# Patient Record
Sex: FEMALE | Race: BLACK OR AFRICAN AMERICAN | NOT HISPANIC OR LATINO | Employment: UNEMPLOYED | URBAN - METROPOLITAN AREA
[De-identification: names, ages, dates, MRNs, and addresses within clinical notes are randomized per-mention and may not be internally consistent; named-entity substitution may affect disease eponyms.]

---

## 2019-07-19 ENCOUNTER — HOSPITAL ENCOUNTER (INPATIENT)
Facility: HOSPITAL | Age: 54
LOS: 19 days | Discharge: HOME | End: 2019-08-07
Attending: INTERNAL MEDICINE | Admitting: INTERNAL MEDICINE
Payer: MEDICAID

## 2019-07-19 DIAGNOSIS — R07.9 CHEST PAIN: ICD-10-CM

## 2019-07-19 DIAGNOSIS — D69.6 THROMBOCYTOPENIA: ICD-10-CM

## 2019-07-19 DIAGNOSIS — R34 OLIGURIA: ICD-10-CM

## 2019-07-19 DIAGNOSIS — R76.8 ELEVATED SERUM IMMUNOGLOBULIN FREE LIGHT CHAINS: ICD-10-CM

## 2019-07-19 DIAGNOSIS — M79.89 LEG SWELLING: ICD-10-CM

## 2019-07-19 DIAGNOSIS — R31.9 HEMATURIA SYNDROME: ICD-10-CM

## 2019-07-19 DIAGNOSIS — R80.8 OTHER PROTEINURIA: ICD-10-CM

## 2019-07-19 DIAGNOSIS — R59.0 MEDIASTINAL ADENOPATHY: ICD-10-CM

## 2019-07-19 DIAGNOSIS — I10 ESSENTIAL HYPERTENSION: ICD-10-CM

## 2019-07-19 DIAGNOSIS — N00.8 ACUTE (DIFFUSE) PROLIFERATIVE GLOMERULONEPHRITIS: ICD-10-CM

## 2019-07-19 DIAGNOSIS — R77.8 LOW SERUM COMPLEMENT C4: ICD-10-CM

## 2019-07-19 DIAGNOSIS — D80.1 HYPOGAMMAGLOBULINEMIA: ICD-10-CM

## 2019-07-19 DIAGNOSIS — N17.9 ACUTE RENAL FAILURE, UNSPECIFIED ACUTE RENAL FAILURE TYPE: ICD-10-CM

## 2019-07-19 DIAGNOSIS — E87.79 OTHER HYPERVOLEMIA: ICD-10-CM

## 2019-07-19 DIAGNOSIS — B18.1 CHRONIC HEPATITIS B: ICD-10-CM

## 2019-07-19 DIAGNOSIS — D89.1 CRYOGLOBULINEMIC VASCULITIS: Primary | ICD-10-CM

## 2019-07-19 DIAGNOSIS — N17.9 ACUTE RENAL FAILURE: ICD-10-CM

## 2019-07-19 DIAGNOSIS — R06.01 ORTHOPNEA: ICD-10-CM

## 2019-07-19 PROBLEM — J18.9 PNEUMONIA: Status: ACTIVE | Noted: 2019-07-19

## 2019-07-19 PROBLEM — R79.89 ELEVATED BRAIN NATRIURETIC PEPTIDE (BNP) LEVEL: Status: ACTIVE | Noted: 2019-07-19

## 2019-07-19 LAB
ALBUMIN SERPL BCP-MCNC: 2.6 G/DL (ref 3.5–5.2)
ALLENS TEST: ABNORMAL
ALP SERPL-CCNC: 121 U/L (ref 55–135)
ALT SERPL W/O P-5'-P-CCNC: 26 U/L (ref 10–44)
ANION GAP SERPL CALC-SCNC: 14 MMOL/L (ref 8–16)
ASCENDING AORTA: 3.01 CM
AST SERPL-CCNC: 31 U/L (ref 10–40)
AV INDEX (PROSTH): 0.91
AV MEAN GRADIENT: 6 MMHG
AV PEAK GRADIENT: 15 MMHG
AV VALVE AREA: 2.64 CM2
AV VELOCITY RATIO: 0.78
BACTERIA #/AREA URNS AUTO: ABNORMAL /HPF
BASOPHILS # BLD AUTO: 0.01 K/UL (ref 0–0.2)
BASOPHILS NFR BLD: 0.2 % (ref 0–1.9)
BILIRUB SERPL-MCNC: 0.8 MG/DL (ref 0.1–1)
BILIRUB UR QL STRIP: NEGATIVE
BNP SERPL-MCNC: 999 PG/ML (ref 0–99)
BSA FOR ECHO PROCEDURE: 2.19 M2
BUN SERPL-MCNC: 100 MG/DL (ref 6–20)
CALCIUM SERPL-MCNC: 9 MG/DL (ref 8.7–10.5)
CHLORIDE SERPL-SCNC: 108 MMOL/L (ref 95–110)
CLARITY UR REFRACT.AUTO: ABNORMAL
CO2 SERPL-SCNC: 16 MMOL/L (ref 23–29)
COLOR UR AUTO: ABNORMAL
CREAT SERPL-MCNC: 4.3 MG/DL (ref 0.5–1.4)
CREAT UR-MCNC: 222 MG/DL (ref 15–325)
CV ECHO LV RWT: 0.51 CM
DAT IGG-SP REAG RBC-IMP: NORMAL
DIFFERENTIAL METHOD: ABNORMAL
DOP CALC AO PEAK VEL: 1.94 M/S
DOP CALC AO VTI: 34.14 CM
DOP CALC LVOT AREA: 2.9 CM2
DOP CALC LVOT DIAMETER: 1.92 CM
DOP CALC LVOT PEAK VEL: 1.52 M/S
DOP CALC LVOT STROKE VOLUME: 90.08 CM3
DOP CALCLVOT PEAK VEL VTI: 31.13 CM
E WAVE DECELERATION TIME: 213.6 MSEC
E/A RATIO: 2.49
E/E' RATIO: 11.79 M/S
ECHO LV POSTERIOR WALL: 1.18 CM (ref 0.6–1.1)
EOSINOPHIL # BLD AUTO: 0 K/UL (ref 0–0.5)
EOSINOPHIL NFR BLD: 0 % (ref 0–8)
ERYTHROCYTE [DISTWIDTH] IN BLOOD BY AUTOMATED COUNT: 22.3 % (ref 11.5–14.5)
EST. GFR  (AFRICAN AMERICAN): 12.8 ML/MIN/1.73 M^2
EST. GFR  (NON AFRICAN AMERICAN): 11.1 ML/MIN/1.73 M^2
FERRITIN SERPL-MCNC: 161 NG/ML (ref 20–300)
FIBRINOGEN PPP-MCNC: 393 MG/DL (ref 182–366)
FOLATE SERPL-MCNC: 11.1 NG/ML (ref 4–24)
FRACTIONAL SHORTENING: 39 % (ref 28–44)
GLUCOSE SERPL-MCNC: 123 MG/DL (ref 70–110)
GLUCOSE UR QL STRIP: ABNORMAL
HAPTOGLOB SERPL-MCNC: 209 MG/DL (ref 30–250)
HBSAG CONFIRMATION: POSITIVE
HBV CORE IGM SERPL QL IA: NEGATIVE
HBV SURFACE AB SER-ACNC: NEGATIVE M[IU]/ML
HBV SURFACE AG SERPL QL IA: POSITIVE
HCO3 UR-SCNC: 18.4 MMOL/L (ref 24–28)
HCT VFR BLD AUTO: 30.6 % (ref 37–48.5)
HGB BLD-MCNC: 10 G/DL (ref 12–16)
HGB UR QL STRIP: ABNORMAL
HIV 1+2 AB+HIV1 P24 AG SERPL QL IA: NEGATIVE
HYALINE CASTS UR QL AUTO: 15 /LPF
IMM GRANULOCYTES # BLD AUTO: 0.11 K/UL (ref 0–0.04)
IMM GRANULOCYTES NFR BLD AUTO: 1.8 % (ref 0–0.5)
INR PPP: 1 (ref 0.8–1.2)
INTERVENTRICULAR SEPTUM: 1 CM (ref 0.6–1.1)
IVRT: 0.06 MSEC
KETONES UR QL STRIP: NEGATIVE
LA MAJOR: 5.58 CM
LA MINOR: 5.66 CM
LA WIDTH: 4.08 CM
LACTATE SERPL-SCNC: 0.6 MMOL/L (ref 0.5–2.2)
LDH SERPL L TO P-CCNC: 604 U/L (ref 110–260)
LEFT ATRIUM SIZE: 4.01 CM
LEFT ATRIUM VOLUME INDEX: 37 ML/M2
LEFT ATRIUM VOLUME: 78.15 CM3
LEFT INTERNAL DIMENSION IN SYSTOLE: 2.84 CM (ref 2.1–4)
LEFT VENTRICLE DIASTOLIC VOLUME INDEX: 47.2 ML/M2
LEFT VENTRICLE DIASTOLIC VOLUME: 99.62 ML
LEFT VENTRICLE MASS INDEX: 86 G/M2
LEFT VENTRICLE SYSTOLIC VOLUME INDEX: 14.5 ML/M2
LEFT VENTRICLE SYSTOLIC VOLUME: 30.66 ML
LEFT VENTRICULAR INTERNAL DIMENSION IN DIASTOLE: 4.65 CM (ref 3.5–6)
LEFT VENTRICULAR MASS: 182.03 G
LEUKOCYTE ESTERASE UR QL STRIP: NEGATIVE
LV LATERAL E/E' RATIO: 11.2 M/S
LV SEPTAL E/E' RATIO: 12.44 M/S
LYMPHOCYTES # BLD AUTO: 1.3 K/UL (ref 1–4.8)
LYMPHOCYTES NFR BLD: 22.2 % (ref 18–48)
MAGNESIUM SERPL-MCNC: 2.2 MG/DL (ref 1.6–2.6)
MCH RBC QN AUTO: 22.4 PG (ref 27–31)
MCHC RBC AUTO-ENTMCNC: 32.7 G/DL (ref 32–36)
MCV RBC AUTO: 69 FL (ref 82–98)
MICROSCOPIC COMMENT: ABNORMAL
MONOCYTES # BLD AUTO: 0.4 K/UL (ref 0.3–1)
MONOCYTES NFR BLD: 7.4 % (ref 4–15)
MV PEAK A VEL: 0.45 M/S
MV PEAK E VEL: 1.12 M/S
NEUTROPHILS # BLD AUTO: 4.1 K/UL (ref 1.8–7.7)
NEUTROPHILS NFR BLD: 68.4 % (ref 38–73)
NITRITE UR QL STRIP: NEGATIVE
NRBC BLD-RTO: 0 /100 WBC
PATH REV BLD -IMP: NORMAL
PATH REV BLD -IMP: NORMAL
PCO2 BLDA: 28.1 MMHG (ref 35–45)
PH SMN: 7.42 [PH] (ref 7.35–7.45)
PH UR STRIP: 5 [PH] (ref 5–8)
PHOSPHATE SERPL-MCNC: 3.8 MG/DL (ref 2.7–4.5)
PISA TR MAX VEL: 2.81 M/S
PLATELET # BLD AUTO: 41 K/UL (ref 150–350)
PMV BLD AUTO: ABNORMAL FL (ref 9.2–12.9)
PO2 BLDA: 69 MMHG (ref 80–100)
POC BE: -6 MMOL/L
POC SATURATED O2: 94 % (ref 95–100)
POC TCO2: 19 MMOL/L (ref 23–27)
POTASSIUM SERPL-SCNC: 3.8 MMOL/L (ref 3.5–5.1)
PROT SERPL-MCNC: 5.4 G/DL (ref 6–8.4)
PROT UR QL STRIP: ABNORMAL
PROT UR-MCNC: 1326 MG/DL (ref 0–15)
PROT/CREAT UR: 5.97 MG/G{CREAT} (ref 0–0.2)
PROTHROMBIN TIME: 10.4 SEC (ref 9–12.5)
PULM VEIN S/D RATIO: 0.57
PV PEAK D VEL: 0.88 M/S
PV PEAK S VEL: 0.5 M/S
RA MAJOR: 4.99 CM
RA PRESSURE: 15 MMHG
RA WIDTH: 3.99 CM
RBC # BLD AUTO: 4.47 M/UL (ref 4–5.4)
RBC #/AREA URNS AUTO: 86 /HPF (ref 0–4)
RETICS/RBC NFR AUTO: 0.8 % (ref 0.5–2.5)
RIGHT VENTRICULAR END-DIASTOLIC DIMENSION: 3.57 CM
RV TISSUE DOPPLER FREE WALL SYSTOLIC VELOCITY 1 (APICAL 4 CHAMBER VIEW): 12 CM/S
SAMPLE: ABNORMAL
SINUS: 3.09 CM
SITE: ABNORMAL
SODIUM SERPL-SCNC: 138 MMOL/L (ref 136–145)
SP GR UR STRIP: 1.02 (ref 1–1.03)
SQUAMOUS #/AREA URNS AUTO: 1 /HPF
STJ: 2.57 CM
T4 FREE SERPL-MCNC: 0.89 NG/DL (ref 0.71–1.51)
TDI LATERAL: 0.1 M/S
TDI SEPTAL: 0.09 M/S
TDI: 0.1 M/S
TR MAX PG: 32 MMHG
TRICUSPID ANNULAR PLANE SYSTOLIC EXCURSION: 2.29 CM
TSH SERPL DL<=0.005 MIU/L-ACNC: 0.38 UIU/ML (ref 0.4–4)
TV REST PULMONARY ARTERY PRESSURE: 47 MMHG
URN SPEC COLLECT METH UR: ABNORMAL
VIT B12 SERPL-MCNC: 843 PG/ML (ref 210–950)
WBC # BLD AUTO: 5.98 K/UL (ref 3.9–12.7)
WBC #/AREA URNS AUTO: 4 /HPF (ref 0–5)
YEAST UR QL AUTO: ABNORMAL

## 2019-07-19 PROCEDURE — 86706 HEP B SURFACE ANTIBODY: CPT

## 2019-07-19 PROCEDURE — 83605 ASSAY OF LACTIC ACID: CPT

## 2019-07-19 PROCEDURE — 99232 PR SUBSEQUENT HOSPITAL CARE,LEVL II: ICD-10-PCS | Mod: ,,, | Performed by: INTERNAL MEDICINE

## 2019-07-19 PROCEDURE — 63600175 PHARM REV CODE 636 W HCPCS: Performed by: NURSE PRACTITIONER

## 2019-07-19 PROCEDURE — 85060 PATHOLOGIST REVIEW: ICD-10-PCS | Mod: ,,, | Performed by: PATHOLOGY

## 2019-07-19 PROCEDURE — 84100 ASSAY OF PHOSPHORUS: CPT

## 2019-07-19 PROCEDURE — 82803 BLOOD GASES ANY COMBINATION: CPT

## 2019-07-19 PROCEDURE — 11000001 HC ACUTE MED/SURG PRIVATE ROOM

## 2019-07-19 PROCEDURE — 85397 CLOTTING FUNCT ACTIVITY: CPT

## 2019-07-19 PROCEDURE — 84439 ASSAY OF FREE THYROXINE: CPT

## 2019-07-19 PROCEDURE — 86703 HIV-1/HIV-2 1 RESULT ANTBDY: CPT

## 2019-07-19 PROCEDURE — 83520 IMMUNOASSAY QUANT NOS NONAB: CPT | Mod: 59

## 2019-07-19 PROCEDURE — 25000003 PHARM REV CODE 250: Performed by: NURSE PRACTITIONER

## 2019-07-19 PROCEDURE — 83880 ASSAY OF NATRIURETIC PEPTIDE: CPT

## 2019-07-19 PROCEDURE — 63600175 PHARM REV CODE 636 W HCPCS: Performed by: STUDENT IN AN ORGANIZED HEALTH CARE EDUCATION/TRAINING PROGRAM

## 2019-07-19 PROCEDURE — 86334 IMMUNOFIX E-PHORESIS SERUM: CPT | Mod: 26,,, | Performed by: PATHOLOGY

## 2019-07-19 PROCEDURE — 84443 ASSAY THYROID STIM HORMONE: CPT

## 2019-07-19 PROCEDURE — 85060 BLOOD SMEAR INTERPRETATION: CPT | Mod: ,,, | Performed by: PATHOLOGY

## 2019-07-19 PROCEDURE — 81001 URINALYSIS AUTO W/SCOPE: CPT

## 2019-07-19 PROCEDURE — 85384 FIBRINOGEN ACTIVITY: CPT

## 2019-07-19 PROCEDURE — 86382 NEUTRALIZATION TEST VIRAL: CPT

## 2019-07-19 PROCEDURE — 99900035 HC TECH TIME PER 15 MIN (STAT)

## 2019-07-19 PROCEDURE — 85025 COMPLETE CBC W/AUTO DIFF WBC: CPT

## 2019-07-19 PROCEDURE — 86334 PATHOLOGIST INTERPRETATION IFE: ICD-10-PCS | Mod: 26,,, | Performed by: PATHOLOGY

## 2019-07-19 PROCEDURE — 84165 PROTEIN E-PHORESIS SERUM: CPT | Mod: 26,,, | Performed by: PATHOLOGY

## 2019-07-19 PROCEDURE — 82570 ASSAY OF URINE CREATININE: CPT

## 2019-07-19 PROCEDURE — 82728 ASSAY OF FERRITIN: CPT

## 2019-07-19 PROCEDURE — 99232 SBSQ HOSP IP/OBS MODERATE 35: CPT | Mod: ,,, | Performed by: INTERNAL MEDICINE

## 2019-07-19 PROCEDURE — 36415 COLL VENOUS BLD VENIPUNCTURE: CPT

## 2019-07-19 PROCEDURE — 99233 SBSQ HOSP IP/OBS HIGH 50: CPT | Mod: ,,, | Performed by: INTERNAL MEDICINE

## 2019-07-19 PROCEDURE — 87040 BLOOD CULTURE FOR BACTERIA: CPT

## 2019-07-19 PROCEDURE — 86705 HEP B CORE ANTIBODY IGM: CPT

## 2019-07-19 PROCEDURE — 36600 WITHDRAWAL OF ARTERIAL BLOOD: CPT

## 2019-07-19 PROCEDURE — 85045 AUTOMATED RETICULOCYTE COUNT: CPT

## 2019-07-19 PROCEDURE — 25000003 PHARM REV CODE 250: Performed by: STUDENT IN AN ORGANIZED HEALTH CARE EDUCATION/TRAINING PROGRAM

## 2019-07-19 PROCEDURE — 85610 PROTHROMBIN TIME: CPT

## 2019-07-19 PROCEDURE — 82746 ASSAY OF FOLIC ACID SERUM: CPT

## 2019-07-19 PROCEDURE — 80053 COMPREHEN METABOLIC PANEL: CPT

## 2019-07-19 PROCEDURE — 87340 HEPATITIS B SURFACE AG IA: CPT

## 2019-07-19 PROCEDURE — 83010 ASSAY OF HAPTOGLOBIN QUANT: CPT

## 2019-07-19 PROCEDURE — 83735 ASSAY OF MAGNESIUM: CPT

## 2019-07-19 PROCEDURE — 99233 PR SUBSEQUENT HOSPITAL CARE,LEVL III: ICD-10-PCS | Mod: ,,, | Performed by: INTERNAL MEDICINE

## 2019-07-19 PROCEDURE — 84165 PROTEIN E-PHORESIS SERUM: CPT

## 2019-07-19 PROCEDURE — 82607 VITAMIN B-12: CPT

## 2019-07-19 PROCEDURE — 94761 N-INVAS EAR/PLS OXIMETRY MLT: CPT

## 2019-07-19 PROCEDURE — 86334 IMMUNOFIX E-PHORESIS SERUM: CPT

## 2019-07-19 PROCEDURE — 83615 LACTATE (LD) (LDH) ENZYME: CPT

## 2019-07-19 PROCEDURE — 84165 PATHOLOGIST INTERPRETATION SPE: ICD-10-PCS | Mod: 26,,, | Performed by: PATHOLOGY

## 2019-07-19 PROCEDURE — 63600175 PHARM REV CODE 636 W HCPCS: Performed by: GENERAL PRACTICE

## 2019-07-19 PROCEDURE — 86880 COOMBS TEST DIRECT: CPT

## 2019-07-19 PROCEDURE — 86022 PLATELET ANTIBODIES: CPT

## 2019-07-19 RX ORDER — SODIUM CHLORIDE 0.9 % (FLUSH) 0.9 %
10 SYRINGE (ML) INJECTION
Status: DISCONTINUED | OUTPATIENT
Start: 2019-07-19 | End: 2019-07-19

## 2019-07-19 RX ORDER — FUROSEMIDE 40 MG/1
160 TABLET ORAL DAILY
Status: DISCONTINUED | OUTPATIENT
Start: 2019-07-19 | End: 2019-07-19

## 2019-07-19 RX ORDER — NIFEDIPINE 30 MG/1
30 TABLET, FILM COATED, EXTENDED RELEASE ORAL DAILY
Status: ON HOLD | COMMUNITY
End: 2019-07-19 | Stop reason: CLARIF

## 2019-07-19 RX ORDER — AMLODIPINE BESYLATE 10 MG/1
10 TABLET ORAL DAILY
Status: DISCONTINUED | OUTPATIENT
Start: 2019-07-19 | End: 2019-07-25

## 2019-07-19 RX ORDER — AZITHROMYCIN 250 MG/1
500 TABLET, FILM COATED ORAL DAILY
Status: COMPLETED | OUTPATIENT
Start: 2019-07-20 | End: 2019-07-21

## 2019-07-19 RX ORDER — AMLODIPINE BESYLATE 10 MG/1
10 TABLET ORAL DAILY
Status: ON HOLD | COMMUNITY
End: 2019-08-07 | Stop reason: HOSPADM

## 2019-07-19 RX ORDER — CARVEDILOL 6.25 MG/1
6.25 TABLET ORAL 2 TIMES DAILY
Status: DISCONTINUED | OUTPATIENT
Start: 2019-07-19 | End: 2019-07-29

## 2019-07-19 RX ORDER — SODIUM CHLORIDE 0.9 % (FLUSH) 0.9 %
10 SYRINGE (ML) INJECTION
Status: DISCONTINUED | OUTPATIENT
Start: 2019-07-19 | End: 2019-08-07 | Stop reason: HOSPADM

## 2019-07-19 RX ORDER — PREDNISONE 20 MG/1
60 TABLET ORAL DAILY
Status: DISCONTINUED | OUTPATIENT
Start: 2019-07-19 | End: 2019-07-26

## 2019-07-19 RX ORDER — CHLORTHALIDONE 50 MG/1
50 TABLET ORAL DAILY
Status: ON HOLD | COMMUNITY
End: 2019-08-07 | Stop reason: HOSPADM

## 2019-07-19 RX ORDER — AZITHROMYCIN 250 MG/1
500 TABLET, FILM COATED ORAL DAILY
Status: DISCONTINUED | OUTPATIENT
Start: 2019-07-19 | End: 2019-07-19

## 2019-07-19 RX ORDER — IPRATROPIUM BROMIDE AND ALBUTEROL SULFATE 2.5; .5 MG/3ML; MG/3ML
3 SOLUTION RESPIRATORY (INHALATION) EVERY 6 HOURS PRN
Status: DISCONTINUED | OUTPATIENT
Start: 2019-07-19 | End: 2019-08-07 | Stop reason: HOSPADM

## 2019-07-19 RX ORDER — PREDNISONE 20 MG/1
60 TABLET ORAL DAILY
Status: DISCONTINUED | OUTPATIENT
Start: 2019-07-19 | End: 2019-07-19

## 2019-07-19 RX ORDER — ASPIRIN 81 MG/1
81 TABLET ORAL DAILY
Status: ON HOLD | COMMUNITY
End: 2019-08-07 | Stop reason: HOSPADM

## 2019-07-19 RX ORDER — FUROSEMIDE 10 MG/ML
160 INJECTION INTRAMUSCULAR; INTRAVENOUS ONCE
Status: COMPLETED | OUTPATIENT
Start: 2019-07-19 | End: 2019-07-19

## 2019-07-19 RX ADMIN — FUROSEMIDE 160 MG: 10 INJECTION, SOLUTION INTRAMUSCULAR; INTRAVENOUS at 06:07

## 2019-07-19 RX ADMIN — CARVEDILOL 6.25 MG: 6.25 TABLET, FILM COATED ORAL at 08:07

## 2019-07-19 RX ADMIN — AMLODIPINE BESYLATE 10 MG: 10 TABLET ORAL at 08:07

## 2019-07-19 RX ADMIN — AZITHROMYCIN 500 MG: 250 TABLET, FILM COATED ORAL at 08:07

## 2019-07-19 RX ADMIN — CARVEDILOL 6.25 MG: 6.25 TABLET, FILM COATED ORAL at 10:07

## 2019-07-19 RX ADMIN — VANCOMYCIN HYDROCHLORIDE 1500 MG: 1.5 INJECTION, POWDER, LYOPHILIZED, FOR SOLUTION INTRAVENOUS at 08:07

## 2019-07-19 RX ADMIN — PIPERACILLIN AND TAZOBACTAM 4.5 G: 4; .5 INJECTION, POWDER, LYOPHILIZED, FOR SOLUTION INTRAVENOUS; PARENTERAL at 08:07

## 2019-07-19 RX ADMIN — PREDNISONE 60 MG: 20 TABLET ORAL at 06:07

## 2019-07-19 NOTE — PLAN OF CARE
Discharge Planning:    Sw met with patient to complete the discharge assessment.  Patient and fiance were present.  Patient stated that she lived at home with her grandchildren.  Patient received help with ADL's/IADL's from her fiance.  Pt did not have any assistive devices but believed that she will need oxygen at discharge.  Patient uses [facility]  and her PCP is Dr. John Mantilla.  Patients plan is to return home at discharge with continued support from her fiance'.    CM will continue to follow.  CARROL Moody,LCSW

## 2019-07-19 NOTE — RESIDENT HANDOFF
Handoff     Primary Team: [facility] CRITICAL CARE MEDICINE Room Number: 6090/6090 A     Patient Name: Kendy Vital MRN: [id number]     Date of Birth: [id number] Allergies: Patient has no known allergies.     Age: 53 y.o. Admit Date: 7/19/2019     Sex: female  BMI: Body mass index is 36.64 kg/m².     Code Status: Full Code        Illness Level (current clinical status): Watcher - No    Reason for Admission: Thrombocytopenia    Brief HPI (pertinent PMH and diagnosis or differential diagnosis):   Patient is a 53 year old female with hypertension, anemia, and history of leiomyoma of the uterus who presents to ICU as a transfer from [facility] for evaluation of thrombocytopenia. Patient reports that prior to going to the hospital 3 days ago that she was experiencing symptoms of chills, feeling febrile, dry cough, shortness of breath and occasional nose bleeds for roughly 2 weeks. She also reports easy fatigueability and difficulty breathing when laying flat; currently sleeps with 2 pillows. Patient presented to hospital in [place] when her symptoms did not resolve. Initial work up showed a , worsening kidney function with creatinine of 2.9, and thrombocytopenia with platelets of 33k. In addition, chest x-ray showed interstitial opacities and follow up CT showed perihilar ground glass opacity. Patient was treated with rocephin and doxycycline as well as Bumex q12 due to her heart failure type symptoms. Lab work at the time showed WBC of 7.9 and a lactate of .8. In addition, she tested positive for strep A. Patient was transferred for further evaluation of her thrombocytopenia with concern for TTP and possible need for plasmapheresis.     At time of exam, patient is properly oriented to person time and places. She endorses headache, generalized myalgias, nausea and orthopnea. She denies SOB while sitting up, chest pain, abdominal pain, vomiting, and diarrhea. She has not felt febrile since 1  week prior to hospital stay.       Procedure Date: n/a    Hospital Course (updated, brief assessment by system or problem, significant events): Upon transfer her platelets improved to 41. She was evaluated by nephrology and hematology/oncology services to determine the etiology of the thrombocytopenia and each service has ordered an extensive workup. Continue monitoring resutls and following up with services.     Tasks (specific, using if-then statements):   If patient deteriorates, consult ICU    Contingency Plan (special circumstances anticipated and plan):     Discharge Disposition: Home or Self Care

## 2019-07-19 NOTE — H&P
[facility]  Critical Care Medicine  History & Physical    Patient Name: Kendy Vital  MRN: [id number]  Admission Date: 7/19/2019  Hospital Length of Stay: 0 days  Code Status: Full Code  Attending Physician: Arielle Sena MD   Primary Care Provider: To Obtain Unable   Principal Problem: Thrombocytopenia    Subjective:     HPI:  Patient is a 53 year old female with hypertension, anemia, and history of leiomyoma of the uterus who presents to ICU as a transfer from [facility] for evaluation of thrombocytopenia. Patient reports that prior to going to the hospital 3 days ago that she was experiencing symptoms of chills, feeling febrile, dry cough, shortness of breath and occasional nose bleeds for roughly 2 weeks. She also reports easy fatigueability and difficulty breathing when laying flat; currently sleeps with 2 pillows. Patient presented to hospital in [place] when her symptoms did not resolve. Initial work up showed a , worsening kidney function with creatinine of 2.9, and thrombocytopenia with platelets of 33k. In addition, chest x-ray showed interstitial opacities and follow up CT showed perihilar ground glass opacity. Patient was treated with rocephin and doxycycline as well as Bumex q12 due to her heart failure type symptoms. Lab work at the time showed WBC of 7.9 and a lactate of .8. In addition, she tested positive for strep A. Patient was transferred for further evaluation of her thrombocytopenia with concern for TTP and possible need for plasmapheresis.    At time of exam, patient is properly oriented to person time and places. She endorses headache, generalized myalgias, nausea and orthopnea. She denies SOB while sitting up, chest pain, abdominal pain, vomiting, and diarrhea. She has not felt febrile since 1 week prior to hospital stay.       Hospital/ICU Course:  No notes on file     No past medical history on file.    No past surgical history  on file.    Review of patient's allergies indicates:  No Known Allergies    Family History     None        Tobacco Use    Smoking status: Not on file   Substance and Sexual Activity    Alcohol use: Not on file    Drug use: Not on file    Sexual activity: Not on file      Review of Systems   Constitutional: Positive for activity change, appetite change, chills, fatigue and fever.   HENT: Negative for congestion and sore throat.    Respiratory: Positive for cough and shortness of breath (orthopnea ). Negative for chest tightness.    Cardiovascular: Positive for leg swelling. Negative for chest pain and palpitations.   Gastrointestinal: Positive for nausea. Negative for abdominal pain, constipation, diarrhea and vomiting.   Genitourinary: Positive for flank pain. Negative for dysuria.   Musculoskeletal: Positive for myalgias. Negative for arthralgias and back pain.   Skin: Negative for color change, pallor and rash.   Neurological: Positive for headaches. Negative for dizziness and weakness.     Objective:     Vital Signs (Most Recent):  Pulse: 69 (07/19/19 0428)  Resp: (!) 30 (07/19/19 0428)  BP: (!) 166/83 (07/19/19 0428)  SpO2: 100 % (07/19/19 0428) Vital Signs (24h Range):  Temp:  [97.8 °F (36.6 °C)-98.2 °F (36.8 °C)] 98.2 °F (36.8 °C)  Pulse:  [69-84] 69  Resp:  [14-30] 30  SpO2:  [96 %-100 %] 100 %  BP: (149-192)/(79-96) 166/83   Weight: 103.4 kg (227 lb 15.3 oz)  Body mass index is 36.79 kg/m².    No intake or output data in the 24 hours ending 07/19/19 0631    Physical Exam   Constitutional: She is oriented to person, place, and time. She appears well-developed and well-nourished. No distress.   HENT:   Head: Normocephalic and atraumatic.   Mouth/Throat: No oropharyngeal exudate.   Eyes: Conjunctivae are normal.   Neck: Normal range of motion. Neck supple. No JVD present.   Cardiovascular: Normal rate, regular rhythm, normal heart sounds and intact distal pulses.   Pulmonary/Chest: Effort normal. She has  wheezes (minor). She has no rales.   Abdominal: Soft. Bowel sounds are normal. She exhibits no distension. There is no tenderness.   Musculoskeletal: Normal range of motion. She exhibits edema (1+ bilateral lower extremity ) and tenderness (bilateral lower extremity ). She exhibits no deformity.   Neurological: She is alert and oriented to person, place, and time. No cranial nerve deficit or sensory deficit.   Skin: Skin is warm and dry.   Psychiatric: She has a normal mood and affect. Her behavior is normal.   Vitals reviewed.      Vents:     Lines/Drains/Airways          None        Significant Labs:    CBC/Anemia Profile:  Recent Labs   Lab 07/19/19  0536   WBC 5.98   HGB 10.0*   HCT 30.6*   PLT 41*   MCV 69*   RDW 22.3*        Chemistries:   Recent Labs   Lab 07/19/19  0535   MG 2.2   PHOS 3.8       Lactic Acid:   Recent Labs   Lab 07/19/19  0537   LACTATE 0.6       Significant Imaging: I have reviewed all pertinent imaging results/findings within the past 24 hours.    Assessment/Plan:     Pulmonary  Pneumonia  - Patient had x-ray in [place] that showed interstitial infiltrates. Follow up chest CT showed perihilar ground glass opacity. Treated initially with rocephin and doxycycline  - Will repeat chest x-ray   - Follow up blood cultures  - Continue treatment with broad spectrum antibiotics vanc, zosyn, azithro    Cardiac/Vascular  Elevated brain natriuretic peptide (BNP) level  - Patient had BNP of 526 from outside records. Patient had minor bilateral lower extremity swelling and reports easy fatigueability and orthopnea   - Repeat BNP, ordered 2d echo  - Can consider lasix based on lab results and chest x-ray result.     Essential hypertension  - Patient normally takes nifedipine 30 mg QD, amlodipine 10 mg QD, and chlorthalidone 50 mg QD as home regimen.  - Patient hypertensive in hospital, however out of concern for sepsis, will resume only amlodipine for now    Renal/  Acute renal failure  - Patient  has baseline creatinine of 1.0 roughly 1 month ago. Outside records show creatinine of 2.9 and BUN of 74.  - Ordered urinalysis, urine sodium, urine creatinine and kidney ultrasound  - Patient also tested positive for strep A as seen on outside hospital records. Possible etiology for GN    Hematology  * Thrombocytopenia  - Patient transferred for evaluation of thrombocytopenia with platelet count of 33k out of concern for TTP.   - Shitocytes seen on blood morphology from outside hospital records  - Multiple lab tests ordered including CBC, CMP, LDH, haptoglobin, reticulocytes, Hep B labs, HIV, INR, PTT, fibrinogen, b12, folate, ferritin, TSH, peripheral blood smear, and [id number]  - Heme/onc consult placed      Critical Care Daily Checklist:    A: Awake: RASS Goal/Actual Goal:    Actual: [place] Agitation Sedation Scale (RASS): Alert and calm   B: Spontaneous Breathing Trial Performed?     C: SAT & SBT Coordinated?  N/A                      D: Delirium: CAM-ICU Overall CAM-ICU: Negative   E: Early Mobility Performed? No   F: Feeding Goal:    Status:     Current Diet Order   Procedures    Diet Cardiac      AS: Analgesia/Sedation none   T: Thromboembolic Prophylaxis SCD   H: HOB > 300 No   U: Stress Ulcer Prophylaxis (if needed) none   G: Glucose Control none   B: Bowel Function     I: Indwelling Catheter (Lines & Marcial) Necessity Urinary catheter   D: De-escalation of Antimicrobials/Pharmacotherapies Broad spectrum, vanc, zosyn, azithro    Plan for the day/ETD Heme/onc evaluation    Code Status:  Family/Goals of Care: Full Code         Critical secondary to Patient has a condition that poses threat to life and bodily function: thrombocytosis and sepsis     Critical care was time spent personally by me on the following activities: development of treatment plan with patient or surrogate and bedside caregivers, discussions with consultants, evaluation of patient's response to treatment, examination of patient, ordering  and performing treatments and interventions, ordering and review of laboratory studies, ordering and review of radiographic studies, pulse oximetry, re-evaluation of patient's condition. This critical care time did not overlap with that of any other provider or involve time for any procedures.     Gustavo Darby MD  Critical Care Medicine  [facility]

## 2019-07-19 NOTE — NURSING
CCS was made aware that the patient already arrived. Dr Darby was made aware of patient's elevated blood pressure. As per patient, last blood pressure medication intake was around 9pm. MD acknowledged. Awaiting for orders. Will continue to monitor.

## 2019-07-19 NOTE — PROGRESS NOTES
Pharmacokinetic Initial Assessment: IV Vancomycin    Assessment/Plan:    Initiate intravenous vancomycin with loading dose of 1500 mg once with subsequent doses when random concentrations are less than 25 mcg/ml  Desired empiric serum trough concentration is 10 to 20 mcg/mL.  Draw vancomycin random level on 7/20/19 with am labs.  Pharmacy will continue to follow and monitor vancomycin.      Please contact pharmacy at extension [phone] or [phone] with any questions regarding this assessment.     Thank you for the consult,   Anna Luna PharmD     Patient brief summary:  Kendy Vital is a 53 y.o. female initiated on antimicrobial therapy with IV Vancomycin for treatment of suspected lower respiratory infection    Drug Allergies:   Review of patient's allergies indicates:  No Known Allergies    Actual Body Weight:   103.4 kg    Renal Function:   Estimated Creatinine Clearance: 18.6 mL/min (A) (based on SCr of 4.3 mg/dL (H)).,     Dialysis Method (if applicable):  N/A    CBC (last 72 hours):  Recent Labs   Lab Result Units 07/19/19  0536   WBC K/uL 5.98   Hemoglobin g/dL 10.0*   Hematocrit % 30.6*   Platelets K/uL 41*   Gran% % 68.4   Lymph% % 22.2   Mono% % 7.4   Eosinophil% % 0.0   Basophil% % 0.2   Differential Method  Automated       Metabolic Panel (last 72 hours):  Recent Labs   Lab Result Units 07/19/19  0514 07/19/19  0535 07/19/19  0634   Sodium mmol/L 138  --   --    Potassium mmol/L 3.8  --   --    Chloride mmol/L 108  --   --    CO2 mmol/L 16*  --   --    Glucose mg/dL 123*  --   --    Glucose, UA   --   --  3+*   BUN, Bld mg/dL 100*  --   --    Creatinine mg/dL 4.3*  --   --    Albumin g/dL 2.6*  --   --    Total Bilirubin mg/dL 0.8  --   --    Alkaline Phosphatase U/L 121  --   --    AST U/L 31  --   --    ALT U/L 26  --   --    Magnesium mg/dL  --  2.2  --    Phosphorus mg/dL  --  3.8  --        Drug levels (last 3 results):  No results for input(s): VANCOMYCINRA, VANCOMYCINPE, VANCOMYCINTR in the last  72 hours.    Microbiologic Results:  Microbiology Results (last 7 days)     Procedure Component Value Units Date/Time    Blood culture [[id number]]     Order Status:  No result Specimen:  Blood     Blood culture [[id number]]     Order Status:  No result Specimen:  Blood

## 2019-07-19 NOTE — SUBJECTIVE & OBJECTIVE
No past medical history on file.    No past surgical history on file.    Review of patient's allergies indicates:  No Known Allergies    Family History     None        Tobacco Use    Smoking status: Not on file   Substance and Sexual Activity    Alcohol use: Not on file    Drug use: Not on file    Sexual activity: Not on file      Review of Systems   Constitutional: Positive for activity change, appetite change, chills, fatigue and fever.   HENT: Negative for congestion and sore throat.    Respiratory: Positive for cough and shortness of breath (orthopnea ). Negative for chest tightness.    Cardiovascular: Positive for leg swelling. Negative for chest pain and palpitations.   Gastrointestinal: Positive for nausea. Negative for abdominal pain, constipation, diarrhea and vomiting.   Genitourinary: Positive for flank pain. Negative for dysuria.   Musculoskeletal: Positive for myalgias. Negative for arthralgias and back pain.   Skin: Negative for color change, pallor and rash.   Neurological: Positive for headaches. Negative for dizziness and weakness.     Objective:     Vital Signs (Most Recent):  Pulse: 69 (07/19/19 0428)  Resp: (!) 30 (07/19/19 0428)  BP: (!) 166/83 (07/19/19 0428)  SpO2: 100 % (07/19/19 0428) Vital Signs (24h Range):  Temp:  [97.8 °F (36.6 °C)-98.2 °F (36.8 °C)] 98.2 °F (36.8 °C)  Pulse:  [69-84] 69  Resp:  [14-30] 30  SpO2:  [96 %-100 %] 100 %  BP: (149-192)/(79-96) 166/83   Weight: 103.4 kg (227 lb 15.3 oz)  Body mass index is 36.79 kg/m².    No intake or output data in the 24 hours ending 07/19/19 0631    Physical Exam   Constitutional: She is oriented to person, place, and time. She appears well-developed and well-nourished. No distress.   HENT:   Head: Normocephalic and atraumatic.   Mouth/Throat: No oropharyngeal exudate.   Eyes: Conjunctivae are normal.   Neck: Normal range of motion. Neck supple. No JVD present.   Cardiovascular: Normal rate, regular rhythm, normal heart sounds and  intact distal pulses.   Pulmonary/Chest: Effort normal. She has wheezes (minor). She has no rales.   Abdominal: Soft. Bowel sounds are normal. She exhibits no distension. There is no tenderness.   Musculoskeletal: Normal range of motion. She exhibits edema (1+ bilateral lower extremity ) and tenderness (bilateral lower extremity ). She exhibits no deformity.   Neurological: She is alert and oriented to person, place, and time. No cranial nerve deficit or sensory deficit.   Skin: Skin is warm and dry.   Psychiatric: She has a normal mood and affect. Her behavior is normal.   Vitals reviewed.      Vents:     Lines/Drains/Airways          None        Significant Labs:    CBC/Anemia Profile:  Recent Labs   Lab 07/19/19  0536   WBC 5.98   HGB 10.0*   HCT 30.6*   PLT 41*   MCV 69*   RDW 22.3*        Chemistries:   Recent Labs   Lab 07/19/19  0535   MG 2.2   PHOS 3.8       Lactic Acid:   Recent Labs   Lab 07/19/19  0537   LACTATE 0.6       Significant Imaging: I have reviewed all pertinent imaging results/findings within the past 24 hours.

## 2019-07-19 NOTE — PLAN OF CARE
No acute events throughout day, VS and assessment per flow sheet, patient progressing towards goals as tolerated, plan of care reviewed with Kendy Vital and family, all concerns addressed, will continue to monitor.

## 2019-07-19 NOTE — ASSESSMENT & PLAN NOTE
- Patient normally takes nifedipine 30 mg QD, amlodipine 10 mg QD, and chlorthalidone 50 mg QD as home regimen.  - Patient hypertensive in hospital, however out of concern for sepsis, will resume only amlodipine for now

## 2019-07-19 NOTE — CONSULTS
[facility]  Hematology/Oncology  Consult Note    Patient Name: Kendy Vital  MRN: [id number]  Admission Date: 7/19/2019  Hospital Length of Stay: 0 days  Code Status: Full Code   Attending Provider: Arielle Sena MD  Consulting Provider: Brock Perkins DO  Primary Care Physician: To Obtain Unable  Principal Problem:Thrombocytopenia    Inpatient consult to Hematology/Oncology  Consult performed by: Brock Perkins DO  Consult ordered by: Gustavo Darby MD        Subjective:     HPI: Pt is a 52 yo female with HTN, iron deficiency anemia, history of multiple DVTs, hx uterine fibroids s/p myomectomy who was transferred here from [facility] in [place] after she was found to have pneumonia, LACI and thrombocytopenia. Hematology was consulted for thrombocytopenia. History was obtained from the patient, patient's cousin, patient's fiancee and chart review.    Pt endorses episodes of fevers, chills, fatigue, dry cough, SOB, and orthopnea for 1 week. She also states that 3 days ago she began to have neck and back muscle cramping and stiffness and decreased urinary output and decided to see her PCP, who diagnosed her with muscle spasms and the flu. A few days later she decided to go to the [facility] ED and was found to have Cr 2.9, increased from baseline Cr 1.0. She was also found to have , WBC 7.9, Plt 33, lactate 0.8. CXR showed bilateral interstitial infiltrates, and subsequent CT chest showed bilateral ground glass opacities. She was treated with rochephin, doxycycline, bumex and transferred here.    Here her labs were significant for Hgb 10, MCV 69, RDW 22.3, Plt 41. WBC 5.98. UA showed 3+ protein, 3+ glucose and 3+ blood but was not consistent with UTI. Cr 4.3 and  consistent with an LACI. Hemolytic labs were unremarkable.    Pt has a history of uncontrolled hypertension and states that she is currently taking lisinopril 20 mg-HCTZ 12.5 mg,  "hydralazine 25 mg TID, and clonidine 0.2 mg BID. She was treated at [facility] 3 weeks ago for chest pain and was admitted. We do not have those records but pt states they had her on multiple drips and did not have a cath procedure. Pt also has a history of unprovoked thrombosis treated at [facility] approximately 4 years ago, none of which is visible in the "care everywhere" section of the chart. She states she had one episode of thrombosis in her left upper extremity that contained "many little clots" and required surgical thrombectomy. She also had a left renal vein thrombosis for which she received a stent. She states that for both episodes she was placed on a heparin drip, did outpatient lovenox and was continued on ASA 81 until now. She states that no one informed her of any findings suggesting a hypercoaguable state or if the clotting was provoked.    Oncology Treatment Plan:   [No treatment plan]    Medications:  Continuous Infusions:  Scheduled Meds:   amLODIPine  10 mg Oral Daily    [START ON 7/20/2019] azithromycin  500 mg Oral Daily    carvedilol  6.25 mg Oral BID    piperacillin-tazobactam (ZOSYN) IVPB  4.5 g Intravenous Q12H     PRN Meds:albuterol-ipratropium, sodium chloride 0.9%     Review of patient's allergies indicates:  No Known Allergies     No past medical history on file.  No past surgical history on file.  Family History     None        Tobacco Use    Smoking status: Not on file   Substance and Sexual Activity    Alcohol use: Not on file    Drug use: Not on file    Sexual activity: Not on file       Review of Systems   Constitutional: Positive for chills (resolved), fatigue and fever (resolved).   HENT: Positive for nosebleeds. Negative for congestion and sore throat.    Eyes: Negative for visual disturbance.   Respiratory: Positive for cough (dry) and shortness of breath (worsening SMYTH).    Cardiovascular: Positive for palpitations. Negative for chest pain and leg swelling. "   Gastrointestinal: Negative for abdominal pain, constipation, diarrhea, nausea and vomiting.   Genitourinary: Positive for decreased urine volume. Negative for dysuria and hematuria.   Musculoskeletal: Negative for arthralgias and back pain. Neck stiffness: resolved.   Skin: Negative for pallor and rash.   Neurological: Positive for weakness (generalized) and headaches (bilateral frontal). Negative for dizziness and syncope.   Hematological: Negative for adenopathy. Bruises/bleeds easily.   Psychiatric/Behavioral: Negative for confusion and decreased concentration.     Objective:     Vital Signs (Most Recent):  Temp: 98.4 °F (36.9 °C) (07/19/19 1100)  Pulse: 70 (07/19/19 1300)  Resp: 18 (07/19/19 1300)  BP: (!) 142/69 (07/19/19 1200)  SpO2: 99 % (07/19/19 1300) Vital Signs (24h Range):  Temp:  [97.8 °F (36.6 °C)-98.4 °F (36.9 °C)] 98.4 °F (36.9 °C)  Pulse:  [57-84] 70  Resp:  [14-22] 18  SpO2:  [96 %-100 %] 99 %  BP: (142-192)/(69-97) 142/69     Weight: 103.4 kg (227 lb 15.3 oz)  Body mass index is 36.79 kg/m².  Body surface area is 2.19 meters squared.      Intake/Output Summary (Last 24 hours) at 7/19/2019 1341  Last data filed at 7/19/2019 1200  Gross per 24 hour   Intake 40 ml   Output 160 ml   Net -120 ml       Physical Exam   Constitutional: She is oriented to person, place, and time. She appears well-developed and well-nourished. No distress.   HENT:   Head: Normocephalic and atraumatic.   Eyes: Pupils are equal, round, and reactive to light. EOM are normal.   Neck: Normal range of motion. No JVD present. No tracheal deviation present.   Cardiovascular: Normal rate, regular rhythm and normal heart sounds.   No murmur heard.  Pulmonary/Chest: Effort normal. No respiratory distress. She has wheezes (mild expiratory wheezing bilaterally at bases). She has no rales.   Abdominal: Soft. Bowel sounds are normal. She exhibits no distension. There is no tenderness.   Musculoskeletal: Normal range of motion. She  exhibits no edema or tenderness.   Neurological: She is alert and oriented to person, place, and time. No cranial nerve deficit or sensory deficit. She exhibits normal muscle tone.   Skin: Skin is warm and dry. She is not diaphoretic. No erythema. No pallor.       Significant Labs:   Recent Lab Results       07/19/19  0634   07/19/19  0537   07/19/19  0536   07/19/19  0535   07/19/19  0514        Albumin         2.6     Alkaline Phosphatase         121     ALT         26     Anion Gap         14     Appearance, UA Hazy             AST         31     Bacteria, UA Moderate             Baso #     0.01         Basophil%     0.2         Bilirubin (UA) Negative             BILIRUBIN TOTAL         0.8  Comment:  For infants and newborns, interpretation of results should be based  on gestational age, weight and in agreement with clinical  observations.  Premature Infant recommended reference ranges:  Up to 24 hours.............<8.0 mg/dL  Up to 48 hours............<12.0 mg/dL  3-5 days..................<15.0 mg/dL  6-29 days.................<15.0 mg/dL       BNP   999  Comment:  Values of less than 100 pg/ml are consistent with non-CHF populations.           BUN, Bld         100     Calcium         9.0     Chloride         108     CO2         16     Color, UA Serenity             Creatinine         4.3     Creatinine, Random Ur 222.0  Comment:  The random urine reference ranges provided were established   for 24 hour urine collections.  No reference ranges exist for  random urine specimens.  Correlate clinically.               Differential Method     Automated         eGFR if          12.8     eGFR if non          11.1  Comment:  Calculation used to obtain the estimated glomerular filtration  rate (eGFR) is the CKD-EPI equation.        Eos #     0.0         Eosinophil%     0.0         Ferritin         161     Fibrinogen         393     Folate         11.1     Free T4         0.89     Glucose          123     Glucose, UA 3+             Gran # (ANC)     4.1         Gran%     68.4         Haptoglobin         209     Hematocrit     30.6         Hemoglobin     10.0         HIV 1/2 Ag/Ab         Negative     Hyaline Casts, UA 15             Immature Grans (Abs)     0.11  Comment:  Mild elevation in immature granulocytes is non specific and   can be seen in a variety of conditions including stress response,   acute inflammation, trauma and pregnancy. Correlation with other   laboratory and clinical findings is essential.           Immature Granulocytes     1.8         Coumadin Monitoring INR         1.0  Comment:  Coumadin Therapy:  2.0 - 3.0 for INR for all indicators except mechanical heart valves  and antiphospholipid syndromes which should use 2.5 - 3.5.       Ketones, UA Negative             Lactate, Luis Angel   0.6  Comment:  Falsely low lactic acid results can be found in samples   containing >=13.0 mg/dL total bilirubin and/or >=3.5 mg/dL   direct bilirubin.             LD         604  Comment:  Results are increased in hemolyzed samples.     Leukocytes, UA Negative             Lymph #     1.3         Lymph%     22.2         Magnesium       2.2       MCH     22.4         MCHC     32.7         MCV     69         Microscopic Comment SEE COMMENT  Comment:  Other formed elements not mentioned in the report are not   present in the microscopic examination.                Mono #     0.4         Mono%     7.4         MPV     SEE COMMENT  Comment:  Result not available.         NITRITE UA Negative             nRBC     0         Occult Blood UA 3+             Pathologist Review     Review completed         Pathologist Review Peripheral Smear     REVIEWED  Comment:  Electronically reviewed and signed by:  Carmelina Rievra MD  Signed on 07/19/19 at 11:24  Pathologist interpretation of peripheral blood smear:  Red cells appear predominantly normochromic and normocytic with mild   anisocytosis.  Rare red cell fragments are  "seen, less than 2 per   high-power field overall.  Occasional target cells are seen which can   be seen in liver and splenic diseases as well as hemoglobinopathies   and thalassemias.  Correlate clinically.    There are occasional "ghost" red cells seen.  Recommend ruling out   hemolysis and G6PD deficiency.  Platelets are moderately decreased in number with no significant   platelet clumping seen.           pH, UA 5.0             Phosphorus       3.8       Platelets     41         Potassium         3.8     Prot/Creat Ratio, Ur 5.97             PROTEIN TOTAL         5.4     Protein, UA 3+  Comment:  Recommend a 24 hour urine protein or a urine   protein/creatinine ratio if globulin induced proteinuria is  clinically suspected.               Protein, Urine Random 1326  Comment:  The random urine reference ranges provided were established   for 24 hour urine collections.  No reference ranges exist for  random urine specimens.  Correlate clinically.               Protime         10.4     RBC     4.47         RBC, UA 86             RDW     22.3         Retic         0.8     Sodium         138     Specific Gravity, UA 1.020             Specimen UA Urine, Catheterized             Squam Epithel, UA 1             TSH         0.375     Vitamin B-12         843     WBC, UA 4             WBC     5.98         Yeast, UA None                                  Diagnostic Results:  I have reviewed all pertinent imaging results/findings within the past 24 hours.   CXR 1 View 7/19  Findings: Heart size normal.  Ill-defined patchy airspace consolidation and/or edema at the lung bases more on the right side.  Underlying pleural effusion cannot be ruled out.  The lung apices are clear.    U/S Retroperitoneal 7/19  Impression: Bilateral medical renal disease. Trace left pleural effusion.      Assessment/Plan:     Active Diagnoses:    Diagnosis Date Noted POA    PRINCIPAL PROBLEM:  Thrombocytopenia [D69.6] 07/19/2019 Yes    Essential " hypertension [I10] 07/19/2019 Yes    Acute renal failure [N17.9] 07/19/2019 Yes    Elevated brain natriuretic peptide (BNP) level [R79.89] 07/19/2019 Yes    Pneumonia [J18.9] 07/19/2019 Yes      Problems Resolved During this Admission:     Hematology Assessment  1) Thrombocytopenia  - Plt now 41, peripheral smear shows moderately decreased number of platelets but no clumping  - , fibrinogen 393, total bilirubin 0.8, haptoglobin 209, retic 0.8%  - PT 10.4, INR 1.0  - Folate and B12 WNL  - HIV negative, hep B pending  - ADAMTS 13 was sent and is pending  - Differential diagnosis includes but is not limited to TTP, HIT, drug- or infection-induced ITP, iron deficiency. HIT score is 4 (intermediate) so we will get HIT Ab. TTP unlikely because labs and smear are not consistent with hemolysis. It is possible that pt had a hypertensive episode in the setting of heart failure which caused the LACI and also caused a transient hemolysis.    2) Chronic microcytic iron-deficiency anemia  - Hgb now 10. Prior labs we have from 5/3/19 show Hgb 11.6 and MCV 69 at that time  - Peripheral smear shows normochromic normocytic RBCs with mild anisocytosis, occasional target cells and occasional ghost cells  - CHAS pending  - Was on oral ferrous sulfate 325 mg for years but was told to discontinue it by her PCP 2-3 months ago  - TSH mildly decreased at 0.375 with normal free T4 so hypothyroidism is unlikely cause of anemia or pt's symptoms  - Considering renal disease and anemia we will work up pt for multiple myeloma    Recommendations  - Will get G6PD level considering ghost RBCs on smear, HIT Ab, SPEP/MARCO/FLC  - Transfuse platelets if < 10k or if < 50k and actively bleeding  - Transfuse pRBCs if Hgb < 7      Thank you for your consult. I will follow-up with patient. Please contact us if you have any additional questions.    Brock Perkins,   Hematology/Oncology  [facility]

## 2019-07-19 NOTE — NURSING
Notified MD that patient's SBP has been ranging between 170s-189. Current SBP is 189. MD will review chart

## 2019-07-19 NOTE — PROGRESS NOTES
Pharmacokinetic Follow Up Assessment: IV Vancomycin    Therapy with vancomycin complete and consult discontinued by provider.  Pharmacy will sign off, please re-consult as needed.    Sabina De Luna, PharmD, BCCCP  [id number]

## 2019-07-19 NOTE — HPI
Patient is a 53 year old female with hypertension, anemia, and history of leiomyoma of the uterus who presents to ICU as a transfer from [facility] for evaluation of thrombocytopenia. Patient reports that prior to going to the hospital 3 days ago that she was experiencing symptoms of chills, feeling febrile, dry cough, shortness of breath and occasional nose bleeds for roughly 2 weeks. She also reports easy fatigueability and difficulty breathing when laying flat; currently sleeps with 2 pillows. Patient presented to hospital in [place] when her symptoms did not resolve. Initial work up showed a , worsening kidney function with creatinine of 2.9, and thrombocytopenia with platelets of 33k. In addition, chest x-ray showed interstitial opacities and follow up CT showed perihilar ground glass opacity. Patient was treated with rocephin and doxycycline as well as Bumex q12 due to her heart failure type symptoms. Lab work at the time showed WBC of 7.9 and a lactate of .8. In addition, she tested positive for strep A. Patient was transferred for further evaluation of her thrombocytopenia with concern for TTP and possible need for plasmapheresis.    At time of exam, patient is properly oriented to person time and places. She endorses headache, generalized myalgias, nausea and orthopnea. She denies SOB while sitting up, chest pain, abdominal pain, vomiting, and diarrhea. She has not felt febrile since 1 week prior to hospital stay.

## 2019-07-19 NOTE — PLAN OF CARE
Problem: Adult Inpatient Plan of Care  Goal: Plan of Care Review  Outcome: Ongoing (interventions implemented as appropriate)  Patient is conscious and coherent. Afebrile without pain concerns. On sinus rhythm. SBP's were noted 170's. Tolerates room air. No desaturations observed. On cardiac diet. FC in place. Plan to do plasma exchange today. POC discussed with patient will continue to monitor.

## 2019-07-19 NOTE — MEDICAL/APP STUDENT
Subjective:       Patient ID: Kendy Vital is a 53 y.o. female.    Chief Complaint: No chief complaint on file.    Ms Melton is a 53 year old female with hypertension, anemia, and history of leiomyoma of the uterus who presents to ICU as a transfer from [facility] for evaluation of thrombocytopenia. Patient presented to hospital 3 days ago with worsening heart failure symptoms of dry cough, shortness of breath, difficulty lying flat (sleeps w/ 2 pillows) and fatigue. Pt also reports occasional nose bleeds for roughly 2 weeks. Initial workup found , Cr 2.9, platelets of 33k, WBC of 7.9, lactate of 0.8, CXR and f/u CT showed interstitial opacities and perihilar ground glass opacity, respectively, and tested positive for strep A.. Patient received rocephin, doxycycline, and Bumex q12 for her heart failure type symptoms. Patient was transferred and hematology consulted for further evaluation of her thrombocytopenia with concern for TTP and possible need for plasmapheresis.     At time of exam, patient is properly oriented to person time and places. She endorses headache, for which she takes aleve (last was 4 days ago), generalized myalgias, nausea, vomiting and orthopnea. She also states to have not had a bowel movement for past 2 days (usually daily), has decreased appetite, and mild lapses in memory. She has not felt febrile since 1 week prior to hospital stay.       Patient reports to have history of multiple blood clots in her arms and kidneys, for which she takes a daily ASA. Pt reports that since she has been on ASA, she has noticed easy bruising. Additionally, she reports to have significant menometrorrhagia, lasting up to 3 weeks, but improved with fibroid removal. She denies any family history of bleeding disorders, is a 0.5 ppd smoker for past 7 yrs and drinks alcohol 3-4x/year. Pt negative for HIV. Work up for Hep B in process.    Review of Systems   Constitutional: Positive for  appetite change (decreased), chills, fatigue and fever. Negative for diaphoresis.   HENT: Positive for nosebleeds.    Eyes: Negative for visual disturbance.   Respiratory: Positive for cough (dry) and shortness of breath (orthopnea).    Cardiovascular: Positive for leg swelling.   Gastrointestinal: Positive for nausea and vomiting. Negative for abdominal pain, anal bleeding, blood in stool and diarrhea.   Genitourinary: Positive for menstrual problem (menometrorrhagia).   Musculoskeletal: Positive for myalgias (diffuse).   Skin: Negative for color change and rash.   Neurological: Positive for headaches. Negative for dizziness, seizures, syncope, weakness, light-headedness and numbness.   Hematological: Bruises/bleeds easily.   Psychiatric/Behavioral: Negative for confusion.       Objective:        07/18 0700 07/19 0659 07/19 0700 07/19 1022 Most Recent    Temp (°F) 97.8-98.2 98.4 98.4 (36.9) 07/19 0700   Pulse 61-84 57-82 82 07/19 0900   Resp 14-20 21-27 27  07/19 0900   //96 159//88 161/77  07/19 0900   MAP (mmHg) 117-132 110-128 110 07/19 0900   SpO2 (%)   100 07/19 0900   Weight (kg) 103.4   103.4 kg (227 lb 15.3 oz) 07/19 0609       Physical Exam   Constitutional: She appears well-developed and well-nourished. No distress.   HENT:   Head: Normocephalic and atraumatic.   Eyes: Pupils are equal, round, and reactive to light. EOM are normal.   Neck: Normal range of motion. Neck supple.   Cardiovascular: Normal rate, regular rhythm, normal heart sounds and intact distal pulses.   No murmur heard.  Pulmonary/Chest: Effort normal and breath sounds normal. No respiratory distress.   Abdominal: Soft. Bowel sounds are normal. There is no tenderness.   Musculoskeletal: She exhibits tenderness (BLE anterior ).   Skin: Skin is warm and dry. No rash noted. She is not diaphoretic.   Psychiatric: She has a normal mood and affect.          Ref Range & Units 05:36   WBC 3.90 - 12.70 K/uL 5.98     RBC 4.00 - 5.40 M/uL 4.47    Hemoglobin 12.0 - 16.0 g/dL 10.0Low     Hematocrit 37.0 - 48.5 % 30.6Low     Mean Corpuscular Volume 82 - 98 fL 69Low     Mean Corpuscular Hemoglobin 27.0 - 31.0 pg 22.4Low     Mean Corpuscular Hemoglobin Conc 32.0 - 36.0 g/dL 32.7    RDW 11.5 - 14.5 % 22.3High     Platelets 150 - 350 K/uL 41Low       Metabolic Panel (last 72 hours):         Recent Labs   Lab Result Units 07/19/19  0514 07/19/19  0535 07/19/19  0634   Sodium mmol/L 138  --   --    Potassium mmol/L 3.8  --   --    Chloride mmol/L 108  --   --    CO2 mmol/L 16*  --   --    Glucose mg/dL 123*  --   --    Glucose, UA    --   --  3+*   BUN, Bld mg/dL 100*  --   --    Creatinine mg/dL 4.3*  --   --    Albumin g/dL 2.6*  --   --    Total Bilirubin mg/dL 0.8  --   --    Alkaline Phosphatase U/L 121  --   --    AST U/L 31  --   --    ALT U/L 26  --   --    Magnesium mg/dL  --  2.2  --    Phosphorus mg/dL  --  3.8  --        Protein, Urine Random 0 - 15 mg/dL 1326High       Creatinine, Random Ur 15.0 - 325.0 mg/dL 222.0      Prot/Creat Ratio, Ur 0.00 - 0.20 5.97High       Specimen UA  Urine, Catheterized    Color, UA Yellow, Straw, Serenity Serenity    Appearance, UA Clear HazyAbnormal     pH, UA 5.0 - 8.0 5.0    Specific Gravity, UA 1.005 - 1.030 1.020    Protein, UA Negative 3+Abnormal     Comment: Recommend a 24 hour urine protein or a urine   protein/creatinine ratio if globulin induced proteinuria is   clinically suspected.    Glucose, UA Negative 3+Abnormal     Ketones, UA Negative Negative    Bilirubin (UA) Negative Negative    Occult Blood UA Negative 3+Abnormal     Nitrite, UA Negative Negative    Leukocytes, UA Negative Negative      Lactate (Lactic Acid) 0.5 - 2.2 mmol/L 0.6      Phosphorus 2.7 - 4.5 mg/dL 3.8      Magnesium 1.6 - 2.6 mg/dL 2.2        Assessment:       1. Thrombocytopenia    2. Orthopnea        Plan:         1. Thrombocytopenia  -Ms Vital is a 52 yo female transfer from [facility] for  evaluation of thrombocytopenia (PLT <33k). In addition, pt last febrile 1 week prior to hospital admission (7/16), with history of anemia, worsening renal function (Cr 2.9), and brief memory lapses is concerning for TTP, among other differentials (HUS, thrombocytopenia secondary to infection, nutrition, medications, ICU or TSH, or primary bone marrow disorders)  -PT 10.4, INR 1.0, fibrinogen 393 (high), B12 843, folate 11.1, ferritin 161, TSH 0.375 (low), free T4 0.89  -retic 0.8, haptoglob 209,  (high)    Plan:  -obtain [id number] levels  -CBC q8 hours, monitor PLT  -CMP daily to track kidney function, BNP  -G6PD quantitative  -SPEP  -Bone Marrow biopsy if worsening thrombocytopenia    Anjana Luz, MS4   [facility]

## 2019-07-19 NOTE — ASSESSMENT & PLAN NOTE
- Patient had x-ray in [place] that showed interstitial infiltrates. Follow up chest CT showed perihilar ground glass opacity. Treated initially with rocephin and doxycycline  - Will repeat chest x-ray   - Follow up blood cultures  - Continue treatment with broad spectrum antibiotics vanc, zosyn, santiagoro

## 2019-07-19 NOTE — ASSESSMENT & PLAN NOTE
- Patient transferred for evaluation of thrombocytopenia with platelet count of 33k out of concern for TTP.   - Shitocytes seen on blood morphology from outside hospital records  - Multiple lab tests ordered including CBC, CMP, LDH, haptoglobin, reticulocytes, Hep B labs, HIV, INR, PTT, fibrinogen, b12, folate, ferritin, TSH, peripheral blood smear, and [id number]  - Heme/onc consult placed

## 2019-07-19 NOTE — NURSING
Verified with charge nurse if urine specimen for urinalysis can be obtained from existing trevino. As per patient, FC was said to be inserted yesterday in [facility]. Charge nurse replied to verify concern with [facility] if they prefer to change the trevino before obtaining the specimen. Dr Darby was consulted. MD verbalized no need to change the FC. Specimen was obtained and sent to lab for urinalysis.

## 2019-07-19 NOTE — MEDICAL/APP STUDENT
[facility]  Nephrology  Medical Student History and Physical    Patient Name: Kendy Vital  MRN: [id number]   Admission Date: 7/19/2019  Length of Stay: 0 days  Attending Physician:       Assessment/Plan:     Pt is 54yo AA female transferred from outside facility for plasma exchange, LACI, bronchopneumonia, and thrombocytopenia.    Nephrology was consulted for LACI with records showing Cr of 2.9 (unknown date) and 3.4 (7/18/19). And BUN of 74 (unknown date) and 83 (7/18/19).     BL Cr is 1.0 approximately 1 month ago, now 4.3.     UA showed: granular/tubular/hyaline casts, 2 acanthocytes    Renal US showed bilateral decreased perfusion    LDH was elevated at other facility, [id number] results from outside facility are pending    Of note, in [facility], pt received azithromycin and vancomycin.  Nephrotic range proteinuria, occult blood 3+, protein 3+, glucose 3+  Protein:Cr ratio: 5.97    Ddx  iATN is possible if there was a fluctuation in her BP during her acute illness resulting in an LACI (possibly also occurred during recent near-syncopal episode)    Minimal change disease a possibility due to HTN, proteinuria, and hematuria. Timeframe fits.     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    Overuse of NSAID's (naproxen 500mg BID, Diclofenic 75mg Daily, daily OTC Aleve) combined with the use of Lisinopril 20-12.5mg BID is likely a contributing factor but does not explain findings on UA.    Strep A was positive may be a factor causing GN     Granulomatosis with polyangiitis- less likely neprhits      Possible UTI resolved as evidenced by frequency and hematuria but no dysuria and urine nitrites were negative.    ROS is concerning for underlying pathology with approximately 1-month hx of significant night sweats, fevers at night, progressive fatigue/weakness, and an unintentional weight loss of 12lbs over the  past month. With thrombocytopenia, a malignancy is a possibility.     TTP is less likely due to labs and smear not consistent with hemolysis     Plan    No biopsy at this time due to low platelet count.   Dialysis not indicated at this time  Manage BP  Avoid nephrotoxic drugs  Qasim [id number] results  Consider cardiology consult for signs/symptoms of decompensated HF  Consider cindy counselor to deal with the very recent loss of her brother. Pt expressed interest in talking to someone (mental health professional or [employer] /)     As per hematology notes, G6PD level, transfusion of platelet if indicated.     To be discussed with team and staff,     Wally Hathaway  MS4    Subjective:     Principal Problem:Thrombocytopenia    Chief Complaint: SOB, C/P, cough, hematuria, fatigue, leg edema    HPI: Pt is a 54yo AA female with a PMHx of HTN, renal thrombosis (approx. 3 years ago), clot in her left arm, obesity, dyslipidemia, angina, blood clots in left arm (4 years ago) and left kidney (3 years ago), uterine fibroids with myomectomy, presented to [facility] from [facility] in [place] for a 1-week history of cough with yellow sputum, malaise, loss of appetite, and generalized body pain to her upper body. She was found to have thrombocytopenia, LACI, and pneumonia.     CXR at other facility revealed interstital opacities. CT thorax showed bilateral groundglass opacities in the perihilar region. Treated with doxycycline 100mg and ceftriaxone 2gm. Her BP was elevated at other facility ('s) and managed with hydrazline 25mg PO TID, amlodipine 10mg PO daily, and clonidine 0.1mg PO twice daily. Other facility found that her platelet count was 33, down from 210 on June 9th. Rapid strep test was positive. BNP was elevated at other facility.     Pt reported small amounts of reddish tinged blood in her urine for 5-6 days with frequency, having to use the toilet every 45 minutes  "including waking up in the middle of the night to urinate. She denied any dysuria during this time period. She still menstruates but stated that her LMP was 3 weeks ago. She now reports a decreased urine output. She reported back/neck stiffness that is bad upon waking up but improves with movement throughout the day.    For the past month she has also had orthopnea (2-pillows at night), exertion intolerance (only able to ambulate approximately 10 feet before experiencing SOB/weakness), and SOB. She has no hx of CHF. She reported a near-syncopal episode recently.     She reported hair loss for the past 3 months and recurrent epistaxis for the past month.     She was unsure of her medications, family member was contacted and medications reviewed with patient. At home pt takes the following. She confirmed that she is compliant with these medications and denied any recent changes to her regimen. She reported that she takes Aleve OTC at a "low dose" for increased back pain/stiffness over the past month.   Current medications, verified by family member and pt over the phone are as follows:     Naproxen 500mg BID  Diclofenic 75mg Daily  Aspirin 81mg Daily  Lisinopril 20-12.5mg BID  Citalopram 20mg Daily  Hydralazine 25mg TID  Clonidine 0.2mg BID  Vitamin B Daily  Aleve- unknown dose once a day    She has never had a kidney biopsy and has never been on dialysis.      Review of Systems   Constitutional: Positive for activity change, appetite change, chills, diaphoresis, fatigue, fever and unexpected weight change.   HENT: Positive for nosebleeds and sore throat. Negative for ear discharge, ear pain, facial swelling, hearing loss, mouth sores, sinus pressure, sinus pain, tinnitus, trouble swallowing and voice change.    Eyes: Negative for photophobia, pain and visual disturbance.   Respiratory: Positive for cough, chest tightness and shortness of breath. Negative for apnea, choking, wheezing and stridor.    Cardiovascular: " Positive for chest pain, palpitations and leg swelling.   Gastrointestinal: Positive for constipation and nausea. Negative for blood in stool, diarrhea and vomiting.   Endocrine: Positive for polyuria.   Genitourinary: Positive for decreased urine volume, frequency, hematuria and urgency. Negative for difficulty urinating, dysuria, flank pain, menstrual problem, pelvic pain and vaginal discharge.   Musculoskeletal: Positive for back pain and neck stiffness. Negative for arthralgias, gait problem and joint swelling.   Skin: Negative for color change, pallor, rash and wound.   Allergic/Immunologic: Negative for environmental allergies, food allergies and immunocompromised state.   Neurological: Positive for weakness and headaches. Negative for dizziness, seizures and numbness.   Psychiatric/Behavioral: Positive for dysphoric mood. Negative for behavioral problems, confusion, decreased concentration, hallucinations, self-injury and suicidal ideas. The patient is nervous/anxious. The patient is not hyperactive.        HX: HTN, renal thrombosis (approx. 3 years ago), clot in her left arm, obesity, dyslipidemia, angina, blood clots in left arm (4 years ago) and left kidney (3 years ago), uterine fibroids with myomectomy      Review of patient's allergies indicates:  No Known Allergies  No current facility-administered medications on file prior to encounter.      Current Outpatient Medications on File Prior to Encounter   Medication Sig    amLODIPine (NORVASC) 10 MG tablet Take 10 mg by mouth once daily.    aspirin (ECOTRIN) 81 MG EC tablet Take 81 mg by mouth once daily.    chlorthalidone (HYGROTEN) 50 MG Tab Take 50 mg by mouth once daily.    [DISCONTINUED] NIFEdipine (ADALAT CC) 30 MG TbSR Take 30 mg by mouth once daily.        Family hx  Mother: Ulcer, brain aneurysm, HTN, Unknown renal disease, DM  Father: HTN,   Brother: HTN, ulcer    Social hx  Works as a caregiver for elderly patient  Smokes 1/2 pack per day  for the last 7 years  Denies drug/alcohol use    Objective:     Vital Signs (Most Recent):  Temp: 98.6 °F (37 °C) (07/19/19 1500)  Pulse: 66 (07/19/19 1500)  Resp: (!) 21 (07/19/19 1500)  BP: (!) 162/77 (07/19/19 1500)  SpO2: 99 % (07/19/19 1500) Vital Signs (24h Range):  Temp:  [97.8 °F (36.6 °C)-98.6 °F (37 °C)] 98.6 °F (37 °C)  Pulse:  [57-84] 66  Resp:  [14-22] 21  SpO2:  [96 %-100 %] 99 %  BP: (142-192)/(69-97) 162/77     Weight: 103 kg (227 lb)  Body mass index is 36.64 kg/m².    Intake/Output Summary (Last 24 hours) at 7/19/2019 1610  Last data filed at 7/19/2019 1500  Gross per 24 hour   Intake 55 ml   Output 180 ml   Net -125 ml      Physical Exam   Constitutional: She is oriented to person, place, and time.   HENT:   Mouth/Throat:       Eyes: Pupils are equal, round, and reactive to light. Right eye exhibits no discharge. Left eye exhibits no discharge. No scleral icterus.   Neck: No hepatojugular reflux and no JVD present. Carotid bruit is not present.   Cardiovascular: Normal rate and normal pulses. Exam reveals no gallop and no friction rub.   No murmur heard.  Pulmonary/Chest: No accessory muscle usage. She has decreased breath sounds in the right lower field and the left lower field. She has wheezes. She has no rhonchi. She has no rales.           Abdominal: Soft. Bowel sounds are normal. There is no tenderness.   Musculoskeletal: She exhibits edema.   Pt had bilateral edema to at least the level of her knees     Neurological: She is alert and oriented to person, place, and time. No sensory deficit. Coordination normal. GCS eye subscore is 4. GCS verbal subscore is 5. GCS motor subscore is 6.   When moving eyes right to left pt's eyes would involuntarily snap back to the middle. Test repeated several times to confirm. Pt denied any vision changes.   Skin: Skin is warm and dry. Capillary refill takes less than 2 seconds. Bruising noted. No rash noted. She is not diaphoretic. Nails show no clubbing.         Psychiatric: Her speech is normal and behavior is normal. Judgment and thought content normal. Her mood appears anxious. She is not actively hallucinating. Cognition and memory are normal. She exhibits a depressed mood.   Pt appeared to have a depressed mood during interview. Pt's brother  unexpectedly while in hospital 3 days ago. Expressed desire to speak with someone to help her cope. Expressed interest in speaking with a mental health professional or to a [employer]ec[employer] /.  She is attentive.   Nursing note and vitals reviewed.    Significant Labs:   Recent Results (from the past 24 hour(s))   Comprehensive metabolic panel    Collection Time: 19  5:14 AM   Result Value Ref Range    Sodium 138 136 - 145 mmol/L    Potassium 3.8 3.5 - 5.1 mmol/L    Chloride 108 95 - 110 mmol/L    CO2 16 (L) 23 - 29 mmol/L    Glucose 123 (H) 70 - 110 mg/dL    BUN, Bld 100 (H) 6 - 20 mg/dL    Creatinine 4.3 (H) 0.5 - 1.4 mg/dL    Calcium 9.0 8.7 - 10.5 mg/dL    Total Protein 5.4 (L) 6.0 - 8.4 g/dL    Albumin 2.6 (L) 3.5 - 5.2 g/dL    Total Bilirubin 0.8 0.1 - 1.0 mg/dL    Alkaline Phosphatase 121 55 - 135 U/L    AST 31 10 - 40 U/L    ALT 26 10 - 44 U/L    Anion Gap 14 8 - 16 mmol/L    eGFR if African American 12.8 (A) >60 mL/min/1.73 m^2    eGFR if non  11.1 (A) >60 mL/min/1.73 m^2   Lactate dehydrogenase    Collection Time: 19  5:14 AM   Result Value Ref Range     (H) 110 - 260 U/L   Haptoglobin    Collection Time: 19  5:14 AM   Result Value Ref Range    Haptoglobin 209 30 - 250 mg/dL   Reticulocytes    Collection Time: 19  5:14 AM   Result Value Ref Range    Retic 0.8 0.5 - 2.5 %   Hepatitis B surface antigen    Collection Time: 19  5:14 AM   Result Value Ref Range    Hepatitis B Surface Ag Positive (A)    Hepatitis B core antibody, IgM    Collection Time: 19  5:14 AM   Result Value Ref Range    Hep B C IgM Negative    HIV 1/2 Ag/Ab (4th Gen)    Collection  Time: 07/19/19  5:14 AM   Result Value Ref Range    HIV 1/2 Ag/Ab Negative Negative   Hepatitis B surface antibody    Collection Time: 07/19/19  5:14 AM   Result Value Ref Range    Hep B S Ab Negative    Protime-INR    Collection Time: 07/19/19  5:14 AM   Result Value Ref Range    Prothrombin Time 10.4 9.0 - 12.5 sec    INR 1.0 0.8 - 1.2   Fibrinogen    Collection Time: 07/19/19  5:14 AM   Result Value Ref Range    Fibrinogen 393 (H) 182 - 366 mg/dL   Vitamin B12    Collection Time: 07/19/19  5:14 AM   Result Value Ref Range    Vitamin B-12 843 210 - 950 pg/mL   Folate    Collection Time: 07/19/19  5:14 AM   Result Value Ref Range    Folate 11.1 4.0 - 24.0 ng/mL   Ferritin    Collection Time: 07/19/19  5:14 AM   Result Value Ref Range    Ferritin 161 20.0 - 300.0 ng/mL   TSH    Collection Time: 07/19/19  5:14 AM   Result Value Ref Range    TSH 0.375 (L) 0.400 - 4.000 uIU/mL   T4, free    Collection Time: 07/19/19  5:14 AM   Result Value Ref Range    Free T4 0.89 0.71 - 1.51 ng/dL   Hepatitis B surface antigen, EIA    Collection Time: 07/19/19  5:14 AM   Result Value Ref Range    HBsAg Confirmation Positive (A) Negative   Magnesium    Collection Time: 07/19/19  5:35 AM   Result Value Ref Range    Magnesium 2.2 1.6 - 2.6 mg/dL   Phosphorus    Collection Time: 07/19/19  5:35 AM   Result Value Ref Range    Phosphorus 3.8 2.7 - 4.5 mg/dL   Pathologist Interpretation Differential    Collection Time: 07/19/19  5:36 AM   Result Value Ref Range    Pathologist Review Review completed    CBC auto differential    Collection Time: 07/19/19  5:36 AM   Result Value Ref Range    WBC 5.98 3.90 - 12.70 K/uL    RBC 4.47 4.00 - 5.40 M/uL    Hemoglobin 10.0 (L) 12.0 - 16.0 g/dL    Hematocrit 30.6 (L) 37.0 - 48.5 %    Mean Corpuscular Volume 69 (L) 82 - 98 fL    Mean Corpuscular Hemoglobin 22.4 (L) 27.0 - 31.0 pg    Mean Corpuscular Hemoglobin Conc 32.7 32.0 - 36.0 g/dL    RDW 22.3 (H) 11.5 - 14.5 %    Platelets 41 (L) 150 - 350 K/uL     MPV SEE COMMENT 9.2 - 12.9 fL    Immature Granulocytes 1.8 (H) 0.0 - 0.5 %    Gran # (ANC) 4.1 1.8 - 7.7 K/uL    Immature Grans (Abs) 0.11 (H) 0.00 - 0.04 K/uL    Lymph # 1.3 1.0 - 4.8 K/uL    Mono # 0.4 0.3 - 1.0 K/uL    Eos # 0.0 0.0 - 0.5 K/uL    Baso # 0.01 0.00 - 0.20 K/uL    nRBC 0 0 /100 WBC    Gran% 68.4 38.0 - 73.0 %    Lymph% 22.2 18.0 - 48.0 %    Mono% 7.4 4.0 - 15.0 %    Eosinophil% 0.0 0.0 - 8.0 %    Basophil% 0.2 0.0 - 1.9 %    Differential Method Automated    Pathologist Review    Collection Time: 07/19/19  5:36 AM   Result Value Ref Range    Pathologist Review Peripheral Smear REVIEWED    Lactic acid, plasma    Collection Time: 07/19/19  5:37 AM   Result Value Ref Range    Lactate (Lactic Acid) 0.6 0.5 - 2.2 mmol/L   Brain natriuretic peptide    Collection Time: 07/19/19  5:37 AM   Result Value Ref Range     (H) 0 - 99 pg/mL   Urinalysis, Reflex to Urine Culture Urine, Clean Catch    Collection Time: 07/19/19  6:34 AM   Result Value Ref Range    Specimen UA Urine, Catheterized     Color, UA Serenity Yellow, Straw, Serenity    Appearance, UA Hazy (A) Clear    pH, UA 5.0 5.0 - 8.0    Specific Gravity, UA 1.020 1.005 - 1.030    Protein, UA 3+ (A) Negative    Glucose, UA 3+ (A) Negative    Ketones, UA Negative Negative    Bilirubin (UA) Negative Negative    Occult Blood UA 3+ (A) Negative    Nitrite, UA Negative Negative    Leukocytes, UA Negative Negative   Urinalysis Microscopic    Collection Time: 07/19/19  6:34 AM   Result Value Ref Range    RBC, UA 86 (H) 0 - 4 /hpf    WBC, UA 4 0 - 5 /hpf    Bacteria Moderate (A) None-Occ /hpf    Yeast, UA None None    Squam Epithel, UA 1 /hpf    Hyaline Casts, UA 15 (A) 0-1/lpf /lpf    Microscopic Comment SEE COMMENT    Protein / creatinine ratio, urine    Collection Time: 07/19/19  6:34 AM   Result Value Ref Range    Protein, Urine Random 1326 (H) 0 - 15 mg/dL    Creatinine, Random Ur 222.0 15.0 - 325.0 mg/dL    Prot/Creat Ratio, Ur 5.97 (H) 0.00 - 0.20   Direct  antiglobulin test    Collection Time: 07/19/19 11:27 AM   Result Value Ref Range    Direct Alfredo (CHAS) NEG    Transthoracic echo (TTE) 2D with Color Flow    Collection Time: 07/19/19  2:26 PM   Result Value Ref Range    Ascending aorta 3.01 cm    STJ 2.57 cm    AV mean gradient 6 mmHg    Ao peak reynaldo 1.94 m/s    Ao VTI 34.14 cm    IVRT 0.06 msec    IVS 1.00 0.6 - 1.1 cm    LA size 4.01 cm    Left Atrium Major Axis 5.58 cm    Left Atrium Minor Axis 5.66 cm    LVIDD 4.65 3.5 - 6.0 cm    LVIDS 2.84 2.1 - 4.0 cm    LVOT diameter 1.92 cm    LVOT peak VTI 31.13 cm    PW 1.18 (A) 0.6 - 1.1 cm    MV Peak A Reynaldo 0.45 m/s    E wave decelartion time 213.60 msec    MV Peak E Reynaldo 1.12 m/s    PV Peak D Reynaldo 0.88 m/s    PV Peak S Reynaldo 0.50 m/s    RA Major Axis 4.99 cm    RA Width 3.99 cm    RVDD 3.57 cm    Sinus 3.09 cm    TAPSE 2.29 cm    TR Max Reynaldo 2.81 m/s    TDI LATERAL 0.10 m/s    TDI SEPTAL 0.09 m/s    LA WIDTH 4.08 cm    LV Diastolic Volume 99.62 mL    LV Systolic Volume 30.66 mL    LVOT peak reynaldo 1.52 m/s    LV LATERAL E/E' RATIO 11.20 m/s    LV SEPTAL E/E' RATIO 12.44 m/s    FS 39 %    LA volume 78.15 cm3    LV mass 182.03 g    Left Ventricle Relative Wall Thickness 0.51 cm    AV valve area 2.64 cm2    AV Velocity Ratio 0.78     AV index (prosthetic) 0.91     E/A ratio 2.49     Mean e' 0.10 m/s    Pulm vein S/D ratio 0.57     LVOT area 2.9 cm2    LVOT stroke volume 90.08 cm3    AV peak gradient 15 mmHg    E/E' ratio 11.79 m/s    LV Systolic Volume Index 14.5 mL/m2    LV Diastolic Volume Index 47.20 mL/m2    LA Volume Index 37.0 mL/m2    LV Mass Index 86 g/m2    Triscuspid Valve Regurgitation Peak Gradient 32 mmHg    BSA 2.19 m2    Right Atrial Pressure (from IVC) 15 mmHg    RV S' 12 cm/s    TV rest pulmonary artery pressure 47 mmHg       Significant Imaging:  Done at outside facility: MRI MRA Neck without contrast: 7/18/19- 0737 hours. for transient cerebral ischemia, right side numbness. No comparison.  Findings: Normal  antegrade flow withuin both carotid and vertrbral arteries. No significant extracranial stenosis or occlusion is identified. No other significant vascular abnormality identified.     Renal US: Bilateral medical renal disease.  Right kidney: The right kidney measures 12.1 cm. No cortical thinning. No loss of corticomedullary distinction.  Decreased perfusion of the right kidney with resistive index measuring 0.76.  No mass. No renal stone. No hydronephrosis.    Left kidney: The left kidney measures 13.0 cm. No cortical thinning. No loss of corticomedullary distinction.  Decreased perfusion of the left kidney with resistive index measuring 0.84.  No mass. No renal stone. No hydronephrosis.    ATTENDING PHYSICIAN ATTESTATION  I have personally interviewed and examined the patient. I thoroughly reviewed the demographic, clinical, laboratorial and imaging information available in medical records. I agree with the assessment and recommendations provided by the MS4 Hathaway who was under my supervision. LACI with hematuria, overt proteinuria, evidence of ATI by urine microscopy. Recent exposure to 3 NSAIDs along with presentation and urinary findings raises suspicion for AIN plus COSME from NSAIDs. Will order serology for other potential causes of acute GN as well. Overall picture does not quite fit with aHUS/TMA. Recommended empiric initiation of corticosteroids (60 mg qd prednisone). Will consider kidney biopsy if PLTs improve. No indication for RRT. Recommend 1 dose of Lasix 160 mg IV to manage hypervolemia and oliguria.

## 2019-07-19 NOTE — CONSULTS
Consult acknowledged.    Full note to follow.    Raymundo Navarro MD  Nephrology  [facility]    Please, see separate full consult entered as a progress note.     ATTENDING PHYSICIAN ATTESTATION  I have personally interviewed and examined the patient. I thoroughly reviewed the demographic, clinical, laboratorial and imaging information available in medical records. I agree with the assessment and recommendations provided by the MS4 Rivas who was under my supervision. LACI with hematuria, overt proteinuria, evidence of ATI by urine microscopy. Recent exposure to 3 NSAIDs along with presentation and urinary findings raises suspicion for AIN plus COSME from NSAIDs. Will order serology for other potential causes of acute GN as well. Overall picture does not quite fit with aHUS/TMA. Recommended empiric initiation of corticosteroids (60 mg qd prednisone). Will consider kidney biopsy if PLTs improve. No indication for RRT. Recommend 1 dose of Lasix 160 mg IV to manage hypervolemia and oliguria.

## 2019-07-19 NOTE — ASSESSMENT & PLAN NOTE
- Patient has baseline creatinine of 1.0 roughly 1 month ago. Outside records show creatinine of 2.9 and BUN of 74.  - Ordered urinalysis, urine sodium, urine creatinine and kidney ultrasound  - Patient also tested positive for strep A as seen on outside hospital records. Possible etiology for GN

## 2019-07-20 PROBLEM — N17.0 ACUTE RENAL FAILURE WITH TUBULAR NECROSIS: Status: ACTIVE | Noted: 2019-07-19

## 2019-07-20 LAB
ALBUMIN SERPL BCP-MCNC: 2.6 G/DL (ref 3.5–5.2)
ALP SERPL-CCNC: 123 U/L (ref 55–135)
ALT SERPL W/O P-5'-P-CCNC: 26 U/L (ref 10–44)
ANION GAP SERPL CALC-SCNC: 14 MMOL/L (ref 8–16)
AST SERPL-CCNC: 25 U/L (ref 10–40)
BASOPHILS # BLD AUTO: 0.02 K/UL (ref 0–0.2)
BASOPHILS NFR BLD: 0.3 % (ref 0–1.9)
BILIRUB SERPL-MCNC: 1 MG/DL (ref 0.1–1)
BUN SERPL-MCNC: 113 MG/DL (ref 6–20)
C3 SERPL-MCNC: 41 MG/DL (ref 50–180)
C4 SERPL-MCNC: <3 MG/DL (ref 11–44)
CALCIUM SERPL-MCNC: 8.7 MG/DL (ref 8.7–10.5)
CHLORIDE SERPL-SCNC: 107 MMOL/L (ref 95–110)
CO2 SERPL-SCNC: 16 MMOL/L (ref 23–29)
CREAT SERPL-MCNC: 4.4 MG/DL (ref 0.5–1.4)
CREAT UR-MCNC: 334 MG/DL (ref 15–325)
DIFFERENTIAL METHOD: ABNORMAL
EOSINOPHIL # BLD AUTO: 0 K/UL (ref 0–0.5)
EOSINOPHIL NFR BLD: 0 % (ref 0–8)
ERYTHROCYTE [DISTWIDTH] IN BLOOD BY AUTOMATED COUNT: 23.1 % (ref 11.5–14.5)
ERYTHROCYTE [SEDIMENTATION RATE] IN BLOOD BY WESTERGREN METHOD: 50 MM/HR (ref 0–36)
EST. GFR  (AFRICAN AMERICAN): 12.5 ML/MIN/1.73 M^2
EST. GFR  (NON AFRICAN AMERICAN): 10.8 ML/MIN/1.73 M^2
GLUCOSE SERPL-MCNC: 163 MG/DL (ref 70–110)
HCT VFR BLD AUTO: 31.3 % (ref 37–48.5)
HGB BLD-MCNC: 10.2 G/DL (ref 12–16)
IMM GRANULOCYTES # BLD AUTO: 0.11 K/UL (ref 0–0.04)
IMM GRANULOCYTES NFR BLD AUTO: 1.5 % (ref 0–0.5)
LYMPHOCYTES # BLD AUTO: 1.2 K/UL (ref 1–4.8)
LYMPHOCYTES NFR BLD: 16.5 % (ref 18–48)
MAGNESIUM SERPL-MCNC: 2.2 MG/DL (ref 1.6–2.6)
MCH RBC QN AUTO: 22 PG (ref 27–31)
MCHC RBC AUTO-ENTMCNC: 32.6 G/DL (ref 32–36)
MCV RBC AUTO: 68 FL (ref 82–98)
MONOCYTES # BLD AUTO: 0.2 K/UL (ref 0.3–1)
MONOCYTES NFR BLD: 3.1 % (ref 4–15)
NEUTROPHILS # BLD AUTO: 5.8 K/UL (ref 1.8–7.7)
NEUTROPHILS NFR BLD: 78.6 % (ref 38–73)
NRBC BLD-RTO: 0 /100 WBC
PHOSPHATE SERPL-MCNC: 4.5 MG/DL (ref 2.7–4.5)
PLATELET # BLD AUTO: 304 K/UL (ref 150–350)
PMV BLD AUTO: 8.9 FL (ref 9.2–12.9)
POTASSIUM SERPL-SCNC: 4.3 MMOL/L (ref 3.5–5.1)
PROT SERPL-MCNC: 5.3 G/DL (ref 6–8.4)
RBC # BLD AUTO: 4.63 M/UL (ref 4–5.4)
SODIUM SERPL-SCNC: 137 MMOL/L (ref 136–145)
SODIUM UR-SCNC: 21 MMOL/L (ref 20–250)
WBC # BLD AUTO: 7.38 K/UL (ref 3.9–12.7)

## 2019-07-20 PROCEDURE — 86225 DNA ANTIBODY NATIVE: CPT

## 2019-07-20 PROCEDURE — 25000003 PHARM REV CODE 250: Performed by: HOSPITALIST

## 2019-07-20 PROCEDURE — 86704 HEP B CORE ANTIBODY TOTAL: CPT

## 2019-07-20 PROCEDURE — 84300 ASSAY OF URINE SODIUM: CPT

## 2019-07-20 PROCEDURE — 84100 ASSAY OF PHOSPHORUS: CPT

## 2019-07-20 PROCEDURE — 36415 COLL VENOUS BLD VENIPUNCTURE: CPT

## 2019-07-20 PROCEDURE — 83735 ASSAY OF MAGNESIUM: CPT

## 2019-07-20 PROCEDURE — 86160 COMPLEMENT ANTIGEN: CPT

## 2019-07-20 PROCEDURE — 82570 ASSAY OF URINE CREATININE: CPT

## 2019-07-20 PROCEDURE — 86038 ANTINUCLEAR ANTIBODIES: CPT

## 2019-07-20 PROCEDURE — 99233 PR SUBSEQUENT HOSPITAL CARE,LEVL III: ICD-10-PCS | Mod: ,,, | Performed by: HOSPITALIST

## 2019-07-20 PROCEDURE — 86255 FLUORESCENT ANTIBODY SCREEN: CPT

## 2019-07-20 PROCEDURE — 99233 PR SUBSEQUENT HOSPITAL CARE,LEVL III: ICD-10-PCS | Mod: ,,, | Performed by: INTERNAL MEDICINE

## 2019-07-20 PROCEDURE — 86215 DEOXYRIBONUCLEASE ANTIBODY: CPT

## 2019-07-20 PROCEDURE — 87517 HEPATITIS B DNA QUANT: CPT

## 2019-07-20 PROCEDURE — 63600175 PHARM REV CODE 636 W HCPCS: Performed by: NURSE PRACTITIONER

## 2019-07-20 PROCEDURE — 86060 ANTISTREPTOLYSIN O TITER: CPT

## 2019-07-20 PROCEDURE — 25000003 PHARM REV CODE 250: Performed by: NURSE PRACTITIONER

## 2019-07-20 PROCEDURE — 83520 IMMUNOASSAY QUANT NOS NONAB: CPT

## 2019-07-20 PROCEDURE — 63600175 PHARM REV CODE 636 W HCPCS: Performed by: GENERAL PRACTICE

## 2019-07-20 PROCEDURE — 25000003 PHARM REV CODE 250: Performed by: STUDENT IN AN ORGANIZED HEALTH CARE EDUCATION/TRAINING PROGRAM

## 2019-07-20 PROCEDURE — 85652 RBC SED RATE AUTOMATED: CPT

## 2019-07-20 PROCEDURE — 82955 ASSAY OF G6PD ENZYME: CPT

## 2019-07-20 PROCEDURE — 99233 SBSQ HOSP IP/OBS HIGH 50: CPT | Mod: ,,, | Performed by: HOSPITALIST

## 2019-07-20 PROCEDURE — 99233 SBSQ HOSP IP/OBS HIGH 50: CPT | Mod: ,,, | Performed by: INTERNAL MEDICINE

## 2019-07-20 PROCEDURE — 80053 COMPREHEN METABOLIC PANEL: CPT

## 2019-07-20 PROCEDURE — 63600175 PHARM REV CODE 636 W HCPCS: Performed by: HOSPITALIST

## 2019-07-20 PROCEDURE — 86160 COMPLEMENT ANTIGEN: CPT | Mod: 59

## 2019-07-20 PROCEDURE — 85025 COMPLETE CBC W/AUTO DIFF WBC: CPT

## 2019-07-20 PROCEDURE — 11000001 HC ACUTE MED/SURG PRIVATE ROOM

## 2019-07-20 RX ORDER — ACETAMINOPHEN 325 MG/1
650 TABLET ORAL EVERY 6 HOURS PRN
Status: DISCONTINUED | OUTPATIENT
Start: 2019-07-20 | End: 2019-08-07 | Stop reason: HOSPADM

## 2019-07-20 RX ORDER — HYDRALAZINE HYDROCHLORIDE 25 MG/1
25 TABLET, FILM COATED ORAL EVERY 8 HOURS
Status: DISCONTINUED | OUTPATIENT
Start: 2019-07-20 | End: 2019-07-20

## 2019-07-20 RX ORDER — FUROSEMIDE 10 MG/ML
160 INJECTION INTRAMUSCULAR; INTRAVENOUS ONCE
Status: COMPLETED | OUTPATIENT
Start: 2019-07-20 | End: 2019-07-20

## 2019-07-20 RX ORDER — HYDRALAZINE HYDROCHLORIDE 50 MG/1
50 TABLET, FILM COATED ORAL EVERY 8 HOURS
Status: DISCONTINUED | OUTPATIENT
Start: 2019-07-20 | End: 2019-07-31

## 2019-07-20 RX ORDER — CLONIDINE HYDROCHLORIDE 0.2 MG/1
0.2 TABLET ORAL 2 TIMES DAILY
Status: DISCONTINUED | OUTPATIENT
Start: 2019-07-20 | End: 2019-07-25

## 2019-07-20 RX ADMIN — HYDRALAZINE HYDROCHLORIDE 50 MG: 50 TABLET ORAL at 09:07

## 2019-07-20 RX ADMIN — FUROSEMIDE 160 MG: 10 INJECTION, SOLUTION INTRAMUSCULAR; INTRAVENOUS at 06:07

## 2019-07-20 RX ADMIN — AMLODIPINE BESYLATE 10 MG: 10 TABLET ORAL at 09:07

## 2019-07-20 RX ADMIN — CEFTRIAXONE 2 G: 2 INJECTION, SOLUTION INTRAVENOUS at 02:07

## 2019-07-20 RX ADMIN — PIPERACILLIN AND TAZOBACTAM 4.5 G: 4; .5 INJECTION, POWDER, LYOPHILIZED, FOR SOLUTION INTRAVENOUS; PARENTERAL at 09:07

## 2019-07-20 RX ADMIN — HYDRALAZINE HYDROCHLORIDE 25 MG: 25 TABLET, FILM COATED ORAL at 01:07

## 2019-07-20 RX ADMIN — CLONIDINE HYDROCHLORIDE 0.2 MG: 0.2 TABLET ORAL at 01:07

## 2019-07-20 RX ADMIN — AZITHROMYCIN 500 MG: 250 TABLET, FILM COATED ORAL at 09:07

## 2019-07-20 RX ADMIN — PREDNISONE 60 MG: 20 TABLET ORAL at 09:07

## 2019-07-20 RX ADMIN — ACETAMINOPHEN 650 MG: 325 TABLET ORAL at 06:07

## 2019-07-20 RX ADMIN — CARVEDILOL 6.25 MG: 6.25 TABLET, FILM COATED ORAL at 09:07

## 2019-07-20 RX ADMIN — HYDRALAZINE HYDROCHLORIDE 25 MG: 25 TABLET, FILM COATED ORAL at 06:07

## 2019-07-20 NOTE — SUBJECTIVE & OBJECTIVE
Interval History: Stepped down from ICU overnight. States that she feels well now, although still oliguric and endorsing some leg tightness. States that she did not urinate much following 160mg lasix dose last night. Denies any fever or chills, states breathing is improved. No bleeding. Discussed her case with family at bedside.    Review of Systems   Constitutional: Positive for activity change and fatigue. Negative for appetite change, chills and fever.   HENT: Negative for congestion and sore throat.    Respiratory: Positive for cough and shortness of breath (improving). Negative for chest tightness.    Cardiovascular: Positive for leg swelling. Negative for chest pain and palpitations.   Gastrointestinal: Negative for abdominal pain, constipation, diarrhea, nausea and vomiting.   Genitourinary: Negative for dysuria.   Musculoskeletal: Positive for myalgias. Negative for arthralgias and back pain.   Skin: Negative for color change, pallor and rash.   Neurological: Negative for dizziness, weakness and headaches.     Objective:     Vital Signs (Most Recent):  Temp: 96.7 °F (35.9 °C) (07/20/19 1118)  Pulse: 62 (07/20/19 1118)  Resp: 18 (07/20/19 1118)  BP: (!) 175/86 (07/20/19 1119)  SpO2: 97 % (07/20/19 1118) Vital Signs (24h Range):  Temp:  [96.7 °F (35.9 °C)-98.6 °F (37 °C)] 96.7 °F (35.9 °C)  Pulse:  [62-70] 62  Resp:  [17-40] 18  SpO2:  [95 %-99 %] 97 %  BP: (146-189)/(69-88) 175/86     Weight: 103 kg (227 lb)  Body mass index is 36.64 kg/m².    Intake/Output Summary (Last 24 hours) at 7/20/2019 1207  Last data filed at 7/20/2019 0628  Gross per 24 hour   Intake 300 ml   Output 260 ml   Net 40 ml      Physical Exam   Constitutional: She is oriented to person, place, and time. She appears well-developed and well-nourished. No distress.   HENT:   Head: Normocephalic and atraumatic.   Mouth/Throat: No oropharyngeal exudate.   Eyes: Conjunctivae are normal.   Neck: Normal range of motion. Neck supple. No JVD  present.   Cardiovascular: Normal rate, regular rhythm, normal heart sounds and intact distal pulses.   Pulmonary/Chest: Effort normal. She has wheezes (minor). She has no rales.   Abdominal: Soft. Bowel sounds are normal. She exhibits no distension. There is no tenderness.   Musculoskeletal: Normal range of motion. She exhibits edema (1+ bilateral lower extremity ) and tenderness (bilateral lower extremity ). She exhibits no deformity.   Neurological: She is alert and oriented to person, place, and time. No cranial nerve deficit or sensory deficit.   Skin: Skin is warm and dry.   Psychiatric: She has a normal mood and affect. Her behavior is normal.   Vitals reviewed.      Significant Labs:   Recent Lab Results       07/20/19  1036   07/20/19  0501   07/20/19  0242   07/19/19  1747   07/19/19  1640        Albumin   2.6           Alkaline Phosphatase   123           Allens Test       Pass       ALT   26           Anion Gap   14           Ascending aorta               Ao peak ed               Ao VTI               AST   25           AV valve area               AV mean gradient               AV index (prosthetic)               AV peak gradient               AV Velocity Ratio               Baso #   0.02           Basophil%   0.3           BILIRUBIN TOTAL   1.0  Comment:  For infants and newborns, interpretation of results should be based  on gestational age, weight and in agreement with clinical  observations.  Premature Infant recommended reference ranges:  Up to 24 hours.............<8.0 mg/dL  Up to 48 hours............<12.0 mg/dL  3-5 days..................<15.0 mg/dL  6-29 days.................<15.0 mg/dL             Blood Culture, Routine               Site       LB       BSA               BUN, Bld   113           Calcium   8.7           Chloride   107           CO2   16           Complement (C-3) 41             Complement (C-4) <3             Creatinine   4.4           Creatinine, Random Ur      334.0  Comment:  The random urine reference ranges provided were established   for 24 hour urine collections.  No reference ranges exist for  random urine specimens.  Correlate clinically.           Left Ventricle Relative Wall Thickness               Differential Method   Automated           E/A ratio               E/E' ratio               eGFR if    12.5           eGFR if non    10.8  Comment:  Calculation used to obtain the estimated glomerular filtration  rate (eGFR) is the CKD-EPI equation.              Eos #   0.0           Eosinophil%   0.0           E wave decelartion time               FS               Glucose   163           Gran # (ANC)   5.8           Gran%   78.6           Hematocrit   31.3           Hemoglobin   10.2           Immature Grans (Abs)   0.11  Comment:  Mild elevation in immature granulocytes is non specific and   can be seen in a variety of conditions including stress response,   acute inflammation, trauma and pregnancy. Correlation with other   laboratory and clinical findings is essential.             Immature Granulocytes   1.5           IVRT               IVS               LA WIDTH               Left Atrium Major Axis               Left Atrium Minor Axis               LA size               LA volume               LA Volume Index               LVOT area               LV LATERAL E/E' RATIO               LV SEPTAL E/E' RATIO               LV Diastolic Volume               LV Diastolic Volume Index               LVIDD               LVIDS               LV mass               LV Mass Index               LVOT diameter               LVOT peak ed               LVOT stroke volume               LVOT peak VTI               LV Systolic Volume               LV Systolic Volume Index               Lymph #   1.2           Lymph%   16.5           Magnesium   2.2           MCH   22.0           MCHC   32.6           MCV   68           Mean e'               Mono #   0.2            Mono%   3.1           MPV   8.9           MV Peak A Reynaldo               MV Peak E Reynaldo               nRBC   0           Phosphorus   4.5           Platelets   304           POC BE       -6       POC HCO3       18.4       POC PCO2       28.1       POC PH       7.424       POC PO2       69       POC SATURATED O2       94       POC TCO2       19       Potassium   4.3           PROTEIN TOTAL   5.3           Protein, Serum         4.9  Comment:  Serum protein electrophoresis and immunofixation results should be   interpreted in clinical context in that some therapeutic agents can   result   in false positive results (example, daratumumab). Correlation with   the   patient s therapeutic regimen is required.       PV Peak D Reynaldo               PV Peak S Reynaldo               Pulm vein S/D ratio               PW               Right Atrial Pressure (from IVC)               RA Major Axis               RA Width               RBC   4.63           RDW   23.1           RV S'               RVDD               Sample       ARTERIAL       Sinus               Sodium   137           Sodium Urine Random     21  Comment:  The random urine reference ranges provided were established   for 24 hour urine collections.  No reference ranges exist for  random urine specimens.  Correlate clinically.           STJ               TAPSE               TDI SEPTAL               TDI LATERAL               Triscuspid Valve Regurgitation Peak Gradient               TR Max Reynaldo               TV rest pulmonary artery pressure               WBC   7.38                            07/19/19  1426   07/19/19  1216        Albumin         Alkaline Phosphatase         Allens Test         ALT         Anion Gap         Ascending aorta 3.01       Ao peak reynaldo 1.94       Ao VTI 34.14       AST         AV valve area 2.64       AV mean gradient 6       AV index (prosthetic) 0.91       AV peak gradient 15       AV Velocity Ratio 0.78       Baso #         Basophil%          BILIRUBIN TOTAL         Blood Culture, Routine   No Growth to date[P]     Site         BSA 2.19       BUN, Bld         Calcium         Chloride         CO2         Complement (C-3)         Complement (C-4)         Creatinine         Creatinine, Random Ur         Left Ventricle Relative Wall Thickness 0.51       Differential Method         E/A ratio 2.49       E/E' ratio 11.79       eGFR if          eGFR if non          Eos #         Eosinophil%         E wave decelartion time 213.60       FS 39       Glucose         Gran # (ANC)         Gran%         Hematocrit         Hemoglobin         Immature Grans (Abs)         Immature Granulocytes         IVRT 0.06       IVS 1.00       LA WIDTH 4.08       Left Atrium Major Axis 5.58       Left Atrium Minor Axis 5.66       LA size 4.01       LA volume 78.15       LA Volume Index 37.0       LVOT area 2.9       LV LATERAL E/E' RATIO 11.20       LV SEPTAL E/E' RATIO 12.44       LV Diastolic Volume 99.62       LV Diastolic Volume Index 47.20       LVIDD 4.65       LVIDS 2.84       LV mass 182.03       LV Mass Index 86       LVOT diameter 1.92       LVOT peak reynaldo 1.52       LVOT stroke volume 90.08       LVOT peak VTI 31.13       LV Systolic Volume 30.66       LV Systolic Volume Index 14.5       Lymph #         Lymph%         Magnesium         MCH         MCHC         MCV         Mean e' 0.10       Mono #         Mono%         MPV         MV Peak A Reynaldo 0.45       MV Peak E Reynaldo 1.12       nRBC         Phosphorus         Platelets         POC BE         POC HCO3         POC PCO2         POC PH         POC PO2         POC SATURATED O2         POC TCO2         Potassium         PROTEIN TOTAL         Protein, Serum         PV Peak D Reynaldo 0.88       PV Peak S Reynaldo 0.50       Pulm vein S/D ratio 0.57       PW 1.18       Right Atrial Pressure (from IVC) 15       RA Major Axis 4.99       RA Width 3.99       RBC         RDW         RV S' 12       RVDD 3.57        Sample         Sinus 3.09       Sodium         Sodium Urine Random         STJ 2.57       TAPSE 2.29       TDI SEPTAL 0.09       TDI LATERAL 0.10       Triscuspid Valve Regurgitation Peak Gradient 32       TR Max Reynaldo 2.81       TV rest pulmonary artery pressure 47       WBC             All pertinent labs within the past 24 hours have been reviewed.    Significant Imaging: I have reviewed all pertinent imaging results/findings within the past 24 hours.

## 2019-07-20 NOTE — ASSESSMENT & PLAN NOTE
- x-ray in [place] revealed interstitial infiltrates. Follow up chest CT showed perihilar ground glass opacity. Treated initially with rocephin and doxycycline  - repeat chest x-ray with patchy airspace consolidations bilaterally R>L, edema vs PNA  - blood cultures NGTD  - currently on broad spectrum antibiotics vanc, zosyn, azithro; will hold vancomycin and de-escalate zosyn to ceftriaxone

## 2019-07-20 NOTE — NURSING TRANSFER
Nursing Transfer Note      7/19/2019     Transfer To: 645    Transfer via bed    Transported by RN and PCT    Medicines sent: none    Chart send with patient: Yes    Notified: Family    Patient reassessed at: 7/19 2130    Upon arrival to floor: patient oriented to room, call bell in reach and bed in lowest position    CCS was made aware of 's. Verified if transfer will still be pushed through. MD verbalized that patient was already started on blood pressure medication, hence evaluated okay for transfer. Patient has poor venous access as per report. Blood culture (2nd site) is still pending to be facilitated. Multiple attempts were already done during the day shift. Urine diagnostic tests are also still pending. No urine output was noted during the day shift. Awaiting for patients response to diuretic. Report given to Med-Surg floor RN.

## 2019-07-20 NOTE — PLAN OF CARE
Problem: Adult Inpatient Plan of Care  Goal: Plan of Care Review  Outcome: Ongoing (interventions implemented as appropriate)  Pt able to make needs known, meds administered as ordered, no distress noted, will continue to monitor.    Problem: Fall Injury Risk  Goal: Absence of Fall and Fall-Related Injury    Intervention: Identify and Manage Contributors to Fall Injury Risk     07/20/19 3788   Manage Acute Allergic Reaction   Medication Review/Management medications reviewed   Identify and Manage Contributors to Fall Injury Risk   Self-Care Promotion independence encouraged;BADL personal objects within reach

## 2019-07-20 NOTE — HPI
Ms. Vital is a 53 year old female with hypertension, anemia, and history of leiomyoma of the uterus who presents to ICU as a transfer from [facility] [place] [facility] for evaluation of thrombocytopenia. Patient reports that prior to going to the hospital 3 days ago that she was experiencing symptoms of chills, feeling febrile, dry cough, shortness of breath and occasional nose bleeds for roughly 2 weeks. She also reports easy fatigueability and difficulty breathing when laying flat; currently sleeps with 2 pillows. Patient presented to hospital in [place] when her symptoms did not resolve. Initial work up showed a , worsening kidney function with creatinine of 2.9, and thrombocytopenia with platelets of 33k. In addition, chest x-ray showed interstitial opacities and follow up CT showed perihilar ground glass opacity. Patient was treated with rocephin and doxycycline as well as Bumex q12 due to her heart failure type symptoms. Lab work at the time showed WBC of 7.9 and a lactate of .8. In addition, she tested positive for strep A. Patient was transferred for further evaluation of her thrombocytopenia with concern for TTP and possible need for plasmapheresis.     At time of exam, patient is properly oriented to person time and places. She endorses headache, generalized myalgias, nausea and orthopnea. She denies SOB while sitting up, chest pain, abdominal pain, vomiting, and diarrhea. She has not felt febrile since 1 week prior to hospital stay.

## 2019-07-20 NOTE — ASSESSMENT & PLAN NOTE
- transferred for evaluation of thrombocytopenia with platelet count of 33k out of concern for TTP  - Shitocytes seen on blood morphology from outside hospital records, although not present here  - multiple lab tests ordered including CBC, CMP, LDH, haptoglobin, reticulocytes, Hep B labs, HIV, INR, PTT, fibrinogen, b12, folate, ferritin, TSH, peripheral blood smear, and [id number]  - Heme/onc following, appreciate recs  - currently, platelets are 304; very unlikely to be related to TTP

## 2019-07-20 NOTE — NURSING
Pt arrived to floor via bed accompanied by nurse, pt denies any pain/discomfort, all precautions maintained, no distress/discomfort noted in pt. Will continue to monitor pt.

## 2019-07-20 NOTE — ASSESSMENT & PLAN NOTE
- baseline creatinine of 1.0 roughly 1 month ago. Outside records show creatinine of 2.9 and BUN of 74; Cr 4.4 today and oliguric  - concern for AIN in setting of recent NSAID exposure vs PSGN vs COSME vs anti-GBM, less concern for TTP despite low platelets given lack of hemolysis on labs  - RJ with bilateral medicorenal disease  - UA with proteinuria, blood, no evidence of infection; pro:cr 5.97; C3 41, C4<3  - positive for strep A as seen on outside hospital records  - given 1x dose lasix 160mg IV last night with poor response  - initiated on empiric prednisone 60mg daily  - avoid NSAIDs and other nephrotoxic agents  - Nephrology following, appreciate recs

## 2019-07-20 NOTE — ASSESSMENT & PLAN NOTE
- BNP of 526 from outside records; 999 on admission  - bilateral lower extremity swelling and reports easy fatigueability and orthopnea  - TTE with CVP 15, normal EF, indeterminate diastolic function  - suspect that this reflects volume overload in setting of oliguric renal failure rather than primary cardiac process

## 2019-07-20 NOTE — PROGRESS NOTES
[facility] Hospital Medicine  Progress Note    Patient Name: Kendy Vital  MRN: [id number]  Patient Class: IP- Inpatient   Admission Date: 7/19/2019  Length of Stay: 1 days  Attending Physician: Lloyd Arambula, *  Primary Care Provider: To Obtain Unable    Hospital Medicine Team: [facility] HOSP MED R Lloyd Arambula MD    Subjective:     Principal Problem:Acute renal failure with tubular necrosis      HPI:  Ms. Vital is a 53 year old female with hypertension, anemia, and history of leiomyoma of the uterus who presents to ICU as a transfer from [facility] for evaluation of thrombocytopenia. Patient reports that prior to going to the hospital 3 days ago that she was experiencing symptoms of chills, feeling febrile, dry cough, shortness of breath and occasional nose bleeds for roughly 2 weeks. She also reports easy fatigueability and difficulty breathing when laying flat; currently sleeps with 2 pillows. Patient presented to hospital in [place] when her symptoms did not resolve. Initial work up showed a , worsening kidney function with creatinine of 2.9, and thrombocytopenia with platelets of 33k. In addition, chest x-ray showed interstitial opacities and follow up CT showed perihilar ground glass opacity. Patient was treated with rocephin and doxycycline as well as Bumex q12 due to her heart failure type symptoms. Lab work at the time showed WBC of 7.9 and a lactate of .8. In addition, she tested positive for strep A. Patient was transferred for further evaluation of her thrombocytopenia with concern for TTP and possible need for plasmapheresis.     At time of exam, patient is properly oriented to person time and places. She endorses headache, generalized myalgias, nausea and orthopnea. She denies SOB while sitting up, chest pain, abdominal pain, vomiting, and diarrhea. She has not felt febrile since 1 week prior to hospital stay.         Overview/Hospital Course:  No notes on file    Interval History: Stepped down from ICU overnight. States that she feels well now, although still oliguric and endorsing some leg tightness. States that she did not urinate much following 160mg lasix dose last night. Denies any fever or chills, states breathing is improved. No bleeding. Discussed her case with family at bedside.    Review of Systems   Constitutional: Positive for activity change and fatigue. Negative for appetite change, chills and fever.   HENT: Negative for congestion and sore throat.    Respiratory: Positive for cough and shortness of breath (improving). Negative for chest tightness.    Cardiovascular: Positive for leg swelling. Negative for chest pain and palpitations.   Gastrointestinal: Negative for abdominal pain, constipation, diarrhea, nausea and vomiting.   Genitourinary: Negative for dysuria.   Musculoskeletal: Positive for myalgias. Negative for arthralgias and back pain.   Skin: Negative for color change, pallor and rash.   Neurological: Negative for dizziness, weakness and headaches.     Objective:     Vital Signs (Most Recent):  Temp: 96.7 °F (35.9 °C) (07/20/19 1118)  Pulse: 62 (07/20/19 1118)  Resp: 18 (07/20/19 1118)  BP: (!) 175/86 (07/20/19 1119)  SpO2: 97 % (07/20/19 1118) Vital Signs (24h Range):  Temp:  [96.7 °F (35.9 °C)-98.6 °F (37 °C)] 96.7 °F (35.9 °C)  Pulse:  [62-70] 62  Resp:  [17-40] 18  SpO2:  [95 %-99 %] 97 %  BP: (146-189)/(69-88) 175/86     Weight: 103 kg (227 lb)  Body mass index is 36.64 kg/m².    Intake/Output Summary (Last 24 hours) at 7/20/2019 1207  Last data filed at 7/20/2019 0628  Gross per 24 hour   Intake 300 ml   Output 260 ml   Net 40 ml      Physical Exam   Constitutional: She is oriented to person, place, and time. She appears well-developed and well-nourished. No distress.   HENT:   Head: Normocephalic and atraumatic.   Mouth/Throat: No oropharyngeal exudate.   Eyes: Conjunctivae are normal.   Neck:  Normal range of motion. Neck supple. No JVD present.   Cardiovascular: Normal rate, regular rhythm, normal heart sounds and intact distal pulses.   Pulmonary/Chest: Effort normal. She has wheezes (minor). She has no rales.   Abdominal: Soft. Bowel sounds are normal. She exhibits no distension. There is no tenderness.   Musculoskeletal: Normal range of motion. She exhibits edema (1+ bilateral lower extremity ) and tenderness (bilateral lower extremity ). She exhibits no deformity.   Neurological: She is alert and oriented to person, place, and time. No cranial nerve deficit or sensory deficit.   Skin: Skin is warm and dry.   Psychiatric: She has a normal mood and affect. Her behavior is normal.   Vitals reviewed.      Significant Labs:   Recent Lab Results       07/20/19  1036   07/20/19  0501   07/20/19  0242   07/19/19  1747   07/19/19  1640        Albumin   2.6           Alkaline Phosphatase   123           Allens Test       Pass       ALT   26           Anion Gap   14           Ascending aorta               Ao peak ed               Ao VTI               AST   25           AV valve area               AV mean gradient               AV index (prosthetic)               AV peak gradient               AV Velocity Ratio               Baso #   0.02           Basophil%   0.3           BILIRUBIN TOTAL   1.0  Comment:  For infants and newborns, interpretation of results should be based  on gestational age, weight and in agreement with clinical  observations.  Premature Infant recommended reference ranges:  Up to 24 hours.............<8.0 mg/dL  Up to 48 hours............<12.0 mg/dL  3-5 days..................<15.0 mg/dL  6-29 days.................<15.0 mg/dL             Blood Culture, Routine               Site       LB       BSA               BUN, Bld   113           Calcium   8.7           Chloride   107           CO2   16           Complement (C-3) 41             Complement (C-4) <3             Creatinine   4.4            Creatinine, Random Ur     334.0  Comment:  The random urine reference ranges provided were established   for 24 hour urine collections.  No reference ranges exist for  random urine specimens.  Correlate clinically.           Left Ventricle Relative Wall Thickness               Differential Method   Automated           E/A ratio               E/E' ratio               eGFR if    12.5           eGFR if non    10.8  Comment:  Calculation used to obtain the estimated glomerular filtration  rate (eGFR) is the CKD-EPI equation.              Eos #   0.0           Eosinophil%   0.0           E wave decelartion time               FS               Glucose   163           Gran # (ANC)   5.8           Gran%   78.6           Hematocrit   31.3           Hemoglobin   10.2           Immature Grans (Abs)   0.11  Comment:  Mild elevation in immature granulocytes is non specific and   can be seen in a variety of conditions including stress response,   acute inflammation, trauma and pregnancy. Correlation with other   laboratory and clinical findings is essential.             Immature Granulocytes   1.5           IVRT               IVS               LA WIDTH               Left Atrium Major Axis               Left Atrium Minor Axis               LA size               LA volume               LA Volume Index               LVOT area               LV LATERAL E/E' RATIO               LV SEPTAL E/E' RATIO               LV Diastolic Volume               LV Diastolic Volume Index               LVIDD               LVIDS               LV mass               LV Mass Index               LVOT diameter               LVOT peak ed               LVOT stroke volume               LVOT peak VTI               LV Systolic Volume               LV Systolic Volume Index               Lymph #   1.2           Lymph%   16.5           Magnesium   2.2           MCH   22.0           MCHC   32.6           MCV   68           Mean e'                Mono #   0.2           Mono%   3.1           MPV   8.9           MV Peak A Reynaldo               MV Peak E Reynaldo               nRBC   0           Phosphorus   4.5           Platelets   304           POC BE       -6       POC HCO3       18.4       POC PCO2       28.1       POC PH       7.424       POC PO2       69       POC SATURATED O2       94       POC TCO2       19       Potassium   4.3           PROTEIN TOTAL   5.3           Protein, Serum         4.9  Comment:  Serum protein electrophoresis and immunofixation results should be   interpreted in clinical context in that some therapeutic agents can   result   in false positive results (example, daratumumab). Correlation with   the   patient s therapeutic regimen is required.       PV Peak D Reynaldo               PV Peak S Reynaldo               Pulm vein S/D ratio               PW               Right Atrial Pressure (from IVC)               RA Major Axis               RA Width               RBC   4.63           RDW   23.1           RV S'               RVDD               Sample       ARTERIAL       Sinus               Sodium   137           Sodium Urine Random     21  Comment:  The random urine reference ranges provided were established   for 24 hour urine collections.  No reference ranges exist for  random urine specimens.  Correlate clinically.           STJ               TAPSE               TDI SEPTAL               TDI LATERAL               Triscuspid Valve Regurgitation Peak Gradient               TR Max Reynaldo               TV rest pulmonary artery pressure               WBC   7.38                            07/19/19  1426   07/19/19  1216        Albumin         Alkaline Phosphatase         Allens Test         ALT         Anion Gap         Ascending aorta 3.01       Ao peak reynaldo 1.94       Ao VTI 34.14       AST         AV valve area 2.64       AV mean gradient 6       AV index (prosthetic) 0.91       AV peak gradient 15       AV Velocity Ratio 0.78       Baso #          Basophil%         BILIRUBIN TOTAL         Blood Culture, Routine   No Growth to date[P]     Site         BSA 2.19       BUN, Bld         Calcium         Chloride         CO2         Complement (C-3)         Complement (C-4)         Creatinine         Creatinine, Random Ur         Left Ventricle Relative Wall Thickness 0.51       Differential Method         E/A ratio 2.49       E/E' ratio 11.79       eGFR if          eGFR if non          Eos #         Eosinophil%         E wave decelartion time 213.60       FS 39       Glucose         Gran # (ANC)         Gran%         Hematocrit         Hemoglobin         Immature Grans (Abs)         Immature Granulocytes         IVRT 0.06       IVS 1.00       LA WIDTH 4.08       Left Atrium Major Axis 5.58       Left Atrium Minor Axis 5.66       LA size 4.01       LA volume 78.15       LA Volume Index 37.0       LVOT area 2.9       LV LATERAL E/E' RATIO 11.20       LV SEPTAL E/E' RATIO 12.44       LV Diastolic Volume 99.62       LV Diastolic Volume Index 47.20       LVIDD 4.65       LVIDS 2.84       LV mass 182.03       LV Mass Index 86       LVOT diameter 1.92       LVOT peak reynaldo 1.52       LVOT stroke volume 90.08       LVOT peak VTI 31.13       LV Systolic Volume 30.66       LV Systolic Volume Index 14.5       Lymph #         Lymph%         Magnesium         MCH         MCHC         MCV         Mean e' 0.10       Mono #         Mono%         MPV         MV Peak A Reynaldo 0.45       MV Peak E Reynaldo 1.12       nRBC         Phosphorus         Platelets         POC BE         POC HCO3         POC PCO2         POC PH         POC PO2         POC SATURATED O2         POC TCO2         Potassium         PROTEIN TOTAL         Protein, Serum         PV Peak D Reynaldo 0.88       PV Peak S Reynaldo 0.50       Pulm vein S/D ratio 0.57       PW 1.18       Right Atrial Pressure (from IVC) 15       RA Major Axis 4.99       RA Width 3.99       RBC         RDW         RV S' 12        RVDD 3.57       Sample         Sinus 3.09       Sodium         Sodium Urine Random         STJ 2.57       TAPSE 2.29       TDI SEPTAL 0.09       TDI LATERAL 0.10       Triscuspid Valve Regurgitation Peak Gradient 32       TR Max Reynaldo 2.81       TV rest pulmonary artery pressure 47       WBC             All pertinent labs within the past 24 hours have been reviewed.    Significant Imaging: I have reviewed all pertinent imaging results/findings within the past 24 hours.      Assessment/Plan:      * Acute renal failure with tubular necrosis  - baseline creatinine of 1.0 roughly 1 month ago. Outside records show creatinine of 2.9 and BUN of 74; Cr 4.4 today and oliguric  - concern for AIN in setting of recent NSAID exposure vs PSGN vs COSME vs anti-GBM, less concern for TTP despite low platelets given lack of hemolysis on labs  - RJ with bilateral medicorenal disease  - UA with proteinuria, blood, no evidence of infection; pro:cr 5.97; C3 41, C4<3  - positive for strep A as seen on outside hospital records  - given 1x dose lasix 160mg IV last night with poor response  - initiated on empiric prednisone 60mg daily  - avoid NSAIDs and other nephrotoxic agents  - Nephrology following, appreciate recs      Pneumonia  - x-ray in [place] revealed interstitial infiltrates. Follow up chest CT showed perihilar ground glass opacity. Treated initially with rocephin and doxycycline  - repeat chest x-ray with patchy airspace consolidations bilaterally R>L, edema vs PNA  - blood cultures NGTD  - currently on broad spectrum antibiotics vanc, zosyn, azithro; will hold vancomycin and de-escalate zosyn to ceftriaxone    Elevated brain natriuretic peptide (BNP) level  - BNP of 526 from outside records; 999 on admission  - bilateral lower extremity swelling and reports easy fatigueability and orthopnea  - TTE with CVP 15, normal EF, indeterminate diastolic function  - suspect that this reflects volume overload in setting of oliguric  renal failure rather than primary cardiac process    Essential hypertension  - currently hypertensive and per patient, has been undergoing multiple recent medication changes due to HTN as outpatient  - suspect initially may have been exacerbated by NSAID use, now concern for multifactorial cause including renal failure with volume overload, steroid use, and inadequate dosing of home regimen  - home regimen includes: lisinopril-HCTZ 20-12.5mg, clonidine 0.2mg bid, and hydralazine 25mg tid  - currently on amlodipine 10mg, hydralazine 25mg tid, and coreg 6.25mg bid  - will add on home clonidine 0.2mg bid today and monitor    Thrombocytopenia  - transferred for evaluation of thrombocytopenia with platelet count of 33k out of concern for TTP  - Shitocytes seen on blood morphology from outside hospital records, although not present here  - multiple lab tests ordered including CBC, CMP, LDH, haptoglobin, reticulocytes, Hep B labs, HIV, INR, PTT, fibrinogen, b12, folate, ferritin, TSH, peripheral blood smear, and [id number]  - Heme/onc following, appreciate recs  - currently, platelets are 304; very unlikely to be related to TTP      VTE Risk Mitigation (From admission, onward)        Ordered     Place sequential compression device  Until discontinued      07/19/19 0533     IP VTE LOW RISK PATIENT  Once      07/19/19 0533                Lloyd Arabmula MD  Department of Hospital Medicine   [facility]

## 2019-07-20 NOTE — PLAN OF CARE
Problem: Adult Inpatient Plan of Care  Goal: Plan of Care Review  Outcome: Ongoing (interventions implemented as appropriate)     07/20/19 5665   Plan of Care Review   Plan of Care Reviewed With patient     no distress/discomfort noted in pt, trevino care performed, all precautions maintained, will continue to monitor pt.        3.22 3.11

## 2019-07-20 NOTE — ASSESSMENT & PLAN NOTE
- currently hypertensive and per patient, has been undergoing multiple recent medication changes due to HTN as outpatient  - suspect initially may have been exacerbated by NSAID use, now concern for multifactorial cause including renal failure with volume overload, steroid use, and inadequate dosing of home regimen  - home regimen includes: lisinopril-HCTZ 20-12.5mg, clonidine 0.2mg bid, and hydralazine 25mg tid  - currently on amlodipine 10mg, hydralazine 25mg tid, and coreg 6.25mg bid  - will add on home clonidine 0.2mg bid today and monitor

## 2019-07-20 NOTE — PLAN OF CARE
Pt's /88, recheck 203/87, and pt complaining of back pain, pt denies any chest pain/blurry vision/lightheadedness, critical care medicine MD called and MD notified, and MD notified pt with indwelling Marcial and no Marcial order, no new orders received at this time. Will continue to monitor pt

## 2019-07-21 PROBLEM — D69.6 THROMBOCYTOPENIA: Status: RESOLVED | Noted: 2019-07-19 | Resolved: 2019-07-21

## 2019-07-21 LAB
ALBUMIN SERPL BCP-MCNC: 2.5 G/DL (ref 3.5–5.2)
ANION GAP SERPL CALC-SCNC: 16 MMOL/L (ref 8–16)
BASOPHILS # BLD AUTO: 0.01 K/UL (ref 0–0.2)
BASOPHILS NFR BLD: 0.1 % (ref 0–1.9)
BUN SERPL-MCNC: 124 MG/DL (ref 6–20)
CALCIUM SERPL-MCNC: 8.5 MG/DL (ref 8.7–10.5)
CHLORIDE SERPL-SCNC: 106 MMOL/L (ref 95–110)
CO2 SERPL-SCNC: 16 MMOL/L (ref 23–29)
CREAT SERPL-MCNC: 5.7 MG/DL (ref 0.5–1.4)
DIFFERENTIAL METHOD: ABNORMAL
EOSINOPHIL # BLD AUTO: 0 K/UL (ref 0–0.5)
EOSINOPHIL NFR BLD: 0 % (ref 0–8)
ERYTHROCYTE [DISTWIDTH] IN BLOOD BY AUTOMATED COUNT: 23.7 % (ref 11.5–14.5)
EST. GFR  (AFRICAN AMERICAN): 9.1 ML/MIN/1.73 M^2
EST. GFR  (NON AFRICAN AMERICAN): 7.9 ML/MIN/1.73 M^2
GLUCOSE SERPL-MCNC: 127 MG/DL (ref 70–110)
HCT VFR BLD AUTO: 31.1 % (ref 37–48.5)
HGB BLD-MCNC: 9.9 G/DL (ref 12–16)
IMM GRANULOCYTES # BLD AUTO: 0.21 K/UL (ref 0–0.04)
IMM GRANULOCYTES NFR BLD AUTO: 1.7 % (ref 0–0.5)
LYMPHOCYTES # BLD AUTO: 2.3 K/UL (ref 1–4.8)
LYMPHOCYTES NFR BLD: 19.3 % (ref 18–48)
MCH RBC QN AUTO: 22.3 PG (ref 27–31)
MCHC RBC AUTO-ENTMCNC: 31.8 G/DL (ref 32–36)
MCV RBC AUTO: 70 FL (ref 82–98)
MONOCYTES # BLD AUTO: 1.8 K/UL (ref 0.3–1)
MONOCYTES NFR BLD: 14.6 % (ref 4–15)
NEUTROPHILS # BLD AUTO: 7.8 K/UL (ref 1.8–7.7)
NEUTROPHILS NFR BLD: 64.3 % (ref 38–73)
NRBC BLD-RTO: 0 /100 WBC
PHOSPHATE SERPL-MCNC: 5.5 MG/DL (ref 2.7–4.5)
PLATELET # BLD AUTO: 237 K/UL (ref 150–350)
PMV BLD AUTO: 7.6 FL (ref 9.2–12.9)
POTASSIUM SERPL-SCNC: 3.9 MMOL/L (ref 3.5–5.1)
RBC # BLD AUTO: 4.43 M/UL (ref 4–5.4)
RHEUMATOID FACT SERPL-ACNC: 209 IU/ML (ref 0–15)
SODIUM SERPL-SCNC: 138 MMOL/L (ref 136–145)
WBC # BLD AUTO: 12.1 K/UL (ref 3.9–12.7)

## 2019-07-21 PROCEDURE — 99232 PR SUBSEQUENT HOSPITAL CARE,LEVL II: ICD-10-PCS | Mod: ,,, | Performed by: INTERNAL MEDICINE

## 2019-07-21 PROCEDURE — 25000003 PHARM REV CODE 250: Performed by: STUDENT IN AN ORGANIZED HEALTH CARE EDUCATION/TRAINING PROGRAM

## 2019-07-21 PROCEDURE — 63600175 PHARM REV CODE 636 W HCPCS: Performed by: HOSPITALIST

## 2019-07-21 PROCEDURE — 36415 COLL VENOUS BLD VENIPUNCTURE: CPT

## 2019-07-21 PROCEDURE — 80069 RENAL FUNCTION PANEL: CPT

## 2019-07-21 PROCEDURE — 86431 RHEUMATOID FACTOR QUANT: CPT

## 2019-07-21 PROCEDURE — 85025 COMPLETE CBC W/AUTO DIFF WBC: CPT

## 2019-07-21 PROCEDURE — 99233 SBSQ HOSP IP/OBS HIGH 50: CPT | Mod: ,,, | Performed by: HOSPITALIST

## 2019-07-21 PROCEDURE — 63600175 PHARM REV CODE 636 W HCPCS: Performed by: GENERAL PRACTICE

## 2019-07-21 PROCEDURE — 99232 SBSQ HOSP IP/OBS MODERATE 35: CPT | Mod: ,,, | Performed by: INTERNAL MEDICINE

## 2019-07-21 PROCEDURE — 25000003 PHARM REV CODE 250: Performed by: HOSPITALIST

## 2019-07-21 PROCEDURE — 11000001 HC ACUTE MED/SURG PRIVATE ROOM

## 2019-07-21 PROCEDURE — 99233 PR SUBSEQUENT HOSPITAL CARE,LEVL III: ICD-10-PCS | Mod: ,,, | Performed by: HOSPITALIST

## 2019-07-21 PROCEDURE — 86803 HEPATITIS C AB TEST: CPT

## 2019-07-21 PROCEDURE — 25000003 PHARM REV CODE 250: Performed by: NURSE PRACTITIONER

## 2019-07-21 RX ORDER — RAMELTEON 8 MG/1
8 TABLET ORAL NIGHTLY PRN
Status: DISCONTINUED | OUTPATIENT
Start: 2019-07-21 | End: 2019-08-03

## 2019-07-21 RX ADMIN — CARVEDILOL 6.25 MG: 6.25 TABLET, FILM COATED ORAL at 09:07

## 2019-07-21 RX ADMIN — PREDNISONE 60 MG: 20 TABLET ORAL at 09:07

## 2019-07-21 RX ADMIN — CLONIDINE HYDROCHLORIDE 0.2 MG: 0.2 TABLET ORAL at 09:07

## 2019-07-21 RX ADMIN — CEFTRIAXONE 2 G: 2 INJECTION, SOLUTION INTRAVENOUS at 02:07

## 2019-07-21 RX ADMIN — AMLODIPINE BESYLATE 10 MG: 10 TABLET ORAL at 09:07

## 2019-07-21 RX ADMIN — HYDRALAZINE HYDROCHLORIDE 50 MG: 50 TABLET ORAL at 05:07

## 2019-07-21 RX ADMIN — ACETAMINOPHEN 650 MG: 325 TABLET ORAL at 06:07

## 2019-07-21 RX ADMIN — HYDRALAZINE HYDROCHLORIDE 50 MG: 50 TABLET ORAL at 02:07

## 2019-07-21 RX ADMIN — HYDRALAZINE HYDROCHLORIDE 50 MG: 50 TABLET ORAL at 10:07

## 2019-07-21 RX ADMIN — CLONIDINE HYDROCHLORIDE 0.2 MG: 0.2 TABLET ORAL at 12:07

## 2019-07-21 RX ADMIN — AZITHROMYCIN 500 MG: 250 TABLET, FILM COATED ORAL at 09:07

## 2019-07-21 NOTE — PROGRESS NOTES
[facility] Hospital Medicine  Progress Note    Patient Name: Kendy Vital  MRN: [id number]  Patient Class: IP- Inpatient   Admission Date: 7/19/2019  Length of Stay: 2 days  Attending Physician: Lloyd Arambula, *  Primary Care Provider: To Obtain Unable    Hospital Medicine Team: [facility] HOSP MED R Lloyd Arambula MD    Subjective:     Principal Problem:Acute renal failure with tubular necrosis      HPI:  Ms. Vital is a 53 year old female with hypertension, anemia, and history of leiomyoma of the uterus who presents to ICU as a transfer from [facility] for evaluation of thrombocytopenia. Patient reports that prior to going to the hospital 3 days ago that she was experiencing symptoms of chills, feeling febrile, dry cough, shortness of breath and occasional nose bleeds for roughly 2 weeks. She also reports easy fatigueability and difficulty breathing when laying flat; currently sleeps with 2 pillows. Patient presented to hospital in [place] when her symptoms did not resolve. Initial work up showed a , worsening kidney function with creatinine of 2.9, and thrombocytopenia with platelets of 33k. In addition, chest x-ray showed interstitial opacities and follow up CT showed perihilar ground glass opacity. Patient was treated with rocephin and doxycycline as well as Bumex q12 due to her heart failure type symptoms. Lab work at the time showed WBC of 7.9 and a lactate of .8. In addition, she tested positive for strep A. Patient was transferred for further evaluation of her thrombocytopenia with concern for TTP and possible need for plasmapheresis.     At time of exam, patient is properly oriented to person time and places. She endorses headache, generalized myalgias, nausea and orthopnea. She denies SOB while sitting up, chest pain, abdominal pain, vomiting, and diarrhea. She has not felt febrile since 1 week prior to hospital stay.         Overview/Hospital Course:  No notes on file    Interval History: NAEO. BP a little better during the evening after addition of clonidine. Still oliguric, but denies shortness of breath, chest pain, nausea. Nephrology following with possible biopsy on Monday given platelet recovery.    Review of Systems   Constitutional: Positive for activity change and fatigue. Negative for appetite change, chills and fever.   HENT: Negative for congestion and sore throat.    Respiratory: Positive for cough and shortness of breath (improving). Negative for chest tightness.    Cardiovascular: Positive for leg swelling. Negative for chest pain and palpitations.   Gastrointestinal: Negative for abdominal pain, constipation, diarrhea, nausea and vomiting.   Genitourinary: Negative for dysuria.   Musculoskeletal: Positive for myalgias. Negative for arthralgias and back pain.   Skin: Negative for color change, pallor and rash.   Neurological: Negative for dizziness, weakness and headaches.     Objective:     Vital Signs (Most Recent):  Temp: 96.5 °F (35.8 °C) (07/21/19 1149)  Pulse: 65 (07/21/19 1149)  Resp: 18 (07/21/19 1149)  BP: 131/64 (07/21/19 1149)  SpO2: 97 % (07/21/19 1149) Vital Signs (24h Range):  Temp:  [96.5 °F (35.8 °C)-98.3 °F (36.8 °C)] 96.5 °F (35.8 °C)  Pulse:  [59-69] 65  Resp:  [18-20] 18  SpO2:  [94 %-100 %] 97 %  BP: (127-196)/(64-92) 131/64     Weight: 103 kg (227 lb)  Body mass index is 36.64 kg/m².    Intake/Output Summary (Last 24 hours) at 7/21/2019 1408  Last data filed at 7/21/2019 0619  Gross per 24 hour   Intake 1070 ml   Output 300 ml   Net 770 ml      Physical Exam   Constitutional: She is oriented to person, place, and time. She appears well-developed and well-nourished. No distress.   HENT:   Head: Normocephalic and atraumatic.   Mouth/Throat: No oropharyngeal exudate.   Eyes: Conjunctivae are normal.   Neck: Normal range of motion. Neck supple. No JVD present.   Cardiovascular: Normal rate, regular  rhythm, normal heart sounds and intact distal pulses.   Pulmonary/Chest: Effort normal. She has wheezes (minor). She has no rales.   Abdominal: Soft. Bowel sounds are normal. She exhibits no distension. There is no tenderness.   Musculoskeletal: Normal range of motion. She exhibits edema (1+ bilateral lower extremity ) and tenderness (bilateral lower extremity ). She exhibits no deformity.   Neurological: She is alert and oriented to person, place, and time. No cranial nerve deficit or sensory deficit.   Skin: Skin is warm and dry.   Psychiatric: She has a normal mood and affect. Her behavior is normal.   Vitals reviewed.      Significant Labs:   Recent Lab Results       07/21/19  1036   07/21/19  0829   07/20/19  1727        Albumin   2.5       Anion Gap   16       Baso #   0.01       Basophil%   0.1       BUN, Bld   124       Calcium   8.5       Chloride   106       CO2   16       Creatinine   5.7       Differential Method   Automated       eGFR if    9.1       eGFR if non    7.9  Comment:  Calculation used to obtain the estimated glomerular filtration  rate (eGFR) is the CKD-EPI equation.          Eos #   0.0       Eosinophil%   0.0       Glucose   127       Gran # (ANC)   7.8       Gran%   64.3       Hematocrit   31.1       Hemoglobin   9.9       Immature Grans (Abs)   0.21  Comment:  Mild elevation in immature granulocytes is non specific and   can be seen in a variety of conditions including stress response,   acute inflammation, trauma and pregnancy. Correlation with other   laboratory and clinical findings is essential.         Immature Granulocytes   1.7       Lymph #   2.3       Lymph%   19.3       MCH   22.3       MCHC   31.8       MCV   70       Mono #   1.8       Mono%   14.6       MPV   7.6       nRBC   0       Phosphorus   5.5       Platelets   237       Potassium   3.9       RBC   4.43       RDW   23.7       Rheumatoid Factor 209.0         Sed Rate     50     Sodium    138       WBC   12.10           All pertinent labs within the past 24 hours have been reviewed.    Significant Imaging: I have reviewed all pertinent imaging results/findings within the past 24 hours.      Assessment/Plan:      * Acute renal failure with tubular necrosis  - baseline creatinine of 1.0 roughly 1 month ago. Outside records show creatinine of 2.9 and BUN of 74; BUN/Cr 124/5.7 today and oliguric  - concern for PSGN vs COSME vs anti-GBM vs AIN in setting of recent NSAID exposure, less concern for TTP despite low platelets given lack of hemolysis on labs  - RJ with bilateral medicorenal disease  - UA with proteinuria, blood, no evidence of infection; pro:cr 5.97; C3 41, C4<3  - positive for strep A as seen on outside hospital records  - given 1x dose lasix 160mg IV with poor response  - initiated on empiric prednisone 60mg daily  - avoid NSAIDs and other nephrotoxic agents  - Nephrology following, appreciate recs: will plan on renal biopsy tomorrow am      Hypervolemia  - 2/2 ARF  - currently appears comfortable, no need for HD for volume control at present time      Pneumonia  - x-ray in [place] revealed interstitial infiltrates. Follow up chest CT showed perihilar ground glass opacity. Treated initially with rocephin and doxycycline  - repeat chest x-ray with patchy airspace consolidations bilaterally R>L, edema vs PNA  - blood cultures NGTD  - currently on broad spectrum antibiotics vanc, zosyn, azithro; will hold vancomycin and de-escalate zosyn to ceftriaxone    Elevated brain natriuretic peptide (BNP) level  - BNP of 526 from outside records; 999 on admission  - bilateral lower extremity swelling and reports easy fatigueability and orthopnea  - TTE with CVP 15, normal EF, indeterminate diastolic function  - suspect that this reflects volume overload in setting of oliguric renal failure rather than primary cardiac process    Essential hypertension  - hypertensive at admission and per patient, has  been undergoing multiple recent medication changes due to HTN as outpatient  - suspect initially may have been exacerbated by NSAID use, now concern for multifactorial cause including renal failure with volume overload, steroid use, and inadequate dosing of home regimen  - home regimen includes: lisinopril-HCTZ 20-12.5mg, clonidine 0.2mg bid, and hydralazine 25mg tid  - currently on amlodipine 10mg, hydralazine 50mg tid, clonidine 0.2mg bid, and coreg 6.25mg bid      VTE Risk Mitigation (From admission, onward)        Ordered     Place sequential compression device  Until discontinued      07/19/19 0533     IP VTE LOW RISK PATIENT  Once      07/19/19 0533                Lloyd Arambula MD  Department of Hospital Medicine   [facility]

## 2019-07-21 NOTE — ASSESSMENT & PLAN NOTE
- baseline creatinine of 1.0 roughly 1 month ago. Outside records show creatinine of 2.9 and BUN of 74; BUN/Cr 124/5.7 today and oliguric  - concern for PSGN vs COSME vs anti-GBM vs AIN in setting of recent NSAID exposure, less concern for TTP despite low platelets given lack of hemolysis on labs  - RJ with bilateral medicorenal disease  - UA with proteinuria, blood, no evidence of infection; pro:cr 5.97; C3 41, C4<3  - positive for strep A as seen on outside hospital records  - given 1x dose lasix 160mg IV with poor response  - initiated on empiric prednisone 60mg daily  - avoid NSAIDs and other nephrotoxic agents  - Nephrology following, appreciate recs: will plan on renal biopsy tomorrow am

## 2019-07-21 NOTE — SUBJECTIVE & OBJECTIVE
Interval History: NAEO. BP a little better during the evening after addition of clonidine. Still oliguric, but denies shortness of breath, chest pain, nausea. Nephrology following with possible biopsy on Monday given platelet recovery.    Review of Systems   Constitutional: Positive for activity change and fatigue. Negative for appetite change, chills and fever.   HENT: Negative for congestion and sore throat.    Respiratory: Positive for cough and shortness of breath (improving). Negative for chest tightness.    Cardiovascular: Positive for leg swelling. Negative for chest pain and palpitations.   Gastrointestinal: Negative for abdominal pain, constipation, diarrhea, nausea and vomiting.   Genitourinary: Negative for dysuria.   Musculoskeletal: Positive for myalgias. Negative for arthralgias and back pain.   Skin: Negative for color change, pallor and rash.   Neurological: Negative for dizziness, weakness and headaches.     Objective:     Vital Signs (Most Recent):  Temp: 96.5 °F (35.8 °C) (07/21/19 1149)  Pulse: 65 (07/21/19 1149)  Resp: 18 (07/21/19 1149)  BP: 131/64 (07/21/19 1149)  SpO2: 97 % (07/21/19 1149) Vital Signs (24h Range):  Temp:  [96.5 °F (35.8 °C)-98.3 °F (36.8 °C)] 96.5 °F (35.8 °C)  Pulse:  [59-69] 65  Resp:  [18-20] 18  SpO2:  [94 %-100 %] 97 %  BP: (127-196)/(64-92) 131/64     Weight: 103 kg (227 lb)  Body mass index is 36.64 kg/m².    Intake/Output Summary (Last 24 hours) at 7/21/2019 1408  Last data filed at 7/21/2019 0619  Gross per 24 hour   Intake 1070 ml   Output 300 ml   Net 770 ml      Physical Exam   Constitutional: She is oriented to person, place, and time. She appears well-developed and well-nourished. No distress.   HENT:   Head: Normocephalic and atraumatic.   Mouth/Throat: No oropharyngeal exudate.   Eyes: Conjunctivae are normal.   Neck: Normal range of motion. Neck supple. No JVD present.   Cardiovascular: Normal rate, regular rhythm, normal heart sounds and intact distal  pulses.   Pulmonary/Chest: Effort normal. She has wheezes (minor). She has no rales.   Abdominal: Soft. Bowel sounds are normal. She exhibits no distension. There is no tenderness.   Musculoskeletal: Normal range of motion. She exhibits edema (1+ bilateral lower extremity ) and tenderness (bilateral lower extremity ). She exhibits no deformity.   Neurological: She is alert and oriented to person, place, and time. No cranial nerve deficit or sensory deficit.   Skin: Skin is warm and dry.   Psychiatric: She has a normal mood and affect. Her behavior is normal.   Vitals reviewed.      Significant Labs:   Recent Lab Results       07/21/19  1036   07/21/19  0829   07/20/19  1727        Albumin   2.5       Anion Gap   16       Baso #   0.01       Basophil%   0.1       BUN, Bld   124       Calcium   8.5       Chloride   106       CO2   16       Creatinine   5.7       Differential Method   Automated       eGFR if    9.1       eGFR if non    7.9  Comment:  Calculation used to obtain the estimated glomerular filtration  rate (eGFR) is the CKD-EPI equation.          Eos #   0.0       Eosinophil%   0.0       Glucose   127       Gran # (ANC)   7.8       Gran%   64.3       Hematocrit   31.1       Hemoglobin   9.9       Immature Grans (Abs)   0.21  Comment:  Mild elevation in immature granulocytes is non specific and   can be seen in a variety of conditions including stress response,   acute inflammation, trauma and pregnancy. Correlation with other   laboratory and clinical findings is essential.         Immature Granulocytes   1.7       Lymph #   2.3       Lymph%   19.3       MCH   22.3       MCHC   31.8       MCV   70       Mono #   1.8       Mono%   14.6       MPV   7.6       nRBC   0       Phosphorus   5.5       Platelets   237       Potassium   3.9       RBC   4.43       RDW   23.7       Rheumatoid Factor 209.0         Sed Rate     50     Sodium   138       WBC   12.10           All pertinent  labs within the past 24 hours have been reviewed.    Significant Imaging: I have reviewed all pertinent imaging results/findings within the past 24 hours.

## 2019-07-21 NOTE — PLAN OF CARE
Problem: Adult Inpatient Plan of Care  Goal: Plan of Care Review  Outcome: Ongoing (interventions implemented as appropriate)     07/21/19 0106   Plan of Care Review   Plan of Care Reviewed With patient     no distress/discomfort noted in pt, BP meds administered as ordered, pt denies any distress/discomfort, trevino care performed as ordered, all precautions maintained, will continue to monitor pt

## 2019-07-21 NOTE — PLAN OF CARE
Problem: Adult Inpatient Plan of Care  Goal: Plan of Care Review  Outcome: Ongoing (interventions implemented as appropriate)  Pt able to make needs known, no distress noted, will continue to monitor.

## 2019-07-21 NOTE — ASSESSMENT & PLAN NOTE
- hypertensive at admission and per patient, has been undergoing multiple recent medication changes due to HTN as outpatient  - suspect initially may have been exacerbated by NSAID use, now concern for multifactorial cause including renal failure with volume overload, steroid use, and inadequate dosing of home regimen  - home regimen includes: lisinopril-HCTZ 20-12.5mg, clonidine 0.2mg bid, and hydralazine 25mg tid  - currently on amlodipine 10mg, hydralazine 50mg tid, clonidine 0.2mg bid, and coreg 6.25mg bid

## 2019-07-21 NOTE — ASSESSMENT & PLAN NOTE
- transferred for evaluation of thrombocytopenia with platelet count of 33k out of concern for TTP  - shitocytes seen on blood morphology from outside hospital records, although not present here  - multiple lab tests ordered including CBC, CMP, LDH, haptoglobin, reticulocytes, Hep B labs, HIV, INR, PTT, fibrinogen, b12, folate, ferritin, TSH, peripheral blood smear, and [id number]  - Heme/onc following, appreciate recs  - currently, platelets are 304; very unlikely to be related to TTP

## 2019-07-22 LAB
ABO + RH BLD: NORMAL
ALBUMIN SERPL BCP-MCNC: 2.4 G/DL (ref 3.5–5.2)
ALBUMIN SERPL ELPH-MCNC: 2.71 G/DL (ref 3.35–5.55)
ALPHA1 GLOB SERPL ELPH-MCNC: 0.37 G/DL (ref 0.17–0.41)
ALPHA2 GLOB SERPL ELPH-MCNC: 0.83 G/DL (ref 0.43–0.99)
ANA SER QL IF: NORMAL
ANION GAP SERPL CALC-SCNC: 15 MMOL/L (ref 8–16)
ANISOCYTOSIS BLD QL SMEAR: SLIGHT
B-GLOBULIN SERPL ELPH-MCNC: 0.5 G/DL (ref 0.5–1.1)
BASOPHILS # BLD AUTO: 0.01 K/UL (ref 0–0.2)
BASOPHILS # BLD AUTO: 0.01 K/UL (ref 0–0.2)
BASOPHILS NFR BLD: 0.1 % (ref 0–1.9)
BASOPHILS NFR BLD: 0.1 % (ref 0–1.9)
BILIRUB SERPL-MCNC: 0.4 MG/DL (ref 0.1–1)
BLD GP AB SCN CELLS X3 SERPL QL: NORMAL
BLD PROD TYP BPU: NORMAL
BLOOD UNIT EXPIRATION DATE: NORMAL
BLOOD UNIT TYPE CODE: 8400
BLOOD UNIT TYPE: NORMAL
BUN SERPL-MCNC: 132 MG/DL (ref 6–20)
CALCIUM SERPL-MCNC: 8.7 MG/DL (ref 8.7–10.5)
CHLORIDE SERPL-SCNC: 105 MMOL/L (ref 95–110)
CO2 SERPL-SCNC: 16 MMOL/L (ref 23–29)
CODING SYSTEM: NORMAL
CREAT SERPL-MCNC: 5.8 MG/DL (ref 0.5–1.4)
DIFFERENTIAL METHOD: ABNORMAL
DIFFERENTIAL METHOD: ABNORMAL
DISPENSE STATUS: NORMAL
DSDNA AB SER-ACNC: NORMAL [IU]/ML
EOSINOPHIL # BLD AUTO: 0 K/UL (ref 0–0.5)
EOSINOPHIL # BLD AUTO: 0 K/UL (ref 0–0.5)
EOSINOPHIL NFR BLD: 0 % (ref 0–8)
EOSINOPHIL NFR BLD: 0 % (ref 0–8)
ERYTHROCYTE [DISTWIDTH] IN BLOOD BY AUTOMATED COUNT: 24.2 % (ref 11.5–14.5)
ERYTHROCYTE [DISTWIDTH] IN BLOOD BY AUTOMATED COUNT: 24.9 % (ref 11.5–14.5)
EST. GFR  (AFRICAN AMERICAN): 8.9 ML/MIN/1.73 M^2
EST. GFR  (NON AFRICAN AMERICAN): 7.8 ML/MIN/1.73 M^2
G6PD RBC-CCNT: 15.2 U/G HGB (ref 7–20.5)
GAMMA GLOB SERPL ELPH-MCNC: 0.49 G/DL (ref 0.67–1.58)
GLUCOSE SERPL-MCNC: 154 MG/DL (ref 70–110)
HAPTOGLOB SERPL-MCNC: 117 MG/DL (ref 30–250)
HBV CORE AB SERPL QL IA: POSITIVE
HCT VFR BLD AUTO: 29.3 % (ref 37–48.5)
HCT VFR BLD AUTO: 30.9 % (ref 37–48.5)
HCV AB SERPL QL IA: NEGATIVE
HEPARIN INDUCED THROMBOCYTOPENIA: NORMAL
HGB BLD-MCNC: 9.6 G/DL (ref 12–16)
HGB BLD-MCNC: 9.7 G/DL (ref 12–16)
IMM GRANULOCYTES # BLD AUTO: 0.2 K/UL (ref 0–0.04)
IMM GRANULOCYTES # BLD AUTO: 0.24 K/UL (ref 0–0.04)
IMM GRANULOCYTES NFR BLD AUTO: 1.8 % (ref 0–0.5)
IMM GRANULOCYTES NFR BLD AUTO: 1.8 % (ref 0–0.5)
INR PPP: 1 (ref 0.8–1.2)
INTERPRETATION SERPL IFE-IMP: NORMAL
KAPPA LC SER QL IA: 107.07 MG/DL (ref 0.33–1.94)
KAPPA LC/LAMBDA SER IA: 47.59 (ref 0.26–1.65)
LAMBDA LC SER QL IA: 2.25 MG/DL (ref 0.57–2.63)
LDH SERPL L TO P-CCNC: 461 U/L (ref 110–260)
LYMPHOCYTES # BLD AUTO: 2 K/UL (ref 1–4.8)
LYMPHOCYTES # BLD AUTO: 2.5 K/UL (ref 1–4.8)
LYMPHOCYTES NFR BLD: 18 % (ref 18–48)
LYMPHOCYTES NFR BLD: 19.1 % (ref 18–48)
MCH RBC QN AUTO: 22.4 PG (ref 27–31)
MCH RBC QN AUTO: 22.9 PG (ref 27–31)
MCHC RBC AUTO-ENTMCNC: 31.4 G/DL (ref 32–36)
MCHC RBC AUTO-ENTMCNC: 32.8 G/DL (ref 32–36)
MCV RBC AUTO: 70 FL (ref 82–98)
MCV RBC AUTO: 71 FL (ref 82–98)
MONOCYTES # BLD AUTO: 1.6 K/UL (ref 0.3–1)
MONOCYTES # BLD AUTO: 2.1 K/UL (ref 0.3–1)
MONOCYTES NFR BLD: 14.5 % (ref 4–15)
MONOCYTES NFR BLD: 16.3 % (ref 4–15)
NEUTROPHILS # BLD AUTO: 7.3 K/UL (ref 1.8–7.7)
NEUTROPHILS # BLD AUTO: 8.2 K/UL (ref 1.8–7.7)
NEUTROPHILS NFR BLD: 62.7 % (ref 38–73)
NEUTROPHILS NFR BLD: 65.6 % (ref 38–73)
NRBC BLD-RTO: 1 /100 WBC
NRBC BLD-RTO: 1 /100 WBC
PATH REV BLD -IMP: NORMAL
PATHOLOGIST INTERPRETATION IFE: NORMAL
PATHOLOGIST INTERPRETATION SPE: NORMAL
PHOSPHATE SERPL-MCNC: 6.2 MG/DL (ref 2.7–4.5)
PLATELET # BLD AUTO: 76 K/UL (ref 150–350)
PLATELET # BLD AUTO: 79 K/UL (ref 150–350)
PLATELET BLD QL SMEAR: ABNORMAL
PMV BLD AUTO: 9.9 FL (ref 9.2–12.9)
PMV BLD AUTO: ABNORMAL FL (ref 9.2–12.9)
POLYCHROMASIA BLD QL SMEAR: ABNORMAL
POTASSIUM SERPL-SCNC: 4.4 MMOL/L (ref 3.5–5.1)
PROT SERPL-MCNC: 4.9 G/DL (ref 6–8.4)
PROTHROMBIN TIME: 10.3 SEC (ref 9–12.5)
RBC # BLD AUTO: 4.2 M/UL (ref 4–5.4)
RBC # BLD AUTO: 4.34 M/UL (ref 4–5.4)
RETICS/RBC NFR AUTO: 4.7 % (ref 0.5–2.5)
SODIUM SERPL-SCNC: 136 MMOL/L (ref 136–145)
TRANS PLATPHERESIS VOL PATIENT: NORMAL ML
URATE SERPL-MCNC: 14.5 MG/DL (ref 2.4–5.7)
WBC # BLD AUTO: 11.14 K/UL (ref 3.9–12.7)
WBC # BLD AUTO: 13.12 K/UL (ref 3.9–12.7)

## 2019-07-22 PROCEDURE — 99233 SBSQ HOSP IP/OBS HIGH 50: CPT | Mod: ,,, | Performed by: HOSPITALIST

## 2019-07-22 PROCEDURE — 99233 PR SUBSEQUENT HOSPITAL CARE,LEVL III: ICD-10-PCS | Mod: ,,, | Performed by: HOSPITALIST

## 2019-07-22 PROCEDURE — 88341 IMHCHEM/IMCYTCHM EA ADD ANTB: CPT | Mod: 26,59,, | Performed by: PATHOLOGY

## 2019-07-22 PROCEDURE — 83615 LACTATE (LD) (LDH) ENZYME: CPT

## 2019-07-22 PROCEDURE — 83010 ASSAY OF HAPTOGLOBIN QUANT: CPT

## 2019-07-22 PROCEDURE — 88313 SPECIAL STAINS GROUP 2: CPT | Mod: 26,,, | Performed by: PATHOLOGY

## 2019-07-22 PROCEDURE — 63600175 PHARM REV CODE 636 W HCPCS: Performed by: GENERAL PRACTICE

## 2019-07-22 PROCEDURE — 88341 TISSUE SPECIMEN TO PATHOLOGY: ICD-10-PCS | Mod: 26,59,, | Performed by: PATHOLOGY

## 2019-07-22 PROCEDURE — 85045 AUTOMATED RETICULOCYTE COUNT: CPT

## 2019-07-22 PROCEDURE — 88311 TISSUE SPECIMEN TO PATHOLOGY: ICD-10-PCS | Mod: 26,,, | Performed by: PATHOLOGY

## 2019-07-22 PROCEDURE — 85097 BONE MARROW INTERPRETATION: CPT | Mod: ,,, | Performed by: PATHOLOGY

## 2019-07-22 PROCEDURE — 36415 COLL VENOUS BLD VENIPUNCTURE: CPT

## 2019-07-22 PROCEDURE — 85025 COMPLETE CBC W/AUTO DIFF WBC: CPT

## 2019-07-22 PROCEDURE — 88342 IMHCHEM/IMCYTCHM 1ST ANTB: CPT | Mod: 26,59,, | Performed by: PATHOLOGY

## 2019-07-22 PROCEDURE — 11000001 HC ACUTE MED/SURG PRIVATE ROOM

## 2019-07-22 PROCEDURE — 99233 PR SUBSEQUENT HOSPITAL CARE,LEVL III: ICD-10-PCS | Mod: ,,, | Performed by: INTERNAL MEDICINE

## 2019-07-22 PROCEDURE — 82247 BILIRUBIN TOTAL: CPT

## 2019-07-22 PROCEDURE — 80069 RENAL FUNCTION PANEL: CPT

## 2019-07-22 PROCEDURE — 63600175 PHARM REV CODE 636 W HCPCS: Performed by: RADIOLOGY

## 2019-07-22 PROCEDURE — 85097 TISSUE SPECIMEN TO PATHOLOGY: ICD-10-PCS | Mod: ,,, | Performed by: PATHOLOGY

## 2019-07-22 PROCEDURE — 88365 INSITU HYBRIDIZATION (FISH): CPT | Mod: 26,,, | Performed by: PATHOLOGY

## 2019-07-22 PROCEDURE — 88305 TISSUE SPECIMEN TO PATHOLOGY: ICD-10-PCS | Mod: 26,,, | Performed by: PATHOLOGY

## 2019-07-22 PROCEDURE — 88365 TISSUE SPECIMEN TO PATHOLOGY: ICD-10-PCS | Mod: 26,,, | Performed by: PATHOLOGY

## 2019-07-22 PROCEDURE — 25000003 PHARM REV CODE 250: Performed by: NURSE PRACTITIONER

## 2019-07-22 PROCEDURE — 88364 INSITU HYBRIDIZATION (FISH): CPT | Mod: 26,,, | Performed by: PATHOLOGY

## 2019-07-22 PROCEDURE — 25000003 PHARM REV CODE 250: Performed by: STUDENT IN AN ORGANIZED HEALTH CARE EDUCATION/TRAINING PROGRAM

## 2019-07-22 PROCEDURE — 88313 TISSUE SPECIMEN TO PATHOLOGY: ICD-10-PCS | Mod: 26,,, | Performed by: PATHOLOGY

## 2019-07-22 PROCEDURE — 88342 TISSUE SPECIMEN TO PATHOLOGY: ICD-10-PCS | Mod: 26,59,, | Performed by: PATHOLOGY

## 2019-07-22 PROCEDURE — 63600175 PHARM REV CODE 636 W HCPCS: Performed by: HOSPITALIST

## 2019-07-22 PROCEDURE — 88364 TISSUE SPECIMEN TO PATHOLOGY: ICD-10-PCS | Mod: 26,,, | Performed by: PATHOLOGY

## 2019-07-22 PROCEDURE — P9035 PLATELET PHERES LEUKOREDUCED: HCPCS

## 2019-07-22 PROCEDURE — 88311 DECALCIFY TISSUE: CPT | Mod: 26,,, | Performed by: PATHOLOGY

## 2019-07-22 PROCEDURE — 88305 TISSUE EXAM BY PATHOLOGIST: CPT | Performed by: PATHOLOGY

## 2019-07-22 PROCEDURE — 86901 BLOOD TYPING SEROLOGIC RH(D): CPT

## 2019-07-22 PROCEDURE — 84550 ASSAY OF BLOOD/URIC ACID: CPT

## 2019-07-22 PROCEDURE — 99233 SBSQ HOSP IP/OBS HIGH 50: CPT | Mod: ,,, | Performed by: INTERNAL MEDICINE

## 2019-07-22 PROCEDURE — 88305 TISSUE EXAM BY PATHOLOGIST: CPT | Mod: 26,,, | Performed by: PATHOLOGY

## 2019-07-22 PROCEDURE — 63600175 PHARM REV CODE 636 W HCPCS: Mod: JG | Performed by: STUDENT IN AN ORGANIZED HEALTH CARE EDUCATION/TRAINING PROGRAM

## 2019-07-22 PROCEDURE — 85610 PROTHROMBIN TIME: CPT

## 2019-07-22 PROCEDURE — 25000003 PHARM REV CODE 250: Performed by: HOSPITALIST

## 2019-07-22 RX ORDER — LEVOFLOXACIN 500 MG/1
500 TABLET, FILM COATED ORAL ONCE
Status: COMPLETED | OUTPATIENT
Start: 2019-07-24 | End: 2019-07-24

## 2019-07-22 RX ORDER — LEVOFLOXACIN 750 MG/1
750 TABLET ORAL ONCE
Status: COMPLETED | OUTPATIENT
Start: 2019-07-22 | End: 2019-07-22

## 2019-07-22 RX ORDER — MIDAZOLAM HYDROCHLORIDE 1 MG/ML
INJECTION INTRAMUSCULAR; INTRAVENOUS CODE/TRAUMA/SEDATION MEDICATION
Status: COMPLETED | OUTPATIENT
Start: 2019-07-22 | End: 2019-07-22

## 2019-07-22 RX ORDER — FENTANYL CITRATE 50 UG/ML
INJECTION, SOLUTION INTRAMUSCULAR; INTRAVENOUS CODE/TRAUMA/SEDATION MEDICATION
Status: COMPLETED | OUTPATIENT
Start: 2019-07-22 | End: 2019-07-22

## 2019-07-22 RX ORDER — LIDOCAINE HYDROCHLORIDE 20 MG/ML
10 INJECTION, SOLUTION INFILTRATION; PERINEURAL ONCE
Status: DISCONTINUED | OUTPATIENT
Start: 2019-07-22 | End: 2019-07-23

## 2019-07-22 RX ORDER — HYDROCODONE BITARTRATE AND ACETAMINOPHEN 500; 5 MG/1; MG/1
TABLET ORAL
Status: DISCONTINUED | OUTPATIENT
Start: 2019-07-22 | End: 2019-07-27

## 2019-07-22 RX ADMIN — CLONIDINE HYDROCHLORIDE 0.2 MG: 0.2 TABLET ORAL at 10:07

## 2019-07-22 RX ADMIN — CARVEDILOL 6.25 MG: 6.25 TABLET, FILM COATED ORAL at 09:07

## 2019-07-22 RX ADMIN — HYDRALAZINE HYDROCHLORIDE 50 MG: 50 TABLET ORAL at 01:07

## 2019-07-22 RX ADMIN — PREDNISONE 60 MG: 20 TABLET ORAL at 10:07

## 2019-07-22 RX ADMIN — CLONIDINE HYDROCHLORIDE 0.2 MG: 0.2 TABLET ORAL at 09:07

## 2019-07-22 RX ADMIN — CARVEDILOL 6.25 MG: 6.25 TABLET, FILM COATED ORAL at 10:07

## 2019-07-22 RX ADMIN — HYDRALAZINE HYDROCHLORIDE 50 MG: 50 TABLET ORAL at 06:07

## 2019-07-22 RX ADMIN — DESMOPRESSIN ACETATE 15.45 MCG: 4 SOLUTION INTRAVENOUS at 01:07

## 2019-07-22 RX ADMIN — AMLODIPINE BESYLATE 10 MG: 10 TABLET ORAL at 10:07

## 2019-07-22 RX ADMIN — MIDAZOLAM HYDROCHLORIDE 2 MG: 1 INJECTION, SOLUTION INTRAMUSCULAR; INTRAVENOUS at 03:07

## 2019-07-22 RX ADMIN — FENTANYL CITRATE 50 MCG: 50 INJECTION, SOLUTION INTRAMUSCULAR; INTRAVENOUS at 03:07

## 2019-07-22 RX ADMIN — HYDRALAZINE HYDROCHLORIDE 50 MG: 50 TABLET ORAL at 09:07

## 2019-07-22 RX ADMIN — LEVOFLOXACIN 750 MG: 750 TABLET, FILM COATED ORAL at 01:07

## 2019-07-22 NOTE — NURSING
Pt to [facility] post procedure bay # 2. Pt arrived A/O x4 and denies pain. Pt VSS, procedure site w/o bleeding or hematoma and dsg are CDI. Pt in NAD will continue to monitor.

## 2019-07-22 NOTE — MEDICAL/APP STUDENT
[facility]  Nephrology  Medical Student Progress Note    Patient Name: Kendy Vital  MRN: [id number]   Admission Date: 7/19/2019  Length of Stay: 3 days  Attending Physician: Dr. Trent Garcia        Assessment/Plan:     Pt is 53F transferred to [facility] for bronchopneumonia, LACI, thrombocytopenia, and plasma exchange.  Continues to make urine. Plates decreased from 237 yesterday to 79 today.    NSAID-induced AIN from use of multiple NSAID's likely a factor but does not explain all her findings.    On ddx is undiagnosed SLE as evidenced by decreased C3/4 with thrombocytopenia, diffuse body pain, and kidney injury, although she lacks other SLE signs. Anti-dsDNA was negative, ANUPAM is pending and will aid in dx clarification.     MPGN a possibility as evidenced by dysmorphic RBC's, hematuria, proteinuria, HTN, positive RF.     PAN on the ddx due to hepatitis B infection, systemic symptoms, HTN but less likely due to amount of proteinuria and the lack of motor/sensory symptoms.     Membraneous nephropathy a possibility as evidenced by hypoalbumenia, peripheral edema, nephrotic range proteinuria, and hypocomplementemia. Possibly secondary to SLE or hepatitis B infection but less likely due to dysmorphic RBCs.    FSGS a possibility due to HTN, renal impairment, and hematuria.     Minimal change disease a possibility since she is recently suffered from a respiratory infection    Mixed cryoglobulinemia syndrome less likely due to negative HCV testing although has C3>C4 and clinical manifestations.    Plan  Blood transfusion due to low platelet count of 74, 72 on repeat. Transfusion was discussed with primary team.  After blood transfusion, kidney biopsy.  Avoid nephrotoxic drugs  Continue prednisone    To be discussed with team and staff,    Wally Hathaway  MS4    Subjective:     Principal Problem:Acute renal failure  Please see H&P for detailed presenting history and ROS.    Interval History: Pt denied  any new symptoms.       Objective:     Vital Signs (Most Recent):  Temp: 97.8 °F (36.6 °C) (07/22/19 1235)  Pulse: (!) 57 (07/22/19 1235)  Resp: 16 (07/22/19 1000)  BP: (!) 141/69 (07/22/19 1235)  SpO2: 97 % (07/22/19 1235) Vital Signs (24h Range):  Temp:  [96.1 °F (35.6 °C)-98.2 °F (36.8 °C)] 97.8 °F (36.6 °C)  Pulse:  [57-65] 57  Resp:  [16-18] 16  SpO2:  [95 %-100 %] 97 %  BP: (141-167)/(68-84) 141/69     Weight: 103 kg (227 lb)  Body mass index is 36.64 kg/m².    Intake/Output Summary (Last 24 hours) at 7/22/2019 1303  Last data filed at 7/22/2019 0645  Gross per 24 hour   Intake 2720 ml   Output 450 ml   Net 2270 ml      Physical Exam   Constitutional: She is oriented to person, place, and time.   Neck: No JVD present.   Cardiovascular: Normal rate.   Murmur heard.   Systolic murmur is present.  Systolic ejection murmur noted, heard loudest over the aortic region   Pulmonary/Chest: Effort normal. She has decreased breath sounds in the right lower field and the left lower field.   Abdominal: Soft. Bowel sounds are normal.   Neurological: She is alert and oriented to person, place, and time.   Skin: Skin is warm and dry. Capillary refill takes less than 2 seconds.     Significant Labs:   Recent Results (from the past 24 hour(s))   Renal function panel    Collection Time: 07/22/19  5:41 AM   Result Value Ref Range    Glucose 154 (H) 70 - 110 mg/dL    Sodium 136 136 - 145 mmol/L    Potassium 4.4 3.5 - 5.1 mmol/L    Chloride 105 95 - 110 mmol/L    CO2 16 (L) 23 - 29 mmol/L    BUN, Bld 132 (H) 6 - 20 mg/dL    Calcium 8.7 8.7 - 10.5 mg/dL    Creatinine 5.8 (H) 0.5 - 1.4 mg/dL    Albumin 2.4 (L) 3.5 - 5.2 g/dL    Phosphorus 6.2 (H) 2.7 - 4.5 mg/dL    eGFR if African American 8.9 (A) >60 mL/min/1.73 m^2    eGFR if non  7.8 (A) >60 mL/min/1.73 m^2    Anion Gap 15 8 - 16 mmol/L   CBC auto differential    Collection Time: 07/22/19  5:41 AM   Result Value Ref Range    WBC 11.14 3.90 - 12.70 K/uL    RBC 4.34  4.00 - 5.40 M/uL    Hemoglobin 9.7 (L) 12.0 - 16.0 g/dL    Hematocrit 30.9 (L) 37.0 - 48.5 %    Mean Corpuscular Volume 71 (L) 82 - 98 fL    Mean Corpuscular Hemoglobin 22.4 (L) 27.0 - 31.0 pg    Mean Corpuscular Hemoglobin Conc 31.4 (L) 32.0 - 36.0 g/dL    RDW 24.9 (H) 11.5 - 14.5 %    Platelets 76 (L) 150 - 350 K/uL    MPV 9.9 9.2 - 12.9 fL    Immature Granulocytes 1.8 (H) 0.0 - 0.5 %    Gran # (ANC) 7.3 1.8 - 7.7 K/uL    Immature Grans (Abs) 0.20 (H) 0.00 - 0.04 K/uL    Lymph # 2.0 1.0 - 4.8 K/uL    Mono # 1.6 (H) 0.3 - 1.0 K/uL    Eos # 0.0 0.0 - 0.5 K/uL    Baso # 0.01 0.00 - 0.20 K/uL    nRBC 1 (A) 0 /100 WBC    Gran% 65.6 38.0 - 73.0 %    Lymph% 18.0 18.0 - 48.0 %    Mono% 14.5 4.0 - 15.0 %    Eosinophil% 0.0 0.0 - 8.0 %    Basophil% 0.1 0.0 - 1.9 %    Differential Method Automated    Haptoglobin    Collection Time: 07/22/19  9:54 AM   Result Value Ref Range    Haptoglobin 117 30 - 250 mg/dL   Reticulocytes    Collection Time: 07/22/19  9:54 AM   Result Value Ref Range    Retic 4.7 (H) 0.5 - 2.5 %   Bilirubin, total    Collection Time: 07/22/19  9:54 AM   Result Value Ref Range    Total Bilirubin 0.4 0.1 - 1.0 mg/dL   Lactate dehydrogenase    Collection Time: 07/22/19  9:54 AM   Result Value Ref Range     (H) 110 - 260 U/L   Pathologist Interpretation Differential    Collection Time: 07/22/19  9:54 AM   Result Value Ref Range    Pathologist Review Review required    Protime-INR    Collection Time: 07/22/19  9:54 AM   Result Value Ref Range    Prothrombin Time 10.3 9.0 - 12.5 sec    INR 1.0 0.8 - 1.2   CBC auto differential    Collection Time: 07/22/19  9:54 AM   Result Value Ref Range    WBC 13.12 (H) 3.90 - 12.70 K/uL    RBC 4.20 4.00 - 5.40 M/uL    Hemoglobin 9.6 (L) 12.0 - 16.0 g/dL    Hematocrit 29.3 (L) 37.0 - 48.5 %    Mean Corpuscular Volume 70 (L) 82 - 98 fL    Mean Corpuscular Hemoglobin 22.9 (L) 27.0 - 31.0 pg    Mean Corpuscular Hemoglobin Conc 32.8 32.0 - 36.0 g/dL    RDW 24.2 (H) 11.5 -  14.5 %    Platelets 79 (L) 150 - 350 K/uL    MPV SEE COMMENT 9.2 - 12.9 fL    Immature Granulocytes 1.8 (H) 0.0 - 0.5 %    Gran # (ANC) 8.2 (H) 1.8 - 7.7 K/uL    Immature Grans (Abs) 0.24 (H) 0.00 - 0.04 K/uL    Lymph # 2.5 1.0 - 4.8 K/uL    Mono # 2.1 (H) 0.3 - 1.0 K/uL    Eos # 0.0 0.0 - 0.5 K/uL    Baso # 0.01 0.00 - 0.20 K/uL    nRBC 1 (A) 0 /100 WBC    Gran% 62.7 38.0 - 73.0 %    Lymph% 19.1 18.0 - 48.0 %    Mono% 16.3 (H) 4.0 - 15.0 %    Eosinophil% 0.0 0.0 - 8.0 %    Basophil% 0.1 0.0 - 1.9 %    Platelet Estimate Decreased (A)     Aniso Slight     Poly Occasional     Differential Method Automated        ATTENDING PHYSICIAN ATTESTATION  I have personally interviewed and examined the patient. I thoroughly reviewed the demographic, clinical, laboratorial and imaging information available in medical records. I agree with the assessment and recommendations provided by the MS4 Hathaway who was under my supervision.

## 2019-07-22 NOTE — H&P
Inpatient Radiology Pre-procedure Note    History of Present Illness:  Kendy Vital is a 53 y.o. female who presents for kidney biopsy.    Admission H&P reviewed.  No past medical history on file.  No past surgical history on file.    Review of Systems:   As documented in primary team H&P    Home Meds:   Prior to Admission medications    Medication Sig Start Date End Date Taking? Authorizing Provider   amLODIPine (NORVASC) 10 MG tablet Take 10 mg by mouth once daily.   Yes Historical Provider, MD   aspirin (ECOTRIN) 81 MG EC tablet Take 81 mg by mouth once daily.   Yes Historical Provider, MD   chlorthalidone (HYGROTEN) 50 MG Tab Take 50 mg by mouth once daily.   Yes Historical Provider, MD     Scheduled Meds:    amLODIPine  10 mg Oral Daily    carvedilol  6.25 mg Oral BID    cloNIDine  0.2 mg Oral BID    hydrALAZINE  50 mg Oral Q8H    [START ON 7/24/2019] levoFLOXacin  500 mg Oral Once    lidocaine HCL 20 mg/ml (2%)  10 mL Other Once    predniSONE  60 mg Oral Daily     Continuous Infusions:   PRN Meds:sodium chloride, acetaminophen, albuterol-ipratropium, desmopressin (DDAVP) IVPB, ramelteon, sodium chloride 0.9%  Anticoagulants/Antiplatelets: aspirin held since PTA    Allergies: Review of patient's allergies indicates:  No Known Allergies  Sedation Hx: have not been any systemic reactions    Labs:  Recent Labs   Lab 07/22/19  0954   INR 1.0       Recent Labs   Lab 07/22/19  0954   WBC 13.12*   HGB 9.6*   HCT 29.3*   MCV 70*   PLT 79*      Recent Labs   Lab 07/20/19  0501  07/22/19  0541 07/22/19  0954   *   < > 154*  --       < > 136  --    K 4.3   < > 4.4  --       < > 105  --    CO2 16*   < > 16*  --    *   < > 132*  --    CREATININE 4.4*   < > 5.8*  --    CALCIUM 8.7   < > 8.7  --    MG 2.2  --   --   --    ALT 26  --   --   --    AST 25  --   --   --    ALBUMIN 2.6*   < > 2.4*  --    BILITOT 1.0  --   --  0.4    < > = values in this interval not displayed.          Vitals:  Temp: 97.8 °F (36.6 °C) (07/22/19 1235)  Pulse: 67 (07/22/19 1552)  Resp: 17 (07/22/19 1552)  BP: 134/68 (07/22/19 1552)  SpO2: 100 % (07/22/19 1552)     Physical Exam:  ASA: 3  Mallampati: 3    General: no acute distress  Mental Status: alert and oriented to person, place and time  HEENT: normocephalic, atraumatic  Chest: unlabored breathing  Heart: no heaves  Abdomen: nondistended  Extremity: moves all extremities    Plan: kidney biopsy  Sedation Plan: moderate    Marino Davila MD  Radiology

## 2019-07-22 NOTE — PROGRESS NOTES
[facility]  Hematology/Oncology  Progress Note    Patient Name: Kendy Vital  Admission Date: 7/19/2019  Hospital Length of Stay: 3 days  Code Status: Full Code     Subjective:     HPI 7/19: Pt is a 52 yo female with HTN, iron deficiency anemia, history of multiple DVTs, hx uterine fibroids s/p myomectomy who was transferred here from [facility] in [place] after she was found to have pneumonia, LACI and thrombocytopenia. Hematology was consulted for thrombocytopenia. History was obtained from the patient, patient's cousin, patient's fiancee and chart review.     Pt endorses episodes of fevers, chills, fatigue, dry cough, SOB, and orthopnea for 1 week. She also states that 3 days ago she began to have neck and back muscle cramping and stiffness and decreased urinary output and decided to see her PCP, who diagnosed her with muscle spasms and the flu. A few days later she decided to go to the [facility] ED and was found to have Cr 2.9, increased from baseline Cr 1.0. She was also found to have , WBC 7.9, Plt 33, lactate 0.8. CXR showed bilateral interstitial infiltrates, and subsequent CT chest showed bilateral ground glass opacities. She was treated with rochephin, doxycycline, bumex and transferred here.     Here her labs were significant for Hgb 10, MCV 69, RDW 22.3, Plt 41. WBC 5.98. UA showed 3+ protein, 3+ glucose and 3+ blood but was not consistent with UTI. Cr 4.3 and  consistent with an LACI. Hemolytic labs were unremarkable.     Pt has a history of uncontrolled hypertension and states that she is currently taking lisinopril 20 mg-HCTZ 12.5 mg, hydralazine 25 mg TID, and clonidine 0.2 mg BID. She was treated at [facility] 3 weeks ago for chest pain and was admitted. We do not have those records but pt states they had her on multiple drips and did not have a cath procedure. Pt also has a history of unprovoked thrombosis treated at [facility]  "approximately 4 years ago, none of which is visible in the "care everywhere" section of the chart. She states she had one episode of thrombosis in her left upper extremity that contained "many little clots" and required surgical thrombectomy. She also had a left renal vein thrombosis for which she received a stent. She states that for both episodes she was placed on a heparin drip, did outpatient lovenox and was continued on ASA 81 until now. She states that no one informed her of any findings suggesting a hypercoaguable state or if the clotting was provoked.    Interval History:   7/22: Pt seen at bedside with bella. Plt today was 76, down from 237 yesterday. Pt denies any active bleeding such has epistaxis, new bruising, BRBPR or melena. Urine in Marcial bag is yellow. Pt has been NPO for kidney biopsy scheduled today. FLC shows elevated kappa/lambda ratio 47.59. Cr 5.8 , stable compared to yesterday but overall increased since admission.    Oncology Treatment Plan:   [No treatment plan]    Medications:  Continuous Infusions:  Scheduled Meds:   amLODIPine  10 mg Oral Daily    carvedilol  6.25 mg Oral BID    cefTRIAXone (ROCEPHIN) IVPB  2 g Intravenous Q24H    cloNIDine  0.2 mg Oral BID    hydrALAZINE  50 mg Oral Q8H    predniSONE  60 mg Oral Daily     PRN Meds:acetaminophen, albuterol-ipratropium, ramelteon, sodium chloride 0.9%     Review of Systems   Constitutional: Positive for fatigue (improving). Negative for chills and fever.   HENT: Negative for congestion and sore throat.    Eyes: Negative for visual disturbance.   Respiratory: Positive for shortness of breath (on exertion, improving).    Cardiovascular: Positive for leg swelling. Negative for chest pain.   Gastrointestinal: Negative for abdominal pain, constipation, diarrhea, nausea and vomiting.   Genitourinary: Positive for decreased urine volume. Negative for dysuria.   Musculoskeletal: Negative for back pain and neck stiffness.   Skin: " Negative for rash.   Neurological: Negative for dizziness, syncope and headaches.   Psychiatric/Behavioral: Negative for confusion and decreased concentration.     Objective:     Vital Signs (Most Recent):  Temp: 98.2 °F (36.8 °C) (07/22/19 0504)  Pulse: 63 (07/22/19 0504)  Resp: 18 (07/22/19 0504)  BP: (!) 155/68 (07/22/19 0504)  SpO2: 95 % (07/22/19 0504) Vital Signs (24h Range):  Temp:  [96.5 °F (35.8 °C)-98.2 °F (36.8 °C)] 98.2 °F (36.8 °C)  Pulse:  [59-65] 63  Resp:  [18] 18  SpO2:  [95 %-100 %] 95 %  BP: (127-155)/(64-87) 155/68     Weight: 103 kg (227 lb)  Body mass index is 36.64 kg/m².  Body surface area is 2.19 meters squared.      Intake/Output Summary (Last 24 hours) at 7/22/2019 0815  Last data filed at 7/22/2019 0645  Gross per 24 hour   Intake 2720 ml   Output 450 ml   Net 2270 ml       Physical Exam   Constitutional: She is oriented to person, place, and time. She appears well-developed and well-nourished. No distress.   HENT:   Head: Normocephalic and atraumatic.   Eyes: EOM are normal.   Neck: Normal range of motion. No JVD present. No tracheal deviation present.   Cardiovascular: Normal rate, regular rhythm and normal heart sounds.   No murmur heard.  Pulmonary/Chest: Effort normal. No respiratory distress. She has wheezes (mild, expiratory, bilateral). She has no rales.   Abdominal: Soft. Bowel sounds are normal. She exhibits no distension. There is no tenderness.   Musculoskeletal: She exhibits edema (trace pitting edema lower extremities bilaterally).   Neurological: She is alert and oriented to person, place, and time. No cranial nerve deficit or sensory deficit. She exhibits normal muscle tone.   Skin: Skin is warm and dry. Capillary refill takes less than 2 seconds. No rash noted. She is not diaphoretic. No erythema.   Vitals reviewed.      Significant Labs:   Recent Lab Results       07/22/19  0541   07/21/19  1036   07/21/19  0829        Albumin 2.4   2.5     Anion Gap 15   16     Baso #  0.01   0.01     Basophil% 0.1   0.1     BUN, Bld 132   124     Calcium 8.7   8.5     Chloride 105   106     CO2 16   16     Creatinine 5.8   5.7     Differential Method Automated   Automated     eGFR if  8.9   9.1     eGFR if non  7.8  Comment:  Calculation used to obtain the estimated glomerular filtration  rate (eGFR) is the CKD-EPI equation.      7.9  Comment:  Calculation used to obtain the estimated glomerular filtration  rate (eGFR) is the CKD-EPI equation.        Eos # 0.0   0.0     Eosinophil% 0.0   0.0     Glucose 154   127     Gran # (ANC) 7.3   7.8     Gran% 65.6   64.3     Hematocrit 30.9   31.1     Hemoglobin 9.7   9.9     Immature Grans (Abs) 0.20  Comment:  Mild elevation in immature granulocytes is non specific and   can be seen in a variety of conditions including stress response,   acute inflammation, trauma and pregnancy. Correlation with other   laboratory and clinical findings is essential.     0.21  Comment:  Mild elevation in immature granulocytes is non specific and   can be seen in a variety of conditions including stress response,   acute inflammation, trauma and pregnancy. Correlation with other   laboratory and clinical findings is essential.       Immature Granulocytes 1.8   1.7     Lymph # 2.0   2.3     Lymph% 18.0   19.3     MCH 22.4   22.3     MCHC 31.4   31.8     MCV 71   70     Mono # 1.6   1.8     Mono% 14.5   14.6     MPV 9.9   7.6     nRBC 1   0     Phosphorus 6.2   5.5     Platelets 76   237     Potassium 4.4   3.9     RBC 4.34   4.43     RDW 24.9   23.7     Rheumatoid Factor   209.0       Sodium 136   138     WBC 11.14   12.10           Diagnostic Results:  I have reviewed all pertinent imaging results/findings within the past 24 hours.    Assessment/Plan:     Active Diagnoses:    Diagnosis Date Noted POA    PRINCIPAL PROBLEM:  Acute renal failure [N17.9] 07/19/2019 Yes    Hematuria syndrome [R31.9]  Yes    Other proteinuria [R80.8]  Yes     Oliguria [R34]  Yes    Hypervolemia [E87.70]  Yes    Essential hypertension [I10] 07/19/2019 Yes    Elevated brain natriuretic peptide (BNP) level [R79.89] 07/19/2019 Yes    Pneumonia [J18.9] 07/19/2019 Yes      Problems Resolved During this Admission:    Diagnosis Date Noted Date Resolved POA    Thrombocytopenia [D69.6] 07/19/2019 07/21/2019 Yes       Hematology Assessment  1) Thrombocytopenia  - Plt on admission 41, 304 on 7/20, 237 on 7/21. Plt today 76  - Pt has had decreasing kidney function since being transferred. Nephro is following and ordered strict I&O's, lasix last given 7/20 for pt's hypervolemia, and kidney biopsy is planned for today. Pt's current fluid balance is +2720 so decrease in platelets since yesterday could be partially due to hemodilution  - Active medications that could be contributing to thrombocytopenia include ceftriaxone. Pt was also given lasix, zosyn, vancomycin and tylenol during this hospitalization which could have contributed.  - Hep B surface Ag positive, Hep B quantitative DNA and core Ab pending to differentiate between chronic and acute infection. Hep B not associated with thrombocytopenia like Hep C. Pt's Hep C negative.  - HIT Ab negative  - TMA from TTP/HUS is less likely, but we are suspicious of the acute platelet decrease over the past day. [id number] sent on 7/19 is pending. We repeated hemolytic labs which showed an increased reticulocyte count 4.7,  (down from 604 on 7/19/19), haptoglobin, bilirubin, and PT-INR WNL. Initial smear showed 1-2 schistocytes/HPF but we sent another smear today.      2) Chronic microcytic anemia, multifactorial from iron deficiency, blood loss from dysfunctional uterine bleeding from fibroids, kidney disease, possible multiple myeloma  - Hgb 9.7, stable since admission  - SPEP does not show M-spike and MARCO is normal, however Kappa FLCs are elevated at 107.07 with kappa/lambda FLC ratio of 47.59. Patient's anemia, renal failure,  and these lab findings are concerning for multiple myeloma. If there is an underlying myeloproliferative process, kidney disease could also be caused by tumor lysis syndrome. Calcium is 8.7 and Ca corrected for albumin is 10.  - G6PD pending      Recommendations  - We will add urine MARCO and UPEP and plan for bone marrow biopsy tomorrow considering worrisome FLC ratio and worsening renal function. Renal biopsy done today will also help diagnose MM if light chain deposition is seen. We will also need to do a skeletal survey for evaluation of lytic lesions, osteopenia or fractures that may suggest MM. We will also get uric acid to assess for tumor lysis syndrome.  - Thrombocytopenia likely multifactorial from hemodilution from hypervolemia and various medications. Low suspicion for active hemolysis caused by TTP/HUS, thrombocytopenia likely related to other medical problems but we will await [id number] and peripheral smear, and continue to trend CBC.  - Transfuse platelets if < 10k or if < 50k and actively bleeding  - Transfuse pRBCs if Hgb < 7      Brock Perkins,   Hematology/Oncology  [facility]

## 2019-07-22 NOTE — PROGRESS NOTES
Pt kidney biopsy complete. Left flank site CDI. Pt to ROCU. Strict bedrest until 2010. Report called to RN [id number].

## 2019-07-22 NOTE — NURSING
Patient lying in bed, AAO, left hand IV intact, unlabored breathing, skin intact, NPO, trevino in place, reviewed and verbalized understanding of the plan of care. RUSSELTM

## 2019-07-22 NOTE — ASSESSMENT & PLAN NOTE
- x-ray in [place] revealed interstitial infiltrates. Follow up chest CT showed perihilar ground glass opacity. Treated initially with rocephin and doxycycline  - repeat chest x-ray with patchy airspace consolidations bilaterally R>L, edema vs PNA  - blood cultures NGTD  - currently on broad spectrum antibiotics vanc, zosyn, azithro; held vanc and de-escalated zosyn to ceftriaxone on 7/20

## 2019-07-22 NOTE — ASSESSMENT & PLAN NOTE
- BNP of 526 from outside records; 999 on admission  - bilateral lower extremity swelling and reports easy fatigueability and orthopnea  - TTE with CVP 15, normal EF, indeterminate diastolic function  - suspect that this reflects volume overload in setting of oliguric renal failure rather than primary cardiac process   PCP addended notes. They were faxed to [facility]

## 2019-07-22 NOTE — PHYSICIAN QUERY
"PT Name: Kendy Vital  MR #: [id number]    Physician Query Form - Hematology Clarification     CDS/: Almaz Sun RN            Contact information:[email or web address]  This form is a permanent document in the medical record.     Query Date: July 22, 2019    By submitting this query, we are merely seeking further clarification of documentation. Please utilize your independent clinical judgment when addressing the question(s) below.    The Medical record contains the following:     Indicators   Supporting Clinical Findings   Location in Medical Record   X "Thrombocytopenia" documented  · Thrombocytopenia: platelets improving.  Seems less likely TTP, but will request hematology input regarding further management.   H & P   X Platelets Plt, 41, 304, 76 Labs 7/19, 7/20, 7/22    Acute bleeding, Petechiae, Bruising      Patient on anticoagulant medication     X Transfusion(s) Transfuse platelets 1 dose MD orders 7/22   X Treatments:  possibly 2/2 rocephin, will switch medication today Hospital medicine 7/22   X Other:  Patient is a 53 year old female with hypertension, anemia, and history of leiomyoma of the uterus who presents to ICU as a transfer from [facility] for evaluation of thrombocytopenia H & P       Provider, please specify diagnosis or diagnoses associated with above clinical findings.    [   ] Secondary thrombocytopenia related to (please specify)______________________   [  x ] Unspecified thrombocytopenia   [   ] Drug induced thrombocytopenia (please specify)   [   ] Other hematological diagnosis (please specify)   [  ] Clinically Undetermined         Please document in your progress notes daily for the duration of treatment, until resolved, and include in your discharge summary.                                                                                                                                                                                       "

## 2019-07-22 NOTE — PROGRESS NOTES
INPATIENT NEPHROLOGY PROGRESS NOTE    S: event over last 24 hrs noted.     O: VITALS: /70 mmHg;   Intake/Output: 1.1 L in / 0.35 L (UOP 0.35 L) = +0.75 L/L24h  Gen: AAOx3 NAD  Skin: no rash  CV: S1 S2 reg no rub  Lungs: CTAB  Abd: soft NT, distended  MSK: trace pitting edema in LE  Neuro: no asterixis    LABS  Serum Cr 5.7;   Urine: MBGCs, waxy casts, dysmorphic RBCs    IMPRESSION  1. LACI/Proteinuria/Hematuria. Worsening, no uremia, no fluid overload. Etiology undetermined: diff diagnosis includes NSAIDs-AIN/COSME, acute DPGN from SLE, MPGN-HCV, among others. Plan for kidney biopsy tomorrow.   2. Low bicarbonate, primary respiratory alkalosis on 7/19. Tracking CO2.     RECS:   1. Kidney biopsy  2. Continue empiric management with prednisone 60 mg qd  3. Daily BMP, strict I/Os, keep Marcial if possible  4. Give 1 dose of Lasix 160 mg IV to prevent hypervolemia.     Trent Garcia MD  Nephrology Attending

## 2019-07-22 NOTE — ASSESSMENT & PLAN NOTE
- baseline creatinine of 1.0 roughly 1 month ago. Outside records show creatinine of 2.9 and BUN of 74; BUN/Cr 132/5.8 today and oliguric although trend appears to be peaking  - concern for DPGN from SLE vs PSGN vs COSME vs anti-GBM vs AIN in setting of recent NSAID exposure, less concern for TTP despite low platelets given lack of hemolysis on labs  - RJ with bilateral medicorenal disease  - UA with proteinuria, blood, no evidence of infection; pro:cr 5.97; C3 41, C4<3  - positive for strep A as seen on outside hospital records  - given 1x dose lasix 160mg IV with poor response  - initiated on empiric prednisone 60mg daily  - avoid NSAIDs and other nephrotoxic agents  - Nephrology following, appreciate recs: will plan on renal biopsy today to further guide treatment

## 2019-07-22 NOTE — HOSPITAL COURSE
Ms. Vital presented as transfer to ICU for suspected TTP. At that time, she had severe thrombocytopenia, renal failure, and bilateral infiltrates on chest CT concerning for PNA. She was stepped down when repeat CBC revealed normalizing platelets and hemolysis labs were unremarkable. Initially placed on vanc/zosyn/azithromycin for PNA, which was de-escalated to rocephin/azithromycin for CAP and she has completed all antibiotics.     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  Patient does not have HCV, but is positive for HBV and has been started on anti-viral therapy with entecavir.  Due to concern of possible hematologic malignancy, patient had bone marrow biopsy on 7/24 and results showed B-cell lymphoproliferative disorders.  Her serum cryoglobulins returned as Type 3 and this was not c/w with a hematologic malignancy etiology for crytoglobulinemia (would expect Type 1 per hematology).     Rheumatology and Nephrology continued to follow for management of her Cryolobinemic Vasculitis with Glomerulonephritis. She has been on Glucorticoid therapy with oral prednisone and also had 3 days of pulse steroids 1gm hydrocortisone, in addition to sessions of Plasma Exchange/Plasmapheresis (via Right IJ Trialysis since dc'd) .      Her platelet levels have normalized, and renal function improving since above treatments started. Rheumatology is following and she received IVIG infusion (due to hypogammaglobulinemia) and then rituxan while in house.  Will need additional dose of rituxan in 2 weeks as outpatient. She will continue prednisone 50mg for 2 weeks (end date 8/16), then taper to 40mg daily until f/u. While on prednisone, will be on dapsone (G6PD wnl) for PCP ppx and PPI. She was also started on insulin due to steroid induced hyperglycemia and  will need close PCP follow up to manage this as her steroids are tapered. Also have made adjustments to BP medications as listed below. Will need coordination with multiple consultants upon discharge, including Heme/Onc, Rheumatology, Allergy/Immunology, Nephrology, and Hepatology. CM assisting with this coordination. Problem based discussion below.    Vitals:    08/07/19 1156   BP: (!) 167/90   Pulse: 79   Resp: 17   Temp: 98.4 °F (36.9 °C)     Physical Exam   Constitutional: She is oriented to person, place, and time. She appears well-nourished. No distress.   obese   Cardiovascular: Normal rate, regular rhythm and normal heart sounds.   Pulmonary/Chest: Effort normal and breath sounds normal. No respiratory distress.   Abdominal: Soft. Bowel sounds are normal. She exhibits no distension.   Musculoskeletal: She exhibits no edema.   Neurological: She is alert and oriented to person, place, and time.   Skin:   Medial thigh rash scaly, hypopigmentation   Psychiatric: She has a normal mood and affect. Her behavior is normal.

## 2019-07-22 NOTE — PLAN OF CARE
Problem: Adult Inpatient Plan of Care  Goal: Plan of Care Review  Outcome: Ongoing (interventions implemented as appropriate)     07/22/19 9530   Plan of Care Review   Plan of Care Reviewed With patient     no distress/discomfort noted in pt, BP meds administered as ordered, pt continues NPO as ordered, pt denies any distress/discomfort, trevino care performed as ordered, strict I&O monitoring ongoing, all precautions maintained, will continue to monitor pt

## 2019-07-22 NOTE — PROGRESS NOTES
[facility] Hospital Medicine  Progress Note    Patient Name: Kendy Vital  MRN: [id number]  Patient Class: IP- Inpatient   Admission Date: 7/19/2019  Length of Stay: 3 days  Attending Physician: Lloyd Arambula, *  Primary Care Provider: To Obtain Unable    Hospital Medicine Team: [facility] R Lloyd Arambula MD    Subjective:     Principal Problem:Acute renal failure      HPI:  Ms. Vital is a 53 year old female with hypertension, anemia, and history of leiomyoma of the uterus who presents to ICU as a transfer from [facility] for evaluation of thrombocytopenia. Patient reports that prior to going to the hospital 3 days ago that she was experiencing symptoms of chills, feeling febrile, dry cough, shortness of breath and occasional nose bleeds for roughly 2 weeks. She also reports easy fatigueability and difficulty breathing when laying flat; currently sleeps with 2 pillows. Patient presented to hospital in [place] when her symptoms did not resolve. Initial work up showed a , worsening kidney function with creatinine of 2.9, and thrombocytopenia with platelets of 33k. In addition, chest x-ray showed interstitial opacities and follow up CT showed perihilar ground glass opacity. Patient was treated with rocephin and doxycycline as well as Bumex q12 due to her heart failure type symptoms. Lab work at the time showed WBC of 7.9 and a lactate of .8. In addition, she tested positive for strep A. Patient was transferred for further evaluation of her thrombocytopenia with concern for TTP and possible need for plasmapheresis.     At time of exam, patient is properly oriented to person time and places. She endorses headache, generalized myalgias, nausea and orthopnea. She denies SOB while sitting up, chest pain, abdominal pain, vomiting, and diarrhea. She has not felt febrile since 1 week prior to hospital stay.        Overview/Hospital Course:  Ms.  Zahraa presented as transfer to ICU for suspected TTP. At that time, she had severe thrombocytopenia, renal failure, and bilateral infiltrates on chest CT concerning for PNA. She was stepped down when repeat CBC revealed normalizing platelets and hemolysis labs were unremarkable. Initially placed on vanc/zosyn/azithromycin for PNA, which has been de-escalated to rocephin/azithromycin for CAP. Her course has been complicated by ARF of unclear cause. 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 Nephrology to biopsy today and have initiated on prednisone empirically.    Interval History: NAEO. BP better controlled today; she denies any chest pain, shortness of breath. Leg swelling has improved. Planning on renal biopsy today.    Review of Systems   Constitutional: Positive for activity change and fatigue. Negative for appetite change, chills and fever.   HENT: Negative for congestion and sore throat.    Respiratory: Positive for cough. Negative for chest tightness and shortness of breath (improving).    Cardiovascular: Negative for chest pain, palpitations and leg swelling.   Gastrointestinal: Negative for abdominal pain, constipation, diarrhea, nausea and vomiting.   Genitourinary: Negative for dysuria.   Musculoskeletal: Positive for myalgias. Negative for arthralgias and back pain.   Skin: Negative for color change, pallor and rash.   Neurological: Negative for dizziness, weakness and headaches.     Objective:     Vital Signs (Most Recent):  Temp: 96.1 °F (35.6 °C) (07/22/19 1000)  Pulse: 61 (07/22/19 1000)  Resp: 16 (07/22/19 1000)  BP: (!) 167/84 (07/22/19 1000)  SpO2: 98 % (07/22/19 1000) Vital Signs (24h Range):  Temp:  [96.1 °F (35.6 °C)-98.2 °F (36.8 °C)] 96.1 °F (35.6 °C)  Pulse:  [61-65] 61  Resp:  [16-18] 16  SpO2:  [95 %-100 %] 98 %  BP: (131-167)/(64-84) 167/84     Weight: 103 kg (227 lb)  Body mass index is 36.64 kg/m².    Intake/Output Summary (Last 24 hours) at 7/22/2019 1105  Last data filed at  7/22/2019 0645  Gross per 24 hour   Intake 2720 ml   Output 450 ml   Net 2270 ml      Physical Exam   Constitutional: She is oriented to person, place, and time. She appears well-developed and well-nourished. No distress.   HENT:   Head: Normocephalic and atraumatic.   Mouth/Throat: No oropharyngeal exudate.   Eyes: Conjunctivae are normal.   Neck: Normal range of motion. Neck supple. No JVD present.   Cardiovascular: Normal rate, regular rhythm, normal heart sounds and intact distal pulses.   Pulmonary/Chest: Effort normal. She has wheezes (minor). She has no rales.   Abdominal: Soft. Bowel sounds are normal. She exhibits no distension. There is no tenderness.   Musculoskeletal: Normal range of motion. She exhibits edema (1+ bilateral lower extremity ) and tenderness (bilateral lower extremity ). She exhibits no deformity.   Neurological: She is alert and oriented to person, place, and time. No cranial nerve deficit or sensory deficit.   Skin: Skin is warm and dry.   Psychiatric: She has a normal mood and affect. Her behavior is normal.   Vitals reviewed.      Significant Labs:   Recent Lab Results       07/22/19  0954   07/22/19  0541        Albumin   2.4     Anion Gap   15     Aniso Slight       Baso # 0.01 0.01     Basophil% 0.1 0.1     BILIRUBIN TOTAL 0.4  Comment:  For infants and newborns, interpretation of results should be based  on gestational age, weight and in agreement with clinical  observations.  Premature Infant recommended reference ranges:  Up to 24 hours.............<8.0 mg/dL  Up to 48 hours............<12.0 mg/dL  3-5 days..................<15.0 mg/dL  6-29 days.................<15.0 mg/dL         BUN, Bld   132     Calcium   8.7     Chloride   105     CO2   16     Creatinine   5.8     Differential Method Automated Automated     eGFR if African American   8.9     eGFR if non    7.8  Comment:  Calculation used to obtain the estimated glomerular filtration  rate (eGFR) is the  CKD-EPI equation.        Eos # 0.0 0.0     Eosinophil% 0.0 0.0     Glucose   154     Gran # (ANC) 8.2 7.3     Gran% 62.7 65.6     Haptoglobin 117       Hematocrit 29.3 30.9     Hemoglobin 9.6 9.7     Immature Grans (Abs) 0.24  Comment:  Mild elevation in immature granulocytes is non specific and   can be seen in a variety of conditions including stress response,   acute inflammation, trauma and pregnancy. Correlation with other   laboratory and clinical findings is essential.   0.20  Comment:  Mild elevation in immature granulocytes is non specific and   can be seen in a variety of conditions including stress response,   acute inflammation, trauma and pregnancy. Correlation with other   laboratory and clinical findings is essential.       Immature Granulocytes 1.8 1.8     Coumadin Monitoring INR 1.0  Comment:  Coumadin Therapy:  2.0 - 3.0 for INR for all indicators except mechanical heart valves  and antiphospholipid syndromes which should use 2.5 - 3.5.           Comment:  Results are increased in hemolyzed samples.       Lymph # 2.5 2.0     Lymph% 19.1 18.0     MCH 22.9 22.4     MCHC 32.8 31.4     MCV 70 71     Mono # 2.1 1.6     Mono% 16.3 14.5     MPV SEE COMMENT  Comment:  Result not available. 9.9     nRBC 1 1     Pathologist Review Review required       Phosphorus   6.2     Platelet Estimate Decreased       Platelets 79 76     Poly Occasional       Potassium   4.4     Protime 10.3       RBC 4.20 4.34     RDW 24.2 24.9     Retic 4.7       Sodium   136     WBC 13.12 11.14         All pertinent labs within the past 24 hours have been reviewed.    Significant Imaging: I have reviewed all pertinent imaging results/findings within the past 24 hours.      Assessment/Plan:      * Acute renal failure  - baseline creatinine of 1.0 roughly 1 month ago. Outside records show creatinine of 2.9 and BUN of 74; BUN/Cr 132/5.8 today and oliguric although trend appears to be peaking  - concern for DPGN from SLE vs PSGN vs  COSME vs anti-GBM vs AIN in setting of recent NSAID exposure, less concern for TTP despite low platelets given lack of hemolysis on labs  - RJ with bilateral medicorenal disease  - UA with proteinuria, blood, no evidence of infection; pro:cr 5.97; C3 41, C4<3  - positive for strep A as seen on outside hospital records  - given 1x dose lasix 160mg IV with poor response  - initiated on empiric prednisone 60mg daily  - avoid NSAIDs and other nephrotoxic agents  - Nephrology following, appreciate recs: will plan on renal biopsy today to further guide treatment      Hypervolemia  - 2/2 ARF  - currently appears comfortable, no need for HD for volume control at present time      Pneumonia  - x-ray in [place] revealed interstitial infiltrates. Follow up chest CT showed perihilar ground glass opacity. Treated initially with rocephin and doxycycline  - repeat chest x-ray with patchy airspace consolidations bilaterally R>L, edema vs PNA  - blood cultures NGTD  - currently on broad spectrum antibiotics vanc, zosyn, azithro; held vanc and de-escalated zosyn to ceftriaxone on 7/20; will switch to levaquin renally dosed today given concern that ceftriaxone may be leading to thrombocytopenia    Elevated brain natriuretic peptide (BNP) level  - BNP of 526 from outside records; 999 on admission  - bilateral lower extremity swelling and reports easy fatigueability and orthopnea  - TTE with CVP 15, normal EF, indeterminate diastolic function  - suspect that this reflects volume overload in setting of oliguric renal failure rather than primary cardiac process    Essential hypertension  - hypertensive at admission and per patient, has been undergoing multiple recent medication changes due to HTN as outpatient  - suspect initially may have been exacerbated by NSAID use, now concern for multifactorial cause including renal failure with volume overload, steroid use, and inadequate dosing of home regimen  - home regimen includes:  lisinopril-HCTZ 20-12.5mg, clonidine 0.2mg bid, and hydralazine 25mg tid  - currently on amlodipine 10mg, hydralazine 50mg tid, clonidine 0.2mg bid, and coreg 6.25mg bid    Thrombocytopenia  - possibly 2/2 rocephin, will switch medication today    VTE Risk Mitigation (From admission, onward)        Ordered     Place sequential compression device  Until discontinued      07/19/19 0533     IP VTE LOW RISK PATIENT  Once      07/19/19 0533                Lloyd Arambula MD  Department of Hospital Medicine   [facility]

## 2019-07-22 NOTE — SUBJECTIVE & OBJECTIVE
Interval History: NAEO. BP better controlled today; she denies any chest pain, shortness of breath. Leg swelling has improved. Planning on renal biopsy today.    Review of Systems   Constitutional: Positive for activity change and fatigue. Negative for appetite change, chills and fever.   HENT: Negative for congestion and sore throat.    Respiratory: Positive for cough. Negative for chest tightness and shortness of breath (improving).    Cardiovascular: Negative for chest pain, palpitations and leg swelling.   Gastrointestinal: Negative for abdominal pain, constipation, diarrhea, nausea and vomiting.   Genitourinary: Negative for dysuria.   Musculoskeletal: Positive for myalgias. Negative for arthralgias and back pain.   Skin: Negative for color change, pallor and rash.   Neurological: Negative for dizziness, weakness and headaches.     Objective:     Vital Signs (Most Recent):  Temp: 96.1 °F (35.6 °C) (07/22/19 1000)  Pulse: 61 (07/22/19 1000)  Resp: 16 (07/22/19 1000)  BP: (!) 167/84 (07/22/19 1000)  SpO2: 98 % (07/22/19 1000) Vital Signs (24h Range):  Temp:  [96.1 °F (35.6 °C)-98.2 °F (36.8 °C)] 96.1 °F (35.6 °C)  Pulse:  [61-65] 61  Resp:  [16-18] 16  SpO2:  [95 %-100 %] 98 %  BP: (131-167)/(64-84) 167/84     Weight: 103 kg (227 lb)  Body mass index is 36.64 kg/m².    Intake/Output Summary (Last 24 hours) at 7/22/2019 1105  Last data filed at 7/22/2019 0645  Gross per 24 hour   Intake 2720 ml   Output 450 ml   Net 2270 ml      Physical Exam   Constitutional: She is oriented to person, place, and time. She appears well-developed and well-nourished. No distress.   HENT:   Head: Normocephalic and atraumatic.   Mouth/Throat: No oropharyngeal exudate.   Eyes: Conjunctivae are normal.   Neck: Normal range of motion. Neck supple. No JVD present.   Cardiovascular: Normal rate, regular rhythm, normal heart sounds and intact distal pulses.   Pulmonary/Chest: Effort normal. She has wheezes (minor). She has no rales.    Abdominal: Soft. Bowel sounds are normal. She exhibits no distension. There is no tenderness.   Musculoskeletal: Normal range of motion. She exhibits edema (1+ bilateral lower extremity ) and tenderness (bilateral lower extremity ). She exhibits no deformity.   Neurological: She is alert and oriented to person, place, and time. No cranial nerve deficit or sensory deficit.   Skin: Skin is warm and dry.   Psychiatric: She has a normal mood and affect. Her behavior is normal.   Vitals reviewed.      Significant Labs:   Recent Lab Results       07/22/19  0954   07/22/19  0541        Albumin   2.4     Anion Gap   15     Aniso Slight       Baso # 0.01 0.01     Basophil% 0.1 0.1     BILIRUBIN TOTAL 0.4  Comment:  For infants and newborns, interpretation of results should be based  on gestational age, weight and in agreement with clinical  observations.  Premature Infant recommended reference ranges:  Up to 24 hours.............<8.0 mg/dL  Up to 48 hours............<12.0 mg/dL  3-5 days..................<15.0 mg/dL  6-29 days.................<15.0 mg/dL         BUN, Bld   132     Calcium   8.7     Chloride   105     CO2   16     Creatinine   5.8     Differential Method Automated Automated     eGFR if African American   8.9     eGFR if non    7.8  Comment:  Calculation used to obtain the estimated glomerular filtration  rate (eGFR) is the CKD-EPI equation.        Eos # 0.0 0.0     Eosinophil% 0.0 0.0     Glucose   154     Gran # (ANC) 8.2 7.3     Gran% 62.7 65.6     Haptoglobin 117       Hematocrit 29.3 30.9     Hemoglobin 9.6 9.7     Immature Grans (Abs) 0.24  Comment:  Mild elevation in immature granulocytes is non specific and   can be seen in a variety of conditions including stress response,   acute inflammation, trauma and pregnancy. Correlation with other   laboratory and clinical findings is essential.   0.20  Comment:  Mild elevation in immature granulocytes is non specific and   can be seen in a  variety of conditions including stress response,   acute inflammation, trauma and pregnancy. Correlation with other   laboratory and clinical findings is essential.       Immature Granulocytes 1.8 1.8     Coumadin Monitoring INR 1.0  Comment:  Coumadin Therapy:  2.0 - 3.0 for INR for all indicators except mechanical heart valves  and antiphospholipid syndromes which should use 2.5 - 3.5.           Comment:  Results are increased in hemolyzed samples.       Lymph # 2.5 2.0     Lymph% 19.1 18.0     MCH 22.9 22.4     MCHC 32.8 31.4     MCV 70 71     Mono # 2.1 1.6     Mono% 16.3 14.5     MPV SEE COMMENT  Comment:  Result not available. 9.9     nRBC 1 1     Pathologist Review Review required       Phosphorus   6.2     Platelet Estimate Decreased       Platelets 79 76     Poly Occasional       Potassium   4.4     Protime 10.3       RBC 4.20 4.34     RDW 24.2 24.9     Retic 4.7       Sodium   136     WBC 13.12 11.14         All pertinent labs within the past 24 hours have been reviewed.    Significant Imaging: I have reviewed all pertinent imaging results/findings within the past 24 hours.

## 2019-07-22 NOTE — PHYSICIAN QUERY
"PT Name: Kendy Vital  MR #: [id number]    Physician Query Form - Hematology Clarification      CDS/: Almaz Sun RN           Contact information:[email or web address]    This form is a permanent document in the medical record.      Query Date: July 22, 2019    By submitting this query, we are merely seeking further clarification of documentation. Please utilize your independent clinical judgment when addressing the question(s) below.    The Medical record contains the following:   Indicators  Supporting Clinical Findings Location in Medical Record   X "Anemia" documented Ms. Vital is a 53 year old female with hypertension, anemia, and history of leiomyoma of the uterus who presents to ICU as a transfer from [facility] for evaluation of thrombocytopenia [facility] medicine 7/22   X H & H = H/H 10.0, 30.6  H/H 9.6, 29.3 Labs 7/19 admission  Labs 7/22   X BP =                     HR= /83, HR 69  /84, HR 61 H & P vitals  [facility] medicine vitals 7/22    "GI bleeding" documented      Acute bleeding (Non GI site)      Transfusion(s)     X Treatment: · Anemia: stable.  transfuse to maintain hgb>7.    CBC with auto differential daily H & P    MD orders 7/22   X Other:  Patient is a 53 year old female with hypertension, anemia, and history of leiomyoma of the uterus who presents to ICU as a transfer from [facility] for evaluation of thrombocytopenia    Ferritin 161 , Folate 11.1,   Vit B-12 843    Renal/  Acute renal failure  - Patient has baseline creatinine of 1.0 roughly 1 month ago. Outside records show creatinine of 2.9 and BUN of 74.  - Ordered urinalysis, urine sodium, urine creatinine and kidney ultrasound  - Patient also tested positive for strep A as seen on outside hospital records. Possible etiology for GN    "shortness of breath and occasional nose bleeds for roughly 2 weeks." H & P            Iron/anemia profile labs " 7/19    H & P     Provider, please specify diagnosis or diagnoses associated with above clinical findings.      [  ] Chronic blood loss anemia    [  ] Anemia of chronic disease ( Specify chronic disease) ______________      [  ] Neoplastic disease    [  ] Other (Specify):_______________   [ x ] Clinically Undetermined       [  ] Other Hematological Diagnosis (please specify):     [ x ] Clinically Undetermined       Please document in your progress notes daily for the duration of treatment, until resolved, and include in your discharge summary.

## 2019-07-23 PROBLEM — D69.6 THROMBOCYTOPENIA, UNSPECIFIED: Status: ACTIVE | Noted: 2019-07-23

## 2019-07-23 PROBLEM — D64.89 OTHER SPECIFIED ANEMIAS: Status: ACTIVE | Noted: 2019-07-23

## 2019-07-23 LAB
ALBUMIN SERPL BCP-MCNC: 2.4 G/DL (ref 3.5–5.2)
ANCA AB TITR SER IF: NORMAL TITER
ANION GAP SERPL CALC-SCNC: 13 MMOL/L (ref 8–16)
ANISOCYTOSIS BLD QL SMEAR: SLIGHT
ASO AB SERPL-ACNC: <14 IU/ML
BASOPHILS # BLD AUTO: 0 K/UL (ref 0–0.2)
BASOPHILS NFR BLD: 0 % (ref 0–1.9)
BM IGG SER-ACNC: <0.2 U
BONE MARROW WRIGHT STAIN COMMENT: NORMAL
BUN SERPL-MCNC: 148 MG/DL (ref 6–20)
CALCIUM SERPL-MCNC: 8.3 MG/DL (ref 8.7–10.5)
CHLORIDE SERPL-SCNC: 103 MMOL/L (ref 95–110)
CO2 SERPL-SCNC: 17 MMOL/L (ref 23–29)
CREAT SERPL-MCNC: 5.9 MG/DL (ref 0.5–1.4)
DIFFERENTIAL METHOD: ABNORMAL
EOSINOPHIL # BLD AUTO: 0 K/UL (ref 0–0.5)
EOSINOPHIL NFR BLD: 0 % (ref 0–8)
ERYTHROCYTE [DISTWIDTH] IN BLOOD BY AUTOMATED COUNT: 25.1 % (ref 11.5–14.5)
EST. GFR  (AFRICAN AMERICAN): 8.8 ML/MIN/1.73 M^2
EST. GFR  (NON AFRICAN AMERICAN): 7.6 ML/MIN/1.73 M^2
GLUCOSE SERPL-MCNC: 194 MG/DL (ref 70–110)
HBV DNA SERPL NAA+PROBE-ACNC: 19 IU/ML
HBV DNA SERPL NAA+PROBE-LOG IU: 1.28 LOG (10) IU/ML
HBV DNA SERPL QL NAA+PROBE: DETECTED
HCT VFR BLD AUTO: 26.7 % (ref 37–48.5)
HGB BLD-MCNC: 8.5 G/DL (ref 12–16)
IMM GRANULOCYTES # BLD AUTO: 0.14 K/UL (ref 0–0.04)
IMM GRANULOCYTES NFR BLD AUTO: 1.5 % (ref 0–0.5)
LYMPHOCYTES # BLD AUTO: 1.3 K/UL (ref 1–4.8)
LYMPHOCYTES NFR BLD: 14.4 % (ref 18–48)
MCH RBC QN AUTO: 22.8 PG (ref 27–31)
MCHC RBC AUTO-ENTMCNC: 31.8 G/DL (ref 32–36)
MCV RBC AUTO: 72 FL (ref 82–98)
MONOCYTES # BLD AUTO: 1.3 K/UL (ref 0.3–1)
MONOCYTES NFR BLD: 13.9 % (ref 4–15)
NEUTROPHILS # BLD AUTO: 6.6 K/UL (ref 1.8–7.7)
NEUTROPHILS NFR BLD: 70.2 % (ref 38–73)
NRBC BLD-RTO: 0 /100 WBC
OB PNL STL: NEGATIVE
P-ANCA TITR SER IF: NORMAL TITER
PHOSPHATE SERPL-MCNC: 6.3 MG/DL (ref 2.7–4.5)
PLATELET # BLD AUTO: 118 K/UL (ref 150–350)
PLATELET BLD QL SMEAR: ABNORMAL
PMV BLD AUTO: ABNORMAL FL (ref 9.2–12.9)
POLYCHROMASIA BLD QL SMEAR: ABNORMAL
POTASSIUM SERPL-SCNC: 4.8 MMOL/L (ref 3.5–5.1)
RBC # BLD AUTO: 3.72 M/UL (ref 4–5.4)
SODIUM SERPL-SCNC: 133 MMOL/L (ref 136–145)
WBC # BLD AUTO: 9.33 K/UL (ref 3.9–12.7)

## 2019-07-23 PROCEDURE — 80069 RENAL FUNCTION PANEL: CPT

## 2019-07-23 PROCEDURE — 99233 SBSQ HOSP IP/OBS HIGH 50: CPT | Mod: ,,, | Performed by: INTERNAL MEDICINE

## 2019-07-23 PROCEDURE — 25000003 PHARM REV CODE 250: Performed by: HOSPITALIST

## 2019-07-23 PROCEDURE — 88313 SPECIAL STAINS GROUP 2: CPT

## 2019-07-23 PROCEDURE — 63600175 PHARM REV CODE 636 W HCPCS: Performed by: STUDENT IN AN ORGANIZED HEALTH CARE EDUCATION/TRAINING PROGRAM

## 2019-07-23 PROCEDURE — 88299 UNLISTED CYTOGENETIC STUDY: CPT

## 2019-07-23 PROCEDURE — 88189 FLOWCYTOMETRY/READ 16 & >: CPT | Mod: ,,, | Performed by: PATHOLOGY

## 2019-07-23 PROCEDURE — 11000001 HC ACUTE MED/SURG PRIVATE ROOM

## 2019-07-23 PROCEDURE — 85025 COMPLETE CBC W/AUTO DIFF WBC: CPT

## 2019-07-23 PROCEDURE — 82272 OCCULT BLD FECES 1-3 TESTS: CPT

## 2019-07-23 PROCEDURE — 88189 PR  FLOWCYTOMETRY/READ, 16 & > MARKERS: ICD-10-PCS | Mod: ,,, | Performed by: PATHOLOGY

## 2019-07-23 PROCEDURE — 99233 PR SUBSEQUENT HOSPITAL CARE,LEVL III: ICD-10-PCS | Mod: ,,, | Performed by: HOSPITALIST

## 2019-07-23 PROCEDURE — 88184 FLOWCYTOMETRY/ TC 1 MARKER: CPT | Performed by: PATHOLOGY

## 2019-07-23 PROCEDURE — 99233 PR SUBSEQUENT HOSPITAL CARE,LEVL III: ICD-10-PCS | Mod: ,,, | Performed by: INTERNAL MEDICINE

## 2019-07-23 PROCEDURE — 36430 TRANSFUSION BLD/BLD COMPNT: CPT

## 2019-07-23 PROCEDURE — 82595 ASSAY OF CRYOGLOBULIN: CPT

## 2019-07-23 PROCEDURE — 88185 FLOWCYTOMETRY/TC ADD-ON: CPT | Mod: 59 | Performed by: PATHOLOGY

## 2019-07-23 PROCEDURE — 99233 SBSQ HOSP IP/OBS HIGH 50: CPT | Mod: ,,, | Performed by: HOSPITALIST

## 2019-07-23 PROCEDURE — 63600175 PHARM REV CODE 636 W HCPCS: Performed by: GENERAL PRACTICE

## 2019-07-23 PROCEDURE — 25000003 PHARM REV CODE 250: Performed by: STUDENT IN AN ORGANIZED HEALTH CARE EDUCATION/TRAINING PROGRAM

## 2019-07-23 PROCEDURE — 36415 COLL VENOUS BLD VENIPUNCTURE: CPT

## 2019-07-23 PROCEDURE — 25000003 PHARM REV CODE 250: Performed by: NURSE PRACTITIONER

## 2019-07-23 PROCEDURE — 88237 TISSUE CULTURE BONE MARROW: CPT

## 2019-07-23 PROCEDURE — 88271 CYTOGENETICS DNA PROBE: CPT

## 2019-07-23 RX ORDER — HYDROMORPHONE HYDROCHLORIDE 1 MG/ML
0.5 INJECTION, SOLUTION INTRAMUSCULAR; INTRAVENOUS; SUBCUTANEOUS ONCE
Status: COMPLETED | OUTPATIENT
Start: 2019-07-23 | End: 2019-07-23

## 2019-07-23 RX ORDER — SODIUM BICARBONATE 650 MG/1
1300 TABLET ORAL 3 TIMES DAILY
Status: DISCONTINUED | OUTPATIENT
Start: 2019-07-23 | End: 2019-07-29

## 2019-07-23 RX ORDER — SODIUM BICARBONATE 650 MG/1
650 TABLET ORAL 3 TIMES DAILY
Status: DISCONTINUED | OUTPATIENT
Start: 2019-07-23 | End: 2019-07-23

## 2019-07-23 RX ADMIN — PREDNISONE 60 MG: 20 TABLET ORAL at 09:07

## 2019-07-23 RX ADMIN — Medication 1 SPRAY: at 05:07

## 2019-07-23 RX ADMIN — CARVEDILOL 6.25 MG: 6.25 TABLET, FILM COATED ORAL at 09:07

## 2019-07-23 RX ADMIN — SODIUM BICARBONATE 650 MG TABLET 1300 MG: at 09:07

## 2019-07-23 RX ADMIN — CLONIDINE HYDROCHLORIDE 0.2 MG: 0.2 TABLET ORAL at 09:07

## 2019-07-23 RX ADMIN — Medication 1 SPRAY: at 08:07

## 2019-07-23 RX ADMIN — HYDRALAZINE HYDROCHLORIDE 50 MG: 50 TABLET ORAL at 05:07

## 2019-07-23 RX ADMIN — SODIUM BICARBONATE 650 MG TABLET 650 MG: at 09:07

## 2019-07-23 RX ADMIN — AMLODIPINE BESYLATE 10 MG: 10 TABLET ORAL at 09:07

## 2019-07-23 RX ADMIN — HYDRALAZINE HYDROCHLORIDE 50 MG: 50 TABLET ORAL at 03:07

## 2019-07-23 RX ADMIN — SODIUM BICARBONATE 650 MG TABLET 650 MG: at 03:07

## 2019-07-23 RX ADMIN — HYDROMORPHONE HYDROCHLORIDE 0.5 MG: 1 INJECTION, SOLUTION INTRAMUSCULAR; INTRAVENOUS; SUBCUTANEOUS at 10:07

## 2019-07-23 RX ADMIN — ACETAMINOPHEN 650 MG: 325 TABLET ORAL at 09:07

## 2019-07-23 RX ADMIN — HYDRALAZINE HYDROCHLORIDE 50 MG: 50 TABLET ORAL at 09:07

## 2019-07-23 NOTE — ASSESSMENT & PLAN NOTE
baseline creatinine of 1.0 roughly 1 month ago. Outside records show creatinine of 2.9 and BUN of 74;  Concern for DPGN from SLE vs PSGN vs COSME vs anti-GBM vs AIN in setting of recent NSAID exposure, less concern for TTP despite low platelets given lack of hemolysis on labs  RJ with bilateral medicorenal disease  UA with proteinuria, blood, no evidence of infection; pro:cr 5.97; C3 41, C4<3  positive for strep A as seen on outside hospital records  given 1x dose lasix 160mg IV with poor response  - 7/19 initiated on empiric prednisone 60mg daily  - Nephrology following, appreciate recs:  7/22 renal biopsy to further guide treatment

## 2019-07-23 NOTE — SUBJECTIVE & OBJECTIVE
Interval History:   Symptoms:  Improved.    Diet:  Adequate intake.    Activity level:  Impaired due to weakness.   Pain:  No pain.       Review of Systems   Constitutional: Positive for fatigue. Negative for fever.   HENT: Positive for nosebleeds.    Respiratory: Negative for shortness of breath.    Cardiovascular: Negative for chest pain.   Gastrointestinal: Negative for abdominal pain.     Objective:     Vital Signs (Most Recent):  Temp: 96.9 °F (36.1 °C) (07/23/19 1225)  Pulse: (!) 57 (07/23/19 1225)  Resp: 17 (07/23/19 1225)  BP: 131/67 (07/23/19 1225)  SpO2: 97 % (07/23/19 1225) Vital Signs (24h Range):  Temp:  [96.4 °F (35.8 °C)-98.3 °F (36.8 °C)] 96.9 °F (36.1 °C)  Pulse:  [57-70] 57  Resp:  [16-20] 17  SpO2:  [96 %-100 %] 97 %  BP: (117-156)/(44-71) 131/67     Weight: 103 kg (227 lb)  Body mass index is 36.64 kg/m².    Intake/Output Summary (Last 24 hours) at 7/23/2019 1535  Last data filed at 7/23/2019 1220  Gross per 24 hour   Intake 502 ml   Output 800 ml   Net -298 ml      Physical Exam   Constitutional: She is oriented to person, place, and time. She appears well-nourished. No distress.   Neck: No JVD present.   Cardiovascular: Normal rate, regular rhythm and normal heart sounds.   Pulmonary/Chest: Effort normal and breath sounds normal. No respiratory distress.   Abdominal: Soft. Bowel sounds are normal. She exhibits no distension.   Musculoskeletal: She exhibits no edema.   Neurological: She is alert and oriented to person, place, and time.   Psychiatric: She has a normal mood and affect. Her behavior is normal.       Significant Labs: All pertinent labs within the past 24 hours have been reviewed.    Significant Imaging: I have reviewed and interpreted all pertinent imaging results/findings within the past 24 hours.

## 2019-07-23 NOTE — ASSESSMENT & PLAN NOTE
hypertensive at admission and per patient, has been undergoing multiple recent medication changes due to HTN as outpatient.  Suspect initially may have been exacerbated by NSAID use, now concern for multifactorial cause including renal failure with volume overload, steroid use, and inadequate dosing of home regimen  Home regimen includes: lisinopril-HCTZ 20-12.5mg, clonidine 0.2mg bid, and hydralazine 25mg tid  - currently on amlodipine 10mg, hydralazine 50mg tid, clonidine 0.2mg bid, and coreg 6.25mg bid

## 2019-07-23 NOTE — PLAN OF CARE
Problem: Fall Injury Risk  Goal: Absence of Fall and Fall-Related Injury  Outcome: Ongoing (interventions implemented as appropriate)  Meds given as ordered. No complaints of pain. No signs or symptoms of distress/discomfort noted. Alert and oriented. Resting quietly. Safety maintained. Will continue to monitor.

## 2019-07-23 NOTE — ASSESSMENT & PLAN NOTE
BNP of 526 from outside records; 999 on admission  bilateral lower extremity swelling and reports easy fatigueability and orthopnea  TTE with CVP 15, normal EF, indeterminate diastolic function  suspect that this reflects volume overload in setting of oliguric renal failure rather than primary cardiac process

## 2019-07-23 NOTE — MEDICAL/APP STUDENT
[facility]  Nephrology  Medical Student Progress Note    Patient Name: Kendy Vital  MRN: [id number]   Admission Date: 7/19/2019  Length of Stay: 4 days  Attending Physician: Dr. Garcia        Assessment/Plan:   Pt is 53 F transferred to [facility] for bronchopneumonia, LACI, thrombocytopenia, and plasma exchange.  Cr 5.9 today, 5.8 yesterday.    As per heme-onc notes, concern for multiple myeloma and tumor lysis syndrome due to renal failure, anemia, and her presentation.    Kappa FLC's are 107.07 and Kappa/Lambda FLC ratio 47.59.  She had a bone marrow biopsy today.      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  Awaiting results for kidney biopsy which will aid in dx clarification    Ddx  Meyloma cast nephropathy is likely given kappa and lambda FLC's although this does not explain low complement levels    NSAID-induced AIN from use of multiple NSAID's likely a factor.    MPGN a possibility as evidenced by dysmorphic RBC's, hematuria, proteinuria, HTN, positive RF.      PAN on the ddx due to hepatitis B infection, systemic symptoms, HTN but less likely due to amount of proteinuria and the lack of motor/sensory symptoms.      Membraneous nephropathy a possibility as evidenced by hypoalbumenia, peripheral edema, nephrotic range proteinuria, and hypocomplementemia. Possibly secondary to hepatitis B infection but less likely due to dysmorphic RBCs.     FSGS a possibility due to HTN, renal impairment, and hematuria.      Minimal change disease a possibility since she is recently suffered from a respiratory infection     Mixed cryoglobulinemia syndrome less likely due to negative HCV testing although has C3>C4 and clinical manifestations.    Lupus is unlikely due to negative ANUPAM and dsDNA      Plan  Continue prednisone  Await biopsy results  Avoid nephrotoxic drugs    To be discussed with team and staff,    Wally Hathaway  MS4    Subjective:      Principal Problem:Acute renal failure  Please see H&P for detailed presenting history and ROS.    Interval History:       Objective:     Vital Signs (Most Recent):  Temp: 96.9 °F (36.1 °C) (07/23/19 1225)  Pulse: (!) 57 (07/23/19 1225)  Resp: 17 (07/23/19 1225)  BP: 131/67 (07/23/19 1225)  SpO2: 97 % (07/23/19 1225) Vital Signs (24h Range):  Temp:  [96.4 °F (35.8 °C)-98.3 °F (36.8 °C)] 96.9 °F (36.1 °C)  Pulse:  [57-70] 57  Resp:  [16-20] 17  SpO2:  [96 %-100 %] 97 %  BP: (117-156)/(44-71) 131/67     Weight: 103 kg (227 lb)  Body mass index is 36.64 kg/m².    Intake/Output Summary (Last 24 hours) at 7/23/2019 1423  Last data filed at 7/23/2019 1220  Gross per 24 hour   Intake 502 ml   Output 800 ml   Net -298 ml      Physical Exam   Constitutional: She is oriented to person, place, and time.   Neck: No JVD present.   Cardiovascular: Bradycardia present.   Pulmonary/Chest: Effort normal and breath sounds normal.   Abdominal: Soft. Bowel sounds are normal. There is no tenderness. No hernia.   Musculoskeletal: She exhibits edema.   Neurological: She is alert and oriented to person, place, and time. GCS eye subscore is 4. GCS verbal subscore is 5. GCS motor subscore is 6.   Skin: Skin is warm and dry. Capillary refill takes less than 2 seconds.   Nursing note and vitals reviewed.    Significant Labs:   Recent Results (from the past 24 hour(s))   Type & Screen    Collection Time: 07/22/19  6:00 PM   Result Value Ref Range    Group & Rh A POS     Indirect Alfredo NEG    Renal function panel    Collection Time: 07/23/19  4:16 AM   Result Value Ref Range    Glucose 194 (H) 70 - 110 mg/dL    Sodium 133 (L) 136 - 145 mmol/L    Potassium 4.8 3.5 - 5.1 mmol/L    Chloride 103 95 - 110 mmol/L    CO2 17 (L) 23 - 29 mmol/L    BUN, Bld 148 (H) 6 - 20 mg/dL    Calcium 8.3 (L) 8.7 - 10.5 mg/dL    Creatinine 5.9 (H) 0.5 - 1.4 mg/dL    Albumin 2.4 (L) 3.5 - 5.2 g/dL    Phosphorus 6.3 (H) 2.7 - 4.5 mg/dL    eGFR if African American  8.8 (A) >60 mL/min/1.73 m^2    eGFR if non  7.6 (A) >60 mL/min/1.73 m^2    Anion Gap 13 8 - 16 mmol/L   CBC auto differential    Collection Time: 07/23/19  4:16 AM   Result Value Ref Range    WBC 9.33 3.90 - 12.70 K/uL    RBC 3.72 (L) 4.00 - 5.40 M/uL    Hemoglobin 8.5 (L) 12.0 - 16.0 g/dL    Hematocrit 26.7 (L) 37.0 - 48.5 %    Mean Corpuscular Volume 72 (L) 82 - 98 fL    Mean Corpuscular Hemoglobin 22.8 (L) 27.0 - 31.0 pg    Mean Corpuscular Hemoglobin Conc 31.8 (L) 32.0 - 36.0 g/dL    RDW 25.1 (H) 11.5 - 14.5 %    Platelets 118 (L) 150 - 350 K/uL    MPV SEE COMMENT 9.2 - 12.9 fL    Immature Granulocytes 1.5 (H) 0.0 - 0.5 %    Gran # (ANC) 6.6 1.8 - 7.7 K/uL    Immature Grans (Abs) 0.14 (H) 0.00 - 0.04 K/uL    Lymph # 1.3 1.0 - 4.8 K/uL    Mono # 1.3 (H) 0.3 - 1.0 K/uL    Eos # 0.0 0.0 - 0.5 K/uL    Baso # 0.00 0.00 - 0.20 K/uL    nRBC 0 0 /100 WBC    Gran% 70.2 38.0 - 73.0 %    Lymph% 14.4 (L) 18.0 - 48.0 %    Mono% 13.9 4.0 - 15.0 %    Eosinophil% 0.0 0.0 - 8.0 %    Basophil% 0.0 0.0 - 1.9 %    Platelet Estimate Decreased (A)     Aniso Slight     Poly Occasional     Differential Method Automated    Bone Marrow Prep and Stain    Collection Time: 07/23/19 10:38 AM   Result Value Ref Range    Bone Marrow Pak Stain Comment See Anatomic Pathology bone marrow report.    Plasma Cell Proliferative Disorder (PCPD), FISH    Collection Time: 07/23/19 10:38 AM   Result Value Ref Range    Reason for Referral, Plasma Cell Prolif (PCPD), FISH anemia, renal failure    Chromosome Analysis, Bone Marrow    Collection Time: 07/23/19 10:54 AM   Result Value Ref Range    Reason for Referral anemia    Leukemia/Lymphoma Screen - Bone Marrow Left Posterior Iliac Crest    Collection Time: 07/23/19 10:55 AM   Result Value Ref Range    Clinical Diagnosis - Bone Marrow ANM     Body Site - Bone Marrow LPI        ATTENDING PHYSICIAN ATTESTATION  I have personally interviewed and examined the patient. I thoroughly reviewed  "the demographic, clinical, laboratorial and imaging information available in medical records. I agree with the assessment and recommendations provided by the MS4 Arroyo was under my supervision. 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 She also has extensive ATN likely secondary to ischemia downstream the affected glomeruli. These features fit with the low C4 and the +RF. 90% of these cases are associated with HCV (but she is negative!) and only ~3% are associated with HBV. Despite her very high kappa/lambda ratio (K:L), she has no features of myeloma cast nephropathy or light chain deposition disease in her biopsy specimen. The high K:L could come from the cryoglobulinemic process (mono and polyclonal LC production). Of note, lymphoma can rarely cause cryoglobulinemic DPGN, so BM biopsy results will be important. No evidence of uric acid tubulopathy (tumor lysis syndrome) either. In retrospect, her NSAIDs use was a major confounding factor in this case.   Recommendations:  1. Continue prednisone 60 mg qd for additional 2 weeks, then begin taper to 40 mg qd  2. Begin PLEX to remove cryoglobulins, consult Blood Bank (please, DO NOT START until a blood sample for the the cryoglobulin test is obtained (I ordered it today).  3. Begin antiviral therapy for HBV with either tenofovir alafenamide or entecavir (avoid tenofovir disoproxil fumarate), consult ID or Hepatology  4. Continue current antihypertensives (HTN at least partially secondary to glomerular dz)  5. No indication for RRT today. However, her kidney function is still slowly declining and her azotemia is worsening. An HD catheter needs to be placed for PLEX ASAP, so, we could use it for RRT if needed, we may end up needing to initiate it in the next 24-48 hrs.   6. OK to use a single daily dose of Lasix " 160 mg IV to avoid hypervolemia  7. Limit protein diet given azotemia, no more than 60-80 g per day  8. Increase NaHCO3 to 1300 mg tid    ALEX Morgan Attending

## 2019-07-23 NOTE — PROGRESS NOTES
[facility] Hospital Medicine  Progress Note    Patient Name: Kendy Vital  MRN: [id number]  Patient Class: IP- Inpatient   Admission Date: 7/19/2019  Length of Stay: 4 days  Attending Physician: Luiz Rea MD  Primary Care Provider: To Obtain [facility] Medicine Team: [facility] MED R Luiz Rea MD    Subjective:     Principal Problem:Acute renal failure      HPI:  Ms. Vital is a 53 year old female with hypertension, anemia, and history of leiomyoma of the uterus who presents to ICU as a transfer from [facility] [place] [facility] for evaluation of thrombocytopenia. Patient reports that prior to going to the hospital 3 days ago that she was experiencing symptoms of chills, feeling febrile, dry cough, shortness of breath and occasional nose bleeds for roughly 2 weeks. She also reports easy fatigueability and difficulty breathing when laying flat; currently sleeps with 2 pillows. Patient presented to hospital in [place] when her symptoms did not resolve. Initial work up showed a , worsening kidney function with creatinine of 2.9, and thrombocytopenia with platelets of 33k. In addition, chest x-ray showed interstitial opacities and follow up CT showed perihilar ground glass opacity. Patient was treated with rocephin and doxycycline as well as Bumex q12 due to her heart failure type symptoms. Lab work at the time showed WBC of 7.9 and a lactate of .8. In addition, she tested positive for strep A. Patient was transferred for further evaluation of her thrombocytopenia with concern for TTP and possible need for plasmapheresis.     At time of exam, patient is properly oriented to person time and places. She endorses headache, generalized myalgias, nausea and orthopnea. She denies SOB while sitting up, chest pain, abdominal pain, vomiting, and diarrhea. She has not felt febrile since 1 week prior to hospital stay.        Overview/Hospital Course:  Ms. Vital  presented as transfer to ICU for suspected TTP. At that time, she had severe thrombocytopenia, renal failure, and bilateral infiltrates on chest CT concerning for PNA. She was stepped down when repeat CBC revealed normalizing platelets and hemolysis labs were unremarkable. Initially placed on vanc/zosyn/azithromycin for PNA, which has been de-escalated to rocephin/azithromycin for CAP. Her course has been complicated by ARF of unclear cause. 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   7/23 Nephrology biopsed  and have initiated on prednisone empirically.  7/24 Heme did BMBx      Interval History:   Symptoms:  Improved.    Diet:  Adequate intake.    Activity level:  Impaired due to weakness.   Pain:  No pain.       Review of Systems   Constitutional: Positive for fatigue. Negative for fever.   HENT: Positive for nosebleeds.    Respiratory: Negative for shortness of breath.    Cardiovascular: Negative for chest pain.   Gastrointestinal: Negative for abdominal pain.     Objective:     Vital Signs (Most Recent):  Temp: 96.9 °F (36.1 °C) (07/23/19 1225)  Pulse: (!) 57 (07/23/19 1225)  Resp: 17 (07/23/19 1225)  BP: 131/67 (07/23/19 1225)  SpO2: 97 % (07/23/19 1225) Vital Signs (24h Range):  Temp:  [96.4 °F (35.8 °C)-98.3 °F (36.8 °C)] 96.9 °F (36.1 °C)  Pulse:  [57-70] 57  Resp:  [16-20] 17  SpO2:  [96 %-100 %] 97 %  BP: (117-156)/(44-71) 131/67     Weight: 103 kg (227 lb)  Body mass index is 36.64 kg/m².    Intake/Output Summary (Last 24 hours) at 7/23/2019 1535  Last data filed at 7/23/2019 1220  Gross per 24 hour   Intake 502 ml   Output 800 ml   Net -298 ml      Physical Exam   Constitutional: She is oriented to person, place, and time. She appears well-nourished. No distress.   Neck: No JVD present.   Cardiovascular: Normal rate, regular rhythm and normal heart sounds.   Pulmonary/Chest: Effort normal and breath sounds normal. No respiratory distress.   Abdominal: Soft. Bowel sounds are normal. She exhibits no  distension.   Musculoskeletal: She exhibits no edema.   Neurological: She is alert and oriented to person, place, and time.   Psychiatric: She has a normal mood and affect. Her behavior is normal.       Significant Labs: All pertinent labs within the past 24 hours have been reviewed.    Significant Imaging: I have reviewed and interpreted all pertinent imaging results/findings within the past 24 hours.      Assessment/Plan:      * Acute renal failure  baseline creatinine of 1.0 roughly 1 month ago. Outside records show creatinine of 2.9 and BUN of 74;  Concern for DPGN from SLE vs PSGN vs COSME vs anti-GBM vs AIN in setting of recent NSAID exposure, less concern for TTP despite low platelets given lack of hemolysis on labs  RJ with bilateral medicorenal disease  UA with proteinuria, blood, no evidence of infection; pro:cr 5.97; C3 41, C4<3  positive for strep A as seen on outside hospital records  given 1x dose lasix 160mg IV with poor response  - 7/19 initiated on empiric prednisone 60mg daily  - Nephrology following, appreciate recs:  7/22 renal biopsy to further guide treatment      Essential hypertension  hypertensive at admission and per patient, has been undergoing multiple recent medication changes due to HTN as outpatient.  Suspect initially may have been exacerbated by NSAID use, now concern for multifactorial cause including renal failure with volume overload, steroid use, and inadequate dosing of home regimen  Home regimen includes: lisinopril-HCTZ 20-12.5mg, clonidine 0.2mg bid, and hydralazine 25mg tid  - currently on amlodipine 10mg, hydralazine 50mg tid, clonidine 0.2mg bid, and coreg 6.25mg bid    Thrombocytopenia, unspecified  Plt on admission 41.  Seen by Heme/onc.  Multifactorial such as  Hemodilution and medications.  HIT Ab negative  - monitor      Other specified anemias  Hgb slowly going down.  FERRITIN 161, RETIC 4.7 (H), Bili 1, FOLATE 11.1, VITAMIN B12 843  - monitor  -  fobt            Pneumonia  x-ray in [place] revealed interstitial infiltrates. Follow up chest CT showed perihilar ground glass opacity. Treated initially with rocephin and doxycycline  repeat chest x-ray with patchy airspace consolidations bilaterally R>L, edema vs PNA  blood cultures NGTD  - currently on broad spectrum antibiotics vanc, zosyn, azithro; held vanc and de-escalated zosyn to ceftriaxone on 7/20    Elevated brain natriuretic peptide (BNP) level  BNP of 526 from outside records; 999 on admission  bilateral lower extremity swelling and reports easy fatigueability and orthopnea  TTE with CVP 15, normal EF, indeterminate diastolic function  suspect that this reflects volume overload in setting of oliguric renal failure rather than primary cardiac process      VTE Risk Mitigation (From admission, onward)        Ordered     Place sequential compression device  Until discontinued      07/19/19 0533     IP VTE LOW RISK PATIENT  Once      07/19/19 0533                Luiz Rea MD  Department of Hospital Medicine   [facility]

## 2019-07-23 NOTE — ASSESSMENT & PLAN NOTE
Hgb slowly going down.  FERRITIN 161, RETIC 4.7 (H), Bili 1, FOLATE 11.1, VITAMIN B12 843  - monitor  - fobt

## 2019-07-23 NOTE — PROCEDURES
PROCEDURE NOTE:  Procedure Date: 7/23/19  Bone Marrow Biopsy  Indication: anemia, renal failure, elevated FLC ratio, r/o plasma cell dyscrasia  Consent: Informed consent was obtained from patient.  Timeout: Done and documented.  Position: prone  Site: Left posterior illiac crest.  Prep: Betadine.  Needle used: 11 gauge Jamshidi needle.  Anesthetic: 2% lidocaine 10 cc, 0.5mg dilaudid IV  Biopsy: The biopsy needle was introduced into the marrow cavity and an aspirate was obtained without complications. Obtained 15 cc aspirate. Core biopsy obtained and sent for routine histologic examination and cytogenetics.  Complications: None  EBL: minimal  Disposition: The patient was instructed to lay flat for 20 min, and to keep area dry/clean for 24 hours    -Expect results in 2-3 days  -Please obtain UPEP and urine MARCO

## 2019-07-23 NOTE — PLAN OF CARE
Problem: Adult Inpatient Plan of Care  Goal: Plan of Care Review  Outcome: Ongoing (interventions implemented as appropriate)  Patient is alert and oriented x4. Marcial catheter removed without difficulty. Patient has inadequate urinary output. Bladder scan showed 111ml of urine in bladder at approximately 5:15pm.Denies urinary urgency. Safety measures met. Free from falls and injury. Denies any pain. Able to verbalize all needs. Ambulates with standby assistance . Has adequate food and fluid intake. Bed locked and placed in lowest position. Call light within reach.

## 2019-07-23 NOTE — ASSESSMENT & PLAN NOTE
Plt on admission 41.  Seen by Heme/onc.  Multifactorial such as  Hemodilution and medications.  HIT Ab negative  - monitor

## 2019-07-23 NOTE — ASSESSMENT & PLAN NOTE
x-ray in [place] revealed interstitial infiltrates. Follow up chest CT showed perihilar ground glass opacity. Treated initially with rocephin and doxycycline  repeat chest x-ray with patchy airspace consolidations bilaterally R>L, edema vs PNA  blood cultures NGTD  - currently on broad spectrum antibiotics vanc, zosyn, azithro; held vanc and de-escalated zosyn to ceftriaxone on 7/20

## 2019-07-24 ENCOUNTER — ANESTHESIA EVENT (OUTPATIENT)
Dept: MEDSURG UNIT | Facility: HOSPITAL | Age: 54
End: 2019-07-24
Payer: MEDICAID

## 2019-07-24 ENCOUNTER — ANESTHESIA (OUTPATIENT)
Dept: MEDSURG UNIT | Facility: HOSPITAL | Age: 54
End: 2019-07-24
Payer: MEDICAID

## 2019-07-24 PROBLEM — B18.1 CHRONIC HEPATITIS B: Status: ACTIVE | Noted: 2019-07-24

## 2019-07-24 LAB
ADAMTS13 ACTIVITY: NORMAL %
ALBUMIN SERPL BCP-MCNC: 2.5 G/DL (ref 3.5–5.2)
ANION GAP SERPL CALC-SCNC: 15 MMOL/L (ref 8–16)
BACTERIA BLD CULT: NORMAL
BASOPHILS # BLD AUTO: 0 K/UL (ref 0–0.2)
BASOPHILS NFR BLD: 0 % (ref 0–1.9)
BONE MARROW IRON STAIN COMMENT: NORMAL
BUN SERPL-MCNC: 154 MG/DL (ref 6–20)
CALCIUM SERPL-MCNC: 8.6 MG/DL (ref 8.7–10.5)
CHLORIDE SERPL-SCNC: 105 MMOL/L (ref 95–110)
CO2 SERPL-SCNC: 17 MMOL/L (ref 23–29)
CREAT SERPL-MCNC: 6 MG/DL (ref 0.5–1.4)
DIFFERENTIAL METHOD: ABNORMAL
EOSINOPHIL # BLD AUTO: 0 K/UL (ref 0–0.5)
EOSINOPHIL NFR BLD: 0 % (ref 0–8)
ERYTHROCYTE [DISTWIDTH] IN BLOOD BY AUTOMATED COUNT: 25.5 % (ref 11.5–14.5)
EST. GFR  (AFRICAN AMERICAN): 8.6 ML/MIN/1.73 M^2
EST. GFR  (NON AFRICAN AMERICAN): 7.4 ML/MIN/1.73 M^2
GLUCOSE SERPL-MCNC: 181 MG/DL (ref 70–110)
HCT VFR BLD AUTO: 26.8 % (ref 37–48.5)
HGB BLD-MCNC: 8.6 G/DL (ref 12–16)
IMM GRANULOCYTES # BLD AUTO: 0.14 K/UL (ref 0–0.04)
IMM GRANULOCYTES NFR BLD AUTO: 1.6 % (ref 0–0.5)
LYMPHOCYTES # BLD AUTO: 1.2 K/UL (ref 1–4.8)
LYMPHOCYTES NFR BLD: 13.8 % (ref 18–48)
MCH RBC QN AUTO: 23.1 PG (ref 27–31)
MCHC RBC AUTO-ENTMCNC: 32.1 G/DL (ref 32–36)
MCV RBC AUTO: 72 FL (ref 82–98)
MONOCYTES # BLD AUTO: 0.8 K/UL (ref 0.3–1)
MONOCYTES NFR BLD: 8.6 % (ref 4–15)
NEUTROPHILS # BLD AUTO: 6.9 K/UL (ref 1.8–7.7)
NEUTROPHILS NFR BLD: 76 % (ref 38–73)
NRBC BLD-RTO: 0 /100 WBC
PHOSPHATE SERPL-MCNC: 7 MG/DL (ref 2.7–4.5)
PLATELET # BLD AUTO: 456 K/UL (ref 150–350)
PMV BLD AUTO: 9.2 FL (ref 9.2–12.9)
POTASSIUM SERPL-SCNC: 4.6 MMOL/L (ref 3.5–5.1)
PROT UR-MCNC: 183 MG/DL (ref 0–15)
RBC # BLD AUTO: 3.72 M/UL (ref 4–5.4)
SODIUM SERPL-SCNC: 137 MMOL/L (ref 136–145)
STREP DNASE B SER-ACNC: <86 U/ML (ref 0–260)
WBC # BLD AUTO: 9 K/UL (ref 3.9–12.7)

## 2019-07-24 PROCEDURE — 11000001 HC ACUTE MED/SURG PRIVATE ROOM

## 2019-07-24 PROCEDURE — 63600175 PHARM REV CODE 636 W HCPCS: Mod: JG | Performed by: PATHOLOGY

## 2019-07-24 PROCEDURE — 86335 PATHOLOGIST INTERPRETATION UIFE: ICD-10-PCS | Mod: 26,,, | Performed by: PATHOLOGY

## 2019-07-24 PROCEDURE — 25000003 PHARM REV CODE 250: Performed by: HOSPITALIST

## 2019-07-24 PROCEDURE — 36514 APHERESIS PLASMA: CPT

## 2019-07-24 PROCEDURE — 63600175 PHARM REV CODE 636 W HCPCS: Performed by: GENERAL PRACTICE

## 2019-07-24 PROCEDURE — 36514 PR THER APHERESIS,PLASMA PHERESIS: ICD-10-PCS | Mod: ,,, | Performed by: PATHOLOGY

## 2019-07-24 PROCEDURE — 25000003 PHARM REV CODE 250: Performed by: STUDENT IN AN ORGANIZED HEALTH CARE EDUCATION/TRAINING PROGRAM

## 2019-07-24 PROCEDURE — 36514 APHERESIS PLASMA: CPT | Mod: ,,, | Performed by: PATHOLOGY

## 2019-07-24 PROCEDURE — 99233 SBSQ HOSP IP/OBS HIGH 50: CPT | Mod: ,,, | Performed by: HOSPITALIST

## 2019-07-24 PROCEDURE — 80069 RENAL FUNCTION PANEL: CPT

## 2019-07-24 PROCEDURE — 80500 PR  LAB PATHOLOGY CONSULT-LTD: ICD-10-PCS | Mod: ,,, | Performed by: PATHOLOGY

## 2019-07-24 PROCEDURE — P9045 ALBUMIN (HUMAN), 5%, 250 ML: HCPCS | Mod: JG | Performed by: PATHOLOGY

## 2019-07-24 PROCEDURE — 85025 COMPLETE CBC W/AUTO DIFF WBC: CPT

## 2019-07-24 PROCEDURE — 86335 IMMUNFIX E-PHORSIS/URINE/CSF: CPT | Mod: 26,,, | Performed by: PATHOLOGY

## 2019-07-24 PROCEDURE — 99232 PR SUBSEQUENT HOSPITAL CARE,LEVL II: ICD-10-PCS | Mod: ,,, | Performed by: INTERNAL MEDICINE

## 2019-07-24 PROCEDURE — 36415 COLL VENOUS BLD VENIPUNCTURE: CPT

## 2019-07-24 PROCEDURE — 80500 PR  LAB PATHOLOGY CONSULT-LTD: CPT | Mod: ,,, | Performed by: PATHOLOGY

## 2019-07-24 PROCEDURE — 99232 SBSQ HOSP IP/OBS MODERATE 35: CPT | Mod: ,,, | Performed by: INTERNAL MEDICINE

## 2019-07-24 PROCEDURE — 25000003 PHARM REV CODE 250: Performed by: NURSE PRACTITIONER

## 2019-07-24 PROCEDURE — 25000003 PHARM REV CODE 250: Performed by: PHYSICIAN ASSISTANT

## 2019-07-24 PROCEDURE — 99233 PR SUBSEQUENT HOSPITAL CARE,LEVL III: ICD-10-PCS | Mod: ,,, | Performed by: HOSPITALIST

## 2019-07-24 PROCEDURE — 86335 IMMUNFIX E-PHORSIS/URINE/CSF: CPT

## 2019-07-24 PROCEDURE — 84156 ASSAY OF PROTEIN URINE: CPT

## 2019-07-24 RX ORDER — BISACODYL 10 MG
10 SUPPOSITORY, RECTAL RECTAL DAILY PRN
Status: DISCONTINUED | OUTPATIENT
Start: 2019-07-24 | End: 2019-08-07 | Stop reason: HOSPADM

## 2019-07-24 RX ORDER — ALBUMIN HUMAN 50 G/1000ML
275 SOLUTION INTRAVENOUS ONCE
Status: DISCONTINUED | OUTPATIENT
Start: 2019-07-24 | End: 2019-07-24

## 2019-07-24 RX ORDER — HEPARIN SODIUM 1000 [USP'U]/ML
4000 INJECTION, SOLUTION INTRAVENOUS; SUBCUTANEOUS ONCE
Status: COMPLETED | OUTPATIENT
Start: 2019-07-24 | End: 2019-07-24

## 2019-07-24 RX ORDER — ENTECAVIR 0.5 MG/1
0.5 TABLET, FILM COATED ORAL
Status: DISCONTINUED | OUTPATIENT
Start: 2019-07-25 | End: 2019-08-01

## 2019-07-24 RX ORDER — IBUPROFEN 200 MG
24 TABLET ORAL
Status: DISCONTINUED | OUTPATIENT
Start: 2019-07-24 | End: 2019-07-27

## 2019-07-24 RX ORDER — ALBUMIN HUMAN 50 G/1000ML
275 SOLUTION INTRAVENOUS ONCE
Status: COMPLETED | OUTPATIENT
Start: 2019-07-24 | End: 2019-07-24

## 2019-07-24 RX ORDER — IBUPROFEN 200 MG
16 TABLET ORAL
Status: DISCONTINUED | OUTPATIENT
Start: 2019-07-24 | End: 2019-07-27

## 2019-07-24 RX ORDER — OXYCODONE AND ACETAMINOPHEN 5; 325 MG/1; MG/1
1 TABLET ORAL EVERY 8 HOURS PRN
Status: DISCONTINUED | OUTPATIENT
Start: 2019-07-24 | End: 2019-08-07 | Stop reason: HOSPADM

## 2019-07-24 RX ORDER — ONDANSETRON 8 MG/1
8 TABLET, ORALLY DISINTEGRATING ORAL EVERY 8 HOURS PRN
Status: DISCONTINUED | OUTPATIENT
Start: 2019-07-24 | End: 2019-08-07 | Stop reason: HOSPADM

## 2019-07-24 RX ORDER — POLYETHYLENE GLYCOL 3350 17 G/17G
17 POWDER, FOR SOLUTION ORAL DAILY
Status: DISCONTINUED | OUTPATIENT
Start: 2019-07-25 | End: 2019-07-30

## 2019-07-24 RX ORDER — GLUCAGON 1 MG
1 KIT INJECTION
Status: DISCONTINUED | OUTPATIENT
Start: 2019-07-24 | End: 2019-07-27

## 2019-07-24 RX ADMIN — LEVOFLOXACIN 500 MG: 500 TABLET, FILM COATED ORAL at 08:07

## 2019-07-24 RX ADMIN — Medication 1 SPRAY: at 08:07

## 2019-07-24 RX ADMIN — CALCIUM GLUCONATE 2000 MG: 98 INJECTION, SOLUTION INTRAVENOUS at 04:07

## 2019-07-24 RX ADMIN — AMLODIPINE BESYLATE 10 MG: 10 TABLET ORAL at 08:07

## 2019-07-24 RX ADMIN — CARVEDILOL 6.25 MG: 6.25 TABLET, FILM COATED ORAL at 09:07

## 2019-07-24 RX ADMIN — SODIUM BICARBONATE 650 MG TABLET 1300 MG: at 09:07

## 2019-07-24 RX ADMIN — PREDNISONE 60 MG: 20 TABLET ORAL at 08:07

## 2019-07-24 RX ADMIN — HYDRALAZINE HYDROCHLORIDE 50 MG: 50 TABLET ORAL at 05:07

## 2019-07-24 RX ADMIN — CARVEDILOL 6.25 MG: 6.25 TABLET, FILM COATED ORAL at 08:07

## 2019-07-24 RX ADMIN — Medication 1 SPRAY: at 03:07

## 2019-07-24 RX ADMIN — HEPARIN SODIUM 2300 UNITS: 1000 INJECTION, SOLUTION INTRAVENOUS; SUBCUTANEOUS at 04:07

## 2019-07-24 RX ADMIN — SODIUM BICARBONATE 650 MG TABLET 1300 MG: at 08:07

## 2019-07-24 RX ADMIN — HYDRALAZINE HYDROCHLORIDE 50 MG: 50 TABLET ORAL at 03:07

## 2019-07-24 RX ADMIN — SODIUM BICARBONATE 650 MG TABLET 1300 MG: at 03:07

## 2019-07-24 RX ADMIN — ACETAMINOPHEN 650 MG: 325 TABLET ORAL at 11:07

## 2019-07-24 RX ADMIN — HYDRALAZINE HYDROCHLORIDE 50 MG: 50 TABLET ORAL at 09:07

## 2019-07-24 RX ADMIN — OXYCODONE HYDROCHLORIDE AND ACETAMINOPHEN 1 TABLET: 5; 325 TABLET ORAL at 05:07

## 2019-07-24 RX ADMIN — Medication 1 SPRAY: at 11:07

## 2019-07-24 RX ADMIN — CLONIDINE HYDROCHLORIDE 0.2 MG: 0.2 TABLET ORAL at 09:07

## 2019-07-24 RX ADMIN — ALBUMIN (HUMAN) 275 G: 12.5 SOLUTION INTRAVENOUS at 04:07

## 2019-07-24 RX ADMIN — ONDANSETRON 8 MG: 8 TABLET, ORALLY DISINTEGRATING ORAL at 10:07

## 2019-07-24 RX ADMIN — CLONIDINE HYDROCHLORIDE 0.2 MG: 0.2 TABLET ORAL at 08:07

## 2019-07-24 NOTE — PLAN OF CARE
Problem: Fall Injury Risk  Goal: Absence of Fall and Fall-Related Injury  Outcome: Ongoing (interventions implemented as appropriate)  Meds given as ordered tolerated well. C/o of pain due to trialysis being placed in right neck. No signs or symptoms of distress/discomfort noted. Apheresis in progress no signs or distress. Nursing communication in to use trialysis catheter. Ambulated to bathroom. Bath given.Vitals stable. Safety maintained. Will continue to monitor.

## 2019-07-24 NOTE — ASSESSMENT & PLAN NOTE
Hepatology consulted to see if her HBV can be causing the cryoglubins and if she needs treatment.  - entecavir 0.5 mg every 72 hour (renal dose)

## 2019-07-24 NOTE — ASSESSMENT & PLAN NOTE
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  As per Dr. 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 She also has extensive ATN likely secondary to ischemia downstream the affected glomeruli. These features fit with the low C4 and the +RF. 90% of these cases are associated with HCV (but she is negative!) and only ~3% are associated with HBV. Despite her very high kappa/lambda ratio (K:L), she has no features of myeloma cast nephropathy or light chain deposition disease in her biopsy specimen. The high K:L could come from the cryoglobulinemic process (mono and polyclonal LC production). Of note, lymphoma can rarely cause cryoglobulinemic DPGN, so BM biopsy results will be important. No evidence of uric acid tubulopathy (tumor lysis syndrome) either. In retrospect, her NSAIDs use was a major confounding factor in this case."     Recommendations:  - Continue prednisone 60 mg qd for additional 2 weeks, then begin taper to 40 mg qd  - Cryoglobulin lab sent f/u results  - Recommend PLEX to remove cryoglobulins; spoke with Dr. Lyons who would reevaluate case now that prelim results show cryoglobulinemic DPGN  - Begin antiviral therapy for HBV with either tenofovir alafenamide or entecavir (avoid tenofovir disoproxil fumarate), consult ID or Hepatology  - BP control to goal <140/90  - No indication for RRT today.  Spoke to patient about RRt in the close future, oriented her about risks vs benefits.  Answered all her questions.  Patient signed informed consent.  Nurse served as witness.  - Limit protein diet given azotemia, no more than 60-80 g per day  - Continue sodium bicarb 1300 mg tid  - Will follow closely  "

## 2019-07-24 NOTE — SUBJECTIVE & OBJECTIVE
PMH and PSH reviewed 07/24/2019 and relevant items addressed in HPI.    Review of patient's allergies indicates:  No Known Allergies    All medications reviewed 07/24/2019 and ace inhibitors {Identified?:55908}    Family History     None        Tobacco Use    Smoking status: Not on file   Substance and Sexual Activity    Alcohol use: Not on file    Drug use: Not on file    Sexual activity: Not on file     Review of Systems  Objective:     Vital Signs (Most Recent):  Temp: 97.2 °F (36.2 °C) (07/24/19 1043)  Pulse: 72 (07/24/19 1111)  Resp: 20 (07/24/19 1111)  BP: (!) 142/73 (07/24/19 1111)  SpO2: 100 % (07/24/19 1111) Vital Signs (24h Range):  Temp:  [96.2 °F (35.7 °C)-97.7 °F (36.5 °C)] 97.2 °F (36.2 °C)  Pulse:  [65-73] 72  Resp:  [16-20] 20  SpO2:  [94 %-100 %] 100 %  BP: (128-157)/(64-85) 142/73     Weight: 103 kg (227 lb)    Physical Exam    Significant Labs: {Results:32609}

## 2019-07-24 NOTE — ASSESSMENT & PLAN NOTE
baseline creatinine of 1.0 roughly 1 month ago. Outside records show creatinine of 2.9 and BUN of 74;  Concern for DPGN from SLE vs PSGN vs COSME vs anti-GBM vs AIN in setting of recent NSAID exposure, less concern for TTP despite low platelets given lack of hemolysis on labs  RJ with bilateral medicorenal disease  UA with proteinuria, blood, no evidence of infection; pro:cr 5.97; C3 41, C4<3  positive for strep A as seen on outside hospital records  given 1x dose lasix 160mg IV with poor response  - 7/19 initiated on empiric prednisone 60mg daily  - Nephrology following, appreciate recs:  7/22 renal biopsy to further guide treatment  - 7/23 HD line placed for HD and/or PLEX.  Blood bank consulted.

## 2019-07-24 NOTE — CONSULTS
"[facility]  Transfusion Medicine  Consult Note    Patient Name: Kendy Vital  MRN: [id number]  Admission Date: 7/19/2019  Hospital Length of Stay: 5 days  Attending Physician: Luiz Rea MD  Primary Care Provider: To Obtain Unable     Inpatient consult to [facility]  Consult performed by: Emily Lyons MD  Consult ordered by: Luiz Rea MD  Reason for consult: Symptomatic cryoglobuliemia   Assessment/Recommendations: TPE x 3, every 1-3 days (plan: W, F, M with clinical reassessment)        Subjective:     Principal Problem:Acute renal failure,     History of Present Illness:  53 year old female with history of anemia initially presented with symptomatic thrombocytopenia concerning for TTP. Subsequent [id number] activity levels returned as normal (83%). Since admit her renal failure has progressed, her creatinine is substantially elevated from historical levels: 1mg/dL cited in note -> 6 mg/dL on 7/24. 4.3 mg/dL on 7/19 is the candace in our system.      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 Steroids have been initiated (7/23). Her low complement levels support this diagnosis. Routine TAT for characterization of cryoglobulins may be over a week, so we will treat presumptively.  We have been consulted to initiate therapeutic plasma exchange for severe/symptomatic cryoglobulinemia with glomerulonephritis an adjunct therapy.    PMH and PSH reviewed 07/24/2019 and relevant items addressed in HPI.    Review of patient's allergies indicates:  No Known Allergies    All medications reviewed 07/24/2019 and ace inhibitors not identified.    Family History     None        Tobacco Use    Smoking status: Not on file   Substance and Sexual Activity    Alcohol use: Not on file    Drug use: Not on file    " Sexual activity: Not on file     Review of Systems  Objective:     Vital Signs (Most Recent):  Temp: 97 °F (36.1 °C) (07/24/19 0729)  Pulse: 69 (07/24/19 0729)  Resp: 18 (07/24/19 0729)  BP: (!) 149/71 (07/24/19 0729)  SpO2: 99 % (07/24/19 0729) Vital Signs (24h Range):  Temp:  [96.2 °F (35.7 °C)-97.7 °F (36.5 °C)] 97 °F (36.1 °C)  Pulse:  [57-73] 69  Resp:  [16-18] 18  SpO2:  [94 %-99 %] 99 %  BP: (128-157)/(64-85) 149/71     Weight: 103 kg (227 lb)    Physical Exam   Nursing note and vitals reviewed.      Significant Labs:   BMP:   Recent Labs   Lab 07/24/19  0327   *      K 4.6      CO2 17*   *   CREATININE 6.0*   CALCIUM 8.6*     CBC:   Recent Labs   Lab 07/23/19  0416 07/24/19  0327   WBC 9.33 9.00   HGB 8.5* 8.6*   HCT 26.7* 26.8*   * 456*     All pertinent labs within the past 24 hours have been reviewed.    Assessment/Plan:     Severe/symptomatic cryoglobulinemia with glomerulonephritis carries a Category II Grade 2A indication for therapeutic apheresis via the 2019 Journal of Clinical Apheresis Guidelines (Teofilo A et al. Journal of Clinical Apheresis 2019; 34:171-354.) The TPE plan is as follows:     Access: RIJ  Number of Procedures: 3  Schedule: W, F, M  Volume: 5.5L  Replacement Fluid: Albumin  Recommended Laboratory Studies: Complete Blood Count, Basic Metabolic Panel and Protein/creatinine ratio    Emily Lyons MD, PhD  Section of Transfusion Medicine & Histocompatibility  Department of Pathology and Laboratory Medicine  [facility]  [phone] (HLA & Blood Bank Offices)  07/24/2019

## 2019-07-24 NOTE — HPI
Ms. Vital is a 54yo F w/PMH of HTN, uterine fibroids and anemia who is admitted with thrombocytopenia and acute renal failure. Hepatology consulted for management of new diagnosis of chronic Hep B.  Patient initially presented to [facility] with few weeks of subjective fevers, chills, arthralgias, fatigue and nose bleed. She was found to be thrombocytopenic with LACI. She was transferred here to ICU for possible TTP, but repeat labs here with normal platelets so downgraded to floor. She underwent renal biopsy 7/22 (prelim report with cryoglobulinemic DPGN) and bone marrow biopsy 7/23. Labs revealed Hep B core total Ab +, viral load 19, Hep B sAg + and normal AST / ALT. Patient did not know about Hep B infection prior to this. 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 Has received blood transfusions about 5 years ago for anemia, but no transfusions >15 years ago. An aunt and uncle had HCC and sister may have had hepatitis. Does not know if mother had hepatitis.

## 2019-07-24 NOTE — ASSESSMENT & PLAN NOTE
TPE not indicated at this time, pending biopsy result.    HD catheter to be placed for dialysis can be used for TPE if we pursue this modality later.

## 2019-07-24 NOTE — SUBJECTIVE & OBJECTIVE
Interval History:   Symptoms:  Same.    Diet:  Adequate intake.    Activity level:  Impaired due to weakness.   Pain:  No pain.       Review of Systems   Constitutional: Positive for fatigue. Negative for fever.   Respiratory: Negative for shortness of breath.    Cardiovascular: Negative for chest pain.   Gastrointestinal: Negative for abdominal pain.     Objective:     Vital Signs (Most Recent):  Temp: 97.2 °F (36.2 °C) (07/24/19 1043)  Pulse: 72 (07/24/19 1111)  Resp: 20 (07/24/19 1111)  BP: (!) 142/73 (07/24/19 1111)  SpO2: 100 % (07/24/19 1111) Vital Signs (24h Range):  Temp:  [96.2 °F (35.7 °C)-97.7 °F (36.5 °C)] 97.2 °F (36.2 °C)  Pulse:  [57-73] 72  Resp:  [16-20] 20  SpO2:  [94 %-100 %] 100 %  BP: (128-157)/(64-85) 142/73     Weight: 103 kg (227 lb)  Body mass index is 36.64 kg/m².    Intake/Output Summary (Last 24 hours) at 7/24/2019 1122  Last data filed at 7/24/2019 0500  Gross per 24 hour   Intake 840 ml   Output 1400 ml   Net -560 ml      Physical Exam   Constitutional: She is oriented to person, place, and time. She appears well-nourished. No distress.   Neck: No JVD present.   Cardiovascular: Normal rate, regular rhythm and normal heart sounds.   Pulmonary/Chest: Effort normal and breath sounds normal. No respiratory distress.   Abdominal: Soft. Bowel sounds are normal. She exhibits no distension.   Musculoskeletal: She exhibits no edema.   Neurological: She is alert and oriented to person, place, and time.   Psychiatric: She has a normal mood and affect. Her behavior is normal.       Significant Labs: All pertinent labs within the past 24 hours have been reviewed.    Significant Imaging: I have reviewed and interpreted all pertinent imaging results/findings within the past 24 hours.

## 2019-07-24 NOTE — MEDICAL/APP STUDENT
[facility]  Hepatology  Consult Note    Patient Name: Kendy Vital  MRN: [id number]  Admission Date: 2019  Hospital Length of Stay: 5 days  Attending Provider: Luiz Rea MD   Primary Care Physician: To Obtain Unable  Principal Problem:Acute renal failure    Consults  Subjective:     Transplant status: No    HPI: 52 yo woman w/ PMHx HTN, anemia, DVTs, uterine leiomyoma transferred from [facility] for evaluation of thrombocytopenia and renal failure. Hepatology consulted for hepB treatment. She had some fevers and chills a few days prior, initially treated for pneumonia and fluid overload at which point decision was made [place] [facility] [place] after worsening renal function due to concern for TTP.     Hematology evaluated her for her thrombocytopenia, elevated uric acid, and elevated light chains, concerned for TLS, multiple myeloma and obtained BM biopsy. Nephrology consulted for acute GN, started her on empiric steroids 60mg prednisone and obtained kidney bx, which consistent with cryoglobulinemic DPGN.     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 She has received multiple blood transfusions in the past for her anemia. She denies IVDU. She states that no one ever tested her for hepatitis. Her FHx is significant for mother's brother and sister both having  of HCC. Her sister has hepatitis but she's not sure which one.         Review of Systems   Constitutional: Positive for fatigue. Negative for chills, fever and unexpected weight change.   HENT: Positive for nosebleeds.    Eyes: Negative for pain and redness.   Respiratory: Negative for cough, chest tightness and shortness of breath.    Cardiovascular: Negative for chest pain and leg swelling.   Gastrointestinal: Negative for abdominal pain,  blood in stool, diarrhea, nausea and vomiting.   Musculoskeletal: Negative for arthralgias, joint swelling and myalgias.   Skin: Positive for rash. Negative for color change.   Neurological: Positive for weakness. Negative for syncope, light-headedness and headaches.   Hematological: Bruises/bleeds easily.       No past medical history on file.    No past surgical history on file.    Family history of liver disease: Yes    Review of patient's allergies indicates:  No Known Allergies    Tobacco Use    Smoking status: Not on file   Substance and Sexual Activity    Alcohol use: Not on file    Drug use: Not on file    Sexual activity: Not on file       Medications Prior to Admission   Medication Sig Dispense Refill Last Dose    amLODIPine (NORVASC) 10 MG tablet Take 10 mg by mouth once daily.       aspirin (ECOTRIN) 81 MG EC tablet Take 81 mg by mouth once daily.       chlorthalidone (HYGROTEN) 50 MG Tab Take 50 mg by mouth once daily.          Objective:     Vital Signs (Most Recent):  Temp: 97 °F (36.1 °C) (07/24/19 0729)  Pulse: 69 (07/24/19 0729)  Resp: 18 (07/24/19 0729)  BP: (!) 149/71 (07/24/19 0729)  SpO2: 99 % (07/24/19 0729)  O2 Device (Oxygen Therapy): room air (07/23/19 1900) Vital Signs (24h Range):  Temp:  [96.2 °F (35.7 °C)-97.7 °F (36.5 °C)] 97 °F (36.1 °C)  Pulse:  [57-73] 69  Resp:  [16-18] 18  SpO2:  [94 %-99 %] 99 %  BP: (128-157)/(64-85) 149/71     Weight: 103 kg (227 lb) (07/19/19 1400)  Body mass index is 36.64 kg/m².    Physical Exam   Constitutional: She is oriented to person, place, and time. She appears well-developed and well-nourished. No distress.   HENT:   Head: Normocephalic and atraumatic.   Nose: Nose normal.   Mouth/Throat: Oropharynx is clear and moist.   Eyes: Pupils are equal, round, and reactive to light. Conjunctivae and EOM are normal. No scleral icterus.   Neck: Normal range of motion. Neck supple.   Cardiovascular: Normal rate and regular rhythm.   No murmur  heard.  Pulmonary/Chest: Effort normal and breath sounds normal.   Abdominal: Soft. Bowel sounds are normal. She exhibits no distension. There is no tenderness. There is no guarding.   Musculoskeletal: She exhibits no edema or tenderness.   Neurological: She is alert and oriented to person, place, and time.   Skin: Capillary refill takes less than 2 seconds.       MELD-Na score: 20 at 7/24/2019  3:27 AM  MELD score: 20 at 7/24/2019  3:27 AM  Calculated from:  Serum Creatinine: 6.0 mg/dL (Rounded to 4 mg/dL) at 7/24/2019  3:27 AM  Serum Sodium: 137 mmol/L at 7/24/2019  3:27 AM  Total Bilirubin: 0.4 mg/dL (Rounded to 1 mg/dL) at 7/22/2019  9:54 AM  INR(ratio): 1.0 at 7/22/2019  9:54 AM  Age: 53 years    Significant Labs:  CBC:   Recent Labs   Lab 07/24/19  0327   WBC 9.00   RBC 3.72*   HGB 8.6*   HCT 26.8*   *     CMP:   Recent Labs   Lab 07/20/19  0501  07/22/19  0954  07/24/19  0327   *   < >  --    < > 181*   CALCIUM 8.7   < >  --    < > 8.6*   ALBUMIN 2.6*   < >  --    < > 2.5*   PROT 5.3*  --   --   --   --       < >  --    < > 137   K 4.3   < >  --    < > 4.6   CO2 16*   < >  --    < > 17*      < >  --    < > 105   *   < >  --    < > 154*   CREATININE 4.4*   < >  --    < > 6.0*   ALKPHOS 123  --   --   --   --    ALT 26  --   --   --   --    AST 25  --   --   --   --    BILITOT 1.0  --  0.4  --   --     < > = values in this interval not displayed.     Coagulation:   Recent Labs   Lab 07/22/19  0954   INR 1.0       Significant Imaging:    Assessment/Plan:     54 yo F here for acute renal failure due to cryoglobulinemia 2/2 HepB vs lymphoma/leukemia    Acute Renal Failure   Chronic Hepatitis B     According to literature, about 3% of cryoglobulinemia cases have been associated with HepB. A study out of [place] identified 17 patients: the median cryocrit was 3% (range 1-14), rheumatoid factor was 200 U/L (range ), C4 was 12 mg/dl (range 2-31), ALT 71 U/L (range ). All  patients were HBsAg-positive and 80% anti-HbeAg-positive, HBV-DNA (IU/ml) 2265 ([id number]). 5 patients were treated with antivirals which achieved clinical and biochemical response, 2/5 achieved immunological response.     - Pt's viral load 19, normal ALT, less likely to have caused mixed cryoglobulinemia however she will need treatment for HepB since she is undergoing immunosuppression and has >20% reactivation risk with high dose steroids    - Hepatitis B labs consistent with chronic HepB  - Please obtain HepB e Ag and Ab  - Please obtain liver U/S w/ doppler and AFP tumor marker for HCC screening  - Cr 6, CrCl 8.6 , will need renally dosed entecavir 0.5 mg q72h -- dose will need to be adjusted if decision for hemodialysis is made   - Please obtain daily hepatic function panel      Active Diagnoses:    Diagnosis Date Noted POA    PRINCIPAL PROBLEM:  Acute renal failure [N17.9] 07/19/2019 Yes    Chronic hepatitis B [B18.1] 07/24/2019 Yes    Other specified anemias [D64.89] 07/23/2019 Yes    Thrombocytopenia, unspecified [D69.6] 07/23/2019 Yes    Hypervolemia [E87.70]  Yes    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 I will follow-up with patient. Please contact us if you have any additional questions.    Adama Elder   Medical Student 4  Hepatology  [facility]

## 2019-07-24 NOTE — CONSULTS
Patient appeared calm and was grateful for the visit. Patient has one dtr and I grandchild. Pt has 2 sisters; the youngest sibling, a brother,  recently while pt was in OSH. Pt's mother is grieving loss of her only son. Pt lives nearly next door to mother and also close to dtr. Patient has had a long career of caring for the elderly . She admits to not having good self-care practices. This would be a useful topic to examine together, but we were interrupted by MDs to do a procedure.

## 2019-07-24 NOTE — PROGRESS NOTES
[facility]  Nephrology  Progress Note    Patient Name: Kendy Vital  MRN: [id number]  Admission Date: 7/19/2019  Hospital Length of Stay: 5 days  Attending Provider: Luiz Rea MD   Primary Care Physician: To Obtain Unable  Principal Problem:Acute renal failure    Subjective:     Interval History: Patient evaluated bedside, stable vital signs, good urine output.  sCr continues to rise.  Night uneventful.    Review of patient's allergies indicates:  No Known Allergies  Current Facility-Administered Medications   Medication Frequency    0.9%  NaCl infusion (for blood administration) Q24H PRN    acetaminophen tablet 650 mg Q6H PRN    albuterol-ipratropium 2.5 mg-0.5 mg/3 mL nebulizer solution 3 mL Q6H PRN    amLODIPine tablet 10 mg Daily    carvedilol tablet 6.25 mg BID    cloNIDine tablet 0.2 mg BID    desmopressin (DDAVP) 15.452 mcg in sodium chloride 0.9% 50 mL IVPB On Call Procedure    hydrALAZINE tablet 50 mg Q8H    predniSONE tablet 60 mg Daily    ramelteon tablet 8 mg Nightly PRN    sodium bicarbonate tablet 1,300 mg TID    sodium chloride 0.65 % nasal spray 1 spray QID WAKE    sodium chloride 0.9% flush 10 mL PRN       Objective:     Vital Signs (Most Recent):  Temp: 97.2 °F (36.2 °C) (07/24/19 1043)  Pulse: 72 (07/24/19 1111)  Resp: 20 (07/24/19 1111)  BP: (!) 142/73 (07/24/19 1111)  SpO2: 100 % (07/24/19 1111)  O2 Device (Oxygen Therapy): room air (07/23/19 1900) Vital Signs (24h Range):  Temp:  [96.2 °F (35.7 °C)-97.7 °F (36.5 °C)] 97.2 °F (36.2 °C)  Pulse:  [65-73] 72  Resp:  [16-20] 20  SpO2:  [94 %-100 %] 100 %  BP: (128-157)/(64-85) 142/73     Weight: 103 kg (227 lb) (07/19/19 1400)  Body mass index is 36.64 kg/m².  Body surface area is 2.19 meters squared.    I/O last 3 completed shifts:  In: 1462 [P.O.:960; Blood:502]  Out: 1400 [Urine:1400]    Physical Exam   Constitutional: She is oriented to person, place, and time. She appears well-nourished. No distress.  "  HENT:   Head: Normocephalic and atraumatic.   Neck: Neck supple. No JVD present.   Cardiovascular: Normal rate, regular rhythm and normal heart sounds.   Pulmonary/Chest: Effort normal and breath sounds normal. No respiratory distress.   Abdominal: Soft. Bowel sounds are normal. She exhibits no distension.   Musculoskeletal: She exhibits no edema.   Neurological: She is alert and oriented to person, place, and time.   Skin: Skin is warm.   Psychiatric: She has a normal mood and affect. Her behavior is normal.   Nursing note and vitals reviewed.      Significant Labs:  CBC:   Recent Labs   Lab 07/24/19  0327   WBC 9.00   RBC 3.72*   HGB 8.6*   HCT 26.8*   *   MCV 72*   MCH 23.1*   MCHC 32.1     CMP:   Recent Labs   Lab 07/20/19  0501  07/22/19  0954  07/24/19  0327   *   < >  --    < > 181*   CALCIUM 8.7   < >  --    < > 8.6*   ALBUMIN 2.6*   < >  --    < > 2.5*   PROT 5.3*  --   --   --   --       < >  --    < > 137   K 4.3   < >  --    < > 4.6   CO2 16*   < >  --    < > 17*      < >  --    < > 105   *   < >  --    < > 154*   CREATININE 4.4*   < >  --    < > 6.0*   ALKPHOS 123  --   --   --   --    ALT 26  --   --   --   --    AST 25  --   --   --   --    BILITOT 1.0  --  0.4  --   --     < > = values in this interval not displayed.     All labs within the past 24 hours have been reviewed.     Significant Imaging:  CXR personally reviewed.    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  As per Dr. 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 She also has extensive ATN likely secondary to ischemia downstream the affected glomeruli. These features fit with the " "low C4 and the +RF. 90% of these cases are associated with HCV (but she is negative!) and only ~3% are associated with HBV. Despite her very high kappa/lambda ratio (K:L), she has no features of myeloma cast nephropathy or light chain deposition disease in her biopsy specimen. The high K:L could come from the cryoglobulinemic process (mono and polyclonal LC production). Of note, lymphoma can rarely cause cryoglobulinemic DPGN, so BM biopsy results will be important. No evidence of uric acid tubulopathy (tumor lysis syndrome) either. In retrospect, her NSAIDs use was a major confounding factor in this case."     Recommendations:  - Continue prednisone 60 mg qd for additional 2 weeks, then begin taper to 40 mg qd  - Cryoglobulin lab sent f/u results  - Recommend PLEX to remove cryoglobulins; spoke with Dr. Lyons who would reevaluate case now that prelim results show cryoglobulinemic DPGN  - Begin antiviral therapy for HBV with either tenofovir alafenamide or entecavir (avoid tenofovir disoproxil fumarate), consult ID or Hepatology  - BP control to goal <140/90  - No indication for RRT today.  Spoke to patient about RRt in the close future, oriented her about risks vs benefits.  Answered all her questions.  Patient signed informed consent.  Nurse served as witness.  - Limit protein diet given azotemia, no more than 60-80 g per day  - Continue sodium bicarb 1300 mg tid  - Will follow closely    Cryoglobulinemic vasculitis  Refer to ARF    Acute (diffuse) proliferative glomerulonephritis  Refer to ARF      Thank you for your consult. I will follow-up with patient. Please contact us if you have any additional questions.    Hira Paris MD  Nephrology  [facility]  "

## 2019-07-24 NOTE — SUBJECTIVE & OBJECTIVE
Review of Systems   Constitutional: Positive for chills, fatigue and fever.   Respiratory: Positive for shortness of breath.    Cardiovascular: Negative for chest pain.   Gastrointestinal: Negative for abdominal pain, blood in stool, constipation, diarrhea, nausea and vomiting.   Genitourinary: Negative for dysuria.   Musculoskeletal: Positive for arthralgias.   Skin: Negative for rash.   Neurological: Negative for light-headedness and headaches.   Psychiatric/Behavioral: Negative for behavioral problems and confusion.       No past medical history on file.    No past surgical history on file.    Family history of liver disease: Yes. See HPI    Review of patient's allergies indicates:  No Known Allergies    Tobacco Use    Smoking status: Not on file   Substance and Sexual Activity    Alcohol use: Not on file    Drug use: Not on file    Sexual activity: Not on file       Medications Prior to Admission   Medication Sig Dispense Refill Last Dose    amLODIPine (NORVASC) 10 MG tablet Take 10 mg by mouth once daily.       aspirin (ECOTRIN) 81 MG EC tablet Take 81 mg by mouth once daily.       chlorthalidone (HYGROTEN) 50 MG Tab Take 50 mg by mouth once daily.          Objective:     Vital Signs (Most Recent):  Temp: 96.6 °F (35.9 °C) (07/24/19 1600)  Pulse: 67 (07/24/19 1600)  Resp: 20 (07/24/19 1111)  BP: (!) 158/76 (07/24/19 1600)  SpO2: (!) 93 % (07/24/19 1600) Vital Signs (24h Range):  Temp:  [96.2 °F (35.7 °C)-97.7 °F (36.5 °C)] 96.6 °F (35.9 °C)  Pulse:  [65-73] 67  Resp:  [16-20] 20  SpO2:  [93 %-100 %] 93 %  BP: (128-158)/(64-85) 158/76     Weight: 103 kg (227 lb) (07/19/19 1400)  Body mass index is 36.64 kg/m².    Physical Exam   Constitutional: She appears well-developed and well-nourished. No distress.   Obese   HENT:   Mouth/Throat: Oropharynx is clear and moist.   Neck: Neck supple.   Cardiovascular: Normal rate and regular rhythm.   Pulmonary/Chest: Effort normal and breath sounds normal. No  respiratory distress.   Abdominal: Soft. Bowel sounds are normal. She exhibits no distension. There is no tenderness.   Neurological: She is alert.   Skin: Skin is warm and dry.   Psychiatric: She has a normal mood and affect. Her behavior is normal.   Nursing note and vitals reviewed.      MELD-Na score: 20 at 7/24/2019  3:27 AM  MELD score: 20 at 7/24/2019  3:27 AM  Calculated from:  Serum Creatinine: 6.0 mg/dL (Rounded to 4 mg/dL) at 7/24/2019  3:27 AM  Serum Sodium: 137 mmol/L at 7/24/2019  3:27 AM  Total Bilirubin: 0.4 mg/dL (Rounded to 1 mg/dL) at 7/22/2019  9:54 AM  INR(ratio): 1.0 at 7/22/2019  9:54 AM  Age: 53 years    Significant Labs:  Labs within the past month have been reviewed.    Significant Imaging:  Reviewed

## 2019-07-24 NOTE — PROGRESS NOTES
.Apheresis tx started at 1603 via RIJ catheter  Pt voiced no complaints of pain at present.  Pt oriented x 4.  5.5 Liter exchange.  Replacement fluids albumin 5%.   RIJ cath patent dressing clean dry intact.  Pt stated 0 pain on numeric scale at start of procedure.  Meds Calcium Gluconate 2 Gm IVPB.  Tx ended at 1736.  Cath flushed and locked.  Pt tolerated tx with out difficulty.  Next tx 07/26/2019.

## 2019-07-24 NOTE — ASSESSMENT & PLAN NOTE
Hepatology consulted to see if her HBV can be causing the cryoglubins and if she needs treatment.

## 2019-07-24 NOTE — PLAN OF CARE
Problem: Adult Inpatient Plan of Care  Goal: Plan of Care Review  Outcome: Ongoing (interventions implemented as appropriate)  Pt calm and cooperative to care. Showed the understanding of POC. S/P D/C F/C. Pt was able to void spontaneously. 300cc clear urine output noted. All due medication taken with tolerated. Kept comfortable. Family remains at bedside. VSS. Continue to monitor.

## 2019-07-24 NOTE — PROGRESS NOTES
[facility] Hospital Medicine  Progress Note    Patient Name: Kendy Vital  MRN: [id number]  Patient Class: IP- Inpatient   Admission Date: 7/19/2019  Length of Stay: 5 days  Attending Physician: Luiz Rea MD  Primary Care Provider: To Obtain [facility] Medicine Team: [facility] [place], [place] HOSP MED R Luiz Rea MD    Subjective:     Principal Problem:Acute renal failure      HPI:  Ms. Vital is a 53 year old female with hypertension, anemia, and history of leiomyoma of the uterus who presents to ICU as a transfer from [facility] for evaluation of thrombocytopenia. Patient reports that prior to going to the hospital 3 days ago that she was experiencing symptoms of chills, feeling febrile, dry cough, shortness of breath and occasional nose bleeds for roughly 2 weeks. She also reports easy fatigueability and difficulty breathing when laying flat; currently sleeps with 2 pillows. Patient presented to hospital in [place] when her symptoms did not resolve. Initial work up showed a , worsening kidney function with creatinine of 2.9, and thrombocytopenia with platelets of 33k. In addition, chest x-ray showed interstitial opacities and follow up CT showed perihilar ground glass opacity. Patient was treated with rocephin and doxycycline as well as Bumex q12 due to her heart failure type symptoms. Lab work at the time showed WBC of 7.9 and a lactate of .8. In addition, she tested positive for strep A. Patient was transferred for further evaluation of her thrombocytopenia with concern for TTP and possible need for plasmapheresis.     At time of exam, patient is properly oriented to person time and places. She endorses headache, generalized myalgias, nausea and orthopnea. She denies SOB while sitting up, chest pain, abdominal pain, vomiting, and diarrhea. She has not felt febrile since 1 week prior to hospital stay.        Overview/Hospital Course:  Ms. Viatl  presented as transfer to ICU for suspected TTP. At that time, she had severe thrombocytopenia, renal failure, and bilateral infiltrates on chest CT concerning for PNA. She was stepped down when repeat CBC revealed normalizing platelets and hemolysis labs were unremarkable. Initially placed on vanc/zosyn/azithromycin for PNA, which has been de-escalated to rocephin/azithromycin for CAP. Her course has been complicated by ARF of unclear cause. 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   7/23 Nephrology biopsed  and have initiated on prednisone empirically.  7/24 Heme did BMBx      Interval History:   Symptoms:  Same.    Diet:  Adequate intake.    Activity level:  Impaired due to weakness.   Pain:  No pain.       Review of Systems   Constitutional: Positive for fatigue. Negative for fever.   Respiratory: Negative for shortness of breath.    Cardiovascular: Negative for chest pain.   Gastrointestinal: Negative for abdominal pain.     Objective:     Vital Signs (Most Recent):  Temp: 97.2 °F (36.2 °C) (07/24/19 1043)  Pulse: 72 (07/24/19 1111)  Resp: 20 (07/24/19 1111)  BP: (!) 142/73 (07/24/19 1111)  SpO2: 100 % (07/24/19 1111) Vital Signs (24h Range):  Temp:  [96.2 °F (35.7 °C)-97.7 °F (36.5 °C)] 97.2 °F (36.2 °C)  Pulse:  [57-73] 72  Resp:  [16-20] 20  SpO2:  [94 %-100 %] 100 %  BP: (128-157)/(64-85) 142/73     Weight: 103 kg (227 lb)  Body mass index is 36.64 kg/m².    Intake/Output Summary (Last 24 hours) at 7/24/2019 1122  Last data filed at 7/24/2019 0500  Gross per 24 hour   Intake 840 ml   Output 1400 ml   Net -560 ml      Physical Exam   Constitutional: She is oriented to person, place, and time. She appears well-nourished. No distress.   Neck: No JVD present.   Cardiovascular: Normal rate, regular rhythm and normal heart sounds.   Pulmonary/Chest: Effort normal and breath sounds normal. No respiratory distress.   Abdominal: Soft. Bowel sounds are normal. She exhibits no distension.   Musculoskeletal: She  exhibits no edema.   Neurological: She is alert and oriented to person, place, and time.   Psychiatric: She has a normal mood and affect. Her behavior is normal.       Significant Labs: All pertinent labs within the past 24 hours have been reviewed.    Significant Imaging: I have reviewed and interpreted all pertinent imaging results/findings within the past 24 hours.      Assessment/Plan:      * Acute renal failure  baseline creatinine of 1.0 roughly 1 month ago. Outside records show creatinine of 2.9 and BUN of 74;  Concern for DPGN from SLE vs PSGN vs COSME vs anti-GBM vs AIN in setting of recent NSAID exposure, less concern for TTP despite low platelets given lack of hemolysis on labs  RJ with bilateral medicorenal disease  UA with proteinuria, blood, no evidence of infection; pro:cr 5.97; C3 41, C4<3  positive for strep A as seen on outside hospital records  given 1x dose lasix 160mg IV with poor response  - 7/19 initiated on empiric prednisone 60mg daily  - Nephrology following, appreciate recs:  7/22 renal biopsy to further guide treatment  - 7/23 HD line placed for HD and/or PLEX.  Blood bank consulted.      Essential hypertension  hypertensive at admission and per patient, has been undergoing multiple recent medication changes due to HTN as outpatient.  Suspect initially may have been exacerbated by NSAID use, now concern for multifactorial cause including renal failure with volume overload, steroid use, and inadequate dosing of home regimen  Home regimen includes: lisinopril-HCTZ 20-12.5mg, clonidine 0.2mg bid, and hydralazine 25mg tid  - currently on amlodipine 10mg, hydralazine 50mg tid, clonidine 0.2mg bid, and coreg 6.25mg bid    Thrombocytopenia, unspecified  Plt on admission 41.  Seen by Heme/onc.  Multifactorial such as  Hemodilution and medications.  HIT Ab negative  - monitor      Other specified anemias  Hgb slowly going down.  FERRITIN 161, RETIC 4.7 (H), Bili 1, FOLATE 11.1, VITAMIN B12 843  -  monitor  - fobt            Chronic hepatitis B  Hepatology consulted to see if her HBV can be causing the cryoglubins and if she needs treatment.      Pneumonia  x-ray in [place] revealed interstitial infiltrates. Follow up chest CT showed perihilar ground glass opacity. Treated initially with rocephin and doxycycline  repeat chest x-ray with patchy airspace consolidations bilaterally R>L, edema vs PNA  blood cultures NGTD  - currently on broad spectrum antibiotics vanc, zosyn, azithro; held vanc and de-escalated zosyn to ceftriaxone on 7/20    Elevated brain natriuretic peptide (BNP) level  BNP of 526 from outside records; 999 on admission  bilateral lower extremity swelling and reports easy fatigueability and orthopnea  TTE with CVP 15, normal EF, indeterminate diastolic function  suspect that this reflects volume overload in setting of oliguric renal failure rather than primary cardiac process      VTE Risk Mitigation (From admission, onward)        Ordered     Place sequential compression device  Until discontinued      07/19/19 0533     IP VTE LOW RISK PATIENT  Once      07/19/19 0533                Luiz Rea MD  Department of Hospital Medicine   [facility]

## 2019-07-24 NOTE — ASSESSMENT & PLAN NOTE
Cryoglobulinemia usually associated with HCV, but rare association with Hep B. Presence of glomerulonephritis on preliminary renal biopsy. Overall, less likely that Hep B is related to this as ALT normal and viral load very low.  - Will need to be on prophylaxis due to immunosuppression with prednisone. Will avoid tenofovir given acute renal failure. Please start entecavir 0.5mg q72hrs (renally dosed)  - Check liver U/S with Doppler  - Patient will need HCC screening going forward given her ethnicity (Liver U/S and AFP every 6 months)  - will f/u final renal and bone marrow biopsies

## 2019-07-24 NOTE — ANESTHESIA PROCEDURE NOTES
Central Line    Diagnosis: renal insufficiency  Patient location during procedure: floor  Procedure start time: 7/24/2019 10:55 AM  Timeout: 7/24/2019 10:50 AM  Procedure end time: 7/24/2019 11:15 AM    Staffing  Authorizing Provider: Abigail Monae MD  Performing Provider: Abigail Monae MD    Staffing  Resident/CRNA: Mik Ocampo MD  Other anesthesia staff: Abigail Monae MD  Performed: resident/CRNA   Preanesthetic Checklist  Completed: patient identified, site marked, timeout performed, IV checked, risks and benefits discussed, monitors and equipment checked and anesthesia consent given  Indication   Indication: hemodialysis     Anesthesia   local infiltration    Central Line   Skin Prep: skin prepped with ChloraPrep, skin prep agent completely dried prior to procedure  maximum sterile barriers used during central venous catheter insertion  hand hygiene performed prior to central venous catheter insertion  Location: right, internal jugular.   Catheter type: triple lumen  Catheter Size: 12 Fr  Inserted Catheter Length: 16 cm  Ultrasound: vascular probe with ultrasound  Vessel Caliber: medium, patent  Needle advanced into vessel with real time Ultrasound guidance.   Manometry: Venous cannualation confirmed by visual estimation of blood vessel pressure using manometry.  Insertion Attempts: 1   Securement:line sutured, chlorhexidine patch, sterile dressing applied and blood return through all ports    Post-Procedure   Adverse Events:none    Guidewire Guidewire removed intact.

## 2019-07-24 NOTE — PROCEDURES
"[facility]  Transfusion Medicine  Procedure Note    SUMMARY   Therapeutic Plasma Exchange (Apheresis)  Date/Time: 7/24/2019 3:35 PM  Performed by: Emily Lyons MD  Authorized by: Emily Lyons MD         Date of Procedure: 7/24/2019     Procedure: Plasma Exchange    Provider: Emily Lyons MD     Pre-Procedure Diagnosis: Severe/symptomatic cryoglobulinemia with glomerulonephritis   Post-Procedure Diagnosis: Severe/symptomatic cryoglobulinemia with glomerulonephritis     Follow-up Assessment: 53 year old female with history of anemia initially presented with symptomatic thrombocytopenia concerning for TTP. Subsequent [id number] activity levels returned as normal (83%). Since admit her renal failure has progressed, her creatinine is substantially elevated from historical levels: 1mg/dL cited in note -> 6 mg/dL on 7/24. 4.3 mg/dL on 7/19 is the candace in our system.  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 Steroids have been initiated (7/23). Her low complement levels support this diagnosis. Routine TAT for characterization of cryoglobulins may be over a week, so we will treat presumptively.  We have been consulted to initiate therapeutic plasma exchange for severe/symptomatic cryoglobulinemia with glomerulonephritis an adjunct therapy.    Severe/symptomatic cryoglobulinemia with glomerulonephritis carries a Category II Grade 2A indication for therapeutic apheresis via the 2019 Journal of Clinical Apheresis Guidelines (Teofilo ROBLES et al. Journal of Clinical Apheresis 2019; 34:171-354.) The TPE plan is as follows: , W, F    Today's procedure was well tolerated and without complication.      Pertinent Laboratory Data:   Complete Blood Count:   Lab Results   Component Value Date    HGB 8.6 (L) 07/24/2019    HCT 26.8 " (L) 07/24/2019     (H) 07/24/2019    WBC 9.00 07/24/2019     Basic Metabolic Panel:   Lab Results   Component Value Date     07/24/2019    K 4.6 07/24/2019     07/24/2019    CO2 17 (L) 07/24/2019     (H) 07/24/2019     (H) 07/24/2019    CREATININE 6.0 (H) 07/24/2019    CALCIUM 8.6 (L) 07/24/2019    ANIONGAP 15 07/24/2019    ESTGFRAFRICA 8.6 (A) 07/24/2019    EGFRNONAA 7.4 (A) 07/24/2019       Pertinent Medications: None contraindicated for TPE    Review of patient's allergies indicates:  No Known Allergies    Anesthesia: None     Technical Procedures Used: Plasma Exchange: Volume exchanged - 5.5; Replacement fluid - Albumin; Number of procedures 1 of 3; Date of next procedure 7/26.    Description of the Findings of the Procedure:     Please see Apheresis Nurse flowsheet for details.    The patient was evaluated and all clinical and laboratory data relevant to the treatment was reviewed, and a decision was made to proceed with the Apheresis procedure.    I was available to the clinical staff throughout the procedure.    Significant Surgical Tasks Conducted by the Assistant(s): Not applicable  Complications: None  Estimated Blood Loss (EBL): None  Implants: None   Specimens: None    Emily Lyons MD, PhD  Section of Transfusion Medicine & Histocompatibility  Department of Pathology and Laboratory Medicine  [facility]  [phone] (HLA & Blood Bank Offices)  07/24/2019

## 2019-07-24 NOTE — HPI
"53 year old female with history of anemia initially presented with symptomatic thrombocytopenia concerning for TTP. Subsequent [id number] activity levels returned as normal (83%). Since admit her renal failure has progressed, her creatinine is substantially elevated from historical levels: 1mg/dL cited in note -> 6 mg/dL on 7/24. 4.3 mg/dL on 7/19 is the candace in our system.     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 Steroids have been initiated (7/23). Her low complement levels support this diagnosis. Routine TAT for characterization of cryoglobulins may be over a week, so we will treat presumptively.  We have been consulted to initiate therapeutic plasma exchange for severe/symptomatic cryoglobulinemia with glomerulonephritis an adjunct therapy.  "

## 2019-07-24 NOTE — SUBJECTIVE & OBJECTIVE
PMH and PSH reviewed 07/24/2019 and relevant items addressed in HPI.    Review of patient's allergies indicates:  No Known Allergies    All medications reviewed 07/24/2019 and ace inhibitors not identified.    Family History     None        Tobacco Use    Smoking status: Not on file   Substance and Sexual Activity    Alcohol use: Not on file    Drug use: Not on file    Sexual activity: Not on file     Review of Systems  Objective:     Vital Signs (Most Recent):  Temp: 97 °F (36.1 °C) (07/24/19 0729)  Pulse: 69 (07/24/19 0729)  Resp: 18 (07/24/19 0729)  BP: (!) 149/71 (07/24/19 0729)  SpO2: 99 % (07/24/19 0729) Vital Signs (24h Range):  Temp:  [96.2 °F (35.7 °C)-97.7 °F (36.5 °C)] 97 °F (36.1 °C)  Pulse:  [57-73] 69  Resp:  [16-18] 18  SpO2:  [94 %-99 %] 99 %  BP: (128-157)/(64-85) 149/71     Weight: 103 kg (227 lb)    Physical Exam   Nursing note and vitals reviewed.      Significant Labs:   BMP:   Recent Labs   Lab 07/24/19  0327   *      K 4.6      CO2 17*   *   CREATININE 6.0*   CALCIUM 8.6*     CBC:   Recent Labs   Lab 07/23/19  0416 07/24/19  0327   WBC 9.33 9.00   HGB 8.5* 8.6*   HCT 26.7* 26.8*   * 456*     All pertinent labs within the past 24 hours have been reviewed.

## 2019-07-24 NOTE — ASSESSMENT & PLAN NOTE
Access: HD catheter in place  Number of Procedures: 3  Schedule: W, F, M  Volume: 5.5L  Replacement Fluid: Albumin  Recommended Laboratory Studies: Complete Blood Count, Basic Metabolic Panel and Protein/creatinine ratio

## 2019-07-24 NOTE — SUBJECTIVE & OBJECTIVE
Interval History: Patient evaluated bedside, stable vital signs, good urine output.  sCr continues to rise.  Night uneventful.    Review of patient's allergies indicates:  No Known Allergies  Current Facility-Administered Medications   Medication Frequency    0.9%  NaCl infusion (for blood administration) Q24H PRN    acetaminophen tablet 650 mg Q6H PRN    albuterol-ipratropium 2.5 mg-0.5 mg/3 mL nebulizer solution 3 mL Q6H PRN    amLODIPine tablet 10 mg Daily    carvedilol tablet 6.25 mg BID    cloNIDine tablet 0.2 mg BID    desmopressin (DDAVP) 15.452 mcg in sodium chloride 0.9% 50 mL IVPB On Call Procedure    hydrALAZINE tablet 50 mg Q8H    predniSONE tablet 60 mg Daily    ramelteon tablet 8 mg Nightly PRN    sodium bicarbonate tablet 1,300 mg TID    sodium chloride 0.65 % nasal spray 1 spray QID WAKE    sodium chloride 0.9% flush 10 mL PRN       Objective:     Vital Signs (Most Recent):  Temp: 97.2 °F (36.2 °C) (07/24/19 1043)  Pulse: 72 (07/24/19 1111)  Resp: 20 (07/24/19 1111)  BP: (!) 142/73 (07/24/19 1111)  SpO2: 100 % (07/24/19 1111)  O2 Device (Oxygen Therapy): room air (07/23/19 1900) Vital Signs (24h Range):  Temp:  [96.2 °F (35.7 °C)-97.7 °F (36.5 °C)] 97.2 °F (36.2 °C)  Pulse:  [65-73] 72  Resp:  [16-20] 20  SpO2:  [94 %-100 %] 100 %  BP: (128-157)/(64-85) 142/73     Weight: 103 kg (227 lb) (07/19/19 1400)  Body mass index is 36.64 kg/m².  Body surface area is 2.19 meters squared.    I/O last 3 completed shifts:  In: 1462 [P.O.:960; Blood:502]  Out: 1400 [Urine:1400]    Physical Exam   Constitutional: She is oriented to person, place, and time. She appears well-nourished. No distress.   HENT:   Head: Normocephalic and atraumatic.   Neck: Neck supple. No JVD present.   Cardiovascular: Normal rate, regular rhythm and normal heart sounds.   Pulmonary/Chest: Effort normal and breath sounds normal. No respiratory distress.   Abdominal: Soft. Bowel sounds are normal. She exhibits no distension.    Musculoskeletal: She exhibits no edema.   Neurological: She is alert and oriented to person, place, and time.   Skin: Skin is warm.   Psychiatric: She has a normal mood and affect. Her behavior is normal.   Nursing note and vitals reviewed.      Significant Labs:  CBC:   Recent Labs   Lab 07/24/19  0327   WBC 9.00   RBC 3.72*   HGB 8.6*   HCT 26.8*   *   MCV 72*   MCH 23.1*   MCHC 32.1     CMP:   Recent Labs   Lab 07/20/19  0501  07/22/19  0954  07/24/19  0327   *   < >  --    < > 181*   CALCIUM 8.7   < >  --    < > 8.6*   ALBUMIN 2.6*   < >  --    < > 2.5*   PROT 5.3*  --   --   --   --       < >  --    < > 137   K 4.3   < >  --    < > 4.6   CO2 16*   < >  --    < > 17*      < >  --    < > 105   *   < >  --    < > 154*   CREATININE 4.4*   < >  --    < > 6.0*   ALKPHOS 123  --   --   --   --    ALT 26  --   --   --   --    AST 25  --   --   --   --    BILITOT 1.0  --  0.4  --   --     < > = values in this interval not displayed.     All labs within the past 24 hours have been reviewed.     Significant Imaging:  CXR personally reviewed.

## 2019-07-25 LAB
AFP SERPL-MCNC: 1.3 NG/ML (ref 0–8.4)
ALBUMIN SERPL BCP-MCNC: 4.1 G/DL (ref 3.5–5.2)
ALP SERPL-CCNC: 26 U/L (ref 55–135)
ALT SERPL W/O P-5'-P-CCNC: 11 U/L (ref 10–44)
ANION GAP SERPL CALC-SCNC: 14 MMOL/L (ref 8–16)
ANISOCYTOSIS BLD QL SMEAR: SLIGHT
AST SERPL-CCNC: 12 U/L (ref 10–40)
BASOPHILS # BLD AUTO: 0.01 K/UL (ref 0–0.2)
BASOPHILS NFR BLD: 0.1 % (ref 0–1.9)
BILIRUB SERPL-MCNC: 0.6 MG/DL (ref 0.1–1)
BUN SERPL-MCNC: 138 MG/DL (ref 6–20)
CALCIUM SERPL-MCNC: 8.4 MG/DL (ref 8.7–10.5)
CHLORIDE SERPL-SCNC: 111 MMOL/L (ref 95–110)
CO2 SERPL-SCNC: 17 MMOL/L (ref 23–29)
CREAT SERPL-MCNC: 5.2 MG/DL (ref 0.5–1.4)
DIFFERENTIAL METHOD: ABNORMAL
DNA/RNA EXTRACT AND HOLD RESULT: NORMAL
DNA/RNA EXTRACTION: NORMAL
EOSINOPHIL # BLD AUTO: 0 K/UL (ref 0–0.5)
EOSINOPHIL NFR BLD: 0 % (ref 0–8)
ERYTHROCYTE [DISTWIDTH] IN BLOOD BY AUTOMATED COUNT: 26.3 % (ref 11.5–14.5)
EST. GFR  (AFRICAN AMERICAN): 10.2 ML/MIN/1.73 M^2
EST. GFR  (NON AFRICAN AMERICAN): 8.8 ML/MIN/1.73 M^2
EXHR SPECIMEN TYPE: NORMAL
GLUCOSE SERPL-MCNC: 156 MG/DL (ref 70–110)
HCT VFR BLD AUTO: 26.9 % (ref 37–48.5)
HGB BLD-MCNC: 8.5 G/DL (ref 12–16)
HYPOCHROMIA BLD QL SMEAR: ABNORMAL
IMM GRANULOCYTES # BLD AUTO: 0.18 K/UL (ref 0–0.04)
IMM GRANULOCYTES NFR BLD AUTO: 2 % (ref 0–0.5)
INTERPRETATION UR IFE-IMP: NORMAL
IRON SERPL-MCNC: 40 UG/DL (ref 30–160)
LYMPHOCYTES # BLD AUTO: 1.4 K/UL (ref 1–4.8)
LYMPHOCYTES NFR BLD: 15.5 % (ref 18–48)
MCH RBC QN AUTO: 22.6 PG (ref 27–31)
MCHC RBC AUTO-ENTMCNC: 31.6 G/DL (ref 32–36)
MCV RBC AUTO: 72 FL (ref 82–98)
MONOCYTES # BLD AUTO: 0.7 K/UL (ref 0.3–1)
MONOCYTES NFR BLD: 7.9 % (ref 4–15)
NEUTROPHILS # BLD AUTO: 6.9 K/UL (ref 1.8–7.7)
NEUTROPHILS NFR BLD: 74.5 % (ref 38–73)
NRBC BLD-RTO: 0 /100 WBC
OVALOCYTES BLD QL SMEAR: ABNORMAL
PLATELET # BLD AUTO: 209 K/UL (ref 150–350)
PLATELET BLD QL SMEAR: ABNORMAL
PMV BLD AUTO: ABNORMAL FL (ref 9.2–12.9)
POIKILOCYTOSIS BLD QL SMEAR: SLIGHT
POLYCHROMASIA BLD QL SMEAR: ABNORMAL
POTASSIUM SERPL-SCNC: 4.5 MMOL/L (ref 3.5–5.1)
PROT SERPL-MCNC: 4.7 G/DL (ref 6–8.4)
RBC # BLD AUTO: 3.76 M/UL (ref 4–5.4)
RETICS/RBC NFR AUTO: 6.4 % (ref 0.5–2.5)
SATURATED IRON: 33 % (ref 20–50)
SODIUM SERPL-SCNC: 142 MMOL/L (ref 136–145)
TOTAL IRON BINDING CAPACITY: 123 UG/DL (ref 250–450)
TRANSFERRIN SERPL-MCNC: 83 MG/DL (ref 200–375)
WBC # BLD AUTO: 9.2 K/UL (ref 3.9–12.7)

## 2019-07-25 PROCEDURE — 85025 COMPLETE CBC W/AUTO DIFF WBC: CPT

## 2019-07-25 PROCEDURE — 87350 HEPATITIS BE AG IA: CPT

## 2019-07-25 PROCEDURE — 63600175 PHARM REV CODE 636 W HCPCS: Performed by: GENERAL PRACTICE

## 2019-07-25 PROCEDURE — 82105 ALPHA-FETOPROTEIN SERUM: CPT

## 2019-07-25 PROCEDURE — 25000003 PHARM REV CODE 250: Performed by: HOSPITALIST

## 2019-07-25 PROCEDURE — 99233 SBSQ HOSP IP/OBS HIGH 50: CPT | Mod: ,,, | Performed by: INTERNAL MEDICINE

## 2019-07-25 PROCEDURE — 80053 COMPREHEN METABOLIC PANEL: CPT

## 2019-07-25 PROCEDURE — 85045 AUTOMATED RETICULOCYTE COUNT: CPT

## 2019-07-25 PROCEDURE — 25000003 PHARM REV CODE 250: Performed by: PHYSICIAN ASSISTANT

## 2019-07-25 PROCEDURE — 11000001 HC ACUTE MED/SURG PRIVATE ROOM

## 2019-07-25 PROCEDURE — 99233 PR SUBSEQUENT HOSPITAL CARE,LEVL III: ICD-10-PCS | Mod: ,,, | Performed by: HOSPITALIST

## 2019-07-25 PROCEDURE — 25000003 PHARM REV CODE 250: Performed by: STUDENT IN AN ORGANIZED HEALTH CARE EDUCATION/TRAINING PROGRAM

## 2019-07-25 PROCEDURE — 86707 HEPATITIS BE ANTIBODY: CPT

## 2019-07-25 PROCEDURE — 99233 PR SUBSEQUENT HOSPITAL CARE,LEVL III: ICD-10-PCS | Mod: ,,, | Performed by: INTERNAL MEDICINE

## 2019-07-25 PROCEDURE — 99233 SBSQ HOSP IP/OBS HIGH 50: CPT | Mod: ,,, | Performed by: HOSPITALIST

## 2019-07-25 PROCEDURE — 83540 ASSAY OF IRON: CPT

## 2019-07-25 PROCEDURE — 25000003 PHARM REV CODE 250: Performed by: NURSE PRACTITIONER

## 2019-07-25 RX ORDER — FAMOTIDINE 20 MG/1
20 TABLET, FILM COATED ORAL DAILY
Status: DISCONTINUED | OUTPATIENT
Start: 2019-07-25 | End: 2019-07-30

## 2019-07-25 RX ORDER — NIFEDIPINE 60 MG/1
60 TABLET, EXTENDED RELEASE ORAL DAILY
Status: DISCONTINUED | OUTPATIENT
Start: 2019-07-26 | End: 2019-07-31

## 2019-07-25 RX ORDER — CLONIDINE HYDROCHLORIDE 0.1 MG/1
0.1 TABLET ORAL 2 TIMES DAILY
Status: DISCONTINUED | OUTPATIENT
Start: 2019-07-25 | End: 2019-07-30

## 2019-07-25 RX ORDER — ATOVAQUONE 750 MG/5ML
1500 SUSPENSION ORAL DAILY
Status: DISCONTINUED | OUTPATIENT
Start: 2019-07-25 | End: 2019-08-07 | Stop reason: HOSPADM

## 2019-07-25 RX ADMIN — Medication 1 SPRAY: at 08:07

## 2019-07-25 RX ADMIN — Medication 1 SPRAY: at 10:07

## 2019-07-25 RX ADMIN — SODIUM BICARBONATE 650 MG TABLET 1300 MG: at 04:07

## 2019-07-25 RX ADMIN — SODIUM BICARBONATE 650 MG TABLET 1300 MG: at 10:07

## 2019-07-25 RX ADMIN — CLONIDINE HYDROCHLORIDE 0.1 MG: 0.1 TABLET ORAL at 10:07

## 2019-07-25 RX ADMIN — ATOVAQUONE 1500 MG: 750 SUSPENSION ORAL at 10:07

## 2019-07-25 RX ADMIN — ENTECAVIR 0.5 MG: 0.5 TABLET ORAL at 10:07

## 2019-07-25 RX ADMIN — CARVEDILOL 6.25 MG: 6.25 TABLET, FILM COATED ORAL at 10:07

## 2019-07-25 RX ADMIN — PREDNISONE 60 MG: 20 TABLET ORAL at 10:07

## 2019-07-25 RX ADMIN — POLYETHYLENE GLYCOL 3350 17 G: 17 POWDER, FOR SOLUTION ORAL at 10:07

## 2019-07-25 RX ADMIN — HYDRALAZINE HYDROCHLORIDE 50 MG: 50 TABLET ORAL at 05:07

## 2019-07-25 RX ADMIN — HYDRALAZINE HYDROCHLORIDE 50 MG: 50 TABLET ORAL at 04:07

## 2019-07-25 RX ADMIN — FAMOTIDINE 20 MG: 20 TABLET, FILM COATED ORAL at 10:07

## 2019-07-25 RX ADMIN — HYDRALAZINE HYDROCHLORIDE 50 MG: 50 TABLET ORAL at 10:07

## 2019-07-25 RX ADMIN — Medication 1 SPRAY: at 04:07

## 2019-07-25 RX ADMIN — Medication 1 SPRAY: at 01:07

## 2019-07-25 NOTE — PLAN OF CARE
Problem: Fall Injury Risk  Goal: Absence of Fall and Fall-Related Injury  Outcome: Ongoing (interventions implemented as appropriate)  Meds given as ordered tolerated well. No complaints of pain. No signs or symptoms of distress/discomfort noted. Ambulated independently. Up in chair. Vitals stable. Safety maintained. Will continue to monitor.

## 2019-07-25 NOTE — PLAN OF CARE
07/25/19 1108   Discharge Reassessment   Assessment Type Discharge Planning Reassessment   Discharge Plan A Home with family   Discharge Plan B Home with family

## 2019-07-25 NOTE — SUBJECTIVE & OBJECTIVE
Interval History:   Symptoms:  Improved strength.    Diet:  Adequate intake.    Activity level:  Impaired due to weakness.   Pain:  No pain.       Review of Systems   Constitutional: Positive for fatigue. Negative for fever.   Respiratory: Negative for shortness of breath.    Cardiovascular: Negative for chest pain.   Gastrointestinal: Negative for abdominal pain.     Objective:     Vital Signs (Most Recent):  Temp: 96.3 °F (35.7 °C) (07/25/19 0805)  Pulse: 66 (07/25/19 0805)  Resp: 18 (07/25/19 0805)  BP: (!) 147/71 (07/25/19 0805)  SpO2: 96 % (07/25/19 0805) Vital Signs (24h Range):  Temp:  [96.3 °F (35.7 °C)-98.8 °F (37.1 °C)] 96.3 °F (35.7 °C)  Pulse:  [63-77] 66  Resp:  [18-20] 18  SpO2:  [93 %-97 %] 96 %  BP: (128-163)/(61-81) 147/71     Weight: 103 kg (227 lb)  Body mass index is 36.64 kg/m².    Intake/Output Summary (Last 24 hours) at 7/25/2019 1152  Last data filed at 7/25/2019 0400  Gross per 24 hour   Intake 750 ml   Output 650 ml   Net 100 ml      Physical Exam   Constitutional: She is oriented to person, place, and time. She appears well-nourished. No distress.   Neck: No JVD present.   Cardiovascular: Normal rate, regular rhythm and normal heart sounds.   Pulmonary/Chest: Effort normal and breath sounds normal. No respiratory distress.   Abdominal: Soft. Bowel sounds are normal. She exhibits no distension.   Musculoskeletal: She exhibits no edema.   Neurological: She is alert and oriented to person, place, and time.   Psychiatric: She has a normal mood and affect. Her behavior is normal.       Significant Labs: All pertinent labs within the past 24 hours have been reviewed.    Significant Imaging: I have reviewed and interpreted all pertinent imaging results/findings within the past 24 hours.

## 2019-07-25 NOTE — ASSESSMENT & PLAN NOTE
Hgb slowly going down.  FERRITIN 161, RETIC 4.7 (H), Bili 1, FOLATE 11.1, VITAMIN B12 843  Iron 40, sat 33%  - monitor  - fobt

## 2019-07-25 NOTE — SUBJECTIVE & OBJECTIVE
No past medical history on file.    No past surgical history on file.      There is no immunization history on file for this patient.    Review of patient's allergies indicates:  No Known Allergies  Current Facility-Administered Medications   Medication Frequency    0.9%  NaCl infusion (for blood administration) Q24H PRN    acetaminophen tablet 650 mg Q6H PRN    albuterol-ipratropium 2.5 mg-0.5 mg/3 mL nebulizer solution 3 mL Q6H PRN    bisacodyl suppository 10 mg Daily PRN    carvedilol tablet 6.25 mg BID    cloNIDine tablet 0.1 mg BID    desmopressin (DDAVP) 15.452 mcg in sodium chloride 0.9% 50 mL IVPB On Call Procedure    dextrose 10% (D10W) Bolus PRN    dextrose 10% (D10W) Bolus PRN    entecavir tablet 0.5 mg Q72H    famotidine tablet 20 mg Daily    glucagon (human recombinant) injection 1 mg PRN    glucose chewable tablet 16 g PRN    glucose chewable tablet 24 g PRN    hydrALAZINE tablet 50 mg Q8H    [START ON 7/26/2019] NIFEdipine 24 hr tablet 60 mg Daily    ondansetron disintegrating tablet 8 mg Q8H PRN    oxyCODONE-acetaminophen 5-325 mg per tablet 1 tablet Q8H PRN    polyethylene glycol packet 17 g Daily    predniSONE tablet 60 mg Daily    promethazine (PHENERGAN) 6.25 mg in dextrose 5 % 50 mL IVPB Q6H PRN    ramelteon tablet 8 mg Nightly PRN    sodium bicarbonate tablet 1,300 mg TID    sodium chloride 0.65 % nasal spray 1 spray QID WAKE    sodium chloride 0.9% flush 10 mL PRN     Family History     None        Tobacco Use    Smoking status: Not on file   Substance and Sexual Activity    Alcohol use: Not on file    Drug use: Not on file    Sexual activity: Not on file     Review of Systems   Constitutional: Negative for activity change, appetite change, chills and fever.   HENT: Positive for nosebleeds.    Eyes: Positive for itching.   Respiratory: Negative for cough, shortness of breath and wheezing.    Cardiovascular: Negative for chest pain and palpitations.    Gastrointestinal: Negative for abdominal pain, blood in stool, constipation, diarrhea, nausea and vomiting.   Genitourinary: Negative for difficulty urinating and hematuria.   Musculoskeletal: Positive for arthralgias and myalgias. Negative for joint swelling.   Skin: Negative for rash.   Allergic/Immunologic: Negative.    Neurological: Negative for weakness, light-headedness, numbness and headaches.   Hematological: Bruises/bleeds easily.        Clotting history as in HPI   Psychiatric/Behavioral: Negative.      Objective:     Vital Signs (Most Recent):  Temp: 98.4 °F (36.9 °C) (07/25/19 1200)  Pulse: 62 (07/25/19 1200)  Resp: 18 (07/25/19 0805)  BP: (!) 147/70 (07/25/19 1200)  SpO2: 96 % (07/25/19 1200)  O2 Device (Oxygen Therapy): room air (07/24/19 1900) Vital Signs (24h Range):  Temp:  [96.3 °F (35.7 °C)-98.8 °F (37.1 °C)] 98.4 °F (36.9 °C)  Pulse:  [62-77] 62  Resp:  [18-20] 18  SpO2:  [96 %-97 %] 96 %  BP: (128-149)/(61-79) 147/70     Weight: 103 kg (227 lb) (07/19/19 1400)  Body mass index is 36.64 kg/m².  Body surface area is 2.19 meters squared.      Intake/Output Summary (Last 24 hours) at 7/25/2019 1714  Last data filed at 7/25/2019 0400  Gross per 24 hour   Intake 650 ml   Output 650 ml   Net 0 ml       Physical Exam   Constitutional: She is oriented to person, place, and time and well-developed, well-nourished, and in no distress. No distress.   HENT:   Head: Normocephalic and atraumatic.   Nasal passage mildly red and inflamed bilaterally.   Neck: Neck supple.   Pulmonary/Chest: Effort normal and breath sounds normal. No respiratory distress. She has no wheezes.   Abdominal: Soft. Bowel sounds are normal. She exhibits no distension. There is no tenderness.   Neurological: She is alert and oriented to person, place, and time.   Skin: She is not diaphoretic.     Patient has significant bruising on both arms.   Musculoskeletal: She exhibits edema.   Patient has no joint swelling or stiffness in  fingers, elbows, knees or toes.    However, right leg appears to be more swollen than left.         Significant Labs:  CBC:   Recent Labs   Lab 07/25/19 0425   WBC 9.20   HGB 8.5*   HCT 26.9*        CMP:   Recent Labs   Lab 07/25/19 0425   *   CALCIUM 8.4*   ALBUMIN 4.1   PROT 4.7*      K 4.5   CO2 17*   *   *   CREATININE 5.2*   ALKPHOS 26*   ALT 11   AST 12   BILITOT 0.6     Coagulation: No results for input(s): LABPROT, INR, APTT in the last 24 hours.  CRP: No results for input(s): CRP in the last 24 hours.  ESR: No results for input(s): SEDRATE in the last 24 hours.  Liver Function Test:   Recent Labs   Lab 07/25/19 0425   ALT 11   AST 12   ALKPHOS 26*   BILITOT 0.6   PROT 4.7*   ALBUMIN 4.1       Significant Imaging:  Imaging results within the past 24 hours have been reviewed.

## 2019-07-25 NOTE — ASSESSMENT & PLAN NOTE
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 Agree that hepatitis B is less likely to be associated with cryoglobulinemia as compared with hepatitis C. Agree with BM biopsy and workup for plasma cell dyscrasia, especially given elevated kappa:lambda light chain ratio.     Given her significant thromboembolic history, we would like to work her up for antiphopholipid antibody syndrome. Will order levels. Would also like to obtain a RLE duplex given her thrombotic history and asymmetric edema. She is not on any anticoagulation currently.    She also had sicca syndromes. Will check for sjogrens syndrome (SSA/SSB)    Regarding therapy for cryoglobulinemia syndrome, we recommend additional therapy with pulse dose steroids and Rituximab.     RECS:  - Antiphospholipid labs  - PCP prophylaxis (for steroids) with bactrim  - AntiSSA and AntiSSB  - RLE duplex to r/o DVT  - IgM, IgG, IgA  - IV solumedrol 1g x3 doses, with rituximab 1g on Day 1 and Day 14.    Plan to be discussed with staff Dr Solis.

## 2019-07-25 NOTE — CONSULTS
[facility]  Rheumatology  Consult Note    Patient Name: Kendy Vital  MRN: [id number]  Admission Date: 7/19/2019  Hospital Length of Stay: 6 days  Code Status: Full Code   Attending Provider: Luiz Rea MD  Primary Care Physician: To Obtain Unable  Principal Problem:Acute renal failure    Consults  Subjective:     HPI: Ms. Vital is a 53 year old female with hypertension, anemia, and history of leiomyoma of the uterus who presents to ICU as a transfer from [facility] for evaluation of thrombocytopenia. Patient initially presented to [facility] due to symptoms of fevers, chills, dry cough, shortness of breath and occasional nose bleeds for roughly 2 weeks. She reported some orthopnea with the need for 2 pillows to sleep. Initial work up at [facility] showed a , worsening kidney function with creatinine of 2.9, and thrombocytopenia with platelets of 33k. In addition, chest x-ray showed interstitial opacities and follow up CT showed perihilar ground glass opacity. Patient was treated with rocephin and doxycycline for suspected pneumonia as well as Bumex q12 due to her heart failure type symptoms. Lab work at the time showed WBC of 7.9 and a lactate of 0.8. In addition, she reportedly tested positive for strep A (AntiDNAseB and ASO titers negative here at [facility]).     Patient was transferred to [facility] on 7/19/19 for further evaluation of her thrombocytopenia with concern for TTP and possible need for plasmapheresis. She was started on PREDNISONE 60mg daily on 7/19/19.      Workup here included an [id number] which was normal at 84. Labs were significant for decreased complement (C3 at 41 and C4 at <3). HepBsAg was positive. LFTs have been normal. Rheumatoid factor elevated at 209. [place]:lambda free light chain ratio was elevated. ESR was elevated at 50. ANCA, ANUPAM and AntidsDNA were negative.      Patient was evaluated by nephrology for increasing creatinine. 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    Given possible malignant etiologies of cryoglobulinemia with elevated kappa:lambda ratio, Hem onc was consulted and a BM biopsy was performed on 7/23/19. Results pending.    Cryoglobulinemia labs were sent on 7/23/19 and treatment was begun empirically with PLEX. She received one session with 5.5L exchanged on 7/24 and another session has been planned for 7/26.  With HBsAg positivity, she was also started on entecavir on 7/25. Of note, she tested negative for Hepatitis C which is more commonly associated with cryoglobulinemia.    Rheumatology is consulted for further management of cryoglobulinemia syndrome and consideration for rituximab therapy.    Regarding her rheumatologic history, she states that she does not have a history of SLE, RA or any other autoimmune condition to her knowledge. She does have a history of significant thrombosis however. Approximately 3-4 yrs ago, she reports that she had a renal vein thrombosis and a left arm thrombosis. The renal vein thrombosis was treated with embolectomy, and she was started on lovenox for 9 mos. She was then taken off anticoagulation. She has only been on low dose ASA antiplatelets since then. She has never been tested for antiphospholipid to her knowledge. She does have a history of 1 miscarriage at 3-4 weeks gestation.    Symptomatically today at bedside, she reports no complaints. She says she feels much better since arriving at [facility]. She denies, headaches, chest pain, SOB, nausea, vomiting, diarrhea, constipation, numbness or tingling.     However, she does some report some concerning rheumatologic symptoms: hair loss for past 6 mos, occasional red, dry eyes, dry mouth, " nosebleeds for past 3 mos, hemoptysis on occasion at night. She also states that her legs hurt and that she has morning stiffness that resolves after 35 minutes of activity around the house. She denies redness or swelling of her joints. She denies nailbed changes or raynauds. She also has a significant autoimmune family history as detailed below.    PMH  Blood clot history as above  Leiomyomas    PSH  Embolectomy as above  Uterine leiomyoma removal    FMH  Mother with rheumatoid arthritis  Cousin with SLE  Niece and niece's daughter with psoriasis  No history of inflammatory bowel disease or sarcoidosis      Patient works as a caretaker  Patient is  with 1 child  Patient is former smoker, quit 5 mos ago. Smoked 0.5 pack per day for 5-10 yrs.  Patient drinks alcohol on occasion.  Patient denies any illicit drug use.    ROS as in HPI.       No past medical history on file.    No past surgical history on file.      There is no immunization history on file for this patient.    Review of patient's allergies indicates:  No Known Allergies  Current Facility-Administered Medications   Medication Frequency    0.9%  NaCl infusion (for blood administration) Q24H PRN    acetaminophen tablet 650 mg Q6H PRN    albuterol-ipratropium 2.5 mg-0.5 mg/3 mL nebulizer solution 3 mL Q6H PRN    bisacodyl suppository 10 mg Daily PRN    carvedilol tablet 6.25 mg BID    cloNIDine tablet 0.1 mg BID    desmopressin (DDAVP) 15.452 mcg in sodium chloride 0.9% 50 mL IVPB On Call Procedure    dextrose 10% (D10W) Bolus PRN    dextrose 10% (D10W) Bolus PRN    entecavir tablet 0.5 mg Q72H    famotidine tablet 20 mg Daily    glucagon (human recombinant) injection 1 mg PRN    glucose chewable tablet 16 g PRN    glucose chewable tablet 24 g PRN    hydrALAZINE tablet 50 mg Q8H    [START ON 7/26/2019] NIFEdipine 24 hr tablet 60 mg Daily    ondansetron disintegrating tablet 8 mg Q8H PRN    oxyCODONE-acetaminophen 5-325 mg per  tablet 1 tablet Q8H PRN    polyethylene glycol packet 17 g Daily    predniSONE tablet 60 mg Daily    promethazine (PHENERGAN) 6.25 mg in dextrose 5 % 50 mL IVPB Q6H PRN    ramelteon tablet 8 mg Nightly PRN    sodium bicarbonate tablet 1,300 mg TID    sodium chloride 0.65 % nasal spray 1 spray QID WAKE    sodium chloride 0.9% flush 10 mL PRN     Family History     None        Tobacco Use    Smoking status: Not on file   Substance and Sexual Activity    Alcohol use: Not on file    Drug use: Not on file    Sexual activity: Not on file     Review of Systems   Constitutional: Negative for activity change, appetite change, chills and fever.   HENT: Positive for nosebleeds.    Eyes: Positive for itching.   Respiratory: Negative for cough, shortness of breath and wheezing.    Cardiovascular: Negative for chest pain and palpitations.   Gastrointestinal: Negative for abdominal pain, blood in stool, constipation, diarrhea, nausea and vomiting.   Genitourinary: Negative for difficulty urinating and hematuria.   Musculoskeletal: Positive for arthralgias and myalgias. Negative for joint swelling.   Skin: Negative for rash.   Allergic/Immunologic: Negative.    Neurological: Negative for weakness, light-headedness, numbness and headaches.   Hematological: Bruises/bleeds easily.        Clotting history as in HPI   Psychiatric/Behavioral: Negative.      Objective:     Vital Signs (Most Recent):  Temp: 98.4 °F (36.9 °C) (07/25/19 1200)  Pulse: 62 (07/25/19 1200)  Resp: 18 (07/25/19 0805)  BP: (!) 147/70 (07/25/19 1200)  SpO2: 96 % (07/25/19 1200)  O2 Device (Oxygen Therapy): room air (07/24/19 1900) Vital Signs (24h Range):  Temp:  [96.3 °F (35.7 °C)-98.8 °F (37.1 °C)] 98.4 °F (36.9 °C)  Pulse:  [62-77] 62  Resp:  [18-20] 18  SpO2:  [96 %-97 %] 96 %  BP: (128-149)/(61-79) 147/70     Weight: 103 kg (227 lb) (07/19/19 1400)  Body mass index is 36.64 kg/m².  Body surface area is 2.19 meters squared.      Intake/Output Summary  (Last 24 hours) at 7/25/2019 1714  Last data filed at 7/25/2019 0400  Gross per 24 hour   Intake 650 ml   Output 650 ml   Net 0 ml       Physical Exam   Constitutional: She is oriented to person, place, and time and well-developed, well-nourished, and in no distress. No distress.   HENT:   Head: Normocephalic and atraumatic.   Nasal passage mildly red and inflamed bilaterally.   Neck: Neck supple.   Pulmonary/Chest: Effort normal and breath sounds normal. No respiratory distress. She has no wheezes.   Abdominal: Soft. Bowel sounds are normal. She exhibits no distension. There is no tenderness.   Neurological: She is alert and oriented to person, place, and time.   Skin: She is not diaphoretic.     Patient has significant bruising on both arms.   Musculoskeletal: She exhibits edema.   Patient has no joint swelling or stiffness in fingers, elbows, knees or toes.    However, right leg appears to be more swollen than left.         Significant Labs:  CBC:   Recent Labs   Lab 07/25/19  0425   WBC 9.20   HGB 8.5*   HCT 26.9*        CMP:   Recent Labs   Lab 07/25/19  0425   *   CALCIUM 8.4*   ALBUMIN 4.1   PROT 4.7*      K 4.5   CO2 17*   *   *   CREATININE 5.2*   ALKPHOS 26*   ALT 11   AST 12   BILITOT 0.6     Coagulation: No results for input(s): LABPROT, INR, APTT in the last 24 hours.  CRP: No results for input(s): CRP in the last 24 hours.  ESR: No results for input(s): SEDRATE in the last 24 hours.  Liver Function Test:   Recent Labs   Lab 07/25/19  0425   ALT 11   AST 12   ALKPHOS 26*   BILITOT 0.6   PROT 4.7*   ALBUMIN 4.1       Significant Imaging:  Imaging results within the past 24 hours have been reviewed.    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 Agree that hepatitis B is less likely to be associated with cryoglobulinemia as compared with hepatitis C. Agree with BM biopsy and workup  for plasma cell dyscrasia, especially given elevated kappa:lambda light chain ratio.     Given her significant thromboembolic history, we would like to work her up for antiphopholipid antibody syndrome. Will order levels. Would also like to obtain a RLE duplex given her thrombotic history and asymmetric edema. She is not on any anticoagulation currently.    She also had sicca syndromes. Will check for sjogrens syndrome (SSA/SSB)    Regarding therapy for cryoglobulinemia syndrome, we recommend additional therapy with pulse dose steroids and Rituximab.     RECS:  - Antiphospholipid labs  - PreDMARD labs  - AntiSSA and AntiSSB  - MPO and PR3 ANCA  - IgM, IgG, IgA (for baseline prior to initiating rituximab)  - RLE duplex to r/o DVT  - IV solumedrol 1g x3 doses, dosed after PLEX  - PCP prophylaxis (for steroids) with atovaquone 1500mg suspension  - Plan for rituximab 1g after DMARD labs for 1 dose on day 1 and then again on Day 14.    Plan discussed with staff Dr Solis.    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 Agree that hepatitis B is less likely to be associated with cryoglobulinemia as compared with hepatitis C. Agree with BM biopsy and workup for plasma cell dyscrasia, especially given elevated kappa:lambda light chain ratio.     Given her significant thromboembolic history, we would like to work her up for antiphopholipid antibody syndrome. Will order levels. Would also like to obtain a RLE duplex given her thrombotic history and asymmetric edema. She is not on any anticoagulation currently.    She also had sicca syndromes. Will check for sjogrens syndrome (SSA/SSB)    Regarding therapy for cryoglobulinemia syndrome, we recommend additional therapy with pulse dose steroids and Rituximab.     RECS:  - Antiphospholipid labs  - PCP prophylaxis (for steroids) with bactrim  - AntiSSA and AntiSSB  - RLE duplex to r/o DVT  - IgM, IgG, IgA  - IV solumedrol 1g x3 doses, with  rituximab 1g on Day 1 and Day 14.    Plan to be discussed with staff Dr Solis.        Thank you for your consult. We will continue to follow.    Augie Null MD  Rheumatology  [facility]      I have personally taken the history and examined the patient and agree with the resident,s note as stated above Pt symptomatically much improved with PLEX and high dose prednisone. LACI and nephrotic syndrome unchanged.    Verbal preliminary renal biopsy report per Dr. Garcia with DPGN with pseudothrombi/cryoplugs typical of cryoglobulinemia. Serum cryoglobulins pending. RF+, C4 <3,  C3  41 ANUPAM- ANCA-(no MPO, PR3) HBsAg+ HBcAB+ HBV DNA 19, HBsAB -. HCV - ASO- antiDNAse B -, anti-GGM -  Has symptoms per Dr. Null's note above, indicating need to further r/o AAV, Sjogren's, APS/microangiopathy, but the cryoglobulinemic DPGN most likely related to hepatitis B.  PH on TTE, will need to follow, may be due to volume overload including lungs with left pleural effusion.[id number]    Thrombophilia panel including APS studies, SS-A  SS-B dsDNA, PR3  MPO,  quant Igs as baseline, rest of pre-DMARD labs not already done.   Obtain records of the prior LUE and renal vein thromboses  Await serum cryoglobulins  CFVDUS LEs to r/o DVT given prior history, nephrotic syndrome, vasculitis  Pulse Solu-Medrol 1 g IV daily x 3 days, then resume prednisone 60mg daily  PLEX every other day x 3  ID consult for vaccinations ASAP prior to rituximab initiation  Reviewed ACR rituximab handout which I personally reviewed  with patient and provided copy  Will obtain witnessed and signed informed consent from patient tomorrow for rituximab  Cont entecavir for hepatitis B, checked with Hepatology who agree with proceeding with rituximab 1000mg IV day 0 and day 15, likely starting early next week, after completion of PLEX and to allow at least several days for vaccinations  Will need Pneumocystis prophylaxis with Bactrim or  atovaquone, per renal opinion.

## 2019-07-25 NOTE — PROGRESS NOTES
[facility] Hospital Medicine  Progress Note    Patient Name: Kendy Vital  MRN: [id number]  Patient Class: IP- Inpatient   Admission Date: 7/19/2019  Length of Stay: 6 days  Attending Physician: Luiz Rea MD  Primary Care Provider: To Obtain [facility] Medicine Team: [facility] Luiz Rea MD    Subjective:     Principal Problem:Acute renal failure      HPI:  Ms. Vital is a 53 year old female with hypertension, anemia, and history of leiomyoma of the uterus who presents to ICU as a transfer from [facility] for evaluation of thrombocytopenia. Patient reports that prior to going to the hospital 3 days ago that she was experiencing symptoms of chills, feeling febrile, dry cough, shortness of breath and occasional nose bleeds for roughly 2 weeks. She also reports easy fatigueability and difficulty breathing when laying flat; currently sleeps with 2 pillows. Patient presented to hospital in [place] when her symptoms did not resolve. Initial work up showed a , worsening kidney function with creatinine of 2.9, and thrombocytopenia with platelets of 33k. In addition, chest x-ray showed interstitial opacities and follow up CT showed perihilar ground glass opacity. Patient was treated with rocephin and doxycycline as well as Bumex q12 due to her heart failure type symptoms. Lab work at the time showed WBC of 7.9 and a lactate of .8. In addition, she tested positive for strep A. Patient was transferred for further evaluation of her thrombocytopenia with concern for TTP and possible need for plasmapheresis.     At time of exam, patient is properly oriented to person time and places. She endorses headache, generalized myalgias, nausea and orthopnea. She denies SOB while sitting up, chest pain, abdominal pain, vomiting, and diarrhea. She has not felt febrile since 1 week prior to hospital stay.        Overview/Hospital Course:  Ms. Vital  presented as transfer to ICU for suspected TTP. At that time, she had severe thrombocytopenia, renal failure, and bilateral infiltrates on chest CT concerning for PNA. She was stepped down when repeat CBC revealed normalizing platelets and hemolysis labs were unremarkable. Initially placed on vanc/zosyn/azithromycin for PNA, which has been de-escalated to rocephin/azithromycin for CAP. Her course has been complicated by ARF of unclear cause. 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   7/23 Nephrology biopsed  and have initiated on prednisone empirically.  7/24 Heme did BMBx      Interval History:   Symptoms:  Improved strength.    Diet:  Adequate intake.    Activity level:  Impaired due to weakness.   Pain:  No pain.       Review of Systems   Constitutional: Positive for fatigue. Negative for fever.   Respiratory: Negative for shortness of breath.    Cardiovascular: Negative for chest pain.   Gastrointestinal: Negative for abdominal pain.     Objective:     Vital Signs (Most Recent):  Temp: 96.3 °F (35.7 °C) (07/25/19 0805)  Pulse: 66 (07/25/19 0805)  Resp: 18 (07/25/19 0805)  BP: (!) 147/71 (07/25/19 0805)  SpO2: 96 % (07/25/19 0805) Vital Signs (24h Range):  Temp:  [96.3 °F (35.7 °C)-98.8 °F (37.1 °C)] 96.3 °F (35.7 °C)  Pulse:  [63-77] 66  Resp:  [18-20] 18  SpO2:  [93 %-97 %] 96 %  BP: (128-163)/(61-81) 147/71     Weight: 103 kg (227 lb)  Body mass index is 36.64 kg/m².    Intake/Output Summary (Last 24 hours) at 7/25/2019 1152  Last data filed at 7/25/2019 0400  Gross per 24 hour   Intake 750 ml   Output 650 ml   Net 100 ml      Physical Exam   Constitutional: She is oriented to person, place, and time. She appears well-nourished. No distress.   Neck: No JVD present.   Cardiovascular: Normal rate, regular rhythm and normal heart sounds.   Pulmonary/Chest: Effort normal and breath sounds normal. No respiratory distress.   Abdominal: Soft. Bowel sounds are normal. She exhibits no distension.    Musculoskeletal: She exhibits no edema.   Neurological: She is alert and oriented to person, place, and time.   Psychiatric: She has a normal mood and affect. Her behavior is normal.       Significant Labs: All pertinent labs within the past 24 hours have been reviewed.    Significant Imaging: I have reviewed and interpreted all pertinent imaging results/findings within the past 24 hours.      Assessment/Plan:      * Acute renal failure  baseline creatinine of 1.0 roughly 1 month ago. Outside records show creatinine of 2.9 and BUN of 74;  Concern for DPGN from SLE vs PSGN vs COSME vs anti-GBM vs AIN in setting of recent NSAID exposure, less concern for TTP despite low platelets given lack of hemolysis on labs  RJ with bilateral medicorenal disease  UA with proteinuria, blood, no evidence of infection; pro:cr 5.97; C3 41, C4<3  positive for strep A as seen on outside hospital records  given 1x dose lasix 160mg IV with poor response  - 7/24 HD line placed for PLEX which was started  - 7/19 initiated on empiric prednisone 60mg daily  for additional 2 weeks, then begin taper to 40 mg qd  - Nephrology following, appreciate recs:  7/22 renal biopsy to further guide treatment        Essential hypertension  hypertensive at admission and per patient, has been undergoing multiple recent medication changes due to HTN as outpatient.  Suspect initially may have been exacerbated by NSAID use, now concern for multifactorial cause including renal failure with volume overload, steroid use, and inadequate dosing of home regimen  Home regimen includes: lisinopril-HCTZ 20-12.5mg, clonidine 0.2mg bid, and hydralazine 25mg tid  - amlodipine changed for nifedpine  - started hydralazine 50mg tid, clonidine, and coreg 6.25mg bid    Thrombocytopenia, unspecified  Plt on admission 41.  Seen by Heme/onc.  Multifactorial such as  Hemodilution and medications.  HIT Ab negative  - monitor      Other specified anemias  Hgb slowly going  down.  FERRITIN 161, RETIC 4.7 (H), Bili 1, FOLATE 11.1, VITAMIN B12 843  Iron 40, sat 33%  - monitor  - fobt            Chronic hepatitis B  Hepatology consulted to see if her HBV can be causing the cryoglubins and if she needs treatment.  - entecavir 0.5 mg every 72 hour (renal dose)    Pneumonia  x-ray in [place] revealed interstitial infiltrates. Follow up chest CT showed perihilar ground glass opacity. Treated initially with rocephin and doxycycline  repeat chest x-ray with patchy airspace consolidations bilaterally R>L, edema vs PNA  blood cultures NGTD  - currently on broad spectrum antibiotics vanc, zosyn, azithro; held vanc and de-escalated zosyn to ceftriaxone on 7/20    Elevated brain natriuretic peptide (BNP) level  BNP of 526 from outside records; 999 on admission  bilateral lower extremity swelling and reports easy fatigueability and orthopnea  TTE with CVP 15, normal EF, indeterminate diastolic function  suspect that this reflects volume overload in setting of oliguric renal failure rather than primary cardiac process      VTE Risk Mitigation (From admission, onward)        Ordered     Place sequential compression device  Until discontinued      07/19/19 0533     IP VTE LOW RISK PATIENT  Once      07/19/19 0533                Luiz Rea MD  Department of Hospital Medicine   [facility]

## 2019-07-25 NOTE — HPI
Ms. 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 She initially presented with fevers, chills, dry cough, shortness of breath, 2 pillow orthopnea, and occasional epistaxis for roughly 2 weeks. Initial work up showed a BNP of 526, worsening kidney function, and thrombocytopenia with platelets of 33k. She was initially treated with Rocephin and doxycycline for suspected pneumonia and was diuresed for HF. She was then  plasmapheresed and started on prednisone 60mg daily for suspected TTP. 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 Thus far, the patient has received pulse steroids (today is day 2). 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

## 2019-07-25 NOTE — ASSESSMENT & PLAN NOTE
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 Agree that hepatitis B is less likely to be associated with cryoglobulinemia as compared with hepatitis C. Agree with BM biopsy and workup for plasma cell dyscrasia, especially given elevated kappa:lambda light chain ratio.     Given her significant thromboembolic history, we would like to work her up for antiphopholipid antibody syndrome. Will order levels. Would also like to obtain a RLE duplex given her thrombotic history and asymmetric edema. She is not on any anticoagulation currently.    She also had sicca syndromes. Will check for sjogrens syndrome (SSA/SSB)    Regarding therapy for cryoglobulinemia syndrome, we recommend additional therapy with pulse dose steroids and Rituximab.     RECS:  - Antiphospholipid labs  - PreDMARD labs  - AntiSSA and AntiSSB  - MPO and PR3 ANCA  - IgM, IgG, IgA (for baseline prior to initiating rituximab)  - RLE duplex to r/o DVT  - IV solumedrol 1g x3 doses, dosed after PLEX  - PCP prophylaxis (for steroids) with atovaquone 1500mg suspension  - Plan for rituximab 1g after DMARD labs for 1 dose on day 1 and then again on Day 14.    Plan discussed with staff Dr Solis.

## 2019-07-25 NOTE — CONSULTS
[facility]  Rheumatology  Consult Note    Patient Name: Kendy Vital  MRN: [id number]  Admission Date: 7/19/2019  Hospital Length of Stay: 6 days  Code Status: Full Code   Attending Provider: Luiz Rea MD  Primary Care Physician: To Obtain Unable  Principal Problem:Acute renal failure    Consults  Subjective:     HPI: Ms. Vital is a 53 year old female with hypertension, anemia, and history of leiomyoma of the uterus who presents to ICU as a transfer from [facility] for evaluation of thrombocytopenia. Patient initially presented to [place] hospital due to symptoms of fevers, chills, dry cough, shortness of breath and occasional nose bleeds for roughly 2 weeks. She reported some orthopnea with the need for 2 pillows to sleep. Initial work up at [place] showed a , worsening kidney function with creatinine of 2.9, and thrombocytopenia with platelets of 33k. In addition, chest x-ray showed interstitial opacities and follow up CT showed perihilar ground glass opacity. Patient was treated with rocephin and doxycycline for suspected pneumonia as well as Bumex q12 due to her heart failure type symptoms. Lab work at the time showed WBC of 7.9 and a lactate of 0.8. In addition, she reportedly tested positive for strep A (AntiDNAseB and ASO titers negative here at [facility]).     Patient was transferred to [facility] on 7/19/19 for further evaluation of her thrombocytopenia with concern for TTP and possible need for plasmapheresis. She was started on PREDNISONE 60mg daily on 7/19/19.      Workup here included an [id number] which was normal at 84. Labs were significant for decreased complement (C3 at 41 and C4 at <3). HepBsAg was positive. LFTs have been normal. Rheumatoid factor elevated at 209. [place]:lambda free light chain ratio was elevated. ESR was elevated at 50. ANCA, ANUPAM and AntidsDNA were negative.      Patient was evaluated by nephrology for increasing creatinine. 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    Given possible malignant etiologies of cryoglobulinemia with elevated kappa:lambda ratio, Hem onc was consulted and a BM biopsy was performed on 7/23/19. Results pending.    Cryoglobulinemia labs were sent on 7/23/19 and treatment was begun empirically with PLEX. She received one session with 5.5L exchanged on 7/24 and another session has been planned for 7/26.  With HBsAg positivity, she was also started on entecavir on 7/25. Of note, she tested negative for Hepatitis C which is more commonly associated with cryoglobulinemia.    Rheumatology is consulted for further management of cryoglobulinemia syndrome and consideration for rituximab therapy.    Regarding her rheumatologic history, she states that she does not have a history of SLE, RA or any other autoimmune condition to her knowledge. She does have a history of significant thrombosis however. Approximately 3-4 yrs ago, she reports that she had a renal vein thrombosis and a left arm thrombosis. The renal vein thrombosis was treated with embolectomy, and she was started on lovenox for 9 mos. She was then taken off anticoagulation. She has only been on low dose ASA antiplatelets since then. She has never been tested for antiphospholipid to her knowledge. She does have a history of 1 miscarriage at 3-4 weeks gestation.    Symptomatically today at bedside, she reports no complaints. She says she feels much better since [place] [facility] She denies, headaches, chest pain, SOB, nausea, vomiting, diarrhea, constipation, numbness or tingling.     However, she does some report some concerning rheumatologic symptoms: hair loss for past 6 mos, occasional red, dry eyes, dry mouth, " nosebleeds for past 3 mos, hemoptysis on occasion at night. She also states that her legs hurt and that she has morning stiffness that resolves after 35 minutes of activity around the house. She denies redness or swelling of her joints. She denies nailbed changes or raynauds. She also has a significant autoimmune family history as detailed below.    PMH  Blood clot history as above  Leiomyomas    PSH  Embolectomy as above  Uterine leiomyoma removal    FMH  Mother with rheumatoid arthritis  Cousin with SLE  Niece and niece's daughter with psoriasis  No history of inflammatory bowel disease or sarcoidosis      Patient works as a caretaker  Patient is  with 1 child  Patient is former smoker, quit 5 mos ago. Smoked 0.5 pack per day for 5-10 yrs.  Patient drinks alcohol on occasion.  Patient denies any illicit drug use.    ROS as in HPI.       No past medical history on file.    No past surgical history on file.      There is no immunization history on file for this patient.    Review of patient's allergies indicates:  No Known Allergies  Current Facility-Administered Medications   Medication Frequency    0.9%  NaCl infusion (for blood administration) Q24H PRN    acetaminophen tablet 650 mg Q6H PRN    albuterol-ipratropium 2.5 mg-0.5 mg/3 mL nebulizer solution 3 mL Q6H PRN    bisacodyl suppository 10 mg Daily PRN    carvedilol tablet 6.25 mg BID    cloNIDine tablet 0.1 mg BID    desmopressin (DDAVP) 15.452 mcg in sodium chloride 0.9% 50 mL IVPB On Call Procedure    dextrose 10% (D10W) Bolus PRN    dextrose 10% (D10W) Bolus PRN    entecavir tablet 0.5 mg Q72H    famotidine tablet 20 mg Daily    glucagon (human recombinant) injection 1 mg PRN    glucose chewable tablet 16 g PRN    glucose chewable tablet 24 g PRN    hydrALAZINE tablet 50 mg Q8H    [START ON 7/26/2019] NIFEdipine 24 hr tablet 60 mg Daily    ondansetron disintegrating tablet 8 mg Q8H PRN    oxyCODONE-acetaminophen 5-325 mg per  tablet 1 tablet Q8H PRN    polyethylene glycol packet 17 g Daily    predniSONE tablet 60 mg Daily    promethazine (PHENERGAN) 6.25 mg in dextrose 5 % 50 mL IVPB Q6H PRN    ramelteon tablet 8 mg Nightly PRN    sodium bicarbonate tablet 1,300 mg TID    sodium chloride 0.65 % nasal spray 1 spray QID WAKE    sodium chloride 0.9% flush 10 mL PRN     Family History     None        Tobacco Use    Smoking status: Not on file   Substance and Sexual Activity    Alcohol use: Not on file    Drug use: Not on file    Sexual activity: Not on file     Review of Systems   Constitutional: Negative for activity change, appetite change, chills and fever.   HENT: Positive for nosebleeds.    Eyes: Positive for itching.   Respiratory: Negative for cough, shortness of breath and wheezing.    Cardiovascular: Negative for chest pain and palpitations.   Gastrointestinal: Negative for abdominal pain, blood in stool, constipation, diarrhea, nausea and vomiting.   Genitourinary: Negative for difficulty urinating and hematuria.   Musculoskeletal: Positive for arthralgias and myalgias. Negative for joint swelling.   Skin: Negative for rash.   Allergic/Immunologic: Negative.    Neurological: Negative for weakness, light-headedness, numbness and headaches.   Hematological: Bruises/bleeds easily.        Clotting history as in HPI   Psychiatric/Behavioral: Negative.      Objective:     Vital Signs (Most Recent):  Temp: 98.4 °F (36.9 °C) (07/25/19 1200)  Pulse: 62 (07/25/19 1200)  Resp: 18 (07/25/19 0805)  BP: (!) 147/70 (07/25/19 1200)  SpO2: 96 % (07/25/19 1200)  O2 Device (Oxygen Therapy): room air (07/24/19 1900) Vital Signs (24h Range):  Temp:  [96.3 °F (35.7 °C)-98.8 °F (37.1 °C)] 98.4 °F (36.9 °C)  Pulse:  [62-77] 62  Resp:  [18-20] 18  SpO2:  [96 %-97 %] 96 %  BP: (128-149)/(61-79) 147/70     Weight: 103 kg (227 lb) (07/19/19 1400)  Body mass index is 36.64 kg/m².  Body surface area is 2.19 meters squared.      Intake/Output Summary  (Last 24 hours) at 7/25/2019 1714  Last data filed at 7/25/2019 0400  Gross per 24 hour   Intake 650 ml   Output 650 ml   Net 0 ml       Physical Exam   Constitutional: She is oriented to person, place, and time and well-developed, well-nourished, and in no distress. No distress.   HENT:   Head: Normocephalic and atraumatic.   Nasal passage mildly red and inflamed bilaterally.   Neck: Neck supple.   Pulmonary/Chest: Effort normal and breath sounds normal. No respiratory distress. She has no wheezes.   Abdominal: Soft. Bowel sounds are normal. She exhibits no distension. There is no tenderness.   Neurological: She is alert and oriented to person, place, and time.   Skin: She is not diaphoretic.     Patient has significant bruising on both arms.   Musculoskeletal: She exhibits edema.   Patient has no joint swelling or stiffness in fingers, elbows, knees or toes.    However, right leg appears to be more swollen than left.         Significant Labs:  CBC:   Recent Labs   Lab 07/25/19  0425   WBC 9.20   HGB 8.5*   HCT 26.9*        CMP:   Recent Labs   Lab 07/25/19  0425   *   CALCIUM 8.4*   ALBUMIN 4.1   PROT 4.7*      K 4.5   CO2 17*   *   *   CREATININE 5.2*   ALKPHOS 26*   ALT 11   AST 12   BILITOT 0.6     Coagulation: No results for input(s): LABPROT, INR, APTT in the last 24 hours.  CRP: No results for input(s): CRP in the last 24 hours.  ESR: No results for input(s): SEDRATE in the last 24 hours.  Liver Function Test:   Recent Labs   Lab 07/25/19  0425   ALT 11   AST 12   ALKPHOS 26*   BILITOT 0.6   PROT 4.7*   ALBUMIN 4.1       Significant Imaging:  Imaging results within the past 24 hours have been reviewed.    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 Agree that hepatitis B is less likely to be associated with cryoglobulinemia as compared with hepatitis C. Agree with BM biopsy and workup  for plasma cell dyscrasia, especially given elevated kappa:lambda light chain ratio.     Given her significant thromboembolic history, we would like to work her up for antiphopholipid antibody syndrome. Will order levels. Would also like to obtain a RLE duplex given her thrombotic history and asymmetric edema. She is not on any anticoagulation currently.    She also had sicca syndromes. Will check for sjogrens syndrome (SSA/SSB)    Regarding therapy for cryoglobulinemia syndrome, we recommend additional therapy with pulse dose steroids and Rituximab.     RECS:  - Antiphospholipid labs  - PCP prophylaxis (for steroids) with bactrim  - AntiSSA and AntiSSB  - RLE duplex to r/o DVT  - IgM, IgG, IgA  - IV solumedrol 1g x3 doses, with rituximab 1g on Day 1 and Day 14.    Plan to be discussed with staff Dr Solis.        Thank you for your consult.     Augie Null MD  Rheumatology  [facility]

## 2019-07-25 NOTE — ASSESSMENT & PLAN NOTE
hypertensive at admission and per patient, has been undergoing multiple recent medication changes due to HTN as outpatient.  Suspect initially may have been exacerbated by NSAID use, now concern for multifactorial cause including renal failure with volume overload, steroid use, and inadequate dosing of home regimen  Home regimen includes: lisinopril-HCTZ 20-12.5mg, clonidine 0.2mg bid, and hydralazine 25mg tid  - amlodipine changed for nifedpine  - started hydralazine 50mg tid, clonidine, and coreg 6.25mg bid

## 2019-07-25 NOTE — ASSESSMENT & PLAN NOTE
baseline creatinine of 1.0 roughly 1 month ago. Outside records show creatinine of 2.9 and BUN of 74;  Concern for DPGN from SLE vs PSGN vs COSME vs anti-GBM vs AIN in setting of recent NSAID exposure, less concern for TTP despite low platelets given lack of hemolysis on labs  RJ with bilateral medicorenal disease  UA with proteinuria, blood, no evidence of infection; pro:cr 5.97; C3 41, C4<3  positive for strep A as seen on outside hospital records  given 1x dose lasix 160mg IV with poor response  - 7/24 HD line placed for PLEX which was started  - 7/19 initiated on empiric prednisone 60mg daily  for additional 2 weeks, then begin taper to 40 mg qd  - Nephrology following, appreciate recs:  7/22 renal biopsy to further guide treatment

## 2019-07-25 NOTE — MEDICAL/APP STUDENT
[facility]  Nephrology  Medical Student Progress Note    Patient Name: Kendy Vital  MRN: [id number]   Admission Date: 7/19/2019  Length of Stay: 6 days  Attending Physician: Dr. 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  Cr today is 5.2, yesterday 6.0 (Admission 4.3).  Steroids were commenced 7/23.   Plasmapheresis (5.5L) was done yesterday, tolerated well.     Not uremic at this time    Plan  Plasmapheresis tomorrow  Continue Prednisone 60mg PO daily  Await bone marrow biopsy results   Maintain BP <140/90  Trend Cr  Avoid nephrotoxic drugs    To be discussed with team and staff,    Wally Hathaway  MS4    Subjective:     Principal Problem:Acute renal failure  Please see H&P for detailed presenting history and ROS.    Interval History:   Pt had nausea without vomiting  Pt reported some depression but was happy to speak with  who will continue to meet with her    Objective:     Vital Signs (Most Recent):  Temp: 98.8 °F (37.1 °C) (07/25/19 0300)  Pulse: 63 (07/25/19 0300)  Resp: 18 (07/25/19 0300)  BP: 139/61 (07/25/19 0300)  SpO2: 96 % (07/25/19 0300) Vital Signs (24h Range):  Temp:  [96.6 °F (35.9 °C)-98.8 °F (37.1 °C)] 98.8 °F (37.1 °C)  Pulse:  [63-77] 63  Resp:  [18-20] 18  SpO2:  [93 %-100 %] 96 %  BP: (128-163)/(61-81) 139/61     Weight: 103 kg (227 lb)  Body mass index is 36.64 kg/m².    Intake/Output Summary (Last 24 hours) at 7/25/2019 0723  Last data filed at 7/25/2019 0400  Gross per 24 hour   Intake 750 ml   Output 650 ml   Net 100 ml      Physical Exam   Constitutional: She is oriented to person, place, and time.   Neck: No JVD present.   Cardiovascular: Normal rate and normal pulses.   Murmur heard.   Systolic murmur is present.  Pt has systolic ejection murmur heard loudest at aortic region   Pulmonary/Chest: Effort normal and breath sounds normal.   Abdominal: Soft. Bowel sounds are  normal.   Neurological: She is alert and oriented to person, place, and time. She has normal strength. GCS eye subscore is 4. GCS verbal subscore is 5. GCS motor subscore is 6.   Skin: Skin is warm and dry. Capillary refill takes less than 2 seconds.        Psychiatric: Her speech is normal and behavior is normal. Judgment and thought content normal. Cognition and memory are normal. She does not exhibit a depressed mood.   Nursing note and vitals reviewed.    Significant Labs:   Recent Results (from the past 24 hour(s))   CBC auto differential    Collection Time: 07/25/19  4:25 AM   Result Value Ref Range    WBC 9.20 3.90 - 12.70 K/uL    RBC 3.76 (L) 4.00 - 5.40 M/uL    Hemoglobin 8.5 (L) 12.0 - 16.0 g/dL    Hematocrit 26.9 (L) 37.0 - 48.5 %    Mean Corpuscular Volume 72 (L) 82 - 98 fL    Mean Corpuscular Hemoglobin 22.6 (L) 27.0 - 31.0 pg    Mean Corpuscular Hemoglobin Conc 31.6 (L) 32.0 - 36.0 g/dL    RDW 26.3 (H) 11.5 - 14.5 %    Platelets 209 150 - 350 K/uL    MPV SEE COMMENT 9.2 - 12.9 fL    Immature Granulocytes 2.0 (H) 0.0 - 0.5 %    Gran # (ANC) 6.9 1.8 - 7.7 K/uL    Immature Grans (Abs) 0.18 (H) 0.00 - 0.04 K/uL    Lymph # 1.4 1.0 - 4.8 K/uL    Mono # 0.7 0.3 - 1.0 K/uL    Eos # 0.0 0.0 - 0.5 K/uL    Baso # 0.01 0.00 - 0.20 K/uL    nRBC 0 0 /100 WBC    Gran% 74.5 (H) 38.0 - 73.0 %    Lymph% 15.5 (L) 18.0 - 48.0 %    Mono% 7.9 4.0 - 15.0 %    Eosinophil% 0.0 0.0 - 8.0 %    Basophil% 0.1 0.0 - 1.9 %    Platelet Estimate Appears normal     Aniso Slight     Poik Slight     Poly Occasional     Hypo Occasional     Ovalocytes Occasional     Differential Method Automated    AFP tumor marker    Collection Time: 07/25/19  4:25 AM   Result Value Ref Range    AFP 1.3 0.0 - 8.4 ng/mL   Comprehensive metabolic panel    Collection Time: 07/25/19  4:25 AM   Result Value Ref Range    Sodium 142 136 - 145 mmol/L    Potassium 4.5 3.5 - 5.1 mmol/L    Chloride 111 (H) 95 - 110 mmol/L    CO2 17 (L) 23 - 29 mmol/L    Glucose 156  (H) 70 - 110 mg/dL    BUN, Bld 138 (H) 6 - 20 mg/dL    Creatinine 5.2 (H) 0.5 - 1.4 mg/dL    Calcium 8.4 (L) 8.7 - 10.5 mg/dL    Total Protein 4.7 (L) 6.0 - 8.4 g/dL    Albumin 4.1 3.5 - 5.2 g/dL    Total Bilirubin 0.6 0.1 - 1.0 mg/dL    Alkaline Phosphatase 26 (L) 55 - 135 U/L    AST 12 10 - 40 U/L    ALT 11 10 - 44 U/L    Anion Gap 14 8 - 16 mmol/L    eGFR if African American 10.2 (A) >60 mL/min/1.73 m^2    eGFR if non  8.8 (A) >60 mL/min/1.73 m^2       Significant Imaging:    CXR AP Portable    FINDINGS:  Right-sided central venous catheter in the SVC.  Mild cardiomegaly.  Opacification at the lung bases probably edema versus volume loss.  The left costophrenic angle is blunted as before.  The lung apices are clear.

## 2019-07-25 NOTE — PLAN OF CARE
Problem: Adult Inpatient Plan of Care  Goal: Plan of Care Review  Outcome: Ongoing (interventions implemented as appropriate)  Pt AAOx4, pleasant and cooperative to care. S/P TLC cath to RIJ and plasma exchange. Pt c/o mild pain and nausea. PRN PO zofran given with effect. Pt states pain and nausea resolved. Slept on and off. No s/s of acute distress noted. Fall precaution maintained. Continue to monitor.

## 2019-07-26 ENCOUNTER — CLINICAL SUPPORT (OUTPATIENT)
Dept: CARDIOLOGY | Facility: CLINIC | Age: 54
End: 2019-07-26
Attending: INTERNAL MEDICINE
Payer: MEDICAID

## 2019-07-26 PROBLEM — D84.9 IMMUNOSUPPRESSION: Status: ACTIVE | Noted: 2019-07-26

## 2019-07-26 LAB
ALBUMIN SERPL BCP-MCNC: 3.6 G/DL (ref 3.5–5.2)
ALP SERPL-CCNC: 36 U/L (ref 55–135)
ALT SERPL W/O P-5'-P-CCNC: 13 U/L (ref 10–44)
ANION GAP SERPL CALC-SCNC: 11 MMOL/L (ref 8–16)
AST SERPL-CCNC: 11 U/L (ref 10–40)
BASOPHILS # BLD AUTO: 0 K/UL (ref 0–0.2)
BASOPHILS NFR BLD: 0 % (ref 0–1.9)
BILIRUB SERPL-MCNC: 0.6 MG/DL (ref 0.1–1)
BODY SITE - BONE MARROW: NORMAL
BUN SERPL-MCNC: 112 MG/DL (ref 6–20)
CALCIUM SERPL-MCNC: 9.3 MG/DL (ref 8.7–10.5)
CHLORIDE SERPL-SCNC: 113 MMOL/L (ref 95–110)
CLINICAL DIAGNOSIS - BONE MARROW: NORMAL
CO2 SERPL-SCNC: 21 MMOL/L (ref 23–29)
CREAT SERPL-MCNC: 4.4 MG/DL (ref 0.5–1.4)
DIFFERENTIAL METHOD: ABNORMAL
EOSINOPHIL # BLD AUTO: 0 K/UL (ref 0–0.5)
EOSINOPHIL NFR BLD: 0 % (ref 0–8)
ERYTHROCYTE [DISTWIDTH] IN BLOOD BY AUTOMATED COUNT: 27.2 % (ref 11.5–14.5)
EST. GFR  (AFRICAN AMERICAN): 12.5 ML/MIN/1.73 M^2
EST. GFR  (NON AFRICAN AMERICAN): 10.8 ML/MIN/1.73 M^2
FACT VIII ACT/NOR PPP: 225 % (ref 60–170)
FLOW CYTOMETRY ANTIBODIES ANALYZED - BONE MARROW: NORMAL
FLOW CYTOMETRY COMMENT - BONE MARROW: NORMAL
FLOW CYTOMETRY INTERPRETATION - BONE MARROW: NORMAL
GLUCOSE SERPL-MCNC: 135 MG/DL (ref 70–110)
HBV E AB SER QL: ABNORMAL
HBV E AG SERPL QL IA: NORMAL
HCT VFR BLD AUTO: 25.8 % (ref 37–48.5)
HEPATITIS A ANTIBODY, IGG: NEGATIVE
HGB A2 MFR BLD HPLC: 2.2 % (ref 2.2–3.2)
HGB BLD-MCNC: 8.3 G/DL (ref 12–16)
HGB FRACT BLD ELPH-IMP: NORMAL
HGB FRACT BLD ELPH-IMP: NORMAL
HIV 1+2 AB+HIV1 P24 AG SERPL QL IA: NEGATIVE
IGA SERPL-MCNC: 44 MG/DL (ref 40–350)
IGG SERPL-MCNC: 185 MG/DL (ref 650–1600)
IGM SERPL-MCNC: 139 MG/DL (ref 50–300)
IMM GRANULOCYTES # BLD AUTO: 0.16 K/UL (ref 0–0.04)
IMM GRANULOCYTES NFR BLD AUTO: 1.7 % (ref 0–0.5)
LYMPHOCYTES # BLD AUTO: 1.4 K/UL (ref 1–4.8)
LYMPHOCYTES NFR BLD: 15.6 % (ref 18–48)
MCH RBC QN AUTO: 23.3 PG (ref 27–31)
MCHC RBC AUTO-ENTMCNC: 32.2 G/DL (ref 32–36)
MCV RBC AUTO: 73 FL (ref 82–98)
MONOCYTES # BLD AUTO: 1.1 K/UL (ref 0.3–1)
MONOCYTES NFR BLD: 11.3 % (ref 4–15)
NEUTROPHILS # BLD AUTO: 6.6 K/UL (ref 1.8–7.7)
NEUTROPHILS NFR BLD: 71.4 % (ref 38–73)
NRBC BLD-RTO: 0 /100 WBC
PATHOLOGIST INTERPRETATION UIFE: NORMAL
PHOSPHATE SERPL-MCNC: 6.5 MG/DL (ref 2.7–4.5)
PLATELET # BLD AUTO: 254 K/UL (ref 150–350)
PMV BLD AUTO: ABNORMAL FL (ref 9.2–12.9)
POTASSIUM SERPL-SCNC: 4.5 MMOL/L (ref 3.5–5.1)
PROT SERPL-MCNC: 4.8 G/DL (ref 6–8.4)
RBC # BLD AUTO: 3.56 M/UL (ref 4–5.4)
RPR SER QL: NORMAL
SODIUM SERPL-SCNC: 145 MMOL/L (ref 136–145)
WBC # BLD AUTO: 9.26 K/UL (ref 3.9–12.7)

## 2019-07-26 PROCEDURE — 99233 SBSQ HOSP IP/OBS HIGH 50: CPT | Mod: ,,, | Performed by: HOSPITALIST

## 2019-07-26 PROCEDURE — 85025 COMPLETE CBC W/AUTO DIFF WBC: CPT

## 2019-07-26 PROCEDURE — 86592 SYPHILIS TEST NON-TREP QUAL: CPT

## 2019-07-26 PROCEDURE — 63600175 PHARM REV CODE 636 W HCPCS: Performed by: STUDENT IN AN ORGANIZED HEALTH CARE EDUCATION/TRAINING PROGRAM

## 2019-07-26 PROCEDURE — 63600175 PHARM REV CODE 636 W HCPCS: Performed by: HOSPITALIST

## 2019-07-26 PROCEDURE — 90670 PCV13 VACCINE IM: CPT | Performed by: PHYSICIAN ASSISTANT

## 2019-07-26 PROCEDURE — 82784 ASSAY IGA/IGD/IGG/IGM EACH: CPT

## 2019-07-26 PROCEDURE — 63600175 PHARM REV CODE 636 W HCPCS: Performed by: PHYSICIAN ASSISTANT

## 2019-07-26 PROCEDURE — 86480 TB TEST CELL IMMUN MEASURE: CPT

## 2019-07-26 PROCEDURE — 86157 COLD AGGLUTININ TITER: CPT

## 2019-07-26 PROCEDURE — 86787 VARICELLA-ZOSTER ANTIBODY: CPT

## 2019-07-26 PROCEDURE — 86235 NUCLEAR ANTIGEN ANTIBODY: CPT

## 2019-07-26 PROCEDURE — 99233 PR SUBSEQUENT HOSPITAL CARE,LEVL III: ICD-10-PCS | Mod: ,,, | Performed by: HOSPITALIST

## 2019-07-26 PROCEDURE — 99231 SBSQ HOSP IP/OBS SF/LOW 25: CPT | Mod: ,,, | Performed by: INTERNAL MEDICINE

## 2019-07-26 PROCEDURE — 90472 IMMUNIZATION ADMIN EACH ADD: CPT | Performed by: HOSPITALIST

## 2019-07-26 PROCEDURE — 25000003 PHARM REV CODE 250: Performed by: HOSPITALIST

## 2019-07-26 PROCEDURE — 93971 CV US DOPPLER VENOUS LEG RIGHT (CUPID ONLY): ICD-10-PCS | Mod: 26,59,RT, | Performed by: INTERNAL MEDICINE

## 2019-07-26 PROCEDURE — 82784 ASSAY IGA/IGD/IGG/IGM EACH: CPT | Mod: 59

## 2019-07-26 PROCEDURE — 93971 EXTREMITY STUDY: CPT | Mod: RT

## 2019-07-26 PROCEDURE — 86703 HIV-1/HIV-2 1 RESULT ANTBDY: CPT

## 2019-07-26 PROCEDURE — 86147 CARDIOLIPIN ANTIBODY EA IG: CPT

## 2019-07-26 PROCEDURE — 90714 TD VACC NO PRESV 7 YRS+ IM: CPT | Performed by: HOSPITALIST

## 2019-07-26 PROCEDURE — 36415 COLL VENOUS BLD VENIPUNCTURE: CPT

## 2019-07-26 PROCEDURE — 90471 IMMUNIZATION ADMIN: CPT | Performed by: PHYSICIAN ASSISTANT

## 2019-07-26 PROCEDURE — 93971 EXTREMITY STUDY: CPT | Mod: 26,59,RT, | Performed by: INTERNAL MEDICINE

## 2019-07-26 PROCEDURE — 83516 IMMUNOASSAY NONANTIBODY: CPT

## 2019-07-26 PROCEDURE — 11000001 HC ACUTE MED/SURG PRIVATE ROOM

## 2019-07-26 PROCEDURE — 93971 EXTREMITY STUDY: CPT | Mod: LT

## 2019-07-26 PROCEDURE — 85240 CLOT FACTOR VIII AHG 1 STAGE: CPT

## 2019-07-26 PROCEDURE — P9045 ALBUMIN (HUMAN), 5%, 250 ML: HCPCS | Mod: JG | Performed by: PATHOLOGY

## 2019-07-26 PROCEDURE — 99231 PR SUBSEQUENT HOSPITAL CARE,LEVL I: ICD-10-PCS | Mod: ,,, | Performed by: INTERNAL MEDICINE

## 2019-07-26 PROCEDURE — 86147 CARDIOLIPIN ANTIBODY EA IG: CPT | Mod: 59

## 2019-07-26 PROCEDURE — 99232 SBSQ HOSP IP/OBS MODERATE 35: CPT | Mod: ,,, | Performed by: PHYSICIAN ASSISTANT

## 2019-07-26 PROCEDURE — 86790 VIRUS ANTIBODY NOS: CPT

## 2019-07-26 PROCEDURE — 25000003 PHARM REV CODE 250: Performed by: STUDENT IN AN ORGANIZED HEALTH CARE EDUCATION/TRAINING PROGRAM

## 2019-07-26 PROCEDURE — 25500020 PHARM REV CODE 255: Performed by: STUDENT IN AN ORGANIZED HEALTH CARE EDUCATION/TRAINING PROGRAM

## 2019-07-26 PROCEDURE — 90633 HEPA VACC PED/ADOL 2 DOSE IM: CPT | Performed by: HOSPITALIST

## 2019-07-26 PROCEDURE — 83020 HEMOGLOBIN ELECTROPHORESIS: CPT

## 2019-07-26 PROCEDURE — 99233 PR SUBSEQUENT HOSPITAL CARE,LEVL III: ICD-10-PCS | Mod: ,,, | Performed by: INTERNAL MEDICINE

## 2019-07-26 PROCEDURE — 99232 PR SUBSEQUENT HOSPITAL CARE,LEVL II: ICD-10-PCS | Mod: ,,, | Performed by: PHYSICIAN ASSISTANT

## 2019-07-26 PROCEDURE — 36514 APHERESIS PLASMA: CPT

## 2019-07-26 PROCEDURE — 99233 SBSQ HOSP IP/OBS HIGH 50: CPT | Mod: ,,, | Performed by: INTERNAL MEDICINE

## 2019-07-26 PROCEDURE — 36514 APHERESIS PLASMA: CPT | Mod: ,,, | Performed by: PATHOLOGY

## 2019-07-26 PROCEDURE — 80053 COMPREHEN METABOLIC PANEL: CPT

## 2019-07-26 PROCEDURE — 25000003 PHARM REV CODE 250: Performed by: NURSE PRACTITIONER

## 2019-07-26 PROCEDURE — 85810 BLOOD VISCOSITY EXAMINATION: CPT

## 2019-07-26 PROCEDURE — 63600175 PHARM REV CODE 636 W HCPCS: Performed by: PATHOLOGY

## 2019-07-26 PROCEDURE — 85613 RUSSELL VIPER VENOM DILUTED: CPT

## 2019-07-26 PROCEDURE — 36514 PR THER APHERESIS,PLASMA PHERESIS: ICD-10-PCS | Mod: ,,, | Performed by: PATHOLOGY

## 2019-07-26 PROCEDURE — 86235 NUCLEAR ANTIGEN ANTIBODY: CPT | Mod: 59

## 2019-07-26 PROCEDURE — 84100 ASSAY OF PHOSPHORUS: CPT

## 2019-07-26 PROCEDURE — 86146 BETA-2 GLYCOPROTEIN ANTIBODY: CPT

## 2019-07-26 RX ORDER — ALLOPURINOL 100 MG/1
100 TABLET ORAL DAILY
Status: DISCONTINUED | OUTPATIENT
Start: 2019-07-27 | End: 2019-08-07 | Stop reason: HOSPADM

## 2019-07-26 RX ORDER — HEPARIN SODIUM 1000 [USP'U]/ML
2300 INJECTION, SOLUTION INTRAVENOUS; SUBCUTANEOUS ONCE
Status: COMPLETED | OUTPATIENT
Start: 2019-07-26 | End: 2019-07-26

## 2019-07-26 RX ORDER — ALBUMIN HUMAN 50 G/1000ML
275 SOLUTION INTRAVENOUS ONCE
Status: COMPLETED | OUTPATIENT
Start: 2019-07-26 | End: 2019-07-26

## 2019-07-26 RX ADMIN — CALCIUM GLUCONATE 2000 MG: 98 INJECTION, SOLUTION INTRAVENOUS at 12:07

## 2019-07-26 RX ADMIN — METHYLPREDNISOLONE SODIUM SUCCINATE: 1 INJECTION, POWDER, LYOPHILIZED, FOR SOLUTION INTRAMUSCULAR; INTRAVENOUS at 11:07

## 2019-07-26 RX ADMIN — CARVEDILOL 6.25 MG: 6.25 TABLET, FILM COATED ORAL at 09:07

## 2019-07-26 RX ADMIN — IOHEXOL 15 ML: 350 INJECTION, SOLUTION INTRAVENOUS at 10:07

## 2019-07-26 RX ADMIN — HEPATITIS A VACCINE 720 UNITS: 720 INJECTION, SUSPENSION INTRAMUSCULAR at 06:07

## 2019-07-26 RX ADMIN — HEPARIN SODIUM 2300 UNITS: 1000 INJECTION, SOLUTION INTRAVENOUS; SUBCUTANEOUS at 12:07

## 2019-07-26 RX ADMIN — ATOVAQUONE 1500 MG: 750 SUSPENSION ORAL at 09:07

## 2019-07-26 RX ADMIN — NIFEDIPINE 60 MG: 60 TABLET, FILM COATED, EXTENDED RELEASE ORAL at 09:07

## 2019-07-26 RX ADMIN — PNEUMOCOCCAL 13-VALENT CONJUGATE VACCINE 0.5 ML: 2.2; 2.2; 2.2; 2.2; 2.2; 4.4; 2.2; 2.2; 2.2; 2.2; 2.2; 2.2; 2.2 INJECTION, SUSPENSION INTRAMUSCULAR at 02:07

## 2019-07-26 RX ADMIN — FAMOTIDINE 20 MG: 20 TABLET, FILM COATED ORAL at 09:07

## 2019-07-26 RX ADMIN — SODIUM BICARBONATE 650 MG TABLET 1300 MG: at 02:07

## 2019-07-26 RX ADMIN — SODIUM BICARBONATE 650 MG TABLET 1300 MG: at 09:07

## 2019-07-26 RX ADMIN — Medication 1 SPRAY: at 09:07

## 2019-07-26 RX ADMIN — HYDRALAZINE HYDROCHLORIDE 50 MG: 50 TABLET ORAL at 09:07

## 2019-07-26 RX ADMIN — CLOSTRIDIUM TETANI TOXOID ANTIGEN (FORMALDEHYDE INACTIVATED) AND CORYNEBACTERIUM DIPHTHERIAE TOXOID ANTIGEN (FORMALDEHYDE INACTIVATED) 0.5 ML: 5; 2 INJECTION, SUSPENSION INTRAMUSCULAR at 07:07

## 2019-07-26 RX ADMIN — ALBUMIN (HUMAN) 275 G: 12.5 SOLUTION INTRAVENOUS at 12:07

## 2019-07-26 RX ADMIN — HYDRALAZINE HYDROCHLORIDE 50 MG: 50 TABLET ORAL at 06:07

## 2019-07-26 RX ADMIN — CLONIDINE HYDROCHLORIDE 0.1 MG: 0.1 TABLET ORAL at 09:07

## 2019-07-26 RX ADMIN — HYDRALAZINE HYDROCHLORIDE 50 MG: 50 TABLET ORAL at 02:07

## 2019-07-26 NOTE — SUBJECTIVE & OBJECTIVE
Interval History: Patient seen today, she is doing well. Received PLEX. No bleeding/bruising. No pain. Her brother's  is tomorrow. We discussed BM biopsy results.    Oncology Treatment Plan:   [No treatment plan]    Medications:  Continuous Infusions:  Scheduled Meds:   atovaquone  1,500 mg Oral Daily    carvedilol  6.25 mg Oral BID    cloNIDine  0.1 mg Oral BID    entecavir  0.5 mg Oral Q72H    famotidine  20 mg Oral Daily    hepatitis A virus vaccine (PF)  1,440 Units Intramuscular Once    hydrALAZINE  50 mg Oral Q8H    methylPREDNISolone (SOLU-Medrol) IVPB (doses > 250 mg)   Intravenous Daily    NIFEdipine  60 mg Oral Daily    pneumoc 13-nikia conj-dip cr(PF)  0.5 mL Intramuscular Once    polyethylene glycol  17 g Oral Daily    sodium bicarbonate  1,300 mg Oral TID    sodium chloride  1 spray Each Nostril QID WAKE     PRN Meds:sodium chloride, acetaminophen, albuterol-ipratropium, bisacodyl, desmopressin (DDAVP) IVPB, Dextrose 10% Bolus, Dextrose 10% Bolus, glucagon (human recombinant), glucose, glucose, ondansetron, oxyCODONE-acetaminophen, promethazine (PHENERGAN) IVPB, ramelteon, sodium chloride 0.9%, DIPH,PERTUS (ADACEL),TETANUS PF VAC (ADULT)     Review of Systems  Objective:     Vital Signs (Most Recent):  Temp: 98.4 °F (36.9 °C) (19 1325)  Pulse: 76 (19 1325)  Resp: 18 (19 1325)  BP: 138/80 (19 1325)  SpO2: 95 % (19 1145) Vital Signs (24h Range):  Temp:  [97.6 °F (36.4 °C)-98.5 °F (36.9 °C)] 98.4 °F (36.9 °C)  Pulse:  [62-76] 76  Resp:  [14-18] 18  SpO2:  [95 %-96 %] 95 %  BP: (133-166)/(67-96) 138/80     Weight: 103 kg (227 lb)  Body mass index is 36.64 kg/m².  Body surface area is 2.19 meters squared.      Intake/Output Summary (Last 24 hours) at 2019 1411  Last data filed at 2019 1228  Gross per 24 hour   Intake 380 ml   Output 1900 ml   Net -1520 ml       Physical Exam   Constitutional: She is oriented to person, place, and time. She appears  well-developed and well-nourished.   HENT:   Trialysis right neck   Eyes: EOM are normal.   Neck: Normal range of motion.   Cardiovascular: Normal rate and regular rhythm.   Pulmonary/Chest: Effort normal. No respiratory distress.   Abdominal: Soft. She exhibits no distension. There is no tenderness.   Musculoskeletal: She exhibits no edema.   Neurological: She is alert and oriented to person, place, and time.   Skin: Skin is warm and dry.   Psychiatric: She has a normal mood and affect. Her behavior is normal.       Significant Labs:   CBC:   Recent Labs   Lab 07/25/19  0425 07/26/19  0503   WBC 9.20 9.26   HGB 8.5* 8.3*   HCT 26.9* 25.8*    254    and CMP:   Recent Labs   Lab 07/25/19  0425 07/26/19  0503    145   K 4.5 4.5   * 113*   CO2 17* 21*   * 135*   * 112*   CREATININE 5.2* 4.4*   CALCIUM 8.4* 9.3   PROT 4.7* 4.8*   ALBUMIN 4.1 3.6   BILITOT 0.6 0.6   ALKPHOS 26* 36*   AST 12 11   ALT 11 13   ANIONGAP 14 11   EGFRNONAA 8.8* 10.8*       Diagnostic Results:  I have reviewed all pertinent imaging results/findings within the past 24 hours.

## 2019-07-26 NOTE — PROGRESS NOTES
[facility]  Rheumatology  Progress Note    Patient Name: Kendy Vital  MRN: [id number]  Admission Date: 7/19/2019  Hospital Length of Stay: 7 days  Code Status: Full Code   Attending Provider: Luiz Rea MD  Primary Care Physician: To Obtain Unable  Principal Problem: Acute renal failure    Subjective:     HPI: Ms. Vital is a 53 year old female with hypertension, anemia, and history of leiomyoma of the uterus who presents to ICU as a transfer from [facility] for evaluation of thrombocytopenia. Patient initially presented to [facility] due to symptoms of fevers, chills, dry cough, shortness of breath and occasional nose bleeds for roughly 2 weeks. She reported some orthopnea with the need for 2 pillows to sleep. Initial work up at [facility] showed a , worsening kidney function with creatinine of 2.9, and thrombocytopenia with platelets of 33k. In addition, chest x-ray showed interstitial opacities and follow up CT showed perihilar ground glass opacity. Patient was treated with rocephin and doxycycline for suspected pneumonia as well as Bumex q12 due to her heart failure type symptoms. Lab work at the time showed WBC of 7.9 and a lactate of 0.8. In addition, she reportedly tested positive for strep A (AntiDNAseB and ASO titers negative here at [facility]).     Patient was transferred to [facility] on 7/19/19 for further evaluation of her thrombocytopenia with concern for TTP and possible need for plasmapheresis. She was started on PREDNISONE 60mg daily on 7/19/19.      Workup here included an OBGPDT05 which was normal at 84. Labs were significant for decreased complement (C3 at 41 and C4 at <3). HepBsAg was positive. LFTs have been normal. Rheumatoid factor elevated at 209. [place]:lambda free light chain ratio was elevated. ESR was elevated at 50. ANCA, ANUPAM and AntidsDNA were negative.      Patient was evaluated by nephrology for increasing creatinine. 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    Given possible malignant etiologies of cryoglobulinemia with elevated kappa:lambda ratio, Hem onc was consulted and a BM biopsy was performed on 7/23/19. Results pending.    Cryoglobulinemia labs were sent on 7/23/19 and treatment was begun empirically with PLEX. She received one session with 5.5L exchanged on 7/24 and another session has been planned for 7/26.  With HBsAg positivity, she was also started on entecavir on 7/25. Of note, she tested negative for Hepatitis C which is more commonly associated with cryoglobulinemia.    Rheumatology is consulted for further management of cryoglobulinemia syndrome and consideration for rituximab therapy.    Regarding her rheumatologic history, she states that she does not have a history of SLE, RA or any other autoimmune condition to her knowledge. She does have a history of significant thrombosis however. Approximately 3-4 yrs ago, she reports that she had a renal vein thrombosis and a left arm thrombosis. The renal vein thrombosis was treated with embolectomy, and she was started on lovenox for 9 mos. She was then taken off anticoagulation. She has only been on low dose ASA antiplatelets since then. She has never been tested for antiphospholipid to her knowledge. She does have a history of 1 miscarriage at 3-4 weeks gestation.    Symptomatically today at bedside, she reports no complaints. She says she feels much better since arriving at [facility]. She denies, headaches, chest pain, SOB, nausea, vomiting, diarrhea, constipation, numbness or tingling.     However, she does some report some concerning rheumatologic symptoms: hair loss for past 6 mos, occasional red, dry eyes, dry mouth, nosebleeds for " past 3 mos, hemoptysis on occasion at night. She also states that her legs hurt and that she has morning stiffness that resolves after 35 minutes of activity around the house. She denies redness or swelling of her joints. She denies nailbed changes or raynauds. She also has a significant autoimmune family history as detailed below.    PMH  Blood clot history as above  Leiomyomas    PSH  Embolectomy as above  Uterine leiomyoma removal    FMH  Mother with rheumatoid arthritis  Cousin with SLE  Niece and niece's daughter with psoriasis  No history of inflammatory bowel disease or sarcoidosis      Patient works as a caretaker  Patient is  with 1 child  Patient is former smoker, quit 5 mos ago. Smoked 0.5 pack per day for 5-10 yrs.  Patient drinks alcohol on occasion.  Patient denies any illicit drug use.    ROS as in HPI.       Interval History: Pt feels depressed today, due to her brother's recent death. He is to be buried today. She reports no subjective symptomatic complaints. She denies rashes, fevers, joint pain or swelling.     Current Facility-Administered Medications   Medication Frequency    0.9%  NaCl infusion (for blood administration) Q24H PRN    acetaminophen tablet 650 mg Q6H PRN    albumin human 5% bottle 275 g Once    albuterol-ipratropium 2.5 mg-0.5 mg/3 mL nebulizer solution 3 mL Q6H PRN    atovaquone suspension 1,500 mg Daily    bisacodyl suppository 10 mg Daily PRN    calcium gluconate 2,000 mg in dextrose 5 % 100 mL IVPB Once    carvedilol tablet 6.25 mg BID    cloNIDine tablet 0.1 mg BID    desmopressin (DDAVP) 15.452 mcg in sodium chloride 0.9% 50 mL IVPB On Call Procedure    dextrose 10% (D10W) Bolus PRN    dextrose 10% (D10W) Bolus PRN    entecavir tablet 0.5 mg Q72H    famotidine tablet 20 mg Daily    glucagon (human recombinant) injection 1 mg PRN    glucose chewable tablet 16 g PRN    glucose chewable tablet 24 g PRN    heparin (porcine) injection 2,300 Units Once     hydrALAZINE tablet 50 mg Q8H    methylPREDNISolone sodium succinate (SOLU-MEDROL) 1,000 mg in dextrose 5 % 100 mL IVPB Daily    NIFEdipine 24 hr tablet 60 mg Daily    ondansetron disintegrating tablet 8 mg Q8H PRN    oxyCODONE-acetaminophen 5-325 mg per tablet 1 tablet Q8H PRN    polyethylene glycol packet 17 g Daily    promethazine (PHENERGAN) 6.25 mg in dextrose 5 % 50 mL IVPB Q6H PRN    ramelteon tablet 8 mg Nightly PRN    sodium bicarbonate tablet 1,300 mg TID    sodium chloride 0.65 % nasal spray 1 spray QID WAKE    sodium chloride 0.9% flush 10 mL PRN     Objective:     Vital Signs (Most Recent):  Temp: 97.8 °F (36.6 °C) (07/26/19 0843)  Pulse: 70 (07/26/19 0843)  Resp: 18 (07/26/19 0843)  BP: 138/67 (07/26/19 0843)  SpO2: 96 % (07/26/19 0843)  O2 Device (Oxygen Therapy): room air (07/26/19 0500) Vital Signs (24h Range):  Temp:  [97.6 °F (36.4 °C)-98.4 °F (36.9 °C)] 97.8 °F (36.6 °C)  Pulse:  [62-70] 70  Resp:  [18] 18  SpO2:  [95 %-96 %] 96 %  BP: (138-160)/(67-90) 138/67     Weight: 103 kg (227 lb) (07/19/19 1400)  Body mass index is 36.64 kg/m².  Body surface area is 2.19 meters squared.      Intake/Output Summary (Last 24 hours) at 7/26/2019 1001  Last data filed at 7/26/2019 0600  Gross per 24 hour   Intake --   Output 1200 ml   Net -1200 ml       Physical Exam   Constitutional: She is oriented to person, place, and time and well-developed, well-nourished, and in no distress.   HENT:   Head: Normocephalic and atraumatic.   Eyes: Right eye exhibits no discharge. Left eye exhibits no discharge.   Neck: Neck supple.   Cardiovascular: Normal rate and regular rhythm.  Exam reveals no gallop and no friction rub.    No murmur heard.  Pulmonary/Chest: Effort normal and breath sounds normal. She has no wheezes. She has no rales.   Abdominal: Soft. She exhibits no distension. There is no tenderness.   Neurological: She is alert and oriented to person, place, and time.   Skin: No rash noted.      Musculoskeletal: She exhibits edema.   1+ edema  No swelling or redness in joints in fingers, wrists, elbows or knees.         Significant Labs:  CBC:   Recent Labs   Lab 07/26/19  0503   WBC 9.26   HGB 8.3*   HCT 25.8*        CMP:   Recent Labs   Lab 07/26/19  0503   *   CALCIUM 9.3   ALBUMIN 3.6   PROT 4.8*      K 4.5   CO2 21*   *   *   CREATININE 4.4*   ALKPHOS 36*   ALT 13   AST 11   BILITOT 0.6       Significant Imaging:  Imaging results within the past 24 hours have been reviewed.    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 Agree that hepatitis B is less likely to be associated with cryoglobulinemia as compared with hepatitis C. Agree with BM biopsy and workup for plasma cell dyscrasia, especially given elevated kappa:lambda light chain ratio. 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 Hem Onc rec'd CT to further evaluate.    Given her significant thromboembolic history, we would like to work her up for antiphopholipid antibody syndrome. Will order levels. Would also like to obtain a RLE duplex given her thrombotic history and asymmetric edema. She is not on any anticoagulation currently.    She also had symptoms of sicca syndromes (dry eyes, dry mouth). Will check for sjogrens syndrome (SSA/SSB)    Regarding therapy for cryoglobulinemia syndrome, we recommend additional therapy with pulse dose steroids and Rituximab.     IgM and IgA normal. IgG decreased at 185. Would request allergy/immunology consult regarding ability to start rituximab with decreased IgG.    RLE extremity duplex results: no evidence of DVT.    RECS:  - HEM ONC CONSULT GIVEN BM BIOPSY FINDINGS.  - Cryoglobulin labs pending  - Antiphospholipid labs pending  - PreDMARD labs pending  - AntiSSA and AntiSSB pending  - MPO and PR3 ANCA pending  - IV solumedrol 1g  x3 doses, started 7/26/19. Hepatology ok with starting steroids while on entecavir.  - PCP prophylaxis (for steroids) with atovaquone 1500mg suspension, started 7/25/19. Avoiding bactrim in setting of acute renal failure.  - Ulcer prophylaxis with PPI (for steroids)  - Hepatology ok with starting rituximab while on entecavir. RITUXIMAB THERAPY DEFERRED TO HEM ONC GIVEN NEW BM BIOPSY FINDINGS.  - Allergy/immunology consult for recs regarding ability to start rituximab with decreased IgG.  - LLE duplex  - ID consult for immunization updates.  - Agree with PLEX.             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  Await Hematology Oncology recommendations.  Cont entecavir, atovaquone  Vaccinations by ID  *Please add Proteinase 3 to labs

## 2019-07-26 NOTE — ASSESSMENT & PLAN NOTE
Recs per ID 7/26    1. Serologies in process:          - Quantiferon Gold is pending.  If positive, please consult ID. If  Indeterminate, please draw T spot.  If T spot positive, please consult ID.            - Strongyloides IgG is pending. If positive, please consult ID to initiate treatment with Ivermectin.         2. If the patient is anticipated to remain on a prolonged course of high dose systemic steroids (> 20 mg prednisone), recommend PCP prophylaxis with Atovaquone 1500 mg PO once daily. Avoid Bactrim in setting of ARF.     3. Continue Entecavir per Hepatology      4. Immunizations recommended:              - Influenza annually (orderd 7/26)              - Tetanus booster today (orderd 7/26) and again every 10 years              - Prevnar 13 today (orderd 7/26) followed by Pneumovax 23 in 8 weeks with booster every 5 years.              - Hepatitis A vaccine today (orderd 7/26) and in 6 months              - Shingrix (two doses at 0 and 2-6 months) [NOT AVAILABLE INPATIENT]

## 2019-07-26 NOTE — PROGRESS NOTES
[facility]  Hematology/Oncology  Progress Note    Patient Name: Kendy Vital  Admission Date: 7/19/2019  Hospital Length of Stay: 7 days  Code Status: Full Code     Subjective:     HPI:  Pt is a 54 yo female with HTN, iron deficiency anemia, history of multiple DVTs, hx uterine fibroids s/p myomectomy who was transferred here from [facility] in [place] after she was found to have pneumonia, LACI and thrombocytopenia. Hematology was consulted for thrombocytopenia. History was obtained from the patient, patient's cousin, patient's fiancee and chart review.     Pt endorses episodes of fevers, chills, fatigue, dry cough, SOB, and orthopnea for 1 week. She also states that 3 days ago she began to have neck and back muscle cramping and stiffness and decreased urinary output and decided to see her PCP, who diagnosed her with muscle spasms and the flu. A few days later she decided to go to the [facility] ED and was found to have Cr 2.9, increased from baseline Cr 1.0. She was also found to have , WBC 7.9, Plt 33, lactate 0.8. CXR showed bilateral interstitial infiltrates, and subsequent CT chest showed bilateral ground glass opacities. She was treated with rochephin, doxycycline, bumex and transferred here.     Here her labs were significant for Hgb 10, MCV 69, RDW 22.3, Plt 41. WBC 5.98. UA showed 3+ protein, 3+ glucose and 3+ blood but was not consistent with UTI. Cr 4.3 and  consistent with an LACI. Hemolytic labs were unremarkable.     Pt has a history of uncontrolled hypertension and states that she is currently taking lisinopril 20 mg-HCTZ 12.5 mg, hydralazine 25 mg TID, and clonidine 0.2 mg BID. She was treated at [facility] 3 weeks ago for chest pain and was admitted. We do not have those records but pt states they had her on multiple drips and did not have a cath procedure. Pt also has a history of unprovoked thrombosis treated at [facility]  "approximately 4 years ago, none of which is visible in the "care everywhere" section of the chart. She states she had one episode of thrombosis in her left upper extremity that contained "many little clots" and required surgical thrombectomy. She also had a left renal vein thrombosis for which she received a stent. She states that for both episodes she was placed on a heparin drip, did outpatient lovenox and was continued on ASA 81 until now. She states that no one informed her of any findings suggesting a hypercoaguable state or if the clotting was provoked.    Interval History: Patient seen today, she is doing well. Received PLEX. No bleeding/bruising. No pain. Her brother's  is tomorrow. We discussed BM biopsy results.    Oncology Treatment Plan:   [No treatment plan]    Medications:  Continuous Infusions:  Scheduled Meds:   atovaquone  1,500 mg Oral Daily    carvedilol  6.25 mg Oral BID    cloNIDine  0.1 mg Oral BID    entecavir  0.5 mg Oral Q72H    famotidine  20 mg Oral Daily    hepatitis A virus vaccine (PF)  1,440 Units Intramuscular Once    hydrALAZINE  50 mg Oral Q8H    methylPREDNISolone (SOLU-Medrol) IVPB (doses > 250 mg)   Intravenous Daily    NIFEdipine  60 mg Oral Daily    pneumoc 13-nikia conj-dip cr(PF)  0.5 mL Intramuscular Once    polyethylene glycol  17 g Oral Daily    sodium bicarbonate  1,300 mg Oral TID    sodium chloride  1 spray Each Nostril QID WAKE     PRN Meds:sodium chloride, acetaminophen, albuterol-ipratropium, bisacodyl, desmopressin (DDAVP) IVPB, Dextrose 10% Bolus, Dextrose 10% Bolus, glucagon (human recombinant), glucose, glucose, ondansetron, oxyCODONE-acetaminophen, promethazine (PHENERGAN) IVPB, ramelteon, sodium chloride 0.9%, DIPH,PERTUS (ADACEL),TETANUS PF VAC (ADULT)     Review of Systems  Objective:     Vital Signs (Most Recent):  Temp: 98.4 °F (36.9 °C) (19 1325)  Pulse: 76 (19 1325)  Resp: 18 (19 1325)  BP: 138/80 (19 1325)  SpO2: " 95 % (07/26/19 1145) Vital Signs (24h Range):  Temp:  [97.6 °F (36.4 °C)-98.5 °F (36.9 °C)] 98.4 °F (36.9 °C)  Pulse:  [62-76] 76  Resp:  [14-18] 18  SpO2:  [95 %-96 %] 95 %  BP: (133-166)/(67-96) 138/80     Weight: 103 kg (227 lb)  Body mass index is 36.64 kg/m².  Body surface area is 2.19 meters squared.      Intake/Output Summary (Last 24 hours) at 7/26/2019 1411  Last data filed at 7/26/2019 1228  Gross per 24 hour   Intake 380 ml   Output 1900 ml   Net -1520 ml       Physical Exam   Constitutional: She is oriented to person, place, and time. She appears well-developed and well-nourished.   HENT:   Trialysis right neck   Eyes: EOM are normal.   Neck: Normal range of motion.   Cardiovascular: Normal rate and regular rhythm.   Pulmonary/Chest: Effort normal. No respiratory distress.   Abdominal: Soft. She exhibits no distension. There is no tenderness.   Musculoskeletal: She exhibits no edema.   Neurological: She is alert and oriented to person, place, and time.   Skin: Skin is warm and dry.   Psychiatric: She has a normal mood and affect. Her behavior is normal.       Significant Labs:   CBC:   Recent Labs   Lab 07/25/19  0425 07/26/19  0503   WBC 9.20 9.26   HGB 8.5* 8.3*   HCT 26.9* 25.8*    254    and CMP:   Recent Labs   Lab 07/25/19  0425 07/26/19  0503    145   K 4.5 4.5   * 113*   CO2 17* 21*   * 135*   * 112*   CREATININE 5.2* 4.4*   CALCIUM 8.4* 9.3   PROT 4.7* 4.8*   ALBUMIN 4.1 3.6   BILITOT 0.6 0.6   ALKPHOS 26* 36*   AST 12 11   ALT 11 13   ANIONGAP 14 11   EGFRNONAA 8.8* 10.8*       Diagnostic Results:  I have reviewed all pertinent imaging results/findings within the past 24 hours.    Assessment/Plan:     Elevated serum immunoglobulin free light chains  BM biopsy 7/23/19:  -- MILDLY HYPERCELLULAR MARROW (60%) WITH TRILINEAGE HEMATOPOIESIS.  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  -- ADEQUATE NUMBER OF MEGAKARYOCYTES.  -- STAINABLE  IRON IS NOT IDENTIFIED.  -- SEE COMMENT.  COMMENT:  CBC data shows microcytic anemia and thrombocytopenia.  Flow cytometric analysis of bone marrow detects a kappa restricted plasma cell population that expresses , CD19, and bright CD38. CD20 and CD56 are negative. Polytypic B lymphocytes are detected. T lymphocytes are immunophenotypically unremarkable. Blasts gate is not increased. Flow differential: Lymphocytes 5.9%, Monocytes 4.6%, Granulocytes 85.8%, Blast 1.2%.  Immunohistochemical stains are performed on the biopsy core and clot section for greater sensitivity and further architectural assessment with adequate controls. -positive plasma cells comprise approximately 4-5% of thetotal cellularity. Immunoglobulin kappa and lambda light chains situ hybridization study reveals kappa light chainrestriction is some areas. The lymphoid aggregates are composed of mixed CD20-positive B-cells and CD3-positive T-cells. 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 Ddx includes marginal zone lymphoma  - Patient with new renal failure, has normal IgG.  - Recommend CT neck/C/A/P to eval site for excisional LN biopsy  - Added on serum viscosity and cold agglutinins  - Start allopurinol given uric acid elevated  - Recommend skeletal survey to evaluate lytic lesions  - Transfuse platelets if < 10k or if < 50k and actively bleeding  - Transfuse pRBCs if Hgb < 7      The following was staffed and discussed with supervising physician Dr. Overton. Please contact Hematology Consult Fellow with any additional questions.    Gunjan Montalvo MD  Hematology/Oncology fellow

## 2019-07-26 NOTE — ASSESSMENT & PLAN NOTE
BM biopsy 7/23/19:  -- MILDLY HYPERCELLULAR MARROW (60%) WITH TRILINEAGE HEMATOPOIESIS.  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  -- ADEQUATE NUMBER OF MEGAKARYOCYTES.  -- STAINABLE IRON IS NOT IDENTIFIED.  -- SEE COMMENT.  COMMENT:  CBC data shows microcytic anemia and thrombocytopenia.  Flow cytometric analysis of bone marrow detects a kappa restricted plasma cell population that expresses , CD19, and bright CD38. CD20 and CD56 are negative. Polytypic B lymphocytes are detected. T lymphocytes are immunophenotypically unremarkable. Blasts gate is not increased. Flow differential: Lymphocytes 5.9%, Monocytes 4.6%, Granulocytes 85.8%, Blast 1.2%.  Immunohistochemical stains are performed on the biopsy core and clot section for greater sensitivity and further architectural assessment with adequate controls. -positive plasma cells comprise approximately 4-5% of thetotal cellularity. Immunoglobulin kappa and lambda light chains situ hybridization study reveals kappa light chainrestriction is some areas. The lymphoid aggregates are composed of mixed CD20-positive B-cells and CD3-positive T-cells. 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 Ddx includes marginal zone lymphoma  - Patient with new renal failure, has normal IgG.  - Recommend CT neck/C/A/P to eval site for excisional LN biopsy  - Added on serum viscosity and cold agglutinins  - Start allopurinol given uric acid elevated  - Recommend skeletal survey to evaluate lytic lesions  - Transfuse platelets if < 10k or if < 50k and actively bleeding  - Transfuse pRBCs if Hgb < 7

## 2019-07-26 NOTE — TREATMENT PLAN
Gastroenterology Treatment Plan    Interval History  Patient seen by Rhematology and plan to start high dose steroids and rituximab.  Cr improving to 4.4.  Liver U/S with hepatomegaly.    Plan  Chronic hepatitis B  Cryoglobulinemia usually associated with HCV, but rare association with Hep B. Presence of glomerulonephritis on preliminary renal biopsy. Overall, less likely that Hep B is related to this as ALT normal and viral load very low.  - Continue chronic Hep B treatment as patient is on immunosuppression, especially while on rituximab.  Continue entecavir 0.5mg q72hrs (renally dosed). Monitor renal function and dose-adjust accordingly.  - Patient will need HCC screening going forward given her ethnicity (Liver U/S and AFP every 6 months)  - Please notify Hepatology team when patient being discharged to arrange outpatient follow-up.    Thank you for involving us in the care of Kendy Vital. We are signing-off. Please call with any additional questions, concerns or changes in the patient's clinical status.      Alfa Smith MD  Gastroenterology Fellow, PGY4  [facility]

## 2019-07-26 NOTE — HPI
Ms. Vital is a 53 year old female with hypertension, anemia, and history of leiomyoma of the uterus who presents to ICU as a transfer from [facility] for evaluation of thrombocytopenia. Patient initially presented to [facility] hospital due to symptoms of fevers, chills, dry cough, shortness of breath and occasional nose bleeds for roughly 2 weeks. Initial work up at [facility] showed a , worsening kidney function with creatinine of 2.9, and thrombocytopenia with platelets of 33k. In addition, chest x-ray showed interstitial opacities and follow up CT showed perihilar ground glass opacity. Patient was treated with rocephin and doxycycline for suspected pneumonia as well as Bumex q12 due to her heart failure type symptoms. Lab work at the time showed WBC of 7.9 and a lactate of 0.8. Her fevers, chills, cough improved with therapy.     Patient was transferred to [facility] on 7/19/19 for further evaluation of her thrombocytopenia with concern for TTP and possible need for plasmapheresis. She was started on prednisone  60mg daily on 7/19/19.      She has had an extensive rheumatologic work up, including an elevated Rheumatoid factor,  Kappa:lambda free light chain ratio and ESR.     Patient was evaluated by nephrology for increasing creatinine. 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 Results pending.     Treatment was begun empirically with PLEX on 7/24 x 1 session with her next scheduled for today.      It appears she has chronic hepatitis B with low viral load based on labs and she was started on entecavir on 7/25 by hepatology.     Rheumatology consulted. Planning to start Rituximab. ID consulted for immunization recommendations.     No  history of diabetes.  Denies splenectomy  Denies recent infectious illnesses other than recent pneumonia treated at OSH.     Denies history of chronic, recurring infections of sinuses, throat, bladder/kidneys, intestines, skin, reproductive organs, teeth or gums.     Patient has had chickenpox.  No shingles.   Hx of Hepatitis B exposure.   Denies history of syphilis, gonorrhea, other STDs/viral hepatitis/ or other known infective illnesses.     Denies known exposure to TB  No history of residence in coccidioides endemic areas  No foreign travel    Pet/Animal exposures:  None. 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

## 2019-07-26 NOTE — SUBJECTIVE & OBJECTIVE
Interval History:   Symptoms:  Improved strength.    Diet:  Adequate intake.    Activity level:  Impaired due to weakness.   Pain:  No pain.       Review of Systems   Constitutional: Negative for fatigue and fever.   Respiratory: Negative for shortness of breath.    Cardiovascular: Negative for chest pain.   Gastrointestinal: Negative for abdominal pain.     Objective:     Vital Signs (Most Recent):  Temp: 97.6 °F (36.4 °C) (07/26/19 1650)  Pulse: 74 (07/26/19 1650)  Resp: 16 (07/26/19 1650)  BP: (!) 171/94 (07/26/19 1650)  SpO2: 99 % (07/26/19 1650) Vital Signs (24h Range):  Temp:  [97.6 °F (36.4 °C)-98.5 °F (36.9 °C)] 97.6 °F (36.4 °C)  Pulse:  [62-76] 74  Resp:  [14-18] 16  SpO2:  [95 %-99 %] 99 %  BP: (133-171)/(67-96) 171/94     Weight: 103 kg (227 lb)  Body mass index is 36.64 kg/m².    Intake/Output Summary (Last 24 hours) at 7/26/2019 1727  Last data filed at 7/26/2019 1228  Gross per 24 hour   Intake 380 ml   Output 1900 ml   Net -1520 ml      Physical Exam   Constitutional: She is oriented to person, place, and time. She appears well-nourished. No distress.   Neck: No JVD present.   Cardiovascular: Normal rate, regular rhythm and normal heart sounds.   Pulmonary/Chest: Effort normal and breath sounds normal. No respiratory distress.   Abdominal: Soft. Bowel sounds are normal. She exhibits no distension.   Musculoskeletal: She exhibits no edema.   Neurological: She is alert and oriented to person, place, and time.   Psychiatric: She has a normal mood and affect. Her behavior is normal.       Significant Labs: All pertinent labs within the past 24 hours have been reviewed.    Significant Imaging: I have reviewed and interpreted all pertinent imaging results/findings within the past 24 hours.

## 2019-07-26 NOTE — ASSESSMENT & PLAN NOTE
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 Agree that hepatitis B is less likely to be associated with cryoglobulinemia as compared with hepatitis C. Agree with BM biopsy and workup for plasma cell dyscrasia, especially given elevated kappa:lambda light chain ratio. 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 Hem Onc rec'd CT to further evaluate.    Given her significant thromboembolic history, we would like to work her up for antiphopholipid antibody syndrome. Will order levels. Would also like to obtain a RLE duplex given her thrombotic history and asymmetric edema. She is not on any anticoagulation currently.    She also had symptoms of sicca syndromes (dry eyes, dry mouth). Will check for sjogrens syndrome (SSA/SSB)    Regarding therapy for cryoglobulinemia syndrome, we recommend additional therapy with pulse dose steroids and Rituximab.     IgM and IgA normal. IgG decreased at 185. Would request allergy/immunology consult regarding ability to start rituximab with decreased IgG.    RLE extremity duplex results: no evidence of DVT.    RECS:  - Cryoglobulin labs pending  - Antiphospholipid labs pending  - PreDMARD labs pending  - AntiSSA and AntiSSB pending  - MPO and PR3 ANCA pending  - IV solumedrol 1g x3 doses, started 7/26/19. Hepatology ok with starting steroids while on entecavir.  - PCP prophylaxis (for steroids) with atovaquone 1500mg suspension, started 7/25/19. Avoiding bactrim in setting of acute renal failure.  - Ulcer prophylaxis with PPI (for steroids)  - Hepatology ok with starting rituximab while on entecavir. RITUXIMAB THERAPY DEFERRED TO HEM ONC GIVEN NEW BM BIOPSY FINDINGS.  - Allergy/immunology consult for recs regarding ability to start rituximab with decreased IgG.  - LLE duplex  - ID consult for immunization updates.  - Agree with PLEX.

## 2019-07-26 NOTE — ASSESSMENT & PLAN NOTE
baseline creatinine of 1.0 roughly 1 month ago. Outside records show creatinine of 2.9 and BUN of 74;  Concern for DPGN from SLE vs PSGN vs COSME vs anti-GBM vs AIN in setting of recent NSAID exposure, less concern for TTP despite low platelets given lack of hemolysis on labs  RJ with bilateral medicorenal disease  UA with proteinuria, blood, no evidence of infection; pro:cr 5.97; C3 41, C4<3  positive for strep A as seen on outside hospital records  given 1x dose lasix 160mg IV with poor response  - 7/24 HD line placed for PLEX which was started  - 7/19 initiated on empiric prednisone 60mg daily then changed 7/26 to solumederol 1 gram qd x3 days  - Nephrology following, appreciate recs:  7/22 renal biopsy to further guide treatment    - 7/26 rheum deferred rituximab choice  to heme.    - per rheum:IgG decreased at 185. They request allergy/immunology consult regarding ability to start rituximab with decreased IgG.  - Given her significant thromboembolic history, rheum 7/26 would like to work her up for antiphopholipid antibody syndrome. They ordered levels. She also had symptoms of sicca syndromes (dry eyes, dry mouth). They will check for sjogrens syndrome (SSA/SSB)

## 2019-07-26 NOTE — PROGRESS NOTES
[facility] Hospital Medicine  Progress Note    Patient Name: Kendy Vital  MRN: [id number]  Patient Class: IP- Inpatient   Admission Date: 7/19/2019  Length of Stay: 7 days  Attending Physician: Luiz Rea MD  Primary Care Provider: To Obtain [facility] Medicine Team: [facility] Luiz Rea MD    Subjective:     Principal Problem:Acute renal failure      HPI:  Ms. Vital is a 53 year old female with hypertension, anemia, and history of leiomyoma of the uterus who presents to ICU as a transfer from [facility] for evaluation of thrombocytopenia. Patient reports that prior to going to the hospital 3 days ago that she was experiencing symptoms of chills, feeling febrile, dry cough, shortness of breath and occasional nose bleeds for roughly 2 weeks. She also reports easy fatigueability and difficulty breathing when laying flat; currently sleeps with 2 pillows. Patient presented to hospital in [place] when her symptoms did not resolve. Initial work up showed a , worsening kidney function with creatinine of 2.9, and thrombocytopenia with platelets of 33k. In addition, chest x-ray showed interstitial opacities and follow up CT showed perihilar ground glass opacity. Patient was treated with rocephin and doxycycline as well as Bumex q12 due to her heart failure type symptoms. Lab work at the time showed WBC of 7.9 and a lactate of .8. In addition, she tested positive for strep A. Patient was transferred for further evaluation of her thrombocytopenia with concern for TTP and possible need for plasmapheresis.     At time of exam, patient is properly oriented to person time and places. She endorses headache, generalized myalgias, nausea and orthopnea. She denies SOB while sitting up, chest pain, abdominal pain, vomiting, and diarrhea. She has not felt febrile since 1 week prior to hospital stay.        Overview/Hospital Course:  Ms. Vital  presented as transfer to ICU for suspected TTP. At that time, she had severe thrombocytopenia, renal failure, and bilateral infiltrates on chest CT concerning for PNA. She was stepped down when repeat CBC revealed normalizing platelets and hemolysis labs were unremarkable. Initially placed on vanc/zosyn/azithromycin for PNA, which has been de-escalated to rocephin/azithromycin for CAP. Her course has been complicated by ARF of unclear cause. 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   7/23 Nephrology biopsed  and have initiated on prednisone empirically.  7/24 Heme did BMBx      Interval History:   Symptoms:  Improved strength.    Diet:  Adequate intake.    Activity level:  Impaired due to weakness.   Pain:  No pain.       Review of Systems   Constitutional: Negative for fatigue and fever.   Respiratory: Negative for shortness of breath.    Cardiovascular: Negative for chest pain.   Gastrointestinal: Negative for abdominal pain.     Objective:     Vital Signs (Most Recent):  Temp: 97.6 °F (36.4 °C) (07/26/19 1650)  Pulse: 74 (07/26/19 1650)  Resp: 16 (07/26/19 1650)  BP: (!) 171/94 (07/26/19 1650)  SpO2: 99 % (07/26/19 1650) Vital Signs (24h Range):  Temp:  [97.6 °F (36.4 °C)-98.5 °F (36.9 °C)] 97.6 °F (36.4 °C)  Pulse:  [62-76] 74  Resp:  [14-18] 16  SpO2:  [95 %-99 %] 99 %  BP: (133-171)/(67-96) 171/94     Weight: 103 kg (227 lb)  Body mass index is 36.64 kg/m².    Intake/Output Summary (Last 24 hours) at 7/26/2019 1727  Last data filed at 7/26/2019 1228  Gross per 24 hour   Intake 380 ml   Output 1900 ml   Net -1520 ml      Physical Exam   Constitutional: She is oriented to person, place, and time. She appears well-nourished. No distress.   Neck: No JVD present.   Cardiovascular: Normal rate, regular rhythm and normal heart sounds.   Pulmonary/Chest: Effort normal and breath sounds normal. No respiratory distress.   Abdominal: Soft. Bowel sounds are normal. She exhibits no distension.   Musculoskeletal: She  exhibits no edema.   Neurological: She is alert and oriented to person, place, and time.   Psychiatric: She has a normal mood and affect. Her behavior is normal.       Significant Labs: All pertinent labs within the past 24 hours have been reviewed.    Significant Imaging: I have reviewed and interpreted all pertinent imaging results/findings within the past 24 hours.      Assessment/Plan:      * Acute renal failure  baseline creatinine of 1.0 roughly 1 month ago. Outside records show creatinine of 2.9 and BUN of 74;  Concern for DPGN from SLE vs PSGN vs COSME vs anti-GBM vs AIN in setting of recent NSAID exposure, less concern for TTP despite low platelets given lack of hemolysis on labs  RJ with bilateral medicorenal disease  UA with proteinuria, blood, no evidence of infection; pro:cr 5.97; C3 41, C4<3  positive for strep A as seen on outside hospital records  given 1x dose lasix 160mg IV with poor response  - 7/24 HD line placed for PLEX which was started  - 7/19 initiated on empiric prednisone 60mg daily then changed 7/26 to solumederol 1 gram qd x3 days  - Nephrology following, appreciate recs:  7/22 renal biopsy to further guide treatment    - 7/26 rheum deferred rituximab choice  to heme.    - per rheum:IgG decreased at 185. They request allergy/immunology consult regarding ability to start rituximab with decreased IgG.  - Given her significant thromboembolic history, rheum 7/26 would like to work her up for antiphopholipid antibody syndrome. They ordered levels. She also had symptoms of sicca syndromes (dry eyes, dry mouth). They will check for sjogrens syndrome (SSA/SSB)            Elevated serum immunoglobulin free light chains  BMBX: MONOCLONAL PLASMA CELL POPULATION WITH ATYPICAL LYMPHOID AGGREGATES.  R/o malignancy  Heme/consulted  -- bone survey  -- CT ordered to look for lymph nodes  -- allopurinol for elevated urate      Essential hypertension  hypertensive at admission and per patient, has been  undergoing multiple recent medication changes due to HTN as outpatient.  Suspect initially may have been exacerbated by NSAID use, now concern for multifactorial cause including renal failure with volume overload, steroid use, and inadequate dosing of home regimen  Home regimen includes: lisinopril-HCTZ 20-12.5mg, clonidine 0.2mg bid, and hydralazine 25mg tid  - amlodipine changed for nifedpine  - started hydralazine 50mg tid, clonidine, and coreg 6.25mg bid    Thrombocytopenia, unspecified  Plt on admission 41. Improved after treatments.  Seen by Heme/onc.   HIT Ab negative  - monitor      Other specified anemias  Hgb slowly going down.  FERRITIN 161, RETIC 4.7 (H), Bili 1, FOLATE 11.1, VITAMIN B12 843  Iron 40, sat 33%  - monitor  - fobt            Immunosuppression  Recs per ID 7/26    1. Serologies in process:          - Quantiferon Gold is pending.  If positive, please consult ID. If  Indeterminate, please draw T spot.  If T spot positive, please consult ID.            - Strongyloides IgG is pending. If positive, please consult ID to initiate treatment with Ivermectin.         2. If the patient is anticipated to remain on a prolonged course of high dose systemic steroids (> 20 mg prednisone), recommend PCP prophylaxis with Atovaquone 1500 mg PO once daily. Avoid Bactrim in setting of ARF.     3. Continue Entecavir per Hepatology      4. Immunizations recommended:              - Influenza annually (orderd 7/26)              - Tetanus booster today (orderd 7/26) and again every 10 years              - Prevnar 13 today (orderd 7/26) followed by Pneumovax 23 in 8 weeks with booster every 5 years.              - Hepatitis A vaccine today  (orderd 7/26) and in 6 months              - Shingrix (two doses at 0 and 2-6 months) [NOT AVAILABLE INPATIENT]      Chronic hepatitis B  Hepatology consulted to see if her HBV can be causing the cryoglubins and if she needs treatment.  - entecavir 0.5 mg every 72 hour (renal  "dose)  - f/u with liver as outpt    Per hepatology:  "Cryoglobulinemia usually associated with HCV, but rare association with Hep B. Presence of glomerulonephritis on preliminary renal biopsy. Overall, less likely that Hep B is related to this as ALT normal and viral load very low.  - Continue chronic Hep B treatment as patient is on immunosuppression, especially while on rituximab.  Continue entecavir 0.5mg q72hrs (renally dosed). Monitor renal function and dose-adjust accordingly.  - Patient will need HCC screening going forward given her ethnicity (Liver U/S and AFP every 6 months)"      Pneumonia  x-ray in [place] revealed interstitial infiltrates. Follow up chest CT showed perihilar ground glass opacity. Treated initially with rocephin and doxycycline  repeat chest x-ray with patchy airspace consolidations bilaterally R>L, edema vs PNA  blood cultures NGTD  - currently on broad spectrum antibiotics vanc, zosyn, azithro; held vanc and de-escalated zosyn to ceftriaxone on 7/20 and all abx stopped 7/24    Elevated brain natriuretic peptide (BNP) level  BNP of 526 from outside records; 999 on admission  bilateral lower extremity swelling and reports easy fatigueability and orthopnea  TTE with CVP 15, normal EF, indeterminate diastolic function  suspect that this reflects volume overload in setting of oliguric renal failure rather than primary cardiac process      VTE Risk Mitigation (From admission, onward)        Ordered     Place sequential compression device  Until discontinued      07/19/19 0533     IP VTE LOW RISK PATIENT  Once      07/19/19 0533                Luiz Rea MD  Department of Hospital Medicine   [facility]  "

## 2019-07-26 NOTE — ASSESSMENT & PLAN NOTE
x-ray in [place] revealed interstitial infiltrates. Follow up chest CT showed perihilar ground glass opacity. Treated initially with rocephin and doxycycline  repeat chest x-ray with patchy airspace consolidations bilaterally R>L, edema vs PNA  blood cultures NGTD  - currently on broad spectrum antibiotics vanc, zosyn, azithro; held vanc and de-escalated zosyn to ceftriaxone on 7/20 and all abx stopped 7/24

## 2019-07-26 NOTE — PLAN OF CARE
Problem: Adult Inpatient Plan of Care  Goal: Plan of Care Review  Outcome: Ongoing (interventions implemented as appropriate)  POC was reviewed with pt. Showed the understanding. VSS. Pt slept on and off. Voids well. No c/o pain voiced. All due medication taken with tolerated. Call light within easy reach. All needs attended. No distress noted.

## 2019-07-26 NOTE — ASSESSMENT & PLAN NOTE
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  R/o malignancy  Heme/consulted  -- bone survey  -- CT ordered to look for lymph nodes  -- allopurinol for elevated urate

## 2019-07-26 NOTE — SUBJECTIVE & OBJECTIVE
Interval History: Pt feels depressed today, due to her brother's recent death. He is to be buried today. She reports no subjective symptomatic complaints. She denies rashes, fevers, joint pain or swelling.     Current Facility-Administered Medications   Medication Frequency    0.9%  NaCl infusion (for blood administration) Q24H PRN    acetaminophen tablet 650 mg Q6H PRN    albumin human 5% bottle 275 g Once    albuterol-ipratropium 2.5 mg-0.5 mg/3 mL nebulizer solution 3 mL Q6H PRN    atovaquone suspension 1,500 mg Daily    bisacodyl suppository 10 mg Daily PRN    calcium gluconate 2,000 mg in dextrose 5 % 100 mL IVPB Once    carvedilol tablet 6.25 mg BID    cloNIDine tablet 0.1 mg BID    desmopressin (DDAVP) 15.452 mcg in sodium chloride 0.9% 50 mL IVPB On Call Procedure    dextrose 10% (D10W) Bolus PRN    dextrose 10% (D10W) Bolus PRN    entecavir tablet 0.5 mg Q72H    famotidine tablet 20 mg Daily    glucagon (human recombinant) injection 1 mg PRN    glucose chewable tablet 16 g PRN    glucose chewable tablet 24 g PRN    heparin (porcine) injection 2,300 Units Once    hydrALAZINE tablet 50 mg Q8H    methylPREDNISolone sodium succinate (SOLU-MEDROL) 1,000 mg in dextrose 5 % 100 mL IVPB Daily    NIFEdipine 24 hr tablet 60 mg Daily    ondansetron disintegrating tablet 8 mg Q8H PRN    oxyCODONE-acetaminophen 5-325 mg per tablet 1 tablet Q8H PRN    polyethylene glycol packet 17 g Daily    promethazine (PHENERGAN) 6.25 mg in dextrose 5 % 50 mL IVPB Q6H PRN    ramelteon tablet 8 mg Nightly PRN    sodium bicarbonate tablet 1,300 mg TID    sodium chloride 0.65 % nasal spray 1 spray QID WAKE    sodium chloride 0.9% flush 10 mL PRN     Objective:     Vital Signs (Most Recent):  Temp: 97.8 °F (36.6 °C) (07/26/19 0843)  Pulse: 70 (07/26/19 0843)  Resp: 18 (07/26/19 0843)  BP: 138/67 (07/26/19 0843)  SpO2: 96 % (07/26/19 0843)  O2 Device (Oxygen Therapy): room air (07/26/19 0500) Vital Signs (24h  Range):  Temp:  [97.6 °F (36.4 °C)-98.4 °F (36.9 °C)] 97.8 °F (36.6 °C)  Pulse:  [62-70] 70  Resp:  [18] 18  SpO2:  [95 %-96 %] 96 %  BP: (138-160)/(67-90) 138/67     Weight: 103 kg (227 lb) (07/19/19 1400)  Body mass index is 36.64 kg/m².  Body surface area is 2.19 meters squared.      Intake/Output Summary (Last 24 hours) at 7/26/2019 1001  Last data filed at 7/26/2019 0600  Gross per 24 hour   Intake --   Output 1200 ml   Net -1200 ml       Physical Exam   Constitutional: She is oriented to person, place, and time and well-developed, well-nourished, and in no distress.   HENT:   Head: Normocephalic and atraumatic.   Eyes: Right eye exhibits no discharge. Left eye exhibits no discharge.   Neck: Neck supple.   Cardiovascular: Normal rate and regular rhythm.  Exam reveals no gallop and no friction rub.    No murmur heard.  Pulmonary/Chest: Effort normal and breath sounds normal. She has no wheezes. She has no rales.   Abdominal: Soft. She exhibits no distension. There is no tenderness.   Neurological: She is alert and oriented to person, place, and time.   Skin: No rash noted.     Musculoskeletal: She exhibits edema.   1+ edema  No swelling or redness in joints in fingers, wrists, elbows or knees.         Significant Labs:  CBC:   Recent Labs   Lab 07/26/19  0503   WBC 9.26   HGB 8.3*   HCT 25.8*        CMP:   Recent Labs   Lab 07/26/19  0503   *   CALCIUM 9.3   ALBUMIN 3.6   PROT 4.8*      K 4.5   CO2 21*   *   *   CREATININE 4.4*   ALKPHOS 36*   ALT 13   AST 11   BILITOT 0.6       Significant Imaging:  Imaging results within the past 24 hours have been reviewed.

## 2019-07-26 NOTE — CONSULTS
[facility]  Infectious Disease  Consult Note    Patient Name: Kendy Vital  MRN: [id number]  Admission Date: 7/19/2019  Hospital Length of Stay: 7 days  Attending Physician: Luiz Rea MD  Primary Care Provider: To Obtain Unable     Isolation Status: No active isolations    Patient information was obtained from patient and past medical records.      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 She was started on PLEX and high dose steroids along with entecavir for her hepatitis B. ID consulted for pre-biologic evaluation prior to initiating therapy with Rituximab.    Serologies ordered and immunizations reviewed. No history of chronic or recurring infectious issues.  Based on the available information, no contraindications to Biologic therapy from an infectious disease standpoint.     1. Serologies in process:   - Quantiferon Gold is pending.  If positive, please consult ID. If  Indeterminate, please draw T spot.  If T spot positive, please consult ID.     - Strongyloides IgG is pending. If positive, please consult ID to initiate treatment with Ivermectin.       2. If the patient is anticipated to remain on a prolonged course of high dose systemic steroids (> 20 mg prednisone), recommend PCP prophylaxis with Atovaquone 1500 mg PO once daily. Avoid Bactrim in setting of ARF.    3. Continue Entecavir per Hepatology     4. Immunizations recommended:              - Influenza annually              - Tetanus booster today and again every 10 years              - Prevnar 13 today followed by Pneumovax 23 in 8 weeks with booster every 5 years.              - Hepatitis A  vaccine today and in 6 months              - Shingrix (two doses at 0 and 2-6 months)        5. Counseling: I discussed with patient the risk for increased susceptibility to infections with biologic therapy. She has been counseled on the importance of vaccinations including but not limited to a yearly flu vaccine. Immunizations and possible side effects were reviewed. Specific guidance has beenprovided to the patient regarding the patient's occupation, hobbies and activities to avoid future infections, including but not limited to avoiding undercooked meats and seafood, proper hygiene and contact with animals.         Thank you for the consult. Please call for any questions.  Alysia Butt PA-C  Phone: [phone]  Pager: [phone]    Subjective:     Principal Problem: Acute renal failure    HPI: Ms. Vital is a 53 year old female with hypertension, anemia, and history of leiomyoma of the uterus who presents to ICU as a transfer from [facility] for evaluation of thrombocytopenia. Patient initially presented to [facility] due to symptoms of fevers, chills, dry cough, shortness of breath and occasional nose bleeds for roughly 2 weeks. Initial work up at [facility] showed a , worsening kidney function with creatinine of 2.9, and thrombocytopenia with platelets of 33k. In addition, chest x-ray showed interstitial opacities and follow up CT showed perihilar ground glass opacity. Patient was treated with rocephin and doxycycline for suspected pneumonia as well as Bumex q12 due to her heart failure type symptoms. Lab work at the time showed WBC of 7.9 and a lactate of 0.8. Her fevers, chills, cough improved with therapy.     Patient was [place] [facility] [place] for further evaluation of her thrombocytopenia with concern for TTP and possible need for plasmapheresis. She was started on prednisone  60mg daily on 7/19/19.      She has had an extensive rheumatologic work up, including an elevated  Rheumatoid factor,  Kappa:lambda free light chain ratio and ESR.     Patient was evaluated by nephrology for increasing creatinine. 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 Results pending.     Treatment was begun empirically with PLEX on 7/24 x 1 session with her next scheduled for today.      It appears she has chronic hepatitis B with low viral load based on labs and she was started on entecavir on 7/25 by hepatology.     Rheumatology consulted. Planning to start Rituximab. ID consulted for immunization recommendations.     No history of diabetes.  Denies splenectomy  Denies recent infectious illnesses other than recent pneumonia treated at OSH.     Denies history of chronic, recurring infections of sinuses, throat, bladder/kidneys, intestines, skin, reproductive organs, teeth or gums.     Patient has had chickenpox.  No shingles.   Hx of Hepatitis B exposure.   Denies history of syphilis, gonorrhea, other STDs/viral hepatitis/ or other known infective illnesses.     Denies known exposure to TB  No history of residence in coccidioides endemic areas  No foreign travel    Pet/Animal exposures:  None. 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    Review of patient's allergies indicates:  No Known Allergies    Medications:  Medications Prior to Admission   Medication Sig    amLODIPine (NORVASC) 10 MG tablet Take 10 mg by mouth once daily.    aspirin (ECOTRIN) 81 MG EC tablet Take 81 mg by mouth once daily.    chlorthalidone (HYGROTEN) 50 MG Tab Take 50 mg by mouth once daily.     Antibiotics (From admission, onward)    None        Antifungals (From admission, onward)    None        Antivirals (From admission, onward)        Stop Route Frequency     entecavir      -- Oral Every 72 hours           There is no immunization history for the selected administration types on file for this patient.    Family History     None        Social History     Socioeconomic History    Marital status:      Spouse name: Not on file    Number of children: Not on file    Years of education: Not on file    Highest education level: Not on file   Occupational History    Not on file   Social Needs    Financial resource strain: Not on file    Food insecurity:     Worry: Not on file     Inability: Not on file    Transportation needs:     Medical: Not on file     Non-medical: Not on file   Tobacco Use    Smoking status: Not on file   Substance and Sexual Activity    Alcohol use: Not on file    Drug use: Not on file    Sexual activity: Not on file   Lifestyle    Physical activity:     Days per week: Not on file     Minutes per session: Not on file    Stress: Not on file   Relationships    Social connections:     Talks on phone: Not on file     Gets together: Not on file     Attends Rastafarian service: Not on file     Active member of club or organization: Not on file     Attends meetings of clubs or organizations: Not on file     Relationship status: Not on file   Other Topics Concern    Not on file   Social History Narrative    Not on file     Review of Systems   Constitutional: Positive for fatigue. Negative for appetite change,  chills, diaphoresis and fever.   HENT: Negative for congestion, dental problem, rhinorrhea and sinus pressure.    Eyes: Negative for visual disturbance.   Respiratory: Negative for cough, shortness of breath and wheezing.    Cardiovascular: Negative for chest pain and palpitations.   Gastrointestinal: Negative for abdominal pain, blood in stool, constipation, diarrhea, nausea and vomiting.   Genitourinary: Positive for difficulty urinating (improved). Negative for hematuria.   Musculoskeletal: Positive for arthralgias and myalgias. Negative for joint swelling.   Skin: Negative for rash.   Allergic/Immunologic: Negative.    Neurological: Negative for weakness, light-headedness, numbness and headaches.   Hematological: Bruises/bleeds easily.        Clotting history as in HPI   Psychiatric/Behavioral: Negative.      Objective:     Vital Signs (Most Recent):  Temp: 98.5 °F (36.9 °C) (07/26/19 1158)  Pulse: 62 (07/26/19 1315)  Resp: 16 (07/26/19 1315)  BP: 133/84 (07/26/19 1315)  SpO2: 95 % (07/26/19 1145) Vital Signs (24h Range):  Temp:  [97.6 °F (36.4 °C)-98.5 °F (36.9 °C)] 98.5 °F (36.9 °C)  Pulse:  [62-71] 62  Resp:  [14-18] 16  SpO2:  [95 %-96 %] 95 %  BP: (133-166)/(67-96) 133/84     Weight: 103 kg (227 lb)  Body mass index is 36.64 kg/m².    Estimated Creatinine Clearance: 18.1 mL/min (A) (based on SCr of 4.4 mg/dL (H)).    Physical Exam   Constitutional: She is oriented to person, place, and time. She appears well-developed and well-nourished. No distress.   HENT:   Head: Normocephalic and atraumatic.   Mouth/Throat: Uvula is midline, oropharynx is clear and moist and mucous membranes are normal. She has dentures (upper). No oral lesions. No dental abscesses or dental caries.   Eyes: Pupils are equal, round, and reactive to light. No scleral icterus.   Cardiovascular: Normal rate, regular rhythm and normal heart sounds.   Pulmonary/Chest: Effort normal and breath sounds normal. No respiratory distress. She has no  wheezes. She has no rales.   Abdominal: Soft. She exhibits no distension. There is no hepatosplenomegaly. There is no tenderness. There is no guarding.   Musculoskeletal: She exhibits no edema.   Lymphadenopathy:     She has no cervical adenopathy.   Neurological: She is alert and oriented to person, place, and time.   Skin: Skin is warm, dry and intact. No rash noted.   Scattered Ecchymosis on extremities   Psychiatric: She has a normal mood and affect. Her behavior is normal.       Significant Labs: All pertinent labs within the past 24 hours have been reviewed.    Significant Imaging: I have reviewed all pertinent imaging results/findings within the past 24 hours.

## 2019-07-26 NOTE — SUBJECTIVE & OBJECTIVE
Past Medical History:   Diagnosis Date    Renal vein thrombosis 2015    s/p embolectomy and lovenox for 9 mos    Uterine leiomyoma     s/p resection       No past surgical history on file.    Review of patient's allergies indicates:  No Known Allergies    Medications:  Medications Prior to Admission   Medication Sig    amLODIPine (NORVASC) 10 MG tablet Take 10 mg by mouth once daily.    aspirin (ECOTRIN) 81 MG EC tablet Take 81 mg by mouth once daily.    chlorthalidone (HYGROTEN) 50 MG Tab Take 50 mg by mouth once daily.     Antibiotics (From admission, onward)    None        Antifungals (From admission, onward)    None        Antivirals (From admission, onward)        Stop Route Frequency     entecavir      -- Oral Every 72 hours           There is no immunization history for the selected administration types on file for this patient.    Family History     None        Social History     Socioeconomic History    Marital status:      Spouse name: Not on file    Number of children: Not on file    Years of education: Not on file    Highest education level: Not on file   Occupational History    Not on file   Social Needs    Financial resource strain: Not on file    Food insecurity:     Worry: Not on file     Inability: Not on file    Transportation needs:     Medical: Not on file     Non-medical: Not on file   Tobacco Use    Smoking status: Not on file   Substance and Sexual Activity    Alcohol use: Not on file    Drug use: Not on file    Sexual activity: Not on file   Lifestyle    Physical activity:     Days per week: Not on file     Minutes per session: Not on file    Stress: Not on file   Relationships    Social connections:     Talks on phone: Not on file     Gets together: Not on file     Attends Restorationist service: Not on file     Active member of club or organization: Not on file     Attends meetings of clubs or organizations: Not on file     Relationship status: Not on file   Other  Topics Concern    Not on file   Social History Narrative    Not on file     Review of Systems   Constitutional: Positive for fatigue. Negative for appetite change, chills, diaphoresis and fever.   HENT: Negative for congestion, dental problem, rhinorrhea and sinus pressure.    Eyes: Negative for visual disturbance.   Respiratory: Negative for cough, shortness of breath and wheezing.    Cardiovascular: Negative for chest pain and palpitations.   Gastrointestinal: Negative for abdominal pain, blood in stool, constipation, diarrhea, nausea and vomiting.   Genitourinary: Positive for difficulty urinating (improved). Negative for hematuria.   Musculoskeletal: Positive for arthralgias and myalgias. Negative for joint swelling.   Skin: Negative for rash.   Allergic/Immunologic: Negative.    Neurological: Negative for weakness, light-headedness, numbness and headaches.   Hematological: Bruises/bleeds easily.        Clotting history as in HPI   Psychiatric/Behavioral: Negative.      Objective:     Vital Signs (Most Recent):  Temp: 98.5 °F (36.9 °C) (07/26/19 1158)  Pulse: 62 (07/26/19 1315)  Resp: 16 (07/26/19 1315)  BP: 133/84 (07/26/19 1315)  SpO2: 95 % (07/26/19 1145) Vital Signs (24h Range):  Temp:  [97.6 °F (36.4 °C)-98.5 °F (36.9 °C)] 98.5 °F (36.9 °C)  Pulse:  [62-71] 62  Resp:  [14-18] 16  SpO2:  [95 %-96 %] 95 %  BP: (133-166)/(67-96) 133/84     Weight: 103 kg (227 lb)  Body mass index is 36.64 kg/m².    Estimated Creatinine Clearance: 18.1 mL/min (A) (based on SCr of 4.4 mg/dL (H)).    Physical Exam   Constitutional: She is oriented to person, place, and time. She appears well-developed and well-nourished. No distress.   HENT:   Head: Normocephalic and atraumatic.   Mouth/Throat: Uvula is midline, oropharynx is clear and moist and mucous membranes are normal. She has dentures (upper). No oral lesions. No dental abscesses or dental caries.   Eyes: Pupils are equal, round, and reactive to light. No scleral icterus.    Cardiovascular: Normal rate, regular rhythm and normal heart sounds.   Pulmonary/Chest: Effort normal and breath sounds normal. No respiratory distress. She has no wheezes. She has no rales.   Abdominal: Soft. She exhibits no distension. There is no hepatosplenomegaly. There is no tenderness. There is no guarding.   Musculoskeletal: She exhibits no edema.   Lymphadenopathy:     She has no cervical adenopathy.   Neurological: She is alert and oriented to person, place, and time.   Skin: Skin is warm, dry and intact. No rash noted.   Scattered Ecchymosis on extremities   Psychiatric: She has a normal mood and affect. Her behavior is normal.       Significant Labs: All pertinent labs within the past 24 hours have been reviewed.    Significant Imaging: I have reviewed all pertinent imaging results/findings within the past 24 hours.

## 2019-07-26 NOTE — ASSESSMENT & PLAN NOTE
BM biopsy results noted and current suspicion for lymphoplasmacytic lymphoma vs Waldenstrom's, vs multiple myeloma. Given results, will let Heme/Onc decide which chemotherapeutic agent they feel is best. Would continue pulse steroids to complete 3 days. Still awaiting thrombophilia work up and records from [place], [place] regarding her history of renal vein thrombosis. Thus far, bilateral lower extremity Duplex and beta 2 glycoproteins were negative.     - Recommend Allergy/Immunology consult for decreased IgG levels, will await their recommendations  - Follow up remaining cryoglobulin and thrombophilia labs, SSA, SSB, MPO, PR3  - PCP prophylaxis (for steroids) with atovaquone 1500mg suspension, started 7/25/19. Avoiding bactrim in setting of acute renal failure.  - Ulcer prophylaxis with PPI (for steroids)  - Chemotherapeutic therapy deferred to Heme/Onc      This case will be discussed with Dr. Solis.    Rita Buck M.D.  Rheumatology Fellow, PGY 4

## 2019-07-26 NOTE — MEDICAL/APP STUDENT
[facility]  Nephrology  Medical Student Progress Note    Patient Name: Kendy Vital  MRN: [id number]   Admission Date: 7/19/2019  Length of Stay: 7 days  Attending Physician: Dr. 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  Cr today is 4.4, yesterday 5.2 (Admission 4.3).   Her kidney function is improving    Awaiting bone marrow biopsy results      Plan  Plasmapheresis today  Monitor labs  Avoid nephrotoxic drugs      To be discussed with team and staff,    Wally Hathaway  MS4    Subjective:     Principal Problem:Acute renal failure  Please see H&P for detailed presenting history and ROS.    Interval History: Her BP has been elevated throughout the day yesterday      Objective:     Vital Signs (Most Recent):  Temp: 98.4 °F (36.9 °C) (07/26/19 1325)  Pulse: 76 (07/26/19 1325)  Resp: 18 (07/26/19 1325)  BP: 138/80 (07/26/19 1325)  SpO2: 95 % (07/26/19 1145) Vital Signs (24h Range):  Temp:  [97.6 °F (36.4 °C)-98.5 °F (36.9 °C)] 98.4 °F (36.9 °C)  Pulse:  [62-76] 76  Resp:  [14-18] 18  SpO2:  [95 %-96 %] 95 %  BP: (133-166)/(67-96) 138/80     Weight: 103 kg (227 lb)  Body mass index is 36.64 kg/m².    Intake/Output Summary (Last 24 hours) at 7/26/2019 1407  Last data filed at 7/26/2019 1228  Gross per 24 hour   Intake 380 ml   Output 1900 ml   Net -1520 ml      Physical Exam   Constitutional: She is oriented to person, place, and time.   Cardiovascular: Normal rate.   Pulmonary/Chest: Effort normal and breath sounds normal.   Abdominal: Soft. Bowel sounds are normal.   Musculoskeletal: She exhibits edema.   Neurological: She is alert and oriented to person, place, and time. GCS eye subscore is 4. GCS verbal subscore is 5. GCS motor subscore is 6.   Skin: Skin is warm and dry. Capillary refill takes less than 2 seconds.   Psychiatric: Her speech is normal and behavior is normal. Judgment and thought content normal.  Her mood appears anxious. Cognition and memory are normal. She exhibits a depressed mood.     Significant Labs:   Recent Results (from the past 24 hour(s))   CBC auto differential    Collection Time: 07/26/19  5:03 AM   Result Value Ref Range    WBC 9.26 3.90 - 12.70 K/uL    RBC 3.56 (L) 4.00 - 5.40 M/uL    Hemoglobin 8.3 (L) 12.0 - 16.0 g/dL    Hematocrit 25.8 (L) 37.0 - 48.5 %    Mean Corpuscular Volume 73 (L) 82 - 98 fL    Mean Corpuscular Hemoglobin 23.3 (L) 27.0 - 31.0 pg    Mean Corpuscular Hemoglobin Conc 32.2 32.0 - 36.0 g/dL    RDW 27.2 (H) 11.5 - 14.5 %    Platelets 254 150 - 350 K/uL    MPV SEE COMMENT 9.2 - 12.9 fL    Immature Granulocytes 1.7 (H) 0.0 - 0.5 %    Gran # (ANC) 6.6 1.8 - 7.7 K/uL    Immature Grans (Abs) 0.16 (H) 0.00 - 0.04 K/uL    Lymph # 1.4 1.0 - 4.8 K/uL    Mono # 1.1 (H) 0.3 - 1.0 K/uL    Eos # 0.0 0.0 - 0.5 K/uL    Baso # 0.00 0.00 - 0.20 K/uL    nRBC 0 0 /100 WBC    Gran% 71.4 38.0 - 73.0 %    Lymph% 15.6 (L) 18.0 - 48.0 %    Mono% 11.3 4.0 - 15.0 %    Eosinophil% 0.0 0.0 - 8.0 %    Basophil% 0.0 0.0 - 1.9 %    Differential Method Automated    Comprehensive metabolic panel    Collection Time: 07/26/19  5:03 AM   Result Value Ref Range    Sodium 145 136 - 145 mmol/L    Potassium 4.5 3.5 - 5.1 mmol/L    Chloride 113 (H) 95 - 110 mmol/L    CO2 21 (L) 23 - 29 mmol/L    Glucose 135 (H) 70 - 110 mg/dL    BUN, Bld 112 (H) 6 - 20 mg/dL    Creatinine 4.4 (H) 0.5 - 1.4 mg/dL    Calcium 9.3 8.7 - 10.5 mg/dL    Total Protein 4.8 (L) 6.0 - 8.4 g/dL    Albumin 3.6 3.5 - 5.2 g/dL    Total Bilirubin 0.6 0.1 - 1.0 mg/dL    Alkaline Phosphatase 36 (L) 55 - 135 U/L    AST 11 10 - 40 U/L    ALT 13 10 - 44 U/L    Anion Gap 11 8 - 16 mmol/L    eGFR if African American 12.5 (A) >60 mL/min/1.73 m^2    eGFR if non African American 10.8 (A) >60 mL/min/1.73 m^2   Phosphorus    Collection Time: 07/26/19  5:03 AM   Result Value Ref Range    Phosphorus 6.5 (H) 2.7 - 4.5 mg/dL   Factor 8 assay    Collection Time:  07/26/19  5:03 AM   Result Value Ref Range    Factor VIII Activity 225 (H) 60 - 170 %   HIV 1/2 Ag/Ab (4th Gen)    Collection Time: 07/26/19  5:03 AM   Result Value Ref Range    HIV 1/2 Ag/Ab Negative Negative   Hepatitis A antibody, IgG    Collection Time: 07/26/19  5:03 AM   Result Value Ref Range    Hepatitis A Antibody IgG Negative    RPR    Collection Time: 07/26/19  5:03 AM   Result Value Ref Range    RPR Non-reactive Non-reactive   IgA    Collection Time: 07/26/19  5:03 AM   Result Value Ref Range    IgA 44 40 - 350 mg/dL   IgG    Collection Time: 07/26/19  5:03 AM   Result Value Ref Range    IgG - Serum 185 (L) 650 - 1600 mg/dL   IgM    Collection Time: 07/26/19  5:03 AM   Result Value Ref Range    IgM 139 50 - 300 mg/dL

## 2019-07-26 NOTE — PROGRESS NOTES
.Apheresis tx started at 1158 via RIJ catheter.  Pt stated no complaints of pain at this time  Pt oriented x 4.   5.5 Liter exchange.  Replacement fluids albumin 5%.  RIJ cath accessed  Patent both lumen.  Dressing clean dry intact  Pt stated 0 pain.  On numeric scale .  Meds Calcium Gluconate 2 Gm IVPB throughout procedure.  Tx ended at 1325.  Cath flushed and locked.  Pt tolerated tx well.  Next tx 07/29/2019.

## 2019-07-26 NOTE — ASSESSMENT & PLAN NOTE
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 She was started on PLEX and high dose steroids along with entecavir for her hepatitis B. ID consulted for pre-biologic evaluation prior to initiating therapy with Rituximab.    Serologies ordered and immunizations reviewed. No history of chronic or recurring infectious issues.  Based on the available information, no contraindications to Biologic therapy from an infectious disease standpoint.     1. Serologies in process:   - Quantiferon Gold is pending.  If positive, please consult ID. If  Indeterminate, please draw T spot.  If T spot positive, please consult ID.     - Strongyloides IgG is pending. If positive, please consult ID to initiate treatment with Ivermectin.       2. If the patient is anticipated to remain on a prolonged course of high dose systemic steroids (> 20 mg prednisone), recommend PCP prophylaxis with Atovaquone 1500 mg PO once daily. Avoid Bactrim in setting of ARF.    3. Continue Entecavir per Hepatology     4. Immunizations recommended:              - Influenza annually              - Tetanus booster today and again every 10 years              - Prevnar 13 today followed by Pneumovax 23 in 8 weeks with booster every 5 years.              - Hepatitis A vaccine today and in 6 months              - Shingrix (two doses at 0 and 2-6 months)        5. Counseling: I discussed with patient the risk for increased susceptibility to infections with biologic therapy. She has been counseled on the importance of vaccinations including but not limited to a yearly flu vaccine. Immunizations and possible side effects were reviewed. Specific guidance has beenprovided to the patient regarding the patient's occupation, hobbies and activities to avoid future infections, including but not limited to avoiding undercooked meats and seafood, proper hygiene and  contact with animals.

## 2019-07-26 NOTE — ASSESSMENT & PLAN NOTE
"Hepatology consulted to see if her HBV can be causing the cryoglubins and if she needs treatment.  - entecavir 0.5 mg every 72 hour (renal dose)  - f/u with liver as outpt    Per hepatology:  "Cryoglobulinemia usually associated with HCV, but rare association with Hep B. Presence of glomerulonephritis on preliminary renal biopsy. Overall, less likely that Hep B is related to this as ALT normal and viral load very low.  - Continue chronic Hep B treatment as patient is on immunosuppression, especially while on rituximab.  Continue entecavir 0.5mg q72hrs (renally dosed). Monitor renal function and dose-adjust accordingly.  - Patient will need HCC screening going forward given her ethnicity (Liver U/S and AFP every 6 months)"    "

## 2019-07-26 NOTE — HPI
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 Hematology was consulted for thrombocytopenia. History was obtained from the patient, patient's cousin, patient's fiancee and chart review.     Pt endorses episodes of fevers, chills, fatigue, dry cough, SOB, and orthopnea for 1 week. She also states that 3 days ago she began to have neck and back muscle cramping and stiffness and decreased urinary output and decided to see her PCP, who diagnosed her with muscle spasms and the flu. A few days later she decided to go to the [facility] ED and was found to have Cr 2.9, increased from baseline Cr 1.0. She was also found to have , WBC 7.9, Plt 33, lactate 0.8. CXR showed bilateral interstitial infiltrates, and subsequent CT chest showed bilateral ground glass opacities. She was treated with rochephin, doxycycline, bumex and transferred here.     Here her labs were significant for Hgb 10, MCV 69, RDW 22.3, Plt 41. WBC 5.98. UA showed 3+ protein, 3+ glucose and 3+ blood but was not consistent with UTI. Cr 4.3 and  consistent with an LACI. Hemolytic labs were unremarkable.     Pt has a history of uncontrolled hypertension and states that she is currently taking lisinopril 20 mg-HCTZ 12.5 mg, hydralazine 25 mg TID, and clonidine 0.2 mg BID. She was treated at [facility] 3 weeks ago for chest pain and was admitted. We do not have those records but pt states they had her on multiple drips and did not have a cath procedure. Pt also has a history of unprovoked thrombosis treated at [facility] approximately 4 years ago, none of which is visible in the "care everywhere" section of the chart. She states she had one episode of thrombosis in her " "left upper extremity that contained "many little clots" and required surgical thrombectomy. She also had a left renal vein thrombosis for which she received a stent. She states that for both episodes she was placed on a heparin drip, did outpatient lovenox and was continued on ASA 81 until now. She states that no one informed her of any findings suggesting a hypercoaguable state or if the clotting was provoked.  "

## 2019-07-27 PROBLEM — F43.21 GRIEF REACTION: Status: ACTIVE | Noted: 2019-07-27

## 2019-07-27 PROBLEM — J44.9 CHRONIC OBSTRUCTIVE LUNG DISEASE: Status: ACTIVE | Noted: 2019-07-27

## 2019-07-27 PROBLEM — Z86.718 HISTORY OF RENAL VEIN THROMBOSIS: Status: ACTIVE | Noted: 2019-07-27

## 2019-07-27 PROBLEM — F43.20 GRIEF REACTION: Status: ACTIVE | Noted: 2019-07-27

## 2019-07-27 LAB
ALBUMIN SERPL BCP-MCNC: 4.4 G/DL (ref 3.5–5.2)
ALP SERPL-CCNC: 20 U/L (ref 55–135)
ALT SERPL W/O P-5'-P-CCNC: 8 U/L (ref 10–44)
ANION GAP SERPL CALC-SCNC: 8 MMOL/L (ref 8–16)
AST SERPL-CCNC: 11 U/L (ref 10–40)
B2 GLYCOPROT1 IGA SER QL: <9 SAU
B2 GLYCOPROT1 IGA SER QL: <9 SAU
B2 GLYCOPROT1 IGG SER QL: <9 SGU
B2 GLYCOPROT1 IGM SER QL: <9 SMU
BASOPHILS # BLD AUTO: 0 K/UL (ref 0–0.2)
BASOPHILS NFR BLD: 0 % (ref 0–1.9)
BILIRUB SERPL-MCNC: 0.9 MG/DL (ref 0.1–1)
BUN SERPL-MCNC: 93 MG/DL (ref 6–20)
CALCIUM SERPL-MCNC: 9.6 MG/DL (ref 8.7–10.5)
CHLORIDE SERPL-SCNC: 113 MMOL/L (ref 95–110)
CO2 SERPL-SCNC: 21 MMOL/L (ref 23–29)
CREAT SERPL-MCNC: 3.6 MG/DL (ref 0.5–1.4)
DIFFERENTIAL METHOD: ABNORMAL
EOSINOPHIL # BLD AUTO: 0 K/UL (ref 0–0.5)
EOSINOPHIL NFR BLD: 0 % (ref 0–8)
ERYTHROCYTE [DISTWIDTH] IN BLOOD BY AUTOMATED COUNT: 27.8 % (ref 11.5–14.5)
EST. GFR  (AFRICAN AMERICAN): 15.9 ML/MIN/1.73 M^2
EST. GFR  (NON AFRICAN AMERICAN): 13.8 ML/MIN/1.73 M^2
GLUCOSE SERPL-MCNC: 241 MG/DL (ref 70–110)
HCT VFR BLD AUTO: 26.2 % (ref 37–48.5)
HGB BLD-MCNC: 8.2 G/DL (ref 12–16)
IMM GRANULOCYTES # BLD AUTO: 0.14 K/UL (ref 0–0.04)
IMM GRANULOCYTES NFR BLD AUTO: 2.4 % (ref 0–0.5)
LYMPHOCYTES # BLD AUTO: 0.8 K/UL (ref 1–4.8)
LYMPHOCYTES NFR BLD: 14.3 % (ref 18–48)
MCH RBC QN AUTO: 23.2 PG (ref 27–31)
MCHC RBC AUTO-ENTMCNC: 31.3 G/DL (ref 32–36)
MCV RBC AUTO: 74 FL (ref 82–98)
MONOCYTES # BLD AUTO: 0.2 K/UL (ref 0.3–1)
MONOCYTES NFR BLD: 3.5 % (ref 4–15)
NEUTROPHILS # BLD AUTO: 4.6 K/UL (ref 1.8–7.7)
NEUTROPHILS NFR BLD: 79.8 % (ref 38–73)
NRBC BLD-RTO: 0 /100 WBC
PHOSPHATE SERPL-MCNC: 5.1 MG/DL (ref 2.7–4.5)
PLATELET # BLD AUTO: 256 K/UL (ref 150–350)
PMV BLD AUTO: ABNORMAL FL (ref 9.2–12.9)
POCT GLUCOSE: 257 MG/DL (ref 70–110)
POCT GLUCOSE: 380 MG/DL (ref 70–110)
POCT GLUCOSE: 391 MG/DL (ref 70–110)
POTASSIUM SERPL-SCNC: 4.5 MMOL/L (ref 3.5–5.1)
PROT SERPL-MCNC: 5 G/DL (ref 6–8.4)
RBC # BLD AUTO: 3.54 M/UL (ref 4–5.4)
SODIUM SERPL-SCNC: 142 MMOL/L (ref 136–145)
VARICELLA INTERPRETATION: POSITIVE
VARICELLA ZOSTER IGG: 1.91 ISR (ref 0–0.9)
WBC # BLD AUTO: 5.74 K/UL (ref 3.9–12.7)

## 2019-07-27 PROCEDURE — 99231 SBSQ HOSP IP/OBS SF/LOW 25: CPT | Mod: ,,, | Performed by: INTERNAL MEDICINE

## 2019-07-27 PROCEDURE — 25000003 PHARM REV CODE 250: Performed by: HOSPITALIST

## 2019-07-27 PROCEDURE — 99231 PR SUBSEQUENT HOSPITAL CARE,LEVL I: ICD-10-PCS | Mod: ,,, | Performed by: INTERNAL MEDICINE

## 2019-07-27 PROCEDURE — 36415 COLL VENOUS BLD VENIPUNCTURE: CPT

## 2019-07-27 PROCEDURE — 84100 ASSAY OF PHOSPHORUS: CPT

## 2019-07-27 PROCEDURE — 99233 PR SUBSEQUENT HOSPITAL CARE,LEVL III: ICD-10-PCS | Mod: ,,, | Performed by: HOSPITALIST

## 2019-07-27 PROCEDURE — 25000003 PHARM REV CODE 250: Performed by: NURSE PRACTITIONER

## 2019-07-27 PROCEDURE — 63600175 PHARM REV CODE 636 W HCPCS: Performed by: HOSPITALIST

## 2019-07-27 PROCEDURE — 63600175 PHARM REV CODE 636 W HCPCS: Performed by: STUDENT IN AN ORGANIZED HEALTH CARE EDUCATION/TRAINING PROGRAM

## 2019-07-27 PROCEDURE — 82955 ASSAY OF G6PD ENZYME: CPT

## 2019-07-27 PROCEDURE — 11000001 HC ACUTE MED/SURG PRIVATE ROOM

## 2019-07-27 PROCEDURE — 80053 COMPREHEN METABOLIC PANEL: CPT

## 2019-07-27 PROCEDURE — 85025 COMPLETE CBC W/AUTO DIFF WBC: CPT

## 2019-07-27 PROCEDURE — 86682 HELMINTH ANTIBODY: CPT

## 2019-07-27 PROCEDURE — 25000003 PHARM REV CODE 250: Performed by: STUDENT IN AN ORGANIZED HEALTH CARE EDUCATION/TRAINING PROGRAM

## 2019-07-27 PROCEDURE — 99233 SBSQ HOSP IP/OBS HIGH 50: CPT | Mod: ,,, | Performed by: HOSPITALIST

## 2019-07-27 RX ORDER — INSULIN ASPART 100 [IU]/ML
1-10 INJECTION, SOLUTION INTRAVENOUS; SUBCUTANEOUS
Status: DISCONTINUED | OUTPATIENT
Start: 2019-07-27 | End: 2019-08-07 | Stop reason: HOSPADM

## 2019-07-27 RX ORDER — GLUCAGON 1 MG
1 KIT INJECTION
Status: DISCONTINUED | OUTPATIENT
Start: 2019-07-27 | End: 2019-08-07 | Stop reason: HOSPADM

## 2019-07-27 RX ORDER — IBUPROFEN 200 MG
24 TABLET ORAL
Status: DISCONTINUED | OUTPATIENT
Start: 2019-07-27 | End: 2019-08-07 | Stop reason: HOSPADM

## 2019-07-27 RX ORDER — IBUPROFEN 200 MG
16 TABLET ORAL
Status: DISCONTINUED | OUTPATIENT
Start: 2019-07-27 | End: 2019-08-07 | Stop reason: HOSPADM

## 2019-07-27 RX ADMIN — METHYLPREDNISOLONE SODIUM SUCCINATE: 1 INJECTION, POWDER, LYOPHILIZED, FOR SOLUTION INTRAMUSCULAR; INTRAVENOUS at 09:07

## 2019-07-27 RX ADMIN — CLONIDINE HYDROCHLORIDE 0.1 MG: 0.1 TABLET ORAL at 09:07

## 2019-07-27 RX ADMIN — NIFEDIPINE 60 MG: 60 TABLET, FILM COATED, EXTENDED RELEASE ORAL at 09:07

## 2019-07-27 RX ADMIN — SODIUM BICARBONATE 650 MG TABLET 1300 MG: at 09:07

## 2019-07-27 RX ADMIN — ALLOPURINOL 100 MG: 100 TABLET ORAL at 09:07

## 2019-07-27 RX ADMIN — INSULIN ASPART 6 UNITS: 100 INJECTION, SOLUTION INTRAVENOUS; SUBCUTANEOUS at 12:07

## 2019-07-27 RX ADMIN — HYDRALAZINE HYDROCHLORIDE 50 MG: 50 TABLET ORAL at 08:07

## 2019-07-27 RX ADMIN — CARVEDILOL 6.25 MG: 6.25 TABLET, FILM COATED ORAL at 08:07

## 2019-07-27 RX ADMIN — ATOVAQUONE 1500 MG: 750 SUSPENSION ORAL at 09:07

## 2019-07-27 RX ADMIN — SODIUM BICARBONATE 650 MG TABLET 1300 MG: at 02:07

## 2019-07-27 RX ADMIN — CARVEDILOL 6.25 MG: 6.25 TABLET, FILM COATED ORAL at 09:07

## 2019-07-27 RX ADMIN — FAMOTIDINE 20 MG: 20 TABLET, FILM COATED ORAL at 09:07

## 2019-07-27 RX ADMIN — SODIUM BICARBONATE 650 MG TABLET 1300 MG: at 08:07

## 2019-07-27 RX ADMIN — CLONIDINE HYDROCHLORIDE 0.1 MG: 0.1 TABLET ORAL at 08:07

## 2019-07-27 RX ADMIN — HYDRALAZINE HYDROCHLORIDE 50 MG: 50 TABLET ORAL at 02:07

## 2019-07-27 RX ADMIN — HYDRALAZINE HYDROCHLORIDE 50 MG: 50 TABLET ORAL at 05:07

## 2019-07-27 RX ADMIN — INSULIN ASPART 5 UNITS: 100 INJECTION, SOLUTION INTRAVENOUS; SUBCUTANEOUS at 08:07

## 2019-07-27 RX ADMIN — INSULIN ASPART 10 UNITS: 100 INJECTION, SOLUTION INTRAVENOUS; SUBCUTANEOUS at 05:07

## 2019-07-27 NOTE — ASSESSMENT & PLAN NOTE
baseline creatinine of 1.0 roughly 1 month ago. BUN/Cr peaked at 154/6.0 and improved the day after PLEX started.  RJ with bilateral medicorenal disease  UA with proteinuria, blood, no evidence of infection; pro:cr 5.97; C3 41, C4<3  positive for strep A as seen on outside hospital records  given 1x dose lasix 160mg IV with poor response  - 7/24 HD line placed for PLEX which was started  - 7/19 initiated on empiric prednisone 60mg daily then changed 7/26 to solumederol 1 gram qd x3 days  - Nephrology following, appreciate recs:  7/22 renal biopsy to further guide treatment

## 2019-07-27 NOTE — PLAN OF CARE
Problem: Adult Inpatient Plan of Care  Goal: Plan of Care Review  Outcome: Ongoing (interventions implemented as appropriate)     07/26/19 1700   Plan of Care Review   Plan of Care Reviewed With patient   A/o x4 v/s stable see flow sheet question and answer addressed , plasma pheresis done today, up ad erica in the bath room see I/o flow sheet denies any discomfort or injury free of falls

## 2019-07-27 NOTE — ASSESSMENT & PLAN NOTE
Given her significant thromboembolic history, rheum 7/26 would like to work her up for antiphopholipid antibody syndrome. They ordered levels.   She also had symptoms of sicca syndromes (dry eyes, dry mouth). They will check for sjogrens syndrome (SSA/SSB)

## 2019-07-27 NOTE — ASSESSMENT & PLAN NOTE
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  R/o malignancy  Heme/consulted  7/26 CT ordered to look for lymph nodes and bone survey found nothing big  -- allopurinol for elevated urate  - 7/26 rheum deferred rituximab choice  to heme.  Per rheum:IgG decreased at 185. They request allergy/immunology consult regarding ability to start rituximab with decreased IgG.

## 2019-07-27 NOTE — PROGRESS NOTES
[facility] Hospital Medicine  Progress Note    Patient Name: Kendy Vital  MRN: [id number]  Patient Class: IP- Inpatient   Admission Date: 7/19/2019  Length of Stay: 8 days  Attending Physician: Luiz Rea MD  Primary Care Provider: To Obtain [facility] Medicine Team: [facility] HOSP MED R Luiz Rea MD    Subjective:     Principal Problem:Acute renal failure      HPI:  Ms. Vital is a 53 year old female with hypertension, anemia, and history of leiomyoma of the uterus who presents to ICU as a transfer from [facility] for evaluation of thrombocytopenia. Patient reports that prior to going to the hospital 3 days ago that she was experiencing symptoms of chills, feeling febrile, dry cough, shortness of breath and occasional nose bleeds for roughly 2 weeks. She also reports easy fatigueability and difficulty breathing when laying flat; currently sleeps with 2 pillows. Patient presented to hospital in [place] when her symptoms did not resolve. Initial work up showed a , worsening kidney function with creatinine of 2.9, and thrombocytopenia with platelets of 33k. In addition, chest x-ray showed interstitial opacities and follow up CT showed perihilar ground glass opacity. Patient was treated with rocephin and doxycycline as well as Bumex q12 due to her heart failure type symptoms. Lab work at the time showed WBC of 7.9 and a lactate of .8. In addition, she tested positive for strep A. Patient was transferred for further evaluation of her thrombocytopenia with concern for TTP and possible need for plasmapheresis.     At time of exam, patient is properly oriented to person time and places. She endorses headache, generalized myalgias, nausea and orthopnea. She denies SOB while sitting up, chest pain, abdominal pain, vomiting, and diarrhea. She has not felt febrile since 1 week prior to hospital stay.        Overview/Hospital Course:  Ms. Vital  presented as transfer to ICU for suspected TTP. At that time, she had severe thrombocytopenia, renal failure, and bilateral infiltrates on chest CT concerning for PNA. She was stepped down when repeat CBC revealed normalizing platelets and hemolysis labs were unremarkable. Initially placed on vanc/zosyn/azithromycin for PNA, which has been de-escalated to rocephin/azithromycin for CAP. Her course has been complicated by ARF of unclear cause. 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   7/23 Nephrology biopsed  and have initiated on prednisone empirically.  7/24 Heme did BMBx      Interval History:   Symptoms:  Improved strength.    Diet:  Adequate intake.    Activity level:  Impaired due to weakness.   Pain:  No pain.       Review of Systems   Constitutional: Negative for fatigue and fever.   Respiratory: Negative for shortness of breath.    Cardiovascular: Negative for chest pain.   Gastrointestinal: Negative for abdominal pain.     Objective:     Vital Signs (Most Recent):  Temp: 97.4 °F (36.3 °C) (07/27/19 1202)  Pulse: 67 (07/27/19 1202)  Resp: 18 (07/27/19 1202)  BP: (!) 165/79 (07/27/19 1202)  SpO2: 95 % (07/27/19 1202) Vital Signs (24h Range):  Temp:  [97.4 °F (36.3 °C)-98.4 °F (36.9 °C)] 97.4 °F (36.3 °C)  Pulse:  [62-84] 67  Resp:  [14-20] 18  SpO2:  [95 %-100 %] 95 %  BP: (133-171)/(65-94) 165/79     Weight: 103 kg (227 lb)  Body mass index is 36.64 kg/m².    Intake/Output Summary (Last 24 hours) at 7/27/2019 1237  Last data filed at 7/27/2019 1230  Gross per 24 hour   Intake 1380 ml   Output 1550 ml   Net -170 ml      Physical Exam   Constitutional: She is oriented to person, place, and time. She appears well-nourished. No distress.   Neck: No JVD present.   Cardiovascular: Normal rate, regular rhythm and normal heart sounds.   Pulmonary/Chest: Effort normal and breath sounds normal. No respiratory distress.   Abdominal: Soft. Bowel sounds are normal. She exhibits no distension.   Musculoskeletal: She  exhibits no edema.   Neurological: She is alert and oriented to person, place, and time.   Psychiatric: She has a normal mood and affect. Her behavior is normal.       Significant Labs: All pertinent labs within the past 24 hours have been reviewed.    Significant Imaging: I have reviewed and interpreted all pertinent imaging results/findings within the past 24 hours.      Assessment/Plan:      * Acute renal failure  baseline creatinine of 1.0 roughly 1 month ago. BUN/Cr peaked at 154/6.0 and improved the day after PLEX started.  RJ with bilateral medicorenal disease  UA with proteinuria, blood, no evidence of infection; pro:cr 5.97; C3 41, C4<3  positive for strep A as seen on outside hospital records  given 1x dose lasix 160mg IV with poor response  - 7/24 HD line placed for PLEX which was started  - 7/19 initiated on empiric prednisone 60mg daily then changed 7/26 to solumederol 1 gram qd x3 days  - Nephrology following, appreciate recs:  7/22 renal biopsy to further guide treatment      Elevated serum immunoglobulin free light chains  BMBX: MONOCLONAL PLASMA CELL POPULATION WITH ATYPICAL LYMPHOID AGGREGATES.  R/o malignancy  Heme/consulted  7/26 CT ordered to look for lymph nodes and bone survey found nothing big  -- allopurinol for elevated urate  - 7/26 rheum deferred rituximab choice  to heme.  Per rheum:IgG decreased at 185. They request allergy/immunology consult regarding ability to start rituximab with decreased IgG.      Essential hypertension  hypertensive at admission and per patient, has been undergoing multiple recent medication changes due to HTN as outpatient.  Suspect initially may have been exacerbated by NSAID use, now concern for multifactorial cause including renal failure with volume overload, steroid use, and inadequate dosing of home regimen  Home regimen includes: lisinopril-HCTZ 20-12.5mg, clonidine 0.2mg bid, and hydralazine 25mg tid  - amlodipine changed for nifedpine  - titrate BP  "meds    Thrombocytopenia, unspecified  Plt on admission 41. Improved after treatments.  Seen by Heme/onc.   HIT Ab negative  - monitor      Other specified anemias  Hgb slowly going down.  FERRITIN 161, RETIC 4.7 (H), Bili 1, FOLATE 11.1, VITAMIN B12 843  Iron 40, sat 33%  - monitor  - fobt            Immunosuppression  Recs per ID 7/26    1. Serologies in process:          - Quantiferon Gold is pending.  If positive, please consult ID. If  Indeterminate, please draw T spot.  If T spot positive, please consult ID.            - Strongyloides IgG is pending. If positive, please consult ID to initiate treatment with Ivermectin.         2. If the patient is anticipated to remain on a prolonged course of high dose systemic steroids (> 20 mg prednisone), recommend PCP prophylaxis with Atovaquone 1500 mg PO once daily. Avoid Bactrim in setting of ARF.     3. Continue Entecavir per Hepatology      4. Immunizations recommended:              - Influenza annually [NOT IN STOCK INPATIENT]              - Tetanus booster today (given 7/26) and again every 10 years              - Prevnar 13 today (given 7/26) followed by Pneumovax 23 in 8 weeks with booster every 5 years.              - Hepatitis A vaccine today (given 7/26) and in 6 months              - Shingrix (two doses at 0 and 2-6 months) [NOT AVAILABLE INPATIENT]      Chronic hepatitis B  Hepatology consulted to see if her HBV can be causing the cryoglubins and if she needs treatment.  - entecavir 0.5 mg every 72 hour (renal dose)  - f/u with liver as outpt    Per hepatology:  "Cryoglobulinemia usually associated with HCV, but rare association with Hep B. Presence of glomerulonephritis on preliminary renal biopsy. Overall, less likely that Hep B is related to this as ALT normal and viral load very low.  - Continue chronic Hep B treatment as patient is on immunosuppression, especially while on rituximab.  Continue entecavir 0.5mg q72hrs (renally dosed). Monitor renal " "function and dose-adjust accordingly.  - Patient will need HCC screening going forward given her ethnicity (Liver U/S and AFP every 6 months)"      Pneumonia  x-ray in [place] revealed interstitial infiltrates. Follow up chest CT showed perihilar ground glass opacity. Treated initially with rocephin and doxycycline  repeat chest x-ray with patchy airspace consolidations bilaterally R>L, edema vs PNA  blood cultures NGTD  - currently on broad spectrum antibiotics vanc, zosyn, azithro; held vanc and de-escalated zosyn to ceftriaxone on  and all abx stopped     History of renal vein thrombosis  Given her significant thromboembolic history, rheum  would like to work her up for antiphopholipid antibody syndrome. They ordered levels.   She also had symptoms of sicca syndromes (dry eyes, dry mouth). They will check for sjogrens syndrome (SSA/SSB)      Grief reaction  Her brother recently .   service came by to talk to her.        VTE Risk Mitigation (From admission, onward)        Ordered     Place sequential compression device  Until discontinued      19 0533     IP VTE LOW RISK PATIENT  Once      19 0533                Luiz Rea MD  Department of Hospital Medicine   [facility]  "

## 2019-07-27 NOTE — ASSESSMENT & PLAN NOTE
Recs per ID 7/26    1. Serologies in process:          - Quantiferon Gold is pending.  If positive, please consult ID. If  Indeterminate, please draw T spot.  If T spot positive, please consult ID.            - Strongyloides IgG is pending. If positive, please consult ID to initiate treatment with Ivermectin.         2. If the patient is anticipated to remain on a prolonged course of high dose systemic steroids (> 20 mg prednisone), recommend PCP prophylaxis with Atovaquone 1500 mg PO once daily. Avoid Bactrim in setting of ARF.     3. Continue Entecavir per Hepatology      4. Immunizations recommended:              - Influenza annually [NOT IN STOCK INPATIENT]              - Tetanus booster today (given 7/26) and again every 10 years              - Prevnar 13 today (given 7/26) followed by Pneumovax 23 in 8 weeks with booster every 5 years.              - Hepatitis A vaccine today (given 7/26) and in 6 months              - Shingrix (two doses at 0 and 2-6 months) [NOT AVAILABLE INPATIENT]

## 2019-07-27 NOTE — PLAN OF CARE
Problem: Adult Inpatient Plan of Care  Goal: Plan of Care Review  Outcome: Ongoing (interventions implemented as appropriate)  POC reviewed with patient and SO. Questions and concerns addressed. VSS. CT scan completed. Metastatic scan completed. Tearful at times this shift due to recent death of brother. Amb to BR to void. Safety maintained. Bed in low and locked position. Call light within reach. Side rails up x2. Frequent rounds.

## 2019-07-27 NOTE — PROGRESS NOTES
[facility]  Rheumatology  Progress Note    Patient Name: Kendy Vital  MRN: [id number]  Admission Date: 7/19/2019  Hospital Length of Stay: 8 days  Code Status: Full Code   Attending Provider: Luiz Rea MD  Primary Care Physician: To Obtain Unable  Principal Problem: Acute renal failure    Subjective:     HPI: Ms. 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 She initially presented with fevers, chills, dry cough, shortness of breath, 2 pillow orthopnea, and occasional epistaxis for roughly 2 weeks. Initial work up showed a BNP of 526, worsening kidney function, and thrombocytopenia with platelets of 33k. She was initially treated with Rocephin and doxycycline for suspected pneumonia and was diuresed for HF. She was then  plasmapheresed and started on prednisone 60mg daily for suspected TTP. 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 Thus far, the patient has received pulse steroids (today is day 2). 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         Interval History: Ms. Vital has no subjective complaints today. Bone scan done yesterday was negative. CT of the chest abdomen and pelvis showed a subcapsular renal hematoma and signs of retroperitoneal bleeding (Hgb is stable), as well as multiple small lymph nodes and one 1.2 x 2cm pre-carinal lymph node that may be (?) accessible for biopsy.    Current Facility-Administered Medications   Medication Frequency    acetaminophen tablet 650 mg Q6H PRN    albuterol-ipratropium 2.5 mg-0.5 mg/3 mL nebulizer solution 3 mL Q6H PRN    allopurinol tablet 100 mg Daily    atovaquone suspension 1,500 mg Daily    bisacodyl suppository 10 mg Daily PRN    carvedilol tablet  6.25 mg BID    cloNIDine tablet 0.1 mg BID    entecavir tablet 0.5 mg Q72H    famotidine tablet 20 mg Daily    glucagon (human recombinant) injection 1 mg PRN    glucose chewable tablet 16 g PRN    glucose chewable tablet 24 g PRN    hydrALAZINE tablet 50 mg Q8H    insulin aspart U-100 pen 1-10 Units QID (AC + HS) PRN    methylPREDNISolone sodium succinate (SOLU-MEDROL) 1,000 mg in dextrose 5 % 100 mL IVPB Daily    NIFEdipine 24 hr tablet 60 mg Daily    ondansetron disintegrating tablet 8 mg Q8H PRN    oxyCODONE-acetaminophen 5-325 mg per tablet 1 tablet Q8H PRN    polyethylene glycol packet 17 g Daily    promethazine (PHENERGAN) 6.25 mg in dextrose 5 % 50 mL IVPB Q6H PRN    ramelteon tablet 8 mg Nightly PRN    sodium bicarbonate tablet 1,300 mg TID    sodium chloride 0.9% flush 10 mL PRN    Tdap vaccine injection 0.5 mL vaccine x 1 dose     Objective:     Vital Signs (Most Recent):  Temp: 97.4 °F (36.3 °C) (07/27/19 0739)  Pulse: 84 (07/27/19 0739)  Resp: 20 (07/27/19 0739)  BP: (!) 162/77 (07/27/19 0739)  SpO2: 99 % (07/27/19 0739)  O2 Device (Oxygen Therapy): room air (07/27/19 0351) Vital Signs (24h Range):  Temp:  [97.4 °F (36.3 °C)-98.5 °F (36.9 °C)] 97.4 °F (36.3 °C)  Pulse:  [62-84] 84  Resp:  [14-20] 20  SpO2:  [95 %-100 %] 99 %  BP: (133-171)/(65-96) 162/77     Weight: 103 kg (227 lb) (07/19/19 1400)  Body mass index is 36.64 kg/m².  Body surface area is 2.19 meters squared.      Intake/Output Summary (Last 24 hours) at 7/27/2019 0912  Last data filed at 7/27/2019 0553  Gross per 24 hour   Intake 1240 ml   Output 1650 ml   Net -410 ml       Physical Exam   Constitutional: She is well-developed, well-nourished, and in no distress. No distress.   Eyes: Conjunctivae are normal. Right eye exhibits no discharge. Left eye exhibits no discharge. No scleral icterus.   Neck: No tracheal deviation present.   Central line in place, right neck   Cardiovascular: Normal rate, regular rhythm and normal  heart sounds.  Exam reveals no gallop and no friction rub.    No murmur heard.  Pulmonary/Chest: Effort normal and breath sounds normal. No respiratory distress. She has no wheezes. She has no rales. She exhibits no tenderness.   Abdominal: Soft. Bowel sounds are normal. She exhibits no distension and no mass. There is no tenderness. There is no rebound and no guarding.   Genitourinary: Right adnexa normal.   Lymphadenopathy:     She has no cervical adenopathy.   Skin: Skin is warm and dry. No rash noted. She is not diaphoretic.     Musculoskeletal: She exhibits no edema.       Significant Labs:  Beta2 glycoprotein IgA, IgG, IgM: Negative    Significant Imaging:  Bone Scan: Negative    CT Neck (7/26/2019)  There are small lymph nodes of the neck noted, sensitivity is somewhat limited however dominant adenopathy or dominant neck mass lesion or cystic collection is not otherwise seen.    CT Chest abdomen pelvis (7/26/2019)     Impression       There is appearance of suspected small subcapsular hematoma at the upper pole of the left kidney with mild hemorrhagic density extending superior to this and along the undersurface of the spleen as well as mild hemorrhagic fluid in the lower pelvis, these findings are thought likely related to the recent renal biopsy close clinical and historical correlation and correlation with serial H and H is recommended.    Small left pleural effusion, minimal pleural fluid on the right.    Pulmonary nodule of the left upper lobe lingular segment, follow-up is recommended.    The left lower lobe demonstrates atelectatic change and volume loss and mild infiltrate.    Small mediastinal lymph nodes are noted with a somewhat prominent precarinal lymph node measuring 1.2 x 2 cm in size, suspected hilar lymph nodes although not well evaluated on this exam.         Assessment/Plan:     Cryoglobulinemic vasculitis  BM biopsy results noted and current suspicion for lymphoplasmacytic lymphoma vs  Waldenstrom's, vs multiple myeloma. Given results, will let Heme/Onc decide which chemotherapeutic agent they feel is best. Would continue pulse steroids to complete 3 days. Still awaiting thrombophilia work up and records from [place], [place] regarding her history of renal vein thrombosis. Thus far, bilateral lower extremity Duplex and beta 2 glycoproteins were negative.     - Recommend Allergy/Immunology consult for decreased IgG levels, will await their recommendations  - Follow up remaining cryoglobulin and thrombophilia labs, SSA, SSB, MPO, PR3  - PCP prophylaxis (for steroids) with atovaquone 1500mg suspension, started 7/25/19. Avoiding bactrim in setting of acute renal failure.  - Ulcer prophylaxis with PPI (for steroids)  - Chemotherapeutic therapy deferred to Heme/Onc      This case will be discussed with Dr. Solis.    Rita Buck M.D.  Rheumatology Fellow, PGY 4         I  Have personally take the history and examined the patient and agree with fellow's note as stated above.      3rd PLEX tomorrow or Monday   Prednisone 60mg daily  Await Heme/Onc further w/u regarding BM findings of possible lymphoplasmacytic lymphoma/Waldenstroum's(but no MGUS in serum or urine), plasma cell dyscrasie or lymphoma   CT chest/abd/pelvis yesterday with small subcapsular hematoma left kidney site of renal biopsy, hemorrhagic fluid in pelvis, JUNE lingular nodule, LLL atelectasis and small mediastinal lymph nodes, suspected hilar nodes  CT neck with small lymph nodes  Skeletal survey negative  Further f/u per Heme-Onc. ? PET-CT and therapeutic recommendations to combine Rx for cryoglobulinemic DPGN and any underlying hematologic neoplasm.      *Received records from [facility]: will have scanned into Epic:    Includes CTA thorax/LUE  2/16/15 report:  Small intimal flap arising from ant wall of aortic arch unchanged from previous day and c/w small dissection flap. No false lumen    2nd  "short segment focal dissection flap post wall proximal descending aorta.     There is reference to CTA LUE preceding day but report not included in provided records.     Mention of "kidney-clot" "left arm" LHC without interventions 6/23/13    Mention in note of 8/29/18: influenza immunization, pneumonia vaccine admin (no mention of which)  "

## 2019-07-27 NOTE — SUBJECTIVE & OBJECTIVE
Interval History:   Symptoms:  Improved strength.    Diet:  Adequate intake.    Activity level:  Impaired due to weakness.   Pain:  No pain.       Review of Systems   Constitutional: Negative for fatigue and fever.   Respiratory: Negative for shortness of breath.    Cardiovascular: Negative for chest pain.   Gastrointestinal: Negative for abdominal pain.     Objective:     Vital Signs (Most Recent):  Temp: 97.4 °F (36.3 °C) (07/27/19 1202)  Pulse: 67 (07/27/19 1202)  Resp: 18 (07/27/19 1202)  BP: (!) 165/79 (07/27/19 1202)  SpO2: 95 % (07/27/19 1202) Vital Signs (24h Range):  Temp:  [97.4 °F (36.3 °C)-98.4 °F (36.9 °C)] 97.4 °F (36.3 °C)  Pulse:  [62-84] 67  Resp:  [14-20] 18  SpO2:  [95 %-100 %] 95 %  BP: (133-171)/(65-94) 165/79     Weight: 103 kg (227 lb)  Body mass index is 36.64 kg/m².    Intake/Output Summary (Last 24 hours) at 7/27/2019 1237  Last data filed at 7/27/2019 1230  Gross per 24 hour   Intake 1380 ml   Output 1550 ml   Net -170 ml      Physical Exam   Constitutional: She is oriented to person, place, and time. She appears well-nourished. No distress.   Neck: No JVD present.   Cardiovascular: Normal rate, regular rhythm and normal heart sounds.   Pulmonary/Chest: Effort normal and breath sounds normal. No respiratory distress.   Abdominal: Soft. Bowel sounds are normal. She exhibits no distension.   Musculoskeletal: She exhibits no edema.   Neurological: She is alert and oriented to person, place, and time.   Psychiatric: She has a normal mood and affect. Her behavior is normal.       Significant Labs: All pertinent labs within the past 24 hours have been reviewed.    Significant Imaging: I have reviewed and interpreted all pertinent imaging results/findings within the past 24 hours.

## 2019-07-27 NOTE — ASSESSMENT & PLAN NOTE
hypertensive at admission and per patient, has been undergoing multiple recent medication changes due to HTN as outpatient.  Suspect initially may have been exacerbated by NSAID use, now concern for multifactorial cause including renal failure with volume overload, steroid use, and inadequate dosing of home regimen  Home regimen includes: lisinopril-HCTZ 20-12.5mg, clonidine 0.2mg bid, and hydralazine 25mg tid  - amlodipine changed for nifedpine  - titrate BP meds

## 2019-07-27 NOTE — SUBJECTIVE & OBJECTIVE
Interval History: Ms. Vital has no subjective complaints today. Bone scan done yesterday was negative. CT of the chest abdomen and pelvis showed a subcapsular renal hematoma and signs of retroperitoneal bleeding (Hgb is stable), as well as multiple small lymph nodes and one 1.2 x 2cm pre-carinal lymph node that may be (?) accessible for biopsy.    Current Facility-Administered Medications   Medication Frequency    acetaminophen tablet 650 mg Q6H PRN    albuterol-ipratropium 2.5 mg-0.5 mg/3 mL nebulizer solution 3 mL Q6H PRN    allopurinol tablet 100 mg Daily    atovaquone suspension 1,500 mg Daily    bisacodyl suppository 10 mg Daily PRN    carvedilol tablet 6.25 mg BID    cloNIDine tablet 0.1 mg BID    entecavir tablet 0.5 mg Q72H    famotidine tablet 20 mg Daily    glucagon (human recombinant) injection 1 mg PRN    glucose chewable tablet 16 g PRN    glucose chewable tablet 24 g PRN    hydrALAZINE tablet 50 mg Q8H    insulin aspart U-100 pen 1-10 Units QID (AC + HS) PRN    methylPREDNISolone sodium succinate (SOLU-MEDROL) 1,000 mg in dextrose 5 % 100 mL IVPB Daily    NIFEdipine 24 hr tablet 60 mg Daily    ondansetron disintegrating tablet 8 mg Q8H PRN    oxyCODONE-acetaminophen 5-325 mg per tablet 1 tablet Q8H PRN    polyethylene glycol packet 17 g Daily    promethazine (PHENERGAN) 6.25 mg in dextrose 5 % 50 mL IVPB Q6H PRN    ramelteon tablet 8 mg Nightly PRN    sodium bicarbonate tablet 1,300 mg TID    sodium chloride 0.9% flush 10 mL PRN    Tdap vaccine injection 0.5 mL vaccine x 1 dose     Objective:     Vital Signs (Most Recent):  Temp: 97.4 °F (36.3 °C) (07/27/19 0739)  Pulse: 84 (07/27/19 0739)  Resp: 20 (07/27/19 0739)  BP: (!) 162/77 (07/27/19 0739)  SpO2: 99 % (07/27/19 0739)  O2 Device (Oxygen Therapy): room air (07/27/19 0351) Vital Signs (24h Range):  Temp:  [97.4 °F (36.3 °C)-98.5 °F (36.9 °C)] 97.4 °F (36.3 °C)  Pulse:  [62-84] 84  Resp:  [14-20] 20  SpO2:  [95 %-100 %] 99  %  BP: (133-171)/(65-96) 162/77     Weight: 103 kg (227 lb) (07/19/19 1400)  Body mass index is 36.64 kg/m².  Body surface area is 2.19 meters squared.      Intake/Output Summary (Last 24 hours) at 7/27/2019 0912  Last data filed at 7/27/2019 0553  Gross per 24 hour   Intake 1240 ml   Output 1650 ml   Net -410 ml       Physical Exam   Constitutional: She is well-developed, well-nourished, and in no distress. No distress.   Eyes: Conjunctivae are normal. Right eye exhibits no discharge. Left eye exhibits no discharge. No scleral icterus.   Neck: No tracheal deviation present.   Central line in place, right neck   Cardiovascular: Normal rate, regular rhythm and normal heart sounds.  Exam reveals no gallop and no friction rub.    No murmur heard.  Pulmonary/Chest: Effort normal and breath sounds normal. No respiratory distress. She has no wheezes. She has no rales. She exhibits no tenderness.   Abdominal: Soft. Bowel sounds are normal. She exhibits no distension and no mass. There is no tenderness. There is no rebound and no guarding.   Genitourinary: Right adnexa normal.   Lymphadenopathy:     She has no cervical adenopathy.   Skin: Skin is warm and dry. No rash noted. She is not diaphoretic.     Musculoskeletal: She exhibits no edema.       Significant Labs:  Beta2 glycoprotein IgA, IgG, IgM: Negative    Significant Imaging:  Bone Scan: Negative    CT Neck (7/26/2019)  There are small lymph nodes of the neck noted, sensitivity is somewhat limited however dominant adenopathy or dominant neck mass lesion or cystic collection is not otherwise seen.    CT Chest abdomen pelvis (7/26/2019)     Impression       There is appearance of suspected small subcapsular hematoma at the upper pole of the left kidney with mild hemorrhagic density extending superior to this and along the undersurface of the spleen as well as mild hemorrhagic fluid in the lower pelvis, these findings are thought likely related to the recent renal biopsy  close clinical and historical correlation and correlation with serial H and H is recommended.    Small left pleural effusion, minimal pleural fluid on the right.    Pulmonary nodule of the left upper lobe lingular segment, follow-up is recommended.    The left lower lobe demonstrates atelectatic change and volume loss and mild infiltrate.    Small mediastinal lymph nodes are noted with a somewhat prominent precarinal lymph node measuring 1.2 x 2 cm in size, suspected hilar lymph nodes although not well evaluated on this exam.

## 2019-07-28 PROBLEM — R73.9 STEROID-INDUCED HYPERGLYCEMIA: Status: ACTIVE | Noted: 2019-07-28

## 2019-07-28 PROBLEM — T38.0X5A STEROID-INDUCED HYPERGLYCEMIA: Status: ACTIVE | Noted: 2019-07-28

## 2019-07-28 LAB
ALBUMIN SERPL BCP-MCNC: 4.2 G/DL (ref 3.5–5.2)
ALP SERPL-CCNC: 29 U/L (ref 55–135)
ALT SERPL W/O P-5'-P-CCNC: 16 U/L (ref 10–44)
ANION GAP SERPL CALC-SCNC: 10 MMOL/L (ref 8–16)
AST SERPL-CCNC: 12 U/L (ref 10–40)
BASOPHILS # BLD AUTO: 0 K/UL (ref 0–0.2)
BASOPHILS NFR BLD: 0 % (ref 0–1.9)
BILIRUB SERPL-MCNC: 0.7 MG/DL (ref 0.1–1)
BUN SERPL-MCNC: 78 MG/DL (ref 6–20)
CALCIUM SERPL-MCNC: 9.9 MG/DL (ref 8.7–10.5)
CHLORIDE SERPL-SCNC: 116 MMOL/L (ref 95–110)
CO2 SERPL-SCNC: 22 MMOL/L (ref 23–29)
CREAT SERPL-MCNC: 3.2 MG/DL (ref 0.5–1.4)
DIFFERENTIAL METHOD: ABNORMAL
EOSINOPHIL # BLD AUTO: 0 K/UL (ref 0–0.5)
EOSINOPHIL NFR BLD: 0 % (ref 0–8)
ERYTHROCYTE [DISTWIDTH] IN BLOOD BY AUTOMATED COUNT: 27.9 % (ref 11.5–14.5)
EST. GFR  (AFRICAN AMERICAN): 18.3 ML/MIN/1.73 M^2
EST. GFR  (NON AFRICAN AMERICAN): 15.9 ML/MIN/1.73 M^2
ESTIMATED AVG GLUCOSE: 117 MG/DL (ref 68–131)
GLUCOSE SERPL-MCNC: 274 MG/DL (ref 70–110)
HBA1C MFR BLD HPLC: 5.7 % (ref 4–5.6)
HCT VFR BLD AUTO: 25 % (ref 37–48.5)
HGB BLD-MCNC: 7.9 G/DL (ref 12–16)
IMM GRANULOCYTES # BLD AUTO: 0.16 K/UL (ref 0–0.04)
IMM GRANULOCYTES NFR BLD AUTO: 2.6 % (ref 0–0.5)
LYMPHOCYTES # BLD AUTO: 0.8 K/UL (ref 1–4.8)
LYMPHOCYTES NFR BLD: 12.3 % (ref 18–48)
MCH RBC QN AUTO: 23.9 PG (ref 27–31)
MCHC RBC AUTO-ENTMCNC: 31.6 G/DL (ref 32–36)
MCV RBC AUTO: 76 FL (ref 82–98)
MONOCYTES # BLD AUTO: 0.6 K/UL (ref 0.3–1)
MONOCYTES NFR BLD: 9.1 % (ref 4–15)
NEUTROPHILS # BLD AUTO: 4.7 K/UL (ref 1.8–7.7)
NEUTROPHILS NFR BLD: 76 % (ref 38–73)
NRBC BLD-RTO: 1 /100 WBC
PHOSPHATE SERPL-MCNC: 4.6 MG/DL (ref 2.7–4.5)
PLATELET # BLD AUTO: 289 K/UL (ref 150–350)
PMV BLD AUTO: 10.9 FL (ref 9.2–12.9)
POCT GLUCOSE: 237 MG/DL (ref 70–110)
POCT GLUCOSE: 294 MG/DL (ref 70–110)
POCT GLUCOSE: 318 MG/DL (ref 70–110)
POCT GLUCOSE: 321 MG/DL (ref 70–110)
POCT GLUCOSE: 357 MG/DL (ref 70–110)
POCT GLUCOSE: 402 MG/DL (ref 70–110)
POCT GLUCOSE: 416 MG/DL (ref 70–110)
POTASSIUM SERPL-SCNC: 4.8 MMOL/L (ref 3.5–5.1)
PROT SERPL-MCNC: 5.2 G/DL (ref 6–8.4)
RBC # BLD AUTO: 3.3 M/UL (ref 4–5.4)
SODIUM SERPL-SCNC: 148 MMOL/L (ref 136–145)
WBC # BLD AUTO: 6.16 K/UL (ref 3.9–12.7)

## 2019-07-28 PROCEDURE — 63600175 PHARM REV CODE 636 W HCPCS: Performed by: STUDENT IN AN ORGANIZED HEALTH CARE EDUCATION/TRAINING PROGRAM

## 2019-07-28 PROCEDURE — 25000003 PHARM REV CODE 250: Performed by: STUDENT IN AN ORGANIZED HEALTH CARE EDUCATION/TRAINING PROGRAM

## 2019-07-28 PROCEDURE — 99231 SBSQ HOSP IP/OBS SF/LOW 25: CPT | Mod: ,,, | Performed by: INTERNAL MEDICINE

## 2019-07-28 PROCEDURE — 99232 SBSQ HOSP IP/OBS MODERATE 35: CPT | Mod: ,,, | Performed by: HOSPITALIST

## 2019-07-28 PROCEDURE — 99232 PR SUBSEQUENT HOSPITAL CARE,LEVL II: ICD-10-PCS | Mod: ,,, | Performed by: HOSPITALIST

## 2019-07-28 PROCEDURE — 11000001 HC ACUTE MED/SURG PRIVATE ROOM

## 2019-07-28 PROCEDURE — 25000003 PHARM REV CODE 250: Performed by: HOSPITALIST

## 2019-07-28 PROCEDURE — 85025 COMPLETE CBC W/AUTO DIFF WBC: CPT

## 2019-07-28 PROCEDURE — 25000003 PHARM REV CODE 250: Performed by: PHYSICIAN ASSISTANT

## 2019-07-28 PROCEDURE — 83036 HEMOGLOBIN GLYCOSYLATED A1C: CPT

## 2019-07-28 PROCEDURE — 25000003 PHARM REV CODE 250: Performed by: NURSE PRACTITIONER

## 2019-07-28 PROCEDURE — 99231 PR SUBSEQUENT HOSPITAL CARE,LEVL I: ICD-10-PCS | Mod: ,,, | Performed by: INTERNAL MEDICINE

## 2019-07-28 PROCEDURE — S5571 INSULIN DISPOS PEN 3 ML: HCPCS | Performed by: HOSPITALIST

## 2019-07-28 PROCEDURE — 84100 ASSAY OF PHOSPHORUS: CPT

## 2019-07-28 PROCEDURE — 63600175 PHARM REV CODE 636 W HCPCS: Performed by: HOSPITALIST

## 2019-07-28 PROCEDURE — 80053 COMPREHEN METABOLIC PANEL: CPT

## 2019-07-28 RX ORDER — PREDNISONE 20 MG/1
60 TABLET ORAL DAILY
Status: DISCONTINUED | OUTPATIENT
Start: 2019-07-29 | End: 2019-08-01

## 2019-07-28 RX ORDER — INSULIN ASPART 100 [IU]/ML
5 INJECTION, SOLUTION INTRAVENOUS; SUBCUTANEOUS
Status: DISCONTINUED | OUTPATIENT
Start: 2019-07-28 | End: 2019-07-29

## 2019-07-28 RX ORDER — HYDRALAZINE HYDROCHLORIDE 25 MG/1
25 TABLET, FILM COATED ORAL EVERY 6 HOURS PRN
Status: DISCONTINUED | OUTPATIENT
Start: 2019-07-28 | End: 2019-08-07 | Stop reason: HOSPADM

## 2019-07-28 RX ADMIN — HYDRALAZINE HYDROCHLORIDE 50 MG: 50 TABLET ORAL at 09:07

## 2019-07-28 RX ADMIN — INSULIN ASPART 5 UNITS: 100 INJECTION, SOLUTION INTRAVENOUS; SUBCUTANEOUS at 05:07

## 2019-07-28 RX ADMIN — CARVEDILOL 6.25 MG: 6.25 TABLET, FILM COATED ORAL at 08:07

## 2019-07-28 RX ADMIN — HYDRALAZINE HYDROCHLORIDE 50 MG: 50 TABLET ORAL at 05:07

## 2019-07-28 RX ADMIN — SODIUM BICARBONATE 650 MG TABLET 1300 MG: at 08:07

## 2019-07-28 RX ADMIN — ALLOPURINOL 100 MG: 100 TABLET ORAL at 08:07

## 2019-07-28 RX ADMIN — SODIUM BICARBONATE 650 MG TABLET 1300 MG: at 09:07

## 2019-07-28 RX ADMIN — POLYETHYLENE GLYCOL 3350 17 G: 17 POWDER, FOR SOLUTION ORAL at 08:07

## 2019-07-28 RX ADMIN — SODIUM BICARBONATE 650 MG TABLET 1300 MG: at 05:07

## 2019-07-28 RX ADMIN — INSULIN ASPART 4 UNITS: 100 INJECTION, SOLUTION INTRAVENOUS; SUBCUTANEOUS at 08:07

## 2019-07-28 RX ADMIN — INSULIN ASPART 8 UNITS: 100 INJECTION, SOLUTION INTRAVENOUS; SUBCUTANEOUS at 05:07

## 2019-07-28 RX ADMIN — CARVEDILOL 6.25 MG: 6.25 TABLET, FILM COATED ORAL at 09:07

## 2019-07-28 RX ADMIN — CLONIDINE HYDROCHLORIDE 0.1 MG: 0.1 TABLET ORAL at 08:07

## 2019-07-28 RX ADMIN — METHYLPREDNISOLONE SODIUM SUCCINATE: 1 INJECTION, POWDER, LYOPHILIZED, FOR SOLUTION INTRAMUSCULAR; INTRAVENOUS at 08:07

## 2019-07-28 RX ADMIN — HYDRALAZINE HYDROCHLORIDE 50 MG: 50 TABLET ORAL at 02:07

## 2019-07-28 RX ADMIN — ENTECAVIR 0.5 MG: 0.5 TABLET ORAL at 08:07

## 2019-07-28 RX ADMIN — INSULIN ASPART 5 UNITS: 100 INJECTION, SOLUTION INTRAVENOUS; SUBCUTANEOUS at 01:07

## 2019-07-28 RX ADMIN — NIFEDIPINE 60 MG: 60 TABLET, FILM COATED, EXTENDED RELEASE ORAL at 08:07

## 2019-07-28 RX ADMIN — CLONIDINE HYDROCHLORIDE 0.1 MG: 0.1 TABLET ORAL at 09:07

## 2019-07-28 RX ADMIN — INSULIN DETEMIR 10 UNITS: 100 INJECTION, SOLUTION SUBCUTANEOUS at 10:07

## 2019-07-28 RX ADMIN — INSULIN ASPART 8 UNITS: 100 INJECTION, SOLUTION INTRAVENOUS; SUBCUTANEOUS at 01:07

## 2019-07-28 RX ADMIN — INSULIN ASPART 3 UNITS: 100 INJECTION, SOLUTION INTRAVENOUS; SUBCUTANEOUS at 09:07

## 2019-07-28 RX ADMIN — ATOVAQUONE 1500 MG: 750 SUSPENSION ORAL at 08:07

## 2019-07-28 RX ADMIN — FAMOTIDINE 20 MG: 20 TABLET, FILM COATED ORAL at 08:07

## 2019-07-28 NOTE — SUBJECTIVE & OBJECTIVE
Interval History:   Symptoms:  Improved strength.    Diet:  Adequate intake.    Activity level:  Impaired due to weakness.   Pain:  No pain.       Review of Systems   Constitutional: Negative for fatigue and fever.   Respiratory: Negative for shortness of breath.    Cardiovascular: Negative for chest pain.   Gastrointestinal: Negative for abdominal pain.     Objective:     Vital Signs (Most Recent):  Temp: 98 °F (36.7 °C) (07/28/19 0430)  Pulse: 76 (07/28/19 0430)  Resp: 18 (07/28/19 0430)  BP: (!) 156/72 (07/28/19 0430)  SpO2: 95 % (07/28/19 0430) Vital Signs (24h Range):  Temp:  [96.6 °F (35.9 °C)-98 °F (36.7 °C)] 98 °F (36.7 °C)  Pulse:  [67-90] 76  Resp:  [17-18] 18  SpO2:  [95 %-99 %] 95 %  BP: (143-196)/(72-90) 156/72     Weight: 103 kg (227 lb)  Body mass index is 36.64 kg/m².    Intake/Output Summary (Last 24 hours) at 7/28/2019 1122  Last data filed at 7/28/2019 0624  Gross per 24 hour   Intake 250 ml   Output 400 ml   Net -150 ml      Physical Exam   Constitutional: She is oriented to person, place, and time. She appears well-nourished. No distress.   Neck: No JVD present.   Cardiovascular: Normal rate, regular rhythm and normal heart sounds.   Pulmonary/Chest: Effort normal and breath sounds normal. No respiratory distress.   Abdominal: Soft. Bowel sounds are normal. She exhibits no distension.   Musculoskeletal: She exhibits no edema.   Neurological: She is alert and oriented to person, place, and time.   Psychiatric: She has a normal mood and affect. Her behavior is normal.       Significant Labs: All pertinent labs within the past 24 hours have been reviewed.    Significant Imaging: I have reviewed and interpreted all pertinent imaging results/findings within the past 24 hours.

## 2019-07-28 NOTE — ASSESSMENT & PLAN NOTE
"Per heme research 7/26 "Pt also has a history of unprovoked thrombosis treated at [facility] approximately 4 years ago, none of which is visible in the "care everywhere" section of the chart. She states she had one episode of thrombosis in her left upper extremity that contained "many little clots" and required surgical thrombectomy. She also had a left renal vein thrombosis for which she received a stent. She states that for both episodes she was placed on a heparin drip, did outpatient lovenox and was continued on ASA 81 until now. She states that no one informed her of any findings suggesting a hypercoaguable state or if the clotting was provoked."  -Given her significant thromboembolic history, rheum 7/26 would like to work her up for antiphopholipid antibody syndrome. They ordered levels.   -She also had symptoms of sicca syndromes (dry eyes, dry mouth). Rheum will check for sjogrens syndrome (SSA/SSB)    "

## 2019-07-28 NOTE — ASSESSMENT & PLAN NOTE
On solumederol 1g qd she got very hyperglycemic (>300) and hypertensive.  A1c 5.7  -- titrated meds

## 2019-07-28 NOTE — PLAN OF CARE
Problem: Adult Inpatient Plan of Care  Goal: Plan of Care Review  Outcome: Ongoing (interventions implemented as appropriate)     07/27/19 1700   Plan of Care Review   Plan of Care Reviewed With patient   AAOX4  V/s see flow sheet ambulatory denies any discomfort or pain , question and answer addressed no injury free of falls, cbg 251-750 no c/o s/s hyperglycemia cover with sliding scale instructed on diabetic diet

## 2019-07-28 NOTE — ASSESSMENT & PLAN NOTE
BM biopsy results noted and current suspicion for lymphoplasmacytic lymphoma vs Waldenstrom's, vs multiple myeloma. Given results, will let Heme/Onc decide which chemotherapeutic agent they feel is best.     - 3rd PLEX tomorrow  - Last dose of pulse steroids received today, start 60mg prednisone tomorrow  - Call [facility] tomorrow to find out more about patient's renal clot  - Recommend Allergy/Immunology consult for decreased IgG levels, will await their recommendations  - Follow up remaining cryoglobulin and thrombophilia labs, SSA, SSB, MPO, PR3  - PCP prophylaxis (for steroids) with atovaquone 1500mg suspension, started 7/25/19. Avoiding bactrim in setting of acute renal failure.  - Ulcer prophylaxis with PPI (for steroids)  - Chemotherapeutic therapy deferred to Heme/Onc    This case will be discussed with Dr. Solis.    Rita Buck M.D.  Rheumatology Fellow, PGY 4

## 2019-07-28 NOTE — PLAN OF CARE
Problem: Adult Inpatient Plan of Care  Goal: Plan of Care Review  Outcome: Ongoing (interventions implemented as appropriate)  POC reviewed with the pt. No acute changes. Will continue to monitor.

## 2019-07-28 NOTE — ASSESSMENT & PLAN NOTE
Seen on renal biopsy.  HBV is uncommon to cause this.  LPL/WM or even myeloma could potentially be the source of her cryoglobulins.

## 2019-07-28 NOTE — PLAN OF CARE
Problem: Adult Inpatient Plan of Care  Goal: Plan of Care Review  Outcome: Ongoing (interventions implemented as appropriate)     07/28/19 0263   Plan of Care Review   Plan of Care Reviewed With patient     Pt remain free from fall and injuries, Ambulated, denies pain and discomfort. Blood glucose remain elevated. A1c drawn this AM. POC reviewed with Pt. Safety maintained. Will monitor.

## 2019-07-28 NOTE — ASSESSMENT & PLAN NOTE
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  R/o malignancy.  Differential diagnosis includes lymphoplasmacytic lymphoma/Waldenstrom's macroglobulinemia, multiple myeloma, and marginal zone lymphoma.   Heme/consulted  7/26 CT ordered to look for lymph nodes and bone survey found nothing big  -- allopurinol for elevated urate  - 7/26 rheum deferred rituximab choice  to heme.  Per rheum:IgG decreased at 185. They request allergy/immunology consult regarding ability to start rituximab with decreased IgG.  -- CTS consulted to look for a paricarinal lymph node.  Her ARF is improving quickly to allow for invasive procedures.

## 2019-07-28 NOTE — ASSESSMENT & PLAN NOTE
hypertensive at admission and per patient, has been undergoing multiple recent medication changes due to HTN as outpatient.  Suspect initially may have been exacerbated by NSAID use, now concern for multifactorial cause including renal failure with volume overload, steroid use, and inadequate dosing of home regimen.  Home regimen includes: lisinopril-HCTZ 20-12.5mg, clonidine 0.2mg bid, and hydralazine 25mg tid  - amlodipine changed for nifedpine  - titrate BP meds

## 2019-07-28 NOTE — PROGRESS NOTES
[facility]  Rheumatology  Progress Note    Patient Name: Kendy Vital  MRN: [id number]  Admission Date: 7/19/2019  Hospital Length of Stay: 9 days  Code Status: Full Code   Attending Provider: Luiz Rea MD  Primary Care Physician: To Obtain Unable  Principal Problem: Acute renal failure    Subjective:     HPI: Ms. 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 She initially presented with fevers, chills, dry cough, shortness of breath, 2 pillow orthopnea, and occasional epistaxis for roughly 2 weeks. Initial work up showed a BNP of 526, worsening kidney function, and thrombocytopenia with platelets of 33k. She was initially treated with Rocephin and doxycycline for suspected pneumonia and was diuresed for HF. She was then  plasmapheresed and started on prednisone 60mg daily for suspected TTP. 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 Thus far, the patient has received pulse steroids (today is day 2). 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         Interval History: Ms. Vital has no subjective complaints today. She is currently waiting for her lymph node biopsy (reportedly scheduled for Monday).     Current Facility-Administered Medications   Medication Frequency    acetaminophen tablet 650 mg Q6H PRN    albuterol-ipratropium 2.5 mg-0.5 mg/3 mL nebulizer solution 3 mL Q6H PRN    allopurinol tablet 100 mg Daily    atovaquone suspension 1,500 mg Daily    bisacodyl suppository 10 mg Daily PRN    carvedilol tablet 6.25 mg BID    cloNIDine tablet 0.1 mg BID    entecavir tablet 0.5 mg Q72H    famotidine tablet 20 mg Daily    glucagon (human recombinant) injection 1 mg PRN    glucose chewable tablet 16 g  PRN    glucose chewable tablet 24 g PRN    hydrALAZINE tablet 50 mg Q8H    insulin aspart U-100 pen 1-10 Units QID (AC + HS) PRN    insulin aspart U-100 pen 5 Units TIDWM    insulin detemir U-100 pen 10 Units Once    methylPREDNISolone sodium succinate (SOLU-MEDROL) 1,000 mg in dextrose 5 % 100 mL IVPB Daily    NIFEdipine 24 hr tablet 60 mg Daily    ondansetron disintegrating tablet 8 mg Q8H PRN    oxyCODONE-acetaminophen 5-325 mg per tablet 1 tablet Q8H PRN    polyethylene glycol packet 17 g Daily    promethazine (PHENERGAN) 6.25 mg in dextrose 5 % 50 mL IVPB Q6H PRN    ramelteon tablet 8 mg Nightly PRN    sodium bicarbonate tablet 1,300 mg TID    sodium chloride 0.9% flush 10 mL PRN    Tdap vaccine injection 0.5 mL vaccine x 1 dose     Objective:     Vital Signs (Most Recent):  Temp: 98 °F (36.7 °C) (07/28/19 0430)  Pulse: 76 (07/28/19 0430)  Resp: 18 (07/28/19 0430)  BP: (!) 156/72 (07/28/19 0430)  SpO2: 95 % (07/28/19 0430)  O2 Device (Oxygen Therapy): room air (07/27/19 0351) Vital Signs (24h Range):  Temp:  [96.6 °F (35.9 °C)-98 °F (36.7 °C)] 98 °F (36.7 °C)  Pulse:  [67-90] 76  Resp:  [17-18] 18  SpO2:  [95 %-99 %] 95 %  BP: (143-196)/(72-90) 156/72     Weight: 103 kg (227 lb) (07/19/19 1400)  Body mass index is 36.64 kg/m².  Body surface area is 2.19 meters squared.      Intake/Output Summary (Last 24 hours) at 7/28/2019 0841  Last data filed at 7/28/2019 0624  Gross per 24 hour   Intake 670 ml   Output 800 ml   Net -130 ml       Physical Exam   Constitutional: She is well-developed, well-nourished, and in no distress. No distress.   Eyes: Conjunctivae are normal. Right eye exhibits no discharge. Left eye exhibits no discharge. No scleral icterus.   Neck: No tracheal deviation present.   Central line in place, right neck   Cardiovascular: Normal rate, regular rhythm and normal heart sounds.  Exam reveals no gallop and no friction rub.    No murmur heard.  Pulmonary/Chest: Effort normal and  breath sounds normal. No respiratory distress. She has no wheezes. She has no rales. She exhibits no tenderness.   Abdominal: Soft. Bowel sounds are normal. She exhibits no distension and no mass. There is no tenderness. There is no rebound and no guarding.   Genitourinary: Right adnexa normal.   Lymphadenopathy:     She has no cervical adenopathy.   Skin: Skin is warm and dry. No rash noted. She is not diaphoretic.     Musculoskeletal: She exhibits no edema.         Assessment/Plan:     Cryoglobulinemic vasculitis  BM biopsy results noted and current suspicion for lymphoplasmacytic lymphoma vs Waldenstrom's, vs multiple myeloma. Given results, will let Heme/Onc decide which chemotherapeutic agent they feel is best.     - 3rd PLEX tomorrow  - Last dose of pulse steroids received today, start 60mg prednisone tomorrow  - Call [facility] tomorrow to find out more about patient's renal thrombosis history  - Recommend Allergy/Immunology consult for decreased IgG levels, will await their recommendations  - Follow up remaining cryoglobulin and thrombophilia labs, SSA, SSB, MPO, PR3  - PCP prophylaxis (for steroids) with atovaquone 1500mg suspension, started 7/25/19. Avoiding bactrim in setting of acute renal failure.  - Ulcer prophylaxis with PPI (for steroids)  - Chemotherapeutic therapy deferred to Heme/Onc    This case will be discussed with Dr. Solis.    Rita Buck M.D.  Rheumatology Fellow, PGY 4       I  Have personally take the history and examined the patient and agree with fellow's note as stated above.      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 Treatment thus far with pulse Solu-Medrol 1 g IV daily x 3, last dose today, and PLEX x 3 alternate days, final tomorrow. For the cryoglobulinemic DPGN, rituximab additional  treatment of choice with Pneumocystis prophylaxis with atovaquone and entecavir for hepatitis B to prevent increased  activation, but need to coordinate with Hematology recommendations for Rx of lymphoma v plasma cell dyscrasia.  Bone marrow suspicious for plasma cell dyscrasia v. Lymphoma  Hepatitis B  *IIDA should be on iron given ALLYSON(no stainable iron on BM)

## 2019-07-28 NOTE — ASSESSMENT & PLAN NOTE
Shayla due to heme malignancy.  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   baseline creatinine of 1.0 roughly 1 month ago. BUN/Cr peaked at 154/6.0 and improved the day after PLEX started.  RJ with bilateral medicorenal disease  UA with proteinuria, blood, no evidence of infection; pro:cr 5.97; C3 41, C4<3  positive for strep A as seen on outside hospital records  given 1x dose lasix 160mg IV with poor response  - 7/24 HD line placed for PLEX which was started q48h x3 treatements  - 7/19 initiated on empiric prednisone 60mg daily, then 7/26 to solumederol 1 gram qd x3 days, then back to 60 qd  - Nephrology following

## 2019-07-29 LAB
ALBUMIN SERPL BCP-MCNC: 4 G/DL (ref 3.5–5.2)
ALP SERPL-CCNC: 35 U/L (ref 55–135)
ALT SERPL W/O P-5'-P-CCNC: 31 U/L (ref 10–44)
ANION GAP SERPL CALC-SCNC: 8 MMOL/L (ref 8–16)
ANISOCYTOSIS BLD QL SMEAR: SLIGHT
ANTI-SSA ANTIBODY: 0 EU (ref 0–19.99)
ANTI-SSA INTERPRETATION: NEGATIVE
ANTI-SSB ANTIBODY: 0.09 EU (ref 0–19.99)
ANTI-SSB INTERPRETATION: NEGATIVE
AST SERPL-CCNC: 20 U/L (ref 10–40)
BASOPHILS # BLD AUTO: 0 K/UL (ref 0–0.2)
BASOPHILS NFR BLD: 0 % (ref 0–1.9)
BILIRUB SERPL-MCNC: 0.7 MG/DL (ref 0.1–1)
BUN SERPL-MCNC: 68 MG/DL (ref 6–20)
CALCIUM SERPL-MCNC: 9.5 MG/DL (ref 8.7–10.5)
CARDIOLIPIN IGA SER IA-ACNC: <9 APL
CHLORIDE SERPL-SCNC: 113 MMOL/L (ref 95–110)
CHROM BANDING METHOD: NORMAL
CHROMOSOME ANALYSIS BM ADDITIONAL INFORMATION: NORMAL
CHROMOSOME ANALYSIS BM RELEASED BY: NORMAL
CHROMOSOME ANALYSIS BM RESULT SUMMARY: NORMAL
CLINICAL CYTOGENETICIST REVIEW: NORMAL
CO2 SERPL-SCNC: 25 MMOL/L (ref 23–29)
CREAT SERPL-MCNC: 2.7 MG/DL (ref 0.5–1.4)
DIFFERENTIAL METHOD: ABNORMAL
EOSINOPHIL # BLD AUTO: 0 K/UL (ref 0–0.5)
EOSINOPHIL NFR BLD: 0 % (ref 0–8)
ERYTHROCYTE [DISTWIDTH] IN BLOOD BY AUTOMATED COUNT: 28.3 % (ref 11.5–14.5)
EST. GFR  (AFRICAN AMERICAN): 22.5 ML/MIN/1.73 M^2
EST. GFR  (NON AFRICAN AMERICAN): 19.5 ML/MIN/1.73 M^2
G6PD RBC-CCNT: 16.5 U/G HGB (ref 7–20.5)
GLUCOSE SERPL-MCNC: 238 MG/DL (ref 70–110)
HCT VFR BLD AUTO: 24.6 % (ref 37–48.5)
HGB BLD-MCNC: 8 G/DL (ref 12–16)
HYPOCHROMIA BLD QL SMEAR: ABNORMAL
IMM GRANULOCYTES # BLD AUTO: 0.32 K/UL (ref 0–0.04)
IMM GRANULOCYTES NFR BLD AUTO: 4.1 % (ref 0–0.5)
KARYOTYP MAR: NORMAL
LYMPHOCYTES # BLD AUTO: 0.8 K/UL (ref 1–4.8)
LYMPHOCYTES NFR BLD: 9.8 % (ref 18–48)
M TB IFN-G CD4+ BCKGRND COR BLD-ACNC: 0 IU/ML
MCH RBC QN AUTO: 24.1 PG (ref 27–31)
MCHC RBC AUTO-ENTMCNC: 32.5 G/DL (ref 32–36)
MCV RBC AUTO: 74 FL (ref 82–98)
MITOGEN IGNF BCKGRD COR BLD-ACNC: >10 IU/ML
MITOGEN IGNF BCKGRD COR BLD-ACNC: NEGATIVE [IU]/ML
MONOCYTES # BLD AUTO: 0.8 K/UL (ref 0.3–1)
MONOCYTES NFR BLD: 9.5 % (ref 4–15)
NEUTROPHILS # BLD AUTO: 6 K/UL (ref 1.8–7.7)
NEUTROPHILS NFR BLD: 76.6 % (ref 38–73)
NIL: 0.03 IU/ML
NRBC BLD-RTO: 1 /100 WBC
OVALOCYTES BLD QL SMEAR: ABNORMAL
PHOSPHATE SERPL-MCNC: 4.2 MG/DL (ref 2.7–4.5)
PLATELET # BLD AUTO: 297 K/UL (ref 150–350)
PLATELET BLD QL SMEAR: ABNORMAL
PMV BLD AUTO: 10.3 FL (ref 9.2–12.9)
POCT GLUCOSE: 225 MG/DL (ref 70–110)
POCT GLUCOSE: 269 MG/DL (ref 70–110)
POCT GLUCOSE: 278 MG/DL (ref 70–110)
POCT GLUCOSE: 383 MG/DL (ref 70–110)
POIKILOCYTOSIS BLD QL SMEAR: SLIGHT
POLYCHROMASIA BLD QL SMEAR: ABNORMAL
POTASSIUM SERPL-SCNC: 4.6 MMOL/L (ref 3.5–5.1)
PROT SERPL-MCNC: 5 G/DL (ref 6–8.4)
RBC # BLD AUTO: 3.32 M/UL (ref 4–5.4)
REASON FOR REFERRAL (NARRATIVE): NORMAL
REF LAB TEST METHOD: NORMAL
SCHISTOCYTES BLD QL SMEAR: ABNORMAL
SODIUM SERPL-SCNC: 146 MMOL/L (ref 136–145)
SPECIMEN SOURCE: NORMAL
SPECIMEN: NORMAL
STRONGYLOIDES ANTIBODY IGG: NEGATIVE
TARGETS BLD QL SMEAR: ABNORMAL
TB2 - NIL: 0 IU/ML
WBC # BLD AUTO: 7.88 K/UL (ref 3.9–12.7)

## 2019-07-29 PROCEDURE — 11000001 HC ACUTE MED/SURG PRIVATE ROOM

## 2019-07-29 PROCEDURE — 36514 APHERESIS PLASMA: CPT

## 2019-07-29 PROCEDURE — 84100 ASSAY OF PHOSPHORUS: CPT

## 2019-07-29 PROCEDURE — 99233 SBSQ HOSP IP/OBS HIGH 50: CPT | Mod: ,,, | Performed by: HOSPITALIST

## 2019-07-29 PROCEDURE — 25000003 PHARM REV CODE 250: Performed by: STUDENT IN AN ORGANIZED HEALTH CARE EDUCATION/TRAINING PROGRAM

## 2019-07-29 PROCEDURE — 99233 SBSQ HOSP IP/OBS HIGH 50: CPT | Mod: ,,, | Performed by: INTERNAL MEDICINE

## 2019-07-29 PROCEDURE — 99233 PR SUBSEQUENT HOSPITAL CARE,LEVL III: ICD-10-PCS | Mod: ,,, | Performed by: INTERNAL MEDICINE

## 2019-07-29 PROCEDURE — 63600175 PHARM REV CODE 636 W HCPCS: Performed by: HOSPITALIST

## 2019-07-29 PROCEDURE — P9045 ALBUMIN (HUMAN), 5%, 250 ML: HCPCS | Mod: JG | Performed by: PATHOLOGY

## 2019-07-29 PROCEDURE — 25000003 PHARM REV CODE 250: Performed by: HOSPITALIST

## 2019-07-29 PROCEDURE — 36514 APHERESIS PLASMA: CPT | Mod: ,,, | Performed by: PATHOLOGY

## 2019-07-29 PROCEDURE — 63600175 PHARM REV CODE 636 W HCPCS: Performed by: PATHOLOGY

## 2019-07-29 PROCEDURE — 36514 PR THER APHERESIS,PLASMA PHERESIS: ICD-10-PCS | Mod: ,,, | Performed by: PATHOLOGY

## 2019-07-29 PROCEDURE — 94761 N-INVAS EAR/PLS OXIMETRY MLT: CPT

## 2019-07-29 PROCEDURE — 80053 COMPREHEN METABOLIC PANEL: CPT

## 2019-07-29 PROCEDURE — 85025 COMPLETE CBC W/AUTO DIFF WBC: CPT

## 2019-07-29 PROCEDURE — 25000003 PHARM REV CODE 250: Performed by: NURSE PRACTITIONER

## 2019-07-29 PROCEDURE — S5571 INSULIN DISPOS PEN 3 ML: HCPCS | Performed by: HOSPITALIST

## 2019-07-29 PROCEDURE — 99233 PR SUBSEQUENT HOSPITAL CARE,LEVL III: ICD-10-PCS | Mod: ,,, | Performed by: HOSPITALIST

## 2019-07-29 RX ORDER — INSULIN ASPART 100 [IU]/ML
8 INJECTION, SOLUTION INTRAVENOUS; SUBCUTANEOUS
Status: DISCONTINUED | OUTPATIENT
Start: 2019-07-29 | End: 2019-08-02

## 2019-07-29 RX ORDER — SODIUM BICARBONATE 650 MG/1
650 TABLET ORAL 3 TIMES DAILY
Status: DISCONTINUED | OUTPATIENT
Start: 2019-07-29 | End: 2019-08-07 | Stop reason: HOSPADM

## 2019-07-29 RX ORDER — CARVEDILOL 12.5 MG/1
12.5 TABLET ORAL 2 TIMES DAILY
Status: DISCONTINUED | OUTPATIENT
Start: 2019-07-29 | End: 2019-07-30

## 2019-07-29 RX ORDER — ALBUMIN HUMAN 50 G/1000ML
275 SOLUTION INTRAVENOUS ONCE
Status: COMPLETED | OUTPATIENT
Start: 2019-07-29 | End: 2019-07-29

## 2019-07-29 RX ORDER — HEPARIN SODIUM 1000 [USP'U]/ML
2300 INJECTION, SOLUTION INTRAVENOUS; SUBCUTANEOUS ONCE
Status: COMPLETED | OUTPATIENT
Start: 2019-07-29 | End: 2019-07-29

## 2019-07-29 RX ADMIN — HYDRALAZINE HYDROCHLORIDE 50 MG: 50 TABLET ORAL at 06:07

## 2019-07-29 RX ADMIN — SODIUM BICARBONATE 650 MG TABLET 1300 MG: at 09:07

## 2019-07-29 RX ADMIN — INSULIN DETEMIR 5 UNITS: 100 INJECTION, SOLUTION SUBCUTANEOUS at 09:07

## 2019-07-29 RX ADMIN — RAMELTEON 8 MG: 8 TABLET, FILM COATED ORAL at 09:07

## 2019-07-29 RX ADMIN — CLONIDINE HYDROCHLORIDE 0.1 MG: 0.1 TABLET ORAL at 09:07

## 2019-07-29 RX ADMIN — CALCIUM GLUCONATE 2000 MG: 98 INJECTION, SOLUTION INTRAVENOUS at 01:07

## 2019-07-29 RX ADMIN — HYDRALAZINE HYDROCHLORIDE 50 MG: 50 TABLET ORAL at 09:07

## 2019-07-29 RX ADMIN — INSULIN ASPART 10 UNITS: 100 INJECTION, SOLUTION INTRAVENOUS; SUBCUTANEOUS at 12:07

## 2019-07-29 RX ADMIN — INSULIN ASPART 5 UNITS: 100 INJECTION, SOLUTION INTRAVENOUS; SUBCUTANEOUS at 09:07

## 2019-07-29 RX ADMIN — SODIUM BICARBONATE 650 MG TABLET 650 MG: at 09:07

## 2019-07-29 RX ADMIN — INSULIN ASPART 5 UNITS: 100 INJECTION, SOLUTION INTRAVENOUS; SUBCUTANEOUS at 12:07

## 2019-07-29 RX ADMIN — NIFEDIPINE 60 MG: 60 TABLET, FILM COATED, EXTENDED RELEASE ORAL at 09:07

## 2019-07-29 RX ADMIN — PREDNISONE 60 MG: 20 TABLET ORAL at 09:07

## 2019-07-29 RX ADMIN — FAMOTIDINE 20 MG: 20 TABLET, FILM COATED ORAL at 09:07

## 2019-07-29 RX ADMIN — CARVEDILOL 12.5 MG: 12.5 TABLET, FILM COATED ORAL at 09:07

## 2019-07-29 RX ADMIN — HEPARIN SODIUM 2300 UNITS: 1000 INJECTION INTRAVENOUS; SUBCUTANEOUS at 02:07

## 2019-07-29 RX ADMIN — INSULIN ASPART 8 UNITS: 100 INJECTION, SOLUTION INTRAVENOUS; SUBCUTANEOUS at 05:07

## 2019-07-29 RX ADMIN — ATOVAQUONE 1500 MG: 750 SUSPENSION ORAL at 09:07

## 2019-07-29 RX ADMIN — INSULIN ASPART 6 UNITS: 100 INJECTION, SOLUTION INTRAVENOUS; SUBCUTANEOUS at 05:07

## 2019-07-29 RX ADMIN — ALLOPURINOL 100 MG: 100 TABLET ORAL at 09:07

## 2019-07-29 RX ADMIN — INSULIN ASPART 4 UNITS: 100 INJECTION, SOLUTION INTRAVENOUS; SUBCUTANEOUS at 09:07

## 2019-07-29 RX ADMIN — HYDRALAZINE HYDROCHLORIDE 50 MG: 50 TABLET ORAL at 01:07

## 2019-07-29 RX ADMIN — SODIUM BICARBONATE 650 MG TABLET 650 MG: at 02:07

## 2019-07-29 RX ADMIN — INSULIN ASPART 3 UNITS: 100 INJECTION, SOLUTION INTRAVENOUS; SUBCUTANEOUS at 09:07

## 2019-07-29 RX ADMIN — ALBUMIN (HUMAN) 275 G: 12.5 SOLUTION INTRAVENOUS at 12:07

## 2019-07-29 RX ADMIN — CARVEDILOL 6.25 MG: 6.25 TABLET, FILM COATED ORAL at 09:07

## 2019-07-29 NOTE — SUBJECTIVE & OBJECTIVE
Interval History: Patient seen today, she is doing well. Last day of PLEX today. She denies any bleeding/bruising, no pain.    Oncology Treatment Plan:   [No treatment plan]    Medications:  Continuous Infusions:  Scheduled Meds:   albumin human 5%  275 g Intravenous Once    allopurinol  100 mg Oral Daily    atovaquone  1,500 mg Oral Daily    calcium gluconate IVPB  2,000 mg Intravenous Once    carvedilol  12.5 mg Oral BID    cloNIDine  0.1 mg Oral BID    entecavir  0.5 mg Oral Q72H    famotidine  20 mg Oral Daily    heparin (porcine)  2,300 Units Intravenous Once    hydrALAZINE  50 mg Oral Q8H    insulin aspart U-100  8 Units Subcutaneous TIDWM    insulin detemir U-100  5 Units Subcutaneous QHS    NIFEdipine  60 mg Oral Daily    polyethylene glycol  17 g Oral Daily    predniSONE  60 mg Oral Daily    sodium bicarbonate  650 mg Oral TID     PRN Meds:acetaminophen, albuterol-ipratropium, bisacodyl, glucagon (human recombinant), glucose, glucose, hydrALAZINE, insulin aspart U-100, ondansetron, oxyCODONE-acetaminophen, promethazine (PHENERGAN) IVPB, ramelteon, sodium chloride 0.9%, DIPH,PERTUS (ADACEL),TETANUS PF VAC (ADULT)     Review of Systems    Objective:     Vital Signs (Most Recent):  Temp: 97.3 °F (36.3 °C) (07/29/19 1238)  Pulse: 79 (07/29/19 1238)  Resp: 20 (07/29/19 0752)  BP: (!) 198/93 (07/29/19 1238)  SpO2: 99 % (07/29/19 1238) Vital Signs (24h Range):  Temp:  [96.5 °F (35.8 °C)-98 °F (36.7 °C)] 97.3 °F (36.3 °C)  Pulse:  [71-81] 79  Resp:  [18-20] 20  SpO2:  [92 %-99 %] 99 %  BP: (163-198)/(74-93) 198/93     Weight: 103 kg (227 lb)  Body mass index is 36.64 kg/m².  Body surface area is 2.19 meters squared.      Intake/Output Summary (Last 24 hours) at 7/29/2019 1327  Last data filed at 7/29/2019 0700  Gross per 24 hour   Intake 980 ml   Output --   Net 980 ml       Physical Exam   Constitutional: She is oriented to person, place, and time. She appears well-developed and well-nourished.    HENT:   Trialysis right neck   Eyes: EOM are normal.   Neck: Normal range of motion.   Cardiovascular: Normal rate and regular rhythm.   Pulmonary/Chest: Effort normal. No respiratory distress.   Abdominal: Soft. She exhibits no distension. There is no tenderness.   Musculoskeletal: She exhibits no edema.   Neurological: She is alert and oriented to person, place, and time.   Skin: Skin is warm and dry.   Psychiatric: She has a normal mood and affect. Her behavior is normal.       Significant Labs:   CBC:   Recent Labs   Lab 07/28/19  0415 07/29/19 0430   WBC 6.16 7.88   HGB 7.9* 8.0*   HCT 25.0* 24.6*    297    and CMP:   Recent Labs   Lab 07/28/19 0415 07/29/19  0430   * 146*   K 4.8 4.6   * 113*   CO2 22* 25   * 238*   BUN 78* 68*   CREATININE 3.2* 2.7*   CALCIUM 9.9 9.5   PROT 5.2* 5.0*   ALBUMIN 4.2 4.0   BILITOT 0.7 0.7   ALKPHOS 29* 35*   AST 12 20   ALT 16 31   ANIONGAP 10 8   EGFRNONAA 15.9* 19.5*       Diagnostic Results:  I have reviewed all pertinent imaging results/findings within the past 24 hours.

## 2019-07-29 NOTE — ASSESSMENT & PLAN NOTE
BM biopsy 7/23/19:  -- MILDLY HYPERCELLULAR MARROW (60%) WITH TRILINEAGE HEMATOPOIESIS.  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  -- ADEQUATE NUMBER OF MEGAKARYOCYTES.  -- STAINABLE IRON IS NOT IDENTIFIED.  -- SEE COMMENT.  COMMENT:  CBC data shows microcytic anemia and thrombocytopenia.  Flow cytometric analysis of bone marrow detects a kappa restricted plasma cell population that expresses , CD19, and bright CD38. CD20 and CD56 are negative. Polytypic B lymphocytes are detected. T lymphocytes are immunophenotypically unremarkable. Blasts gate is not increased. Flow differential: Lymphocytes 5.9%, Monocytes 4.6%, Granulocytes 85.8%, Blast 1.2%.  Immunohistochemical stains are performed on the biopsy core and clot section for greater sensitivity and further architectural assessment with adequate controls. -positive plasma cells comprise approximately 4-5% of thetotal cellularity. Immunoglobulin kappa and lambda light chains situ hybridization study reveals kappa light chainrestriction is some areas. The lymphoid aggregates are composed of mixed CD20-positive B-cells and CD3-positive T-cells. 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 Ddx includes marginal zone lymphoma  - Patient with new renal failure, has normal IgG.  - Skeletal survey (-) for lytic lesions  - CT neck/C/A/P done showed small mediastinal LN and precarinal LN 1.2x2cm  - CT surgery consulted for possible LN biopsy. Given expected low yield of LN biopsy (possibly because patient has been on steroids), would hold off scheduling LN biopsy until have subtype of cryoglobulins and final renal biopsy report. If type 1 cryoglobulins or light chain deposition on renal biopsy, would like to proceed with LN biopsy to confirm nature of  hematologic malignancy. If not, will defer for now. Discussed the following with CT surgery.  - Pending cryoglobulins, serum viscosity and cold agglutinins  - Continue allopurinol  - Transfuse platelets if < 10k or if < 50k and actively bleeding  - Transfuse pRBCs if Hgb < 7

## 2019-07-29 NOTE — HPI
54 yo female transferred to [facility] due to thrombocytopenia and concern for TTP.  Subjective fever, chills, cough, SOB for 2 weeks prior to admission. On admission , creatinine of 2.9,  platelets of 33k, chest x-ray showing interstitial opacities, and CT showing  perihilar ground glass opacity. Started on  doxycycline and ceftriazone for PNA. Since transfer platelets have normalized s/p plasmapheresis x3 and on steroids. ARF prompting renal biopsy. BUN/Cr continues to improve. Hem/Onc performed BM biopsy with results concerning for lymphoma versus plasma cell dyscrasia. Chest/Abdomen/Pelvis showed mild mediastinal and hilar adenopathy with largest LN measuring apx 1.5 cm in the precarinal area. Adenopathy is not well visualized without contrasted study. Thoracic consulted for LN biopsy. Platelets 240k today. INR 1.3.

## 2019-07-29 NOTE — ASSESSMENT & PLAN NOTE
Stable.  FERRITIN 161, RETIC 4.7 (H), Bili 1, FOLATE 11.1, VITAMIN B12 843  Iron 40, sat 33%  - monitor  - fobt

## 2019-07-29 NOTE — PROGRESS NOTES
Apheresis tx #3 started at 12:50 without difficulty.  Pt stated no pain.  Pt is alert and oriented x 4.  5.5  Liter plasma exchange.  Completed via RIJ, cath is patent, dressing is CDI, although old dry blood noted underneath, dressing changed today. Replacement fluids 5% albumin.  2 grams calcium gluconate IVPB throughout duration of exchange.  Tx ended at 14:07.  Cath flushed and locked.  Pt tolerated tx well. Today's treatment completes this series.

## 2019-07-29 NOTE — PROGRESS NOTES
"[facility]  Adult Nutrition  Progress Note    SUMMARY       Recommendations    Recommendation/Intervention:   Continue Renal, diabetic diet restriction. Pt consuming 100% of meals.     RD to monitor.    Goals: Pt to continue tolerating >85% EEN and EPN during admission.  Nutrition Goal Status: new  Communication of RD Recs: reviewed with RN    Reason for Assessment    Reason For Assessment: length of stay  Diagnosis: renal disease  Relevant Medical History: HTN, anemia, leiomyoma of uterus  General Information Comments: Pt consuming 100% of meals. Reports weight loss of approx 7lb over previous months 2/2 illness. Pt states appetite and intake adequate. Pt nourished and obese, does not meet criteria for malnutrition at this time  Nutrition Discharge Planning: adequate po intake for optimal nutrition    Nutrition Risk Screen    Nutrition Risk Screen: no indicators present    Nutrition/Diet History    Spiritual, Cultural Beliefs, Catholic Practices, Values that Affect Care: (None known at this time)  Factors Affecting Nutritional Intake: None identified at this time    Anthropometrics    Temp: 97.8 °F (36.6 °C)  Height Method: Stated  Height: 5' 6" (167.6 cm)  Height (inches): 66 in  Weight Method: Bed Scale  Weight: 103 kg (227 lb)  Weight (lb): 227 lb  Ideal Body Weight (IBW), Female: 130 lb  % Ideal Body Weight, Female (lb): 174.62 lb  BMI (Calculated): 36.7  BMI Grade: 35 - 39.9 - obesity - grade II       Lab/Procedures/Meds    Pertinent Labs Reviewed: reviewed  Pertinent Labs Comments: BUN 68, Cr 2.7, HgbA1c 5.7, POCT Glu 225-321  Pertinent Medications Reviewed: reviewed  Pertinent Medications Comments: insulin    Estimated/Assessed Needs    Weight Used For Calorie Calculations: 103 kg (227 lb 1.2 oz)  Energy Calorie Requirements (kcal): 2070  Energy Need Method: [place]-St Jeor(PAL 1.25)  Protein Requirements: 103-124g(1.0-1.2g/kg)  Weight Used For Protein Calculations: 103 kg (227 lb 1.2 " oz)  Fluid Requirements (mL): 1mL/kcal or per MD     RDA Method (mL): 2070         Nutrition Prescription Ordered    Current Diet Order: renal, diabetic    Evaluation of Received Nutrient/Fluid Intake       % Intake of Estimated Energy Needs: 75 - 100 %  % Meal Intake: 75 - 100 %    Nutrition Risk    Level of Risk/Frequency of Follow-up: low(f/u 1x/week)     Assessment and Plan    No nutrition diagnosis at this time.       Monitor and Evaluation    Food and Nutrient Intake: energy intake, food and beverage intake  Food and Nutrient Adminstration: diet order  Anthropometric Measurements: weight, weight change, body mass index  Biochemical Data, Medical Tests and Procedures: electrolyte and renal panel, gastrointestinal profile, glucose/endocrine profile, inflammatory profile, lipid profile  Nutrition-Focused Physical Findings: overall appearance       Nutrition Follow-Up    RD Follow-up?: Yes

## 2019-07-29 NOTE — ASSESSMENT & PLAN NOTE
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  As per Dr. 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 She also has extensive ATN likely secondary to ischemia downstream the affected glomeruli. These features fit with the low C4 and the +RF. 90% of these cases are associated with HCV (but she is negative!) and only ~3% are associated with HBV. Despite her very high kappa/lambda ratio (K:L), she has no features of myeloma cast nephropathy or light chain deposition disease in her biopsy specimen. The high K:L could come from the cryoglobulinemic process (mono and polyclonal LC production). Of note, lymphoma can rarely cause cryoglobulinemic DPGN, so BM biopsy results will be important. No evidence of uric acid tubulopathy (tumor lysis syndrome) either. In retrospect, her NSAIDs use was a major confounding factor in this case."     Recommendations:  - Continue prednisone 60 mg qd for additional 2 weeks, then begin taper to 40 mg qd  - Continue Myfortic  - Cryoglobulin lab sent f/u results  - Continue treatment with entecavir for Hep B   - Follow up on official Kidney Biopsy report  - BP control to goal <140/90  - Limit protein diet given azotemia, no more than 60-80 g per day  - Continue sodium bicarb 1300 mg tid  - Will follow closely  "

## 2019-07-29 NOTE — PROGRESS NOTES
[facility]  Rheumatology  Progress Note    Patient Name: Kendy Vital  MRN: [id number]  Admission Date: 7/19/2019  Hospital Length of Stay: 10 days  Code Status: Full Code   Attending Provider: Luiz Rea MD  Primary Care Physician: To Obtain Unable  Principal Problem: Acute renal failure    Subjective:     HPI: Ms. 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 She initially presented with fevers, chills, dry cough, shortness of breath, 2 pillow orthopnea, and occasional epistaxis for roughly 2 weeks. Initial work up showed a BNP of 526, worsening kidney function, and thrombocytopenia with platelets of 33k. She was initially treated with Rocephin and doxycycline for suspected pneumonia and was diuresed for HF. She was then  plasmapheresed and started on prednisone 60mg daily for suspected TTP. 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 Thus far, the patient has received pulse steroids (today is day 2). 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         Interval History: Pt was tearful and reported feeling anxious. Her brother passed away recently. Otherwise at her baseline. Sitting up with 1 pillow and head of bed elevated due to dyspnea when lying flat.    Current Facility-Administered Medications   Medication Frequency    acetaminophen tablet 650 mg Q6H PRN    albumin human 5% bottle 275 g Once    albuterol-ipratropium 2.5 mg-0.5 mg/3 mL nebulizer solution 3 mL Q6H PRN    allopurinol tablet 100 mg Daily    atovaquone suspension 1,500 mg Daily    bisacodyl suppository 10 mg Daily PRN    calcium gluconate 2,000 mg in sodium chloride 0.9% 100 mL IVPB Once    carvedilol tablet 6.25 mg BID    cloNIDine  tablet 0.1 mg BID    entecavir tablet 0.5 mg Q72H    famotidine tablet 20 mg Daily    glucagon (human recombinant) injection 1 mg PRN    glucose chewable tablet 16 g PRN    glucose chewable tablet 24 g PRN    heparin (porcine) injection 2,300 Units Once    hydrALAZINE tablet 25 mg Q6H PRN    hydrALAZINE tablet 50 mg Q8H    insulin aspart U-100 pen 1-10 Units QID (AC + HS) PRN    insulin aspart U-100 pen 5 Units TIDWM    NIFEdipine 24 hr tablet 60 mg Daily    ondansetron disintegrating tablet 8 mg Q8H PRN    oxyCODONE-acetaminophen 5-325 mg per tablet 1 tablet Q8H PRN    polyethylene glycol packet 17 g Daily    predniSONE tablet 60 mg Daily    promethazine (PHENERGAN) 6.25 mg in dextrose 5 % 50 mL IVPB Q6H PRN    ramelteon tablet 8 mg Nightly PRN    sodium bicarbonate tablet 1,300 mg TID    sodium chloride 0.9% flush 10 mL PRN    Tdap vaccine injection 0.5 mL vaccine x 1 dose     Objective:     Vital Signs (Most Recent):  Temp: 97.8 °F (36.6 °C) (07/29/19 0752)  Pulse: 81 (07/29/19 0752)  Resp: 20 (07/29/19 0752)  BP: (!) 173/91 (07/29/19 0752)  SpO2: 95 % (07/29/19 0752)  O2 Device (Oxygen Therapy): room air (07/28/19 0800) Vital Signs (24h Range):  Temp:  [96.5 °F (35.8 °C)-98 °F (36.7 °C)] 97.8 °F (36.6 °C)  Pulse:  [71-81] 81  Resp:  [18-20] 20  SpO2:  [92 %-99 %] 95 %  BP: (163-193)/(74-91) 173/91     Weight: 103 kg (227 lb) (07/19/19 1400)  Body mass index is 36.64 kg/m².  Body surface area is 2.19 meters squared.      Intake/Output Summary (Last 24 hours) at 7/29/2019 1115  Last data filed at 7/29/2019 0700  Gross per 24 hour   Intake 980 ml   Output --   Net 980 ml       Physical Exam   Vitals reviewed.  Constitutional: She is oriented to person, place, and time and well-developed, well-nourished, and in no distress. No distress.   HENT:   Head: Normocephalic and atraumatic.   Eyes: Pupils are equal, round, and reactive to light.   Cardiovascular: Normal rate and regular rhythm.    No  murmur heard.  Pulmonary/Chest: Effort normal and breath sounds normal. No respiratory distress. She has no rales.   Abdominal: Soft. Bowel sounds are normal. There is no tenderness. There is no rebound and no guarding.   Neurological: She is alert and oriented to person, place, and time.   Skin: Skin is warm and dry. She is not diaphoretic.     Musculoskeletal: She exhibits edema (pitting edema up to 2/3 of tibia). She exhibits no tenderness.         Significant Labs:  All pertinent lab results from the last 24 hours have been reviewed.    Significant Imaging:  Imaging results within the past 24 hours have been reviewed.    Assessment/Plan:     Cryoglobulinemic vasculitis  BM biopsy results noted and current suspicion for lymphoplasmacytic lymphoma vs Waldenstrom's, vs multiple myeloma. Given results, will let Heme/Onc decide which chemotherapeutic agent they feel is best.     - 3rd PLEX 07/29  - Last dose of pulse steroids received, starting 60mg prednisone 07/29  - LN bx on 07/29 or 07/30  - Recommend Allergy/Immunology consult for decreased IgG levels, will await their recommendations  - Follow up remaining cryoglobulin and thrombophilia labs, SSA, SSB, MPO, PR3  - PCP prophylaxis (for steroids) with atovaquone 1500mg suspension, started 7/25/19. Avoiding bactrim in setting of acute renal failure.  - Ulcer prophylaxis, currently on famotidine  - Chemotherapeutic therapy deferred to Heme/Onc              Jey Marcus MD  Rheumatology  [facility]      Patient seen and examined with resident.  All elements of history, physical exam and medical decision making independently confirmed by me.  Patient with renal failure thought to be due to cryoglobulinemic vasculitis, +Hep B and abnormal BM biopsy.  Patient treated with pulse dose steroids now on 60 mg prednisone,  PLEX x3 and entecavir.  Await labs and lymph node biopsy.   If no contraindications consider switch famotidine to PPI for better GI  prophylaxis on high dose steroid.  See note for details.

## 2019-07-29 NOTE — ASSESSMENT & PLAN NOTE
"Per heme research 7/26 "Pt also has a history of unprovoked thrombosis treated at [facility] approximately 4 years ago, none of which is visible in the "care everywhere" section of the chart. She states she had one episode of thrombosis in her left upper extremity that contained "many little clots" and required surgical thrombectomy. She also had a left renal vein thrombosis for which she received a stent. She states that for both episodes she was placed on a heparin drip, did outpatient lovenox and was continued on ASA 81 until now. She states that no one informed her of any findings suggesting a hypercoaguable state or if the clotting was provoked."  -Given her significant thromboembolic history, rheum 7/26 would like to work her up for antiphopholipid antibody syndrome. They ordered levels.   -She also had symptoms of sicca syndromes (dry eyes, dry mouth). Rheum will check for sjogrens syndrome (SSA/SSB)    "

## 2019-07-29 NOTE — PLAN OF CARE
07/29/19 1112   Discharge Reassessment   Assessment Type Discharge Planning Reassessment   Do you have any problems affording any of your prescribed medications? No   Discharge Plan A Home   Discharge Plan B Home   DME Needed Upon Discharge  none   Anticipated Discharge Disposition Home   Post-Acute Status   Post-Acute Authorization Other   Other Status No Post-Acute Service Needs

## 2019-07-29 NOTE — SUBJECTIVE & OBJECTIVE
Interval History: Pt was tearful and reported feeling anxious. Her brother passed away recently. Otherwise at her baseline. Sitting up with 1 pillow and head of bed elevated due to dyspnea when lying flat.    Current Facility-Administered Medications   Medication Frequency    acetaminophen tablet 650 mg Q6H PRN    albumin human 5% bottle 275 g Once    albuterol-ipratropium 2.5 mg-0.5 mg/3 mL nebulizer solution 3 mL Q6H PRN    allopurinol tablet 100 mg Daily    atovaquone suspension 1,500 mg Daily    bisacodyl suppository 10 mg Daily PRN    calcium gluconate 2,000 mg in sodium chloride 0.9% 100 mL IVPB Once    carvedilol tablet 6.25 mg BID    cloNIDine tablet 0.1 mg BID    entecavir tablet 0.5 mg Q72H    famotidine tablet 20 mg Daily    glucagon (human recombinant) injection 1 mg PRN    glucose chewable tablet 16 g PRN    glucose chewable tablet 24 g PRN    heparin (porcine) injection 2,300 Units Once    hydrALAZINE tablet 25 mg Q6H PRN    hydrALAZINE tablet 50 mg Q8H    insulin aspart U-100 pen 1-10 Units QID (AC + HS) PRN    insulin aspart U-100 pen 5 Units TIDWM    NIFEdipine 24 hr tablet 60 mg Daily    ondansetron disintegrating tablet 8 mg Q8H PRN    oxyCODONE-acetaminophen 5-325 mg per tablet 1 tablet Q8H PRN    polyethylene glycol packet 17 g Daily    predniSONE tablet 60 mg Daily    promethazine (PHENERGAN) 6.25 mg in dextrose 5 % 50 mL IVPB Q6H PRN    ramelteon tablet 8 mg Nightly PRN    sodium bicarbonate tablet 1,300 mg TID    sodium chloride 0.9% flush 10 mL PRN    Tdap vaccine injection 0.5 mL vaccine x 1 dose     Objective:     Vital Signs (Most Recent):  Temp: 97.8 °F (36.6 °C) (07/29/19 0752)  Pulse: 81 (07/29/19 0752)  Resp: 20 (07/29/19 0752)  BP: (!) 173/91 (07/29/19 0752)  SpO2: 95 % (07/29/19 0752)  O2 Device (Oxygen Therapy): room air (07/28/19 0800) Vital Signs (24h Range):  Temp:  [96.5 °F (35.8 °C)-98 °F (36.7 °C)] 97.8 °F (36.6 °C)  Pulse:  [71-81] 81  Resp:   [18-20] 20  SpO2:  [92 %-99 %] 95 %  BP: (163-193)/(74-91) 173/91     Weight: 103 kg (227 lb) (07/19/19 1400)  Body mass index is 36.64 kg/m².  Body surface area is 2.19 meters squared.      Intake/Output Summary (Last 24 hours) at 7/29/2019 1115  Last data filed at 7/29/2019 0700  Gross per 24 hour   Intake 980 ml   Output --   Net 980 ml       Physical Exam   Vitals reviewed.  Constitutional: She is oriented to person, place, and time and well-developed, well-nourished, and in no distress. No distress.   HENT:   Head: Normocephalic and atraumatic.   Eyes: Pupils are equal, round, and reactive to light.   Cardiovascular: Normal rate and regular rhythm.    No murmur heard.  Pulmonary/Chest: Effort normal and breath sounds normal. No respiratory distress. She has no rales.   Abdominal: Soft. Bowel sounds are normal. There is no tenderness. There is no rebound and no guarding.   Neurological: She is alert and oriented to person, place, and time.   Skin: Skin is warm and dry. She is not diaphoretic.     Musculoskeletal: She exhibits edema (pitting edema up to 2/3 of tibia). She exhibits no tenderness.         Significant Labs:  All pertinent lab results from the last 24 hours have been reviewed.    Significant Imaging:  Imaging results within the past 24 hours have been reviewed.

## 2019-07-29 NOTE — PROCEDURES
"[facility]  Transfusion Medicine  Procedure Note    SUMMARY   Therapeutic Plasma Exchange (Apheresis)  Date/Time: 7/26/2019 8:31 AM  Performed by: Emily Lyons MD  Authorized by: Emily Lyons MD         Date of Procedure: 7/26/19     Procedure: Plasma Exchange    Provider: Emily Lyons MD     Pre-Procedure Diagnosis: Severe/symptomatic cryoglobulinemia with glomerulonephritis   Post-Procedure Diagnosis: Severe/symptomatic cryoglobulinemia with glomerulonephritis     Follow-up Assessment: 53 year old female with history of anemia initially presented with symptomatic thrombocytopenia concerning for TTP. Subsequent [id number] activity levels returned as normal (83%). Since admit her renal failure has progressed, her creatinine is substantially elevated from historical levels: 1mg/dL cited in note -> 6 mg/dL on 7/24. 4.3 mg/dL on 7/19 is the candace in our system.  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 Steroids have been initiated (7/23). Her low complement levels support this diagnosis. Routine TAT for characterization of cryoglobulins may be over a week, so we will treat presumptively.  We have been consulted to initiate therapeutic plasma exchange for severe/symptomatic cryoglobulinemia with glomerulonephritis an adjunct therapy.    Severe/symptomatic cryoglobulinemia with glomerulonephritis carries a Category II Grade 2A indication for therapeutic apheresis via the 2019 Journal of Clinical Apheresis Guidelines (Teofilo A et al. Journal of Clinical Apheresis 2019; 34:171-354.) The TPE plan is as follows: M    Today's procedure (# 2 of 3) was well tolerated and without complication.      Pertinent Laboratory Data:   Results for MUNA HERNANDEZ (MRN [id number]) as of 7/29/2019 08:34   Ref. Range 7/24/2019 03:27 " 7/25/2019 04:25 7/26/2019 05:03   Creatinine Latest Ref Range: 0.5 - 1.4 mg/dL 6.0 (H) 5.2 (H) 4.4 (H)       Pertinent Medications: None contraindicated for TPE    Review of patient's allergies indicates:  No Known Allergies    Anesthesia: None     Technical Procedures Used: Plasma Exchange: Volume exchanged - 5.5; Replacement fluid - Albumin; Number of procedures 2 of 3; Date of next procedure 7/29.    Description of the Findings of the Procedure:     Please see Apheresis Nurse flowsheet for details.    The patient was evaluated and all clinical and laboratory data relevant to the treatment was reviewed, and a decision was made to proceed with the Apheresis procedure.    I was available to the clinical staff throughout the procedure.    Significant Surgical Tasks Conducted by the Assistant(s): Not applicable  Complications: None  Estimated Blood Loss (EBL): None  Implants: None   Specimens: None    Emily Lyons MD, PhD  Section of Transfusion Medicine & Histocompatibility  Department of Pathology and Laboratory Medicine  [facility]  [phone] (HLA & Blood Bank Offices)  07/29/2019

## 2019-07-29 NOTE — MEDICAL/APP STUDENT
[facility]  Nephrology  Medical Student Progress Note    Patient Name: Kendy Vital  MRN: [id number]   Admission Date: 7/19/2019  Length of Stay: 10 days  Attending Physician: Dr. 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  Cr today is 2.7, yesterday 3.2 (Admission 4.3).   Her kidney function is recovering and Cr and BUN have both been trending downward since 7/24/19  Bone marrow results showed monoclonal plasma cell population with atypical lymphoid aggregates.  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  Last dose of pulse steroids received on 7/28/19, 60mg prednisone started 7/29/19  Making urine, not uremic.    Differential diagnosis as per notes includes lymphoplasmacytic lymphoma/Waldenstrom's macroglobulinemia, multiple myeloma, and marginal zone lymphoma.     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      Plan  Monitor labs  Avoid nephrotoxic drugs  Await cryoglobulins      To be discussed with team and staff,    Wally Hathaway  MS4    Subjective:     Principal Problem:Acute renal failure  Please see H&P for detailed presenting history and ROS.    Interval History:       Objective:     Vital Signs (Most Recent):  Temp: 97.7 °F (36.5 °C) (07/29/19 0431)  Pulse: 73 (07/29/19 0431)  Resp: 18 (07/29/19 0431)  BP: (!) 191/83 (07/29/19 0431)  SpO2: 97 % (07/29/19 0431) Vital Signs (24h Range):  Temp:  [96.5 °F (35.8 °C)-98.1 °F (36.7 °C)] 97.7 °F (36.5 °C)  Pulse:  [71-80] 73  Resp:  [18] 18  SpO2:  [92 %-99 %] 97 %  BP: (163-193)/(74-88) 191/83     Weight: 103 kg (227 lb)  Body mass index is 36.64 kg/m².    Intake/Output Summary (Last 24 hours) at 7/29/2019 0705  Last data filed at 7/28/2019 1600  Gross per 24 hour   Intake 780 ml    Output --   Net 780 ml      Physical Exam   Constitutional: She is oriented to person, place, and time.   Neck: No JVD present.   Cardiovascular: Normal rate.   Murmur heard.  Pulmonary/Chest: Effort normal and breath sounds normal.   Abdominal: Soft. Bowel sounds are normal.   Musculoskeletal: She exhibits edema.   Neurological: She is alert and oriented to person, place, and time. GCS eye subscore is 4. GCS verbal subscore is 5. GCS motor subscore is 6.   Skin: Skin is warm and dry. Capillary refill takes less than 2 seconds.   Nursing note and vitals reviewed.    Significant Labs:   Recent Results (from the past 24 hour(s))   POCT glucose    Collection Time: 07/28/19  8:37 AM   Result Value Ref Range    POCT Glucose 237 (H) 70 - 110 mg/dL   POCT glucose    Collection Time: 07/28/19  1:01 PM   Result Value Ref Range    POCT Glucose 321 (H) 70 - 110 mg/dL   POCT glucose    Collection Time: 07/28/19  5:17 PM   Result Value Ref Range    POCT Glucose 318 (H) 70 - 110 mg/dL   POCT glucose    Collection Time: 07/28/19  9:25 PM   Result Value Ref Range    POCT Glucose 294 (H) 70 - 110 mg/dL   CBC auto differential    Collection Time: 07/29/19  4:30 AM   Result Value Ref Range    WBC 7.88 3.90 - 12.70 K/uL    RBC 3.32 (L) 4.00 - 5.40 M/uL    Hemoglobin 8.0 (L) 12.0 - 16.0 g/dL    Hematocrit 24.6 (L) 37.0 - 48.5 %    Mean Corpuscular Volume 74 (L) 82 - 98 fL    Mean Corpuscular Hemoglobin 24.1 (L) 27.0 - 31.0 pg    Mean Corpuscular Hemoglobin Conc 32.5 32.0 - 36.0 g/dL    RDW 28.3 (H) 11.5 - 14.5 %    Platelets 297 150 - 350 K/uL    MPV 10.3 9.2 - 12.9 fL   Comprehensive metabolic panel    Collection Time: 07/29/19  4:30 AM   Result Value Ref Range    Sodium 146 (H) 136 - 145 mmol/L    Potassium 4.6 3.5 - 5.1 mmol/L    Chloride 113 (H) 95 - 110 mmol/L    CO2 25 23 - 29 mmol/L    Glucose 238 (H) 70 - 110 mg/dL    BUN, Bld 68 (H) 6 - 20 mg/dL    Creatinine 2.7 (H) 0.5 - 1.4 mg/dL    Calcium 9.5 8.7 - 10.5 mg/dL    Total  Protein 5.0 (L) 6.0 - 8.4 g/dL    Albumin 4.0 3.5 - 5.2 g/dL    Total Bilirubin 0.7 0.1 - 1.0 mg/dL    Alkaline Phosphatase 35 (L) 55 - 135 U/L    AST 20 10 - 40 U/L    ALT 31 10 - 44 U/L    Anion Gap 8 8 - 16 mmol/L    eGFR if African American 22.5 (A) >60 mL/min/1.73 m^2    eGFR if non African American 19.5 (A) >60 mL/min/1.73 m^2   Phosphorus    Collection Time: 07/29/19  4:30 AM   Result Value Ref Range    Phosphorus 4.2 2.7 - 4.5 mg/dL       Significant Imaging: Non- contrast CT chest, abdomen, pelvis    Evaluation for intrathoracic adenopathy is limited however there are small mediastinal lymph nodes noted.  The most prominent appears at the precarinal location measuring approximately 1.2 x 2.0 cm.  There is mild hilar prominence that could relate to mildly prominent hilar lymph nodes bilaterally although not well evaluated on this exam.    There is mild pericardial thickening noted.  There is minimal pleural fluid on the right and small pleural effusion on the left.  The lungs demonstrate motion artifact, mild diminished depth of inspiration and atelectatic change.  As seen on axial image 38 there is a pulmonary nodule of the lingular segment of the left upper lobe measuring approximately 6 mm in size.  Follow-up is recommended.  There is also appearance of atelectasis and volume loss at the left lower lobe, and suspected mild superimposed infiltrate.  There is no evidence for pneumothorax.     There is appearance thought to represent subcapsular hematoma overlying the upper pole of the left kidney.  Posterior peripherally this measures up to approximately 2.4 cm in thickness, superiorly on coronal reconstruction imaging appears to measure approximately 2.4 cm in thickness as well.  There is some additional stranding and complex appearance extending superior to this adjacent to the undersurface of the spleen that may relate to mild hemorrhagic component, and there is mild dense fluid seen in the lower pelvis,  that could relate to a component of mild hemorrhagic fluid.  A large amount of hemoperitoneum or retroperitoneal hemorrhage is not otherwise seen.  These findings are thought likely related to recent renal biopsy, clinical and historical correlation and correlation with serial H and H levels is recommended.    The kidneys otherwise demonstrate mild perinephric stranding appearing relatively symmetric there is no evidence for hydronephrosis or obstructive uropathy bilaterally.      X-ray metastatic survey    The visualized osseous structures demonstrate chronic change however there is no radiographic evidence for osseous destructive process or acute fracture deformity on submitted imaging.  Right-sided central venous catheter noted the distal tip at the expected location of the SVC.  There is appearance of small pleural effusion on the left.  Opacity of the bowel likely relates to oral contrast from prior CT.  Vascular calcifications are noted in calcifications of the pelvis consistent with phleboliths are noted.    The osseous structures appear intact without evidence for osseous destructive process or acute fracture deformity.

## 2019-07-29 NOTE — ASSESSMENT & PLAN NOTE
52 yo female transferred for evaluation of thrombocytopenia. BM biopsy concerning for lymphoma vs plasma cell dyscrasia. Thoracic consulted for LN biopsy.     Chest CT reviewed. Mediastinal and hilar adenopathy is not well visualized without contrast scan. There does appear to be a precarinal LN which was measured at 1.2 x 2.0cm. No bulky lymphadenopathy. Currently on PO steroids. Willing to perform mediastinoscopy although tissue sampling may be limited. Tentatively added to OR schedule for Thursday 8/1/19. Will work with oncology/BMT to determine best timing of procedure - in hospital or at later date when off steroids. ARF continues to improve. NPO at midnight Wednesday. Hold morning heparin day of procedure.

## 2019-07-29 NOTE — CONSULTS
[facility]  Thoracic Surgery  Consult Note    Patient Name: Kendy Vital  MRN: [id number]  Code Status: Full Code  Admission Date: 7/19/2019  Hospital Length of Stay: 10 days  Consult Requesting Physician: Dr. Rea   Consulting Physician: Zane Muhammad  Primary Care Provider: To Obtain Unable    Inpatient consult to Cardiothoracic Surgery  Consult performed by: Lucía Gould PA-C  Consult ordered by: Luiz Rea MD        Subjective:     Reason for Consult: mediastinal adenopathy     History of Present Illness: 52 yo female transferred to [place] due to thrombocytopenia and concern for TTP.  Subjective fever, chills, cough, SOB for 2 weeks prior to admission. On admission , creatinine of 2.9,  platelets of 33k, chest x-ray showing interstitial opacities, and CT showing  perihilar ground glass opacity. Started on  doxycycline and ceftriazone for PNA. Since transfer platelets have normalized s/p plasmapheresis x3 and on steroids. ARF prompting renal biopsy. BUN/Cr continues to improve. Hem/Onc performed BM biopsy with results concerning for lymphoma versus plasma cell dyscrasia. Chest/Abdomen/Pelvis showed mild mediastinal and hilar adenopathy with largest LN measuring apx 1.5 cm in the precarinal area. Adenopathy is not well visualized without contrasted study. Thoracic consulted for LN biopsy. Platelets 240k today. INR 1.3.        No current facility-administered medications on file prior to encounter.      Current Outpatient Medications on File Prior to Encounter   Medication Sig    amLODIPine (NORVASC) 10 MG tablet Take 10 mg by mouth once daily.    aspirin (ECOTRIN) 81 MG EC tablet Take 81 mg by mouth once daily.    chlorthalidone (HYGROTEN) 50 MG Tab Take 50 mg by mouth once daily.       Review of patient's allergies indicates:  No Known Allergies    Past Medical History:   Diagnosis Date    Renal vein thrombosis 2015    s/p embolectomy and lovenox for 9 mos     Uterine leiomyoma     s/p resection     No past surgical history on file.  Family History     None        Tobacco Use    Smoking status: Not on file   Substance and Sexual Activity    Alcohol use: Not on file    Drug use: Not on file    Sexual activity: Not on file     Review of Systems   Constitutional: Positive for activity change and fatigue.   Respiratory: Positive for cough. Negative for shortness of breath.    Gastrointestinal: Negative for abdominal pain.   Genitourinary: Negative for difficulty urinating.   Musculoskeletal: Negative for arthralgias.   Skin: Negative for color change and rash.   Neurological: Negative for dizziness and syncope.   Psychiatric/Behavioral: Negative for agitation.     Objective:     Vital Signs (Most Recent):  Temp: 97.3 °F (36.3 °C) (07/29/19 1250)  Pulse: 79 (07/29/19 1250)  Resp: 20 (07/29/19 0752)  BP: (!) 198/93 (07/29/19 1250)  SpO2: 99 % (07/29/19 1238) Vital Signs (24h Range):  Temp:  [96.5 °F (35.8 °C)-98 °F (36.7 °C)] 97.3 °F (36.3 °C)  Pulse:  [71-81] 79  Resp:  [18-20] 20  SpO2:  [92 %-99 %] 99 %  BP: (163-198)/(74-93) 198/93     Weight: 103 kg (227 lb)  Body mass index is 36.64 kg/m².      Intake/Output - Last 3 Shifts       07/27 0700 - 07/28 0659 07/28 0700 - 07/29 0659 07/29 0700 - 07/30 0659    P.O. 670 780 200    IV Piggyback       Total Intake(mL/kg) 670 (6.5) 780 (7.6) 200 (1.9)    Urine (mL/kg/hr) 800 (0.3)      Stool 0      Total Output 800      Net -130 +780 +200           Urine Occurrence 2 x 3 x 5 x    Stool Occurrence 1 x 0 x 2 x    Emesis Occurrence   0 x          SpO2: 99 %  O2 Device (Oxygen Therapy): room air    Physical Exam   Constitutional: She is oriented to person, place, and time. She appears well-developed and well-nourished.   HENT:   Head: Atraumatic.   Eyes: EOM are normal.   Neck: Neck supple.   Cardiovascular: Normal rate.   Pulmonary/Chest: Effort normal.   Abdominal: Soft.   Musculoskeletal: Normal range of motion.   Neurological:  She is alert and oriented to person, place, and time.   Skin: Skin is warm and dry.   Psychiatric: She has a normal mood and affect. Thought content normal.   Vitals reviewed.      Significant Labs:  BMP:   Recent Labs   Lab 07/29/19  0430   *   *   K 4.6   *   CO2 25   BUN 68*   CREATININE 2.7*   CALCIUM 9.5     CBC:   Recent Labs   Lab 07/29/19  0430   WBC 7.88   RBC 3.32*   HGB 8.0*   HCT 24.6*      MCV 74*   MCH 24.1*   MCHC 32.5       Significant Diagnostics:  CT: I have reviewed all pertinent results/findings within the past 24 hours  CXR: I have reviewed all pertinent results/findings within the past 24 hours    VTE Risk Mitigation (From admission, onward)        Ordered     Place sequential compression device  Until discontinued      07/19/19 0533     IP VTE LOW RISK PATIENT  Once      07/19/19 0533        Assessment/Plan:     Thrombocytopenia, unspecified  54 yo female transferred for evaluation of thrombocytopenia. BM biopsy concerning for lymphoma vs plasma cell dyscrasia. Thoracic consulted for LN biopsy.     Chest CT reviewed. Mediastinal and hilar adenopathy is not well visualized without contrast scan. There does appear to be a precarinal LN which was measured at 1.2 x 2.0cm. No bulky lymphadenopathy. Currently on PO steroids. Willing to perform mediastinoscopy although tissue sampling may be limited. Tentatively added to OR schedule for Thursday 8/1/19. Will work with oncology/BMT to determine best timing of procedure - in hospital or at later date when off steroids. ARF continues to improve. NPO at midnight Wednesday. Hold morning heparin day of procedure.         Thank you for your consult. I will follow-up with patient. Please contact us if you have any additional questions.    Lucía Gould PA-C  Thoracic Surgery  [facility]

## 2019-07-29 NOTE — SUBJECTIVE & OBJECTIVE
Interval History: Patient evaluated bedside, stable vital signs.  Night uneventful.  sCr in decreasing trend.  Good urine output.    Review of patient's allergies indicates:  No Known Allergies  Current Facility-Administered Medications   Medication Frequency    acetaminophen tablet 650 mg Q6H PRN    albumin human 5% bottle 275 g Once    albuterol-ipratropium 2.5 mg-0.5 mg/3 mL nebulizer solution 3 mL Q6H PRN    allopurinol tablet 100 mg Daily    atovaquone suspension 1,500 mg Daily    bisacodyl suppository 10 mg Daily PRN    calcium gluconate 2,000 mg in sodium chloride 0.9% 100 mL IVPB Once    carvedilol tablet 12.5 mg BID    cloNIDine tablet 0.1 mg BID    entecavir tablet 0.5 mg Q72H    famotidine tablet 20 mg Daily    glucagon (human recombinant) injection 1 mg PRN    glucose chewable tablet 16 g PRN    glucose chewable tablet 24 g PRN    heparin (porcine) injection 2,300 Units Once    hydrALAZINE tablet 25 mg Q6H PRN    hydrALAZINE tablet 50 mg Q8H    insulin aspart U-100 pen 1-10 Units QID (AC + HS) PRN    insulin aspart U-100 pen 8 Units TIDWM    insulin detemir U-100 pen 5 Units QHS    NIFEdipine 24 hr tablet 60 mg Daily    ondansetron disintegrating tablet 8 mg Q8H PRN    oxyCODONE-acetaminophen 5-325 mg per tablet 1 tablet Q8H PRN    polyethylene glycol packet 17 g Daily    predniSONE tablet 60 mg Daily    promethazine (PHENERGAN) 6.25 mg in dextrose 5 % 50 mL IVPB Q6H PRN    ramelteon tablet 8 mg Nightly PRN    sodium bicarbonate tablet 650 mg TID    sodium chloride 0.9% flush 10 mL PRN    Tdap vaccine injection 0.5 mL vaccine x 1 dose       Objective:     Vital Signs (Most Recent):  Temp: 97.3 °F (36.3 °C) (07/29/19 1238)  Pulse: 79 (07/29/19 1238)  Resp: 20 (07/29/19 0752)  BP: (!) 198/93 (07/29/19 1238)  SpO2: 99 % (07/29/19 1238)  O2 Device (Oxygen Therapy): room air (07/28/19 0800) Vital Signs (24h Range):  Temp:  [96.5 °F (35.8 °C)-98 °F (36.7 °C)] 97.3 °F (36.3  °C)  Pulse:  [71-81] 79  Resp:  [18-20] 20  SpO2:  [92 %-99 %] 99 %  BP: (163-198)/(74-93) 198/93     Weight: 103 kg (227 lb) (07/19/19 1400)  Body mass index is 36.64 kg/m².  Body surface area is 2.19 meters squared.    I/O last 3 completed shifts:  In: 1230 [P.O.:1230]  Out: -     Physical Exam   Constitutional: She is oriented to person, place, and time. She appears well-nourished. No distress.   HENT:   Head: Normocephalic and atraumatic.   Neck: Neck supple. No JVD present.   Cardiovascular: Normal rate, regular rhythm and normal heart sounds.   Pulmonary/Chest: Effort normal and breath sounds normal. No respiratory distress.   Abdominal: Soft. Bowel sounds are normal. She exhibits no distension.   Musculoskeletal: She exhibits no edema.   Neurological: She is alert and oriented to person, place, and time.   Skin: Skin is warm.   Psychiatric: She has a normal mood and affect. Her behavior is normal.   Nursing note and vitals reviewed.      Significant Labs:  CBC:   Recent Labs   Lab 07/29/19  0430   WBC 7.88   RBC 3.32*   HGB 8.0*   HCT 24.6*      MCV 74*   MCH 24.1*   MCHC 32.5     CMP:   Recent Labs   Lab 07/29/19  0430   *   CALCIUM 9.5   ALBUMIN 4.0   PROT 5.0*   *   K 4.6   CO2 25   *   BUN 68*   CREATININE 2.7*   ALKPHOS 35*   ALT 31   AST 20   BILITOT 0.7     All labs within the past 24 hours have been reviewed.

## 2019-07-29 NOTE — PROGRESS NOTES
[facility]  Nephrology  Progress Note    Patient Name: Kendy Vital  MRN: [id number]  Admission Date: 7/19/2019  Hospital Length of Stay: 10 days  Attending Provider: Luiz Rea MD   Primary Care Physician: To Obtain Unable  Principal Problem:Acute renal failure    Subjective:     HPI: No notes on file    Interval History: Patient evaluated bedside, stable vital signs.  Night uneventful.  sCr in decreasing trend.  Good urine output.    Review of patient's allergies indicates:  No Known Allergies  Current Facility-Administered Medications   Medication Frequency    acetaminophen tablet 650 mg Q6H PRN    albumin human 5% bottle 275 g Once    albuterol-ipratropium 2.5 mg-0.5 mg/3 mL nebulizer solution 3 mL Q6H PRN    allopurinol tablet 100 mg Daily    atovaquone suspension 1,500 mg Daily    bisacodyl suppository 10 mg Daily PRN    calcium gluconate 2,000 mg in sodium chloride 0.9% 100 mL IVPB Once    carvedilol tablet 12.5 mg BID    cloNIDine tablet 0.1 mg BID    entecavir tablet 0.5 mg Q72H    famotidine tablet 20 mg Daily    glucagon (human recombinant) injection 1 mg PRN    glucose chewable tablet 16 g PRN    glucose chewable tablet 24 g PRN    heparin (porcine) injection 2,300 Units Once    hydrALAZINE tablet 25 mg Q6H PRN    hydrALAZINE tablet 50 mg Q8H    insulin aspart U-100 pen 1-10 Units QID (AC + HS) PRN    insulin aspart U-100 pen 8 Units TIDWM    insulin detemir U-100 pen 5 Units QHS    NIFEdipine 24 hr tablet 60 mg Daily    ondansetron disintegrating tablet 8 mg Q8H PRN    oxyCODONE-acetaminophen 5-325 mg per tablet 1 tablet Q8H PRN    polyethylene glycol packet 17 g Daily    predniSONE tablet 60 mg Daily    promethazine (PHENERGAN) 6.25 mg in dextrose 5 % 50 mL IVPB Q6H PRN    ramelteon tablet 8 mg Nightly PRN    sodium bicarbonate tablet 650 mg TID    sodium chloride 0.9% flush 10 mL PRN    Tdap vaccine injection 0.5 mL vaccine x 1 dose        Objective:     Vital Signs (Most Recent):  Temp: 97.3 °F (36.3 °C) (07/29/19 1238)  Pulse: 79 (07/29/19 1238)  Resp: 20 (07/29/19 0752)  BP: (!) 198/93 (07/29/19 1238)  SpO2: 99 % (07/29/19 1238)  O2 Device (Oxygen Therapy): room air (07/28/19 0800) Vital Signs (24h Range):  Temp:  [96.5 °F (35.8 °C)-98 °F (36.7 °C)] 97.3 °F (36.3 °C)  Pulse:  [71-81] 79  Resp:  [18-20] 20  SpO2:  [92 %-99 %] 99 %  BP: (163-198)/(74-93) 198/93     Weight: 103 kg (227 lb) (07/19/19 1400)  Body mass index is 36.64 kg/m².  Body surface area is 2.19 meters squared.    I/O last 3 completed shifts:  In: 1230 [P.O.:1230]  Out: -     Physical Exam   Constitutional: She is oriented to person, place, and time. She appears well-nourished. No distress.   HENT:   Head: Normocephalic and atraumatic.   Neck: Neck supple. No JVD present.   Cardiovascular: Normal rate, regular rhythm and normal heart sounds.   Pulmonary/Chest: Effort normal and breath sounds normal. No respiratory distress.   Abdominal: Soft. Bowel sounds are normal. She exhibits no distension.   Musculoskeletal: She exhibits no edema.   Neurological: She is alert and oriented to person, place, and time.   Skin: Skin is warm.   Psychiatric: She has a normal mood and affect. Her behavior is normal.   Nursing note and vitals reviewed.      Significant Labs:  CBC:   Recent Labs   Lab 07/29/19  0430   WBC 7.88   RBC 3.32*   HGB 8.0*   HCT 24.6*      MCV 74*   MCH 24.1*   MCHC 32.5     CMP:   Recent Labs   Lab 07/29/19  0430   *   CALCIUM 9.5   ALBUMIN 4.0   PROT 5.0*   *   K 4.6   CO2 25   *   BUN 68*   CREATININE 2.7*   ALKPHOS 35*   ALT 31   AST 20   BILITOT 0.7     All labs within the past 24 hours have been reviewed.       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  As per Dr. Garcia's staff attestation,  ""Preliminary kidney biopsy reading reveals features of cryoglobulinemic DPGN (diffuse proliferative glomerulonephritis) with several "pseudothrombi" or cryoplugs obliterating the glomerular capillaries. She also has extensive ATN likely secondary to ischemia downstream the affected glomeruli. These features fit with the low C4 and the +RF. 90% of these cases are associated with HCV (but she is negative!) and only ~3% are associated with HBV. Despite her very high kappa/lambda ratio (K:L), she has no features of myeloma cast nephropathy or light chain deposition disease in her biopsy specimen. The high K:L could come from the cryoglobulinemic process (mono and polyclonal LC production). Of note, lymphoma can rarely cause cryoglobulinemic DPGN, so BM biopsy results will be important. No evidence of uric acid tubulopathy (tumor lysis syndrome) either. In retrospect, her NSAIDs use was a major confounding factor in this case."     Recommendations:  - Continue prednisone 60 mg qd for additional 2 weeks, then begin taper to 40 mg qd  - Continue Myfortic  - Cryoglobulin lab sent f/u results  - Continue treatment with entecavir for Hep B   - Follow up on official Kidney Biopsy report  - BP control to goal <140/90  - Limit protein diet given azotemia, no more than 60-80 g per day  - Continue sodium bicarb 1300 mg tid  - Will follow closely    Cryoglobulinemic vasculitis  Refer to ARF    Acute (diffuse) proliferative glomerulonephritis  Refer to ARF        Thank you for your consult. I will follow-up with patient. Please contact us if you have any additional questions.    Hira Paris MD  Nephrology  [facility]  "

## 2019-07-29 NOTE — CONSULTS
ALLERGY & IMMUNOLOGY SERVICE - INITIAL CONSULT    Patient ID: Kendy Vital is a 53 y.o. female    Consulted for: Hypogammaglobulinemia    Consulted by: Dr. Rea    HPI: 53 year old female with a past medical history of hypertension, anemia, chronic hepatitis B, and leiomyoma of the uterus currently admitted with suspected malignancy. Preferred treatment at this time is with Rituximab but due to her low IgG levels they have requested an evaluation by allergy Immunology.    Initially transferred to [facility] due to thrombocytopenia. Had been expereincing chills, feeling febrile, dry cough, SOB, and occasional nose bleeds for 2 weeks prior to initial presentation in [place]. In [place] her initial labs were significant for a , creatinine of 2.9,  platelets of 33k, chest x-ray showing interstitial opacities, and CT showing  perihilar ground glass opacity. Was treated for a suspected pneumonia with doxycycline and ceftriaxone and was transferred to [facility] due to thrombocytopenia and concern for TTP. Since transfer platelets have normalized s/p plasmapheresis x3 and on steroids. She has been continued on antibiotics initially escelated to vanc, zosyn, and azithromycin. She has since been switched to ceftriaxone and azithromycin. She continued to have worsening kidney function since transfer so renal biopsy was performed which showed ryoglobulinemic diffuse proliferative GN. Hem/Onc was consulted and BM biopsy was performed which is  lymphoplasmacytic lymphoma vs Waldenstrom's, vs multiple myeloma.    Patient has no significant infection history prior to onset of fatigue symptoms over the past year. Denies any previous episodes of pneumonia and denies recurrent sinus infections or recurrent AOM. Denies any family history of immunodeficiency. Was not taking any immuno suppresive drugs prior to onset of symptoms.   REVIEW OF SYSTEMS   Review of Systems   Constitutional: Negative for fatigue and fever.   Respiratory:  "Negative for shortness of breath.    Cardiovascular: Negative for chest pain.   Gastrointestinal: Negative for abdominal pain.    MEDICAL HISTORY     Past Medical History:   Diagnosis Date    Renal vein thrombosis 2015    s/p embolectomy and lovenox for 9 mos    Uterine leiomyoma     s/p resection        PHYSICAL EXAM     BP (!) 173/91 (BP Location: Left arm, Patient Position: Lying)   Pulse 81   Temp 97.8 °F (36.6 °C) (Oral)   Resp 20   Ht 5' 6" (1.676 m)   Wt 103 kg (227 lb)   SpO2 95%   BMI 36.64 kg/m²   Constitutional: She is oriented to person, place, and time. She appears well-nourished. No distress. Was tearful on exam when discussing her condition and the recent passing of her brother.   Cardiovascular: Normal rate, regular rhythm and normal heart sounds.   Pulmonary/Chest: Effort normal and breath sounds normal. No respiratory distress.   Abdominal: Soft. Bowel sounds are normal. She exhibits no distension.   Musculoskeletal: She exhibits no edema.   Neurological: She is alert and oriented to person, place, and time.   Psychiatric: She has a normal mood and affect. Her behavior is normal     LABORATORY STUDIES     Component      Latest Ref Rng & Units 7/29/2019 7/26/2019   WBC      3.90 - 12.70 K/uL 7.88    RBC      4.00 - 5.40 M/uL 3.32 (L)    Hemoglobin      12.0 - 16.0 g/dL 8.0 (L)    Hematocrit      37.0 - 48.5 % 24.6 (L)    MCV      82 - 98 fL 74 (L)    MCH      27.0 - 31.0 pg 24.1 (L)    MCHC      32.0 - 36.0 g/dL 32.5    RDW      11.5 - 14.5 % 28.3 (H)    Platelets      150 - 350 K/uL 297    MPV      9.2 - 12.9 fL 10.3    Immature Granulocytes      0.0 - 0.5 % 4.1 (H)    Gran # (ANC)      1.8 - 7.7 K/uL 6.0    Immature Grans (Abs)      0.00 - 0.04 K/uL 0.32 (H)    Lymph #      1.0 - 4.8 K/uL 0.8 (L)    Mono #      0.3 - 1.0 K/uL 0.8    Eos #      0.0 - 0.5 K/uL 0.0    Baso #      0.00 - 0.20 K/uL 0.00    nRBC      0 /100 WBC 1 (A)    Gran%      38.0 - 73.0 % 76.6 (H)    Lymph%      18.0 - " 48.0 % 9.8 (L)    Mono%      4.0 - 15.0 % 9.5    Eosinophil%      0.0 - 8.0 % 0.0    Basophil%      0.0 - 1.9 % 0.0    Platelet Estimate       Appears normal    Aniso       Slight    Poik       Slight    Poly       Occasional    Hypo       Occasional    Ovalocytes       Occasional    Target Cells       Occasional    Fragmented Cells       Occasional    Differential Method       Automated    Sodium      136 - 145 mmol/L 146 (H)    Potassium      3.5 - 5.1 mmol/L 4.6    Chloride      95 - 110 mmol/L 113 (H)    CO2      23 - 29 mmol/L 25    Glucose      70 - 110 mg/dL 238 (H)    BUN, Bld      6 - 20 mg/dL 68 (H)    Creatinine      0.5 - 1.4 mg/dL 2.7 (H)    Calcium      8.7 - 10.5 mg/dL 9.5    PROTEIN TOTAL      6.0 - 8.4 g/dL 5.0 (L)    Albumin      3.5 - 5.2 g/dL 4.0    BILIRUBIN TOTAL      0.1 - 1.0 mg/dL 0.7    Alkaline Phosphatase      55 - 135 U/L 35 (L)    AST      10 - 40 U/L 20    ALT      10 - 44 U/L 31    Anion Gap      8 - 16 mmol/L 8    eGFR if African American      >60 mL/min/1.73 m:2 22.5 (A)    eGFR if non African American      >60 mL/min/1.73 m:2 19.5 (A)    IgA      40 - 350 mg/dL  44   IgG - Serum      650 - 1600 mg/dL  185 (L)   IgM      50 - 300 mg/dL  139      IMAGING & OTHER DIAGNOSTICS   FINAL PATHOLOGIC DIAGNOSIS  BONE MARROW, LEFT ILIAC CREST (ASPIRATE SMEAR, TOUCH PREPARATION, CLOT SECTION, AND  CORE BIOPSY):  -- MILDLY HYPERCELLULAR MARROW (60%) WITH TRILINEAGE HEMATOPOIESIS.  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  -- ADEQUATE NUMBER OF MEGAKARYOCYTES.  -- STAINABLE IRON IS NOT IDENTIFIED.  -- SEE COMMENT.  COMMENT:  CBC data shows microcytic anemia and thrombocytopenia.  Flow cytometric analysis of bone marrow detects a kappa restricted plasma cell population that expresses ,  CD19, and bright CD38. CD20 and CD56 are negative. Polytypic B lymphocytes are detected. T lymphocytes are  immunophenotypically unremarkable. Blasts gate is not increased. Flow  differential: Lymphocytes 5.9%, Monocytes  4.6%, Granulocytes 85.8%, Blast 1.2%.  Immunohistochemical stains are performed on the biopsy core and clot section for greater sensitivity and further  architectural assessment with adequate controls. -positive plasma cells comprise approximately 4-5% of the  total cellularity. Immunoglobulin kappa and lambda light chains situ hybridization study reveals kappa light chain  restriction is some areas. The lymphoid aggregates are composed of mixed CD20-positive B-cells and  CD3-positive T-cells. B-cells are more numerous than T-cellsAtypical lymphoid aggregates are seen. Although flow cytometric immunophenotyping fails to detect monoclonal  B-cell population, plasma cells with CD19 expression is unusual for plasma cell neoplasm. Differential  considerations include B-cell lymphoma with plasmacytic differentiation and plasma cell neoplasm . MYD88 gene  mutation test is pending. Correlation with clinical presentation and other laboratory results is required. Lymphoid  tissue biopsy, if available, is recommended.   CHART REVIEW   Reviewed previous notes, labs, imaging, pathology reports.   ASSESSMENT & PLAN     Kendy Vital is a 53 y.o. 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 Differential diagnosis includes lymphoplasmacytic lymphoma/Waldenstrom's macroglobulinemia, multiple myeloma, and marginal zone lymphoma. Rituximab infusion is indicated in this patient but due to low IgG of 185 there is concern of immunosuppresive effects of the Rituximab.    Given history patient ericaley with low IgG 2/2 her malignancy. Hepatic cause has been ruled out. Given the severity of the hypogamm and the fact that patient presented with a pneumonia there is significant concerned that she is at increased risk of infection at this time.  Rituximab can further worsen the hypogammaglobulinemia. If rituximab is the preferred therapy, prior to using Rituximab would recommend pre-treatment with 500 mg/kg of IVIG to raise immunoglobulin levels prior to initiation of therapy. Due to previous renal issues would recommend using concentration no greater then 5%. Would also recommend long term monitoring of IG levels following rituximab infusion.    I have discussed the patient's case with my attending physician.  Thank you for allowing us to help care for your patient.  Please contact us with any concerns.    Saleem Boothe DO  Allergy Immunology Fellow    Pager [phone]

## 2019-07-29 NOTE — SUBJECTIVE & OBJECTIVE
Interval History:   Symptoms:  Improved strength.    Diet:  Adequate intake.    Activity level:  Impaired due to weakness.   Pain:  No pain.       Review of Systems   Constitutional: Negative for fatigue and fever.   Respiratory: Negative for shortness of breath.    Cardiovascular: Negative for chest pain.   Gastrointestinal: Negative for abdominal pain.     Objective:     Vital Signs (Most Recent):  Temp: 97.3 °F (36.3 °C) (07/29/19 1238)  Pulse: 79 (07/29/19 1238)  Resp: 20 (07/29/19 0752)  BP: (!) 198/93 (07/29/19 1238)  SpO2: 99 % (07/29/19 1238) Vital Signs (24h Range):  Temp:  [96.5 °F (35.8 °C)-98 °F (36.7 °C)] 97.3 °F (36.3 °C)  Pulse:  [71-81] 79  Resp:  [18-20] 20  SpO2:  [92 %-99 %] 99 %  BP: (163-198)/(74-93) 198/93     Weight: 103 kg (227 lb)  Body mass index is 36.64 kg/m².    Intake/Output Summary (Last 24 hours) at 7/29/2019 1306  Last data filed at 7/29/2019 0700  Gross per 24 hour   Intake 980 ml   Output --   Net 980 ml      Physical Exam   Constitutional: She is oriented to person, place, and time. She appears well-nourished. No distress.   Neck: No JVD present.   Cardiovascular: Normal rate, regular rhythm and normal heart sounds.   Pulmonary/Chest: Effort normal and breath sounds normal. No respiratory distress.   Abdominal: Soft. Bowel sounds are normal. She exhibits no distension.   Musculoskeletal: She exhibits no edema.   Neurological: She is alert and oriented to person, place, and time.   Psychiatric: She has a normal mood and affect. Her behavior is normal.       Significant Labs: All pertinent labs within the past 24 hours have been reviewed.    Significant Imaging: I have reviewed and interpreted all pertinent imaging results/findings within the past 24 hours.

## 2019-07-29 NOTE — PROCEDURES
"[facility]  Transfusion Medicine  Procedure Note    SUMMARY   Therapeutic Plasma Exchange (Apheresis)  Date/Time: 7/29/2019 2:15 PM  Performed by: Rufino Monet MD  Authorized by: Rufino Monet MD       Date of Procedure: 7/29/19     Procedure: Plasma Exchange    Provider: Rufino Monet MD     Pre-Procedure Diagnosis: Severe/symptomatic cryoglobulinemia with glomerulonephritis   Post-Procedure Diagnosis: Severe/symptomatic cryoglobulinemia with glomerulonephritis     Follow-up Assessment: 53 year old female with history of anemia initially presented with symptomatic thrombocytopenia concerning for TTP. Subsequent AOMPEH01 activity levels returned as normal (83%). Since admit her renal failure has progressed, her creatinine is substantially elevated from historical levels: 1mg/dL cited in note -> 6 mg/dL on 7/24. 4.3 mg/dL on 7/19 is the candace in our system.  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 Steroids have been initiated (7/23). Her low complement levels support this diagnosis. Routine TAT for characterization of cryoglobulins may be over a week, so we will treat presumptively.  We have been consulted to initiate therapeutic plasma exchange for severe/symptomatic cryoglobulinemia with glomerulonephritis an adjunct therapy.    Severe/symptomatic cryoglobulinemia with glomerulonephritis carries a Category II Grade 2A indication for therapeutic apheresis via the 2019 Journal of Clinical Apheresis Guidelines (Teofilo ROBLES et al. Journal of Clinical Apheresis 2019; 34:171-354.) The TPE plan is as follows:    Today's procedure (# 3 of 3) was well tolerated and without complication. This completes her treatment series.      Pertinent Laboratory Data:   Results for MUNA HERNANDEZ (MRN [id number]) as of 7/29/2019 08:34   Ref. " Range 7/24/2019 03:27 7/25/2019 04:25 7/26/2019 05:03   Creatinine Latest Ref Range: 0.5 - 1.4 mg/dL 6.0 (H) 5.2 (H) 4.4 (H)       Pertinent Medications: None contraindicated for TPE    Review of patient's allergies indicates:  No Known Allergies    Anesthesia: None     Technical Procedures Used: Plasma Exchange: Volume exchanged - 5.5; Replacement fluid - Albumin; Number of procedures 3 of 3; Date of next procedure NA.    Description of the Findings of the Procedure:     Please see Apheresis Nurse flowsheet for details.    The patient was evaluated and all clinical and laboratory data relevant to the treatment was reviewed, and a decision was made to proceed with the Apheresis procedure.    I was available to the clinical staff throughout the procedure.    Significant Surgical Tasks Conducted by the Assistant(s): Not applicable  Complications: None  Estimated Blood Loss (EBL): None  Implants: None   Specimens: None    Rufino Monet M.D., M.S., [facility]  Medical Director  Section of Transfusion Medicine & Blood Donor Services  Department of Pathology and Laboratory Medicine  [facility]  [phone] (Blood Bank)  07/29/2019

## 2019-07-29 NOTE — PROGRESS NOTES
[facility] Hospital Medicine  Progress Note    Patient Name: Kendy Vital  MRN: [id number]  Patient Class: IP- Inpatient   Admission Date: 7/19/2019  Length of Stay: 10 days  Attending Physician: Luiz Rea MD  Primary Care Provider: To Obtain [facility] Medicine Team: [facility] HOSP MED R Luiz Rea MD    Subjective:     Principal Problem:Acute renal failure      HPI:  Ms. Vital is a 53 year old female with hypertension, anemia, and history of leiomyoma of the uterus who presents to ICU as a transfer from [facility] for evaluation of thrombocytopenia. Patient reports that prior to going to the hospital 3 days ago that she was experiencing symptoms of chills, feeling febrile, dry cough, shortness of breath and occasional nose bleeds for roughly 2 weeks. She also reports easy fatigueability and difficulty breathing when laying flat; currently sleeps with 2 pillows. Patient presented to hospital in [place] when her symptoms did not resolve. Initial work up showed a , worsening kidney function with creatinine of 2.9, and thrombocytopenia with platelets of 33k. In addition, chest x-ray showed interstitial opacities and follow up CT showed perihilar ground glass opacity. Patient was treated with rocephin and doxycycline as well as Bumex q12 due to her heart failure type symptoms. Lab work at the time showed WBC of 7.9 and a lactate of .8. In addition, she tested positive for strep A. Patient was transferred for further evaluation of her thrombocytopenia with concern for TTP and possible need for plasmapheresis.     At time of exam, patient is properly oriented to person time and places. She endorses headache, generalized myalgias, nausea and orthopnea. She denies SOB while sitting up, chest pain, abdominal pain, vomiting, and diarrhea. She has not felt febrile since 1 week prior to hospital stay.        Overview/Hospital Course:  Ms. Vital  presented as transfer to ICU for suspected TTP. At that time, she had severe thrombocytopenia, renal failure, and bilateral infiltrates on chest CT concerning for PNA. She was stepped down when repeat CBC revealed normalizing platelets and hemolysis labs were unremarkable. Initially placed on vanc/zosyn/azithromycin for PNA, which has been de-escalated to rocephin/azithromycin for CAP. Her course has been complicated by ARF of unclear cause. 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   7/23 Nephrology biopsed  and have initiated on prednisone empirically.  7/24 Heme did BMBx      Interval History:   Symptoms:  Improved strength.    Diet:  Adequate intake.    Activity level:  Impaired due to weakness.   Pain:  No pain.       Review of Systems   Constitutional: Negative for fatigue and fever.   Respiratory: Negative for shortness of breath.    Cardiovascular: Negative for chest pain.   Gastrointestinal: Negative for abdominal pain.     Objective:     Vital Signs (Most Recent):  Temp: 97.3 °F (36.3 °C) (07/29/19 1238)  Pulse: 79 (07/29/19 1238)  Resp: 20 (07/29/19 0752)  BP: (!) 198/93 (07/29/19 1238)  SpO2: 99 % (07/29/19 1238) Vital Signs (24h Range):  Temp:  [96.5 °F (35.8 °C)-98 °F (36.7 °C)] 97.3 °F (36.3 °C)  Pulse:  [71-81] 79  Resp:  [18-20] 20  SpO2:  [92 %-99 %] 99 %  BP: (163-198)/(74-93) 198/93     Weight: 103 kg (227 lb)  Body mass index is 36.64 kg/m².    Intake/Output Summary (Last 24 hours) at 7/29/2019 1306  Last data filed at 7/29/2019 0700  Gross per 24 hour   Intake 980 ml   Output --   Net 980 ml      Physical Exam   Constitutional: She is oriented to person, place, and time. She appears well-nourished. No distress.   Neck: No JVD present.   Cardiovascular: Normal rate, regular rhythm and normal heart sounds.   Pulmonary/Chest: Effort normal and breath sounds normal. No respiratory distress.   Abdominal: Soft. Bowel sounds are normal. She exhibits no distension.   Musculoskeletal: She exhibits  no edema.   Neurological: She is alert and oriented to person, place, and time.   Psychiatric: She has a normal mood and affect. Her behavior is normal.       Significant Labs: All pertinent labs within the past 24 hours have been reviewed.    Significant Imaging: I have reviewed and interpreted all pertinent imaging results/findings within the past 24 hours.      Assessment/Plan:      * Acute renal failure  [place] due to heme malignancy.  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   baseline creatinine of 1.0 roughly 1 month ago. BUN/Cr peaked at 154/6.0 and improved the day after PLEX started.  RJ with bilateral medicorenal disease  UA with proteinuria, blood, no evidence of infection; pro:cr 5.97; C3 41, C4<3  positive for strep A as seen on outside hospital records  given 1x dose lasix 160mg IV with poor response  7/24 HD line placed for PLEX which was started q48h x3 treatements  - 7/19 initiated on empiric prednisone 60mg daily, then 7/26 to solumederol 1 gram qd x3 days, then back to 60 qd  - Nephrology following      Elevated serum immunoglobulin free light chains  BMBX: MONOCLONAL PLASMA CELL POPULATION WITH ATYPICAL LYMPHOID AGGREGATES.  R/o malignancy.  Differential diagnosis includes lymphoplasmacytic lymphoma/Waldenstrom's macroglobulinemia, multiple myeloma, and marginal zone lymphoma.   Heme/consulted  7/26 CT ordered to look for lymph nodes and bone survey found nothing big  -- allopurinol for elevated urate  - 7/26 rheum deferred rituximab choice  to heme.  Per rheum:IgG decreased at 185. They request allergy/immunology consult (spoke to them 7/29) regarding ability to start rituximab with decreased IgG.  -- CTS consulted to look for a paricarinal lymph node. Spoke to them 7/29.      Essential hypertension  hypertensive at admission and per patient, has been undergoing multiple recent medication changes due to HTN as outpatient.  Suspect initially  "may have been exacerbated by NSAID use, now concern for multifactorial cause including renal failure with volume overload, steroid use, and inadequate dosing of home regimen.  Home regimen includes: lisinopril-HCTZ 20-12.5mg, clonidine 0.2mg bid, and hydralazine 25mg tid  - amlodipine changed for nifedpine; increase prn  - started coreg and up-titrating  - titrate BP meds    Thrombocytopenia, unspecified  Plt on admission 41. Improved after treatments.  Seen by Heme/onc.   HIT Ab negative  - monitor      Other specified anemias  Stable.  FERRITIN 161, RETIC 4.7 (H), Bili 1, FOLATE 11.1, VITAMIN B12 843  Iron 40, sat 33%  - monitor  - fobt            Immunosuppression  Recs per ID 7/26    1. Serologies in process:          - Quantiferon Gold is pending.  If positive, please consult ID. If  Indeterminate, please draw T spot.  If T spot positive, please consult ID.            - Strongyloides IgG is pending. If positive, please consult ID to initiate treatment with Ivermectin.         2. If the patient is anticipated to remain on a prolonged course of high dose systemic steroids (> 20 mg prednisone), recommend PCP prophylaxis with Atovaquone 1500 mg PO once daily. Avoid Bactrim in setting of ARF.     3. Continue Entecavir per Hepatology      4. Immunizations recommended:              - Influenza annually [NOT IN STOCK INPATIENT]              - Tetanus booster today (given 7/26) and again every 10 years              - Prevnar 13 today (given 7/26) followed by Pneumovax 23 in 8 weeks with booster every 5 years.              - Hepatitis A vaccine today (given 7/26) and in 6 months              - Shingrix (two doses at 0 and 2-6 months) [NOT AVAILABLE INPATIENT]      Chronic hepatitis B  Hepatology consulted to see if her HBV can be causing the cryoglubins and if she needs treatment.  - entecavir 0.5 mg every 72 hour (renal dose)  - f/u with liver as outpt    Per hepatology:  "Cryoglobulinemia usually associated with HCV, " "but rare association with Hep B. Presence of glomerulonephritis on preliminary renal biopsy. Overall, less likely that Hep B is related to this as ALT normal and viral load very low.  - Continue chronic Hep B treatment as patient is on immunosuppression, especially while on rituximab.  Continue entecavir 0.5mg q72hrs (renally dosed). Monitor renal function and dose-adjust accordingly.  - Patient will need HCC screening going forward given her ethnicity (Liver U/S and AFP every 6 months)"      Steroid-induced hyperglycemia  On solumederol 1g qd she got very hyperglycemic (>300) and hypertensive.  A1c 5.7  -- titrated meds      History of renal vein thrombosis  Per heme research  "Pt also has a history of unprovoked thrombosis treated at [facility] approximately 4 years ago, none of which is visible in the "care everywhere" section of the chart. She states she had one episode of thrombosis in her left upper extremity that contained "many little clots" and required surgical thrombectomy. She also had a left renal vein thrombosis for which she received a stent. She states that for both episodes she was placed on a heparin drip, did outpatient lovenox and was continued on ASA 81 until now. She states that no one informed her of any findings suggesting a hypercoaguable state or if the clotting was provoked."  -Given her significant thromboembolic history, rheum  would like to work her up for antiphopholipid antibody syndrome. They ordered levels.   -She also had symptoms of sicca syndromes (dry eyes, dry mouth). Rheum will check for sjogrens syndrome (SSA/SSB)      Grief reaction  Her brother recently .   service came by to talk to her.      Cryoglobulinemic vasculitis  Seen on renal biopsy.  HBV is uncommon to cause this.  LPL/WM or even myeloma could potentially be the source of her cryoglobulins.      Pneumonia  x-ray in [place] revealed interstitial infiltrates. Follow up chest CT showed perihilar " ground glass opacity. Treated initially with rocephin and doxycycline  repeat chest x-ray with patchy airspace consolidations bilaterally R>L, edema vs PNA  blood cultures NGTD  - currently on broad spectrum antibiotics vanc, zosyn, azithro; held vanc and de-escalated zosyn to ceftriaxone on 7/20 and all abx stopped 7/24      VTE Risk Mitigation (From admission, onward)        Ordered     heparin (porcine) injection 2,300 Units  Once      07/29/19 1055     Place sequential compression device  Until discontinued      07/19/19 0533     IP VTE LOW RISK PATIENT  Once      07/19/19 0533                Luiz Rea MD  Department of Hospital Medicine   [facility]

## 2019-07-29 NOTE — PLAN OF CARE
Problem: Adult Inpatient Plan of Care  Goal: Plan of Care Review  Outcome: Ongoing (interventions implemented as appropriate)  POC reviewed with pt. AAO x4. accu-checks. plasmapharesis today. accu-checks.   Pt remained free from falls. Questions and concerns addressed. Pt progressing towards goals. Will continue to monitor. See flow sheets for full assessment and VS

## 2019-07-29 NOTE — PROGRESS NOTES
[facility]  Hematology/Oncology  Progress Note    Patient Name: Kendy Vital  Admission Date: 7/19/2019  Hospital Length of Stay: 10 days  Code Status: Full Code     Subjective:     HPI:  Pt is a 54 yo female with HTN, iron deficiency anemia, history of multiple DVTs, hx uterine fibroids s/p myomectomy who was transferred here from [facility] in [place] after she was found to have pneumonia, LACI and thrombocytopenia. Hematology was consulted for thrombocytopenia. History was obtained from the patient, patient's cousin, patient's fiancee and chart review.     Pt endorses episodes of fevers, chills, fatigue, dry cough, SOB, and orthopnea for 1 week. She also states that 3 days ago she began to have neck and back muscle cramping and stiffness and decreased urinary output and decided to see her PCP, who diagnosed her with muscle spasms and the flu. A few days later she decided to go to the [facility] ED and was found to have Cr 2.9, increased from baseline Cr 1.0. She was also found to have , WBC 7.9, Plt 33, lactate 0.8. CXR showed bilateral interstitial infiltrates, and subsequent CT chest showed bilateral ground glass opacities. She was treated with rochephin, doxycycline, bumex and transferred here.     Here her labs were significant for Hgb 10, MCV 69, RDW 22.3, Plt 41. WBC 5.98. UA showed 3+ protein, 3+ glucose and 3+ blood but was not consistent with UTI. Cr 4.3 and  consistent with an LACI. Hemolytic labs were unremarkable.     Pt has a history of uncontrolled hypertension and states that she is currently taking lisinopril 20 mg-HCTZ 12.5 mg, hydralazine 25 mg TID, and clonidine 0.2 mg BID. She was treated at [facility] 3 weeks ago for chest pain and was admitted. We do not have those records but pt states they had her on multiple drips and did not have a cath procedure. Pt also has a history of unprovoked thrombosis treated at [facility]  "approximately 4 years ago, none of which is visible in the "care everywhere" section of the chart. She states she had one episode of thrombosis in her left upper extremity that contained "many little clots" and required surgical thrombectomy. She also had a left renal vein thrombosis for which she received a stent. She states that for both episodes she was placed on a heparin drip, did outpatient lovenox and was continued on ASA 81 until now. She states that no one informed her of any findings suggesting a hypercoaguable state or if the clotting was provoked.    Interval History: Patient seen today, she is doing well. Last day of PLEX today. She denies any bleeding/bruising, no pain.    Oncology Treatment Plan:   [No treatment plan]    Medications:  Continuous Infusions:  Scheduled Meds:   albumin human 5%  275 g Intravenous Once    allopurinol  100 mg Oral Daily    atovaquone  1,500 mg Oral Daily    calcium gluconate IVPB  2,000 mg Intravenous Once    carvedilol  12.5 mg Oral BID    cloNIDine  0.1 mg Oral BID    entecavir  0.5 mg Oral Q72H    famotidine  20 mg Oral Daily    heparin (porcine)  2,300 Units Intravenous Once    hydrALAZINE  50 mg Oral Q8H    insulin aspart U-100  8 Units Subcutaneous TIDWM    insulin detemir U-100  5 Units Subcutaneous QHS    NIFEdipine  60 mg Oral Daily    polyethylene glycol  17 g Oral Daily    predniSONE  60 mg Oral Daily    sodium bicarbonate  650 mg Oral TID     PRN Meds:acetaminophen, albuterol-ipratropium, bisacodyl, glucagon (human recombinant), glucose, glucose, hydrALAZINE, insulin aspart U-100, ondansetron, oxyCODONE-acetaminophen, promethazine (PHENERGAN) IVPB, ramelteon, sodium chloride 0.9%, DIPH,PERTUS (ADACEL),TETANUS PF VAC (ADULT)     Review of Systems    Objective:     Vital Signs (Most Recent):  Temp: 97.3 °F (36.3 °C) (07/29/19 1238)  Pulse: 79 (07/29/19 1238)  Resp: 20 (07/29/19 0752)  BP: (!) 198/93 (07/29/19 1238)  SpO2: 99 % (07/29/19 1238) " Vital Signs (24h Range):  Temp:  [96.5 °F (35.8 °C)-98 °F (36.7 °C)] 97.3 °F (36.3 °C)  Pulse:  [71-81] 79  Resp:  [18-20] 20  SpO2:  [92 %-99 %] 99 %  BP: (163-198)/(74-93) 198/93     Weight: 103 kg (227 lb)  Body mass index is 36.64 kg/m².  Body surface area is 2.19 meters squared.      Intake/Output Summary (Last 24 hours) at 7/29/2019 1327  Last data filed at 7/29/2019 0700  Gross per 24 hour   Intake 980 ml   Output --   Net 980 ml       Physical Exam   Constitutional: She is oriented to person, place, and time. She appears well-developed and well-nourished.   HENT:   Trialysis right neck   Eyes: EOM are normal.   Neck: Normal range of motion.   Cardiovascular: Normal rate and regular rhythm.   Pulmonary/Chest: Effort normal. No respiratory distress.   Abdominal: Soft. She exhibits no distension. There is no tenderness.   Musculoskeletal: She exhibits no edema.   Neurological: She is alert and oriented to person, place, and time.   Skin: Skin is warm and dry.   Psychiatric: She has a normal mood and affect. Her behavior is normal.       Significant Labs:   CBC:   Recent Labs   Lab 07/28/19  0415 07/29/19  0430   WBC 6.16 7.88   HGB 7.9* 8.0*   HCT 25.0* 24.6*    297    and CMP:   Recent Labs   Lab 07/28/19  0415 07/29/19  0430   * 146*   K 4.8 4.6   * 113*   CO2 22* 25   * 238*   BUN 78* 68*   CREATININE 3.2* 2.7*   CALCIUM 9.9 9.5   PROT 5.2* 5.0*   ALBUMIN 4.2 4.0   BILITOT 0.7 0.7   ALKPHOS 29* 35*   AST 12 20   ALT 16 31   ANIONGAP 10 8   EGFRNONAA 15.9* 19.5*       Diagnostic Results:  I have reviewed all pertinent imaging results/findings within the past 24 hours.    Assessment/Plan:     Elevated serum immunoglobulin free light chains  BM biopsy 7/23/19:  -- MILDLY HYPERCELLULAR MARROW (60%) WITH TRILINEAGE HEMATOPOIESIS.  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  -- ADEQUATE NUMBER OF MEGAKARYOCYTES.  -- STAINABLE IRON IS NOT IDENTIFIED.  -- SEE  COMMENT.  COMMENT:  CBC data shows microcytic anemia and thrombocytopenia.  Flow cytometric analysis of bone marrow detects a kappa restricted plasma cell population that expresses , CD19, and bright CD38. CD20 and CD56 are negative. Polytypic B lymphocytes are detected. T lymphocytes are immunophenotypically unremarkable. Blasts gate is not increased. Flow differential: Lymphocytes 5.9%, Monocytes 4.6%, Granulocytes 85.8%, Blast 1.2%.  Immunohistochemical stains are performed on the biopsy core and clot section for greater sensitivity and further architectural assessment with adequate controls. -positive plasma cells comprise approximately 4-5% of thetotal cellularity. Immunoglobulin kappa and lambda light chains situ hybridization study reveals kappa light chainrestriction is some areas. The lymphoid aggregates are composed of mixed CD20-positive B-cells and CD3-positive T-cells. 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 Ddx includes marginal zone lymphoma  - Patient with new renal failure, has normal IgG.  - Skeletal survey (-) for lytic lesions  - CT neck/C/A/P done showed small mediastinal LN and precarinal LN 1.2x2cm  - CT surgery consulted for possible LN biopsy. Given expected low yield of LN biopsy (possibly because patient has been on steroids), would hold off scheduling LN biopsy until have subtype of cryoglobulins and final renal biopsy report. If type 1 cryoglobulins or light chain deposition on renal biopsy, would like to proceed with LN biopsy to confirm nature of hematologic malignancy. If not, will defer for now. Discussed the following with CT surgery.  - Pending cryoglobulins, serum viscosity and cold agglutinins  - Continue allopurinol  - Transfuse platelets if < 10k or if < 50k and actively bleeding  -  Transfuse pRBCs if Hgb < 7    The following was staffed and discussed with supervising physician Dr. Overton. Please contact Hematology Consult Fellow with any additional questions.     Gunjan Montalvo MD  Hematology/Oncology fellow

## 2019-07-29 NOTE — PLAN OF CARE
Problem: Adult Inpatient Plan of Care  Goal: Plan of Care Review  Outcome: Ongoing (interventions implemented as appropriate)     07/29/19 0608   Plan of Care Review   Plan of Care Reviewed With patient;family     Pt remain free from fall and injuries, BP remain elevated. Blood sugar also remain elevated. Denies pain and discomfort. Safety maintained.  Will monitor.

## 2019-07-29 NOTE — SUBJECTIVE & OBJECTIVE
No current facility-administered medications on file prior to encounter.      Current Outpatient Medications on File Prior to Encounter   Medication Sig    amLODIPine (NORVASC) 10 MG tablet Take 10 mg by mouth once daily.    aspirin (ECOTRIN) 81 MG EC tablet Take 81 mg by mouth once daily.    chlorthalidone (HYGROTEN) 50 MG Tab Take 50 mg by mouth once daily.       Review of patient's allergies indicates:  No Known Allergies    Past Medical History:   Diagnosis Date    Renal vein thrombosis 2015    s/p embolectomy and lovenox for 9 mos    Uterine leiomyoma     s/p resection     No past surgical history on file.  Family History     None        Tobacco Use    Smoking status: Not on file   Substance and Sexual Activity    Alcohol use: Not on file    Drug use: Not on file    Sexual activity: Not on file     Review of Systems   Constitutional: Positive for activity change and fatigue.   Respiratory: Positive for cough. Negative for shortness of breath.    Gastrointestinal: Negative for abdominal pain.   Genitourinary: Negative for difficulty urinating.   Musculoskeletal: Negative for arthralgias.   Skin: Negative for color change and rash.   Neurological: Negative for dizziness and syncope.   Psychiatric/Behavioral: Negative for agitation.     Objective:     Vital Signs (Most Recent):  Temp: 97.3 °F (36.3 °C) (07/29/19 1250)  Pulse: 79 (07/29/19 1250)  Resp: 20 (07/29/19 0752)  BP: (!) 198/93 (07/29/19 1250)  SpO2: 99 % (07/29/19 1238) Vital Signs (24h Range):  Temp:  [96.5 °F (35.8 °C)-98 °F (36.7 °C)] 97.3 °F (36.3 °C)  Pulse:  [71-81] 79  Resp:  [18-20] 20  SpO2:  [92 %-99 %] 99 %  BP: (163-198)/(74-93) 198/93     Weight: 103 kg (227 lb)  Body mass index is 36.64 kg/m².      Intake/Output - Last 3 Shifts       07/27 0700 - 07/28 0659 07/28 0700 - 07/29 0659 07/29 0700 - 07/30 0659    P.O. 670 780 200    IV Piggyback       Total Intake(mL/kg) 670 (6.5) 780 (7.6) 200 (1.9)    Urine (mL/kg/hr) 800 (0.3)       Stool 0      Total Output 800      Net -130 +780 +200           Urine Occurrence 2 x 3 x 5 x    Stool Occurrence 1 x 0 x 2 x    Emesis Occurrence   0 x          SpO2: 99 %  O2 Device (Oxygen Therapy): room air    Physical Exam   Constitutional: She is oriented to person, place, and time. She appears well-developed and well-nourished.   HENT:   Head: Atraumatic.   Eyes: EOM are normal.   Neck: Neck supple.   Cardiovascular: Normal rate.   Pulmonary/Chest: Effort normal.   Abdominal: Soft.   Musculoskeletal: Normal range of motion.   Neurological: She is alert and oriented to person, place, and time.   Skin: Skin is warm and dry.   Psychiatric: She has a normal mood and affect. Thought content normal.   Vitals reviewed.      Significant Labs:  BMP:   Recent Labs   Lab 07/29/19 0430   *   *   K 4.6   *   CO2 25   BUN 68*   CREATININE 2.7*   CALCIUM 9.5     CBC:   Recent Labs   Lab 07/29/19 0430   WBC 7.88   RBC 3.32*   HGB 8.0*   HCT 24.6*      MCV 74*   MCH 24.1*   MCHC 32.5       Significant Diagnostics:  CT: I have reviewed all pertinent results/findings within the past 24 hours  CXR: I have reviewed all pertinent results/findings within the past 24 hours    VTE Risk Mitigation (From admission, onward)        Ordered     Place sequential compression device  Until discontinued      07/19/19 0533     IP VTE LOW RISK PATIENT  Once      07/19/19 0533

## 2019-07-29 NOTE — ASSESSMENT & PLAN NOTE
Shayla due to heme malignancy.  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   baseline creatinine of 1.0 roughly 1 month ago. BUN/Cr peaked at 154/6.0 and improved the day after PLEX started.  RJ with bilateral medicorenal disease  UA with proteinuria, blood, no evidence of infection; pro:cr 5.97; C3 41, C4<3  positive for strep A as seen on outside hospital records  given 1x dose lasix 160mg IV with poor response  7/24 HD line placed for PLEX which was started q48h x3 treatements  - 7/19 initiated on empiric prednisone 60mg daily, then 7/26 to solumederol 1 gram qd x3 days, then back to 60 qd  - Nephrology following

## 2019-07-29 NOTE — PLAN OF CARE
Problem: Adult Inpatient Plan of Care  Goal: Plan of Care Review  Outcome: Ongoing (interventions implemented as appropriate)  Nutrition assessment completed. Please see RD note for details.    Recommendation/Intervention:   Continue Renal, diabetic diet restriction. Pt consuming 100% of meals.     RD to monitor.    Goals: Pt to continue tolerating >85% EEN and EPN during admission.  Nutrition Goal Status: new  Communication of RD Recs: reviewed with RN

## 2019-07-29 NOTE — ASSESSMENT & PLAN NOTE
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  R/o malignancy.  Differential diagnosis includes lymphoplasmacytic lymphoma/Waldenstrom's macroglobulinemia, multiple myeloma, and marginal zone lymphoma.   Heme/consulted  7/26 CT ordered to look for lymph nodes and bone survey found nothing big  -- allopurinol for elevated urate  - 7/26 rheum deferred rituximab choice  to heme.  Per rheum:IgG decreased at 185. They request allergy/immunology consult (spoke to them 7/29) regarding ability to start rituximab with decreased IgG.  -- CTS consulted to look for a paricarinal lymph node. Spoke to them 7/29.

## 2019-07-30 PROBLEM — B37.0 ORAL CANDIDIASIS: Status: ACTIVE | Noted: 2019-07-30

## 2019-07-30 PROBLEM — C96.9: Status: ACTIVE | Noted: 2019-07-30

## 2019-07-30 PROBLEM — D50.9 IRON DEFICIENCY ANEMIA: Status: ACTIVE | Noted: 2019-07-30

## 2019-07-30 LAB
ALBUMIN SERPL BCP-MCNC: 4.3 G/DL (ref 3.5–5.2)
ALP SERPL-CCNC: 25 U/L (ref 55–135)
ALT SERPL W/O P-5'-P-CCNC: 35 U/L (ref 10–44)
ANION GAP SERPL CALC-SCNC: 8 MMOL/L (ref 8–16)
AST SERPL-CCNC: 26 U/L (ref 10–40)
BASOPHILS # BLD AUTO: 0.01 K/UL (ref 0–0.2)
BASOPHILS NFR BLD: 0.1 % (ref 0–1.9)
BILIRUB SERPL-MCNC: 1.2 MG/DL (ref 0.1–1)
BUN SERPL-MCNC: 48 MG/DL (ref 6–20)
CA TITR SERPL: NORMAL TITER
CALCIUM SERPL-MCNC: 9.3 MG/DL (ref 8.7–10.5)
CARDIOLIPIN IGG SER IA-ACNC: <9.4 GPL (ref 0–14.99)
CARDIOLIPIN IGM SER IA-ACNC: <9.4 MPL (ref 0–12.49)
CHLORIDE SERPL-SCNC: 113 MMOL/L (ref 95–110)
CO2 SERPL-SCNC: 24 MMOL/L (ref 23–29)
CREAT SERPL-MCNC: 2 MG/DL (ref 0.5–1.4)
DIFFERENTIAL METHOD: ABNORMAL
EOSINOPHIL # BLD AUTO: 0 K/UL (ref 0–0.5)
EOSINOPHIL NFR BLD: 0 % (ref 0–8)
ERYTHROCYTE [DISTWIDTH] IN BLOOD BY AUTOMATED COUNT: 29.1 % (ref 11.5–14.5)
EST. GFR  (AFRICAN AMERICAN): 32.4 ML/MIN/1.73 M^2
EST. GFR  (NON AFRICAN AMERICAN): 28.1 ML/MIN/1.73 M^2
GENETICIST REVIEW: NORMAL
GLUCOSE SERPL-MCNC: 169 MG/DL (ref 70–110)
HCT VFR BLD AUTO: 25.3 % (ref 37–48.5)
HGB BLD-MCNC: 8 G/DL (ref 12–16)
IMM GRANULOCYTES # BLD AUTO: 0.51 K/UL (ref 0–0.04)
IMM GRANULOCYTES NFR BLD AUTO: 4.2 % (ref 0–0.5)
LA PPP-IMP: NEGATIVE
LYMPHOCYTES # BLD AUTO: 1.7 K/UL (ref 1–4.8)
LYMPHOCYTES NFR BLD: 14 % (ref 18–48)
MCH RBC QN AUTO: 24.1 PG (ref 27–31)
MCHC RBC AUTO-ENTMCNC: 31.6 G/DL (ref 32–36)
MCV RBC AUTO: 76 FL (ref 82–98)
MONOCYTES # BLD AUTO: 1.2 K/UL (ref 0.3–1)
MONOCYTES NFR BLD: 10.1 % (ref 4–15)
NEUTROPHILS # BLD AUTO: 8.6 K/UL (ref 1.8–7.7)
NEUTROPHILS NFR BLD: 71.6 % (ref 38–73)
NRBC BLD-RTO: 1 /100 WBC
PHOSPHATE SERPL-MCNC: 3.9 MG/DL (ref 2.7–4.5)
PLASMA CELL PROLIF RELEASED BY: NORMAL
PLASMA CELL PROLIF RESULT SUMMARY: NORMAL
PLASMA CELL PROLIF RESULT TABLE: NORMAL
PLATELET # BLD AUTO: 264 K/UL (ref 150–350)
PMV BLD AUTO: 11.5 FL (ref 9.2–12.9)
POCT GLUCOSE: 165 MG/DL (ref 70–110)
POCT GLUCOSE: 194 MG/DL (ref 70–110)
POCT GLUCOSE: 229 MG/DL (ref 70–110)
POCT GLUCOSE: 318 MG/DL (ref 70–110)
POTASSIUM SERPL-SCNC: 4.1 MMOL/L (ref 3.5–5.1)
PROT SERPL-MCNC: 4.8 G/DL (ref 6–8.4)
RBC # BLD AUTO: 3.32 M/UL (ref 4–5.4)
REASON FOR REFERRAL, PLASMA CELL PROLIF (PCPD), FISH: NORMAL
REF LAB TEST METHOD: NORMAL
RESULTS, PLASMA CELL PROLIF (PCPD), FISH: NORMAL
SERVICE CMNT-IMP: NORMAL
SERVICE CMNT-IMP: NORMAL
SODIUM SERPL-SCNC: 145 MMOL/L (ref 136–145)
SPECIMEN SOURCE: NORMAL
SPECIMEN, PLASMA CELL PROLIF (PCPD), FISH: NORMAL
URATE SERPL-MCNC: 7.2 MG/DL (ref 2.4–5.7)
WBC # BLD AUTO: 12.03 K/UL (ref 3.9–12.7)

## 2019-07-30 PROCEDURE — 84100 ASSAY OF PHOSPHORUS: CPT

## 2019-07-30 PROCEDURE — 25000003 PHARM REV CODE 250: Performed by: HOSPITALIST

## 2019-07-30 PROCEDURE — 25000003 PHARM REV CODE 250: Performed by: STUDENT IN AN ORGANIZED HEALTH CARE EDUCATION/TRAINING PROGRAM

## 2019-07-30 PROCEDURE — 99233 SBSQ HOSP IP/OBS HIGH 50: CPT | Mod: ,,, | Performed by: INTERNAL MEDICINE

## 2019-07-30 PROCEDURE — 63600175 PHARM REV CODE 636 W HCPCS: Performed by: HOSPITALIST

## 2019-07-30 PROCEDURE — 11000001 HC ACUTE MED/SURG PRIVATE ROOM

## 2019-07-30 PROCEDURE — 99233 PR SUBSEQUENT HOSPITAL CARE,LEVL III: ICD-10-PCS | Mod: ,,, | Performed by: INTERNAL MEDICINE

## 2019-07-30 PROCEDURE — 84550 ASSAY OF BLOOD/URIC ACID: CPT

## 2019-07-30 PROCEDURE — 80053 COMPREHEN METABOLIC PANEL: CPT

## 2019-07-30 PROCEDURE — 99233 PR SUBSEQUENT HOSPITAL CARE,LEVL III: ICD-10-PCS | Mod: ,,, | Performed by: HOSPITALIST

## 2019-07-30 PROCEDURE — 99233 SBSQ HOSP IP/OBS HIGH 50: CPT | Mod: ,,, | Performed by: HOSPITALIST

## 2019-07-30 PROCEDURE — 85025 COMPLETE CBC W/AUTO DIFF WBC: CPT

## 2019-07-30 RX ORDER — PANTOPRAZOLE SODIUM 40 MG/1
40 TABLET, DELAYED RELEASE ORAL
Status: DISCONTINUED | OUTPATIENT
Start: 2019-07-31 | End: 2019-08-07 | Stop reason: HOSPADM

## 2019-07-30 RX ORDER — CLONIDINE HYDROCHLORIDE 0.1 MG/1
0.1 TABLET ORAL 3 TIMES DAILY
Status: DISCONTINUED | OUTPATIENT
Start: 2019-07-30 | End: 2019-08-07 | Stop reason: HOSPADM

## 2019-07-30 RX ORDER — CARVEDILOL 25 MG/1
25 TABLET ORAL 2 TIMES DAILY
Status: DISCONTINUED | OUTPATIENT
Start: 2019-07-30 | End: 2019-08-07 | Stop reason: HOSPADM

## 2019-07-30 RX ORDER — NYSTATIN 100000 [USP'U]/ML
500000 SUSPENSION ORAL 4 TIMES DAILY
Status: DISCONTINUED | OUTPATIENT
Start: 2019-07-30 | End: 2019-08-07 | Stop reason: HOSPADM

## 2019-07-30 RX ADMIN — INSULIN ASPART 8 UNITS: 100 INJECTION, SOLUTION INTRAVENOUS; SUBCUTANEOUS at 05:07

## 2019-07-30 RX ADMIN — PREDNISONE 60 MG: 20 TABLET ORAL at 08:07

## 2019-07-30 RX ADMIN — HYDRALAZINE HYDROCHLORIDE 50 MG: 50 TABLET ORAL at 09:07

## 2019-07-30 RX ADMIN — FAMOTIDINE 20 MG: 20 TABLET, FILM COATED ORAL at 08:07

## 2019-07-30 RX ADMIN — NYSTATIN 500000 UNITS: 100000 SUSPENSION ORAL at 09:07

## 2019-07-30 RX ADMIN — INSULIN ASPART 8 UNITS: 100 INJECTION, SOLUTION INTRAVENOUS; SUBCUTANEOUS at 08:07

## 2019-07-30 RX ADMIN — RAMELTEON 8 MG: 8 TABLET, FILM COATED ORAL at 09:07

## 2019-07-30 RX ADMIN — SODIUM BICARBONATE 650 MG TABLET 650 MG: at 03:07

## 2019-07-30 RX ADMIN — ATOVAQUONE 1500 MG: 750 SUSPENSION ORAL at 08:07

## 2019-07-30 RX ADMIN — CARVEDILOL 12.5 MG: 12.5 TABLET, FILM COATED ORAL at 08:07

## 2019-07-30 RX ADMIN — CARVEDILOL 25 MG: 25 TABLET, FILM COATED ORAL at 09:07

## 2019-07-30 RX ADMIN — HYDRALAZINE HYDROCHLORIDE 50 MG: 50 TABLET ORAL at 06:07

## 2019-07-30 RX ADMIN — SODIUM BICARBONATE 650 MG TABLET 650 MG: at 08:07

## 2019-07-30 RX ADMIN — ALLOPURINOL 100 MG: 100 TABLET ORAL at 08:07

## 2019-07-30 RX ADMIN — INSULIN ASPART 8 UNITS: 100 INJECTION, SOLUTION INTRAVENOUS; SUBCUTANEOUS at 12:07

## 2019-07-30 RX ADMIN — INSULIN ASPART 4 UNITS: 100 INJECTION, SOLUTION INTRAVENOUS; SUBCUTANEOUS at 12:07

## 2019-07-30 RX ADMIN — NYSTATIN 500000 UNITS: 100000 SUSPENSION ORAL at 05:07

## 2019-07-30 RX ADMIN — SODIUM BICARBONATE 650 MG TABLET 650 MG: at 09:07

## 2019-07-30 RX ADMIN — INSULIN DETEMIR 5 UNITS: 100 INJECTION, SOLUTION SUBCUTANEOUS at 09:07

## 2019-07-30 RX ADMIN — NIFEDIPINE 60 MG: 60 TABLET, FILM COATED, EXTENDED RELEASE ORAL at 08:07

## 2019-07-30 RX ADMIN — HYDRALAZINE HYDROCHLORIDE 50 MG: 50 TABLET ORAL at 01:07

## 2019-07-30 RX ADMIN — NYSTATIN 500000 UNITS: 100000 SUSPENSION ORAL at 12:07

## 2019-07-30 RX ADMIN — CLONIDINE HYDROCHLORIDE 0.1 MG: 0.1 TABLET ORAL at 08:07

## 2019-07-30 RX ADMIN — CLONIDINE HYDROCHLORIDE 0.1 MG: 0.1 TABLET ORAL at 06:07

## 2019-07-30 NOTE — SUBJECTIVE & OBJECTIVE
Interval history: patient appears to be improving with creatinine trending down and urine output increasing. Oral thrush noted today for which primary has order nystatin. Some new scleral icterus and mild elevation of tbili. Hgb stable. If worsens tomorrow, may consider HA labs. Brief episode of blurred vision yesterday while walking, BP has been elevated. Unlikely related to cryoglobulinemia unless recurs      Medications:  Continuous Infusions:  Scheduled Meds:   allopurinol  100 mg Oral Daily    atovaquone  1,500 mg Oral Daily    carvedilol  12.5 mg Oral BID    cloNIDine  0.1 mg Oral BID    entecavir  0.5 mg Oral Q72H    famotidine  20 mg Oral Daily    hydrALAZINE  50 mg Oral Q8H    insulin aspart U-100  8 Units Subcutaneous TIDWM    insulin detemir U-100  5 Units Subcutaneous QHS    NIFEdipine  60 mg Oral Daily    nystatin  500,000 Units Oral QID    predniSONE  60 mg Oral Daily    sodium bicarbonate  650 mg Oral TID     PRN Meds:acetaminophen, albuterol-ipratropium, bisacodyl, glucagon (human recombinant), glucose, glucose, hydrALAZINE, insulin aspart U-100, ondansetron, oxyCODONE-acetaminophen, promethazine (PHENERGAN) IVPB, ramelteon, sodium chloride 0.9%, DIPH,PERTUS (ADACEL),TETANUS PF VAC (ADULT)     Review of patient's allergies indicates:  No Known Allergies     Past Medical History:   Diagnosis Date    Renal vein thrombosis 2015    s/p embolectomy and lovenox for 9 mos    Uterine leiomyoma     s/p resection     No past surgical history on file.  Family History     None        Tobacco Use    Smoking status: Not on file   Substance and Sexual Activity    Alcohol use: Not on file    Drug use: Not on file    Sexual activity: Not on file       Review of Systems   Constitutional: Negative for activity change, appetite change, chills, diaphoresis, fatigue and fever.   HENT: Negative for congestion and tinnitus.    Eyes: Positive for discharge and visual disturbance (1 episode blurred vision  yesterday while walking). Negative for photophobia.   Respiratory: Negative for apnea, cough, chest tightness and shortness of breath.    Cardiovascular: Negative for chest pain, palpitations and leg swelling.   Gastrointestinal: Positive for abdominal pain and diarrhea. Negative for abdominal distention, nausea and vomiting.   Genitourinary: Negative for dysuria and hematuria.        Urine output improving per patient   Musculoskeletal: Negative for arthralgias and back pain.   Skin: Negative for pallor, rash and wound.   Neurological: Negative for dizziness, light-headedness and headaches.   Psychiatric/Behavioral: Negative for agitation and confusion.     Objective:     Vital Signs (Most Recent):  Temp: 97.9 °F (36.6 °C) (07/30/19 1516)  Pulse: 72 (07/30/19 1516)  Resp: 18 (07/30/19 1516)  BP: (!) 149/76 (07/30/19 1516)  SpO2: 99 % (07/30/19 1516) Vital Signs (24h Range):  Temp:  [97.3 °F (36.3 °C)-99 °F (37.2 °C)] 97.9 °F (36.6 °C)  Pulse:  [65-79] 72  Resp:  [18-20] 18  SpO2:  [97 %-100 %] 99 %  BP: (145-190)/(71-83) 149/76     Weight: 103 kg (227 lb)  Body mass index is 36.64 kg/m².  Body surface area is 2.19 meters squared.    No intake or output data in the 24 hours ending 07/30/19 1526    Physical Exam   Constitutional: She is oriented to person, place, and time. She appears well-developed and well-nourished.   HENT:   Trialysis right neck. Oral thrush   Eyes: EOM are normal. Scleral icterus is present.   Neck: Normal range of motion.   Cardiovascular: Normal rate and regular rhythm.   Pulmonary/Chest: Effort normal. No respiratory distress.   Abdominal: Soft. She exhibits no distension. There is tenderness (mild epigastric TTP).   Musculoskeletal: She exhibits no edema.   Neurological: She is alert and oriented to person, place, and time.   Skin: Skin is warm and dry. No rash noted. No erythema.   Psychiatric: She has a normal mood and affect. Her behavior is normal.       Significant Labs:   CBC:   Recent  Labs   Lab 07/29/19  0430 07/30/19  0654   WBC 7.88 12.03   HGB 8.0* 8.0*   HCT 24.6* 25.3*    264   , CMP:   Recent Labs   Lab 07/29/19  0430 07/30/19  0654   * 145   K 4.6 4.1   * 113*   CO2 25 24   * 169*   BUN 68* 48*   CREATININE 2.7* 2.0*   CALCIUM 9.5 9.3   PROT 5.0* 4.8*   ALBUMIN 4.0 4.3   BILITOT 0.7 1.2*   ALKPHOS 35* 25*   AST 20 26   ALT 31 35   ANIONGAP 8 8   EGFRNONAA 19.5* 28.1*   , Coagulation: No results for input(s): PT, INR, APTT in the last 48 hours., Haptoglobin: No results for input(s): HAPTOGLOBIN in the last 48 hours., LDH: No results for input(s): LDHCSF, BFSOURCE in the last 48 hours. and Reticulocytes: No results for input(s): RETIC in the last 48 hours.    Diagnostic Results:  I have reviewed all pertinent imaging results/findings within the past 24 hours.

## 2019-07-30 NOTE — PLAN OF CARE
Problem: Adult Inpatient Plan of Care  Goal: Plan of Care Review  Outcome: Ongoing (interventions implemented as appropriate)  POC reviewed with pt. AAO x4.  accu-checks.  Pt remained free from falls. Questions and concerns addressed. Pt progressing towards goals. Will continue to monitor. See flow sheets for full assessment and VS

## 2019-07-30 NOTE — ASSESSMENT & PLAN NOTE
BM biopsy 7/23/19:  -- MILDLY HYPERCELLULAR MARROW (60%) WITH TRILINEAGE HEMATOPOIESIS.  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  -- ADEQUATE NUMBER OF MEGAKARYOCYTES.  -- STAINABLE IRON IS NOT IDENTIFIED.  -- SEE COMMENT.  COMMENT:  CBC data shows microcytic anemia and thrombocytopenia.  Flow cytometric analysis of bone marrow detects a kappa restricted plasma cell population that expresses , CD19, and bright CD38. CD20 and CD56 are negative. Polytypic B lymphocytes are detected. T lymphocytes are immunophenotypically unremarkable. Blasts gate is not increased. Flow differential: Lymphocytes 5.9%, Monocytes 4.6%, Granulocytes 85.8%, Blast 1.2%.  Immunohistochemical stains are performed on the biopsy core and clot section for greater sensitivity and further architectural assessment with adequate controls. -positive plasma cells comprise approximately 4-5% of thetotal cellularity. Immunoglobulin kappa and lambda light chains situ hybridization study reveals kappa light chainrestriction is some areas. The lymphoid aggregates are composed of mixed CD20-positive B-cells and CD3-positive T-cells. B-cells are more numerous than T-cells

## 2019-07-30 NOTE — TREATMENT PLAN
Discussed LN biopsy with BMT team and decision was made to hold off on LN biopsy until cryoglobulins subtype and renal biopsy finalized. I will leave on the OR schedule for Thursday in the event the biopsied are finalized however it does not seem likely at present time. Please call with questions.       Lucía Gould PA-C  Thoracic Surgery  [phone]

## 2019-07-30 NOTE — ASSESSMENT & PLAN NOTE
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  R/o malignancy.  Differential diagnosis includes lymphoplasmacytic lymphoma/Waldenstrom's macroglobulinemia, multiple myeloma, and marginal zone lymphoma.   Heme/consulted  7/26 CT ordered to look for lymph nodes and bone survey found nothing big  -- allopurinol for elevated urate  - 7/26 rheum deferred rituximab choice  to heme.  Per rheum:IgG decreased at 185. They request allergy/immunology consult (spoke to them 7/29) regarding ability to start rituximab with decreased IgG.  -Pending Cryoglobulin subtype and final renal biopsy report prior to recs for CT surgery LN biopsy per Hematology team.

## 2019-07-30 NOTE — HPI
"52 yo female with HTN, iron deficiency anemia, history of multiple DVTs (including left renal vein thrombosis), chronic hepatitis B, uterine fibroids s/p myomectomy who was transferred here from [facility] in [place] after she was found to have pneumonia, LACI (cr 2.9) and thrombocytopenia. Hematology was consulted for thrombocytopenia. History was obtained from the patient, patient's cousin, patient's fiancee and chart review.     Pt endorses episodes of fevers, chills, fatigue, dry cough, SOB, and orthopnea for 1 week. She also states that 3 days ago she began to have neck and back muscle cramping and stiffness and decreased urinary output and decided to see her PCP, who diagnosed her with muscle spasms and the flu. A few days later she decided to go to the [facility] ED and was found to have Cr 2.9, increased from baseline Cr 1.0. She was also found to have , WBC 7.9, Plt 33, lactate 0.8. CXR showed bilateral interstitial infiltrates, and subsequent CT chest showed bilateral ground glass opacities. She was treated with rochephin, doxycycline, bumex and transferred here.     Here her labs were significant for Hgb 10, MCV 69, RDW 22.3, Plt 41. WBC 5.98. UA showed 3+ protein, 3+ glucose and 3+ blood but was not consistent with UTI. Cr 4.3 and  consistent with an LACI. Hemolytic labs were unremarkable.     Pt has a history of uncontrolled hypertension and states that she is currently taking lisinopril 20 mg-HCTZ 12.5 mg, hydralazine 25 mg TID, and clonidine 0.2 mg BID. She was treated at [facility] 3 weeks ago for chest pain and was admitted. We do not have those records but pt states they had her on multiple drips and did not have a cath procedure. Pt also has a history of unprovoked thrombosis treated at [facility] approximately 4 years ago, none of which is visible in the "care everywhere" section of the chart. She states she had one episode of thrombosis in her left " "upper extremity that contained "many little clots" and required surgical thrombectomy. She also had a left renal vein thrombosis for which she received a stent. She states that for both episodes she was placed on a heparin drip, did outpatient lovenox and was continued on ASA 81 until now. She states that no one informed her of any findings suggesting a hypercoaguable state or if the clotting was provoked.  "

## 2019-07-30 NOTE — PROGRESS NOTES
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 Hematology was consulted for thrombocytopenia. History was obtained from the patient, patient's cousin, patient's fiancee and chart review.     Pt endorses episodes of fevers, chills, fatigue, dry cough, SOB, and orthopnea for 1 week. She also states that 3 days ago she began to have neck and back muscle cramping and stiffness and decreased urinary output and decided to see her PCP, who diagnosed her with muscle spasms and the flu. A few days later she decided to go to the [facility] ED and was found to have Cr 2.9, increased from baseline Cr 1.0. She was also found to have , WBC 7.9, Plt 33, lactate 0.8. CXR showed bilateral interstitial infiltrates, and subsequent CT chest showed bilateral ground glass opacities. She was treated with rochephin, doxycycline, bumex and transferred here.     Here her labs were significant for Hgb 10, MCV 69, RDW 22.3, Plt 41. WBC 5.98. UA showed 3+ protein, 3+ glucose and 3+ blood but was not consistent with UTI. Cr 4.3 and  consistent with an LACI. Hemolytic labs were unremarkable.     Pt has a history of uncontrolled hypertension and states that she is currently taking lisinopril 20 mg-HCTZ 12.5 mg, hydralazine 25 mg TID, and clonidine 0.2 mg BID. She was treated at [facility] 3 weeks ago for chest pain and was admitted. We do not have those  "records but pt states they had her on multiple drips and did not have a cath procedure. Pt also has a history of unprovoked thrombosis treated at [facility] approximately 4 years ago, none of which is visible in the "care everywhere" section of the chart. She states she had one episode of thrombosis in her left upper extremity that contained "many little clots" and required surgical thrombectomy. She also had a left renal vein thrombosis for which she received a stent. She states that for both episodes she was placed on a heparin drip, did outpatient lovenox and was continued on ASA 81 until now. She states that no one informed her of any findings suggesting a hypercoaguable state or if the clotting was provoked.    Interval History: Patient seen and examined at bedside, labs improving with down trending creatinine       Review of patient's allergies indicates:  No Known Allergies  Current Facility-Administered Medications   Medication Frequency    acetaminophen tablet 650 mg Q6H PRN    albuterol-ipratropium 2.5 mg-0.5 mg/3 mL nebulizer solution 3 mL Q6H PRN    allopurinol tablet 100 mg Daily    atovaquone suspension 1,500 mg Daily    bisacodyl suppository 10 mg Daily PRN    carvedilol tablet 12.5 mg BID    cloNIDine tablet 0.1 mg BID    entecavir tablet 0.5 mg Q72H    famotidine tablet 20 mg Daily    glucagon (human recombinant) injection 1 mg PRN    glucose chewable tablet 16 g PRN    glucose chewable tablet 24 g PRN    hydrALAZINE tablet 25 mg Q6H PRN    hydrALAZINE tablet 50 mg Q8H    insulin aspart U-100 pen 1-10 Units QID (AC + HS) PRN    insulin aspart U-100 pen 8 Units TIDWM    insulin detemir U-100 pen 5 Units QHS    NIFEdipine 24 hr tablet 60 mg Daily    nystatin 100,000 unit/mL suspension 500,000 Units QID    ondansetron disintegrating tablet 8 mg Q8H PRN    oxyCODONE-acetaminophen 5-325 mg per tablet 1 tablet Q8H PRN    predniSONE tablet 60 mg Daily    promethazine (PHENERGAN) 6.25 " mg in dextrose 5 % 50 mL IVPB Q6H PRN    ramelteon tablet 8 mg Nightly PRN    sodium bicarbonate tablet 650 mg TID    sodium chloride 0.9% flush 10 mL PRN    Tdap vaccine injection 0.5 mL vaccine x 1 dose       Objective:     Vital Signs (Most Recent):  Temp: 97.9 °F (36.6 °C) (07/30/19 1516)  Pulse: 72 (07/30/19 1516)  Resp: 18 (07/30/19 1516)  BP: (!) 149/76 (07/30/19 1516)  SpO2: 99 % (07/30/19 1516)  O2 Device (Oxygen Therapy): room air (07/30/19 1516) Vital Signs (24h Range):  Temp:  [97.7 °F (36.5 °C)-99 °F (37.2 °C)] 97.9 °F (36.6 °C)  Pulse:  [65-79] 72  Resp:  [18-20] 18  SpO2:  [97 %-100 %] 99 %  BP: (149-190)/(73-83) 149/76     Weight: 103 kg (227 lb) (07/29/19 1250)  Body mass index is 36.64 kg/m².  Body surface area is 2.19 meters squared.    I/O last 3 completed shifts:  In: 200 [P.O.:200]  Out: -     Physical Exam   Constitutional: She is oriented to person, place, and time. She appears well-developed and well-nourished.   HENT:   Trialysis right neck. Oral thrush   Eyes: EOM are normal. Scleral icterus is present.   Neck: Normal range of motion.   Cardiovascular: Normal rate and regular rhythm.   Pulmonary/Chest: Effort normal. No respiratory distress.   Abdominal: Soft. She exhibits no distension. There is tenderness (mild epigastric TTP).   Musculoskeletal: She exhibits no edema.   Neurological: She is alert and oriented to person, place, and time.   Skin: Skin is warm and dry. No rash noted. No erythema.   Psychiatric: She has a normal mood and affect. Her behavior is normal.       Significant Labs:  CBC:   Recent Labs   Lab 07/30/19  0654   WBC 12.03   RBC 3.32*   HGB 8.0*   HCT 25.3*      MCV 76*   MCH 24.1*   MCHC 31.6*     CMP:   Recent Labs   Lab 07/30/19  0654   *   CALCIUM 9.3   ALBUMIN 4.3   PROT 4.8*      K 4.1   CO2 24   *   BUN 48*   CREATININE 2.0*   ALKPHOS 25*   ALT 35   AST 26   BILITOT 1.2*     All labs within the past 24 hours have been reviewed.  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  As per Dr. 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 She also has extensive ATN likely secondary to ischemia downstream the affected glomeruli. These features fit with the low C4 and the +RF. 90% of these cases are associated with HCV (but she is negative!) and only ~3% are associated with HBV. Despite her very high kappa/lambda ratio (K:L), she has no features of myeloma cast nephropathy or light chain deposition disease in her biopsy specimen. The high K:L could come from the cryoglobulinemic process (mono and polyclonal LC production). Of note, lymphoma can rarely cause cryoglobulinemic DPGN, so BM biopsy results will be important. No evidence of uric acid tubulopathy (tumor lysis syndrome) either. In retrospect, her NSAIDs use was a major confounding factor in this case."     Recommendations:  - Continue prednisone 60 mg qd for additional 2 weeks, then begin taper to 40 mg qd  - Continue Myfortic  - creatinine downtrending, 2.0 today  - Cryoglobulin lab sent f/u results  - Continue treatment with entecavir for Hep B   - Follow up on official Kidney Biopsy report  - BP control to goal <140/90  - underwent PLEX yesterday (3rd session)  - Limit protein diet given azotemia, no more than 60-80 g per day  - Continue sodium bicarb 1300 mg tid  - Will follow closely        Thank you for your consult. I will follow-up with patient. Please contact us if you have any additional questions.    Mustapha Reid MD  Nephrology  [facility]  "

## 2019-07-30 NOTE — PROGRESS NOTES
[facility]  Rheumatology  Progress Note    Patient Name: Kendy Vital  MRN: [id number]  Admission Date: 7/19/2019  Hospital Length of Stay: 11 days  Code Status: Full Code   Attending Provider: Lele Clay MD  Primary Care Physician: To Obtain Unable  Principal Problem: Acute renal failure    Subjective:     HPI: Ms. 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 She initially presented with fevers, chills, dry cough, shortness of breath, 2 pillow orthopnea, and occasional epistaxis for roughly 2 weeks. Initial work up showed a BNP of 526, worsening kidney function, and thrombocytopenia with platelets of 33k. She was initially treated with Rocephin and doxycycline for suspected pneumonia and was diuresed for HF. She was then  plasmapheresed and started on prednisone 60mg daily for suspected TTP. 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 Thus far, the patient has received pulse steroids (today is day 2). 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         Interval History: Pt sitting up in bed today. Still having nagging epigastric pain. C/o 5x watery diarrhea overnight as well as greenish white oropharyngeal exudate that appears to be oral candidiasis.    Current Facility-Administered Medications   Medication Frequency    acetaminophen tablet 650 mg Q6H PRN    albuterol-ipratropium 2.5 mg-0.5 mg/3 mL nebulizer solution 3 mL Q6H PRN    allopurinol tablet 100 mg Daily    atovaquone suspension 1,500 mg Daily    bisacodyl suppository 10 mg Daily PRN    carvedilol tablet 12.5 mg BID    cloNIDine tablet 0.1 mg BID    entecavir tablet 0.5 mg Q72H    famotidine tablet 20 mg Daily    glucagon (human recombinant)  injection 1 mg PRN    glucose chewable tablet 16 g PRN    glucose chewable tablet 24 g PRN    hydrALAZINE tablet 25 mg Q6H PRN    hydrALAZINE tablet 50 mg Q8H    insulin aspart U-100 pen 1-10 Units QID (AC + HS) PRN    insulin aspart U-100 pen 8 Units TIDWM    insulin detemir U-100 pen 5 Units QHS    NIFEdipine 24 hr tablet 60 mg Daily    nystatin 100,000 unit/mL suspension 500,000 Units QID    ondansetron disintegrating tablet 8 mg Q8H PRN    oxyCODONE-acetaminophen 5-325 mg per tablet 1 tablet Q8H PRN    predniSONE tablet 60 mg Daily    promethazine (PHENERGAN) 6.25 mg in dextrose 5 % 50 mL IVPB Q6H PRN    ramelteon tablet 8 mg Nightly PRN    sodium bicarbonate tablet 650 mg TID    sodium chloride 0.9% flush 10 mL PRN    Tdap vaccine injection 0.5 mL vaccine x 1 dose     Objective:     Vital Signs (Most Recent):  Temp: 97.7 °F (36.5 °C) (07/30/19 0728)  Pulse: 74 (07/30/19 0728)  Resp: 18 (07/30/19 0728)  BP: (!) 190/83 (07/30/19 0728)  SpO2: 97 % (07/30/19 0728)  O2 Device (Oxygen Therapy): room air (07/29/19 2155) Vital Signs (24h Range):  Temp:  [97.3 °F (36.3 °C)-99 °F (37.2 °C)] 97.7 °F (36.5 °C)  Pulse:  [58-79] 74  Resp:  [18] 18  SpO2:  [97 %-100 %] 97 %  BP: (142-198)/(67-93) 190/83     Weight: 103 kg (227 lb) (07/29/19 1250)  Body mass index is 36.64 kg/m².  Body surface area is 2.19 meters squared.    No intake or output data in the 24 hours ending 07/30/19 1118    Physical Exam   Vitals reviewed.  Constitutional: She is oriented to person, place, and time and well-developed, well-nourished, and in no distress. No distress.   HENT:   Head: Normocephalic and atraumatic.   Mouth/Throat: Oropharyngeal exudate (consistent with oral candidiasis) present.   Eyes: Pupils are equal, round, and reactive to light.   Cardiovascular: Normal rate and regular rhythm.    No murmur heard.  Pulmonary/Chest: Effort normal and breath sounds normal. No respiratory distress. She has no rales.   Abdominal:  Soft. Bowel sounds are normal. There is tenderness (tenderness on palpation of epigastric area). There is no rebound and no guarding.   Neurological: She is alert and oriented to person, place, and time.   Skin: Skin is warm and dry. She is not diaphoretic.     Musculoskeletal: She exhibits edema (pitting edema up to 2/3 of tibia). She exhibits no tenderness.         Significant Labs:  All pertinent lab results from the last 24 hours have been reviewed.    Significant Imaging:  Imaging results within the past 24 hours have been reviewed.    Assessment/Plan:     Cryoglobulinemic vasculitis  BM biopsy results noted and current suspicion for lymphoplasmacytic lymphoma vs Waldenstrom's, vs multiple myeloma. Given results, will let Heme/Onc decide which chemotherapeutic agent they feel is best. Will f/u with LN bx results if performed 08/01.    - s/p 3rd PLEX 07/29  - Continue 60mg prednisone (started 07/29)  - LN bx on 08/01 if remainder of cryoglobulin and thrombophilia labs result per CT surgery.   - Allergy/Immunology recommends for patient to follow up on outpatient basis to determine dose and route of IgG   - Will follow up remaining cryoglobulin and thrombophilia labs, MPO, PR3  - PCP prophylaxis (for steroids) with atovaquone 1500mg suspension, started 7/25/19. Avoiding bactrim in setting of acute renal failure.  - Ulcer prophylaxis, currently on famotidine. Recommend PPI  - Chemotherapeutic therapy deferred to Heme/Onc         Jey Marcus MD  Rheumatology  [facility]        - Rheumatology will continue to monitor periodically as labs result and further developments arise      Patient seen and examined with resident.  All elements of history, physical exam and medical decision making independently confirmed by me.  Patient with renal failure thought to be due to cryoglobulinemic vasculitis, +Hep B and abnormal BM biopsy.  Patient treated with pulse dose steroids now on 60 mg prednisone,  PLEX  x3 and entecavir.  Await labs and possible lymph node biopsy.  Elevated BP today.  Recommend close monitoring and consideration of medication adjustment for better BP control.  Prednisone may be contributing to hypertension.   If no contraindications consider switch famotidine to PPI for better GI prophylaxis on high dose steroid.  See note for details.

## 2019-07-30 NOTE — MEDICAL/APP STUDENT
[facility]  Nephrology  Medical Student Progress Note    Patient Name: Kendy Vital  MRN: [id number]   Admission Date: 7/19/2019  Length of Stay: 11 days  Attending Physician: Dr. 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   As per heme-onc notes, ddx is lymphoplasmacytic lymphoma, plasma cell dyscrasia, marginal zone lymphoma Waldenstrom's, and multiple myeloma  Nephrology was consulted for LACI.   Cr and BUN have been decreasing progressively since 7/24   3/3 PLEX was completed yesterday- pt tolerated well.  She is considered to be at an increased risk for infection  Allergy immunology was consulted yesterday for hypogammaglobulinemia who consulted on rituximab use  LN biopsy to commence if type-1 cryoglobulins or light chain deposition on renal biopsy is noted.   Mediastinal and hilar adenopathy is not well visualized without contrast scan. There does appear to be a precarinal LN which was measured at 1.2 x 2.0cm. No bulky lymphadenopathy.  Today will be day 3 on steroids      Plan  Continue prednisone 60mg.  Recommend increasing dose of carvidilol 25 BID or nifidipine 90 once per day to control pt's BP.  Follow-up on full biopsy results   Monitor labs  Maintain BP <140/90  Consider transfusion if indicated    To be discussed with team and staff,    Wally Hathaway  MS4    Subjective:     Principal Problem:Acute renal failure  Please see H&P for detailed presenting history and ROS.    Interval History:       Objective:     Vital Signs (Most Recent):  Temp: 97.7 °F (36.5 °C) (07/30/19 0728)  Pulse: 74 (07/30/19 0728)  Resp: 18 (07/30/19 0728)  BP: (!) 190/83 (07/30/19 0728)  SpO2: 97 % (07/30/19 0728) Vital Signs (24h Range):  Temp:  [97.3 °F (36.3 °C)-99 °F (37.2 °C)] 97.7 °F (36.5 °C)  Pulse:  [58-81] 74  Resp:  [18-20] 18  SpO2:  [95  %-100 %] 97 %  BP: (142-198)/(67-93) 190/83     Weight: 103 kg (227 lb)  Body mass index is 36.64 kg/m².  No intake or output data in the 24 hours ending 07/30/19 0732   Physical Exam   Constitutional: She is oriented to person, place, and time.   Neck: No JVD present.   Cardiovascular: Normal rate.   Murmur heard.  Pulmonary/Chest: Effort normal and breath sounds normal.   Abdominal: Soft. Bowel sounds are normal.   Musculoskeletal: She exhibits edema.   Neurological: She is alert and oriented to person, place, and time. GCS eye subscore is 4. GCS verbal subscore is 5. GCS motor subscore is 6.   Skin: Capillary refill takes less than 2 seconds.   Nursing note and vitals reviewed.    Significant Labs:   Recent Results (from the past 24 hour(s))   POCT glucose    Collection Time: 07/29/19  7:52 AM   Result Value Ref Range    POCT Glucose 225 (H) 70 - 110 mg/dL   POCT glucose    Collection Time: 07/29/19 12:05 PM   Result Value Ref Range    POCT Glucose 383 (H) 70 - 110 mg/dL   POCT glucose    Collection Time: 07/29/19  4:43 PM   Result Value Ref Range    POCT Glucose 269 (H) 70 - 110 mg/dL   POCT glucose    Collection Time: 07/29/19  9:47 PM   Result Value Ref Range    POCT Glucose 278 (H) 70 - 110 mg/dL

## 2019-07-30 NOTE — ASSESSMENT & PLAN NOTE
BM biopsy concerning for low-grade B-cell lymphoma with plasmacytic differentiation such as LPL Vs plasma cell dyscrasia (<10% plasma cells on BM biopsy). Ddx includes marginal zone lymphoma  - Patient with new renal failure, has normal IgG.  - Skeletal survey (-) for lytic lesions  - CT neck/C/A/P done showed small mediastinal LN and precarinal LN 1.2x2cm  - CT surgery consulted for possible LN biopsy. Given expected low yield of LN biopsy (possibly because patient has been on steroids), would hold off scheduling LN biopsy until have subtype of cryoglobulins and final renal biopsy report. If type 1 cryoglobulins or light chain deposition on renal biopsy, would like to proceed with LN biopsy to confirm nature of hematologic malignancy. If not, will defer for now. Discussed the following with CT surgery.  - Pending cryoglobulins and serum viscosity; cold agglutinin negative  - Continue allopurinol  - Transfuse platelets if < 10k or if < 50k and actively bleeding  - Transfuse pRBCs if Hgb < 7

## 2019-07-30 NOTE — ASSESSMENT & PLAN NOTE
BM biopsy results noted and current suspicion for lymphoplasmacytic lymphoma vs Waldenstrom's, vs multiple myeloma. Given results, will let Heme/Onc decide which chemotherapeutic agent they feel is best.     - 3rd PLEX 07/29  - Last dose of pulse steroids received, starting 60mg prednisone 07/29  - LN bx on 08/01 if remainder of cryoglobulin and thrombophilia labs result per CT surgery  - Recommend Allergy/Immunology consult for decreased IgG levels, will await their recommendations  - Follow up remaining cryoglobulin and thrombophilia labs, MPO, PR3  - PCP prophylaxis (for steroids) with atovaquone 1500mg suspension, started 7/25/19. Avoiding bactrim in setting of acute renal failure.  - Ulcer prophylaxis, currently on famotidine. Recommend PPI  - Chemotherapeutic therapy deferred to Heme/Onc

## 2019-07-30 NOTE — PROGRESS NOTES
[facility] Hospital Medicine  Progress Note    Patient Name: Kendy Vital  MRN: [id number]  Patient Class: IP- Inpatient   Admission Date: 7/19/2019  Length of Stay: 11 days  Attending Physician: Lele Clay MD  Primary Care Provider: To Obtain [facility] Medicine Team: [facility] HOSP MED R Lele Clay MD    Subjective:     Principal Problem:Acute (diffuse) proliferative glomerulonephritis      HPI:  Ms. Vital is a 53 year old female with hypertension, anemia, and history of leiomyoma of the uterus who presents to ICU as a transfer from [facility] for evaluation of thrombocytopenia. Patient reports that prior to going to the hospital 3 days ago that she was experiencing symptoms of chills, feeling febrile, dry cough, shortness of breath and occasional nose bleeds for roughly 2 weeks. She also reports easy fatigueability and difficulty breathing when laying flat; currently sleeps with 2 pillows. Patient presented to hospital in [place] when her symptoms did not resolve. Initial work up showed a , worsening kidney function with creatinine of 2.9, and thrombocytopenia with platelets of 33k. In addition, chest x-ray showed interstitial opacities and follow up CT showed perihilar ground glass opacity. Patient was treated with rocephin and doxycycline as well as Bumex q12 due to her heart failure type symptoms. Lab work at the time showed WBC of 7.9 and a lactate of .8. In addition, she tested positive for strep A. Patient was transferred for further evaluation of her thrombocytopenia with concern for TTP and possible need for plasmapheresis.     At time of exam, patient is properly oriented to person time and places. She endorses headache, generalized myalgias, nausea and orthopnea. She denies SOB while sitting up, chest pain, abdominal pain, vomiting, and diarrhea. She has not felt febrile since 1 week prior to hospital stay.         Overview/Hospital Course:  Ms. Vital presented as transfer to ICU for suspected TTP. At that time, she had severe thrombocytopenia, renal failure, and bilateral infiltrates on chest CT concerning for PNA. She was stepped down when repeat CBC revealed normalizing platelets and hemolysis labs were unremarkable. Initially placed on vanc/zosyn/azithromycin for PNA, which has been de-escalated to rocephin/azithromycin for CAP. Her course has been complicated by ARF of unclear cause. 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   7/23 Nephrology biopsed  and have initiated on prednisone empirically.  7/24 Heme did BMBx      Interval History: Patient reports vomiting x 1, and states she has been having diarrhea    She self reports 5 bm overnight, Output not recorded.  She states she has been having diarrhea for a few days. Will discuss with RN to monitor if pt truly having watery diarrhea.  -Will stop miralax  Patient hasn't received in > 2days    Pt complaints also asking why tongue has whitish plaques. - will try Nystatin swish & swallow           Review of Systems   Constitutional: Negative for fatigue and fever.        Obese   Respiratory: Negative for shortness of breath.    Cardiovascular: Negative for chest pain.   Gastrointestinal: Negative for abdominal pain.     Objective:     Vital Signs (Most Recent):  Temp: 97.9 °F (36.6 °C) (07/30/19 1516)  Pulse: 72 (07/30/19 1516)  Resp: 18 (07/30/19 1516)  BP: (!) 149/76 (07/30/19 1516)  SpO2: 99 % (07/30/19 1516) Vital Signs (24h Range):  Temp:  [97.7 °F (36.5 °C)-99 °F (37.2 °C)] 97.9 °F (36.6 °C)  Pulse:  [65-79] 72  Resp:  [18-20] 18  SpO2:  [97 %-100 %] 99 %  BP: (149-190)/(73-83) 149/76     Weight: 103 kg (227 lb)  Body mass index is 36.64 kg/m².  No intake or output data in the 24 hours ending 07/30/19 2439   Physical Exam   Constitutional: She is oriented to person, place, and time. She appears well-nourished. No distress.   obese   Neck: No JVD present.    Cardiovascular: Normal rate, regular rhythm and normal heart sounds.   Pulmonary/Chest: Effort normal and breath sounds normal. No respiratory distress.   Abdominal: Soft. Bowel sounds are normal. She exhibits no distension.   Musculoskeletal: She exhibits no edema.   Neurological: She is alert and oriented to person, place, and time.   Psychiatric: She has a normal mood and affect. Her behavior is normal.       Significant Labs:   POCT Glucose:   Recent Labs   Lab 07/30/19  0731 07/30/19  1156 07/30/19  1713   POCTGLUCOSE 165* 229* 318*     All pertinent labs within the past 24 hours have been reviewed.    Significant Imaging: I have reviewed and interpreted all pertinent imaging results/findings within the past 24 hours.      Assessment/Plan:      * Acute (diffuse) proliferative glomerulonephritis  Likley due to heme malignancy.  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   baseline creatinine of 1.0 roughly 1 month ago. BUN/Cr peaked at 154/6.0 and improved the day after PLEX started.  RJ with bilateral medicorenal disease  UA with proteinuria, blood, no evidence of infection; pro:cr 5.97; C3 41, C4<3  positive for strep A as seen on outside hospital records  7/24 HD line placed for PLEX which was started q48h x3 treatements  - 7/19 initiated on empiric prednisone 60mg daily, then 7/26 to solumederol 1 gram qd x3 days, then back to 60 qd  - Nephrology following        Acute renal failure        Thrombocytopenia, unspecified  Plt on admission 41. Resolved after treatments.  Seen by Heme/onc.   HIT Ab negative  - monitor      Other specified anemias  Stable.  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  HBV is uncommon to cause this.  LPL/WM or even myeloma could potentially be the source of her cryoglobulins.      Elevated serum  immunoglobulin free light chains  BMBX: MONOCLONAL PLASMA CELL POPULATION WITH ATYPICAL LYMPHOID AGGREGATES.  R/o malignancy.  Differential diagnosis includes lymphoplasmacytic lymphoma/Waldenstrom's macroglobulinemia, multiple myeloma, and marginal zone lymphoma.   Heme/consulted  7/26 CT ordered to look for lymph nodes and bone survey found nothing big  -- allopurinol for elevated urate  - 7/26 rheum deferred rituximab choice  to heme.  Per rheum:IgG decreased at 185. They request allergy/immunology consult (spoke to them 7/29) regarding ability to start rituximab with decreased IgG.  -Pending Cryoglobulin subtype and final renal biopsy report prior to recs for CT surgery LN biopsy per Hematology team.       Pneumonia of both lower lobes  x-ray in [place] revealed interstitial infiltrates. Follow up chest CT showed perihilar ground glass opacity. Treated initially with rocephin and doxycycline  repeat chest x-ray with patchy airspace consolidations bilaterally R>L, edema vs PNA  blood cultures NGTD  - currently on broad spectrum antibiotics vanc, zosyn, azithro; held vanc and de-escalated zosyn to ceftriaxone on 7/20 and all abx stopped 7/24    Steroid-induced hyperglycemia  On solumederol 1g qd she got very hyperglycemic (>300) and hypertensive.  A1c 5.7.  Continue on Prednisone.   -- titrated meds      Essential hypertension  hypertensive at admission and per patient, has been undergoing multiple recent medication changes due to HTN as outpatient.  Suspect initially may have been exacerbated by NSAID use, now concern for multifactorial cause including renal failure with volume overload, steroid use, and inadequate dosing of home regimen.  Home regimen includes: lisinopril-HCTZ 20-12.5mg, clonidine 0.2mg bid, and hydralazine 25mg tid  - amlodipine changed for nifedpine; increase prn  - started coreg and up-titrating  - titrate BP meds    Chronic hepatitis B  Hepatology consulted to see if her HBV can be  "causing the cryoglubins and if she needs treatment.  - entecavir 0.5 mg every 72 hour (renal dose)  - f/u with liver as outpt    Per hepatology:  "Cryoglobulinemia usually associated with HCV, but rare association with Hep B. Presence of glomerulonephritis on preliminary renal biopsy. Overall, less likely that Hep B is related to this as ALT normal and viral load very low.  - Continue chronic Hep B treatment as patient is on immunosuppression, especially while on rituximab.  Continue entecavir 0.5mg q72hrs (renally dosed). Monitor renal function and dose-adjust accordingly.  - Patient will need HCC screening going forward given her ethnicity (Liver U/S and AFP every 6 months)"      Immunosuppression  Recs per ID     1. Serologies in process:          - Quantiferon Gold is pending.  If positive, please consult ID. If  Indeterminate, please draw T spot.  If T spot positive, please consult ID.            - Strongyloides IgG is pending. If positive, please consult ID to initiate treatment with Ivermectin.         2. If the patient is anticipated to remain on a prolonged course of high dose systemic steroids (> 20 mg prednisone), recommend PCP prophylaxis with Atovaquone 1500 mg PO once daily. Avoid Bactrim in setting of ARF.     3. Continue Entecavir per Hepatology      4. Immunizations recommended:              - Influenza annually [NOT IN STOCK INPATIENT]              - Tetanus booster today (given ) and again every 10 years              - Prevnar 13 today (given ) followed by Pneumovax 23 in 8 weeks with booster every 5 years.              - Hepatitis A vaccine today (given ) and in 6 months              - Shingrix (two doses at 0 and 2-6 months) [NOT AVAILABLE INPATIENT]      Grief reaction  Her brother recently .   service came by to talk to her.      History of renal vein thrombosis  Per heme research  "Pt also has a history of unprovoked thrombosis treated at [facility] " "4 years ago, none of which is visible in the "care everywhere" section of the chart. She states she had one episode of thrombosis in her left upper extremity that contained "many little clots" and required surgical thrombectomy. She also had a left renal vein thrombosis for which she received a stent. She states that for both episodes she was placed on a heparin drip, did outpatient lovenox and was continued on ASA 81 until now. She states that no one informed her of any findings suggesting a hypercoaguable state or if the clotting was provoked."  -Given her significant thromboembolic history, rheum 7/26 would like to work her up for antiphopholipid antibody syndrome. They ordered levels.   -She also had symptoms of sicca syndromes (dry eyes, dry mouth). Rheum will check for sjogrens syndrome (SSA/SSB)        VTE Risk Mitigation (From admission, onward)        Ordered     Place sequential compression device  Until discontinued      07/19/19 0533     IP VTE LOW RISK PATIENT  Once      07/19/19 0533                Lele Clay MD  Department of Hospital Medicine   [facility]  "

## 2019-07-30 NOTE — ASSESSMENT & PLAN NOTE
Shayla due to heme malignancy.  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   baseline creatinine of 1.0 roughly 1 month ago. BUN/Cr peaked at 154/6.0 and improved the day after PLEX started.  RJ with bilateral medicorenal disease  UA with proteinuria, blood, no evidence of infection; pro:cr 5.97; C3 41, C4<3  positive for strep A as seen on outside hospital records  7/24 HD line placed for PLEX which was started q48h x3 treatements  - 7/19 initiated on empiric prednisone 60mg daily, then 7/26 to solumederol 1 gram qd x3 days, then back to 60 qd  - Nephrology following

## 2019-07-30 NOTE — ASSESSMENT & PLAN NOTE
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  As per Dr. 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 She also has extensive ATN likely secondary to ischemia downstream the affected glomeruli. These features fit with the low C4 and the +RF. 90% of these cases are associated with HCV (but she is negative!) and only ~3% are associated with HBV. Despite her very high kappa/lambda ratio (K:L), she has no features of myeloma cast nephropathy or light chain deposition disease in her biopsy specimen. The high K:L could come from the cryoglobulinemic process (mono and polyclonal LC production). Of note, lymphoma can rarely cause cryoglobulinemic DPGN, so BM biopsy results will be important. No evidence of uric acid tubulopathy (tumor lysis syndrome) either. In retrospect, her NSAIDs use was a major confounding factor in this case."     Recommendations:  - Continue prednisone 60 mg qd for additional 2 weeks, then begin taper to 40 mg qd  - Continue Myfortic  - creatinine downtrending, 2.0 today  - Cryoglobulin lab sent f/u results  - Continue treatment with entecavir for Hep B   - Follow up on official Kidney Biopsy report  - BP control to goal <140/90  - underwent PLEX yesterday (3rd session)  - Limit protein diet given azotemia, no more than 60-80 g per day  - Continue sodium bicarb 1300 mg tid  - Will follow closely  "

## 2019-07-30 NOTE — CONSULTS
[facility]  Hematology/Oncology  Consult Note    Patient Name: Kendy Vital  MRN: [id number]  Admission Date: 7/19/2019  Hospital Length of Stay: 11 days  Code Status: Full Code   Attending Provider: Lele Clay MD  Consulting Provider: Gold Hill MD  Primary Care Physician: To Obtain Unable  Principal Problem:Hematologic malignancy    Consults  Subjective:     HPI:  Pt is a 54 yo female with HTN, iron deficiency anemia, history of multiple DVTs (including left renal vein thrombosis), chronic hepatitis B, uterine fibroids s/p myomectomy who was transferred here from [facility] in [place] after she was found to have pneumonia, LACI (cr 2.9) and thrombocytopenia. Hematology was consulted for thrombocytopenia. History was obtained from the patient, patient's cousin, patient's fiancee and chart review.     Pt endorses episodes of fevers, chills, fatigue, dry cough, SOB, and orthopnea for 1 week. She also states that 3 days ago she began to have neck and back muscle cramping and stiffness and decreased urinary output and decided to see her PCP, who diagnosed her with muscle spasms and the flu. A few days later she decided to go to the [facility] ED and was found to have Cr 2.9, increased from baseline Cr 1.0. She was also found to have , WBC 7.9, Plt 33, lactate 0.8. CXR showed bilateral interstitial infiltrates, and subsequent CT chest showed bilateral ground glass opacities. She was treated with rochephin, doxycycline, bumex and transferred here.     Here her labs were significant for Hgb 10, MCV 69, RDW 22.3, Plt 41. WBC 5.98. UA showed 3+ protein, 3+ glucose and 3+ blood but was not consistent with UTI. Cr 4.3 and  consistent with an LACI. Hemolytic labs were unremarkable.     Pt has a history of uncontrolled hypertension and states that she is currently taking lisinopril 20 mg-HCTZ 12.5 mg, hydralazine 25 mg TID, and clonidine 0.2 mg BID.  "She was treated at [facility] 3 weeks ago for chest pain and was admitted. We do not have those records but pt states they had her on multiple drips and did not have a cath procedure. Pt also has a history of unprovoked thrombosis treated at [facility] approximately 4 years ago, none of which is visible in the "care everywhere" section of the chart. She states she had one episode of thrombosis in her left upper extremity that contained "many little clots" and required surgical thrombectomy. She also had a left renal vein thrombosis for which she received a stent. She states that for both episodes she was placed on a heparin drip, did outpatient lovenox and was continued on ASA 81 until now. She states that no one informed her of any findings suggesting a hypercoaguable state or if the clotting was provoked.    Interval history: patient appears to be improving with creatinine trending down and urine output increasing. Oral thrush noted today for which primary has order nystatin. Some new scleral icterus and mild elevation of tbili. Hgb stable. If worsens tomorrow, may consider HA labs. Brief episode of blurred vision yesterday while walking, BP has been elevated. Unlikely related to cryoglobulinemia unless recurs      Medications:  Continuous Infusions:  Scheduled Meds:   allopurinol  100 mg Oral Daily    atovaquone  1,500 mg Oral Daily    carvedilol  12.5 mg Oral BID    cloNIDine  0.1 mg Oral BID    entecavir  0.5 mg Oral Q72H    famotidine  20 mg Oral Daily    hydrALAZINE  50 mg Oral Q8H    insulin aspart U-100  8 Units Subcutaneous TIDWM    insulin detemir U-100  5 Units Subcutaneous QHS    NIFEdipine  60 mg Oral Daily    nystatin  500,000 Units Oral QID    predniSONE  60 mg Oral Daily    sodium bicarbonate  650 mg Oral TID     PRN Meds:acetaminophen, albuterol-ipratropium, bisacodyl, glucagon (human recombinant), glucose, glucose, hydrALAZINE, insulin aspart U-100, ondansetron, oxyCODONE-acetaminophen, " promethazine (PHENERGAN) IVPB, ramelteon, sodium chloride 0.9%, DIPH,PERTUS (ADACEL),TETANUS PF VAC (ADULT)     Review of patient's allergies indicates:  No Known Allergies     Past Medical History:   Diagnosis Date    Renal vein thrombosis 2015    s/p embolectomy and lovenox for 9 mos    Uterine leiomyoma     s/p resection     No past surgical history on file.  Family History     None        Tobacco Use    Smoking status: Not on file   Substance and Sexual Activity    Alcohol use: Not on file    Drug use: Not on file    Sexual activity: Not on file       Review of Systems   Constitutional: Negative for activity change, appetite change, chills, diaphoresis, fatigue and fever.   HENT: Negative for congestion and tinnitus.    Eyes: Positive for discharge and visual disturbance (1 episode blurred vision yesterday while walking). Negative for photophobia.   Respiratory: Negative for apnea, cough, chest tightness and shortness of breath.    Cardiovascular: Negative for chest pain, palpitations and leg swelling.   Gastrointestinal: Positive for abdominal pain and diarrhea. Negative for abdominal distention, nausea and vomiting.   Genitourinary: Negative for dysuria and hematuria.        Urine output improving per patient   Musculoskeletal: Negative for arthralgias and back pain.   Skin: Negative for pallor, rash and wound.   Neurological: Negative for dizziness, light-headedness and headaches.   Psychiatric/Behavioral: Negative for agitation and confusion.     Objective:     Vital Signs (Most Recent):  Temp: 97.9 °F (36.6 °C) (07/30/19 1516)  Pulse: 72 (07/30/19 1516)  Resp: 18 (07/30/19 1516)  BP: (!) 149/76 (07/30/19 1516)  SpO2: 99 % (07/30/19 1516) Vital Signs (24h Range):  Temp:  [97.3 °F (36.3 °C)-99 °F (37.2 °C)] 97.9 °F (36.6 °C)  Pulse:  [65-79] 72  Resp:  [18-20] 18  SpO2:  [97 %-100 %] 99 %  BP: (145-190)/(71-83) 149/76     Weight: 103 kg (227 lb)  Body mass index is 36.64 kg/m².  Body surface area is  2.19 meters squared.    No intake or output data in the 24 hours ending 07/30/19 1526    Physical Exam   Constitutional: She is oriented to person, place, and time. She appears well-developed and well-nourished.   HENT:   Trialysis right neck. Oral thrush   Eyes: EOM are normal. Scleral icterus is present.   Neck: Normal range of motion.   Cardiovascular: Normal rate and regular rhythm.   Pulmonary/Chest: Effort normal. No respiratory distress.   Abdominal: Soft. She exhibits no distension. There is tenderness (mild epigastric TTP).   Musculoskeletal: She exhibits no edema.   Neurological: She is alert and oriented to person, place, and time.   Skin: Skin is warm and dry. No rash noted. No erythema.   Psychiatric: She has a normal mood and affect. Her behavior is normal.       Significant Labs:   CBC:   Recent Labs   Lab 07/29/19  0430 07/30/19  0654   WBC 7.88 12.03   HGB 8.0* 8.0*   HCT 24.6* 25.3*    264   , CMP:   Recent Labs   Lab 07/29/19  0430 07/30/19  0654   * 145   K 4.6 4.1   * 113*   CO2 25 24   * 169*   BUN 68* 48*   CREATININE 2.7* 2.0*   CALCIUM 9.5 9.3   PROT 5.0* 4.8*   ALBUMIN 4.0 4.3   BILITOT 0.7 1.2*   ALKPHOS 35* 25*   AST 20 26   ALT 31 35   ANIONGAP 8 8   EGFRNONAA 19.5* 28.1*   , Coagulation: No results for input(s): PT, INR, APTT in the last 48 hours., Haptoglobin: No results for input(s): HAPTOGLOBIN in the last 48 hours., LDH: No results for input(s): LDHCSF, BFSOURCE in the last 48 hours. and Reticulocytes: No results for input(s): RETIC in the last 48 hours.    Diagnostic Results:  I have reviewed all pertinent imaging results/findings within the past 24 hours.    Assessment/Plan:     * Hematologic malignancy  BM biopsy concerning for low-grade B-cell lymphoma with plasmacytic differentiation such as LPL Vs plasma cell dyscrasia (<10% plasma cells on BM biopsy). Ddx includes marginal zone lymphoma  - Patient with new renal failure, has normal IgG.  - Skeletal  survey (-) for lytic lesions  - CT neck/C/A/P done showed small mediastinal LN and precarinal LN 1.2x2cm  - CT surgery consulted for possible LN biopsy. Given expected low yield of LN biopsy (possibly because patient has been on steroids), would hold off scheduling LN biopsy until have subtype of cryoglobulins and final renal biopsy report. If type 1 cryoglobulins or light chain deposition on renal biopsy, would like to proceed with LN biopsy to confirm nature of hematologic malignancy. If not, will defer for now. Discussed the following with CT surgery.  - Pending cryoglobulins and serum viscosity; cold agglutinin negative  - Continue allopurinol  - Transfuse platelets if < 10k or if < 50k and actively bleeding  - Transfuse pRBCs if Hgb < 7    Elevated serum immunoglobulin free light chains  BM biopsy 7/23/19:  -- MILDLY HYPERCELLULAR MARROW (60%) WITH TRILINEAGE HEMATOPOIESIS.  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  -- ADEQUATE NUMBER OF MEGAKARYOCYTES.  -- STAINABLE IRON IS NOT IDENTIFIED.  -- SEE COMMENT.  COMMENT:  CBC data shows microcytic anemia and thrombocytopenia.  Flow cytometric analysis of bone marrow detects a kappa restricted plasma cell population that expresses , CD19, and bright CD38. CD20 and CD56 are negative. Polytypic B lymphocytes are detected. T lymphocytes are immunophenotypically unremarkable. Blasts gate is not increased. Flow differential: Lymphocytes 5.9%, Monocytes 4.6%, Granulocytes 85.8%, Blast 1.2%.  Immunohistochemical stains are performed on the biopsy core and clot section for greater sensitivity and further architectural assessment with adequate controls. -positive plasma cells comprise approximately 4-5% of thetotal cellularity. Immunoglobulin kappa and lambda light chains situ hybridization study reveals kappa light chainrestriction is some areas. The lymphoid aggregates are composed of mixed CD20-positive B-cells and CD3-positive  T-cells. B-cells are more numerous than T-cells    Iron deficiency anemia  *persistent microcytic anemia hgb 8, MCV 76, retic 6.4 (7/25)  -Iron studies on 7/25:  Iron 30 - 160 ug/dL 40    Transferrin 200 - 375 mg/dL 83Low     TIBC 250 - 450 ug/dL 123Low     Saturated Iron 20 - 50 % 33    -No stainable Iron on BM bx from 7/23, diagnostic of ALLYSON  -Consider starting IV iron inpatient    The following was staffed and discussed with supervising physician Dr. Overton. Please contact Hematology Consult Fellow with any additional questions.    Thank you for your consult. I will follow-up with patient. Please contact us if you have any additional questions.    Gold Hill MD  Hematology/Oncology  [facility]

## 2019-07-30 NOTE — ASSESSMENT & PLAN NOTE
On solumederol 1g qd she got very hyperglycemic (>300) and hypertensive.  A1c 5.7.  Continue on Prednisone.   -- titrated meds

## 2019-07-30 NOTE — SUBJECTIVE & OBJECTIVE
Interval History: Patient reports vomiting x 1, and states she has been having diarrhea    She self reports 5 bm overnight, Output not recorded.  She states she has been having diarrhea for a few days. Will discuss with RN to monitor if pt truly having watery diarrhea.  -Will stop miralax  Patient hasn't received in > 2days    Pt complaints also asking why tongue has whitish plaques. - will try Nystatin swish & swallow           Review of Systems   Constitutional: Negative for fatigue and fever.        Obese   Respiratory: Negative for shortness of breath.    Cardiovascular: Negative for chest pain.   Gastrointestinal: Negative for abdominal pain.     Objective:     Vital Signs (Most Recent):  Temp: 97.9 °F (36.6 °C) (07/30/19 1516)  Pulse: 72 (07/30/19 1516)  Resp: 18 (07/30/19 1516)  BP: (!) 149/76 (07/30/19 1516)  SpO2: 99 % (07/30/19 1516) Vital Signs (24h Range):  Temp:  [97.7 °F (36.5 °C)-99 °F (37.2 °C)] 97.9 °F (36.6 °C)  Pulse:  [65-79] 72  Resp:  [18-20] 18  SpO2:  [97 %-100 %] 99 %  BP: (149-190)/(73-83) 149/76     Weight: 103 kg (227 lb)  Body mass index is 36.64 kg/m².  No intake or output data in the 24 hours ending 07/30/19 1739   Physical Exam   Constitutional: She is oriented to person, place, and time. She appears well-nourished. No distress.   obese   Neck: No JVD present.   Cardiovascular: Normal rate, regular rhythm and normal heart sounds.   Pulmonary/Chest: Effort normal and breath sounds normal. No respiratory distress.   Abdominal: Soft. Bowel sounds are normal. She exhibits no distension.   Musculoskeletal: She exhibits no edema.   Neurological: She is alert and oriented to person, place, and time.   Psychiatric: She has a normal mood and affect. Her behavior is normal.       Significant Labs:   POCT Glucose:   Recent Labs   Lab 07/30/19  0731 07/30/19  1156 07/30/19  1713   POCTGLUCOSE 165* 229* 318*     All pertinent labs within the past 24 hours have been reviewed.    Significant  Imaging: I have reviewed and interpreted all pertinent imaging results/findings within the past 24 hours.

## 2019-07-30 NOTE — ASSESSMENT & PLAN NOTE
*persistent microcytic anemia hgb 8, MCV 76, retic 6.4 (7/25)  -Iron studies on 7/25:  Iron 30 - 160 ug/dL 40    Transferrin 200 - 375 mg/dL 83Low     TIBC 250 - 450 ug/dL 123Low     Saturated Iron 20 - 50 % 33    -No stainable Iron on BM bx from 7/23, diagnostic of ALLYSON  -Consider starting IV iron inpatient

## 2019-07-31 PROBLEM — R21 RASH OF GROIN: Status: ACTIVE | Noted: 2019-07-31

## 2019-07-31 LAB
ALBUMIN SERPL BCP-MCNC: 3.9 G/DL (ref 3.5–5.2)
ALBUMIN SERPL BCP-MCNC: 3.9 G/DL (ref 3.5–5.2)
ALP SERPL-CCNC: 41 U/L (ref 55–135)
ALP SERPL-CCNC: 41 U/L (ref 55–135)
ALT SERPL W/O P-5'-P-CCNC: 58 U/L (ref 10–44)
ALT SERPL W/O P-5'-P-CCNC: 58 U/L (ref 10–44)
ANION GAP SERPL CALC-SCNC: 9 MMOL/L (ref 8–16)
ANION GAP SERPL CALC-SCNC: 9 MMOL/L (ref 8–16)
AST SERPL-CCNC: 35 U/L (ref 10–40)
AST SERPL-CCNC: 35 U/L (ref 10–40)
BACTERIA #/AREA URNS AUTO: NORMAL /HPF
BASOPHILS # BLD AUTO: 0.01 K/UL (ref 0–0.2)
BASOPHILS # BLD AUTO: 0.01 K/UL (ref 0–0.2)
BASOPHILS NFR BLD: 0.1 % (ref 0–1.9)
BASOPHILS NFR BLD: 0.1 % (ref 0–1.9)
BILIRUB SERPL-MCNC: 1.1 MG/DL (ref 0.1–1)
BILIRUB SERPL-MCNC: 1.1 MG/DL (ref 0.1–1)
BILIRUB UR QL STRIP: NEGATIVE
BUN SERPL-MCNC: 39 MG/DL (ref 6–20)
BUN SERPL-MCNC: 39 MG/DL (ref 6–20)
CALCIUM SERPL-MCNC: 8.9 MG/DL (ref 8.7–10.5)
CALCIUM SERPL-MCNC: 8.9 MG/DL (ref 8.7–10.5)
CHLORIDE SERPL-SCNC: 106 MMOL/L (ref 95–110)
CHLORIDE SERPL-SCNC: 106 MMOL/L (ref 95–110)
CLARITY UR REFRACT.AUTO: CLEAR
CO2 SERPL-SCNC: 25 MMOL/L (ref 23–29)
CO2 SERPL-SCNC: 25 MMOL/L (ref 23–29)
COLOR UR AUTO: YELLOW
CREAT SERPL-MCNC: 2 MG/DL (ref 0.5–1.4)
CREAT SERPL-MCNC: 2 MG/DL (ref 0.5–1.4)
CREAT UR-MCNC: 47 MG/DL (ref 15–325)
CRYOGLOB SER QL: PRESENT
DIFFERENTIAL METHOD: ABNORMAL
DIFFERENTIAL METHOD: ABNORMAL
EOSINOPHIL # BLD AUTO: 0 K/UL (ref 0–0.5)
EOSINOPHIL # BLD AUTO: 0 K/UL (ref 0–0.5)
EOSINOPHIL NFR BLD: 0 % (ref 0–8)
EOSINOPHIL NFR BLD: 0 % (ref 0–8)
ERYTHROCYTE [DISTWIDTH] IN BLOOD BY AUTOMATED COUNT: 30 % (ref 11.5–14.5)
ERYTHROCYTE [DISTWIDTH] IN BLOOD BY AUTOMATED COUNT: 30 % (ref 11.5–14.5)
EST. GFR  (AFRICAN AMERICAN): 32.4 ML/MIN/1.73 M^2
EST. GFR  (AFRICAN AMERICAN): 32.4 ML/MIN/1.73 M^2
EST. GFR  (NON AFRICAN AMERICAN): 28.1 ML/MIN/1.73 M^2
EST. GFR  (NON AFRICAN AMERICAN): 28.1 ML/MIN/1.73 M^2
GLUCOSE SERPL-MCNC: 230 MG/DL (ref 70–110)
GLUCOSE SERPL-MCNC: 230 MG/DL (ref 70–110)
GLUCOSE UR QL STRIP: ABNORMAL
HCT VFR BLD AUTO: 27.6 % (ref 37–48.5)
HCT VFR BLD AUTO: 27.6 % (ref 37–48.5)
HGB BLD-MCNC: 8.8 G/DL (ref 12–16)
HGB BLD-MCNC: 8.8 G/DL (ref 12–16)
HGB UR QL STRIP: NEGATIVE
HYALINE CASTS UR QL AUTO: 0 /LPF
IMM GRANULOCYTES # BLD AUTO: 0.35 K/UL (ref 0–0.04)
IMM GRANULOCYTES # BLD AUTO: 0.35 K/UL (ref 0–0.04)
IMM GRANULOCYTES NFR BLD AUTO: 2.8 % (ref 0–0.5)
IMM GRANULOCYTES NFR BLD AUTO: 2.8 % (ref 0–0.5)
KETONES UR QL STRIP: NEGATIVE
LEUKOCYTE ESTERASE UR QL STRIP: NEGATIVE
LYMPHOCYTES # BLD AUTO: 0.9 K/UL (ref 1–4.8)
LYMPHOCYTES # BLD AUTO: 0.9 K/UL (ref 1–4.8)
LYMPHOCYTES NFR BLD: 7.5 % (ref 18–48)
LYMPHOCYTES NFR BLD: 7.5 % (ref 18–48)
MCH RBC QN AUTO: 24.6 PG (ref 27–31)
MCH RBC QN AUTO: 24.6 PG (ref 27–31)
MCHC RBC AUTO-ENTMCNC: 31.9 G/DL (ref 32–36)
MCHC RBC AUTO-ENTMCNC: 31.9 G/DL (ref 32–36)
MCV RBC AUTO: 77 FL (ref 82–98)
MCV RBC AUTO: 77 FL (ref 82–98)
MICROSCOPIC COMMENT: NORMAL
MONOCYTES # BLD AUTO: 0.5 K/UL (ref 0.3–1)
MONOCYTES # BLD AUTO: 0.5 K/UL (ref 0.3–1)
MONOCYTES NFR BLD: 4.2 % (ref 4–15)
MONOCYTES NFR BLD: 4.2 % (ref 4–15)
MYELOPEROXIDASE AB SER-ACNC: 2 UNITS
NEUTROPHILS # BLD AUTO: 10.6 K/UL (ref 1.8–7.7)
NEUTROPHILS # BLD AUTO: 10.6 K/UL (ref 1.8–7.7)
NEUTROPHILS NFR BLD: 85.4 % (ref 38–73)
NEUTROPHILS NFR BLD: 85.4 % (ref 38–73)
NITRITE UR QL STRIP: NEGATIVE
NRBC BLD-RTO: 0 /100 WBC
NRBC BLD-RTO: 0 /100 WBC
PH UR STRIP: 7 [PH] (ref 5–8)
PHOSPHATE SERPL-MCNC: 3.1 MG/DL (ref 2.7–4.5)
PLATELET # BLD AUTO: 266 K/UL (ref 150–350)
PLATELET # BLD AUTO: 266 K/UL (ref 150–350)
PMV BLD AUTO: 11.2 FL (ref 9.2–12.9)
PMV BLD AUTO: 11.2 FL (ref 9.2–12.9)
POCT GLUCOSE: 120 MG/DL (ref 70–110)
POCT GLUCOSE: 141 MG/DL (ref 70–110)
POCT GLUCOSE: 185 MG/DL (ref 70–110)
POCT GLUCOSE: 291 MG/DL (ref 70–110)
POCT GLUCOSE: 366 MG/DL (ref 70–110)
POTASSIUM SERPL-SCNC: 4.3 MMOL/L (ref 3.5–5.1)
POTASSIUM SERPL-SCNC: 4.3 MMOL/L (ref 3.5–5.1)
PROT SERPL-MCNC: 5 G/DL (ref 6–8.4)
PROT SERPL-MCNC: 5 G/DL (ref 6–8.4)
PROT UR QL STRIP: ABNORMAL
PROT UR-MCNC: 330 MG/DL (ref 0–15)
PROT/CREAT UR: 7.02 MG/G{CREAT} (ref 0–0.2)
RBC # BLD AUTO: 3.58 M/UL (ref 4–5.4)
RBC # BLD AUTO: 3.58 M/UL (ref 4–5.4)
RBC #/AREA URNS AUTO: 2 /HPF (ref 0–4)
SODIUM SERPL-SCNC: 140 MMOL/L (ref 136–145)
SODIUM SERPL-SCNC: 140 MMOL/L (ref 136–145)
SP GR UR STRIP: 1.01 (ref 1–1.03)
SQUAMOUS #/AREA URNS AUTO: 0 /HPF
URN SPEC COLLECT METH UR: ABNORMAL
VISC SER: 1.13 CP (ref 1.1–1.4)
WBC # BLD AUTO: 12.45 K/UL (ref 3.9–12.7)
WBC # BLD AUTO: 12.45 K/UL (ref 3.9–12.7)
WBC #/AREA URNS AUTO: 1 /HPF (ref 0–5)

## 2019-07-31 PROCEDURE — 84100 ASSAY OF PHOSPHORUS: CPT

## 2019-07-31 PROCEDURE — 81001 URINALYSIS AUTO W/SCOPE: CPT

## 2019-07-31 PROCEDURE — 25000003 PHARM REV CODE 250: Performed by: HOSPITALIST

## 2019-07-31 PROCEDURE — 99232 PR SUBSEQUENT HOSPITAL CARE,LEVL II: ICD-10-PCS | Mod: ,,, | Performed by: HOSPITALIST

## 2019-07-31 PROCEDURE — 85025 COMPLETE CBC W/AUTO DIFF WBC: CPT

## 2019-07-31 PROCEDURE — 83516 IMMUNOASSAY NONANTIBODY: CPT

## 2019-07-31 PROCEDURE — 84156 ASSAY OF PROTEIN URINE: CPT

## 2019-07-31 PROCEDURE — 99232 SBSQ HOSP IP/OBS MODERATE 35: CPT | Mod: ,,, | Performed by: HOSPITALIST

## 2019-07-31 PROCEDURE — 36415 COLL VENOUS BLD VENIPUNCTURE: CPT

## 2019-07-31 PROCEDURE — 63600175 PHARM REV CODE 636 W HCPCS: Performed by: HOSPITALIST

## 2019-07-31 PROCEDURE — 25000003 PHARM REV CODE 250: Performed by: STUDENT IN AN ORGANIZED HEALTH CARE EDUCATION/TRAINING PROGRAM

## 2019-07-31 PROCEDURE — 80053 COMPREHEN METABOLIC PANEL: CPT

## 2019-07-31 PROCEDURE — 11000001 HC ACUTE MED/SURG PRIVATE ROOM

## 2019-07-31 RX ADMIN — RAMELTEON 8 MG: 8 TABLET, FILM COATED ORAL at 10:07

## 2019-07-31 RX ADMIN — INSULIN ASPART 10 UNITS: 100 INJECTION, SOLUTION INTRAVENOUS; SUBCUTANEOUS at 06:07

## 2019-07-31 RX ADMIN — NIFEDIPINE 60 MG: 60 TABLET, FILM COATED, EXTENDED RELEASE ORAL at 09:07

## 2019-07-31 RX ADMIN — ATOVAQUONE 1500 MG: 750 SUSPENSION ORAL at 09:07

## 2019-07-31 RX ADMIN — CARVEDILOL 25 MG: 25 TABLET, FILM COATED ORAL at 09:07

## 2019-07-31 RX ADMIN — INSULIN ASPART 8 UNITS: 100 INJECTION, SOLUTION INTRAVENOUS; SUBCUTANEOUS at 06:07

## 2019-07-31 RX ADMIN — INSULIN ASPART 2 UNITS: 100 INJECTION, SOLUTION INTRAVENOUS; SUBCUTANEOUS at 01:07

## 2019-07-31 RX ADMIN — NYSTATIN 500000 UNITS: 100000 SUSPENSION ORAL at 10:07

## 2019-07-31 RX ADMIN — CLONIDINE HYDROCHLORIDE 0.1 MG: 0.1 TABLET ORAL at 09:07

## 2019-07-31 RX ADMIN — INSULIN ASPART 3 UNITS: 100 INJECTION, SOLUTION INTRAVENOUS; SUBCUTANEOUS at 10:07

## 2019-07-31 RX ADMIN — HYDRALAZINE HYDROCHLORIDE 75 MG: 50 TABLET ORAL at 09:07

## 2019-07-31 RX ADMIN — SODIUM BICARBONATE 650 MG TABLET 650 MG: at 09:07

## 2019-07-31 RX ADMIN — PREDNISONE 60 MG: 20 TABLET ORAL at 09:07

## 2019-07-31 RX ADMIN — SODIUM BICARBONATE 650 MG TABLET 650 MG: at 04:07

## 2019-07-31 RX ADMIN — MICONAZOLE NITRATE: 20 OINTMENT TOPICAL at 01:07

## 2019-07-31 RX ADMIN — PANTOPRAZOLE SODIUM 40 MG: 40 TABLET, DELAYED RELEASE ORAL at 05:07

## 2019-07-31 RX ADMIN — NYSTATIN 500000 UNITS: 100000 SUSPENSION ORAL at 09:07

## 2019-07-31 RX ADMIN — CLONIDINE HYDROCHLORIDE 0.1 MG: 0.1 TABLET ORAL at 10:07

## 2019-07-31 RX ADMIN — MICONAZOLE NITRATE: 20 OINTMENT TOPICAL at 10:07

## 2019-07-31 RX ADMIN — ENTECAVIR 0.5 MG: 0.5 TABLET ORAL at 09:07

## 2019-07-31 RX ADMIN — HYDRALAZINE HYDROCHLORIDE 75 MG: 50 TABLET ORAL at 01:07

## 2019-07-31 RX ADMIN — ALLOPURINOL 100 MG: 100 TABLET ORAL at 09:07

## 2019-07-31 RX ADMIN — INSULIN DETEMIR 5 UNITS: 100 INJECTION, SOLUTION SUBCUTANEOUS at 10:07

## 2019-07-31 RX ADMIN — INSULIN ASPART 8 UNITS: 100 INJECTION, SOLUTION INTRAVENOUS; SUBCUTANEOUS at 09:07

## 2019-07-31 RX ADMIN — HYDRALAZINE HYDROCHLORIDE 50 MG: 50 TABLET ORAL at 05:07

## 2019-07-31 RX ADMIN — CLONIDINE HYDROCHLORIDE 0.1 MG: 0.1 TABLET ORAL at 04:07

## 2019-07-31 RX ADMIN — INSULIN ASPART 8 UNITS: 100 INJECTION, SOLUTION INTRAVENOUS; SUBCUTANEOUS at 01:07

## 2019-07-31 NOTE — PHYSICIAN QUERY
"PT Name: Kendy Vital  MR #: [id number]    Physician Query Form - Heart  Condition Clarification     CDS/: Almaz Sun RN       Contact information: [email or web address]  This form is a permanent document in the medical record.     Query Date: July 31, 2019    By submitting this query, we are merely seeking further clarification of documentation. Please utilize your independent clinical judgment when addressing the question(s) below.    The medical record contains the following   Indicators     Supporting Clinical Findings Location in Medical Record   X BNP  Labs 7/19   X EF · The estimated ejection fraction is 60% TTE 7/19   X Radiology findings Heart size normal.  Ill-defined patchy airspace consolidation and/or edema at the lung bases more on the right side.  Underlying pleural effusion cannot be ruled out.  The lung apices are clear.      Right-sided central venous catheter in the SVC.  Mild cardiomegaly.  Opacification at the lung bases probably edema versus volume loss.  The left costophrenic angle is blunted as before.  The lung apices are clear.   CXR 7/19          CXR 7/24   X Echo Results · Concentric left ventricular remodeling.  · Normal left ventricular systolic function. The estimated ejection fraction is 60%  · Normal right ventricular systolic function.  · Indeterminate left ventricular diastolic function.  · Mild left atrial enlargement.  · The estimated PA systolic pressure is 47 mm Hg  XElevated central venous pressure (15 mm Hg).      TTE 7/19    "Ascites" documented     X "SOB" or "SMYTH" documented Patient had BNP of 526 from outside records. Patient had minor bilateral lower extremity swelling and reports easy fatigueability and orthopnea  H & P critical care    "Hypoxia" documented     X Heart Failure documented Ms. Vital is a 53 year old female with hypertension, anemia, and history of leiomyoma of the uterus who presents to ICU as a transfer from [facility] for evaluation of thrombocytopenia.  Patient was treated with rocephin and doxycycline as well as Bumex q12 due to her heart failure type symptoms. Hospital medicine 7/30    "Edema" documented     X Diuretics/Meds Coreg 12.5mg po 2 times daily  7/29, 7/30 MAR   X Treatment: - Repeat BNP, ordered 2d echo  - Can consider lasix based on lab results and chest x-ray result.  H & P critical care   X Other:  x-ray in [place] revealed interstitial infiltrates. Follow up chest CT showed perihilar ground glass opacity. Treated initially with rocephin and doxycycline  repeat chest x-ray with patchy airspace consolidations bilaterally R>L, edema vs PNA  blood cultures NGTD    · Elevated BNP: likely 2/2 hypervolemia in the setting of renal failure.  Check ECHO to r/o heart failure       [facility] medicine 7/30            Critical care 7/19   Heart failure (HF) can be acute, chronic or both. It is generally further specificed as systolic, diastolic, or combined. Lastly, it is important to identify an underlying etiology if known or suspected.     Common clues to acute exacerbation:  Rapidly progressive symptoms (w/in 2 weeks of presentation), using IV diuretics to treat, using supplemental O2, pulmonary edema on Xray, MI w/in 4 weeks, and/or BNP >500    Systolic Heart Failure: is defined as chart documentation of a left ventricular ejection fraction (LVEF) less than 40%     Diastolic Heart Failure: is defined as a left ventricular ejection fraction (LVEF) greater than 40%   +      Evidence of diastolic dysfunction on echocardiography OR    Right heart catheterization wedge pressure above 12 mm Hg OR    Left heart catheterization left ventricular end diastolic pressure 18 mm Hg or above.    References: *American Heart Association    The clinical guidelines noted below are only system guidelines, and do not replace the providers clinical judgment.     Provider, please specify the diagnosis " associated with above clinical findings    [   ] Acute on Chronic Diastolic Heart Failure -    Pre-existing diastoic HF diagnosis.  EF > 40%  and acute HF symptoms documented                                 [   ] Chronic Diastolic Heart Failure - Pre-existing diastolic HF diagnosis.  EF > 40%  without  acute HF symptoms documented  [   ] Other Type of Heart Failure (please specify type): _________________________  [x   ] Heart Failure Ruled Out  [   ] Other (please specify): ___________________________________  [  ] Clinically Undetermined                          Please document in your progress notes daily for the duration of treatment until resolved and include in your discharge summary.

## 2019-07-31 NOTE — PLAN OF CARE
Brief heme-onc follow up note    Pt complaining of some medial thigh skin breakdown as well as worsening of blurred vision. AM labs not drawn for reasons unclear. Still awaiting final results of cryoglobulin  subtype (per lab, likely tomorrow) and renal path (from 7/22).       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 Ddx includes marginal zone lymphoma  - Patient with new renal failure, has normal IgG.  - Skeletal survey (-) for lytic lesions  - CT neck/C/A/P done showed small mediastinal LN and precarinal LN 1.2x2cm  - CT surgery consulted for possible LN biopsy. Given expected low yield of LN biopsy (possibly because patient has been on steroids), would hold off scheduling LN biopsy until have subtype of cryoglobulins and final renal biopsy report. If type 1 cryoglobulins or light chain deposition on renal biopsy, would like to proceed with LN biopsy to confirm nature of hematologic malignancy. If not, will defer for now. Discussed the following with CT surgery.  - Pending cryoglobulins and serum viscosity; cold agglutinin negative  - consider optho consult/ vision eval for vision changes in the setting of cryoglobulinemia  - Continue allopurinol  - Transfuse platelets if < 10k or if < 50k and actively bleeding  - Transfuse pRBCs if Hgb < 7     Elevated serum immunoglobulin free light chains  BM biopsy 7/23/19:  -- MILDLY HYPERCELLULAR MARROW (60%) WITH TRILINEAGE HEMATOPOIESIS.  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  -- ADEQUATE NUMBER OF MEGAKARYOCYTES.  -- STAINABLE IRON IS NOT IDENTIFIED.  -- SEE COMMENT.  COMMENT:  CBC data shows microcytic anemia and thrombocytopenia.  Flow cytometric analysis of bone marrow detects a kappa restricted plasma cell population that expresses , CD19, and bright CD38. CD20 and CD56 are  negative. Polytypic B lymphocytes are detected. T lymphocytes are immunophenotypically unremarkable. Blasts gate is not increased. Flow differential: Lymphocytes 5.9%, Monocytes 4.6%, Granulocytes 85.8%, Blast 1.2%.  Immunohistochemical stains are performed on the biopsy core and clot section for greater sensitivity and further architectural assessment with adequate controls. -positive plasma cells comprise approximately 4-5% of thetotal cellularity. Immunoglobulin kappa and lambda light chains situ hybridization study reveals kappa light chainrestriction is some areas. The lymphoid aggregates are composed of mixed CD20-positive B-cells and CD3-positive T-cells. B-cells are more numerous than T-cells     Iron deficiency anemia  *persistent microcytic anemia hgb 8, MCV 76, retic 6.4 (7/25)  -Iron studies on 7/25:  Iron 30 - 160 ug/dL 40    Transferrin 200 - 375 mg/dL 83Low     TIBC 250 - 450 ug/dL 123Low     Saturated Iron 20 - 50 % 33    -No stainable Iron on BM bx from 7/23, diagnostic of ALLYSON  -Consider starting IV iron inpatient     The following was staffed and discussed with supervising physician Dr. Overton. Please contact Hematology Consult Fellow with any additional questions.

## 2019-08-01 PROBLEM — J18.9 PNEUMONIA OF BOTH LOWER LOBES: Status: RESOLVED | Noted: 2019-07-19 | Resolved: 2019-08-01

## 2019-08-01 PROBLEM — E79.0 ELEVATED URIC ACID IN BLOOD: Status: ACTIVE | Noted: 2019-08-01

## 2019-08-01 PROBLEM — R89.8 ABNORMAL BONE MARROW EXAMINATION: Status: ACTIVE | Noted: 2019-07-30

## 2019-08-01 LAB
ALBUMIN SERPL BCP-MCNC: 3.5 G/DL (ref 3.5–5.2)
ALP SERPL-CCNC: 47 U/L (ref 55–135)
ALT SERPL W/O P-5'-P-CCNC: 44 U/L (ref 10–44)
ANION GAP SERPL CALC-SCNC: 8 MMOL/L (ref 8–16)
AST SERPL-CCNC: 18 U/L (ref 10–40)
BASOPHILS # BLD AUTO: 0.01 K/UL (ref 0–0.2)
BASOPHILS NFR BLD: 0.1 % (ref 0–1.9)
BILIRUB SERPL-MCNC: 0.8 MG/DL (ref 0.1–1)
BUN SERPL-MCNC: 36 MG/DL (ref 6–20)
CALCIUM SERPL-MCNC: 8.4 MG/DL (ref 8.7–10.5)
CHLORIDE SERPL-SCNC: 107 MMOL/L (ref 95–110)
CO2 SERPL-SCNC: 25 MMOL/L (ref 23–29)
CREAT SERPL-MCNC: 1.7 MG/DL (ref 0.5–1.4)
DIFFERENTIAL METHOD: ABNORMAL
EOSINOPHIL # BLD AUTO: 0 K/UL (ref 0–0.5)
EOSINOPHIL NFR BLD: 0.2 % (ref 0–8)
ERYTHROCYTE [DISTWIDTH] IN BLOOD BY AUTOMATED COUNT: 29 % (ref 11.5–14.5)
EST. GFR  (AFRICAN AMERICAN): 39.4 ML/MIN/1.73 M^2
EST. GFR  (NON AFRICAN AMERICAN): 34.2 ML/MIN/1.73 M^2
GLUCOSE SERPL-MCNC: 133 MG/DL (ref 70–110)
HCT VFR BLD AUTO: 24.8 % (ref 37–48.5)
HGB BLD-MCNC: 8 G/DL (ref 12–16)
IMM GRANULOCYTES # BLD AUTO: 0.3 K/UL (ref 0–0.04)
IMM GRANULOCYTES NFR BLD AUTO: 2.5 % (ref 0–0.5)
LYMPHOCYTES # BLD AUTO: 1.9 K/UL (ref 1–4.8)
LYMPHOCYTES NFR BLD: 15.4 % (ref 18–48)
MCH RBC QN AUTO: 24.6 PG (ref 27–31)
MCHC RBC AUTO-ENTMCNC: 32.3 G/DL (ref 32–36)
MCV RBC AUTO: 76 FL (ref 82–98)
MONOCYTES # BLD AUTO: 1.2 K/UL (ref 0.3–1)
MONOCYTES NFR BLD: 9.8 % (ref 4–15)
NEUTROPHILS # BLD AUTO: 8.8 K/UL (ref 1.8–7.7)
NEUTROPHILS NFR BLD: 72 % (ref 38–73)
NRBC BLD-RTO: 0 /100 WBC
PHOSPHATE SERPL-MCNC: 3.4 MG/DL (ref 2.7–4.5)
PLATELET # BLD AUTO: 222 K/UL (ref 150–350)
PMV BLD AUTO: 10.2 FL (ref 9.2–12.9)
POCT GLUCOSE: 109 MG/DL (ref 70–110)
POCT GLUCOSE: 284 MG/DL (ref 70–110)
POCT GLUCOSE: 350 MG/DL (ref 70–110)
POCT GLUCOSE: 435 MG/DL (ref 70–110)
POTASSIUM SERPL-SCNC: 3.6 MMOL/L (ref 3.5–5.1)
PROT SERPL-MCNC: 4.7 G/DL (ref 6–8.4)
RBC # BLD AUTO: 3.25 M/UL (ref 4–5.4)
SODIUM SERPL-SCNC: 140 MMOL/L (ref 136–145)
WBC # BLD AUTO: 12.22 K/UL (ref 3.9–12.7)

## 2019-08-01 PROCEDURE — 63600175 PHARM REV CODE 636 W HCPCS: Performed by: HOSPITALIST

## 2019-08-01 PROCEDURE — 99231 SBSQ HOSP IP/OBS SF/LOW 25: CPT | Mod: ,,, | Performed by: HOSPITALIST

## 2019-08-01 PROCEDURE — 80053 COMPREHEN METABOLIC PANEL: CPT

## 2019-08-01 PROCEDURE — 99233 PR SUBSEQUENT HOSPITAL CARE,LEVL III: ICD-10-PCS | Mod: ,,, | Performed by: INTERNAL MEDICINE

## 2019-08-01 PROCEDURE — 11000001 HC ACUTE MED/SURG PRIVATE ROOM

## 2019-08-01 PROCEDURE — 25000003 PHARM REV CODE 250: Performed by: HOSPITALIST

## 2019-08-01 PROCEDURE — 25000003 PHARM REV CODE 250: Performed by: STUDENT IN AN ORGANIZED HEALTH CARE EDUCATION/TRAINING PROGRAM

## 2019-08-01 PROCEDURE — 84100 ASSAY OF PHOSPHORUS: CPT

## 2019-08-01 PROCEDURE — 99231 PR SUBSEQUENT HOSPITAL CARE,LEVL I: ICD-10-PCS | Mod: ,,, | Performed by: HOSPITALIST

## 2019-08-01 PROCEDURE — 99233 SBSQ HOSP IP/OBS HIGH 50: CPT | Mod: ,,, | Performed by: INTERNAL MEDICINE

## 2019-08-01 PROCEDURE — 85025 COMPLETE CBC W/AUTO DIFF WBC: CPT

## 2019-08-01 RX ORDER — ENTECAVIR 0.5 MG/1
0.5 TABLET, FILM COATED ORAL
Status: DISCONTINUED | OUTPATIENT
Start: 2019-08-02 | End: 2019-08-03

## 2019-08-01 RX ADMIN — HYDRALAZINE HYDROCHLORIDE 75 MG: 50 TABLET ORAL at 09:08

## 2019-08-01 RX ADMIN — OXYCODONE HYDROCHLORIDE AND ACETAMINOPHEN 1 TABLET: 5; 325 TABLET ORAL at 01:08

## 2019-08-01 RX ADMIN — CARVEDILOL 25 MG: 25 TABLET, FILM COATED ORAL at 09:08

## 2019-08-01 RX ADMIN — MICONAZOLE NITRATE: 20 OINTMENT TOPICAL at 09:08

## 2019-08-01 RX ADMIN — INSULIN DETEMIR 5 UNITS: 100 INJECTION, SOLUTION SUBCUTANEOUS at 09:08

## 2019-08-01 RX ADMIN — ALLOPURINOL 100 MG: 100 TABLET ORAL at 09:08

## 2019-08-01 RX ADMIN — NYSTATIN 500000 UNITS: 100000 SUSPENSION ORAL at 09:08

## 2019-08-01 RX ADMIN — HYPROMELLOSE 2910 1 DROP: 5 SOLUTION OPHTHALMIC at 03:08

## 2019-08-01 RX ADMIN — RAMELTEON 8 MG: 8 TABLET, FILM COATED ORAL at 09:08

## 2019-08-01 RX ADMIN — NYSTATIN 500000 UNITS: 100000 SUSPENSION ORAL at 05:08

## 2019-08-01 RX ADMIN — NIFEDIPINE 90 MG: 60 TABLET, FILM COATED, EXTENDED RELEASE ORAL at 09:08

## 2019-08-01 RX ADMIN — INSULIN ASPART 6 UNITS: 100 INJECTION, SOLUTION INTRAVENOUS; SUBCUTANEOUS at 12:08

## 2019-08-01 RX ADMIN — NYSTATIN 500000 UNITS: 100000 SUSPENSION ORAL at 12:08

## 2019-08-01 RX ADMIN — SODIUM BICARBONATE 650 MG TABLET 650 MG: at 01:08

## 2019-08-01 RX ADMIN — CLONIDINE HYDROCHLORIDE 0.1 MG: 0.1 TABLET ORAL at 01:08

## 2019-08-01 RX ADMIN — INSULIN ASPART 4 UNITS: 100 INJECTION, SOLUTION INTRAVENOUS; SUBCUTANEOUS at 09:08

## 2019-08-01 RX ADMIN — SODIUM BICARBONATE 650 MG TABLET 650 MG: at 09:08

## 2019-08-01 RX ADMIN — HYPROMELLOSE 2910 1 DROP: 5 SOLUTION OPHTHALMIC at 09:08

## 2019-08-01 RX ADMIN — ATOVAQUONE 1500 MG: 750 SUSPENSION ORAL at 09:08

## 2019-08-01 RX ADMIN — HYDRALAZINE HYDROCHLORIDE 75 MG: 50 TABLET ORAL at 01:08

## 2019-08-01 RX ADMIN — PREDNISONE 60 MG: 20 TABLET ORAL at 09:08

## 2019-08-01 RX ADMIN — CLONIDINE HYDROCHLORIDE 0.1 MG: 0.1 TABLET ORAL at 09:08

## 2019-08-01 RX ADMIN — INSULIN ASPART 8 UNITS: 100 INJECTION, SOLUTION INTRAVENOUS; SUBCUTANEOUS at 12:08

## 2019-08-01 RX ADMIN — INSULIN ASPART 8 UNITS: 100 INJECTION, SOLUTION INTRAVENOUS; SUBCUTANEOUS at 05:08

## 2019-08-01 NOTE — ASSESSMENT & PLAN NOTE
x-ray in [place] revealed interstitial infiltrates. Follow up chest CT showed perihilar ground glass opacity. Treated initially with rocephin and doxycycline  repeat chest x-ray with patchy airspace consolidations bilaterally R>L, edema vs PNA  blood cultures NGTD  - is now s/p  broad spectrum antibiotics vanc, zosyn, azithro; held vanc and de-escalated zosyn to ceftriaxone on 7/20 and all abx stopped 7/24

## 2019-08-01 NOTE — ASSESSMENT & PLAN NOTE
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  As per Dr. 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 She also has extensive ATN likely secondary to ischemia downstream the affected glomeruli. These features fit with the low C4 and the +RF. 90% of these cases are associated with HCV (but she is negative!) and only ~3% are associated with HBV. Despite her very high kappa/lambda ratio (K:L), she has no features of myeloma cast nephropathy or light chain deposition disease in her biopsy specimen. The high K:L could come from the cryoglobulinemic process (mono and polyclonal LC production). Of note, lymphoma can rarely cause cryoglobulinemic DPGN, so BM biopsy results will be important. No evidence of uric acid tubulopathy (tumor lysis syndrome) either. In retrospect, her NSAIDs use was a major confounding factor in this case."     Recommendations:  - Continue prednisone 60 mg qd for additional 2 weeks, then begin taper to 40 mg qd  - Continue Myfortic  - creatinine downtrending, 2.0 today  - Cryoglobulin lab sent f/u results  - Continue treatment with entecavir for Hep B   - Follow up on official Kidney Biopsy report  - BP control to goal <140/90  - underwent PLEX 07/29 (3rd session)  - Limit protein diet given azotemia, no more than 60-80 g per day  - Continue sodium bicarb 1300 mg tid  - Will follow closely  "

## 2019-08-01 NOTE — ASSESSMENT & PLAN NOTE
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  As per Dr. 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 She also has extensive ATN likely secondary to ischemia downstream the affected glomeruli. These features fit with the low C4 and the +RF. 90% of these cases are associated with HCV (but she is negative!) and only ~3% are associated with HBV. Despite her very high kappa/lambda ratio (K:L), she has no features of myeloma cast nephropathy or light chain deposition disease in her biopsy specimen. The high K:L could come from the cryoglobulinemic process (mono and polyclonal LC production). Of note, lymphoma can rarely cause cryoglobulinemic DPGN, so BM biopsy results will be important. No evidence of uric acid tubulopathy (tumor lysis syndrome) either. In retrospect, her NSAIDs use was a major confounding factor in this case."     Recommendations:  - Continue prednisone 60 mg daily   - creatinine downtrending, 1.7 today  - Cryoglobulin lab sent f/u results  - Continue treatment with entecavir for Hep B   - Follow up on official Kidney Biopsy report   - BP control to goal <140/90  - underwent PLEX 07/29 (3rd session)  - Limit protein diet given azotemia, no more than 60-80 g per day  - Continue sodium bicarb 1300 mg tid  - Will follow closely  "

## 2019-08-01 NOTE — PLAN OF CARE
Problem: Adult Inpatient Plan of Care  Goal: Plan of Care Review  Outcome: Ongoing (interventions implemented as appropriate)  AOx4. Lying down in bed resting quietly. No c/o pain or discomfort. Able to make needs known. Ambulates ad erica. Performs ADLs per self. NPO at Midnight. Isolation contact in place. NADN at this time.

## 2019-08-01 NOTE — ASSESSMENT & PLAN NOTE
BM biopsy results noted and current suspicion for lymphoplasmacytic lymphoma vs Waldenstrom's, vs multiple myeloma. Given results, will let Heme/Onc decide which chemotherapeutic agent they feel is best.       - Continue 60mg prednisone, possibly decrease to 40 mg qd pending staff determination  - Spoke with Allergy/Immunology, recommend to f/u in clinic to re-test IgG levels and determine route of IgG injection if deemed necessary at that time.  - Continue PCP prophylaxis (for steroids) with atovaquone 1500mg suspension, started 7/25/19. Avoiding bactrim in setting of acute renal failure.  - Continue ulcer prophylaxis with pantoprazole.  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 Will await final renal bx results. Renal function continuing to improve (Cr 1.7 08/01). Continue to f/u with hepatology for HBV treatment.   - Follow up in rheumatology clinic within ~1 month to determine if further treatment with rituximab will be necessary. Will perform DEXA scan at that time.   - Recommend 1000 U Calcium and 1000 U Vit D3 daily.  - Heme/Onc recommend delaying LN biopsy at this time due to likely decreased diagnostic yield in light of steroid treatment and due to cryoglobulins being type 3 vs type 1.       Rheumatology will sign off. Thank you for your consult. Staff attestation to follow

## 2019-08-01 NOTE — PROGRESS NOTES
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 Hematology was consulted for thrombocytopenia. History was obtained from the patient, patient's cousin, patient's fiancee and chart review.     Pt endorses episodes of fevers, chills, fatigue, dry cough, SOB, and orthopnea for 1 week. She also states that 3 days ago she began to have neck and back muscle cramping and stiffness and decreased urinary output and decided to see her PCP, who diagnosed her with muscle spasms and the flu. A few days later she decided to go to the [facility] ED and was found to have Cr 2.9, increased from baseline Cr 1.0. She was also found to have , WBC 7.9, Plt 33, lactate 0.8. CXR showed bilateral interstitial infiltrates, and subsequent CT chest showed bilateral ground glass opacities. She was treated with rochephin, doxycycline, bumex and transferred here.     Here her labs were significant for Hgb 10, MCV 69, RDW 22.3, Plt 41. WBC 5.98. UA showed 3+ protein, 3+ glucose and 3+ blood but was not consistent with UTI. Cr 4.3 and  consistent with an LACI. Hemolytic labs were unremarkable.     Pt has a history of uncontrolled hypertension and states that she is currently taking lisinopril 20 mg-HCTZ 12.5 mg, hydralazine 25 mg TID, and clonidine 0.2 mg BID. She was treated at [facility] 3 weeks ago for chest pain and was  "admitted. We do not have those records but pt states they had her on multiple drips and did not have a cath procedure. Pt also has a history of unprovoked thrombosis treated at [facility] approximately 4 years ago, none of which is visible in the "care everywhere" section of the chart. She states she had one episode of thrombosis in her left upper extremity that contained "many little clots" and required surgical thrombectomy. She also had a left renal vein thrombosis for which she received a stent. She states that for both episodes she was placed on a heparin drip, did outpatient lovenox and was continued on ASA 81 until now. She states that no one informed her of any findings suggesting a hypercoaguable state or if the clotting was provoked.    Interval History: Patient seen and examined at bedside, no acute events overnight, creatinine stable.     Review of patient's allergies indicates:  No Known Allergies  Current Facility-Administered Medications   Medication Frequency    acetaminophen tablet 650 mg Q6H PRN    albuterol-ipratropium 2.5 mg-0.5 mg/3 mL nebulizer solution 3 mL Q6H PRN    allopurinol tablet 100 mg Daily    atovaquone suspension 1,500 mg Daily    bisacodyl suppository 10 mg Daily PRN    carvedilol tablet 25 mg BID    cloNIDine tablet 0.1 mg TID    entecavir tablet 0.5 mg Q72H    glucagon (human recombinant) injection 1 mg PRN    glucose chewable tablet 16 g PRN    glucose chewable tablet 24 g PRN    hydrALAZINE tablet 25 mg Q6H PRN    hydrALAZINE tablet 75 mg Q8H    insulin aspart U-100 pen 1-10 Units QID (AC + HS) PRN    insulin aspart U-100 pen 8 Units TIDWM    insulin detemir U-100 pen 5 Units QHS    miconazole nitrate 2% ointment BID    [START ON 8/1/2019] NIFEdipine 24 hr tablet 90 mg Daily    nystatin 100,000 unit/mL suspension 500,000 Units QID    ondansetron disintegrating tablet 8 mg Q8H PRN    oxyCODONE-acetaminophen 5-325 mg per tablet 1 tablet Q8H PRN    " pantoprazole EC tablet 40 mg Before breakfast    predniSONE tablet 60 mg Daily    promethazine (PHENERGAN) 6.25 mg in dextrose 5 % 50 mL IVPB Q6H PRN    ramelteon tablet 8 mg Nightly PRN    sodium bicarbonate tablet 650 mg TID    sodium chloride 0.9% flush 10 mL PRN    Tdap vaccine injection 0.5 mL vaccine x 1 dose       Objective:     Vital Signs (Most Recent):  Temp: 98.4 °F (36.9 °C) (07/31/19 1629)  Pulse: 74 (07/31/19 1629)  Resp: 18 (07/31/19 1629)  BP: (!) 145/68 (07/31/19 1629)  SpO2: 96 % (07/31/19 1629)  O2 Device (Oxygen Therapy): room air (07/30/19 1516) Vital Signs (24h Range):  Temp:  [97.4 °F (36.3 °C)-98.4 °F (36.9 °C)] 98.4 °F (36.9 °C)  Pulse:  [64-74] 74  Resp:  [16-18] 18  SpO2:  [96 %-100 %] 96 %  BP: (145-185)/(68-88) 145/68     Weight: 103 kg (227 lb) (07/29/19 1250)  Body mass index is 36.64 kg/m².  Body surface area is 2.19 meters squared.    No intake/output data recorded.    Physical Exam   Constitutional: She is oriented to person, place, and time. She appears well-nourished. No distress.   obese   Neck: No JVD present.   Cardiovascular: Normal rate, regular rhythm and normal heart sounds.   Pulmonary/Chest: Effort normal and breath sounds normal. No respiratory distress.   Abdominal: Soft. Bowel sounds are normal. She exhibits no distension.   Musculoskeletal: She exhibits no edema.   Neurological: She is alert and oriented to person, place, and time.   Psychiatric: She has a normal mood and affect. Her behavior is normal.       Significant Labs:  CBC:   Recent Labs   Lab 07/31/19  1414   WBC 12.45  12.45   RBC 3.58*  3.58*   HGB 8.8*  8.8*   HCT 27.6*  27.6*     266   MCV 77*  77*   MCH 24.6*  24.6*   MCHC 31.9*  31.9*     CMP:   Recent Labs   Lab 07/31/19  1414   *  230*   CALCIUM 8.9  8.9   ALBUMIN 3.9  3.9   PROT 5.0*  5.0*     140   K 4.3  4.3   CO2 25  25     106   BUN 39*  39*   CREATININE 2.0*  2.0*   ALKPHOS 41*  41*   ALT 58*   "58*   AST 35  35   BILITOT 1.1*  1.1*     All labs within the past 24 hours have been reviewed.     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  As per Dr. 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 She also has extensive ATN likely secondary to ischemia downstream the affected glomeruli. These features fit with the low C4 and the +RF. 90% of these cases are associated with HCV (but she is negative!) and only ~3% are associated with HBV. Despite her very high kappa/lambda ratio (K:L), she has no features of myeloma cast nephropathy or light chain deposition disease in her biopsy specimen. The high K:L could come from the cryoglobulinemic process (mono and polyclonal LC production). Of note, lymphoma can rarely cause cryoglobulinemic DPGN, so BM biopsy results will be important. No evidence of uric acid tubulopathy (tumor lysis syndrome) either. In retrospect, her NSAIDs use was a major confounding factor in this case."     Recommendations:  - Continue prednisone 60 mg   - creatinine downtrending, 2.0 today  - LN bx on 08/01 if remainder of cryoglobulin and thrombophilia labs result per CT surgery.   - Will follow up remaining cryoglobulin and thrombophilia labs, MPO, PR3  - Cryoglobulin lab sent f/u results  - Continue treatment with entecavir for Hep B   - Follow up on official Kidney Biopsy report, still pending  - BP control to goal <140/90  - underwent PLEX 07/29 (3rd session)  - Limit protein diet given azotemia, no more than 60-80 g per day  - f/u Rheum and Heme-Onc recommendations  - Continue sodium bicarb 1300 mg tid  - Will follow " closely        Thank you for your consult. I will follow-up with patient. Please contact us if you have any additional questions.    Mustapha Reid MD  Nephrology  [facility]

## 2019-08-01 NOTE — ASSESSMENT & PLAN NOTE
Shayla due to heme malignancy.  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 - Awaiting final renal biopsy report  baseline creatinine of 1.0 roughly 1 month ago. BUN/Cr peaked at 154/6.0 and improved the day after PLEX started.  RJ with bilateral medicorenal disease  UA with proteinuria, blood, no evidence of infection; pro:cr 5.97; C3 41, C4<3  positive for strep A as seen on outside hospital records  7/24 HD line placed for PLEX which was started q48h x3 treatements  - 7/19 initiated on empiric prednisone 60mg daily, then 7/26 to solumederol 1 gram qd x3 days, then back to 60 qd  - Nephrology following

## 2019-08-01 NOTE — SUBJECTIVE & OBJECTIVE
Interval History:   -denies BM today, and none since yesterday morning.  Will d/c C.diff order and contact precautions this afternoon    -Entecavir dose frequency adjusted per pharmD as renal function/CrCl improving    -Patient w/o acute complaints  Seen by ophthalmology for c/o of blurred vision recently but noted to have normal visual acuity    Pts daughter/grandchildren at bedside, answered questions    Review of Systems   Constitutional: Negative for fatigue and fever.   Respiratory: Negative for shortness of breath.    Cardiovascular: Negative for chest pain.   Gastrointestinal: Negative for abdominal pain.     Objective:     Vital Signs (Most Recent):  Temp: 97.2 °F (36.2 °C) (08/01/19 1156)  Pulse: 67 (08/01/19 1156)  Resp: 18 (08/01/19 1156)  BP: 137/75 (08/01/19 1156)  SpO2: 98 % (08/01/19 1156) Vital Signs (24h Range):  Temp:  [97.2 °F (36.2 °C)-98.4 °F (36.9 °C)] 97.2 °F (36.2 °C)  Pulse:  [65-74] 67  Resp:  [18-20] 18  SpO2:  [96 %-98 %] 98 %  BP: (137-174)/(68-82) 137/75     Weight: 103 kg (227 lb)  Body mass index is 36.64 kg/m².    Intake/Output Summary (Last 24 hours) at 8/1/2019 1522  Last data filed at 8/1/2019 1500  Gross per 24 hour   Intake 750 ml   Output 750 ml   Net 0 ml      Physical Exam   Constitutional: She is oriented to person, place, and time. She appears well-nourished. No distress.   obese   Neck: No JVD present.   Cardiovascular: Normal rate, regular rhythm and normal heart sounds.   Pulmonary/Chest: Effort normal and breath sounds normal. No respiratory distress.   Abdominal: Soft. Bowel sounds are normal. She exhibits no distension.   Musculoskeletal: She exhibits no edema.   Neurological: She is alert and oriented to person, place, and time.   Psychiatric: She has a normal mood and affect. Her behavior is normal.       Significant Labs:   All pertinent labs within the past 24 hours have been reviewed.     Significant Imaging: I have reviewed and interpreted all pertinent imaging  results/findings within the past 24 hours.

## 2019-08-01 NOTE — PROGRESS NOTES
[facility] Hospital Medicine  Progress Note    Patient Name: Kendy Vital  MRN: [id number]  Patient Class: IP- Inpatient   Admission Date: 7/19/2019  Length of Stay: 13 days  Attending Physician: Lele Clay MD  Primary Care Provider: To Obtain [facility] Medicine Team: [facility] HOSP MED R Lele Clay MD    Subjective:     Principal Problem:Acute (diffuse) proliferative glomerulonephritis      HPI:  Ms. Vital is a 53 year old female with hypertension, anemia, and history of leiomyoma of the uterus who presents to ICU as a transfer from [facility] for evaluation of thrombocytopenia. Patient reports that prior to going to the hospital 3 days ago that she was experiencing symptoms of chills, feeling febrile, dry cough, shortness of breath and occasional nose bleeds for roughly 2 weeks. She also reports easy fatigueability and difficulty breathing when laying flat; currently sleeps with 2 pillows. Patient presented to hospital in [place] when her symptoms did not resolve. Initial work up showed a , worsening kidney function with creatinine of 2.9, and thrombocytopenia with platelets of 33k. In addition, chest x-ray showed interstitial opacities and follow up CT showed perihilar ground glass opacity. Patient was treated with rocephin and doxycycline as well as Bumex q12 due to her heart failure type symptoms. Lab work at the time showed WBC of 7.9 and a lactate of .8. In addition, she tested positive for strep A. Patient was transferred for further evaluation of her thrombocytopenia with concern for TTP and possible need for plasmapheresis.     At time of exam, patient is properly oriented to person time and places. She endorses headache, generalized myalgias, nausea and orthopnea. She denies SOB while sitting up, chest pain, abdominal pain, vomiting, and diarrhea. She has not felt febrile since 1 week prior to hospital stay.         Overview/Hospital Course:  Ms. Vital presented as transfer to ICU for suspected TTP. At that time, she had severe thrombocytopenia, renal failure, and bilateral infiltrates on chest CT concerning for PNA. She was stepped down when repeat CBC revealed normalizing platelets and hemolysis labs were unremarkable. Initially placed on vanc/zosyn/azithromycin for PNA, which has been de-escalated to rocephin/azithromycin for CAP. Her course has been complicated by ARF of unclear cause. 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   7/23 Nephrology biopsed  and have initiated on prednisone empirically.  7/24 Heme did BMBx        Interval History:   -denies BM today, and none since yesterday morning.  Will d/c C.diff order and contact precautions this afternoon    -Entecavir dose frequency adjusted per pharmD as renal function/CrCl improving    -Patient w/o acute complaints  Seen by ophthalmology for c/o of blurred vision recently but noted to have normal visual acuity    Pts daughter/grandchildren at bedside, answered questions    Review of Systems   Constitutional: Negative for fatigue and fever.   Respiratory: Negative for shortness of breath.    Cardiovascular: Negative for chest pain.   Gastrointestinal: Negative for abdominal pain.     Objective:     Vital Signs (Most Recent):  Temp: 97.2 °F (36.2 °C) (08/01/19 1156)  Pulse: 67 (08/01/19 1156)  Resp: 18 (08/01/19 1156)  BP: 137/75 (08/01/19 1156)  SpO2: 98 % (08/01/19 1156) Vital Signs (24h Range):  Temp:  [97.2 °F (36.2 °C)-98.4 °F (36.9 °C)] 97.2 °F (36.2 °C)  Pulse:  [65-74] 67  Resp:  [18-20] 18  SpO2:  [96 %-98 %] 98 %  BP: (137-174)/(68-82) 137/75     Weight: 103 kg (227 lb)  Body mass index is 36.64 kg/m².    Intake/Output Summary (Last 24 hours) at 8/1/2019 1522  Last data filed at 8/1/2019 1500  Gross per 24 hour   Intake 750 ml   Output 750 ml   Net 0 ml      Physical Exam   Constitutional: She is oriented to person, place, and time. She appears  well-nourished. No distress.   obese   Neck: No JVD present.   Cardiovascular: Normal rate, regular rhythm and normal heart sounds.   Pulmonary/Chest: Effort normal and breath sounds normal. No respiratory distress.   Abdominal: Soft. Bowel sounds are normal. She exhibits no distension.   Musculoskeletal: She exhibits no edema.   Neurological: She is alert and oriented to person, place, and time.   Psychiatric: She has a normal mood and affect. Her behavior is normal.       Significant Labs:   All pertinent labs within the past 24 hours have been reviewed.     Significant Imaging: I have reviewed and interpreted all pertinent imaging results/findings within the past 24 hours.      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 - Awaiting final renal biopsy report  baseline creatinine of 1.0 roughly 1 month ago. BUN/Cr peaked at 154/6.0 and improved the day after PLEX started.  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  HBV is uncommon to cause this.  LPL/WM or even myeloma could potentially be the source of her cryoglobulins.  Rheumatology consulted,awaiting pending serologies      Abnormal bone marrow examination  BMBX: MONOCLONAL PLASMA CELL POPULATION WITH ATYPICAL LYMPHOID AGGREGATES.  R/o malignancy.  Differential diagnosis includes lymphoplasmacytic lymphoma/Waldenstrom's macroglobulinemia,  multiple myeloma, and marginal zone lymphoma.   7/26 CT chest abd pelvis ordered to look for lymphadenopathy, only notable for small mediastinal lymph nodes/pre-carinal/hildar LN. and bone survey w/o acute findings.   - 7/26 rheum deferred rituximab choice  to heme.  Per rheum:IgG decreased at 185. They request allergy/immunology consult (spoke to them 7/29) regarding ability to start rituximab with decreased IgG.  -Cryoglobulin subtype is Type 3, renal biopsy pending, discussed with Dr. Rea (hematology) based on cryoglobulin subtype likely not a primary hematologic malignancy causing cryoglobulinemia.  Mediastinoscopy or LN biopsy is not recommended as an inpatient at this time.  May be necessary/indicated on outpatient bases. Further management of crygolobulinemia per Nephrology or Rheum services.   -Will plan to remove trialysis line, no further plasma exchange planned    Thrombocytopenia, unspecified  Plt on admission 41. Resolved after treatments.  Seen by Heme/onc.   HIT Ab negative  - monitor      Other specified anemias  Stable.  FERRITIN 161, RETIC 4.7 (H), Bili 1, FOLATE 11.1, VITAMIN B12 843  Iron 40, sat 33%  - monitor              Steroid-induced hyperglycemia  On solumederol 1g qd she got very hyperglycemic (>300) and hypertensive.  A1c 5.7.  Continue on Prednisone.   -- titrated meds      Elevated serum immunoglobulin free light chains  See heme malignancy      Essential hypertension  hypertensive at admission and per patient, has been undergoing multiple recent medication changes due to HTN as outpatient.  Suspect initially may have been exacerbated by NSAID use, now concern for multifactorial cause including renal failure with volume overload, steroid use, and inadequate dosing of home regimen.  Home regimen includes: lisinopril-HCTZ 20-12.5mg, clonidine 0.2mg bid, and hydralazine 25mg tid  - amlodipine changed for nifedpine; increase prn  - started coreg and up-titrating  - titrate BP  "meds    Chronic hepatitis B  Hepatology consulted to see if her HBV can be causing the cryoglubins and if she needs treatment.  - entecavir 0.5 mg every 72 hour (renal dose)  - f/u with liver as outpt    Per hepatology:  "Cryoglobulinemia usually associated with HCV, but rare association with Hep B. Presence of glomerulonephritis on preliminary renal biopsy. Overall, less likely that Hep B is related to this as ALT normal and viral load very low.  - Continue chronic Hep B treatment as patient is on immunosuppression, especially while on rituximab.  Continue entecavir 0.5mg q72hrs (renally dosed). Monitor renal function and dose-adjust accordingly.  - Patient will need HCC screening going forward given her ethnicity (Liver U/S and AFP every 6 months)"      Immunosuppression  Recs per ID     1. Serologies in process:          - Quantiferon Gold is pending.  If positive, please consult ID. If  Indeterminate, please draw T spot.  If T spot positive, please consult ID.            - Strongyloides IgG is pending. If positive, please consult ID to initiate treatment with Ivermectin.         2. If the patient is anticipated to remain on a prolonged course of high dose systemic steroids (> 20 mg prednisone), recommend PCP prophylaxis with Atovaquone 1500 mg PO once daily. Avoid Bactrim in setting of ARF.     3. Continue Entecavir per Hepatology      4. Immunizations recommended:              - Influenza annually [NOT IN STOCK INPATIENT]              - Tetanus booster today (given ) and again every 10 years              - Prevnar 13 today (given ) followed by Pneumovax 23 in 8 weeks with booster every 5 years.              - Hepatitis A vaccine today (given ) and in 6 months              - Shingrix (two doses at 0 and 2-6 months) [NOT AVAILABLE INPATIENT]      Grief reaction  Her brother recently .   service came by to talk to her.      History of renal vein thrombosis  Per heme research  "Pt also " "has a history of unprovoked thrombosis treated at [facility] approximately 4 years ago, none of which is visible in the "care everywhere" section of the chart. She states she had one episode of thrombosis in her left upper extremity that contained "many little clots" and required surgical thrombectomy. She also had a left renal vein thrombosis for which she received a stent. She states that for both episodes she was placed on a heparin drip, did outpatient lovenox and was continued on ASA 81 until now. She states that no one informed her of any findings suggesting a hypercoaguable state or if the clotting was provoked."  -Given her significant thromboembolic history, rheum 7/26 would like to work her up for antiphopholipid antibody syndrome. They ordered levels.   -She also had symptoms of sicca syndromes (dry eyes, dry mouth). Rheum will check for sjogrens syndrome (SSA/SSB)      Elevated uric acid in blood  -started on allopurinol  -downtrending from admit level >14.       Rash of groin  Notes rash to medial thighs, some skin breakdown, discussed with wound care  -Miconazole ointment ordered to act as both barrier cream and antifungal  - apply BID        VTE Risk Mitigation (From admission, onward)        Ordered     Place sequential compression device  Until discontinued      07/19/19 0533     IP VTE LOW RISK PATIENT  Once      07/19/19 0533                Lele Clay MD  Department of Hospital Medicine   [facility]  "

## 2019-08-01 NOTE — ASSESSMENT & PLAN NOTE
Stable.  FERRITIN 161, RETIC 4.7 (H), Bili 1, FOLATE 11.1, VITAMIN B12 843  Iron 40, sat 33%  - monitor

## 2019-08-01 NOTE — ASSESSMENT & PLAN NOTE
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 - Awaiting final renal biopsy report  baseline creatinine of 1.0 roughly 1 month ago. BUN/Cr peaked at 154/6.0 and improved the day after PLEX started.  RJ with bilateral medicorenal disease  UA with proteinuria, blood, no evidence of infection; pro:cr 5.97; C3 41, C4<3  positive for strep A as seen on outside hospital records   7/19 initiated on empiric prednisone 60mg daily, then 7/26 to solumederol 1 gram qd x3 days, then back to 60 qd  7/24 Trialysis line placed for PLEX which was started q48h x3 treatements  -- Nephrology following

## 2019-08-01 NOTE — PROGRESS NOTES
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 Hematology was consulted for thrombocytopenia. History was obtained from the patient, patient's cousin, patient's fiancee and chart review.     Pt endorses episodes of fevers, chills, fatigue, dry cough, SOB, and orthopnea for 1 week. She also states that 3 days ago she began to have neck and back muscle cramping and stiffness and decreased urinary output and decided to see her PCP, who diagnosed her with muscle spasms and the flu. A few days later she decided to go to the [facility] ED and was found to have Cr 2.9, increased from baseline Cr 1.0. She was also found to have , WBC 7.9, Plt 33, lactate 0.8. CXR showed bilateral interstitial infiltrates, and subsequent CT chest showed bilateral ground glass opacities. She was treated with rochephin, doxycycline, bumex and transferred here.     Here her labs were significant for Hgb 10, MCV 69, RDW 22.3, Plt 41. WBC 5.98. UA showed 3+ protein, 3+ glucose and 3+ blood but was not consistent with UTI. Cr 4.3 and  consistent with an LACI. Hemolytic labs were unremarkable.     Pt has a history of uncontrolled hypertension and states that she is currently taking lisinopril 20 mg-HCTZ 12.5 mg, hydralazine 25 mg TID, and clonidine 0.2 mg BID. She was treated at [facility] 3 weeks ago for chest pain and was  "admitted. We do not have those records but pt states they had her on multiple drips and did not have a cath procedure. Pt also has a history of unprovoked thrombosis treated at [facility] approximately 4 years ago, none of which is visible in the "care everywhere" section of the chart. She states she had one episode of thrombosis in her left upper extremity that contained "many little clots" and required surgical thrombectomy. She also had a left renal vein thrombosis for which she received a stent. She states that for both episodes she was placed on a heparin drip, did outpatient lovenox and was continued on ASA 81 until now. She states that no one informed her of any findings suggesting a hypercoaguable state or if the clotting was provoked.    Interval History: Patient seen and examined at bedside, urine output improving, creatinine downtrending.     Review of patient's allergies indicates:  No Known Allergies  Current Facility-Administered Medications   Medication Frequency    acetaminophen tablet 650 mg Q6H PRN    albuterol-ipratropium 2.5 mg-0.5 mg/3 mL nebulizer solution 3 mL Q6H PRN    allopurinol tablet 100 mg Daily    atovaquone suspension 1,500 mg Daily    bisacodyl suppository 10 mg Daily PRN    carvedilol tablet 25 mg BID    cloNIDine tablet 0.1 mg TID    [START ON 8/2/2019] entecavir tablet 0.5 mg Q48H    glucagon (human recombinant) injection 1 mg PRN    glucose chewable tablet 16 g PRN    glucose chewable tablet 24 g PRN    hydrALAZINE tablet 25 mg Q6H PRN    hydrALAZINE tablet 75 mg Q8H    insulin aspart U-100 pen 1-10 Units QID (AC + HS) PRN    insulin aspart U-100 pen 8 Units TIDWM    insulin detemir U-100 pen 5 Units QHS    miconazole nitrate 2% ointment BID    NIFEdipine 24 hr tablet 90 mg Daily    nystatin 100,000 unit/mL suspension 500,000 Units QID    ondansetron disintegrating tablet 8 mg Q8H PRN    oxyCODONE-acetaminophen 5-325 mg per tablet 1 tablet Q8H PRN    " pantoprazole EC tablet 40 mg Before breakfast    predniSONE tablet 60 mg Daily    promethazine (PHENERGAN) 6.25 mg in dextrose 5 % 50 mL IVPB Q6H PRN    ramelteon tablet 8 mg Nightly PRN    sodium bicarbonate tablet 650 mg TID    sodium chloride 0.9% flush 10 mL PRN    Tdap vaccine injection 0.5 mL vaccine x 1 dose       Objective:     Vital Signs (Most Recent):  Temp: 97.6 °F (36.4 °C) (08/01/19 0900)  Pulse: 70 (08/01/19 0900)  Resp: 20 (08/01/19 0900)  BP: (!) 152/82 (08/01/19 0900)  SpO2: 98 % (08/01/19 0900)  O2 Device (Oxygen Therapy): room air (08/01/19 0900) Vital Signs (24h Range):  Temp:  [97.6 °F (36.4 °C)-98.4 °F (36.9 °C)] 97.6 °F (36.4 °C)  Pulse:  [65-74] 70  Resp:  [18-20] 20  SpO2:  [96 %-98 %] 98 %  BP: (145-174)/(68-82) 152/82     Weight: 103 kg (227 lb) (07/29/19 1250)  Body mass index is 36.64 kg/m².  Body surface area is 2.19 meters squared.    I/O last 3 completed shifts:  In: -   Out: 400 [Urine:400]    Physical Exam   Constitutional: She is oriented to person, place, and time. She appears well-nourished. No distress.   obese   Neck: No JVD present.   Cardiovascular: Normal rate, regular rhythm and normal heart sounds.   Pulmonary/Chest: Effort normal and breath sounds normal. No respiratory distress.   Abdominal: Soft. Bowel sounds are normal. She exhibits no distension.   Musculoskeletal: She exhibits no edema.   Neurological: She is alert and oriented to person, place, and time.   Psychiatric: She has a normal mood and affect. Her behavior is normal.       Significant Labs:  CBC:   Recent Labs   Lab 08/01/19 0215   WBC 12.22   RBC 3.25*   HGB 8.0*   HCT 24.8*      MCV 76*   MCH 24.6*   MCHC 32.3     CMP:   Recent Labs   Lab 08/01/19  0215   *   CALCIUM 8.4*   ALBUMIN 3.5   PROT 4.7*      K 3.6   CO2 25      BUN 36*   CREATININE 1.7*   ALKPHOS 47*   ALT 44   AST 18   BILITOT 0.8        Significant Imaging:  Labs: Reviewed    Assessment/Plan:     Acute renal  "failure  Patient with acute renal failure from cryoglobulinemic diffuse proliferative glomerulonephritis (preliminary report of kidney biopsy 7/23/2019).  As per Dr. 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 She also has extensive ATN likely secondary to ischemia downstream the affected glomeruli. These features fit with the low C4 and the +RF. 90% of these cases are associated with HCV (but she is negative!) and only ~3% are associated with HBV. Despite her very high kappa/lambda ratio (K:L), she has no features of myeloma cast nephropathy or light chain deposition disease in her biopsy specimen. The high K:L could come from the cryoglobulinemic process (mono and polyclonal LC production). Of note, lymphoma can rarely cause cryoglobulinemic DPGN, so BM biopsy results will be important. No evidence of uric acid tubulopathy (tumor lysis syndrome) either. In retrospect, her NSAIDs use was a major confounding factor in this case."     Recommendations:  - Continue prednisone 60 mg daily   - creatinine downtrending, 1.7 today  - Cryoglobulin lab sent f/u results  - Continue treatment with entecavir for Hep B   - Follow up on official Kidney Biopsy report   - BP control to goal <140/90  - underwent PLEX 07/29 (3rd session)  - Limit protein diet given azotemia, no more than 60-80 g per day  - Continue sodium bicarb 1300 mg tid  - Will follow closely        Thank you for your consult. I will follow-up with patient. Please contact us if you have any additional questions.    Mustapha Reid MD  Nephrology  [facility]  "

## 2019-08-01 NOTE — PROGRESS NOTES
[facility]  Rheumatology  Progress Note    Patient Name: Kendy Vital  MRN: [id number]  Admission Date: 7/19/2019  Hospital Length of Stay: 13 days  Code Status: Full Code   Attending Provider: Lele Clay MD  Primary Care Physician: To Obtain Unable  Principal Problem: Acute (diffuse) proliferative glomerulonephritis    Subjective:     HPI: Ms. 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 She initially presented with fevers, chills, dry cough, shortness of breath, 2 pillow orthopnea, and occasional epistaxis for roughly 2 weeks. Initial work up showed a BNP of 526, worsening kidney function, and thrombocytopenia with platelets of 33k. She was initially treated with Rocephin and doxycycline for suspected pneumonia and was diuresed for HF. She was then  plasmapheresed and started on prednisone 60mg daily for suspected TTP. 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 Thus far, the patient has received pulse steroids (today is day 2). 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         Interval History: Pt was tearful and reported feeling anxious. Her brother passed away recently. Otherwise at her baseline. Sitting up with 1 pillow and head of bed elevated due to dyspnea when lying flat.    Current Facility-Administered Medications   Medication Frequency    acetaminophen tablet 650 mg Q6H PRN    albuterol-ipratropium 2.5 mg-0.5 mg/3 mL nebulizer solution 3 mL Q6H PRN    allopurinol tablet 100 mg Daily    artificial tears 0.5 % ophthalmic solution 1 drop TID    atovaquone suspension 1,500 mg Daily    bisacodyl suppository 10 mg Daily PRN    carvedilol tablet 25 mg BID    cloNIDine tablet 0.1 mg TID    [START ON  8/2/2019] entecavir tablet 0.5 mg Q48H    glucagon (human recombinant) injection 1 mg PRN    glucose chewable tablet 16 g PRN    glucose chewable tablet 24 g PRN    hydrALAZINE tablet 25 mg Q6H PRN    hydrALAZINE tablet 75 mg Q8H    insulin aspart U-100 pen 1-10 Units QID (AC + HS) PRN    insulin aspart U-100 pen 8 Units TIDWM    insulin detemir U-100 pen 5 Units QHS    miconazole nitrate 2% ointment BID    NIFEdipine 24 hr tablet 90 mg Daily    nystatin 100,000 unit/mL suspension 500,000 Units QID    ondansetron disintegrating tablet 8 mg Q8H PRN    oxyCODONE-acetaminophen 5-325 mg per tablet 1 tablet Q8H PRN    pantoprazole EC tablet 40 mg Before breakfast    predniSONE tablet 60 mg Daily    promethazine (PHENERGAN) 6.25 mg in dextrose 5 % 50 mL IVPB Q6H PRN    ramelteon tablet 8 mg Nightly PRN    sodium bicarbonate tablet 650 mg TID    sodium chloride 0.9% flush 10 mL PRN    Tdap vaccine injection 0.5 mL vaccine x 1 dose     Objective:     Vital Signs (Most Recent):  Temp: 97.2 °F (36.2 °C) (08/01/19 1156)  Pulse: 67 (08/01/19 1156)  Resp: 18 (08/01/19 1156)  BP: 137/75 (08/01/19 1156)  SpO2: 98 % (08/01/19 1156)  O2 Device (Oxygen Therapy): room air (08/01/19 0900) Vital Signs (24h Range):  Temp:  [97.2 °F (36.2 °C)-98.4 °F (36.9 °C)] 97.2 °F (36.2 °C)  Pulse:  [65-74] 67  Resp:  [18-20] 18  SpO2:  [96 %-98 %] 98 %  BP: (137-174)/(68-82) 137/75     Weight: 103 kg (227 lb) (07/29/19 1250)  Body mass index is 36.64 kg/m².  Body surface area is 2.19 meters squared.      Intake/Output Summary (Last 24 hours) at 8/1/2019 1618  Last data filed at 8/1/2019 1500  Gross per 24 hour   Intake 750 ml   Output 600 ml   Net 150 ml       Physical Exam   Vitals reviewed.  Constitutional: She is oriented to person, place, and time and well-developed, well-nourished, and in no distress. No distress.   HENT:   Head: Normocephalic and atraumatic.   Mouth/Throat: Oropharyngeal exudate (consistent with oral  candidiasis; improving) present.   Eyes: Pupils are equal, round, and reactive to light.   Cardiovascular: Normal rate and regular rhythm.    No murmur heard.  Pulmonary/Chest: Effort normal and breath sounds normal. No respiratory distress. She has no rales.   Abdominal: Soft. Bowel sounds are normal. There is no tenderness (epigastric tenderness has resolved). There is no rebound and no guarding.   Neurological: She is alert and oriented to person, place, and time.   Skin: Skin is warm and dry. She is not diaphoretic.     Musculoskeletal: She exhibits no tenderness.         Significant Labs:  All pertinent lab results from the last 24 hours have been reviewed.    Significant Imaging:  Imaging results within the past 24 hours have been reviewed.    Assessment/Plan:     Cryoglobulinemic vasculitis  BM biopsy results noted and current suspicion for lymphoplasmacytic lymphoma vs Waldenstrom's, vs multiple myeloma. Given results, will let Heme/Onc decide which chemotherapeutic agent they feel is best.       - Continue 60mg prednisone, possibly decrease to 40 mg qd pending staff determination  - Spoke with Allergy/Immunology, recommend to f/u in clinic to re-test IgG levels and determine route of IgG injection if deemed necessary at that time.  - Continue PCP prophylaxis (for steroids) with atovaquone 1500mg suspension, started 7/25/19. Avoiding bactrim in setting of acute renal failure.  - Continue ulcer prophylaxis with pantoprazole.  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 Will await final renal bx results. Renal function continuing to improve (Cr 1.7 08/01). Continue to f/u with hepatology for HBV treatment.   - Follow up in rheumatology clinic within ~1 month to determine if further treatment with rituximab will be necessary. Will perform DEXA scan at that time.   - Recommend 1000 U Calcium and 1000 U  Vit D3 daily.  - Heme/Onc recommend delaying LN biopsy at this time due to likely decreased diagnostic yield in light of steroid treatment and due to cryoglobulins being type 3 vs type 1.                 Jey Marcus MD  Rheumatology  [facility]    Patient seen and examined with resident.  All elements of history, physical exam and medical decision making independently confirmed by me.  Patient doing well.  Will continue to follow.  Prednisone can be decreased to prednisone 50 mg for 2 weeks then to 40 mg. See note for details.

## 2019-08-01 NOTE — SUBJECTIVE & OBJECTIVE
Interval History: Patient seen and examined at bedside, no acute events overnight, creatinine stable.     Review of patient's allergies indicates:  No Known Allergies  Current Facility-Administered Medications   Medication Frequency    acetaminophen tablet 650 mg Q6H PRN    albuterol-ipratropium 2.5 mg-0.5 mg/3 mL nebulizer solution 3 mL Q6H PRN    allopurinol tablet 100 mg Daily    atovaquone suspension 1,500 mg Daily    bisacodyl suppository 10 mg Daily PRN    carvedilol tablet 25 mg BID    cloNIDine tablet 0.1 mg TID    entecavir tablet 0.5 mg Q72H    glucagon (human recombinant) injection 1 mg PRN    glucose chewable tablet 16 g PRN    glucose chewable tablet 24 g PRN    hydrALAZINE tablet 25 mg Q6H PRN    hydrALAZINE tablet 75 mg Q8H    insulin aspart U-100 pen 1-10 Units QID (AC + HS) PRN    insulin aspart U-100 pen 8 Units TIDWM    insulin detemir U-100 pen 5 Units QHS    miconazole nitrate 2% ointment BID    [START ON 8/1/2019] NIFEdipine 24 hr tablet 90 mg Daily    nystatin 100,000 unit/mL suspension 500,000 Units QID    ondansetron disintegrating tablet 8 mg Q8H PRN    oxyCODONE-acetaminophen 5-325 mg per tablet 1 tablet Q8H PRN    pantoprazole EC tablet 40 mg Before breakfast    predniSONE tablet 60 mg Daily    promethazine (PHENERGAN) 6.25 mg in dextrose 5 % 50 mL IVPB Q6H PRN    ramelteon tablet 8 mg Nightly PRN    sodium bicarbonate tablet 650 mg TID    sodium chloride 0.9% flush 10 mL PRN    Tdap vaccine injection 0.5 mL vaccine x 1 dose       Objective:     Vital Signs (Most Recent):  Temp: 98.4 °F (36.9 °C) (07/31/19 1629)  Pulse: 74 (07/31/19 1629)  Resp: 18 (07/31/19 1629)  BP: (!) 145/68 (07/31/19 1629)  SpO2: 96 % (07/31/19 1629)  O2 Device (Oxygen Therapy): room air (07/30/19 1516) Vital Signs (24h Range):  Temp:  [97.4 °F (36.3 °C)-98.4 °F (36.9 °C)] 98.4 °F (36.9 °C)  Pulse:  [64-74] 74  Resp:  [16-18] 18  SpO2:  [96 %-100 %] 96 %  BP: (145-185)/(68-88) 145/68      Weight: 103 kg (227 lb) (07/29/19 1250)  Body mass index is 36.64 kg/m².  Body surface area is 2.19 meters squared.    No intake/output data recorded.    Physical Exam   Constitutional: She is oriented to person, place, and time. She appears well-nourished. No distress.   obese   Neck: No JVD present.   Cardiovascular: Normal rate, regular rhythm and normal heart sounds.   Pulmonary/Chest: Effort normal and breath sounds normal. No respiratory distress.   Abdominal: Soft. Bowel sounds are normal. She exhibits no distension.   Musculoskeletal: She exhibits no edema.   Neurological: She is alert and oriented to person, place, and time.   Psychiatric: She has a normal mood and affect. Her behavior is normal.       Significant Labs:  CBC:   Recent Labs   Lab 07/31/19  1414   WBC 12.45  12.45   RBC 3.58*  3.58*   HGB 8.8*  8.8*   HCT 27.6*  27.6*     266   MCV 77*  77*   MCH 24.6*  24.6*   MCHC 31.9*  31.9*     CMP:   Recent Labs   Lab 07/31/19  1414   *  230*   CALCIUM 8.9  8.9   ALBUMIN 3.9  3.9   PROT 5.0*  5.0*     140   K 4.3  4.3   CO2 25  25     106   BUN 39*  39*   CREATININE 2.0*  2.0*   ALKPHOS 41*  41*   ALT 58*  58*   AST 35  35   BILITOT 1.1*  1.1*     All labs within the past 24 hours have been reviewed.     Significant Imaging:  Labs: Reviewed

## 2019-08-01 NOTE — PROGRESS NOTES
[facility] Hospital Medicine  Progress Note    Patient Name: Kendy Vital  MRN: [id number]  Patient Class: IP- Inpatient   Admission Date: 7/19/2019  Length of Stay: 12 days  Attending Physician: Lele Clay MD  Primary Care Provider: To Obtain [facility] Medicine Team: [facility] Lele Clay MD    Subjective:     Principal Problem:Acute (diffuse) proliferative glomerulonephritis      HPI:  Ms. Vital is a 53 year old female with hypertension, anemia, and history of leiomyoma of the uterus who presents to ICU as a transfer from [facility] [place] [facility] for evaluation of thrombocytopenia. Patient reports that prior to going to the hospital 3 days ago that she was experiencing symptoms of chills, feeling febrile, dry cough, shortness of breath and occasional nose bleeds for roughly 2 weeks. She also reports easy fatigueability and difficulty breathing when laying flat; currently sleeps with 2 pillows. Patient presented to hospital in [place] when her symptoms did not resolve. Initial work up showed a , worsening kidney function with creatinine of 2.9, and thrombocytopenia with platelets of 33k. In addition, chest x-ray showed interstitial opacities and follow up CT showed perihilar ground glass opacity. Patient was treated with rocephin and doxycycline as well as Bumex q12 due to her heart failure type symptoms. Lab work at the time showed WBC of 7.9 and a lactate of .8. In addition, she tested positive for strep A. Patient was transferred for further evaluation of her thrombocytopenia with concern for TTP and possible need for plasmapheresis.     At time of exam, patient is properly oriented to person time and places. She endorses headache, generalized myalgias, nausea and orthopnea. She denies SOB while sitting up, chest pain, abdominal pain, vomiting, and diarrhea. She has not felt febrile since 1 week prior to hospital stay.         Overview/Hospital Course:  Ms. Vital presented as transfer to ICU for suspected TTP. At that time, she had severe thrombocytopenia, renal failure, and bilateral infiltrates on chest CT concerning for PNA. She was stepped down when repeat CBC revealed normalizing platelets and hemolysis labs were unremarkable. Initially placed on vanc/zosyn/azithromycin for PNA, which has been de-escalated to rocephin/azithromycin for CAP. Her course has been complicated by ARF of unclear cause. 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   7/23 Nephrology biopsed  and have initiated on prednisone empirically.  7/24 Heme did BMBx        Interval History: continues to report loose stools, reports 4BM yesterday    Will order C.diff     Also c/o of blurred vision, when walking around reports trouble seeing faces.   REmains Hypertensive titrating Hydralazine dose, Increased NIfedipine for tomorrow.     Renal function stable from yesterday    Review of Systems   Constitutional: Negative for fatigue and fever.        Obese   Respiratory: Negative for shortness of breath.    Cardiovascular: Negative for chest pain.   Gastrointestinal: Negative for abdominal pain.     Objective:     Vital Signs (Most Recent):  Temp: 98.4 °F (36.9 °C) (07/31/19 1629)  Pulse: 74 (07/31/19 1629)  Resp: 18 (07/31/19 1629)  BP: (!) 145/68 (07/31/19 1629)  SpO2: 96 % (07/31/19 1629) Vital Signs (24h Range):  Temp:  [97.4 °F (36.3 °C)-98.4 °F (36.9 °C)] 98.4 °F (36.9 °C)  Pulse:  [64-74] 74  Resp:  [16-18] 18  SpO2:  [96 %-100 %] 96 %  BP: (145-185)/(68-88) 145/68     Weight: 103 kg (227 lb)  Body mass index is 36.64 kg/m².  No intake or output data in the 24 hours ending 07/31/19 2002   Physical Exam   Constitutional: She is oriented to person, place, and time. She appears well-nourished. No distress.   obese   Neck: No JVD present.   Cardiovascular: Normal rate, regular rhythm and normal heart sounds.   Pulmonary/Chest: Effort normal and breath sounds  normal. No respiratory distress.   Abdominal: Soft. Bowel sounds are normal. She exhibits no distension.   Musculoskeletal: She exhibits no edema.   Neurological: She is alert and oriented to person, place, and time.   Psychiatric: She has a normal mood and affect. Her behavior is normal.       Significant Labs:   All pertinent labs within the past 24 hours have been reviewed.     Significant Imaging: I have reviewed and interpreted all pertinent imaging results/findings within the past 24 hours.      Assessment/Plan:      * Acute (diffuse) proliferative glomerulonephritis  Shayla due to heme malignancy.  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 - Awaiting final renal biopsy report  baseline creatinine of 1.0 roughly 1 month ago. BUN/Cr peaked at 154/6.0 and improved the day after PLEX started.  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  R/o malignancy.  Differential diagnosis includes lymphoplasmacytic lymphoma/Waldenstrom's macroglobulinemia, multiple myeloma, and marginal zone lymphoma.   Heme/consulted  7/26 CT ordered to look for lymph nodes and bone survey found nothing big  -- allopurinol for elevated urate  - 7/26 rheum deferred rituximab choice  to heme.  Per rheum:IgG decreased at 185. They request allergy/immunology consult (spoke to them 7/29) regarding ability to start rituximab with decreased IgG.  -Pending Cryoglobulin subtype and final renal biopsy report prior  to [facility] for CT surgery LN biopsy per Hematology team.       Cryoglobulinemic vasculitis  Seen on renal biopsy.  HBV is uncommon to cause this.  LPL/WM or even myeloma could potentially be the source of her cryoglobulins.      Thrombocytopenia, unspecified  Plt on admission 41. Resolved after treatments.  Seen by Heme/onc.   HIT Ab negative  - monitor      Other specified anemias  Stable.  FERRITIN 161, RETIC 4.7 (H), Bili 1, FOLATE 11.1, VITAMIN B12 843  Iron 40, sat 33%  - monitor  - fobt            Pneumonia of both lower lobes  x-ray in [place] revealed interstitial infiltrates. Follow up chest CT showed perihilar ground glass opacity. Treated initially with rocephin and doxycycline  repeat chest x-ray with patchy airspace consolidations bilaterally R>L, edema vs PNA  blood cultures NGTD  - is now s/p  broad spectrum antibiotics vanc, zosyn, azithro; held vanc and de-escalated zosyn to ceftriaxone on 7/20 and all abx stopped 7/24    Steroid-induced hyperglycemia  On solumederol 1g qd she got very hyperglycemic (>300) and hypertensive.  A1c 5.7.  Continue on Prednisone.   -- titrated meds      Elevated serum immunoglobulin free light chains  See heme malignancy      Essential hypertension  hypertensive at admission and per patient, has been undergoing multiple recent medication changes due to HTN as outpatient.  Suspect initially may have been exacerbated by NSAID use, now concern for multifactorial cause including renal failure with volume overload, steroid use, and inadequate dosing of home regimen.  Home regimen includes: lisinopril-HCTZ 20-12.5mg, clonidine 0.2mg bid, and hydralazine 25mg tid  - amlodipine changed for nifedpine; increase prn  - started coreg and up-titrating  - titrate BP meds    Chronic hepatitis B  Hepatology consulted to see if her HBV can be causing the cryoglubins and if she needs treatment.  - entecavir 0.5 mg every 72 hour (renal dose)  - f/u with liver as outpt    Per  "hepatology:  "Cryoglobulinemia usually associated with HCV, but rare association with Hep B. Presence of glomerulonephritis on preliminary renal biopsy. Overall, less likely that Hep B is related to this as ALT normal and viral load very low.  - Continue chronic Hep B treatment as patient is on immunosuppression, especially while on rituximab.  Continue entecavir 0.5mg q72hrs (renally dosed). Monitor renal function and dose-adjust accordingly.  - Patient will need HCC screening going forward given her ethnicity (Liver U/S and AFP every 6 months)"      Immunosuppression  Recs per ID     1. Serologies in process:          - Quantiferon Gold is pending.  If positive, please consult ID. If  Indeterminate, please draw T spot.  If T spot positive, please consult ID.            - Strongyloides IgG is pending. If positive, please consult ID to initiate treatment with Ivermectin.         2. If the patient is anticipated to remain on a prolonged course of high dose systemic steroids (> 20 mg prednisone), recommend PCP prophylaxis with Atovaquone 1500 mg PO once daily. Avoid Bactrim in setting of ARF.     3. Continue Entecavir per Hepatology      4. Immunizations recommended:              - Influenza annually [NOT IN STOCK INPATIENT]              - Tetanus booster today (given ) and again every 10 years              - Prevnar 13 today (given ) followed by Pneumovax 23 in 8 weeks with booster every 5 years.              - Hepatitis A vaccine today (given ) and in 6 months              - Shingrix (two doses at 0 and 2-6 months) [NOT AVAILABLE INPATIENT]      Grief reaction  Her brother recently .   service came by to talk to her.      History of renal vein thrombosis  Per heme research  "Pt also has a history of unprovoked thrombosis treated at [facility] approximately 4 years ago, none of which is visible in the "care everywhere" section of the chart. She states she had one episode of thrombosis in " "her left upper extremity that contained "many little clots" and required surgical thrombectomy. She also had a left renal vein thrombosis for which she received a stent. She states that for both episodes she was placed on a heparin drip, did outpatient lovenox and was continued on ASA 81 until now. She states that no one informed her of any findings suggesting a hypercoaguable state or if the clotting was provoked."  -Given her significant thromboembolic history, rheum 7/26 would like to work her up for antiphopholipid antibody syndrome. They ordered levels.   -She also had symptoms of sicca syndromes (dry eyes, dry mouth). Rheum will check for sjogrens syndrome (SSA/SSB)      Rash of groin  Notes rash to medial thighs, some skin breakdown, discussed with wound care  -Miconazole ointment ordered to act as both barrier cream and antifungal  - apply BID        VTE Risk Mitigation (From admission, onward)        Ordered     Place sequential compression device  Until discontinued      07/19/19 0533     IP VTE LOW RISK PATIENT  Once      07/19/19 7902                Lele Clay MD  Department of Hospital Medicine   [facility]  "

## 2019-08-01 NOTE — CONSULTS
Consultation Report  Ophthalmology Service    Date: 08/01/2019    Chief complaint/Reason for Consult: blurry vision     History of Present Illness: Kendy Vital is a 53 y.o. female with PMHx of HTN, anemia, history of leiomyoma  who presents with 4 days history of blurry vision. She reports vision has been blurry since her blood pressure has been elevated for the last four days. She states that with her glasses on she can see people's faces but cannot make out details. She denies eye pain, redness, swelling, flashes, floaters, curtains across vision, vision loss, headache, or weakness. She states the blurry vision is constant and has not improved with improvement in her blood pressure. The patient is currently being worked up for acute proliferative glomerulonephritis with renal failure, Hep B cryoglobulinemia, and concern for hematologic malignancy.     POcularHx: No history of ocular problems or past ocular surgeries. Wears bifocal glasses for distance and near.     Current eye gtts: none      PMHx:  has a past medical history of Renal vein thrombosis (2015) and Uterine leiomyoma.     PSurgHx:  has no past surgical history on file.     Home Medications:   Prior to Admission medications    Medication Sig Start Date End Date Taking? Authorizing Provider   amLODIPine (NORVASC) 10 MG tablet Take 10 mg by mouth once daily.   Yes Historical Provider, MD   aspirin (ECOTRIN) 81 MG EC tablet Take 81 mg by mouth once daily.   Yes Historical Provider, MD   chlorthalidone (HYGROTEN) 50 MG Tab Take 50 mg by mouth once daily.   Yes Historical Provider, MD        Medications this encounter:    allopurinol  100 mg Oral Daily    atovaquone  1,500 mg Oral Daily    carvedilol  25 mg Oral BID    cloNIDine  0.1 mg Oral TID    [START ON 8/2/2019] entecavir  0.5 mg Oral Q48H    hydrALAZINE  75 mg Oral Q8H    insulin aspart U-100  8 Units Subcutaneous TIDWM    insulin detemir U-100  5 Units Subcutaneous QHS    miconazole  nitrate 2%   Topical (Top) BID    NIFEdipine  90 mg Oral Daily    nystatin  500,000 Units Oral QID    pantoprazole  40 mg Oral Before breakfast    predniSONE  60 mg Oral Daily    sodium bicarbonate  650 mg Oral TID       Allergies: has No Known Allergies.     Social:       Family Hx: No family history of glaucoma, macular degeneration, or blindness. family history is not on file.     ROS: Negative x 10 except for complaints as described in HPI; negative for fever, chills, weight loss, nausea, vomiting, diarrhea, shortness of breath, nasal discharge, cough, abdominal pain, dyspnea, difficulty moving arms and legs, confusion, dysuria, palpitations, or chest pain     Ocular examination/Dilated fundus examination:  Base Eye Exam     Visual Acuity (Snellen - Linear)       Right Left    Dist cc 20/20 20/20          Tonometry (Tonopen, 1:08 PM)       Right Left    Pressure 8 11          Pupils       Dark Light APD    Right 3 2 None    Left 3 2 None          Visual Fields       Right Left     Full Full          Extraocular Movement       Right Left     Full, Ortho Full, Ortho          Dilation     Both eyes:  1% Mydriacyl, 2.5% Phenylephrine @ 1:09 PM            Slit Lamp and Fundus Exam     External Exam       Right Left    External Normal Normal          Slit Lamp Exam       Right Left    Lids/Lashes Normal Normal    Conjunctiva/Sclera CAM, otherwise white and quiet CAM, otherwise white and quiet    Cornea Clear Clear    Anterior Chamber Deep and formed Deep and formed    Iris Round and pharm dilated Round and pharm dilated    Lens Clear Clear    Vitreous Normal Normal    Anterior Segment Exam with Indirect and 20D lens          Fundus Exam       Right Left    Disc pink, sharp pink, sharp    C/D Ratio 0.3 0.3    Macula flat, good FLR flat, good FLR    Vessels Normal Normal    Periphery Normal Normal              This is a 53 year old female with PMH of uncontrolled HTN and anemia being evaluated for  glomerulonephritis, Hep B cryoglobulinemia, and possible hematologic malignancy. Ophthalmology was consulted for worsening blurry vision for 4 days.     Assessment/Plan:   1. Blurry Vision, both eyes  - VA 20/20 both eyes with glasses, IOP WNL  - No evidence of hypertensive retinopathy or other retinal pathology.   - May be secondary to dry eyes. Recommend artificial tears PRN.   - Patient can follow up with optometry/ophthalmology PRN  - Ophthalmology will sign off at this time.     Discussed patient's history, physical, assessment and plan with Dr. Rivera.  If there are further questions, please page the on call ophthalmology resident.    Sofya Barajas MD  PGY2, Ophthalmology Resident  08/01/2019  1:14 PM

## 2019-08-01 NOTE — PLAN OF CARE
Brief heme-onc follow up note     Pt complaining of some medial thigh skin breakdown as well as worsening of blurred vision. AM labs not drawn for reasons unclear. Still awaiting final results of cryoglobulin  subtype (per lab, likely tomorrow) and renal path (from 7/22).         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 Ddx includes marginal zone lymphoma  - Patient with new renal failure, has normal IgG.  - Skeletal survey (-) for lytic lesions  - CT neck/C/A/P done showed small mediastinal LN and precarinal LN 1.2x2cm  - CT surgery consulted for possible LN biopsy. Given expected low yield of LN biopsy (possibly because patient has been on steroids), will defer to outpatient with Cryoglobulin type III suggesting non-hematologic malignancy etiology. Discussed the following with CT surgery.  -cold agglutinin negative  --Cryglobulin type III which does not indicate hematologic malignancy as a source  - optho consult for vision setting of cryoglobulinemia revealed no gross pathology  - Continue allopurinol  - Transfuse platelets if < 10k or if < 50k and actively bleeding  - Transfuse pRBCs if Hgb < 7     Elevated serum immunoglobulin free light chains  BM biopsy 7/23/19:  -- MILDLY HYPERCELLULAR MARROW (60%) WITH TRILINEAGE HEMATOPOIESIS.  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  -- ADEQUATE NUMBER OF MEGAKARYOCYTES.  -- STAINABLE IRON IS NOT IDENTIFIED.  -- SEE COMMENT.  COMMENT:  CBC data shows microcytic anemia and thrombocytopenia.  Flow cytometric analysis of bone marrow detects a kappa restricted plasma cell population that expresses , CD19, and bright CD38. CD20 and CD56 are negative. Polytypic B lymphocytes are detected. T lymphocytes are immunophenotypically unremarkable. Blasts gate is not increased. Flow differential:  Lymphocytes 5.9%, Monocytes 4.6%, Granulocytes 85.8%, Blast 1.2%.  Immunohistochemical stains are performed on the biopsy core and clot section for greater sensitivity and further architectural assessment with adequate controls. -positive plasma cells comprise approximately 4-5% of thetotal cellularity. Immunoglobulin kappa and lambda light chains situ hybridization study reveals kappa light chainrestriction is some areas. The lymphoid aggregates are composed of mixed CD20-positive B-cells and CD3-positive T-cells. B-cells are more numerous than T-cells     Iron deficiency anemia  *persistent microcytic anemia hgb 8, MCV 76, retic 6.4 (7/25)  -Iron studies on 7/25:  Iron 30 - 160 ug/dL 40    Transferrin 200 - 375 mg/dL 83Low     TIBC 250 - 450 ug/dL 123Low     Saturated Iron 20 - 50 % 33    -No stainable Iron on BM bx from 7/23, diagnostic of ALLYSON  -Consider starting IV iron inpatient     The following was staffed and discussed with supervising physician Dr. Overton. Please contact Hematology Consult Fellow with any additional questions.

## 2019-08-01 NOTE — ASSESSMENT & PLAN NOTE
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  R/o malignancy.  Differential diagnosis includes lymphoplasmacytic lymphoma/Waldenstrom's macroglobulinemia, multiple myeloma, and marginal zone lymphoma.   7/26 CT chest abd pelvis ordered to look for lymphadenopathy, only notable for small mediastinal lymph nodes/pre-carinal/hildar LN. and bone survey w/o acute findings.   - 7/26 rheum deferred rituximab choice  to heme.  Per rheum:IgG decreased at 185. They request allergy/immunology consult (spoke to them 7/29) regarding ability to start rituximab with decreased IgG.  -Cryoglobulin subtype is Type 3, renal biopsy pending, discussed with Dr. Rea (hematology) based on cryoglobulin subtype likely not a primary hematologic malignancy causing cryoglobulinemia.  Mediastinoscopy or LN biopsy is not recommended as an inpatient at this time.  May be necessary/indicated on outpatient bases. Further management of crygolobulinemia per Nephrology or Rheum services.   -Will plan to remove trialysis line, no further plasma exchange planned

## 2019-08-01 NOTE — SUBJECTIVE & OBJECTIVE
Interval History: Patient seen and examined at bedside, urine output improving, creatinine downtrending.     Review of patient's allergies indicates:  No Known Allergies  Current Facility-Administered Medications   Medication Frequency    acetaminophen tablet 650 mg Q6H PRN    albuterol-ipratropium 2.5 mg-0.5 mg/3 mL nebulizer solution 3 mL Q6H PRN    allopurinol tablet 100 mg Daily    atovaquone suspension 1,500 mg Daily    bisacodyl suppository 10 mg Daily PRN    carvedilol tablet 25 mg BID    cloNIDine tablet 0.1 mg TID    [START ON 8/2/2019] entecavir tablet 0.5 mg Q48H    glucagon (human recombinant) injection 1 mg PRN    glucose chewable tablet 16 g PRN    glucose chewable tablet 24 g PRN    hydrALAZINE tablet 25 mg Q6H PRN    hydrALAZINE tablet 75 mg Q8H    insulin aspart U-100 pen 1-10 Units QID (AC + HS) PRN    insulin aspart U-100 pen 8 Units TIDWM    insulin detemir U-100 pen 5 Units QHS    miconazole nitrate 2% ointment BID    NIFEdipine 24 hr tablet 90 mg Daily    nystatin 100,000 unit/mL suspension 500,000 Units QID    ondansetron disintegrating tablet 8 mg Q8H PRN    oxyCODONE-acetaminophen 5-325 mg per tablet 1 tablet Q8H PRN    pantoprazole EC tablet 40 mg Before breakfast    predniSONE tablet 60 mg Daily    promethazine (PHENERGAN) 6.25 mg in dextrose 5 % 50 mL IVPB Q6H PRN    ramelteon tablet 8 mg Nightly PRN    sodium bicarbonate tablet 650 mg TID    sodium chloride 0.9% flush 10 mL PRN    Tdap vaccine injection 0.5 mL vaccine x 1 dose       Objective:     Vital Signs (Most Recent):  Temp: 97.6 °F (36.4 °C) (08/01/19 0900)  Pulse: 70 (08/01/19 0900)  Resp: 20 (08/01/19 0900)  BP: (!) 152/82 (08/01/19 0900)  SpO2: 98 % (08/01/19 0900)  O2 Device (Oxygen Therapy): room air (08/01/19 0900) Vital Signs (24h Range):  Temp:  [97.6 °F (36.4 °C)-98.4 °F (36.9 °C)] 97.6 °F (36.4 °C)  Pulse:  [65-74] 70  Resp:  [18-20] 20  SpO2:  [96 %-98 %] 98 %  BP: (145-174)/(68-82) 152/82      Weight: 103 kg (227 lb) (07/29/19 1250)  Body mass index is 36.64 kg/m².  Body surface area is 2.19 meters squared.    I/O last 3 completed shifts:  In: -   Out: 400 [Urine:400]    Physical Exam   Constitutional: She is oriented to person, place, and time. She appears well-nourished. No distress.   obese   Neck: No JVD present.   Cardiovascular: Normal rate, regular rhythm and normal heart sounds.   Pulmonary/Chest: Effort normal and breath sounds normal. No respiratory distress.   Abdominal: Soft. Bowel sounds are normal. She exhibits no distension.   Musculoskeletal: She exhibits no edema.   Neurological: She is alert and oriented to person, place, and time.   Psychiatric: She has a normal mood and affect. Her behavior is normal.       Significant Labs:  CBC:   Recent Labs   Lab 08/01/19 0215   WBC 12.22   RBC 3.25*   HGB 8.0*   HCT 24.8*      MCV 76*   MCH 24.6*   MCHC 32.3     CMP:   Recent Labs   Lab 08/01/19 0215   *   CALCIUM 8.4*   ALBUMIN 3.5   PROT 4.7*      K 3.6   CO2 25      BUN 36*   CREATININE 1.7*   ALKPHOS 47*   ALT 44   AST 18   BILITOT 0.8        Significant Imaging:  Labs: Reviewed

## 2019-08-01 NOTE — ASSESSMENT & PLAN NOTE
Notes rash to medial thighs, some skin breakdown, discussed with wound care  -Miconazole ointment ordered to act as both barrier cream and antifungal  - apply BID

## 2019-08-01 NOTE — SUBJECTIVE & OBJECTIVE
Interval History: continues to report loose stools, reports 4BM yesterday    Will order C.diff     Also c/o of blurred vision, when walking around reports trouble seeing faces.   REmains Hypertensive titrating Hydralazine dose, Increased NIfedipine for tomorrow.     Renal function stable from yesterday    Review of Systems   Constitutional: Negative for fatigue and fever.        Obese   Respiratory: Negative for shortness of breath.    Cardiovascular: Negative for chest pain.   Gastrointestinal: Negative for abdominal pain.     Objective:     Vital Signs (Most Recent):  Temp: 98.4 °F (36.9 °C) (07/31/19 1629)  Pulse: 74 (07/31/19 1629)  Resp: 18 (07/31/19 1629)  BP: (!) 145/68 (07/31/19 1629)  SpO2: 96 % (07/31/19 1629) Vital Signs (24h Range):  Temp:  [97.4 °F (36.3 °C)-98.4 °F (36.9 °C)] 98.4 °F (36.9 °C)  Pulse:  [64-74] 74  Resp:  [16-18] 18  SpO2:  [96 %-100 %] 96 %  BP: (145-185)/(68-88) 145/68     Weight: 103 kg (227 lb)  Body mass index is 36.64 kg/m².  No intake or output data in the 24 hours ending 07/31/19 2002   Physical Exam   Constitutional: She is oriented to person, place, and time. She appears well-nourished. No distress.   obese   Neck: No JVD present.   Cardiovascular: Normal rate, regular rhythm and normal heart sounds.   Pulmonary/Chest: Effort normal and breath sounds normal. No respiratory distress.   Abdominal: Soft. Bowel sounds are normal. She exhibits no distension.   Musculoskeletal: She exhibits no edema.   Neurological: She is alert and oriented to person, place, and time.   Psychiatric: She has a normal mood and affect. Her behavior is normal.       Significant Labs:   All pertinent labs within the past 24 hours have been reviewed.     Significant Imaging: I have reviewed and interpreted all pertinent imaging results/findings within the past 24 hours.

## 2019-08-01 NOTE — ASSESSMENT & PLAN NOTE
Seen on renal biopsy.  HBV is uncommon to cause this.  LPL/WM or even myeloma could potentially be the source of her cryoglobulins.  Rheumatology consulted,awaiting pending serologies

## 2019-08-02 PROBLEM — D47.9 B-CELL LYMPHOPROLIFERATIVE DISORDER: Status: ACTIVE | Noted: 2019-07-30

## 2019-08-02 LAB
ALBUMIN SERPL BCP-MCNC: 3.5 G/DL (ref 3.5–5.2)
ALP SERPL-CCNC: 53 U/L (ref 55–135)
ALT SERPL W/O P-5'-P-CCNC: 53 U/L (ref 10–44)
ANION GAP SERPL CALC-SCNC: 7 MMOL/L (ref 8–16)
AST SERPL-CCNC: 15 U/L (ref 10–40)
BASOPHILS # BLD AUTO: 0.01 K/UL (ref 0–0.2)
BASOPHILS NFR BLD: 0.1 % (ref 0–1.9)
BILIRUB SERPL-MCNC: 0.8 MG/DL (ref 0.1–1)
BUN SERPL-MCNC: 31 MG/DL (ref 6–20)
CALCIUM SERPL-MCNC: 9 MG/DL (ref 8.7–10.5)
CHLORIDE SERPL-SCNC: 108 MMOL/L (ref 95–110)
CO2 SERPL-SCNC: 27 MMOL/L (ref 23–29)
CREAT SERPL-MCNC: 1.5 MG/DL (ref 0.5–1.4)
DIFFERENTIAL METHOD: ABNORMAL
EOSINOPHIL # BLD AUTO: 0 K/UL (ref 0–0.5)
EOSINOPHIL NFR BLD: 0.3 % (ref 0–8)
ERYTHROCYTE [DISTWIDTH] IN BLOOD BY AUTOMATED COUNT: 29 % (ref 11.5–14.5)
EST. GFR  (AFRICAN AMERICAN): 45.8 ML/MIN/1.73 M^2
EST. GFR  (NON AFRICAN AMERICAN): 39.8 ML/MIN/1.73 M^2
GLUCOSE SERPL-MCNC: 153 MG/DL (ref 70–110)
HCT VFR BLD AUTO: 25.4 % (ref 37–48.5)
HGB BLD-MCNC: 8.3 G/DL (ref 12–16)
IMM GRANULOCYTES # BLD AUTO: 0.31 K/UL (ref 0–0.04)
IMM GRANULOCYTES NFR BLD AUTO: 2.4 % (ref 0–0.5)
LYMPHOCYTES # BLD AUTO: 2.1 K/UL (ref 1–4.8)
LYMPHOCYTES NFR BLD: 15.7 % (ref 18–48)
MCH RBC QN AUTO: 25.2 PG (ref 27–31)
MCHC RBC AUTO-ENTMCNC: 32.7 G/DL (ref 32–36)
MCV RBC AUTO: 77 FL (ref 82–98)
MONOCYTES # BLD AUTO: 1.3 K/UL (ref 0.3–1)
MONOCYTES NFR BLD: 9.9 % (ref 4–15)
NEUTROPHILS # BLD AUTO: 9.4 K/UL (ref 1.8–7.7)
NEUTROPHILS NFR BLD: 71.6 % (ref 38–73)
NRBC BLD-RTO: 0 /100 WBC
PHOSPHATE SERPL-MCNC: 3 MG/DL (ref 2.7–4.5)
PLATELET # BLD AUTO: 199 K/UL (ref 150–350)
PMV BLD AUTO: 10.2 FL (ref 9.2–12.9)
POCT GLUCOSE: 251 MG/DL (ref 70–110)
POCT GLUCOSE: 274 MG/DL (ref 70–110)
POCT GLUCOSE: 310 MG/DL (ref 70–110)
POTASSIUM SERPL-SCNC: 3.6 MMOL/L (ref 3.5–5.1)
PROT SERPL-MCNC: 5 G/DL (ref 6–8.4)
PROTEINASE3 IGG SER-ACNC: <0.2 U
RBC # BLD AUTO: 3.29 M/UL (ref 4–5.4)
SODIUM SERPL-SCNC: 142 MMOL/L (ref 136–145)
WBC # BLD AUTO: 13.17 K/UL (ref 3.9–12.7)

## 2019-08-02 PROCEDURE — 99232 SBSQ HOSP IP/OBS MODERATE 35: CPT | Mod: ,,, | Performed by: HOSPITALIST

## 2019-08-02 PROCEDURE — 25000003 PHARM REV CODE 250: Performed by: STUDENT IN AN ORGANIZED HEALTH CARE EDUCATION/TRAINING PROGRAM

## 2019-08-02 PROCEDURE — 25000003 PHARM REV CODE 250: Performed by: HOSPITALIST

## 2019-08-02 PROCEDURE — 63600175 PHARM REV CODE 636 W HCPCS: Performed by: HOSPITALIST

## 2019-08-02 PROCEDURE — 85025 COMPLETE CBC W/AUTO DIFF WBC: CPT

## 2019-08-02 PROCEDURE — 99233 SBSQ HOSP IP/OBS HIGH 50: CPT | Mod: ,,, | Performed by: INTERNAL MEDICINE

## 2019-08-02 PROCEDURE — 80053 COMPREHEN METABOLIC PANEL: CPT

## 2019-08-02 PROCEDURE — 99233 PR SUBSEQUENT HOSPITAL CARE,LEVL III: ICD-10-PCS | Mod: ,,, | Performed by: INTERNAL MEDICINE

## 2019-08-02 PROCEDURE — 84100 ASSAY OF PHOSPHORUS: CPT

## 2019-08-02 PROCEDURE — 11000001 HC ACUTE MED/SURG PRIVATE ROOM

## 2019-08-02 PROCEDURE — 36415 COLL VENOUS BLD VENIPUNCTURE: CPT

## 2019-08-02 PROCEDURE — 99232 PR SUBSEQUENT HOSPITAL CARE,LEVL II: ICD-10-PCS | Mod: ,,, | Performed by: HOSPITALIST

## 2019-08-02 RX ORDER — INSULIN ASPART 100 [IU]/ML
11 INJECTION, SOLUTION INTRAVENOUS; SUBCUTANEOUS 2 TIMES DAILY WITH MEALS
Status: DISCONTINUED | OUTPATIENT
Start: 2019-08-03 | End: 2019-08-07 | Stop reason: HOSPADM

## 2019-08-02 RX ORDER — CHOLECALCIFEROL (VITAMIN D3) 25 MCG
1000 TABLET ORAL DAILY
Status: DISCONTINUED | OUTPATIENT
Start: 2019-08-02 | End: 2019-08-07 | Stop reason: HOSPADM

## 2019-08-02 RX ORDER — INSULIN ASPART 100 [IU]/ML
15 INJECTION, SOLUTION INTRAVENOUS; SUBCUTANEOUS
Status: DISCONTINUED | OUTPATIENT
Start: 2019-08-03 | End: 2019-08-06

## 2019-08-02 RX ORDER — HYDRALAZINE HYDROCHLORIDE 50 MG/1
100 TABLET, FILM COATED ORAL EVERY 8 HOURS
Status: DISCONTINUED | OUTPATIENT
Start: 2019-08-02 | End: 2019-08-07 | Stop reason: HOSPADM

## 2019-08-02 RX ORDER — CALCIUM CARBONATE 500(1250)
1000 TABLET ORAL DAILY
Status: DISCONTINUED | OUTPATIENT
Start: 2019-08-02 | End: 2019-08-07 | Stop reason: HOSPADM

## 2019-08-02 RX ADMIN — NYSTATIN 500000 UNITS: 100000 SUSPENSION ORAL at 01:08

## 2019-08-02 RX ADMIN — HYDRALAZINE HYDROCHLORIDE 100 MG: 50 TABLET ORAL at 01:08

## 2019-08-02 RX ADMIN — NIFEDIPINE 90 MG: 60 TABLET, FILM COATED, EXTENDED RELEASE ORAL at 09:08

## 2019-08-02 RX ADMIN — ALLOPURINOL 100 MG: 100 TABLET ORAL at 09:08

## 2019-08-02 RX ADMIN — INSULIN ASPART 8 UNITS: 100 INJECTION, SOLUTION INTRAVENOUS; SUBCUTANEOUS at 01:08

## 2019-08-02 RX ADMIN — HYDRALAZINE HYDROCHLORIDE 75 MG: 50 TABLET ORAL at 06:08

## 2019-08-02 RX ADMIN — MICONAZOLE NITRATE: 20 OINTMENT TOPICAL at 10:08

## 2019-08-02 RX ADMIN — CARVEDILOL 25 MG: 25 TABLET, FILM COATED ORAL at 10:08

## 2019-08-02 RX ADMIN — HYPROMELLOSE 2910 1 DROP: 5 SOLUTION OPHTHALMIC at 09:08

## 2019-08-02 RX ADMIN — INSULIN ASPART 6 UNITS: 100 INJECTION, SOLUTION INTRAVENOUS; SUBCUTANEOUS at 01:08

## 2019-08-02 RX ADMIN — INSULIN ASPART 8 UNITS: 100 INJECTION, SOLUTION INTRAVENOUS; SUBCUTANEOUS at 09:08

## 2019-08-02 RX ADMIN — NYSTATIN 500000 UNITS: 100000 SUSPENSION ORAL at 09:08

## 2019-08-02 RX ADMIN — MICONAZOLE NITRATE: 20 OINTMENT TOPICAL at 09:08

## 2019-08-02 RX ADMIN — NYSTATIN 500000 UNITS: 100000 SUSPENSION ORAL at 10:08

## 2019-08-02 RX ADMIN — CLONIDINE HYDROCHLORIDE 0.1 MG: 0.1 TABLET ORAL at 05:08

## 2019-08-02 RX ADMIN — PANTOPRAZOLE SODIUM 40 MG: 40 TABLET, DELAYED RELEASE ORAL at 06:08

## 2019-08-02 RX ADMIN — SODIUM BICARBONATE 650 MG TABLET 650 MG: at 01:08

## 2019-08-02 RX ADMIN — CARVEDILOL 25 MG: 25 TABLET, FILM COATED ORAL at 09:08

## 2019-08-02 RX ADMIN — ENTECAVIR 0.5 MG: 0.5 TABLET ORAL at 09:08

## 2019-08-02 RX ADMIN — HYDRALAZINE HYDROCHLORIDE 100 MG: 50 TABLET ORAL at 10:08

## 2019-08-02 RX ADMIN — CLONIDINE HYDROCHLORIDE 0.1 MG: 0.1 TABLET ORAL at 09:08

## 2019-08-02 RX ADMIN — PREDNISONE 50 MG: 20 TABLET ORAL at 09:08

## 2019-08-02 RX ADMIN — ATOVAQUONE 1500 MG: 750 SUSPENSION ORAL at 09:08

## 2019-08-02 RX ADMIN — CALCIUM 1000 MG: 500 TABLET ORAL at 01:08

## 2019-08-02 RX ADMIN — INSULIN DETEMIR 5 UNITS: 100 INJECTION, SOLUTION SUBCUTANEOUS at 10:08

## 2019-08-02 RX ADMIN — HYPROMELLOSE 2910 1 DROP: 5 SOLUTION OPHTHALMIC at 05:08

## 2019-08-02 RX ADMIN — INSULIN ASPART 6 UNITS: 100 INJECTION, SOLUTION INTRAVENOUS; SUBCUTANEOUS at 05:08

## 2019-08-02 RX ADMIN — MELATONIN 1000 UNITS: at 01:08

## 2019-08-02 RX ADMIN — HYPROMELLOSE 2910 1 DROP: 5 SOLUTION OPHTHALMIC at 10:08

## 2019-08-02 RX ADMIN — NYSTATIN 500000 UNITS: 100000 SUSPENSION ORAL at 05:08

## 2019-08-02 NOTE — PROGRESS NOTES
[facility]  Rheumatology  Progress Note    Patient Name: Kendy Vital  MRN: [id number]  Admission Date: 7/19/2019  Hospital Length of Stay: 14 days  Code Status: Full Code   Attending Provider: Lele Clay MD  Primary Care Physician: To Obtain Unable  Principal Problem: Acute (diffuse) proliferative glomerulonephritis    Subjective:     HPI: Ms. 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 She initially presented with fevers, chills, dry cough, shortness of breath, 2 pillow orthopnea, and occasional epistaxis for roughly 2 weeks. Initial work up showed a BNP of 526, worsening kidney function, and thrombocytopenia with platelets of 33k. She was initially treated with Rocephin and doxycycline for suspected pneumonia and was diuresed for HF. She was then  plasmapheresed and started on prednisone 60mg daily for suspected TTP. 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 Thus far, the patient has received pulse steroids (today is day 2). 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         Interval History: Pt is much happier and has improved greatly over the past few days. She is up and walking around and kidney function continues to improve.    Current Facility-Administered Medications   Medication Frequency    acetaminophen tablet 650 mg Q6H PRN    albuterol-ipratropium 2.5 mg-0.5 mg/3 mL nebulizer solution 3 mL Q6H PRN    allopurinol tablet 100 mg Daily    artificial tears 0.5 % ophthalmic solution 1 drop TID    atovaquone suspension 1,500 mg Daily    bisacodyl suppository 10 mg Daily PRN    carvedilol tablet 25 mg BID    cloNIDine tablet 0.1 mg TID    entecavir tablet 0.5 mg Q48H    glucagon (human  recombinant) injection 1 mg PRN    glucose chewable tablet 16 g PRN    glucose chewable tablet 24 g PRN    hydrALAZINE tablet 25 mg Q6H PRN    hydrALAZINE tablet 75 mg Q8H    insulin aspart U-100 pen 1-10 Units QID (AC + HS) PRN    insulin aspart U-100 pen 8 Units TIDWM    insulin detemir U-100 pen 5 Units QHS    miconazole nitrate 2% ointment BID    NIFEdipine 24 hr tablet 90 mg Daily    nystatin 100,000 unit/mL suspension 500,000 Units QID    ondansetron disintegrating tablet 8 mg Q8H PRN    oxyCODONE-acetaminophen 5-325 mg per tablet 1 tablet Q8H PRN    pantoprazole EC tablet 40 mg Before breakfast    predniSONE tablet 50 mg Daily    promethazine (PHENERGAN) 6.25 mg in dextrose 5 % 50 mL IVPB Q6H PRN    ramelteon tablet 8 mg Nightly PRN    sodium bicarbonate tablet 650 mg TID    sodium chloride 0.9% flush 10 mL PRN    Tdap vaccine injection 0.5 mL vaccine x 1 dose     Objective:     Vital Signs (Most Recent):  Temp: 97.1 °F (36.2 °C) (08/02/19 0910)  Pulse: 72 (08/02/19 0910)  Resp: 18 (08/02/19 0910)  BP: (!) 151/90 (08/02/19 0910)  SpO2: 100 % (08/02/19 0910)  O2 Device (Oxygen Therapy): room air (08/01/19 1920) Vital Signs (24h Range):  Temp:  [97.1 °F (36.2 °C)-99.3 °F (37.4 °C)] 97.1 °F (36.2 °C)  Pulse:  [60-77] 72  Resp:  [18] 18  SpO2:  [98 %-100 %] 100 %  BP: (122-151)/(59-90) 151/90     Weight: 103 kg (227 lb) (07/29/19 1250)  Body mass index is 36.64 kg/m².  Body surface area is 2.19 meters squared.      Intake/Output Summary (Last 24 hours) at 8/2/2019 0947  Last data filed at 8/1/2019 1500  Gross per 24 hour   Intake 750 ml   Output 600 ml   Net 150 ml       Physical Exam   Vitals reviewed.  Constitutional: She is oriented to person, place, and time and well-developed, well-nourished, and in no distress. No distress.   HENT:   Head: Normocephalic and atraumatic.   Mouth/Throat: Oropharyngeal exudate (consistent with oral candidiasis; improving) present.   Eyes: EOM are normal.  Pupils are equal, round, and reactive to light.       Cardiovascular: Normal rate and regular rhythm.    No murmur heard.  Pulmonary/Chest: Effort normal and breath sounds normal. No respiratory distress. She has no rales.   Abdominal: Soft. Bowel sounds are normal. There is no tenderness (epigastric tenderness has resolved). There is no rebound and no guarding.   Neurological: She is alert and oriented to person, place, and time.   Skin: Skin is warm and dry. She is not diaphoretic.     Musculoskeletal: She exhibits no tenderness.         Significant Labs:  All pertinent lab results from the last 24 hours have been reviewed.    Significant Imaging:  Imaging results within the past 24 hours have been reviewed.    Assessment/Plan:     Cryoglobulinemic vasculitis  BM biopsy results noted and current suspicion for lymphoplasmacytic lymphoma vs Waldenstrom's, vs multiple myeloma. Given results, will let Heme/Onc decide which chemotherapeutic agent they feel is best.       - Taper prednisone to 50 mg for 2 weeks (final dose 08/16), then to 40 mg qd (starting 08/17)  - Spoke with Allergy/Immunology, recommend to f/u in clinic to re-test IgG levels and determine route of IgG injection if deemed necessary at that time.  - Continue PCP prophylaxis (for steroids) with atovaquone 1500mg suspension, started 7/25/19. Avoiding bactrim in setting of acute renal failure. - Continue ulcer prophylaxis with pantoprazole.  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 Will await final renal bx results. Renal function continuing to improve (Cr 1.7 08/01). Continue to f/u with hepatology for HBV treatment.   - Follow up in rheumatology clinic within ~1 month to determine if further treatment with rituximab will be necessary. Will perform DEXA scan at that time.   - Recommend 1000 U Calcium and 1000 U Vit D3 daily.  - Heme/Onc  recommend delaying LN biopsy at this time due to likely decreased diagnostic yield in light of steroid treatment and due to cryoglobulins being type 3 vs type 1.   - Will repeat IgG levels. If raised in comparison to previous level, will give Rituximab. If decreased, will discuss IVIG/IgG with Allergy/Immunology with plan to give Rituximab after. Will consider antiviral and antifungal therapy if/when Rituximab is initiated.                Jey Marcus MD  Rheumatology  [facility]    Patient seen and examined with resident.  All elements of history, physical exam and medical decision making independently confirmed by me.  See note for details.

## 2019-08-02 NOTE — ASSESSMENT & PLAN NOTE
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  As per Dr. 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 She also has extensive ATN likely secondary to ischemia downstream the affected glomeruli. These features fit with the low C4 and the +RF. 90% of these cases are associated with HCV (but she is negative!) and only ~3% are associated with HBV. Despite her very high kappa/lambda ratio (K:L), she has no features of myeloma cast nephropathy or light chain deposition disease in her biopsy specimen. The high K:L could come from the cryoglobulinemic process (mono and polyclonal LC production). Of note, lymphoma can rarely cause cryoglobulinemic DPGN, so BM biopsy results will be important. No evidence of uric acid tubulopathy (tumor lysis syndrome) either. In retrospect, her NSAIDs use was a major confounding factor in this case."     Recommendations:  - Continue prednisone 60 mg daily   - creatinine downtrending, 1.5 today  - Cryoglobulin positive for type III (non-malignant), possibly associated with HBV (1.2 - 4% HBV patients can present with cryoglobulinemic vasculitis). Will await final renal bx results. Renal function continuing to improve (Cr 1.5 08/02)   - will need outpatient follow up for treatment of Lymphoma, possibly with Rituximab   - Continue treatment with entecavir for Hep B   - Follow up on official Kidney Biopsy report   - BP control to goal <140/90  - underwent PLEX 07/29 (3rd session)  - Limit protein diet given azotemia, no more than 60-80 g per day  - Continue sodium bicarb 1300 mg tid  - Will follow closely  "

## 2019-08-02 NOTE — PLAN OF CARE
Brief heme-onc follow up note     Pt continues to improve clinically with creatinine trending down and good uop. Will defer further hematologic workup to the outpatient setting as cryoglobulin type III suggests alternative etiology (likely Hep B). Still pending final renal biopsy path. Pt expressed desire to f/u at [facility] outpatient for further workup of BM biopsy findings.     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 Ddx includes marginal zone lymphoma  - Patient with new renal failure, has normal IgG.  - Skeletal survey (-) for lytic lesions  - CT neck/C/A/P done showed small mediastinal LN and precarinal LN 1.2x2cm  - CT surgery consulted for possible LN biopsy. Given expected low yield of LN biopsy (possibly because patient has been on steroids), will defer to outpatient with Cryoglobulin type III suggesting non-hematologic malignancy etiology. Discussed the following with CT surgery.  -cold agglutinin negative  --Cryglobulin type III which does not indicate hematologic malignancy as a source  - optho consult for vision setting of cryoglobulinemia revealed no gross pathology  - Continue allopurinol  - Transfuse platelets if < 10k or if < 50k and actively bleeding  - Transfuse pRBCs if Hgb < 7     Elevated serum immunoglobulin free light chains  BM biopsy 7/23/19:  -- MILDLY HYPERCELLULAR MARROW (60%) WITH TRILINEAGE HEMATOPOIESIS.  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  -- ADEQUATE NUMBER OF MEGAKARYOCYTES.  -- STAINABLE IRON IS NOT IDENTIFIED.  -- SEE COMMENT.  COMMENT:  CBC data shows microcytic anemia and thrombocytopenia.  Flow cytometric analysis of bone marrow detects a kappa restricted plasma cell population that expresses , CD19, and bright CD38. CD20 and CD56 are negative. Polytypic B lymphocytes are  detected. T lymphocytes are immunophenotypically unremarkable. Blasts gate is not increased. Flow differential: Lymphocytes 5.9%, Monocytes 4.6%, Granulocytes 85.8%, Blast 1.2%.  Immunohistochemical stains are performed on the biopsy core and clot section for greater sensitivity and further architectural assessment with adequate controls. -positive plasma cells comprise approximately 4-5% of thetotal cellularity. Immunoglobulin kappa and lambda light chains situ hybridization study reveals kappa light chainrestriction is some areas. The lymphoid aggregates are composed of mixed CD20-positive B-cells and CD3-positive T-cells. B-cells are more numerous than T-cells     Iron deficiency anemia  *persistent microcytic anemia hgb 8, MCV 76, retic 6.4 (7/25)  -Iron studies on 7/25:  Iron 30 - 160 ug/dL 40    Transferrin 200 - 375 mg/dL 83Low     TIBC 250 - 450 ug/dL 123Low     Saturated Iron 20 - 50 % 33    -No stainable Iron on BM bx from 7/23, diagnostic of ALLYSON  -Consider starting IV iron inpatient     The following was staffed and discussed with supervising physician Dr. Peguero. Please contact Hematology Consult Fellow with any additional questions.

## 2019-08-02 NOTE — PROGRESS NOTES
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 Hematology was consulted for thrombocytopenia. History was obtained from the patient, patient's cousin, patient's fiancee and chart review.     Pt endorses episodes of fevers, chills, fatigue, dry cough, SOB, and orthopnea for 1 week. She also states that 3 days ago she began to have neck and back muscle cramping and stiffness and decreased urinary output and decided to see her PCP, who diagnosed her with muscle spasms and the flu. A few days later she decided to go to the [facility] ED and was found to have Cr 2.9, increased from baseline Cr 1.0. She was also found to have , WBC 7.9, Plt 33, lactate 0.8. CXR showed bilateral interstitial infiltrates, and subsequent CT chest showed bilateral ground glass opacities. She was treated with rochephin, doxycycline, bumex and transferred here.     Here her labs were significant for Hgb 10, MCV 69, RDW 22.3, Plt 41. WBC 5.98. UA showed 3+ protein, 3+ glucose and 3+ blood but was not consistent with UTI. Cr 4.3 and  consistent with an LACI. Hemolytic labs were unremarkable.     Pt has a history of uncontrolled hypertension and states that she is currently taking lisinopril 20 mg-HCTZ 12.5 mg, hydralazine 25 mg TID, and clonidine 0.2 mg BID. She was treated at [facility] 3 weeks ago for chest pain and was  "admitted. We do not have those records but pt states they had her on multiple drips and did not have a cath procedure. Pt also has a history of unprovoked thrombosis treated at [facility] approximately 4 years ago, none of which is visible in the "care everywhere" section of the chart. She states she had one episode of thrombosis in her left upper extremity that contained "many little clots" and required surgical thrombectomy. She also had a left renal vein thrombosis for which she received a stent. She states that for both episodes she was placed on a heparin drip, did outpatient lovenox and was continued on ASA 81 until now. She states that no one informed her of any findings suggesting a hypercoaguable state or if the clotting was provoked.    Interval History: Patient seen and examined at bedside, creatinine continues to improve. Adequate urine output.     Review of patient's allergies indicates:  No Known Allergies  Current Facility-Administered Medications   Medication Frequency    acetaminophen tablet 650 mg Q6H PRN    albuterol-ipratropium 2.5 mg-0.5 mg/3 mL nebulizer solution 3 mL Q6H PRN    allopurinol tablet 100 mg Daily    artificial tears 0.5 % ophthalmic solution 1 drop TID    atovaquone suspension 1,500 mg Daily    bisacodyl suppository 10 mg Daily PRN    calcium carbonate (OS-DONTE) tablet 500 mg Daily    carvedilol tablet 25 mg BID    cloNIDine tablet 0.1 mg TID    entecavir tablet 0.5 mg Q48H    glucagon (human recombinant) injection 1 mg PRN    glucose chewable tablet 16 g PRN    glucose chewable tablet 24 g PRN    hydrALAZINE tablet 100 mg Q8H    hydrALAZINE tablet 25 mg Q6H PRN    insulin aspart U-100 pen 1-10 Units QID (AC + HS) PRN    insulin aspart U-100 pen 8 Units TIDWM    insulin detemir U-100 pen 5 Units QHS    miconazole nitrate 2% ointment BID    NIFEdipine 24 hr tablet 90 mg Daily    nystatin 100,000 unit/mL suspension 500,000 Units QID    ondansetron disintegrating " tablet 8 mg Q8H PRN    oxyCODONE-acetaminophen 5-325 mg per tablet 1 tablet Q8H PRN    pantoprazole EC tablet 40 mg Before breakfast    predniSONE tablet 50 mg Daily    promethazine (PHENERGAN) 6.25 mg in dextrose 5 % 50 mL IVPB Q6H PRN    ramelteon tablet 8 mg Nightly PRN    sodium bicarbonate tablet 650 mg TID    sodium chloride 0.9% flush 10 mL PRN    Tdap vaccine injection 0.5 mL vaccine x 1 dose    vitamin D 1000 units tablet 1,000 Units Daily       Objective:     Vital Signs (Most Recent):  Temp: 98.4 °F (36.9 °C) (08/02/19 1220)  Pulse: 66 (08/02/19 1220)  Resp: 19 (08/02/19 1220)  BP: 118/60 (08/02/19 1220)  SpO2: 99 % (08/02/19 1220)  O2 Device (Oxygen Therapy): room air (08/01/19 1920) Vital Signs (24h Range):  Temp:  [97.1 °F (36.2 °C)-99.3 °F (37.4 °C)] 98.4 °F (36.9 °C)  Pulse:  [60-77] 66  Resp:  [18-19] 19  SpO2:  [99 %-100 %] 99 %  BP: (118-151)/(59-90) 118/60     Weight: 103 kg (227 lb) (07/29/19 1250)  Body mass index is 36.64 kg/m².  Body surface area is 2.19 meters squared.    I/O last 3 completed shifts:  In: 750 [P.O.:750]  Out: 600 [Urine:600]    Physical Exam   Constitutional: She is oriented to person, place, and time. She appears well-nourished. No distress.   obese   Neck: No JVD present.   Cardiovascular: Normal rate, regular rhythm and normal heart sounds.   Pulmonary/Chest: Effort normal and breath sounds normal. No respiratory distress.   Abdominal: Soft. Bowel sounds are normal. She exhibits no distension.   Musculoskeletal: She exhibits no edema.   Neurological: She is alert and oriented to person, place, and time.   Psychiatric: She has a normal mood and affect. Her behavior is normal.       Significant Labs:  CBC:   Recent Labs   Lab 08/02/19  0540   WBC 13.17*   RBC 3.29*   HGB 8.3*   HCT 25.4*      MCV 77*   MCH 25.2*   MCHC 32.7     CMP:   Recent Labs   Lab 08/02/19  0540   *   CALCIUM 9.0   ALBUMIN 3.5   PROT 5.0*      K 3.6   CO2 27      BUN  "31*   CREATININE 1.5*   ALKPHOS 53*   ALT 53*   AST 15   BILITOT 0.8     All labs within the past 24 hours have been reviewed.     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  As per Dr. 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 She also has extensive ATN likely secondary to ischemia downstream the affected glomeruli. These features fit with the low C4 and the +RF. 90% of these cases are associated with HCV (but she is negative!) and only ~3% are associated with HBV. Despite her very high kappa/lambda ratio (K:L), she has no features of myeloma cast nephropathy or light chain deposition disease in her biopsy specimen. The high K:L could come from the cryoglobulinemic process (mono and polyclonal LC production). Of note, lymphoma can rarely cause cryoglobulinemic DPGN, so BM biopsy results will be important. No evidence of uric acid tubulopathy (tumor lysis syndrome) either. In retrospect, her NSAIDs use was a major confounding factor in this case."     Recommendations:  - Continue prednisone 60 mg daily   - creatinine downtrending, 1.5 today  - Cryoglobulin positive for type III (non-malignant), possibly associated with HBV (1.2 - 4% HBV patients can present with cryoglobulinemic vasculitis). Will await final renal bx results. Renal function continuing to improve (Cr 1.5 08/02)   - will need outpatient follow up for treatment of Lymphoma, possibly with Rituximab   - LN biopsy deferred by Heme-Onc given recent steroid use  - Continue treatment with entecavir for Hep B   - Follow up on official Kidney Biopsy report   - BP control to goal <140/90  - " underwent PLEX 07/29 (3rd session)  - Limit protein diet given azotemia, no more than 60-80 g per day  - Continue sodium bicarb 1300 mg tid  - Will follow closely        Thank you for your consult. I will follow-up with patient. Please contact us if you have any additional questions.    Mustapha Reid MD  Nephrology  [facility]

## 2019-08-02 NOTE — ASSESSMENT & PLAN NOTE
BM biopsy results noted and current suspicion for lymphoplasmacytic lymphoma vs Waldenstrom's, vs multiple myeloma. Given results, will let Heme/Onc decide which chemotherapeutic agent they feel is best.       - Taper prednisone to 50 mg for 2 weeks (final dose 08/16), then to 40 mg qd (starting 08/17)  - Spoke with Allergy/Immunology, recommend to f/u in clinic to re-test IgG levels and determine route of IgG injection if deemed necessary at that time.  - Continue PCP prophylaxis (for steroids) with atovaquone 1500mg suspension, started 7/25/19. Avoiding bactrim in setting of acute renal failure. - Continue ulcer prophylaxis with pantoprazole.  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 Will await final renal bx results. Renal function continuing to improve (Cr 1.7 08/01). Continue to f/u with hepatology for HBV treatment.   - Follow up in rheumatology clinic within ~1 month to determine if further treatment with rituximab will be necessary. Will perform DEXA scan at that time.   - Recommend 1000 U Calcium and 1000 U Vit D3 daily.  - Heme/Onc recommend delaying LN biopsy at this time due to likely decreased diagnostic yield in light of steroid treatment and due to cryoglobulins being type 3 vs type 1.   - Will repeat IgG levels. If raised in comparison to previous level, will give Rituximab. If decreased, will discuss IVIG/IgG with Allergy/Immunology with plan to give Rituximab after. Will consider antiviral and antifungal therapy if/when Rituximab is initiated.

## 2019-08-02 NOTE — PLAN OF CARE
Problem: Adult Inpatient Plan of Care  Goal: Plan of Care Review  Outcome: Ongoing (interventions implemented as appropriate)  Pt receiving insulin with meals. bs remain elevated. No acute distress noted today.

## 2019-08-02 NOTE — SUBJECTIVE & OBJECTIVE
Interval History: Pt is much happier and has improved greatly over the past few days. She is up and walking around and kidney function continues to improve.    Current Facility-Administered Medications   Medication Frequency    acetaminophen tablet 650 mg Q6H PRN    albuterol-ipratropium 2.5 mg-0.5 mg/3 mL nebulizer solution 3 mL Q6H PRN    allopurinol tablet 100 mg Daily    artificial tears 0.5 % ophthalmic solution 1 drop TID    atovaquone suspension 1,500 mg Daily    bisacodyl suppository 10 mg Daily PRN    carvedilol tablet 25 mg BID    cloNIDine tablet 0.1 mg TID    entecavir tablet 0.5 mg Q48H    glucagon (human recombinant) injection 1 mg PRN    glucose chewable tablet 16 g PRN    glucose chewable tablet 24 g PRN    hydrALAZINE tablet 25 mg Q6H PRN    hydrALAZINE tablet 75 mg Q8H    insulin aspart U-100 pen 1-10 Units QID (AC + HS) PRN    insulin aspart U-100 pen 8 Units TIDWM    insulin detemir U-100 pen 5 Units QHS    miconazole nitrate 2% ointment BID    NIFEdipine 24 hr tablet 90 mg Daily    nystatin 100,000 unit/mL suspension 500,000 Units QID    ondansetron disintegrating tablet 8 mg Q8H PRN    oxyCODONE-acetaminophen 5-325 mg per tablet 1 tablet Q8H PRN    pantoprazole EC tablet 40 mg Before breakfast    predniSONE tablet 50 mg Daily    promethazine (PHENERGAN) 6.25 mg in dextrose 5 % 50 mL IVPB Q6H PRN    ramelteon tablet 8 mg Nightly PRN    sodium bicarbonate tablet 650 mg TID    sodium chloride 0.9% flush 10 mL PRN    Tdap vaccine injection 0.5 mL vaccine x 1 dose     Objective:     Vital Signs (Most Recent):  Temp: 97.1 °F (36.2 °C) (08/02/19 0910)  Pulse: 72 (08/02/19 0910)  Resp: 18 (08/02/19 0910)  BP: (!) 151/90 (08/02/19 0910)  SpO2: 100 % (08/02/19 0910)  O2 Device (Oxygen Therapy): room air (08/01/19 1920) Vital Signs (24h Range):  Temp:  [97.1 °F (36.2 °C)-99.3 °F (37.4 °C)] 97.1 °F (36.2 °C)  Pulse:  [60-77] 72  Resp:  [18] 18  SpO2:  [98 %-100 %] 100 %  BP:  (122-151)/(59-90) 151/90     Weight: 103 kg (227 lb) (07/29/19 1250)  Body mass index is 36.64 kg/m².  Body surface area is 2.19 meters squared.      Intake/Output Summary (Last 24 hours) at 8/2/2019 0947  Last data filed at 8/1/2019 1500  Gross per 24 hour   Intake 750 ml   Output 600 ml   Net 150 ml       Physical Exam   Vitals reviewed.  Constitutional: She is oriented to person, place, and time and well-developed, well-nourished, and in no distress. No distress.   HENT:   Head: Normocephalic and atraumatic.   Mouth/Throat: Oropharyngeal exudate (consistent with oral candidiasis; improving) present.   Eyes: EOM are normal. Pupils are equal, round, and reactive to light.       Cardiovascular: Normal rate and regular rhythm.    No murmur heard.  Pulmonary/Chest: Effort normal and breath sounds normal. No respiratory distress. She has no rales.   Abdominal: Soft. Bowel sounds are normal. There is no tenderness (epigastric tenderness has resolved). There is no rebound and no guarding.   Neurological: She is alert and oriented to person, place, and time.   Skin: Skin is warm and dry. She is not diaphoretic.     Musculoskeletal: She exhibits no tenderness.         Significant Labs:  All pertinent lab results from the last 24 hours have been reviewed.    Significant Imaging:  Imaging results within the past 24 hours have been reviewed.

## 2019-08-02 NOTE — SUBJECTIVE & OBJECTIVE
Interval History: Patient seen and examined at bedside, creatinine continues to improve. Adequate urine output.     Review of patient's allergies indicates:  No Known Allergies  Current Facility-Administered Medications   Medication Frequency    acetaminophen tablet 650 mg Q6H PRN    albuterol-ipratropium 2.5 mg-0.5 mg/3 mL nebulizer solution 3 mL Q6H PRN    allopurinol tablet 100 mg Daily    artificial tears 0.5 % ophthalmic solution 1 drop TID    atovaquone suspension 1,500 mg Daily    bisacodyl suppository 10 mg Daily PRN    calcium carbonate (OS-DONTE) tablet 500 mg Daily    carvedilol tablet 25 mg BID    cloNIDine tablet 0.1 mg TID    entecavir tablet 0.5 mg Q48H    glucagon (human recombinant) injection 1 mg PRN    glucose chewable tablet 16 g PRN    glucose chewable tablet 24 g PRN    hydrALAZINE tablet 100 mg Q8H    hydrALAZINE tablet 25 mg Q6H PRN    insulin aspart U-100 pen 1-10 Units QID (AC + HS) PRN    insulin aspart U-100 pen 8 Units TIDWM    insulin detemir U-100 pen 5 Units QHS    miconazole nitrate 2% ointment BID    NIFEdipine 24 hr tablet 90 mg Daily    nystatin 100,000 unit/mL suspension 500,000 Units QID    ondansetron disintegrating tablet 8 mg Q8H PRN    oxyCODONE-acetaminophen 5-325 mg per tablet 1 tablet Q8H PRN    pantoprazole EC tablet 40 mg Before breakfast    predniSONE tablet 50 mg Daily    promethazine (PHENERGAN) 6.25 mg in dextrose 5 % 50 mL IVPB Q6H PRN    ramelteon tablet 8 mg Nightly PRN    sodium bicarbonate tablet 650 mg TID    sodium chloride 0.9% flush 10 mL PRN    Tdap vaccine injection 0.5 mL vaccine x 1 dose    vitamin D 1000 units tablet 1,000 Units Daily       Objective:     Vital Signs (Most Recent):  Temp: 98.4 °F (36.9 °C) (08/02/19 1220)  Pulse: 66 (08/02/19 1220)  Resp: 19 (08/02/19 1220)  BP: 118/60 (08/02/19 1220)  SpO2: 99 % (08/02/19 1220)  O2 Device (Oxygen Therapy): room air (08/01/19 1920) Vital Signs (24h Range):  Temp:  [97.1 °F  (36.2 °C)-99.3 °F (37.4 °C)] 98.4 °F (36.9 °C)  Pulse:  [60-77] 66  Resp:  [18-19] 19  SpO2:  [99 %-100 %] 99 %  BP: (118-151)/(59-90) 118/60     Weight: 103 kg (227 lb) (07/29/19 1250)  Body mass index is 36.64 kg/m².  Body surface area is 2.19 meters squared.    I/O last 3 completed shifts:  In: 750 [P.O.:750]  Out: 600 [Urine:600]    Physical Exam   Constitutional: She is oriented to person, place, and time. She appears well-nourished. No distress.   obese   Neck: No JVD present.   Cardiovascular: Normal rate, regular rhythm and normal heart sounds.   Pulmonary/Chest: Effort normal and breath sounds normal. No respiratory distress.   Abdominal: Soft. Bowel sounds are normal. She exhibits no distension.   Musculoskeletal: She exhibits no edema.   Neurological: She is alert and oriented to person, place, and time.   Psychiatric: She has a normal mood and affect. Her behavior is normal.       Significant Labs:  CBC:   Recent Labs   Lab 08/02/19  0540   WBC 13.17*   RBC 3.29*   HGB 8.3*   HCT 25.4*      MCV 77*   MCH 25.2*   MCHC 32.7     CMP:   Recent Labs   Lab 08/02/19  0540   *   CALCIUM 9.0   ALBUMIN 3.5   PROT 5.0*      K 3.6   CO2 27      BUN 31*   CREATININE 1.5*   ALKPHOS 53*   ALT 53*   AST 15   BILITOT 0.8     All labs within the past 24 hours have been reviewed.     Significant Imaging:  Labs: Reviewed

## 2019-08-02 NOTE — NURSING
AAOX4. Denies pain. VSS. Tolerated meds well.  No complaints throughout the night. Safety measures enforced resulting in no injuries.  Bed in lowest position, call light within reach, bedside table near, bed locked, nurse lab drawn obtained.  IV cdi; saline locked.  No acute distress. Will continue to monitor.

## 2019-08-02 NOTE — PLAN OF CARE
08/02/19 1326   Discharge Reassessment   Assessment Type Discharge Planning Reassessment   Discharge Plan A Home with family   Discharge Plan B Home with family   Anticipated Discharge Disposition Home   Post-Acute Status   Other Status Community Services  (patient is uninsured)

## 2019-08-03 PROBLEM — E87.6 HYPOKALEMIA: Status: ACTIVE | Noted: 2019-08-03

## 2019-08-03 LAB
ALBUMIN SERPL BCP-MCNC: 3.3 G/DL (ref 3.5–5.2)
ALP SERPL-CCNC: 58 U/L (ref 55–135)
ALT SERPL W/O P-5'-P-CCNC: 51 U/L (ref 10–44)
ANION GAP SERPL CALC-SCNC: 11 MMOL/L (ref 8–16)
AST SERPL-CCNC: 21 U/L (ref 10–40)
BASOPHILS # BLD AUTO: 0 K/UL (ref 0–0.2)
BASOPHILS NFR BLD: 0 % (ref 0–1.9)
BILIRUB SERPL-MCNC: 0.7 MG/DL (ref 0.1–1)
BNP SERPL-MCNC: 282 PG/ML (ref 0–99)
BUN SERPL-MCNC: 27 MG/DL (ref 6–20)
CALCIUM SERPL-MCNC: 8.5 MG/DL (ref 8.7–10.5)
CHLORIDE SERPL-SCNC: 104 MMOL/L (ref 95–110)
CK MB SERPL-MCNC: 0.7 NG/ML (ref 0.1–6.5)
CK MB SERPL-RTO: 3.3 % (ref 0–5)
CK SERPL-CCNC: 21 U/L (ref 20–180)
CO2 SERPL-SCNC: 25 MMOL/L (ref 23–29)
CREAT SERPL-MCNC: 1.5 MG/DL (ref 0.5–1.4)
DIFFERENTIAL METHOD: ABNORMAL
EOSINOPHIL # BLD AUTO: 0 K/UL (ref 0–0.5)
EOSINOPHIL NFR BLD: 0.4 % (ref 0–8)
ERYTHROCYTE [DISTWIDTH] IN BLOOD BY AUTOMATED COUNT: 29.2 % (ref 11.5–14.5)
EST. GFR  (AFRICAN AMERICAN): 45.8 ML/MIN/1.73 M^2
EST. GFR  (NON AFRICAN AMERICAN): 39.8 ML/MIN/1.73 M^2
GLUCOSE SERPL-MCNC: 172 MG/DL (ref 70–110)
HCT VFR BLD AUTO: 25.5 % (ref 37–48.5)
HGB BLD-MCNC: 8 G/DL (ref 12–16)
IGA SERPL-MCNC: 44 MG/DL (ref 40–350)
IGG SERPL-MCNC: 152 MG/DL (ref 650–1600)
IGM SERPL-MCNC: 173 MG/DL (ref 50–300)
IMM GRANULOCYTES # BLD AUTO: 0.12 K/UL (ref 0–0.04)
IMM GRANULOCYTES NFR BLD AUTO: 1.3 % (ref 0–0.5)
LYMPHOCYTES # BLD AUTO: 2.1 K/UL (ref 1–4.8)
LYMPHOCYTES NFR BLD: 22.3 % (ref 18–48)
MCH RBC QN AUTO: 24.7 PG (ref 27–31)
MCHC RBC AUTO-ENTMCNC: 31.4 G/DL (ref 32–36)
MCV RBC AUTO: 79 FL (ref 82–98)
MONOCYTES # BLD AUTO: 1.1 K/UL (ref 0.3–1)
MONOCYTES NFR BLD: 11.1 % (ref 4–15)
NEUTROPHILS # BLD AUTO: 6.2 K/UL (ref 1.8–7.7)
NEUTROPHILS NFR BLD: 64.9 % (ref 38–73)
NRBC BLD-RTO: 0 /100 WBC
PHOSPHATE SERPL-MCNC: 2.9 MG/DL (ref 2.7–4.5)
PLATELET # BLD AUTO: 180 K/UL (ref 150–350)
PMV BLD AUTO: 9.9 FL (ref 9.2–12.9)
POCT GLUCOSE: 117 MG/DL (ref 70–110)
POCT GLUCOSE: 172 MG/DL (ref 70–110)
POCT GLUCOSE: 268 MG/DL (ref 70–110)
POTASSIUM SERPL-SCNC: 2.9 MMOL/L (ref 3.5–5.1)
PROT SERPL-MCNC: 4.9 G/DL (ref 6–8.4)
RBC # BLD AUTO: 3.24 M/UL (ref 4–5.4)
SODIUM SERPL-SCNC: 140 MMOL/L (ref 136–145)
TROPONIN I SERPL DL<=0.01 NG/ML-MCNC: 0.03 NG/ML (ref 0–0.03)
WBC # BLD AUTO: 9.55 K/UL (ref 3.9–12.7)

## 2019-08-03 PROCEDURE — 83880 ASSAY OF NATRIURETIC PEPTIDE: CPT

## 2019-08-03 PROCEDURE — 99232 PR SUBSEQUENT HOSPITAL CARE,LEVL II: ICD-10-PCS | Mod: ,,, | Performed by: HOSPITALIST

## 2019-08-03 PROCEDURE — 25000003 PHARM REV CODE 250: Performed by: STUDENT IN AN ORGANIZED HEALTH CARE EDUCATION/TRAINING PROGRAM

## 2019-08-03 PROCEDURE — 82784 ASSAY IGA/IGD/IGG/IGM EACH: CPT | Mod: 59

## 2019-08-03 PROCEDURE — 93010 EKG 12-LEAD: ICD-10-PCS | Mod: ,,, | Performed by: INTERNAL MEDICINE

## 2019-08-03 PROCEDURE — 84100 ASSAY OF PHOSPHORUS: CPT

## 2019-08-03 PROCEDURE — 82784 ASSAY IGA/IGD/IGG/IGM EACH: CPT

## 2019-08-03 PROCEDURE — 82550 ASSAY OF CK (CPK): CPT

## 2019-08-03 PROCEDURE — 93010 ELECTROCARDIOGRAM REPORT: CPT | Mod: ,,, | Performed by: INTERNAL MEDICINE

## 2019-08-03 PROCEDURE — 63600175 PHARM REV CODE 636 W HCPCS: Performed by: HOSPITALIST

## 2019-08-03 PROCEDURE — 25000003 PHARM REV CODE 250: Performed by: HOSPITALIST

## 2019-08-03 PROCEDURE — 99232 SBSQ HOSP IP/OBS MODERATE 35: CPT | Mod: ,,, | Performed by: HOSPITALIST

## 2019-08-03 PROCEDURE — 85025 COMPLETE CBC W/AUTO DIFF WBC: CPT

## 2019-08-03 PROCEDURE — 80053 COMPREHEN METABOLIC PANEL: CPT

## 2019-08-03 PROCEDURE — 82553 CREATINE MB FRACTION: CPT

## 2019-08-03 PROCEDURE — 36415 COLL VENOUS BLD VENIPUNCTURE: CPT

## 2019-08-03 PROCEDURE — 93005 ELECTROCARDIOGRAM TRACING: CPT

## 2019-08-03 PROCEDURE — 84484 ASSAY OF TROPONIN QUANT: CPT

## 2019-08-03 PROCEDURE — 11000001 HC ACUTE MED/SURG PRIVATE ROOM

## 2019-08-03 RX ORDER — NITROGLYCERIN 0.4 MG/1
0.4 TABLET SUBLINGUAL EVERY 5 MIN PRN
Status: DISCONTINUED | OUTPATIENT
Start: 2019-08-03 | End: 2019-08-07 | Stop reason: HOSPADM

## 2019-08-03 RX ORDER — ENTECAVIR 0.5 MG/1
0.5 TABLET, FILM COATED ORAL DAILY
Status: DISCONTINUED | OUTPATIENT
Start: 2019-08-03 | End: 2019-08-07 | Stop reason: HOSPADM

## 2019-08-03 RX ORDER — POTASSIUM CHLORIDE 750 MG/1
30 CAPSULE, EXTENDED RELEASE ORAL 3 TIMES DAILY
Status: COMPLETED | OUTPATIENT
Start: 2019-08-03 | End: 2019-08-03

## 2019-08-03 RX ORDER — RAMELTEON 8 MG/1
8 TABLET ORAL NIGHTLY
Status: DISCONTINUED | OUTPATIENT
Start: 2019-08-03 | End: 2019-08-07 | Stop reason: HOSPADM

## 2019-08-03 RX ADMIN — MICONAZOLE NITRATE: 20 OINTMENT TOPICAL at 09:08

## 2019-08-03 RX ADMIN — CARVEDILOL 25 MG: 25 TABLET, FILM COATED ORAL at 10:08

## 2019-08-03 RX ADMIN — PANTOPRAZOLE SODIUM 40 MG: 40 TABLET, DELAYED RELEASE ORAL at 06:08

## 2019-08-03 RX ADMIN — CLONIDINE HYDROCHLORIDE 0.1 MG: 0.1 TABLET ORAL at 10:08

## 2019-08-03 RX ADMIN — HYPROMELLOSE 2910 1 DROP: 5 SOLUTION OPHTHALMIC at 10:08

## 2019-08-03 RX ADMIN — NYSTATIN 500000 UNITS: 100000 SUSPENSION ORAL at 01:08

## 2019-08-03 RX ADMIN — INSULIN ASPART 11 UNITS: 100 INJECTION, SOLUTION INTRAVENOUS; SUBCUTANEOUS at 09:08

## 2019-08-03 RX ADMIN — MELATONIN 1000 UNITS: at 10:08

## 2019-08-03 RX ADMIN — SODIUM BICARBONATE 650 MG TABLET 650 MG: at 02:08

## 2019-08-03 RX ADMIN — HYPROMELLOSE 2910 1 DROP: 5 SOLUTION OPHTHALMIC at 09:08

## 2019-08-03 RX ADMIN — PREDNISONE 50 MG: 20 TABLET ORAL at 10:08

## 2019-08-03 RX ADMIN — INSULIN ASPART 15 UNITS: 100 INJECTION, SOLUTION INTRAVENOUS; SUBCUTANEOUS at 01:08

## 2019-08-03 RX ADMIN — CLONIDINE HYDROCHLORIDE 0.1 MG: 0.1 TABLET ORAL at 02:08

## 2019-08-03 RX ADMIN — RAMELTEON 8 MG: 8 TABLET, FILM COATED ORAL at 09:08

## 2019-08-03 RX ADMIN — HYPROMELLOSE 2910 1 DROP: 5 SOLUTION OPHTHALMIC at 04:08

## 2019-08-03 RX ADMIN — INSULIN DETEMIR 5 UNITS: 100 INJECTION, SOLUTION SUBCUTANEOUS at 09:08

## 2019-08-03 RX ADMIN — HYDRALAZINE HYDROCHLORIDE 100 MG: 50 TABLET ORAL at 01:08

## 2019-08-03 RX ADMIN — ENTECAVIR 0.5 MG: 0.5 TABLET ORAL at 10:08

## 2019-08-03 RX ADMIN — ATOVAQUONE 1500 MG: 750 SUSPENSION ORAL at 10:08

## 2019-08-03 RX ADMIN — CARVEDILOL 25 MG: 25 TABLET, FILM COATED ORAL at 09:08

## 2019-08-03 RX ADMIN — SODIUM BICARBONATE 650 MG TABLET 650 MG: at 10:08

## 2019-08-03 RX ADMIN — POTASSIUM CHLORIDE 30 MEQ: 750 CAPSULE, EXTENDED RELEASE ORAL at 01:08

## 2019-08-03 RX ADMIN — SODIUM BICARBONATE 650 MG TABLET 650 MG: at 04:08

## 2019-08-03 RX ADMIN — POTASSIUM CHLORIDE 30 MEQ: 750 CAPSULE, EXTENDED RELEASE ORAL at 09:08

## 2019-08-03 RX ADMIN — INSULIN ASPART 11 UNITS: 100 INJECTION, SOLUTION INTRAVENOUS; SUBCUTANEOUS at 04:08

## 2019-08-03 RX ADMIN — NIFEDIPINE 90 MG: 60 TABLET, FILM COATED, EXTENDED RELEASE ORAL at 10:08

## 2019-08-03 RX ADMIN — CALCIUM 1000 MG: 500 TABLET ORAL at 10:08

## 2019-08-03 RX ADMIN — CLONIDINE HYDROCHLORIDE 0.1 MG: 0.1 TABLET ORAL at 04:08

## 2019-08-03 RX ADMIN — ALLOPURINOL 100 MG: 100 TABLET ORAL at 10:08

## 2019-08-03 RX ADMIN — POTASSIUM CHLORIDE 30 MEQ: 750 CAPSULE, EXTENDED RELEASE ORAL at 04:08

## 2019-08-03 RX ADMIN — RAMELTEON 8 MG: 8 TABLET, FILM COATED ORAL at 02:08

## 2019-08-03 RX ADMIN — NYSTATIN 500000 UNITS: 100000 SUSPENSION ORAL at 10:08

## 2019-08-03 RX ADMIN — HYDRALAZINE HYDROCHLORIDE 100 MG: 50 TABLET ORAL at 06:08

## 2019-08-03 RX ADMIN — SODIUM BICARBONATE 650 MG TABLET 650 MG: at 09:08

## 2019-08-03 RX ADMIN — HYDRALAZINE HYDROCHLORIDE 100 MG: 50 TABLET ORAL at 09:08

## 2019-08-03 RX ADMIN — NYSTATIN 500000 UNITS: 100000 SUSPENSION ORAL at 09:08

## 2019-08-03 RX ADMIN — INSULIN ASPART 6 UNITS: 100 INJECTION, SOLUTION INTRAVENOUS; SUBCUTANEOUS at 04:08

## 2019-08-03 RX ADMIN — CLONIDINE HYDROCHLORIDE 0.1 MG: 0.1 TABLET ORAL at 09:08

## 2019-08-03 RX ADMIN — NYSTATIN 500000 UNITS: 100000 SUSPENSION ORAL at 04:08

## 2019-08-03 NOTE — ASSESSMENT & PLAN NOTE
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  R/o malignancy.  Differential diagnosis includes lymphoplasmacytic lymphoma/Waldenstrom's macroglobulinemia, multiple myeloma, and marginal zone lymphoma.   7/26 CT chest abd pelvis ordered to look for lymphadenopathy, only notable for small mediastinal lymph nodes/pre-carinal/hildar LN. and bone survey w/o acute findings.   - 7/26 rheum deferred rituximab choice  to heme.  Per rheum:IgG decreased at 185. They request allergy/immunology consult (spoke to them 7/29) regarding ability to start rituximab with decreased IgG.  -Cryoglobulin subtype is Type 3, renal biopsy pending, discussed with Dr. Rea (hematology) based on cryoglobulin subtype likely not a primary hematologic malignancy causing cryoglobulinemia.  Mediastinoscopy or LN biopsy is not recommended as an inpatient at this time.  May be necessary/indicated on outpatient bases. Further management of crygolobulinemia per Nephrology or Rheum services.

## 2019-08-03 NOTE — ASSESSMENT & PLAN NOTE
hypertensive at admission and per patient, has been undergoing multiple recent medication changes due to HTN as outpatient.  Suspect initially may have been exacerbated by NSAID use, now concern for multifactorial cause including renal failure with volume overload, steroid use, and inadequate dosing of home regimen.  Home regimen includes: lisinopril-HCTZ 20-12.5mg, clonidine 0.2mg bid, and hydralazine 25mg tid    -Nifedipine @ 90mg,   -Coreg 25mg bid, Clonidine 0.1 TID  -Hydralazine increased to 100mg q8hrs.

## 2019-08-03 NOTE — CARE UPDATE
Notified by RN that patient with c/o of 5/10 chest pain, burning quality.     EKG  Cardiac enzymes ordered.     EKG is NSR, no ST_T-wave changes.     Troponin is .001 above normal limit and on repeat phone call pts symptoms improving.   NTG PRN placed but pt declined.  Will monitor, lower suspicion for cardiac etiology at this time.     AM potassium 2.9, will treat accordingly        Lele Clay M.D.  Attending Physician  [facility] Medicine Dept.  Pager: [phone]  Spectralink -[phone]

## 2019-08-03 NOTE — PLAN OF CARE
Problem: Adult Inpatient Plan of Care  Goal: Plan of Care Review  Outcome: Ongoing (interventions implemented as appropriate)  Pt insulin adjusted today per md. Pt has had quiet day. Safety maintained. Pt had one episode of burning in chest today. ekg negative and troponin negative. Pain subsided on its own. Vs wnl. No distress noted today.

## 2019-08-03 NOTE — CONSULTS
Consult received on patient's skin breakdown to upper medial thighs. Moisture associated dermatitis noted to upper medial thighs, appears to be healing. Patient has been on barrier cream with miconazole since 7/31/19, recommend continuing barrier cream with miconazole for additional week to two weeks. Patient on nystatin orally since 7/30. Recommend no restrictive underwear or briefs, if wearing underwear would wear only cotton underwear. (patient denied any questions).  Updated Dr. Clay.   Reconsult wound care if no improvement after a week of treatment with barrier cream with miconazole.

## 2019-08-03 NOTE — SUBJECTIVE & OBJECTIVE
Interval History:   -no acute complaints    -initially plans to remove trialysis, this afternoon, Rheumatology with plans for potential IVIG and likely rituximab infusion inpatient in coming days pending repeat labwork.     Cr downtrending @ 1.5.     Pt reports thigh rash    Review of Systems   Constitutional: Negative for fatigue and fever.   Respiratory: Negative for shortness of breath.    Cardiovascular: Negative for chest pain.   Gastrointestinal: Negative for abdominal pain.     Objective:     Vital Signs (Most Recent):  Temp: 97.6 °F (36.4 °C) (08/02/19 1706)  Pulse: 73 (08/02/19 1706)  Resp: 17 (08/02/19 1706)  BP: 139/66 (08/02/19 1706)  SpO2: (!) 94 % (08/02/19 1706) Vital Signs (24h Range):  Temp:  [97.1 °F (36.2 °C)-98.4 °F (36.9 °C)] 97.6 °F (36.4 °C)  Pulse:  [60-73] 73  Resp:  [17-19] 17  SpO2:  [94 %-100 %] 94 %  BP: (118-151)/(60-90) 139/66     Weight: 103 kg (227 lb)  Body mass index is 36.64 kg/m².  No intake or output data in the 24 hours ending 08/02/19 2132   Physical Exam   Constitutional: She is oriented to person, place, and time. She appears well-nourished. No distress.   obese   Cardiovascular: Normal rate, regular rhythm and normal heart sounds.   Pulmonary/Chest: Effort normal and breath sounds normal. No respiratory distress.   Abdominal: Soft. Bowel sounds are normal. She exhibits no distension.   Musculoskeletal: She exhibits no edema.   Neurological: She is alert and oriented to person, place, and time.   Skin:   Medial thigh rash scaly, hypopigmentation   Psychiatric: She has a normal mood and affect. Her behavior is normal.       Significant Labs:   All pertinent labs within the past 24 hours have been reviewed.     Significant Imaging: I have reviewed and interpreted all pertinent imaging results/findings within the past 24 hours.

## 2019-08-03 NOTE — PROGRESS NOTES
[facility] Hospital Medicine  Progress Note    Patient Name: Kendy Vital  MRN: [id number]  Patient Class: IP- Inpatient   Admission Date: 7/19/2019  Length of Stay: 14 days  Attending Physician: Lele Clay MD  Primary Care Provider: To Obtain [facility] Medicine Team: [facility] HOSP MED R Lele Clay MD    Subjective:     Principal Problem:Acute (diffuse) proliferative glomerulonephritis      HPI:  Ms. Vital is a 53 year old female with hypertension, anemia, and history of leiomyoma of the uterus who presents to ICU as a transfer from [facility] for evaluation of thrombocytopenia. Patient reports that prior to going to the hospital 3 days ago that she was experiencing symptoms of chills, feeling febrile, dry cough, shortness of breath and occasional nose bleeds for roughly 2 weeks. She also reports easy fatigueability and difficulty breathing when laying flat; currently sleeps with 2 pillows. Patient presented to hospital in [place] when her symptoms did not resolve. Initial work up showed a , worsening kidney function with creatinine of 2.9, and thrombocytopenia with platelets of 33k. In addition, chest x-ray showed interstitial opacities and follow up CT showed perihilar ground glass opacity. Patient was treated with rocephin and doxycycline as well as Bumex q12 due to her heart failure type symptoms. Lab work at the time showed WBC of 7.9 and a lactate of .8. In addition, she tested positive for strep A. Patient was transferred for further evaluation of her thrombocytopenia with concern for TTP and possible need for plasmapheresis.     At time of exam, patient is properly oriented to person time and places. She endorses headache, generalized myalgias, nausea and orthopnea. She denies SOB while sitting up, chest pain, abdominal pain, vomiting, and diarrhea. She has not felt febrile since 1 week prior to hospital stay.         Overview/Hospital Course:  Ms. Vital presented as transfer to ICU for suspected TTP. At that time, she had severe thrombocytopenia, renal failure, and bilateral infiltrates on chest CT concerning for PNA. She was stepped down when repeat CBC revealed normalizing platelets and hemolysis labs were unremarkable. Initially placed on vanc/zosyn/azithromycin for PNA, which has been de-escalated to rocephin/azithromycin for CAP. Her course has been complicated by ARF of unclear cause. 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   7/23 Nephrology biopsed  and have initiated on prednisone empirically.  7/24 Heme did BMBx        Interval History:   -no acute complaints    -initially plans to remove trialysis, this afternoon, Rheumatology with plans for potential IVIG and likely rituximab infusion inpatient in coming days pending repeat labwork.     Cr downtrending @ 1.5.     Pt reports thigh rash    Review of Systems   Constitutional: Negative for fatigue and fever.   Respiratory: Negative for shortness of breath.    Cardiovascular: Negative for chest pain.   Gastrointestinal: Negative for abdominal pain.     Objective:     Vital Signs (Most Recent):  Temp: 97.6 °F (36.4 °C) (08/02/19 1706)  Pulse: 73 (08/02/19 1706)  Resp: 17 (08/02/19 1706)  BP: 139/66 (08/02/19 1706)  SpO2: (!) 94 % (08/02/19 1706) Vital Signs (24h Range):  Temp:  [97.1 °F (36.2 °C)-98.4 °F (36.9 °C)] 97.6 °F (36.4 °C)  Pulse:  [60-73] 73  Resp:  [17-19] 17  SpO2:  [94 %-100 %] 94 %  BP: (118-151)/(60-90) 139/66     Weight: 103 kg (227 lb)  Body mass index is 36.64 kg/m².  No intake or output data in the 24 hours ending 08/02/19 2132   Physical Exam   Constitutional: She is oriented to person, place, and time. She appears well-nourished. No distress.   obese   Cardiovascular: Normal rate, regular rhythm and normal heart sounds.   Pulmonary/Chest: Effort normal and breath sounds normal. No respiratory distress.   Abdominal: Soft. Bowel sounds are  normal. She exhibits no distension.   Musculoskeletal: She exhibits no edema.   Neurological: She is alert and oriented to person, place, and time.   Skin:   Medial thigh rash scaly, hypopigmentation   Psychiatric: She has a normal mood and affect. Her behavior is normal.       Significant Labs:   All pertinent labs within the past 24 hours have been reviewed.     Significant Imaging: I have reviewed and interpreted all pertinent imaging results/findings within the past 24 hours.      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 - Awaiting final renal biopsy report  baseline creatinine of 1.0 roughly 1 month ago. BUN/Cr peaked at 154/6.0 and improved the day after PLEX started.  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  HBV is uncommon to cause this.  LPL/WM or even myeloma could potentially be the source of her cryoglobulins.  Rheumatology consulted,awaiting pending serologies      B-cell lymphoproliferative disorder  BMBX: MONOCLONAL PLASMA CELL POPULATION WITH ATYPICAL LYMPHOID AGGREGATES.  R/o malignancy.  Differential diagnosis includes lymphoplasmacytic lymphoma/Waldenstrom's macroglobulinemia, multiple myeloma, and marginal zone lymphoma.   7/26 CT chest abd pelvis ordered to look for lymphadenopathy, only notable for small mediastinal lymph nodes/pre-carinal/hildar LN. and bone survey  "w/o acute findings.   - 7/26 rheum deferred rituximab choice  to heme.  Per rheum:IgG decreased at 185. They request allergy/immunology consult (spoke to them 7/29) regarding ability to start rituximab with decreased IgG.  -Cryoglobulin subtype is Type 3, renal biopsy pending, discussed with Dr. Rea (hematology) based on cryoglobulin subtype likely not a primary hematologic malignancy causing cryoglobulinemia.  Mediastinoscopy or LN biopsy is not recommended as an inpatient at this time.  May be necessary/indicated on outpatient bases. Further management of crygolobulinemia per Nephrology or Rheum services.       Thrombocytopenia, unspecified  Plt on admission 41. Resolved after treatments.  Seen by Heme/onc.   HIT Ab negative  - monitor      Other specified anemias  Stable.  FERRITIN 161, RETIC 4.7 (H), Bili 1, FOLATE 11.1, VITAMIN B12 843  Iron 40, sat 33%  - monitor              Steroid-induced hyperglycemia  On solumederol 1g qd she got very hyperglycemic (>300) and hypertensive.  A1c 5.7.  Continue on Prednisone.   -- titrated meds      Elevated serum immunoglobulin free light chains  See heme malignancy      Essential hypertension  hypertensive at admission and per patient, has been undergoing multiple recent medication changes due to HTN as outpatient.  Suspect initially may have been exacerbated by NSAID use, now concern for multifactorial cause including renal failure with volume overload, steroid use, and inadequate dosing of home regimen.  Home regimen includes: lisinopril-HCTZ 20-12.5mg, clonidine 0.2mg bid, and hydralazine 25mg tid    -Nifedipine @ 90mg,   -Coreg 25mg bid, Clonidine 0.1 TID  -Hydralazine increased to 100mg q8hrs.     Chronic hepatitis B  Hepatology consulted to see if her HBV can be causing the cryoglubins and if she needs treatment.  - entecavir 0.5 mg every 72 hour (renal dose)  - f/u with liver as outpt    Per hepatology:  "Cryoglobulinemia usually associated with HCV, but rare " "association with Hep B. Presence of glomerulonephritis on preliminary renal biopsy. Overall, less likely that Hep B is related to this as ALT normal and viral load very low.  - Continue chronic Hep B treatment as patient is on immunosuppression, especially while on rituximab.  Continue entecavir 0.5mg q72hrs (renally dosed). Monitor renal function and dose-adjust accordingly.  - Patient will need HCC screening going forward given her ethnicity (Liver U/S and AFP every 6 months)"      Immunosuppression  Recs per ID     1. Serologies in process:          - Quantiferon Gold is pending.  If positive, please consult ID. If  Indeterminate, please draw T spot.  If T spot positive, please consult ID.            - Strongyloides IgG is pending. If positive, please consult ID to initiate treatment with Ivermectin.         2. If the patient is anticipated to remain on a prolonged course of high dose systemic steroids (> 20 mg prednisone), recommend PCP prophylaxis with Atovaquone 1500 mg PO once daily. Avoid Bactrim in setting of ARF.     3. Continue Entecavir per Hepatology      4. Immunizations recommended:              - Influenza annually [NOT IN STOCK INPATIENT]              - Tetanus booster today (given ) and again every 10 years              - Prevnar 13 today (given ) followed by Pneumovax 23 in 8 weeks with booster every 5 years.              - Hepatitis A vaccine today (given ) and in 6 months              - Shingrix (two doses at 0 and 2-6 months) [NOT AVAILABLE INPATIENT]      Rash of groin  Notes rash to medial thighs, some skin breakdown, discussed with wound care  -Miconazole ointment ordered to act as both barrier cream and antifungal  - apply BID      Grief reaction  Her brother recently .   service came by to talk to her.      History of renal vein thrombosis  Per heme research  "Pt also has a history of unprovoked thrombosis treated at [facility] approximately 4 years ago, none " "of which is visible in the "care everywhere" section of the chart. She states she had one episode of thrombosis in her left upper extremity that contained "many little clots" and required surgical thrombectomy. She also had a left renal vein thrombosis for which she received a stent. She states that for both episodes she was placed on a heparin drip, did outpatient lovenox and was continued on ASA 81 until now. She states that no one informed her of any findings suggesting a hypercoaguable state or if the clotting was provoked."  -Given her significant thromboembolic history, rheum 7/26 would like to work her up for antiphopholipid antibody syndrome. They ordered levels.   -She also had symptoms of sicca syndromes (dry eyes, dry mouth). Rheum will check for sjogrens syndrome (SSA/SSB)      Elevated uric acid in blood  -started on allopurinol  -downtrending from admit level >14.         VTE Risk Mitigation (From admission, onward)        Ordered     Place sequential compression device  Until discontinued      07/19/19 0533     IP VTE LOW RISK PATIENT  Once      07/19/19 0533                Lele Clay MD  Department of Hospital Medicine   [facility]  "

## 2019-08-04 LAB
ALBUMIN SERPL BCP-MCNC: 3.3 G/DL (ref 3.5–5.2)
ALP SERPL-CCNC: 66 U/L (ref 55–135)
ALT SERPL W/O P-5'-P-CCNC: 46 U/L (ref 10–44)
ANION GAP SERPL CALC-SCNC: 10 MMOL/L (ref 8–16)
AST SERPL-CCNC: 15 U/L (ref 10–40)
BASOPHILS # BLD AUTO: 0 K/UL (ref 0–0.2)
BASOPHILS NFR BLD: 0 % (ref 0–1.9)
BILIRUB SERPL-MCNC: 0.5 MG/DL (ref 0.1–1)
BUN SERPL-MCNC: 30 MG/DL (ref 6–20)
CALCIUM SERPL-MCNC: 8.7 MG/DL (ref 8.7–10.5)
CHLORIDE SERPL-SCNC: 108 MMOL/L (ref 95–110)
CO2 SERPL-SCNC: 23 MMOL/L (ref 23–29)
CREAT SERPL-MCNC: 1.5 MG/DL (ref 0.5–1.4)
DIFFERENTIAL METHOD: ABNORMAL
EOSINOPHIL # BLD AUTO: 0 K/UL (ref 0–0.5)
EOSINOPHIL NFR BLD: 0.1 % (ref 0–8)
ERYTHROCYTE [DISTWIDTH] IN BLOOD BY AUTOMATED COUNT: 29.9 % (ref 11.5–14.5)
EST. GFR  (AFRICAN AMERICAN): 45.8 ML/MIN/1.73 M^2
EST. GFR  (NON AFRICAN AMERICAN): 39.8 ML/MIN/1.73 M^2
GLUCOSE SERPL-MCNC: 217 MG/DL (ref 70–110)
HCT VFR BLD AUTO: 26.9 % (ref 37–48.5)
HGB BLD-MCNC: 8.2 G/DL (ref 12–16)
IMM GRANULOCYTES # BLD AUTO: 0.16 K/UL (ref 0–0.04)
IMM GRANULOCYTES NFR BLD AUTO: 1.6 % (ref 0–0.5)
LYMPHOCYTES # BLD AUTO: 1.9 K/UL (ref 1–4.8)
LYMPHOCYTES NFR BLD: 18.9 % (ref 18–48)
MCH RBC QN AUTO: 24.8 PG (ref 27–31)
MCHC RBC AUTO-ENTMCNC: 30.5 G/DL (ref 32–36)
MCV RBC AUTO: 82 FL (ref 82–98)
MONOCYTES # BLD AUTO: 0.9 K/UL (ref 0.3–1)
MONOCYTES NFR BLD: 9.1 % (ref 4–15)
NEUTROPHILS # BLD AUTO: 7.2 K/UL (ref 1.8–7.7)
NEUTROPHILS NFR BLD: 70.3 % (ref 38–73)
NRBC BLD-RTO: 0 /100 WBC
PHOSPHATE SERPL-MCNC: 2.3 MG/DL (ref 2.7–4.5)
PLATELET # BLD AUTO: 196 K/UL (ref 150–350)
PMV BLD AUTO: 10.7 FL (ref 9.2–12.9)
POCT GLUCOSE: 117 MG/DL (ref 70–110)
POCT GLUCOSE: 154 MG/DL (ref 70–110)
POCT GLUCOSE: 173 MG/DL (ref 70–110)
POCT GLUCOSE: 345 MG/DL (ref 70–110)
POTASSIUM SERPL-SCNC: 4.1 MMOL/L (ref 3.5–5.1)
PROT SERPL-MCNC: 5.1 G/DL (ref 6–8.4)
RBC # BLD AUTO: 3.3 M/UL (ref 4–5.4)
SODIUM SERPL-SCNC: 141 MMOL/L (ref 136–145)
WBC # BLD AUTO: 10.21 K/UL (ref 3.9–12.7)

## 2019-08-04 PROCEDURE — 36415 COLL VENOUS BLD VENIPUNCTURE: CPT

## 2019-08-04 PROCEDURE — 84100 ASSAY OF PHOSPHORUS: CPT

## 2019-08-04 PROCEDURE — 99233 SBSQ HOSP IP/OBS HIGH 50: CPT | Mod: ,,, | Performed by: INTERNAL MEDICINE

## 2019-08-04 PROCEDURE — 25000003 PHARM REV CODE 250: Performed by: HOSPITALIST

## 2019-08-04 PROCEDURE — 99232 SBSQ HOSP IP/OBS MODERATE 35: CPT | Mod: ,,, | Performed by: HOSPITALIST

## 2019-08-04 PROCEDURE — 25000003 PHARM REV CODE 250: Performed by: STUDENT IN AN ORGANIZED HEALTH CARE EDUCATION/TRAINING PROGRAM

## 2019-08-04 PROCEDURE — 11000001 HC ACUTE MED/SURG PRIVATE ROOM

## 2019-08-04 PROCEDURE — 99232 PR SUBSEQUENT HOSPITAL CARE,LEVL II: ICD-10-PCS | Mod: ,,, | Performed by: HOSPITALIST

## 2019-08-04 PROCEDURE — 63600175 PHARM REV CODE 636 W HCPCS: Performed by: HOSPITALIST

## 2019-08-04 PROCEDURE — 85025 COMPLETE CBC W/AUTO DIFF WBC: CPT

## 2019-08-04 PROCEDURE — 80053 COMPREHEN METABOLIC PANEL: CPT

## 2019-08-04 PROCEDURE — 99233 PR SUBSEQUENT HOSPITAL CARE,LEVL III: ICD-10-PCS | Mod: ,,, | Performed by: INTERNAL MEDICINE

## 2019-08-04 PROCEDURE — 25000003 PHARM REV CODE 250: Performed by: PHYSICIAN ASSISTANT

## 2019-08-04 RX ADMIN — INSULIN DETEMIR 5 UNITS: 100 INJECTION, SOLUTION SUBCUTANEOUS at 09:08

## 2019-08-04 RX ADMIN — CLONIDINE HYDROCHLORIDE 0.1 MG: 0.1 TABLET ORAL at 02:08

## 2019-08-04 RX ADMIN — CARVEDILOL 25 MG: 25 TABLET, FILM COATED ORAL at 09:08

## 2019-08-04 RX ADMIN — CALCIUM 1000 MG: 500 TABLET ORAL at 09:08

## 2019-08-04 RX ADMIN — MICONAZOLE NITRATE: 20 OINTMENT TOPICAL at 09:08

## 2019-08-04 RX ADMIN — PREDNISONE 50 MG: 20 TABLET ORAL at 09:08

## 2019-08-04 RX ADMIN — MELATONIN 1000 UNITS: at 09:08

## 2019-08-04 RX ADMIN — INSULIN ASPART 11 UNITS: 100 INJECTION, SOLUTION INTRAVENOUS; SUBCUTANEOUS at 09:08

## 2019-08-04 RX ADMIN — INSULIN ASPART 15 UNITS: 100 INJECTION, SOLUTION INTRAVENOUS; SUBCUTANEOUS at 12:08

## 2019-08-04 RX ADMIN — Medication 10 MG: at 02:08

## 2019-08-04 RX ADMIN — NYSTATIN 500000 UNITS: 100000 SUSPENSION ORAL at 09:08

## 2019-08-04 RX ADMIN — ATOVAQUONE 1500 MG: 750 SUSPENSION ORAL at 09:08

## 2019-08-04 RX ADMIN — INSULIN ASPART 2 UNITS: 100 INJECTION, SOLUTION INTRAVENOUS; SUBCUTANEOUS at 09:08

## 2019-08-04 RX ADMIN — ENTECAVIR 0.5 MG: 0.5 TABLET ORAL at 09:08

## 2019-08-04 RX ADMIN — CLONIDINE HYDROCHLORIDE 0.1 MG: 0.1 TABLET ORAL at 09:08

## 2019-08-04 RX ADMIN — NYSTATIN 500000 UNITS: 100000 SUSPENSION ORAL at 05:08

## 2019-08-04 RX ADMIN — SODIUM BICARBONATE 650 MG TABLET 650 MG: at 09:08

## 2019-08-04 RX ADMIN — NYSTATIN 500000 UNITS: 100000 SUSPENSION ORAL at 12:08

## 2019-08-04 RX ADMIN — HYPROMELLOSE 2910 1 DROP: 5 SOLUTION OPHTHALMIC at 09:08

## 2019-08-04 RX ADMIN — HYDRALAZINE HYDROCHLORIDE 100 MG: 50 TABLET ORAL at 02:08

## 2019-08-04 RX ADMIN — HYDRALAZINE HYDROCHLORIDE 100 MG: 50 TABLET ORAL at 05:08

## 2019-08-04 RX ADMIN — ALLOPURINOL 100 MG: 100 TABLET ORAL at 09:08

## 2019-08-04 RX ADMIN — INSULIN ASPART 8 UNITS: 100 INJECTION, SOLUTION INTRAVENOUS; SUBCUTANEOUS at 05:08

## 2019-08-04 RX ADMIN — SODIUM BICARBONATE 650 MG TABLET 650 MG: at 02:08

## 2019-08-04 RX ADMIN — HYPROMELLOSE 2910 1 DROP: 5 SOLUTION OPHTHALMIC at 02:08

## 2019-08-04 RX ADMIN — INSULIN ASPART 11 UNITS: 100 INJECTION, SOLUTION INTRAVENOUS; SUBCUTANEOUS at 05:08

## 2019-08-04 RX ADMIN — RAMELTEON 8 MG: 8 TABLET, FILM COATED ORAL at 09:08

## 2019-08-04 RX ADMIN — HYDRALAZINE HYDROCHLORIDE 100 MG: 50 TABLET ORAL at 09:08

## 2019-08-04 RX ADMIN — NIFEDIPINE 90 MG: 60 TABLET, FILM COATED, EXTENDED RELEASE ORAL at 09:08

## 2019-08-04 RX ADMIN — PANTOPRAZOLE SODIUM 40 MG: 40 TABLET, DELAYED RELEASE ORAL at 05:08

## 2019-08-04 NOTE — ASSESSMENT & PLAN NOTE
"Hepatology consulted to see if her HBV can be causing the cryoglubins and if she needs treatment.  - entecavir 0.5 mg dosage has been adjusted as her renal function has improved. Today patient converted to normal dosing of 0.5mg daily as her CrCl remains >50.    -      Per hepatology:  "Cryoglobulinemia usually associated with HCV, but rare association with Hep B. Presence of glomerulonephritis on preliminary renal biopsy. Overall, less likely that Hep B is related to this as ALT normal and viral load very low.  - Continue chronic Hep B treatment as patient is on immunosuppression, especially while on rituximab.  Continue entecavir  Monitor renal function and dose-adjust accordingly.  - Patient will need HCC screening going forward given her ethnicity (Liver U/S and AFP every 6 months)"    "

## 2019-08-04 NOTE — SUBJECTIVE & OBJECTIVE
Interval History:   -had 5/10 chest pain this AM midline radiation to neck - cardiac enzymes and EKG ordered, no acute ischemic changes and pain resolved w/o any intervention,  Tender on palpation of chest wall    -Artificial tears for eye itching    -Removed right iJ trialysis this evening, has a peripheral IV in left hand    Cr stable. -   Hypokalemia - repletion ordered.     Review of Systems   Constitutional: Negative for fatigue and fever.   Eyes: Positive for itching.   Respiratory: Negative for shortness of breath.    Cardiovascular: Negative for chest pain.   Gastrointestinal: Negative for abdominal pain.   Genitourinary: Negative for dysuria.   Neurological: Positive for headaches.     Objective:     Vital Signs (Most Recent):  Temp: 98.6 °F (37 °C) (08/03/19 1554)  Pulse: 73 (08/03/19 1554)  Resp: 18 (08/03/19 1554)  BP: 139/71 (08/03/19 1554)  SpO2: 99 % (08/03/19 1554) Vital Signs (24h Range):  Temp:  [96.5 °F (35.8 °C)-98.6 °F (37 °C)] 98.6 °F (37 °C)  Pulse:  [65-80] 73  Resp:  [16-18] 18  SpO2:  [95 %-100 %] 99 %  BP: (138-153)/(63-81) 139/71     Weight: 108.4 kg (238 lb 15.7 oz)  Body mass index is 38.57 kg/m².  No intake or output data in the 24 hours ending 08/03/19 1943   Physical Exam   Constitutional: She is oriented to person, place, and time. She appears well-nourished. No distress.   obese   Cardiovascular: Normal rate, regular rhythm and normal heart sounds.   Chest wall tenderness   Pulmonary/Chest: Effort normal and breath sounds normal. No respiratory distress.   Abdominal: Soft. Bowel sounds are normal. She exhibits no distension.   Musculoskeletal: She exhibits no edema.   Neurological: She is alert and oriented to person, place, and time.   Skin:   Medial thigh rash scaly, hypopigmentation   Psychiatric: She has a normal mood and affect. Her behavior is normal.       Significant Labs:   All pertinent labs within the past 24 hours have been reviewed.     Significant Imaging: I have  reviewed and interpreted all pertinent imaging results/findings within the past 24 hours.

## 2019-08-04 NOTE — PROGRESS NOTES
[facility] Hospital Medicine  Progress Note    Patient Name: Kendy Vital  MRN: [id number]  Patient Class: IP- Inpatient   Admission Date: 7/19/2019  Length of Stay: 15 days  Attending Physician: Lele Clay MD  Primary Care Provider: To Obtain [facility] Medicine Team: [facility] HOSP MED R Lele Clay MD    Subjective:     Principal Problem:Acute (diffuse) proliferative glomerulonephritis      HPI:  Ms. Vital is a 53 year old female with hypertension, anemia, and history of leiomyoma of the uterus who presents to ICU as a transfer from [facility] for evaluation of thrombocytopenia. Patient reports that prior to going to the hospital 3 days ago that she was experiencing symptoms of chills, feeling febrile, dry cough, shortness of breath and occasional nose bleeds for roughly 2 weeks. She also reports easy fatigueability and difficulty breathing when laying flat; currently sleeps with 2 pillows. Patient presented to hospital in [place] when her symptoms did not resolve. Initial work up showed a , worsening kidney function with creatinine of 2.9, and thrombocytopenia with platelets of 33k. In addition, chest x-ray showed interstitial opacities and follow up CT showed perihilar ground glass opacity. Patient was treated with rocephin and doxycycline as well as Bumex q12 due to her heart failure type symptoms. Lab work at the time showed WBC of 7.9 and a lactate of .8. In addition, she tested positive for strep A. Patient was transferred for further evaluation of her thrombocytopenia with concern for TTP and possible need for plasmapheresis.     At time of exam, patient is properly oriented to person time and places. She endorses headache, generalized myalgias, nausea and orthopnea. She denies SOB while sitting up, chest pain, abdominal pain, vomiting, and diarrhea. She has not felt febrile since 1 week prior to hospital stay.         Overview/Hospital Course:  Ms. Vital presented as transfer to ICU for suspected TTP. At that time, she had severe thrombocytopenia, renal failure, and bilateral infiltrates on chest CT concerning for PNA. She was stepped down when repeat CBC revealed normalizing platelets and hemolysis labs were unremarkable. Initially placed on vanc/zosyn/azithromycin for PNA, which has been de-escalated to rocephin/azithromycin for CAP. Her course has been complicated by ARF of unclear cause. 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   7/23 Nephrology biopsed  and have initiated on prednisone empirically.  7/24 Heme did BMBx        Interval History:   -had 5/10 chest pain this AM midline radiation to neck - cardiac enzymes and EKG ordered, no acute ischemic changes and pain resolved w/o any intervention,  Tender on palpation of chest wall    -Artificial tears for eye itching    -Removed right iJ trialysis this evening, has a peripheral IV in left hand    Cr stable. -   Hypokalemia - repletion ordered.     Review of Systems   Constitutional: Negative for fatigue and fever.   Eyes: Positive for itching.   Respiratory: Negative for shortness of breath.    Cardiovascular: Negative for chest pain.   Gastrointestinal: Negative for abdominal pain.   Genitourinary: Negative for dysuria.   Neurological: Positive for headaches.     Objective:     Vital Signs (Most Recent):  Temp: 98.6 °F (37 °C) (08/03/19 1554)  Pulse: 73 (08/03/19 1554)  Resp: 18 (08/03/19 1554)  BP: 139/71 (08/03/19 1554)  SpO2: 99 % (08/03/19 1554) Vital Signs (24h Range):  Temp:  [96.5 °F (35.8 °C)-98.6 °F (37 °C)] 98.6 °F (37 °C)  Pulse:  [65-80] 73  Resp:  [16-18] 18  SpO2:  [95 %-100 %] 99 %  BP: (138-153)/(63-81) 139/71     Weight: 108.4 kg (238 lb 15.7 oz)  Body mass index is 38.57 kg/m².  No intake or output data in the 24 hours ending 08/03/19 1943   Physical Exam   Constitutional: She is oriented to person, place, and time. She appears well-nourished.  No distress.   obese   Cardiovascular: Normal rate, regular rhythm and normal heart sounds.   Chest wall tenderness   Pulmonary/Chest: Effort normal and breath sounds normal. No respiratory distress.   Abdominal: Soft. Bowel sounds are normal. She exhibits no distension.   Musculoskeletal: She exhibits no edema.   Neurological: She is alert and oriented to person, place, and time.   Skin:   Medial thigh rash scaly, hypopigmentation   Psychiatric: She has a normal mood and affect. Her behavior is normal.       Significant Labs:   All pertinent labs within the past 24 hours have been reviewed.     Significant Imaging: I have reviewed and interpreted all pertinent imaging results/findings within the past 24 hours.      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 - Awaiting final renal biopsy report  baseline creatinine of 1.0 roughly 1 month ago. BUN/Cr peaked at 154/6.0 and improved the day after PLEX started.  RJ with bilateral medicorenal disease  UA with proteinuria, blood, no evidence of infection; pro:cr 5.97; C3 41, C4<3  positive for strep A as seen on outside hospital records   7/19 initiated on empiric prednisone 60mg daily, then 7/26 to solumederol 1 gram qd x3 days, then back to 60 qd.  On 8/02 Prednisone decreased to 50mg for 2 weeks and then to go to 40mg daily until f/u with rheumatology  7/24 Trialysis line placed for PLEX which was started q48h x3 treatements.   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  HBV is uncommon to cause this.  LPL/WM or even myeloma could potentially be the source of her cryoglobulins, however returned as Type 3 cryoglobulins and these are not consistent with a primary hematologic pathology causing  cryoglobulinemia  Rheumatology consulted,awaiting pending serologies      B-cell lymphoproliferative disorder  BMBX: MONOCLONAL PLASMA CELL POPULATION WITH ATYPICAL LYMPHOID AGGREGATES.  R/o malignancy.  Differential diagnosis includes lymphoplasmacytic lymphoma/Waldenstrom's macroglobulinemia, multiple myeloma, and marginal zone lymphoma.   7/26 CT chest abd pelvis ordered to look for lymphadenopathy, only notable for small mediastinal lymph nodes/pre-carinal/hildar LN. and bone survey w/o acute findings.   - 7/26 rheum deferred rituximab choice  to heme.  Per rheum:IgG decreased at 185. They request allergy/immunology consult (spoke to them 7/29) regarding ability to start rituximab with decreased IgG.  -Cryoglobulin subtype is Type 3, renal biopsy pending, discussed with Dr. Rea (hematology) based on cryoglobulin subtype likely not a primary hematologic malignancy causing cryoglobulinemia.  Mediastinoscopy or LN biopsy is not recommended as an inpatient at this time.  May be necessary/indicated on outpatient bases. Further management of crygolobulinemia per Nephrology or Rheum services.       Thrombocytopenia, unspecified  Plt on admission 41. Resolved after treatments.  Seen by Heme/onc.   HIT Ab negative  - monitor      Other specified anemias  Stable.  FERRITIN 161, RETIC 4.7 (H), Bili 1, FOLATE 11.1, VITAMIN B12 843  Iron 40, sat 33%  - monitor              Steroid-induced hyperglycemia  On solumederol 1g qd she got very hyperglycemic (>300) and hypertensive.  A1c 5.7.  Continue on Prednisone.   -- titrated meds      Elevated serum immunoglobulin free light chains  See heme malignancy      Essential hypertension  hypertensive at admission and per patient, has been undergoing multiple recent medication changes due to HTN as outpatient.  Suspect initially may have been exacerbated by NSAID use, now concern for multifactorial cause including renal failure with volume overload, steroid use, and inadequate  "dosing of home regimen.  Home regimen includes: lisinopril-HCTZ 20-12.5mg, clonidine 0.2mg bid, and hydralazine 25mg tid    -Nifedipine @ 90mg,   -Coreg 25mg bid, Clonidine 0.1 TID  -Hydralazine increased to 100mg q8hrs.     Chronic hepatitis B  Hepatology consulted to see if her HBV can be causing the cryoglubins and if she needs treatment.  - entecavir 0.5 mg dosage has been adjusted as her renal function has improved. Today patient converted to normal dosing of 0.5mg daily as her CrCl remains >50.    -      Per hepatology:  "Cryoglobulinemia usually associated with HCV, but rare association with Hep B. Presence of glomerulonephritis on preliminary renal biopsy. Overall, less likely that Hep B is related to this as ALT normal and viral load very low.  - Continue chronic Hep B treatment as patient is on immunosuppression, especially while on rituximab.  Continue entecavir  Monitor renal function and dose-adjust accordingly.  - Patient will need HCC screening going forward given her ethnicity (Liver U/S and AFP every 6 months)"      Immunosuppression  Recs per ID 7/26    1. Serologies in process:          - Quantiferon Gold is pending.  If positive, please consult ID. If  Indeterminate, please draw T spot.  If T spot positive, please consult ID.            - Strongyloides IgG is pending. If positive, please consult ID to initiate treatment with Ivermectin.         2. If the patient is anticipated to remain on a prolonged course of high dose systemic steroids (> 20 mg prednisone), recommend PCP prophylaxis with Atovaquone 1500 mg PO once daily. Avoid Bactrim in setting of ARF.     3. Continue Entecavir per Hepatology      4. Immunizations recommended:              - Influenza annually [NOT IN STOCK INPATIENT]              - Tetanus booster today (given 7/26) and again every 10 years              - Prevnar 13 today (given 7/26) followed by Pneumovax 23 in 8 weeks with booster every 5 years.              - Hepatitis A " "vaccine today (given ) and in 6 months              - Shingrix (two doses at 0 and 2-6 months) [NOT AVAILABLE INPATIENT]      Rash of groin  Notes rash to medial thighs, some skin breakdown, discussed with wound care  -Miconazole ointment ordered to act as both barrier cream and antifungal  - apply BID      Grief reaction  Her brother recently .   service came by to talk to her.      History of renal vein thrombosis  Per heme research  "Pt also has a history of unprovoked thrombosis treated at [facility] approximately 4 years ago, none of which is visible in the "care everywhere" section of the chart. She states she had one episode of thrombosis in her left upper extremity that contained "many little clots" and required surgical thrombectomy. She also had a left renal vein thrombosis for which she received a stent. She states that for both episodes she was placed on a heparin drip, did outpatient lovenox and was continued on ASA 81 until now. She states that no one informed her of any findings suggesting a hypercoaguable state or if the clotting was provoked."  -Given her significant thromboembolic history, rheum  would like to work her up for antiphopholipid antibody syndrome. They ordered levels.   -She also had symptoms of sicca syndromes (dry eyes, dry mouth). Rheum will check for sjogrens syndrome (SSA/SSB)      Elevated uric acid in blood  -started on allopurinol  -downtrending from admit level >14.  Trend Weekly       Hypokalemia  Replete, 30meq TID x 3 doses f/u in AM        VTE Risk Mitigation (From admission, onward)        Ordered     Place sequential compression device  Until discontinued      19 0533     IP VTE LOW RISK PATIENT  Once      19 0533                Lele Clay MD  Department of Hospital Medicine   [facility]  "

## 2019-08-04 NOTE — SUBJECTIVE & OBJECTIVE
Interval History:   No events overnight, no acute complaints.     Review of Systems   Constitutional: Negative for fatigue and fever.   Eyes: Positive for itching.   Respiratory: Negative for shortness of breath.    Cardiovascular: Negative for chest pain.   Gastrointestinal: Negative for abdominal pain.   Genitourinary: Negative for dysuria.   Neurological: Positive for headaches.     Objective:     Vital Signs (Most Recent):  Temp: 97.9 °F (36.6 °C) (08/04/19 1554)  Pulse: 77 (08/04/19 1554)  Resp: 20 (08/04/19 1554)  BP: 135/67 (08/04/19 1554)  SpO2: 97 % (08/04/19 1554) Vital Signs (24h Range):  Temp:  [97.8 °F (36.6 °C)-98.2 °F (36.8 °C)] 97.9 °F (36.6 °C)  Pulse:  [61-77] 77  Resp:  [12-20] 20  SpO2:  [97 %-100 %] 97 %  BP: (121-156)/(62-83) 135/67     Weight: 108.4 kg (238 lb 15.7 oz)  Body mass index is 38.57 kg/m².    Intake/Output Summary (Last 24 hours) at 8/4/2019 1823  Last data filed at 8/4/2019 1700  Gross per 24 hour   Intake 720 ml   Output 2300 ml   Net -1580 ml      Physical Exam   Constitutional: She is oriented to person, place, and time. She appears well-nourished. No distress.   obese   Cardiovascular: Normal rate, regular rhythm and normal heart sounds.   Chest wall tenderness   Pulmonary/Chest: Effort normal and breath sounds normal. No respiratory distress.   Abdominal: Soft. Bowel sounds are normal. She exhibits no distension.   Musculoskeletal: She exhibits no edema.   Neurological: She is alert and oriented to person, place, and time.   Skin:   Medial thigh rash scaly, hypopigmentation   Psychiatric: She has a normal mood and affect. Her behavior is normal.       Significant Labs:   All pertinent labs within the past 24 hours have been reviewed.   Results for MUNA HERNANDEZ (MRN [id number]) as of 8/4/2019 18:24   Ref. Range 7/26/2019 05:03 7/31/2019 14:14 8/3/2019 10:50   IgG - Serum Latest Ref Range: 650 - 1600 mg/dL 185 (L)  152 (L)   IgM Latest Ref Range: 50 - 300 mg/dL 139  173   IgA  Latest Ref Range: 40 - 350 mg/dL 44  44       Significant Imaging: I have reviewed and interpreted all pertinent imaging results/findings within the past 24 hours.

## 2019-08-04 NOTE — ASSESSMENT & PLAN NOTE
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 - Awaiting final renal biopsy report  baseline creatinine of 1.0 roughly 1 month ago. BUN/Cr peaked at 154/6.0 and improved the day after PLEX started.  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  On 8/02 Prednisone decreased to 50mg for 2 weeks and then to go to 40mg daily until f/u with rheumatology  7/24 Trialysis line placed for PLEX which was started q48h x3 treatements.   -- Nephrology/Rheumatology following

## 2019-08-04 NOTE — PLAN OF CARE
Problem: Adult Inpatient Plan of Care  Goal: Plan of Care Review  Went over plan of care - all questions answered. Slept well during night. No complaints voiced. Will continue to monitor

## 2019-08-04 NOTE — PROGRESS NOTES
[facility]  Rheumatology  Progress Note    Patient Name: Kendy Vital  MRN: [id number]  Admission Date: 7/19/2019  Hospital Length of Stay: 16 days  Code Status: Full Code   Attending Provider: Lele Clay MD  Primary Care Physician: To Obtain Unable  Principal Problem: Acute (diffuse) proliferative glomerulonephritis    Subjective:     HPI: Ms. 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 She initially presented with fevers, chills, dry cough, shortness of breath, 2 pillow orthopnea, and occasional epistaxis for roughly 2 weeks. Initial work up showed a BNP of 526, worsening kidney function, and thrombocytopenia with platelets of 33k. She was initially treated with Rocephin and doxycycline for suspected pneumonia and was diuresed for HF. She was then  plasmapheresed and started on prednisone 60mg daily for suspected TTP. 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 Thus far, the patient has received pulse steroids (today is day 2). 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           Interval History: Pt in good spirits this AM and has improved greatly over the past few days. She is up and walking around. Family members from [place] came to visit her yesterday. Kidney function stable. Trialysis line taken out yesterday.    Current Facility-Administered Medications   Medication Frequency    acetaminophen tablet 650 mg Q6H PRN    albuterol-ipratropium 2.5 mg-0.5 mg/3 mL nebulizer solution 3 mL Q6H PRN    allopurinol tablet 100 mg Daily    artificial tears 0.5 % ophthalmic solution 1 drop TID    atovaquone suspension 1,500 mg Daily    bisacodyl suppository 10 mg Daily PRN    calcium carbonate (OS-DONTE) tablet 500 mg  Daily    carvedilol tablet 25 mg BID    cloNIDine tablet 0.1 mg TID    entecavir tablet 0.5 mg Daily    glucagon (human recombinant) injection 1 mg PRN    glucose chewable tablet 16 g PRN    glucose chewable tablet 24 g PRN    hydrALAZINE tablet 100 mg Q8H    hydrALAZINE tablet 25 mg Q6H PRN    insulin aspart U-100 pen 1-10 Units QID (AC + HS) PRN    insulin aspart U-100 pen 11 Units BID WM    insulin aspart U-100 pen 15 Units with lunch    insulin detemir U-100 pen 5 Units QHS    miconazole nitrate 2% ointment BID    NIFEdipine 24 hr tablet 90 mg Daily    nitroGLYCERIN SL tablet 0.4 mg Q5 Min PRN    nystatin 100,000 unit/mL suspension 500,000 Units QID    ondansetron disintegrating tablet 8 mg Q8H PRN    oxyCODONE-acetaminophen 5-325 mg per tablet 1 tablet Q8H PRN    pantoprazole EC tablet 40 mg Before breakfast    predniSONE tablet 50 mg Daily    promethazine (PHENERGAN) 6.25 mg in dextrose 5 % 50 mL IVPB Q6H PRN    ramelteon tablet 8 mg QHS    sodium bicarbonate tablet 650 mg TID    sodium chloride 0.9% flush 10 mL PRN    Tdap vaccine injection 0.5 mL vaccine x 1 dose    vitamin D 1000 units tablet 1,000 Units Daily     Objective:     Vital Signs (Most Recent):  Temp: 97.8 °F (36.6 °C) (08/04/19 0802)  Pulse: 71 (08/04/19 0802)  Resp: 20 (08/04/19 0802)  BP: (!) 156/83 (08/04/19 0802)  SpO2: 100 % (08/04/19 0802)  O2 Device (Oxygen Therapy): room air (08/02/19 2020) Vital Signs (24h Range):  Temp:  [97.5 °F (36.4 °C)-98.6 °F (37 °C)] 97.8 °F (36.6 °C)  Pulse:  [65-73] 71  Resp:  [12-20] 20  SpO2:  [96 %-100 %] 100 %  BP: (127-156)/(62-83) 156/83     Weight: 108.4 kg (238 lb 15.7 oz) (08/02/19 2211)  Body mass index is 38.57 kg/m².  Body surface area is 2.25 meters squared.      Intake/Output Summary (Last 24 hours) at 8/4/2019 0954  Last data filed at 8/4/2019 0900  Gross per 24 hour   Intake 300 ml   Output 1600 ml   Net -1300 ml       Physical Exam   Vitals reviewed.  Constitutional:  She is oriented to person, place, and time and well-developed, well-nourished, and in no distress. No distress.   HENT:   Head: Normocephalic and atraumatic.   Mouth/Throat: Oropharyngeal exudate (consistent with oral candidiasis; improving) present.   Eyes: EOM are normal. Pupils are equal, round, and reactive to light.   Cardiovascular: Normal rate and regular rhythm.    No murmur heard.  Pulmonary/Chest: Effort normal and breath sounds normal. No respiratory distress. She has no rales.   Abdominal: Soft. Bowel sounds are normal. There is no tenderness (epigastric tenderness has resolved). There is no rebound and no guarding.   Neurological: She is alert and oriented to person, place, and time.   Skin: Skin is warm and dry. She is not diaphoretic.     Musculoskeletal: She exhibits no tenderness.         Significant Labs:  All pertinent lab results from the last 24 hours have been reviewed.    Significant Imaging:  Imaging results within the past 24 hours have been reviewed.    Assessment/Plan:     Cryoglobulinemic vasculitis  BM biopsy results noted and current suspicion for lymphoplasmacytic lymphoma vs Waldenstrom's, vs multiple myeloma.   Cryoglobulin positive for type III (non-malignant).    Plan:  - Continue prednisone to 50 mg for 2 weeks (final dose 08/16), then to 40 mg qd (starting 08/17)  - Repeat IgG levels: IgG remains low at 152, IgA and IgM wnl.  Given continued decreased IgG level, Recommend Allergery/Immunology consult to determine how much IgG replacement is needed prior to Rituximab. How much space in between giving IVIG and Riuxan is needed or can they be given simultaneously. And what is the proper prophylactic coverage needed prior to immunosuppressives in regards to antifungals/antivirals/antibiotcs.  - Pt given information on Rituxan.    - Continue PCP prophylaxis (for steroids) with atovaquone 1500mg suspension, started 7/25/19. Avoiding bactrim in setting of acute renal failure.   -  Continue ulcer prophylaxis with pantoprazole.  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 Renal function stable. Continue to f/u with hepatology for HBV treatment.   - Follow up in rheumatology clinic within ~1 month of discarge to determine if further treatment with rituximab will be necessary. Will perform DEXA scan at that time.   - Recommend 1000 U Calcium and 1000 U Vit D3 daily.  - Heme/Onc recommend delaying LN biopsy at this time due to likely decreased diagnostic yield in light of steroid treatment and due to cryoglobulins being type 3 instead of type 1.     Plan discussed with primary team.  Case discussed with Dr. Castanon.          Yissel Wilde MD  Rheumatology  [facility]    Patient seen and examined with resident.  All elements of history, physical exam and medical decision making independently confirmed by me.  Patient with renal failure thought to be due to cryoglobulinemic vasculitis, +Hep B and abnormal BM biopsy.  Patient treated with pulse dose steroids then converted to oral prednisone 60 mg now on 50 mg prednisone,  PLEX x3 and entecavir.  Repeat IgG still low.  Please consult allergy/immunology.  Will need allergy immunology input to determine best way to provide patient IgG prior to Rituximab, how soon after receiving IgG replacement can rituximab be given and what prophylaxis (as well as the duration) this IgG deficient patient will need.   See note for details.

## 2019-08-04 NOTE — ASSESSMENT & PLAN NOTE
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 - Awaiting final renal biopsy report  baseline creatinine of 1.0 roughly 1 month ago. BUN/Cr peaked at 154/6.0 and improved the day after PLEX started.  RJ with bilateral medicorenal disease  UA with proteinuria, blood, no evidence of infection; pro:cr 5.97; C3 41, C4<3  positive for strep A as seen on outside hospital records   7/19 initiated on empiric prednisone 60mg daily, then 7/26 to solumederol 1 gram qd x3 days, then back to 60 qd.  On 8/02 Prednisone decreased to 50mg for 2 weeks and then to go to 40mg daily until f/u with rheumatology  7/24 Trialysis line placed for PLEX which was started q48h x3 treatements.   -- Nephrology/Rheumatology following

## 2019-08-04 NOTE — PROGRESS NOTES
[facility] Hospital Medicine  Progress Note    Patient Name: Kendy Vital  MRN: [id number]  Patient Class: IP- Inpatient   Admission Date: 7/19/2019  Length of Stay: 16 days  Attending Physician: Lele Clay MD  Primary Care Provider: To Obtain [facility] Medicine Team: [facility] [place] [facility] according to the [facility] R Lele Clay MD    Subjective:     Principal Problem:Acute (diffuse) proliferative glomerulonephritis      HPI:  Ms. Vital is a 53 year old female with hypertension, anemia, and history of leiomyoma of the uterus who presents to ICU as a transfer from [facility] for evaluation of thrombocytopenia. Patient reports that prior to going to the hospital 3 days ago that she was experiencing symptoms of chills, feeling febrile, dry cough, shortness of breath and occasional nose bleeds for roughly 2 weeks. She also reports easy fatigueability and difficulty breathing when laying flat; currently sleeps with 2 pillows. Patient presented to hospital in [place] when her symptoms did not resolve. Initial work up showed a , worsening kidney function with creatinine of 2.9, and thrombocytopenia with platelets of 33k. In addition, chest x-ray showed interstitial opacities and follow up CT showed perihilar ground glass opacity. Patient was treated with rocephin and doxycycline as well as Bumex q12 due to her heart failure type symptoms. Lab work at the time showed WBC of 7.9 and a lactate of .8. In addition, she tested positive for strep A. Patient was transferred for further evaluation of her thrombocytopenia with concern for TTP and possible need for plasmapheresis.     At time of exam, patient is properly oriented to person time and places. She endorses headache, generalized myalgias, nausea and orthopnea. She denies SOB while sitting up, chest pain, abdominal pain, vomiting, and diarrhea. She has not felt febrile since 1 week prior to hospital stay.         Overview/Hospital Course:  Ms. Vital presented as transfer to ICU for suspected TTP. At that time, she had severe thrombocytopenia, renal failure, and bilateral infiltrates on chest CT concerning for PNA. She was stepped down when repeat CBC revealed normalizing platelets and hemolysis labs were unremarkable. Initially placed on vanc/zosyn/azithromycin for PNA, which has been de-escalated to rocephin/azithromycin for CAP and she has completed all antibiotics.      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  Patient does not have HCV, but is positive for HBV and has been started on anti-viral therapy with entecavir.  Due to concern of possible hematologic malignancy patient had bone marrow biopsy on 7/24 and results showed B-cell lymphoproliferative disorders.   Her serum cryoglobulins returned as Type 3 and this was not c/w with a hematologic malignancy etiology for crytoglobulinemia (would expect Type 1 per hematology).   -Rheumatology and Nephrology continued to follow for management of her Cryolobinemic Vasculitis with Glomerulonephritis.   She has been on Glucorticoid therapy with oral prednisone and also had 3 days of pulse steroids 1gm hydrocortisone, in addition to sessions of Plasma Exchange/Plasmapheresis (via Right IJ Trialysis since dc'd) .      Her platelet levels have normalized, and renal function improving since above treatments started. Rheumatology is following and next steps are for IVIG infusion and then rituxan.  Allergy/Immunology consulted to assist in determining dosing/timing with these next therapies.  They will re-evaluate patient on 8/5 provide further recommendations.         Interval History:   No events overnight, no acute complaints.     Review of Systems   Constitutional: Negative for fatigue and fever.    Eyes: Positive for itching.   Respiratory: Negative for shortness of breath.    Cardiovascular: Negative for chest pain.   Gastrointestinal: Negative for abdominal pain.   Genitourinary: Negative for dysuria.   Neurological: Positive for headaches.     Objective:     Vital Signs (Most Recent):  Temp: 97.9 °F (36.6 °C) (08/04/19 1554)  Pulse: 77 (08/04/19 1554)  Resp: 20 (08/04/19 1554)  BP: 135/67 (08/04/19 1554)  SpO2: 97 % (08/04/19 1554) Vital Signs (24h Range):  Temp:  [97.8 °F (36.6 °C)-98.2 °F (36.8 °C)] 97.9 °F (36.6 °C)  Pulse:  [61-77] 77  Resp:  [12-20] 20  SpO2:  [97 %-100 %] 97 %  BP: (121-156)/(62-83) 135/67     Weight: 108.4 kg (238 lb 15.7 oz)  Body mass index is 38.57 kg/m².    Intake/Output Summary (Last 24 hours) at 8/4/2019 1823  Last data filed at 8/4/2019 1700  Gross per 24 hour   Intake 720 ml   Output 2300 ml   Net -1580 ml      Physical Exam   Constitutional: She is oriented to person, place, and time. She appears well-nourished. No distress.   obese   Cardiovascular: Normal rate, regular rhythm and normal heart sounds.   Chest wall tenderness   Pulmonary/Chest: Effort normal and breath sounds normal. No respiratory distress.   Abdominal: Soft. Bowel sounds are normal. She exhibits no distension.   Musculoskeletal: She exhibits no edema.   Neurological: She is alert and oriented to person, place, and time.   Skin:   Medial thigh rash scaly, hypopigmentation   Psychiatric: She has a normal mood and affect. Her behavior is normal.       Significant Labs:   All pertinent labs within the past 24 hours have been reviewed.   Results for MUNA HERNANDEZ (MRN [id number]) as of 8/4/2019 18:24   Ref. Range 7/26/2019 05:03 7/31/2019 14:14 8/3/2019 10:50   IgG - Serum Latest Ref Range: 650 - 1600 mg/dL 185 (L)  152 (L)   IgM Latest Ref Range: 50 - 300 mg/dL 139  173   IgA Latest Ref Range: 40 - 350 mg/dL 44  44       Significant Imaging: I have reviewed and interpreted all pertinent imaging  results/findings within the past 24 hours.      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 - Awaiting final renal biopsy report  baseline creatinine of 1.0 roughly 1 month ago. BUN/Cr peaked at 154/6.0 and improved the day after PLEX started.  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  On 8/02 Prednisone decreased to 50mg for 2 weeks and then to go to 40mg daily until f/u with rheumatology  7/24 Trialysis line placed for PLEX which was started q48h x3 treatements.   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  HBV is uncommon to cause this.  LPL/WM or even myeloma could potentially be the source of her cryoglobulins, however returned as Type 3 cryoglobulins and these are not consistent with a primary hematologic pathology causing cryoglobulinemia  Rheumatology consulted, Immunoglobulin levels re-checked and patient remains low.  Allergy/Immunology re-contacted to evaluate and assist in determining dosing/timing of IVIG and also with Rituximab.       B-cell lymphoproliferative disorder  BMBX: MONOCLONAL PLASMA CELL POPULATION WITH ATYPICAL LYMPHOID AGGREGATES.  R/o malignancy.  Differential diagnosis includes lymphoplasmacytic lymphoma/Waldenstrom's macroglobulinemia, multiple myeloma, and marginal zone lymphoma.   7/26 CT chest abd pelvis ordered to look for lymphadenopathy, only notable for small mediastinal lymph nodes/pre-carinal/hildar LN. and bone survey w/o acute findings.   - 7/26  rheum deferred rituximab choice  to heme.  Per rheum:IgG decreased at 185. They request allergy/immunology consult (spoke to them 7/29) regarding ability to start rituximab with decreased IgG.  -Cryoglobulin subtype is Type 3, renal biopsy pending, discussed with Dr. Rea (hematology) based on cryoglobulin subtype likely not a primary hematologic malignancy causing cryoglobulinemia.  Mediastinoscopy or LN biopsy is not recommended as an inpatient at this time.  May be necessary/indicated on outpatient bases. Further management of crygolobulinemia per Nephrology or Rheum services.       Thrombocytopenia, unspecified  Plt on admission 41. Resolved after treatments.  Seen by Heme/onc.   HIT Ab negative  - monitor      Other specified anemias  Stable.  FERRITIN 161, RETIC 4.7 (H), Bili 1, FOLATE 11.1, VITAMIN B12 843  Iron 40, sat 33%  - monitor              Steroid-induced hyperglycemia  On solumederol 1g qd she got very hyperglycemic (>300) and hypertensive.  A1c 5.7.  Continue on Prednisone.   -- titrated meds      Elevated serum immunoglobulin free light chains  See heme malignancy      Essential hypertension  hypertensive at admission and per patient, has been undergoing multiple recent medication changes due to HTN as outpatient.  Suspect initially may have been exacerbated by NSAID use, now concern for multifactorial cause including renal failure with volume overload, steroid use, and inadequate dosing of home regimen.  Home regimen includes: lisinopril-HCTZ 20-12.5mg, clonidine 0.2mg bid, and hydralazine 25mg tid    -Nifedipine @ 90mg,   -Coreg 25mg bid, Clonidine 0.1 TID  -Hydralazine increased to 100mg q8hrs.     Chronic hepatitis B  Hepatology consulted to see if her HBV can be causing the cryoglubins and if she needs treatment.  - entecavir 0.5 mg dosage has been adjusted as her renal function has improved. Today patient converted to normal dosing of 0.5mg daily as her CrCl remains >50.    -      Per  "hepatology:  "Cryoglobulinemia usually associated with HCV, but rare association with Hep B. Presence of glomerulonephritis on preliminary renal biopsy. Overall, less likely that Hep B is related to this as ALT normal and viral load very low.  - Continue chronic Hep B treatment as patient is on immunosuppression, especially while on rituximab.  Continue entecavir  Monitor renal function and dose-adjust accordingly.  - Patient will need HCC screening going forward given her ethnicity (Liver U/S and AFP every 6 months)"      Immunosuppression  Recs per ID     1. Serologies in process:          - Quantiferon Gold is pending.  If positive, please consult ID. If  Indeterminate, please draw T spot.  If T spot positive, please consult ID.            - Strongyloides IgG is pending. If positive, please consult ID to initiate treatment with Ivermectin.         2. If the patient is anticipated to remain on a prolonged course of high dose systemic steroids (> 20 mg prednisone), recommend PCP prophylaxis with Atovaquone 1500 mg PO once daily. Avoid Bactrim in setting of ARF.     3. Continue Entecavir per Hepatology      4. Immunizations recommended:              - Influenza annually [NOT IN STOCK INPATIENT]              - Tetanus booster today (given ) and again every 10 years              - Prevnar 13 today (given ) followed by Pneumovax 23 in 8 weeks with booster every 5 years.              - Hepatitis A vaccine today (given ) and in 6 months              - Shingrix (two doses at 0 and 2-6 months) [NOT AVAILABLE INPATIENT]      Rash of groin  Notes rash to medial thighs, some skin breakdown, discussed with wound care  -Miconazole ointment ordered to act as both barrier cream and antifungal  - apply BID      Grief reaction  Her brother recently .   service came by to talk to her.      History of renal vein thrombosis  Per heme research  "Pt also has a history of unprovoked thrombosis treated at " [facility] approximately [facility] years ago, none of which is visible in the "care everywhere" section of the chart. She states she had one episode of thrombosis in her left upper extremity that contained "many little clots" and required surgical thrombectomy. She also had a left renal vein thrombosis for which she received a stent. She states that for both episodes she was placed on a heparin drip, did outpatient lovenox and was continued on ASA 81 until now. She states that no one informed her of any findings suggesting a hypercoaguable state or if the clotting was provoked."  -Given her significant thromboembolic history, rheum 7/26 would like to work her up for antiphopholipid antibody syndrome. They ordered levels.   -She also had symptoms of sicca syndromes (dry eyes, dry mouth). Rheum will check for sjogrens syndrome (SSA/SSB)      Elevated uric acid in blood  -started on allopurinol  -downtrending from admit level >14.  Trend Weekly on Monday       Hypokalemia  Replete, 30meq TID x 3 doses f/u in AM        VTE Risk Mitigation (From admission, onward)        Ordered     Place sequential compression device  Until discontinued      07/19/19 0533     IP VTE LOW RISK PATIENT  Once      07/19/19 0533                Lele Clay MD  Department of Hospital Medicine   [facility]  "

## 2019-08-04 NOTE — SUBJECTIVE & OBJECTIVE
Interval History: Pt in good spirits this AM and has improved greatly over the past few days. She is up and walking around. Family members from [place] came to visit her yesterday. Kidney function stable. Trialysis line taken out yesterday.    Current Facility-Administered Medications   Medication Frequency    acetaminophen tablet 650 mg Q6H PRN    albuterol-ipratropium 2.5 mg-0.5 mg/3 mL nebulizer solution 3 mL Q6H PRN    allopurinol tablet 100 mg Daily    artificial tears 0.5 % ophthalmic solution 1 drop TID    atovaquone suspension 1,500 mg Daily    bisacodyl suppository 10 mg Daily PRN    calcium carbonate (OS-DONTE) tablet 500 mg Daily    carvedilol tablet 25 mg BID    cloNIDine tablet 0.1 mg TID    entecavir tablet 0.5 mg Daily    glucagon (human recombinant) injection 1 mg PRN    glucose chewable tablet 16 g PRN    glucose chewable tablet 24 g PRN    hydrALAZINE tablet 100 mg Q8H    hydrALAZINE tablet 25 mg Q6H PRN    insulin aspart U-100 pen 1-10 Units QID (AC + HS) PRN    insulin aspart U-100 pen 11 Units BID WM    insulin aspart U-100 pen 15 Units with lunch    insulin detemir U-100 pen 5 Units QHS    miconazole nitrate 2% ointment BID    NIFEdipine 24 hr tablet 90 mg Daily    nitroGLYCERIN SL tablet 0.4 mg Q5 Min PRN    nystatin 100,000 unit/mL suspension 500,000 Units QID    ondansetron disintegrating tablet 8 mg Q8H PRN    oxyCODONE-acetaminophen 5-325 mg per tablet 1 tablet Q8H PRN    pantoprazole EC tablet 40 mg Before breakfast    predniSONE tablet 50 mg Daily    promethazine (PHENERGAN) 6.25 mg in dextrose 5 % 50 mL IVPB Q6H PRN    ramelteon tablet 8 mg QHS    sodium bicarbonate tablet 650 mg TID    sodium chloride 0.9% flush 10 mL PRN    Tdap vaccine injection 0.5 mL vaccine x 1 dose    vitamin D 1000 units tablet 1,000 Units Daily     Objective:     Vital Signs (Most Recent):  Temp: 97.8 °F (36.6 °C) (08/04/19 0802)  Pulse: 71 (08/04/19 0802)  Resp: 20 (08/04/19  0802)  BP: (!) 156/83 (08/04/19 0802)  SpO2: 100 % (08/04/19 0802)  O2 Device (Oxygen Therapy): room air (08/02/19 2020) Vital Signs (24h Range):  Temp:  [97.5 °F (36.4 °C)-98.6 °F (37 °C)] 97.8 °F (36.6 °C)  Pulse:  [65-73] 71  Resp:  [12-20] 20  SpO2:  [96 %-100 %] 100 %  BP: (127-156)/(62-83) 156/83     Weight: 108.4 kg (238 lb 15.7 oz) (08/02/19 2211)  Body mass index is 38.57 kg/m².  Body surface area is 2.25 meters squared.      Intake/Output Summary (Last 24 hours) at 8/4/2019 0954  Last data filed at 8/4/2019 0900  Gross per 24 hour   Intake 300 ml   Output 1600 ml   Net -1300 ml       Physical Exam   Vitals reviewed.  Constitutional: She is oriented to person, place, and time and well-developed, well-nourished, and in no distress. No distress.   HENT:   Head: Normocephalic and atraumatic.   Mouth/Throat: Oropharyngeal exudate (consistent with oral candidiasis; improving) present.   Eyes: EOM are normal. Pupils are equal, round, and reactive to light.   Cardiovascular: Normal rate and regular rhythm.    No murmur heard.  Pulmonary/Chest: Effort normal and breath sounds normal. No respiratory distress. She has no rales.   Abdominal: Soft. Bowel sounds are normal. There is no tenderness (epigastric tenderness has resolved). There is no rebound and no guarding.   Neurological: She is alert and oriented to person, place, and time.   Skin: Skin is warm and dry. She is not diaphoretic.     Musculoskeletal: She exhibits no tenderness.         Significant Labs:  All pertinent lab results from the last 24 hours have been reviewed.    Significant Imaging:  Imaging results within the past 24 hours have been reviewed.

## 2019-08-04 NOTE — ASSESSMENT & PLAN NOTE
Seen on renal biopsy.  HBV is uncommon to cause this.  LPL/WM or even myeloma could potentially be the source of her cryoglobulins, however returned as Type 3 cryoglobulins and these are not consistent with a primary hematologic pathology causing cryoglobulinemia  Rheumatology consulted, Immunoglobulin levels re-checked and patient remains low.  Allergy/Immunology re-contacted to evaluate and assist in determining dosing/timing of IVIG and also with Rituximab.

## 2019-08-04 NOTE — ASSESSMENT & PLAN NOTE
BM biopsy results noted and current suspicion for lymphoplasmacytic lymphoma vs Waldenstrom's, vs multiple myeloma.   Cryoglobulin positive for type III (non-malignant).    Plan:  - Continue prednisone to 50 mg for 2 weeks (final dose 08/16), then to 40 mg qd (starting 08/17)  - Repeat IgG levels: IgG remains low at 152, IgA and IgM wnl.  Given continued decreased IgG level, Recommend Allergery/Immunology consult to determine how much IVIG replacement is needed prior to Rituximab. How much space in between giving IVIG and Riuxan is needed or can they be given simultaneously. And what is the proper prophylactic coverage needed prior to immunosuppressives in regards to antifungals/antivirals/antibiotcs.  - Pt given information on Rituxan.    - Continue PCP prophylaxis (for steroids) with atovaquone 1500mg suspension, started 7/25/19. Avoiding bactrim in setting of acute renal failure.   - Continue ulcer prophylaxis with pantoprazole.  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 Renal function stable. Continue to f/u with hepatology for HBV treatment.   - Follow up in rheumatology clinic within ~1 month of discarge to determine if further treatment with rituximab will be necessary. Will perform DEXA scan at that time.   - Recommend 1000 U Calcium and 1000 U Vit D3 daily.  - Heme/Onc recommend delaying LN biopsy at this time due to likely decreased diagnostic yield in light of steroid treatment and due to cryoglobulins being type 3 instead of type 1.     Plan discussed with primary team.  Case discussed with Dr. Castanon.

## 2019-08-04 NOTE — ASSESSMENT & PLAN NOTE
Seen on renal biopsy.  HBV is uncommon to cause this.  LPL/WM or even myeloma could potentially be the source of her cryoglobulins, however returned as Type 3 cryoglobulins and these are not consistent with a primary hematologic pathology causing cryoglobulinemia  Rheumatology consulted,awaiting pending serologies

## 2019-08-04 NOTE — CARE UPDATE
Removed patients  Right IJ Trialysis catheter    10min pressure held, hemostasis achieved, dry gauze/tegaderm dressing placed.     RN aware.         Lele Clay M.D.  Attending Physician  [facility] Medicine Dept.  Pager: [phone]  Spectralink -[phone]

## 2019-08-05 PROBLEM — D80.1 HYPOGAMMAGLOBULINEMIA: Status: ACTIVE | Noted: 2019-08-05

## 2019-08-05 LAB
ALBUMIN SERPL BCP-MCNC: 3.3 G/DL (ref 3.5–5.2)
ALP SERPL-CCNC: 68 U/L (ref 55–135)
ALT SERPL W/O P-5'-P-CCNC: 46 U/L (ref 10–44)
ANION GAP SERPL CALC-SCNC: 12 MMOL/L (ref 8–16)
AST SERPL-CCNC: 17 U/L (ref 10–40)
BASOPHILS # BLD AUTO: 0.01 K/UL (ref 0–0.2)
BASOPHILS NFR BLD: 0.1 % (ref 0–1.9)
BILIRUB SERPL-MCNC: 0.5 MG/DL (ref 0.1–1)
BUN SERPL-MCNC: 26 MG/DL (ref 6–20)
CALCIUM SERPL-MCNC: 9 MG/DL (ref 8.7–10.5)
CHLORIDE SERPL-SCNC: 108 MMOL/L (ref 95–110)
CO2 SERPL-SCNC: 22 MMOL/L (ref 23–29)
CREAT SERPL-MCNC: 1.2 MG/DL (ref 0.5–1.4)
DIFFERENTIAL METHOD: ABNORMAL
EOSINOPHIL # BLD AUTO: 0 K/UL (ref 0–0.5)
EOSINOPHIL NFR BLD: 0.2 % (ref 0–8)
ERYTHROCYTE [DISTWIDTH] IN BLOOD BY AUTOMATED COUNT: 29.7 % (ref 11.5–14.5)
EST. GFR  (AFRICAN AMERICAN): >60 ML/MIN/1.73 M^2
EST. GFR  (NON AFRICAN AMERICAN): 52.1 ML/MIN/1.73 M^2
GLUCOSE SERPL-MCNC: 96 MG/DL (ref 70–110)
HCT VFR BLD AUTO: 26.9 % (ref 37–48.5)
HGB BLD-MCNC: 8.2 G/DL (ref 12–16)
IMM GRANULOCYTES # BLD AUTO: 0.14 K/UL (ref 0–0.04)
IMM GRANULOCYTES NFR BLD AUTO: 1.5 % (ref 0–0.5)
LYMPHOCYTES # BLD AUTO: 2.1 K/UL (ref 1–4.8)
LYMPHOCYTES NFR BLD: 22.8 % (ref 18–48)
MCH RBC QN AUTO: 25 PG (ref 27–31)
MCHC RBC AUTO-ENTMCNC: 30.5 G/DL (ref 32–36)
MCV RBC AUTO: 82 FL (ref 82–98)
MONOCYTES # BLD AUTO: 0.9 K/UL (ref 0.3–1)
MONOCYTES NFR BLD: 9.6 % (ref 4–15)
NEUTROPHILS # BLD AUTO: 6.1 K/UL (ref 1.8–7.7)
NEUTROPHILS NFR BLD: 65.8 % (ref 38–73)
NRBC BLD-RTO: 0 /100 WBC
PHOSPHATE SERPL-MCNC: 3.2 MG/DL (ref 2.7–4.5)
PLATELET # BLD AUTO: 210 K/UL (ref 150–350)
PMV BLD AUTO: 10.1 FL (ref 9.2–12.9)
POCT GLUCOSE: 125 MG/DL (ref 70–110)
POCT GLUCOSE: 125 MG/DL (ref 70–110)
POCT GLUCOSE: 208 MG/DL (ref 70–110)
POCT GLUCOSE: 293 MG/DL (ref 70–110)
POTASSIUM SERPL-SCNC: 3.7 MMOL/L (ref 3.5–5.1)
PROT SERPL-MCNC: 4.9 G/DL (ref 6–8.4)
RBC # BLD AUTO: 3.28 M/UL (ref 4–5.4)
SODIUM SERPL-SCNC: 142 MMOL/L (ref 136–145)
URATE SERPL-MCNC: 4.4 MG/DL (ref 2.4–5.7)
WBC # BLD AUTO: 9.31 K/UL (ref 3.9–12.7)

## 2019-08-05 PROCEDURE — S5571 INSULIN DISPOS PEN 3 ML: HCPCS | Performed by: HOSPITALIST

## 2019-08-05 PROCEDURE — 36415 COLL VENOUS BLD VENIPUNCTURE: CPT

## 2019-08-05 PROCEDURE — 84550 ASSAY OF BLOOD/URIC ACID: CPT

## 2019-08-05 PROCEDURE — 85025 COMPLETE CBC W/AUTO DIFF WBC: CPT

## 2019-08-05 PROCEDURE — 25000003 PHARM REV CODE 250: Performed by: STUDENT IN AN ORGANIZED HEALTH CARE EDUCATION/TRAINING PROGRAM

## 2019-08-05 PROCEDURE — 25000003 PHARM REV CODE 250: Performed by: HOSPITALIST

## 2019-08-05 PROCEDURE — 94761 N-INVAS EAR/PLS OXIMETRY MLT: CPT

## 2019-08-05 PROCEDURE — 63600175 PHARM REV CODE 636 W HCPCS: Performed by: HOSPITALIST

## 2019-08-05 PROCEDURE — 99233 SBSQ HOSP IP/OBS HIGH 50: CPT | Mod: ,,, | Performed by: HOSPITALIST

## 2019-08-05 PROCEDURE — 80053 COMPREHEN METABOLIC PANEL: CPT

## 2019-08-05 PROCEDURE — 11000001 HC ACUTE MED/SURG PRIVATE ROOM

## 2019-08-05 PROCEDURE — 99233 SBSQ HOSP IP/OBS HIGH 50: CPT | Mod: ,,, | Performed by: INTERNAL MEDICINE

## 2019-08-05 PROCEDURE — 99233 PR SUBSEQUENT HOSPITAL CARE,LEVL III: ICD-10-PCS | Mod: ,,, | Performed by: INTERNAL MEDICINE

## 2019-08-05 PROCEDURE — 99233 PR SUBSEQUENT HOSPITAL CARE,LEVL III: ICD-10-PCS | Mod: ,,, | Performed by: HOSPITALIST

## 2019-08-05 PROCEDURE — 84100 ASSAY OF PHOSPHORUS: CPT

## 2019-08-05 RX ORDER — ACETAMINOPHEN 325 MG/1
325 TABLET ORAL EVERY 4 HOURS PRN
Status: DISCONTINUED | OUTPATIENT
Start: 2019-08-05 | End: 2019-08-06

## 2019-08-05 RX ORDER — LISINOPRIL AND HYDROCHLOROTHIAZIDE 12.5; 2 MG/1; MG/1
2 TABLET ORAL DAILY
Status: ON HOLD | COMMUNITY
End: 2019-08-07 | Stop reason: HOSPADM

## 2019-08-05 RX ORDER — DIPHENHYDRAMINE HCL 25 MG
25 CAPSULE ORAL
Status: CANCELLED | OUTPATIENT
Start: 2019-08-05

## 2019-08-05 RX ORDER — ACETAMINOPHEN 500 MG
500 TABLET ORAL
Status: CANCELLED | OUTPATIENT
Start: 2019-08-05

## 2019-08-05 RX ORDER — SODIUM CHLORIDE 0.9 % (FLUSH) 0.9 %
10 SYRINGE (ML) INJECTION
Status: CANCELLED | OUTPATIENT
Start: 2019-08-05

## 2019-08-05 RX ORDER — DIPHENHYDRAMINE HYDROCHLORIDE 50 MG/ML
25 INJECTION INTRAMUSCULAR; INTRAVENOUS
Status: DISCONTINUED | OUTPATIENT
Start: 2019-08-05 | End: 2019-08-06

## 2019-08-05 RX ORDER — DIPHENHYDRAMINE HYDROCHLORIDE 50 MG/ML
25 INJECTION INTRAMUSCULAR; INTRAVENOUS
Status: CANCELLED | OUTPATIENT
Start: 2019-08-05

## 2019-08-05 RX ORDER — SODIUM CHLORIDE 9 MG/ML
INJECTION, SOLUTION INTRAVENOUS CONTINUOUS
Status: DISCONTINUED | OUTPATIENT
Start: 2019-08-05 | End: 2019-08-07 | Stop reason: HOSPADM

## 2019-08-05 RX ORDER — CYCLOBENZAPRINE HCL 10 MG
10 TABLET ORAL 2 TIMES DAILY PRN
Status: ON HOLD | COMMUNITY
End: 2019-08-07 | Stop reason: HOSPADM

## 2019-08-05 RX ORDER — HEPARIN 100 UNIT/ML
500 SYRINGE INTRAVENOUS
Status: CANCELLED | OUTPATIENT
Start: 2019-08-05

## 2019-08-05 RX ADMIN — HYPROMELLOSE 2910 1 DROP: 5 SOLUTION OPHTHALMIC at 09:08

## 2019-08-05 RX ADMIN — OXYCODONE HYDROCHLORIDE AND ACETAMINOPHEN 1 TABLET: 5; 325 TABLET ORAL at 09:08

## 2019-08-05 RX ADMIN — CLONIDINE HYDROCHLORIDE 0.1 MG: 0.1 TABLET ORAL at 09:08

## 2019-08-05 RX ADMIN — SODIUM BICARBONATE 650 MG TABLET 650 MG: at 09:08

## 2019-08-05 RX ADMIN — HYPROMELLOSE 2910 1 DROP: 5 SOLUTION OPHTHALMIC at 03:08

## 2019-08-05 RX ADMIN — PANTOPRAZOLE SODIUM 40 MG: 40 TABLET, DELAYED RELEASE ORAL at 05:08

## 2019-08-05 RX ADMIN — CARVEDILOL 25 MG: 25 TABLET, FILM COATED ORAL at 09:08

## 2019-08-05 RX ADMIN — RAMELTEON 8 MG: 8 TABLET, FILM COATED ORAL at 09:08

## 2019-08-05 RX ADMIN — MICONAZOLE NITRATE: 20 OINTMENT TOPICAL at 09:08

## 2019-08-05 RX ADMIN — INSULIN ASPART 6 UNITS: 100 INJECTION, SOLUTION INTRAVENOUS; SUBCUTANEOUS at 06:08

## 2019-08-05 RX ADMIN — NYSTATIN 500000 UNITS: 100000 SUSPENSION ORAL at 05:08

## 2019-08-05 RX ADMIN — ENTECAVIR 0.5 MG: 0.5 TABLET ORAL at 09:08

## 2019-08-05 RX ADMIN — HYDRALAZINE HYDROCHLORIDE 100 MG: 50 TABLET ORAL at 09:08

## 2019-08-05 RX ADMIN — NYSTATIN 500000 UNITS: 100000 SUSPENSION ORAL at 01:08

## 2019-08-05 RX ADMIN — HYDRALAZINE HYDROCHLORIDE 100 MG: 50 TABLET ORAL at 05:08

## 2019-08-05 RX ADMIN — CALCIUM 1000 MG: 500 TABLET ORAL at 09:08

## 2019-08-05 RX ADMIN — PREDNISONE 50 MG: 20 TABLET ORAL at 09:08

## 2019-08-05 RX ADMIN — NYSTATIN 500000 UNITS: 100000 SUSPENSION ORAL at 09:08

## 2019-08-05 RX ADMIN — MELATONIN 1000 UNITS: at 09:08

## 2019-08-05 RX ADMIN — SODIUM BICARBONATE 650 MG TABLET 650 MG: at 03:08

## 2019-08-05 RX ADMIN — HYDRALAZINE HYDROCHLORIDE 100 MG: 50 TABLET ORAL at 01:08

## 2019-08-05 RX ADMIN — INSULIN ASPART 11 UNITS: 100 INJECTION, SOLUTION INTRAVENOUS; SUBCUTANEOUS at 09:08

## 2019-08-05 RX ADMIN — INSULIN DETEMIR 5 UNITS: 100 INJECTION, SOLUTION SUBCUTANEOUS at 09:08

## 2019-08-05 RX ADMIN — ALLOPURINOL 100 MG: 100 TABLET ORAL at 09:08

## 2019-08-05 RX ADMIN — SODIUM CHLORIDE: 0.9 INJECTION, SOLUTION INTRAVENOUS at 07:08

## 2019-08-05 RX ADMIN — ATOVAQUONE 1500 MG: 750 SUSPENSION ORAL at 09:08

## 2019-08-05 RX ADMIN — INSULIN ASPART 11 UNITS: 100 INJECTION, SOLUTION INTRAVENOUS; SUBCUTANEOUS at 06:08

## 2019-08-05 RX ADMIN — NIFEDIPINE 90 MG: 60 TABLET, FILM COATED, EXTENDED RELEASE ORAL at 09:08

## 2019-08-05 NOTE — PLAN OF CARE
Problem: Adult Inpatient Plan of Care  Goal: Plan of Care Review  Outcome: Ongoing (interventions implemented as appropriate)  Recommendations    Recommendation:   1. Continue Renal, diabetic diet restriction.    Pt consuming 100% of meals.   2. RD to monitor.    Goals: Pt to continue tolerating >85% EEN and EPN during admission.  Nutrition Goal Status: goal met  Communication of RD Recs: POC

## 2019-08-05 NOTE — SUBJECTIVE & OBJECTIVE
Interval History: Pt reports feeling more tired over the past 2-3 days. Otherwise feeling better at baseline. Kidney function continuing to improve. IVIG to be started and Rituxan following labs and approval for medication    Current Facility-Administered Medications   Medication Frequency    acetaminophen tablet 650 mg Q6H PRN    albuterol-ipratropium 2.5 mg-0.5 mg/3 mL nebulizer solution 3 mL Q6H PRN    allopurinol tablet 100 mg Daily    artificial tears 0.5 % ophthalmic solution 1 drop TID    atovaquone suspension 1,500 mg Daily    bisacodyl suppository 10 mg Daily PRN    calcium carbonate (OS-DONTE) tablet 500 mg Daily    carvedilol tablet 25 mg BID    cloNIDine tablet 0.1 mg TID    entecavir tablet 0.5 mg Daily    glucagon (human recombinant) injection 1 mg PRN    glucose chewable tablet 16 g PRN    glucose chewable tablet 24 g PRN    hydrALAZINE tablet 100 mg Q8H    hydrALAZINE tablet 25 mg Q6H PRN    insulin aspart U-100 pen 1-10 Units QID (AC + HS) PRN    insulin aspart U-100 pen 11 Units BID WM    insulin aspart U-100 pen 15 Units with lunch    insulin detemir U-100 pen 5 Units QHS    miconazole nitrate 2% ointment BID    NIFEdipine 24 hr tablet 90 mg Daily    nitroGLYCERIN SL tablet 0.4 mg Q5 Min PRN    nystatin 100,000 unit/mL suspension 500,000 Units QID    ondansetron disintegrating tablet 8 mg Q8H PRN    oxyCODONE-acetaminophen 5-325 mg per tablet 1 tablet Q8H PRN    pantoprazole EC tablet 40 mg Before breakfast    predniSONE tablet 50 mg Daily    promethazine (PHENERGAN) 6.25 mg in dextrose 5 % 50 mL IVPB Q6H PRN    ramelteon tablet 8 mg QHS    sodium bicarbonate tablet 650 mg TID    sodium chloride 0.9% flush 10 mL PRN    Tdap vaccine injection 0.5 mL vaccine x 1 dose    vitamin D 1000 units tablet 1,000 Units Daily     Objective:     Vital Signs (Most Recent):  Temp: 98 °F (36.7 °C) (08/05/19 1126)  Pulse: 61 (08/05/19 1126)  Resp: 19 (08/05/19 1126)  BP: 133/65  (08/05/19 1126)  SpO2: 98 % (08/05/19 1127)  O2 Device (Oxygen Therapy): room air (08/05/19 1127) Vital Signs (24h Range):  Temp:  [97.4 °F (36.3 °C)-98.7 °F (37.1 °C)] 98 °F (36.7 °C)  Pulse:  [61-77] 61  Resp:  [12-20] 19  SpO2:  [97 %-100 %] 98 %  BP: (121-156)/(59-84) 133/65     Weight: 108.4 kg (238 lb 15.7 oz) (08/02/19 2211)  Body mass index is 38.57 kg/m².  Body surface area is 2.25 meters squared.      Intake/Output Summary (Last 24 hours) at 8/5/2019 1404  Last data filed at 8/5/2019 0600  Gross per 24 hour   Intake 420 ml   Output 1400 ml   Net -980 ml       Physical Exam   Vitals reviewed.  Constitutional: She is oriented to person, place, and time and well-developed, well-nourished, and in no distress. No distress.   HENT:   Head: Normocephalic and atraumatic.   Mouth/Throat: Oropharyngeal exudate (consistent with oral candidiasis; improving) present.   Eyes: EOM are normal. Pupils are equal, round, and reactive to light.   Cardiovascular: Normal rate and regular rhythm.    No murmur heard.  Pulmonary/Chest: Effort normal and breath sounds normal. No respiratory distress. She has no rales.   Abdominal: Soft. Bowel sounds are normal. There is no tenderness (epigastric tenderness has resolved). There is no rebound and no guarding.   Neurological: She is alert and oriented to person, place, and time.   Skin: Skin is warm and dry. She is not diaphoretic.     Musculoskeletal: She exhibits no tenderness.         Significant Labs:  All pertinent lab results from the last 24 hours have been reviewed.    Significant Imaging:  Imaging results within the past 24 hours have been reviewed.

## 2019-08-05 NOTE — PROGRESS NOTES
"[facility]  Adult Nutrition  Progress Note    SUMMARY       Recommendations    Recommendation:   1. Continue Renal, diabetic diet restriction.    Pt consuming 100% of meals.   2. RD to monitor.    Goals: Pt to continue tolerating >85% EEN and EPN during admission.  Nutrition Goal Status: goal met  Communication of RD Recs: POC    Reason for Assessment    Reason For Assessment: RD follow-up  Diagnosis: renal disease  Relevant Medical History: HTN, anemia, leiomyoma of uterus  Interdisciplinary Rounds: did not attend  General Information Comments: Pt reported good appetite, % of meals. Pt eating breakfast at time of visit. Compliant with renal/diabetic diet. No c/o N/V/C/D reported. Wt gain since admission, discussed with pt intake to meet energy needs. Pt continues well nourished.   Nutrition Discharge Planning: adequate po intake for optimal nutrition    Nutrition Risk Screen    Nutrition Risk Screen: no indicators present    Nutrition/Diet History    Spiritual, Cultural Beliefs, Muslim Practices, Values that Affect Care: (None known at this time)  Food Allergies: NKFA  Factors Affecting Nutritional Intake: None identified at this time    Anthropometrics    Temp: 97.4 °F (36.3 °C)  Height Method: Stated  Height: 5' 6" (167.6 cm)  Height (inches): 66 in  Weight Method: Bed Scale  Weight: 108.4 kg (238 lb 15.7 oz)  Weight (lb): 238.98 lb  Ideal Body Weight (IBW), Female: 130 lb  % Ideal Body Weight, Female (lb): 174.62 lb  BMI (Calculated): 36.7  BMI Grade: 35 - 39.9 - obesity - grade II  Weight Loss: unintentional  Usual Body Weight (UBW), k.82 kg  % Usual Body Weight: 106.69      Lab/Procedures/Meds    Pertinent Labs Reviewed: reviewed  Pertinent Labs Comments: BUN 26  Pertinent Medications Reviewed: reviewed  Pertinent Medications Comments: calcium carbonate; entecavir; insulin; prednisone; ramelteon; vitamin D    Estimated/Assessed Needs    Weight Used For Calorie Calculations: " 103 kg (227 lb 1.2 oz)  Energy Calorie Requirements (kcal): 2070  Energy Need Method: [place]-St Jeor(PAL 1.25)  Protein Requirements: 103-124g(1.0-1.2g/kg)  Weight Used For Protein Calculations: 103 kg (227 lb 1.2 oz)  Fluid Requirements (mL): 1mL/kcal or per MD  RDA Method (mL): 2070    Nutrition Prescription Ordered    Current Diet Order: 2000 kcal Diabetic, renal     Evaluation of Received Nutrient/Fluid Intake    Energy Calories Required: meeting needs  Protein Required: meeting needs  Fluid Required: meeting needs  Comments: LBM 8/4  Tolerance: tolerating  % Intake of Estimated Energy Needs: 75 - 100 %  % Meal Intake: 75 - 100 %    Nutrition Risk    Level of Risk/Frequency of Follow-up: low(f/u 1x/week)     Assessment and Plan    No nutrition diagnosis at this time    Monitor and Evaluation    Food and Nutrient Intake: energy intake, food and beverage intake  Food and Nutrient Adminstration: diet order  Anthropometric Measurements: weight, weight change, body mass index  Biochemical Data, Medical Tests and Procedures: electrolyte and renal panel, gastrointestinal profile, glucose/endocrine profile, inflammatory profile, lipid profile  Nutrition-Focused Physical Findings: overall appearance     Nutrition Follow-Up    RD Follow-up?: Yes

## 2019-08-05 NOTE — PROGRESS NOTES
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 Hematology was consulted for thrombocytopenia. History was obtained from the patient, patient's cousin, patient's fiancee and chart review.     Pt endorses episodes of fevers, chills, fatigue, dry cough, SOB, and orthopnea for 1 week. She also states that 3 days ago she began to have neck and back muscle cramping and stiffness and decreased urinary output and decided to see her PCP, who diagnosed her with muscle spasms and the flu. A few days later she decided to go to the [facility] ED and was found to have Cr 2.9, increased from baseline Cr 1.0. She was also found to have , WBC 7.9, Plt 33, lactate 0.8. CXR showed bilateral interstitial infiltrates, and subsequent CT chest showed bilateral ground glass opacities. She was treated with rochephin, doxycycline, bumex and transferred here.     Here her labs were significant for Hgb 10, MCV 69, RDW 22.3, Plt 41. WBC 5.98. UA showed 3+ protein, 3+ glucose and 3+ blood but was not consistent with UTI. Cr 4.3 and  consistent with an LACI. Hemolytic labs were unremarkable.     Pt has a history of uncontrolled hypertension and states that she is currently taking lisinopril 20 mg-HCTZ 12.5 mg, hydralazine 25 mg TID, and clonidine 0.2 mg BID. She was treated at [facility] 3 weeks ago for chest pain and was  "admitted. We do not have those records but pt states they had her on multiple drips and did not have a cath procedure. Pt also has a history of unprovoked thrombosis treated at [facility] approximately 4 years ago, none of which is visible in the "care everywhere" section of the chart. She states she had one episode of thrombosis in her left upper extremity that contained "many little clots" and required surgical thrombectomy. She also had a left renal vein thrombosis for which she received a stent. She states that for both episodes she was placed on a heparin drip, did outpatient lovenox and was continued on ASA 81 until now. She states that no one informed her of any findings suggesting a hypercoaguable state or if the clotting was provoked.    Interval History: Patient seen and examined at bedside, seen by allergy who recommended use of 500mg/kg of IVIG prior to starting Rituximab.     Review of patient's allergies indicates:  No Known Allergies  Current Facility-Administered Medications   Medication Frequency    acetaminophen tablet 650 mg Q6H PRN    albuterol-ipratropium 2.5 mg-0.5 mg/3 mL nebulizer solution 3 mL Q6H PRN    allopurinol tablet 100 mg Daily    artificial tears 0.5 % ophthalmic solution 1 drop TID    atovaquone suspension 1,500 mg Daily    bisacodyl suppository 10 mg Daily PRN    calcium carbonate (OS-DONTE) tablet 500 mg Daily    carvedilol tablet 25 mg BID    cloNIDine tablet 0.1 mg TID    entecavir tablet 0.5 mg Daily    glucagon (human recombinant) injection 1 mg PRN    glucose chewable tablet 16 g PRN    glucose chewable tablet 24 g PRN    hydrALAZINE tablet 100 mg Q8H    hydrALAZINE tablet 25 mg Q6H PRN    insulin aspart U-100 pen 1-10 Units QID (AC + HS) PRN    insulin aspart U-100 pen 11 Units BID WM    insulin aspart U-100 pen 15 Units with lunch    insulin detemir U-100 pen 5 Units QHS    miconazole nitrate 2% ointment BID    NIFEdipine 24 hr tablet 90 mg Daily    " nitroGLYCERIN SL tablet 0.4 mg Q5 Min PRN    nystatin 100,000 unit/mL suspension 500,000 Units QID    ondansetron disintegrating tablet 8 mg Q8H PRN    oxyCODONE-acetaminophen 5-325 mg per tablet 1 tablet Q8H PRN    pantoprazole EC tablet 40 mg Before breakfast    predniSONE tablet 50 mg Daily    promethazine (PHENERGAN) 6.25 mg in dextrose 5 % 50 mL IVPB Q6H PRN    ramelteon tablet 8 mg QHS    sodium bicarbonate tablet 650 mg TID    sodium chloride 0.9% flush 10 mL PRN    Tdap vaccine injection 0.5 mL vaccine x 1 dose    vitamin D 1000 units tablet 1,000 Units Daily       Objective:     Vital Signs (Most Recent):  Temp: 98.5 °F (36.9 °C) (08/05/19 1525)  Pulse: 73 (08/05/19 1525)  Resp: 19 (08/05/19 1126)  BP: (!) 108/54 (08/05/19 1525)  SpO2: 98 % (08/05/19 1525)  O2 Device (Oxygen Therapy): room air (08/05/19 1127) Vital Signs (24h Range):  Temp:  [97.4 °F (36.3 °C)-98.7 °F (37.1 °C)] 98.5 °F (36.9 °C)  Pulse:  [61-77] 73  Resp:  [12-20] 19  SpO2:  [97 %-100 %] 98 %  BP: (108-156)/(54-84) 108/54     Weight: 108.4 kg (238 lb 15.7 oz) (08/02/19 2211)  Body mass index is 38.57 kg/m².  Body surface area is 2.25 meters squared.    I/O last 3 completed shifts:  In: 920 [P.O.:920]  Out: 3500 [Urine:3500]    Physical Exam   Constitutional: She is oriented to person, place, and time. She appears well-nourished. No distress.   obese   Cardiovascular: Normal rate, regular rhythm and normal heart sounds.   Chest wall tenderness   Pulmonary/Chest: Effort normal and breath sounds normal. No respiratory distress.   Abdominal: Soft. Bowel sounds are normal. She exhibits no distension.   Musculoskeletal: She exhibits no edema.   Neurological: She is alert and oriented to person, place, and time.   Skin:   Medial thigh rash scaly, hypopigmentation   Psychiatric: She has a normal mood and affect. Her behavior is normal.       Significant Labs:  CBC:   Recent Labs   Lab 08/05/19  0416   WBC 9.31   RBC 3.28*   HGB 8.2*  "  HCT 26.9*      MCV 82   MCH 25.0*   MCHC 30.5*     CMP:   Recent Labs   Lab 08/05/19  0416   GLU 96   CALCIUM 9.0   ALBUMIN 3.3*   PROT 4.9*      K 3.7   CO2 22*      BUN 26*   CREATININE 1.2   ALKPHOS 68   ALT 46*   AST 17   BILITOT 0.5     All labs within the past 24 hours have been reviewed.     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  As per Dr. 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 She also has extensive ATN likely secondary to ischemia downstream the affected glomeruli. These features fit with the low C4 and the +RF. 90% of these cases are associated with HCV (but she is negative!) and only ~3% are associated with HBV. Despite her very high kappa/lambda ratio (K:L), she has no features of myeloma cast nephropathy or light chain deposition disease in her biopsy specimen. The high K:L could come from the cryoglobulinemic process (mono and polyclonal LC production). Of note, lymphoma can rarely cause cryoglobulinemic DPGN, so BM biopsy results will be important. No evidence of uric acid tubulopathy (tumor lysis syndrome) either. In retrospect, her NSAIDs use was a major confounding factor in this case."     Recommendations:  - Continue prednisone 60 mg daily   - creatinine downtrending, 1.2 today  - Cryoglobulin positive for type III (non-malignant), possibly associated with HBV (1.2 - 4% HBV patients can present with cryoglobulinemic vasculitis). Will await final renal bx results. Renal function continuing to improve (Cr 1.5 08/02)   - as per Rheumatology, Rituximab therapy to begin after IVIG administration today.   - " will need outpatient follow up for treatment of Lymphoma, possibly with Rituximab   - Continue treatment with entecavir for Hep B   - Follow up on official Kidney Biopsy report   - BP control to goal <140/90  - underwent PLEX 07/29 (3rd session)  - Limit protein diet given azotemia, no more than 60-80 g per day  - Continue sodium bicarb 1300 mg tid  - Will follow closely        Thank you for your consult. I will follow-up with patient. Please contact us if you have any additional questions.    Mustapha Reid MD  Nephrology  [facility]

## 2019-08-05 NOTE — PLAN OF CARE
Problem: Adult Inpatient Plan of Care  Goal: Plan of Care Review  Outcome: Ongoing (interventions implemented as appropriate)     08/05/19 5265   Plan of Care Review   Plan of Care Reviewed With patient   Pt is A,A,O x 4 . Stable V/s. No C/O pain during the day . Pt is able to ambulate in the room and hallway independently , pt remains free of falls and injuries . Fall precautions maintained  .plan to give pt IVIG first dose today .

## 2019-08-05 NOTE — ASSESSMENT & PLAN NOTE
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  As per Dr. 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 She also has extensive ATN likely secondary to ischemia downstream the affected glomeruli. These features fit with the low C4 and the +RF. 90% of these cases are associated with HCV (but she is negative!) and only ~3% are associated with HBV. Despite her very high kappa/lambda ratio (K:L), she has no features of myeloma cast nephropathy or light chain deposition disease in her biopsy specimen. The high K:L could come from the cryoglobulinemic process (mono and polyclonal LC production). Of note, lymphoma can rarely cause cryoglobulinemic DPGN, so BM biopsy results will be important. No evidence of uric acid tubulopathy (tumor lysis syndrome) either. In retrospect, her NSAIDs use was a major confounding factor in this case."     Recommendations:  - Continue prednisone 60 mg daily   - creatinine downtrending, 1.2 today  - Cryoglobulin positive for type III (non-malignant), possibly associated with HBV (1.2 - 4% HBV patients can present with cryoglobulinemic vasculitis). Will await final renal bx results. Renal function continuing to improve (Cr 1.5 08/02)   - as per Rheumatology, Rituximab therapy to begin after IVIG administration today.   - will need outpatient follow up for treatment of Lymphoma, possibly with Rituximab   - Continue treatment with entecavir for Hep B   - Follow up on official Kidney Biopsy report   - BP control to goal <140/90  - underwent PLEX 07/29 (3rd session)  - Limit protein diet given azotemia, no more than 60-80 g per day  - Continue sodium bicarb 1300 mg tid  - Will follow closely  "

## 2019-08-05 NOTE — PLAN OF CARE
Problem: Adult Inpatient Plan of Care  Goal: Plan of Care Review  Went over plan of care - all questions answered. No complaints voiced during night. Pt had 2 bm's. Will continue to monitor

## 2019-08-05 NOTE — CONSULTS
PharmD Consult received for:    1.) Admit medication history/reconciliation:  · See Pharmacy MedRec note    2.) Renally adjust all medications:  Estimated Creatinine Clearance: 68.3 mL/min (based on SCr of 1.2 mg/dL).   · Medications reviewed; no medication adjustments needed today - pharmacy will continue to monitor    3.) Patient will need several medications on discharge but has no insurance - will likely need to get patient assistance involved for medications on discharge (specifically the atovaquone suspension 1500 mg po daily and entecavir 0.5 mg po daily)     Thank you for the consult,     Em OliverosD, [facility]  [id number]     **Note: Consults are reviewed Monday-Friday 7:00am-3:30pm. The above recommendations are only suggested. The recommendations should be considered in conjunction with all patient factors.**

## 2019-08-05 NOTE — PHARMACY MED REC
"Admission Medication Reconciliation - Pharmacy Consult Note    The home medication history was taken by Carin Oglesby, Pharmacy Technician.  Based on information gathered and subsequent review by the clinical pharmacist, the items below may need attention.     You may go to "Admission" then "Reconcile Home Medications" tabs to review and/or act upon these items.     Potentially problematic discrepancies with current MAR  o Patient states she was taking the following which was not ordered upon admit (however, pt appears to be non-compliant based on conversation with pt/family and pharmacy fill history)   o Amlodipine 10mg po daily  o HCTZ/lisinopril 12.5mg/20mg takes 2 tablets po daily  o ASA 81mg po daily  o OTHER meds that have been filled but pt reports not taking:   - Citalopram 20 mg po daily on 6/27/19 for 30 day supply  - Clonidine 0.2 mg po bid on 5/22/19 for 30 day supply  - Hydralazine 25 mg po tid on 6/29/19 for 90 day supply  - Was on metoprolol tartrate 100 mg po bid at some point - last filled in 2017      Potential issues to be addressed PRIOR TO DISCHARGE  · Patient will need several medications on discharge but has no insurance - will likely need to get patient assistance involved for medications on discharge (specifically the atovaquone suspension 1500 mg po daily and entecavir 0.5 mg po daily)  · Patient seems to lack understanding of therapy/medications she was taking PTA     Please address this information as you see fit.  Feel free to contact us if you have any questions or require assistance.    Em OliverosD, BCPS  [id number]                  .    .            "

## 2019-08-05 NOTE — PROGRESS NOTES
"ALLERGY & IMMUNOLOGY SERVICE - PROGRESS NOTE     HISTORY OF PRESENT ILLNESS     Patient ID: Kendy Vital is a 53 y.o. 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 Initially consulted for hypogammaglobulinemia. Patient to be started on rituximab. On presentation patient was found to have pneumonia. Patient has no significant infection history prior to onset of fatigue symptoms over the past year. Denies any previous episodes of pneumonia and denies recurrent sinus infections or recurrent AOM. Denies any family history of immunodeficiency. Was not taking any immuno suppresive drugs prior to onset of symptoms.     MEDICAL HISTORY     Past Medical History:   Diagnosis Date    Renal vein thrombosis 2015    s/p embolectomy and lovenox for 9 mos    Uterine leiomyoma     s/p resection      PHYSICAL EXAM     BP (!) 156/84 (BP Location: Right arm, Patient Position: Sitting)   Pulse 69   Temp 97.4 °F (36.3 °C) (Oral)   Resp 18   Ht 5' 6" (1.676 m)   Wt 108.4 kg (238 lb 15.7 oz)   SpO2 100%   BMI 38.57 kg/m²      LABORATORY STUDIES     Component      Latest Ref Rng & Units 8/5/2019   WBC      3.90 - 12.70 K/uL 9.31   RBC      4.00 - 5.40 M/uL 3.28 (L)   Hemoglobin      12.0 - 16.0 g/dL 8.2 (L)   Hematocrit      37.0 - 48.5 % 26.9 (L)   MCV      82 - 98 fL 82   MCH      27.0 - 31.0 pg 25.0 (L)   MCHC      32.0 - 36.0 g/dL 30.5 (L)   RDW      11.5 - 14.5 % 29.7 (H)   Platelets      150 - 350 K/uL 210   MPV      9.2 - 12.9 fL 10.1   Immature Granulocytes      0.0 - 0.5 % 1.5 (H)   Gran # (ANC)      1.8 - 7.7 K/uL 6.1   Immature Grans (Abs)      0.00 - 0.04 K/uL 0.14 (H)   Lymph #      1.0 - 4.8 K/uL 2.1   Mono #      0.3 - 1.0 K/uL 0.9   Eos #      0.0 - 0.5 K/uL 0.0   Baso #      0.00 - 0.20 K/uL 0.01   nRBC      0 /100 WBC 0   Gran%      38.0 - 73.0 % 65.8   Lymph%      18.0 - 48.0 % 22.8   Mono%     "  4.0 - 15.0 % 9.6   Eosinophil%      0.0 - 8.0 % 0.2   Basophil%      0.0 - 1.9 % 0.1   Differential Method       Automated     Component      Latest Ref Rng & Units 8/3/2019 7/26/2019   IgA      40 - 350 mg/dL 44 44   IgG - Serum      650 - 1600 mg/dL 152 (L) 185 (L)   IgM      50 - 300 mg/dL 173 139      CHART REVIEW   Reviewed previous notes, labs, imaging.   ASSESSMENT & PLAN     Kendy Vital is a 53 y.o. female with Cryoglobulinemic vasculitis and hypogammaglobulinemia without any significant previous infection history who is going to be started on rituximab therapy for treatment of his cryoglobulinemic vasculitis. Without any previous history of infections it is unlikely the patient has a primary immunodeficiency. Plasmapheresis often leads to a significant decrease in IgG levels often with normal IgA and IgM levels as is seen in this patient. Patient received three rounds of plasmapheresis which likely is the primary cause of her hypogammaglobulinemia. In addition patients with cryoglobulinemia can also display decreased IgG which could be contributing to her levels in this scanerio. Given the severity of the hypogamm and the fact that patient presented with a pneumonia there is significant concerned that she is at increased risk of infection at this time. Rituximab can further worsen the hypogammaglobulinemia.     Recommendations:  - Prior to using Rituximab would recommend pre-treatment with 500 mg/kg of IVIG to raise immunoglobulin levels prior to initiation of therapy. Given patient's renal status would ensure adequate hydration prior to administration. Would also consider a lower rate and lower concentration if there is concern for further renal damage. Would also try to avoid IVIG products that contain sucrose as these are associated with higher risk of renal dysfunction.  - There is no specific timing for giving the IVIG and Rituximab and no required amount of time that you need to wait prior to  giving rituximab post IVIG infusion.  - There is no need for any additional antibiotic, antiviral, or antifungal therapy for treatment of the patient's low immunoglobulin levels.   - Recommend following up in AI clinic following discharge to assess the need for possible long term replacement. Patient should should have immunoglobulin levels checked periodically.   - Ideally would want to check strep pneumoniae titers prior to initiation of rituximab therapy. However given the patient's recent plasmapheresis this would likely be of limited clinical utility.  - Would recommend checking lymphocyte subsets (Lymphocyte profile 2 in epic) and a B-Cell Memory and Naive Panel prior to initiating rituximab therapy to have baseline levels established.      I have discussed the patient's case with my attending physician.  Thank you for allowing us to help care for your patient.  Please contact us with any concerns.    Saleem Boothe DO  Allergy Immunology Fellow    Pager [phone]

## 2019-08-05 NOTE — SUBJECTIVE & OBJECTIVE
Interval History: Patient seen and examined at bedside, seen by allergy who recommended use of 500mg/kg of IVIG prior to starting Rituximab.     Review of patient's allergies indicates:  No Known Allergies  Current Facility-Administered Medications   Medication Frequency    acetaminophen tablet 650 mg Q6H PRN    albuterol-ipratropium 2.5 mg-0.5 mg/3 mL nebulizer solution 3 mL Q6H PRN    allopurinol tablet 100 mg Daily    artificial tears 0.5 % ophthalmic solution 1 drop TID    atovaquone suspension 1,500 mg Daily    bisacodyl suppository 10 mg Daily PRN    calcium carbonate (OS-DONTE) tablet 500 mg Daily    carvedilol tablet 25 mg BID    cloNIDine tablet 0.1 mg TID    entecavir tablet 0.5 mg Daily    glucagon (human recombinant) injection 1 mg PRN    glucose chewable tablet 16 g PRN    glucose chewable tablet 24 g PRN    hydrALAZINE tablet 100 mg Q8H    hydrALAZINE tablet 25 mg Q6H PRN    insulin aspart U-100 pen 1-10 Units QID (AC + HS) PRN    insulin aspart U-100 pen 11 Units BID WM    insulin aspart U-100 pen 15 Units with lunch    insulin detemir U-100 pen 5 Units QHS    miconazole nitrate 2% ointment BID    NIFEdipine 24 hr tablet 90 mg Daily    nitroGLYCERIN SL tablet 0.4 mg Q5 Min PRN    nystatin 100,000 unit/mL suspension 500,000 Units QID    ondansetron disintegrating tablet 8 mg Q8H PRN    oxyCODONE-acetaminophen 5-325 mg per tablet 1 tablet Q8H PRN    pantoprazole EC tablet 40 mg Before breakfast    predniSONE tablet 50 mg Daily    promethazine (PHENERGAN) 6.25 mg in dextrose 5 % 50 mL IVPB Q6H PRN    ramelteon tablet 8 mg QHS    sodium bicarbonate tablet 650 mg TID    sodium chloride 0.9% flush 10 mL PRN    Tdap vaccine injection 0.5 mL vaccine x 1 dose    vitamin D 1000 units tablet 1,000 Units Daily       Objective:     Vital Signs (Most Recent):  Temp: 98.5 °F (36.9 °C) (08/05/19 1525)  Pulse: 73 (08/05/19 1525)  Resp: 19 (08/05/19 1126)  BP: (!) 108/54 (08/05/19  1525)  SpO2: 98 % (08/05/19 1525)  O2 Device (Oxygen Therapy): room air (08/05/19 1127) Vital Signs (24h Range):  Temp:  [97.4 °F (36.3 °C)-98.7 °F (37.1 °C)] 98.5 °F (36.9 °C)  Pulse:  [61-77] 73  Resp:  [12-20] 19  SpO2:  [97 %-100 %] 98 %  BP: (108-156)/(54-84) 108/54     Weight: 108.4 kg (238 lb 15.7 oz) (08/02/19 2211)  Body mass index is 38.57 kg/m².  Body surface area is 2.25 meters squared.    I/O last 3 completed shifts:  In: 920 [P.O.:920]  Out: 3500 [Urine:3500]    Physical Exam   Constitutional: She is oriented to person, place, and time. She appears well-nourished. No distress.   obese   Cardiovascular: Normal rate, regular rhythm and normal heart sounds.   Chest wall tenderness   Pulmonary/Chest: Effort normal and breath sounds normal. No respiratory distress.   Abdominal: Soft. Bowel sounds are normal. She exhibits no distension.   Musculoskeletal: She exhibits no edema.   Neurological: She is alert and oriented to person, place, and time.   Skin:   Medial thigh rash scaly, hypopigmentation   Psychiatric: She has a normal mood and affect. Her behavior is normal.       Significant Labs:  CBC:   Recent Labs   Lab 08/05/19 0416   WBC 9.31   RBC 3.28*   HGB 8.2*   HCT 26.9*      MCV 82   MCH 25.0*   MCHC 30.5*     CMP:   Recent Labs   Lab 08/05/19 0416   GLU 96   CALCIUM 9.0   ALBUMIN 3.3*   PROT 4.9*      K 3.7   CO2 22*      BUN 26*   CREATININE 1.2   ALKPHOS 68   ALT 46*   AST 17   BILITOT 0.5     All labs within the past 24 hours have been reviewed.     Significant Imaging:  Labs: Reviewed

## 2019-08-05 NOTE — ASSESSMENT & PLAN NOTE
BM biopsy results noted and current suspicion for lymphoplasmacytic lymphoma vs Waldenstrom's, vs multiple myeloma.   Cryoglobulin positive for type III (non-malignant).    Plan:  - Continue prednisone to 50 mg for 2 weeks (final dose 08/16), then to 40 mg qd (starting 08/17)    - IgG remains low at 152 (08/02), IgA and IgM wnl.  Will start 500 mg/kg of IVIG prior to starting Rituximab. Also recommend checking lymphocytic subsets, B-cell memory and naive panel, and Strep pneumoniae titers per Allergy/Immunology recs. No need for additional antibiotics, antivirals, or antifungals and no specific timing post-IVIG treatment before starting Rituximab.    - Continue PCP prophylaxis (for steroids) with atovaquone 1500mg suspension, started 7/25/19. Avoiding bactrim in setting of acute renal failure.   - Continue ulcer prophylaxis with pantoprazole.  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 Renal function stable. Continue to f/u with hepatology for HBV treatment.   - Follow up in rheumatology clinic within ~1 month of discarge to determine if further treatment with rituximab will be necessary. Will perform DEXA scan at that time.   - Recommend 1000 U Calcium and 1000 U Vit D3 daily.  - Heme/Onc recommend delaying LN biopsy at this time due to likely decreased diagnostic yield in light of steroid treatment and due to cryoglobulins being type 3 instead of type 1.     Plan discussed with primary team.  Case discussed with Dr. Castanon.

## 2019-08-05 NOTE — PROGRESS NOTES
[facility]  Rheumatology  Progress Note    Patient Name: Kendy Vital  MRN: [id number]  Admission Date: 7/19/2019  Hospital Length of Stay: 17 days  Code Status: Full Code   Attending Provider: Lele Clay MD  Primary Care Physician: To Obtain Unable  Principal Problem: Acute (diffuse) proliferative glomerulonephritis    Subjective:     HPI: Ms. 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 She initially presented with fevers, chills, dry cough, shortness of breath, 2 pillow orthopnea, and occasional epistaxis for roughly 2 weeks. Initial work up showed a BNP of 526, worsening kidney function, and thrombocytopenia with platelets of 33k. She was initially treated with Rocephin and doxycycline for suspected pneumonia and was diuresed for HF. She was then  plasmapheresed and started on prednisone 60mg daily for suspected TTP. 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 Thus far, the patient has received pulse steroids (today is day 2). 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         Interval History: Pt reports feeling more tired over the past 2-3 days. Otherwise feeling better at baseline. Kidney function continuing to improve. IVIG to be started and Rituxan following labs and approval for medication    Current Facility-Administered Medications   Medication Frequency    acetaminophen tablet 650 mg Q6H PRN    albuterol-ipratropium 2.5 mg-0.5 mg/3 mL nebulizer solution 3 mL Q6H PRN    allopurinol tablet 100 mg Daily    artificial tears 0.5 % ophthalmic solution 1 drop TID    atovaquone suspension 1,500 mg Daily    bisacodyl suppository 10 mg Daily PRN    calcium carbonate (OS-DONTE) tablet 500 mg Daily     carvedilol tablet 25 mg BID    cloNIDine tablet 0.1 mg TID    entecavir tablet 0.5 mg Daily    glucagon (human recombinant) injection 1 mg PRN    glucose chewable tablet 16 g PRN    glucose chewable tablet 24 g PRN    hydrALAZINE tablet 100 mg Q8H    hydrALAZINE tablet 25 mg Q6H PRN    insulin aspart U-100 pen 1-10 Units QID (AC + HS) PRN    insulin aspart U-100 pen 11 Units BID WM    insulin aspart U-100 pen 15 Units with lunch    insulin detemir U-100 pen 5 Units QHS    miconazole nitrate 2% ointment BID    NIFEdipine 24 hr tablet 90 mg Daily    nitroGLYCERIN SL tablet 0.4 mg Q5 Min PRN    nystatin 100,000 unit/mL suspension 500,000 Units QID    ondansetron disintegrating tablet 8 mg Q8H PRN    oxyCODONE-acetaminophen 5-325 mg per tablet 1 tablet Q8H PRN    pantoprazole EC tablet 40 mg Before breakfast    predniSONE tablet 50 mg Daily    promethazine (PHENERGAN) 6.25 mg in dextrose 5 % 50 mL IVPB Q6H PRN    ramelteon tablet 8 mg QHS    sodium bicarbonate tablet 650 mg TID    sodium chloride 0.9% flush 10 mL PRN    Tdap vaccine injection 0.5 mL vaccine x 1 dose    vitamin D 1000 units tablet 1,000 Units Daily     Objective:     Vital Signs (Most Recent):  Temp: 98 °F (36.7 °C) (08/05/19 1126)  Pulse: 61 (08/05/19 1126)  Resp: 19 (08/05/19 1126)  BP: 133/65 (08/05/19 1126)  SpO2: 98 % (08/05/19 1127)  O2 Device (Oxygen Therapy): room air (08/05/19 1127) Vital Signs (24h Range):  Temp:  [97.4 °F (36.3 °C)-98.7 °F (37.1 °C)] 98 °F (36.7 °C)  Pulse:  [61-77] 61  Resp:  [12-20] 19  SpO2:  [97 %-100 %] 98 %  BP: (121-156)/(59-84) 133/65     Weight: 108.4 kg (238 lb 15.7 oz) (08/02/19 2211)  Body mass index is 38.57 kg/m².  Body surface area is 2.25 meters squared.      Intake/Output Summary (Last 24 hours) at 8/5/2019 1404  Last data filed at 8/5/2019 0600  Gross per 24 hour   Intake 420 ml   Output 1400 ml   Net -980 ml       Physical Exam   Vitals reviewed.  Constitutional: She is oriented to  person, place, and time and well-developed, well-nourished, and in no distress. No distress.   HENT:   Head: Normocephalic and atraumatic.   Mouth/Throat: Oropharyngeal exudate (consistent with oral candidiasis; improving) present.   Eyes: EOM are normal. Pupils are equal, round, and reactive to light.   Cardiovascular: Normal rate and regular rhythm.    No murmur heard.  Pulmonary/Chest: Effort normal and breath sounds normal. No respiratory distress. She has no rales.   Abdominal: Soft. Bowel sounds are normal. There is no tenderness (epigastric tenderness has resolved). There is no rebound and no guarding.   Neurological: She is alert and oriented to person, place, and time.   Skin: Skin is warm and dry. She is not diaphoretic.     Musculoskeletal: She exhibits no tenderness.         Significant Labs:  All pertinent lab results from the last 24 hours have been reviewed.    Significant Imaging:  Imaging results within the past 24 hours have been reviewed.    Assessment/Plan:     Cryoglobulinemic vasculitis  BM biopsy results noted and current suspicion for lymphoplasmacytic lymphoma vs Waldenstrom's, vs multiple myeloma.   Cryoglobulin positive for type III (non-malignant).    Plan:  - Continue prednisone to 50 mg for 2 weeks (final dose 08/16), then to 40 mg qd (starting 08/17)    - IgG remains low at 152 (08/02), IgA and IgM wnl.  Will start 500 mg/kg of IVIG prior to starting Rituximab. Also recommend checking lymphocytic subsets, B-cell memory and naive panel, and Strep pneumoniae titers per Allergy/Immunology recs. No need for additional antibiotics, antivirals, or antifungals and no specific timing post-IVIG treatment before starting Rituximab.    - Continue PCP prophylaxis (for steroids) with atovaquone 1500mg suspension, started 7/25/19. Avoiding bactrim in setting of acute renal failure.   - Continue ulcer prophylaxis with pantoprazole.  - Cryoglobulin positive for type III (non-malignant), possibly  associated with HBV (1.2 - 4% HBV patients can present with cryoglobulinemic vasculitis). Renal function stable. Continue to f/u with hepatology for HBV treatment.   - Follow up in rheumatology clinic within ~1 month of discarge to determine if further treatment with rituximab will be necessary. Will perform DEXA scan at that time.   - Recommend 1000 U Calcium and 1000 U Vit D3 daily.  - Heme/Onc recommend delaying LN biopsy at this time due to likely decreased diagnostic yield in light of steroid treatment and due to cryoglobulins being type 3 instead of type 1.     Plan discussed with primary team.  Case discussed with Dr. Castanon.            Jey Marcus MD  Rheumatology  [facility]    Patient seen and examined with resident.  All elements of history, physical exam and medical decision making independently confirmed by me.  See note for details.

## 2019-08-06 PROBLEM — D69.6 THROMBOCYTOPENIA, UNSPECIFIED: Status: RESOLVED | Noted: 2019-07-23 | Resolved: 2019-08-06

## 2019-08-06 PROBLEM — F43.21 GRIEF REACTION: Status: RESOLVED | Noted: 2019-07-27 | Resolved: 2019-08-06

## 2019-08-06 PROBLEM — F43.20 GRIEF REACTION: Status: RESOLVED | Noted: 2019-07-27 | Resolved: 2019-08-06

## 2019-08-06 LAB
ALBUMIN SERPL BCP-MCNC: 3.3 G/DL (ref 3.5–5.2)
ALP SERPL-CCNC: 74 U/L (ref 55–135)
ALT SERPL W/O P-5'-P-CCNC: 45 U/L (ref 10–44)
ANION GAP SERPL CALC-SCNC: 13 MMOL/L (ref 8–16)
AST SERPL-CCNC: 16 U/L (ref 10–40)
BASOPHILS # BLD AUTO: 0.01 K/UL (ref 0–0.2)
BASOPHILS NFR BLD: 0.1 % (ref 0–1.9)
BILIRUB SERPL-MCNC: 0.5 MG/DL (ref 0.1–1)
BUN SERPL-MCNC: 24 MG/DL (ref 6–20)
CALCIUM SERPL-MCNC: 8.5 MG/DL (ref 8.7–10.5)
CHLORIDE SERPL-SCNC: 106 MMOL/L (ref 95–110)
CO2 SERPL-SCNC: 22 MMOL/L (ref 23–29)
CREAT SERPL-MCNC: 1.1 MG/DL (ref 0.5–1.4)
DIFFERENTIAL METHOD: ABNORMAL
EOSINOPHIL # BLD AUTO: 0 K/UL (ref 0–0.5)
EOSINOPHIL NFR BLD: 0.1 % (ref 0–8)
ERYTHROCYTE [DISTWIDTH] IN BLOOD BY AUTOMATED COUNT: 30.5 % (ref 11.5–14.5)
EST. GFR  (AFRICAN AMERICAN): >60 ML/MIN/1.73 M^2
EST. GFR  (NON AFRICAN AMERICAN): 57.9 ML/MIN/1.73 M^2
GLUCOSE SERPL-MCNC: 125 MG/DL (ref 70–110)
HCT VFR BLD AUTO: 26.4 % (ref 37–48.5)
HGB BLD-MCNC: 8.3 G/DL (ref 12–16)
IMM GRANULOCYTES # BLD AUTO: 0.11 K/UL (ref 0–0.04)
IMM GRANULOCYTES NFR BLD AUTO: 1.2 % (ref 0–0.5)
LYMPHOCYTES # BLD AUTO: 2 K/UL (ref 1–4.8)
LYMPHOCYTES NFR BLD: 21.7 % (ref 18–48)
MCH RBC QN AUTO: 25.2 PG (ref 27–31)
MCHC RBC AUTO-ENTMCNC: 31.4 G/DL (ref 32–36)
MCV RBC AUTO: 80 FL (ref 82–98)
MONOCYTES # BLD AUTO: 0.8 K/UL (ref 0.3–1)
MONOCYTES NFR BLD: 8.6 % (ref 4–15)
NEUTROPHILS # BLD AUTO: 6.1 K/UL (ref 1.8–7.7)
NEUTROPHILS NFR BLD: 68.3 % (ref 38–73)
NRBC BLD-RTO: 0 /100 WBC
PHOSPHATE SERPL-MCNC: 3.3 MG/DL (ref 2.7–4.5)
PLATELET # BLD AUTO: 266 K/UL (ref 150–350)
PMV BLD AUTO: 10.3 FL (ref 9.2–12.9)
POCT GLUCOSE: 153 MG/DL (ref 70–110)
POCT GLUCOSE: 196 MG/DL (ref 70–110)
POCT GLUCOSE: 205 MG/DL (ref 70–110)
POCT GLUCOSE: 236 MG/DL (ref 70–110)
POTASSIUM SERPL-SCNC: 3.5 MMOL/L (ref 3.5–5.1)
PROT SERPL-MCNC: 5 G/DL (ref 6–8.4)
RBC # BLD AUTO: 3.3 M/UL (ref 4–5.4)
SODIUM SERPL-SCNC: 141 MMOL/L (ref 136–145)
WBC # BLD AUTO: 8.97 K/UL (ref 3.9–12.7)

## 2019-08-06 PROCEDURE — 36410 VNPNXR 3YR/> PHY/QHP DX/THER: CPT

## 2019-08-06 PROCEDURE — 25000003 PHARM REV CODE 250: Performed by: INTERNAL MEDICINE

## 2019-08-06 PROCEDURE — 25000003 PHARM REV CODE 250: Performed by: STUDENT IN AN ORGANIZED HEALTH CARE EDUCATION/TRAINING PROGRAM

## 2019-08-06 PROCEDURE — 84100 ASSAY OF PHOSPHORUS: CPT

## 2019-08-06 PROCEDURE — 99233 SBSQ HOSP IP/OBS HIGH 50: CPT | Mod: ,,, | Performed by: INTERNAL MEDICINE

## 2019-08-06 PROCEDURE — 63600175 PHARM REV CODE 636 W HCPCS: Performed by: HOSPITALIST

## 2019-08-06 PROCEDURE — 86359 T CELLS TOTAL COUNT: CPT | Mod: 91

## 2019-08-06 PROCEDURE — 63600175 PHARM REV CODE 636 W HCPCS: Mod: JG | Performed by: INTERNAL MEDICINE

## 2019-08-06 PROCEDURE — C1751 CATH, INF, PER/CENT/MIDLINE: HCPCS

## 2019-08-06 PROCEDURE — 86355 B CELLS TOTAL COUNT: CPT | Mod: 91

## 2019-08-06 PROCEDURE — 86360 T CELL ABSOLUTE COUNT/RATIO: CPT

## 2019-08-06 PROCEDURE — 76937 US GUIDE VASCULAR ACCESS: CPT

## 2019-08-06 PROCEDURE — 80053 COMPREHEN METABOLIC PANEL: CPT

## 2019-08-06 PROCEDURE — 11000001 HC ACUTE MED/SURG PRIVATE ROOM

## 2019-08-06 PROCEDURE — 30000890 ARUP MISCELLANEOUS TEST 1

## 2019-08-06 PROCEDURE — 99233 PR SUBSEQUENT HOSPITAL CARE,LEVL III: ICD-10-PCS | Mod: ,,, | Performed by: INTERNAL MEDICINE

## 2019-08-06 PROCEDURE — 25000003 PHARM REV CODE 250: Performed by: HOSPITALIST

## 2019-08-06 PROCEDURE — 86356 MONONUCLEAR CELL ANTIGEN: CPT

## 2019-08-06 PROCEDURE — 85025 COMPLETE CBC W/AUTO DIFF WBC: CPT

## 2019-08-06 PROCEDURE — 99233 SBSQ HOSP IP/OBS HIGH 50: CPT | Mod: ,,, | Performed by: HOSPITALIST

## 2019-08-06 PROCEDURE — 63600175 PHARM REV CODE 636 W HCPCS: Mod: JG | Performed by: HOSPITALIST

## 2019-08-06 PROCEDURE — 99233 PR SUBSEQUENT HOSPITAL CARE,LEVL III: ICD-10-PCS | Mod: ,,, | Performed by: HOSPITALIST

## 2019-08-06 PROCEDURE — 86357 NK CELLS TOTAL COUNT: CPT | Mod: 91

## 2019-08-06 RX ORDER — FAMOTIDINE 20 MG/1
20 TABLET, FILM COATED ORAL
Status: COMPLETED | OUTPATIENT
Start: 2019-08-06 | End: 2019-08-06

## 2019-08-06 RX ORDER — DIPHENHYDRAMINE HCL 25 MG
25 CAPSULE ORAL
Status: COMPLETED | OUTPATIENT
Start: 2019-08-06 | End: 2019-08-06

## 2019-08-06 RX ORDER — ACETAMINOPHEN 325 MG/1
650 TABLET ORAL
Status: COMPLETED | OUTPATIENT
Start: 2019-08-06 | End: 2019-08-06

## 2019-08-06 RX ORDER — ACETAMINOPHEN 325 MG/1
650 TABLET ORAL
Status: CANCELLED | OUTPATIENT
Start: 2019-08-07

## 2019-08-06 RX ORDER — DIPHENHYDRAMINE HCL 25 MG
25 CAPSULE ORAL
Status: CANCELLED | OUTPATIENT
Start: 2019-08-07

## 2019-08-06 RX ORDER — METHYLPREDNISOLONE SOD SUCC 125 MG
100 VIAL (EA) INJECTION
Status: COMPLETED | OUTPATIENT
Start: 2019-08-06 | End: 2019-08-06

## 2019-08-06 RX ORDER — ACETAMINOPHEN 325 MG/1
325 TABLET ORAL EVERY 4 HOURS PRN
Status: DISCONTINUED | OUTPATIENT
Start: 2019-08-06 | End: 2019-08-07 | Stop reason: HOSPADM

## 2019-08-06 RX ORDER — DIPHENHYDRAMINE HYDROCHLORIDE 50 MG/ML
25 INJECTION INTRAMUSCULAR; INTRAVENOUS ONCE
Status: COMPLETED | OUTPATIENT
Start: 2019-08-06 | End: 2019-08-06

## 2019-08-06 RX ORDER — HEPARIN 100 UNIT/ML
500 SYRINGE INTRAVENOUS
Status: CANCELLED | OUTPATIENT
Start: 2019-08-07

## 2019-08-06 RX ORDER — SODIUM CHLORIDE 0.9 % (FLUSH) 0.9 %
10 SYRINGE (ML) INJECTION
Status: DISCONTINUED | OUTPATIENT
Start: 2019-08-06 | End: 2019-08-07 | Stop reason: HOSPADM

## 2019-08-06 RX ORDER — INSULIN ASPART 100 [IU]/ML
13 INJECTION, SOLUTION INTRAVENOUS; SUBCUTANEOUS
Status: DISCONTINUED | OUTPATIENT
Start: 2019-08-07 | End: 2019-08-07 | Stop reason: HOSPADM

## 2019-08-06 RX ORDER — METHYLPREDNISOLONE SOD SUCC 125 MG
100 VIAL (EA) INJECTION
Status: CANCELLED | OUTPATIENT
Start: 2019-08-07

## 2019-08-06 RX ORDER — FERROUS SULFATE 325(65) MG
325 TABLET, DELAYED RELEASE (ENTERIC COATED) ORAL DAILY
Status: DISCONTINUED | OUTPATIENT
Start: 2019-08-06 | End: 2019-08-07 | Stop reason: HOSPADM

## 2019-08-06 RX ORDER — HEPARIN 100 UNIT/ML
500 SYRINGE INTRAVENOUS
Status: DISCONTINUED | OUTPATIENT
Start: 2019-08-06 | End: 2019-08-07 | Stop reason: HOSPADM

## 2019-08-06 RX ORDER — SODIUM CHLORIDE 0.9 % (FLUSH) 0.9 %
10 SYRINGE (ML) INJECTION
Status: CANCELLED | OUTPATIENT
Start: 2019-08-07

## 2019-08-06 RX ORDER — FAMOTIDINE 20 MG/1
20 TABLET, FILM COATED ORAL
Status: CANCELLED | OUTPATIENT
Start: 2019-08-07

## 2019-08-06 RX ORDER — ACETAMINOPHEN 325 MG/1
650 TABLET ORAL ONCE
Status: COMPLETED | OUTPATIENT
Start: 2019-08-06 | End: 2019-08-06

## 2019-08-06 RX ADMIN — CALCIUM 1000 MG: 500 TABLET ORAL at 09:08

## 2019-08-06 RX ADMIN — ENTECAVIR 0.5 MG: 0.5 TABLET ORAL at 09:08

## 2019-08-06 RX ADMIN — INSULIN DETEMIR 5 UNITS: 100 INJECTION, SOLUTION SUBCUTANEOUS at 10:08

## 2019-08-06 RX ADMIN — NYSTATIN 500000 UNITS: 100000 SUSPENSION ORAL at 12:08

## 2019-08-06 RX ADMIN — METHYLPREDNISOLONE SODIUM SUCCINATE 100 MG: 125 INJECTION, POWDER, FOR SOLUTION INTRAMUSCULAR; INTRAVENOUS at 04:08

## 2019-08-06 RX ADMIN — SODIUM BICARBONATE 650 MG TABLET 650 MG: at 09:08

## 2019-08-06 RX ADMIN — DIPHENHYDRAMINE HYDROCHLORIDE 25 MG: 25 CAPSULE ORAL at 04:08

## 2019-08-06 RX ADMIN — MICONAZOLE NITRATE: 20 OINTMENT TOPICAL at 09:08

## 2019-08-06 RX ADMIN — INSULIN ASPART 11 UNITS: 100 INJECTION, SOLUTION INTRAVENOUS; SUBCUTANEOUS at 09:08

## 2019-08-06 RX ADMIN — HYPROMELLOSE 2910 1 DROP: 5 SOLUTION OPHTHALMIC at 10:08

## 2019-08-06 RX ADMIN — ATOVAQUONE 1500 MG: 750 SUSPENSION ORAL at 09:08

## 2019-08-06 RX ADMIN — HYPROMELLOSE 2910 1 DROP: 5 SOLUTION OPHTHALMIC at 09:08

## 2019-08-06 RX ADMIN — PREDNISONE 50 MG: 20 TABLET ORAL at 09:08

## 2019-08-06 RX ADMIN — CARVEDILOL 25 MG: 25 TABLET, FILM COATED ORAL at 09:08

## 2019-08-06 RX ADMIN — CLONIDINE HYDROCHLORIDE 0.1 MG: 0.1 TABLET ORAL at 09:08

## 2019-08-06 RX ADMIN — PANTOPRAZOLE SODIUM 40 MG: 40 TABLET, DELAYED RELEASE ORAL at 06:08

## 2019-08-06 RX ADMIN — INSULIN ASPART 15 UNITS: 100 INJECTION, SOLUTION INTRAVENOUS; SUBCUTANEOUS at 01:08

## 2019-08-06 RX ADMIN — HYPROMELLOSE 2910 1 DROP: 5 SOLUTION OPHTHALMIC at 04:08

## 2019-08-06 RX ADMIN — HYDRALAZINE HYDROCHLORIDE 100 MG: 50 TABLET ORAL at 06:08

## 2019-08-06 RX ADMIN — ALLOPURINOL 100 MG: 100 TABLET ORAL at 09:08

## 2019-08-06 RX ADMIN — NYSTATIN 500000 UNITS: 100000 SUSPENSION ORAL at 04:08

## 2019-08-06 RX ADMIN — ACETAMINOPHEN 650 MG: 325 TABLET ORAL at 04:08

## 2019-08-06 RX ADMIN — HUMAN IMMUNOGLOBULIN G 40 G: 40 LIQUID INTRAVENOUS at 11:08

## 2019-08-06 RX ADMIN — ACETAMINOPHEN 650 MG: 325 TABLET ORAL at 10:08

## 2019-08-06 RX ADMIN — FERROUS SULFATE TAB EC 325 MG (65 MG FE EQUIVALENT) 325 MG: 325 (65 FE) TABLET DELAYED RESPONSE at 04:08

## 2019-08-06 RX ADMIN — MELATONIN 1000 UNITS: at 09:08

## 2019-08-06 RX ADMIN — DIPHENHYDRAMINE HYDROCHLORIDE 25 MG: 50 INJECTION, SOLUTION INTRAMUSCULAR; INTRAVENOUS at 10:08

## 2019-08-06 RX ADMIN — RAMELTEON 8 MG: 8 TABLET, FILM COATED ORAL at 10:08

## 2019-08-06 RX ADMIN — CARVEDILOL 25 MG: 25 TABLET, FILM COATED ORAL at 10:08

## 2019-08-06 RX ADMIN — SODIUM BICARBONATE 650 MG TABLET 650 MG: at 10:08

## 2019-08-06 RX ADMIN — INSULIN ASPART 11 UNITS: 100 INJECTION, SOLUTION INTRAVENOUS; SUBCUTANEOUS at 04:08

## 2019-08-06 RX ADMIN — NYSTATIN 500000 UNITS: 100000 SUSPENSION ORAL at 10:08

## 2019-08-06 RX ADMIN — FAMOTIDINE 20 MG: 20 TABLET, FILM COATED ORAL at 04:08

## 2019-08-06 RX ADMIN — NIFEDIPINE 90 MG: 60 TABLET, FILM COATED, EXTENDED RELEASE ORAL at 09:08

## 2019-08-06 RX ADMIN — INSULIN ASPART 4 UNITS: 100 INJECTION, SOLUTION INTRAVENOUS; SUBCUTANEOUS at 04:08

## 2019-08-06 RX ADMIN — HYDRALAZINE HYDROCHLORIDE 100 MG: 50 TABLET ORAL at 10:08

## 2019-08-06 RX ADMIN — NYSTATIN 500000 UNITS: 100000 SUSPENSION ORAL at 09:08

## 2019-08-06 RX ADMIN — RITUXIMAB 1000 MG: 10 INJECTION, SOLUTION INTRAVENOUS at 04:08

## 2019-08-06 RX ADMIN — SODIUM BICARBONATE 650 MG TABLET 650 MG: at 04:08

## 2019-08-06 NOTE — PROGRESS NOTES
[facility]  Rheumatology  Progress Note    Patient Name: Kendy Vital  MRN: [id number]  Admission Date: 7/19/2019  Hospital Length of Stay: 18 days  Code Status: Full Code   Attending Provider: Lloyd Arambula, *  Primary Care Physician: To Obtain Unable  Principal Problem: Acute (diffuse) proliferative glomerulonephritis    Subjective:     HPI: Ms. 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 She initially presented with fevers, chills, dry cough, shortness of breath, 2 pillow orthopnea, and occasional epistaxis for roughly 2 weeks. Initial work up showed a BNP of 526, worsening kidney function, and thrombocytopenia with platelets of 33k. She was initially treated with Rocephin and doxycycline for suspected pneumonia and was diuresed for HF. She was then  plasmapheresed and started on prednisone 60mg daily for suspected TTP. 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 Thus far, the patient has received pulse steroids (today is day 2). 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         Interval History: Feeling better at baseline. Kidney function continuing to improve. IVIG and Rituxan being given    Current Facility-Administered Medications   Medication Frequency    0.9%  NaCl infusion Continuous    acetaminophen tablet 325 mg Q4H PRN    acetaminophen tablet 650 mg Q6H PRN    albuterol-ipratropium 2.5 mg-0.5 mg/3 mL nebulizer solution 3 mL Q6H PRN    allopurinol tablet 100 mg Daily    artificial tears 0.5 % ophthalmic solution 1 drop TID    atovaquone suspension 1,500 mg Daily    bisacodyl suppository 10 mg Daily PRN    calcium carbonate (OS-DONTE) tablet 500 mg Daily    carvedilol tablet 25 mg BID     cloNIDine tablet 0.1 mg TID    entecavir tablet 0.5 mg Daily    glucagon (human recombinant) injection 1 mg PRN    glucose chewable tablet 16 g PRN    glucose chewable tablet 24 g PRN    hydrALAZINE tablet 100 mg Q8H    hydrALAZINE tablet 25 mg Q6H PRN    Immune Globulin G (IGG)-PRO-IGA 10 % injection (Privigen) 10 % injection 40 g Once    insulin aspart U-100 pen 1-10 Units QID (AC + HS) PRN    insulin aspart U-100 pen 11 Units BID WM    insulin aspart U-100 pen 15 Units with lunch    insulin detemir U-100 pen 5 Units QHS    miconazole nitrate 2% ointment BID    NIFEdipine 24 hr tablet 90 mg Daily    nitroGLYCERIN SL tablet 0.4 mg Q5 Min PRN    nystatin 100,000 unit/mL suspension 500,000 Units QID    ondansetron disintegrating tablet 8 mg Q8H PRN    oxyCODONE-acetaminophen 5-325 mg per tablet 1 tablet Q8H PRN    pantoprazole EC tablet 40 mg Before breakfast    predniSONE tablet 50 mg Daily    promethazine (PHENERGAN) 6.25 mg in dextrose 5 % 50 mL IVPB Q6H PRN    ramelteon tablet 8 mg QHS    sodium bicarbonate tablet 650 mg TID    sodium chloride 0.9% flush 10 mL PRN    Tdap vaccine injection 0.5 mL vaccine x 1 dose    vitamin D 1000 units tablet 1,000 Units Daily     Objective:     Vital Signs (Most Recent):  Temp: 97.7 °F (36.5 °C) (08/06/19 0915)  Pulse: 67 (08/06/19 0915)  Resp: 17 (08/06/19 0415)  BP: (!) 166/78 (08/06/19 0915)  SpO2: (!) 92 % (08/06/19 0915)  O2 Device (Oxygen Therapy): room air (08/06/19 0415) Vital Signs (24h Range):  Temp:  [97.7 °F (36.5 °C)-100 °F (37.8 °C)] 97.7 °F (36.5 °C)  Pulse:  [61-77] 67  Resp:  [17-19] 17  SpO2:  [92 %-98 %] 92 %  BP: (108-166)/(54-78) 166/78     Weight: 108.4 kg (238 lb 15.7 oz) (08/02/19 2211)  Body mass index is 38.57 kg/m².  Body surface area is 2.25 meters squared.      Intake/Output Summary (Last 24 hours) at 8/6/2019 1034  Last data filed at 8/6/2019 0600  Gross per 24 hour   Intake 1890 ml   Output 800 ml   Net 1090 ml        Physical Exam   Vitals reviewed.  Constitutional: She is oriented to person, place, and time and well-developed, well-nourished, and in no distress. No distress.   HENT:   Head: Normocephalic and atraumatic.   Mouth/Throat: No oropharyngeal exudate (resolved oral candidiasis).   Eyes: EOM are normal. Pupils are equal, round, and reactive to light. No scleral icterus.   Cardiovascular: Normal rate and regular rhythm.    No murmur heard.  Pulmonary/Chest: Effort normal and breath sounds normal. No respiratory distress. She has no rales.   Abdominal: Soft. Bowel sounds are normal. There is no tenderness (epigastric tenderness has resolved). There is no rebound and no guarding.   Neurological: She is alert and oriented to person, place, and time.   Skin: Skin is warm and dry. She is not diaphoretic.     Musculoskeletal: She exhibits edema (mild dependent pitting edema of right LE to 1/3 tibia). She exhibits no tenderness.         Significant Labs:  All pertinent lab results from the last 24 hours have been reviewed.    Significant Imaging:  Imaging results within the past 24 hours have been reviewed.    Assessment/Plan:     Cryoglobulinemic vasculitis  BM biopsy results noted and current suspicion for lymphoplasmacytic lymphoma vs Waldenstrom's, vs multiple myeloma.   Cryoglobulin positive for type III (non-malignant).    Plan:  - Continue prednisone to 50 mg for 2 weeks (final dose 08/16), then to 40 mg qd (starting 08/17)    - IVIG and Rituximab given 08/06. [place] Medicaid pending. Will schedule f/u in [facility] in 2 weeks and hopefully 2nd dose rituximab will be able to be given on same date since patient comes a long distance.  Unable to get outpatient prior authorization until patient can provide insurance insurance. Lymphocytic subsets and B-cell memory and naive panel pending. No need for additional antibiotics, antivirals, or antifungals and no specific timing post-IVIG treatment  before starting Rituximab.    - Continue PCP prophylaxis (for steroids) with atovaquone 1500mg suspension, started 7/25/19. Avoiding bactrim in setting of acute renal failure.   - Continue ulcer prophylaxis with pantoprazole.  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 Renal function stable. Continue to f/u with hepatology for HBV treatment.   - Will perform DEXA scan as outpatient.   - Recommend 1000 U Calcium and 1000 U Vit D3 daily.  - Heme/Onc recommend delaying LN biopsy at this time due to likely decreased diagnostic yield in light of steroid treatment and due to cryoglobulins being type 3 instead of type 1.  -Patient will need to see hepatology, nephrology and allergy as an outpatient.               Jey Marcus MD  Rheumatology  [facility]

## 2019-08-06 NOTE — ASSESSMENT & PLAN NOTE
"Hepatology consulted to see if her HBV can be causing the cryoglubins and if she needs treatment.  - entecavir 0.5 mg dosage has been adjusted as her renal function has improved.   -patient converted to normal dosing of 0.5mg daily as her CrCl remains >50 and LACI has resolved  - will need close Hepatology f/u upon discharge      Per hepatology:  "Cryoglobulinemia usually associated with HCV, but rare association with Hep B. Presence of glomerulonephritis on preliminary renal biopsy. Overall, less likely that Hep B is related to this as ALT normal and viral load very low.  - Continue chronic Hep B treatment as patient is on immunosuppression, especially while on rituximab.  Continue entecavir  Monitor renal function and dose-adjust accordingly.  - Patient will need HCC screening going forward given her ethnicity (Liver U/S and AFP every 6 months)"    "

## 2019-08-06 NOTE — PLAN OF CARE
Problem: Adult Inpatient Plan of Care  Goal: Plan of Care Review  Outcome: Ongoing (interventions implemented as appropriate)  Plan of care reviewed and updated. Pt AA+O. Pt's pain is managed with the medication ordered at this time. Pt's VS are as charted.  No falls this shift. Pt is oriented to room and call system. Will continue to [facility].

## 2019-08-06 NOTE — ASSESSMENT & PLAN NOTE
BM biopsy results noted and current suspicion for lymphoplasmacytic lymphoma vs Waldenstrom's, vs multiple myeloma.   Cryoglobulin positive for type III (non-malignant).    Plan:  - Continue prednisone to 50 mg for 2 weeks (final dose 08/16), then to 40 mg qd (starting 08/17)    - IVIG and Rituximab given 08/06. [place] Medicaid pending. Will schedule f/u in [facility] in 2 weeks where the patient would potentially receive second dose of Rituxan pending insurance. Lymphocytic subsets and B-cell memory and naive panel pending. No need for additional antibiotics, antivirals, or antifungals and no specific timing post-IVIG treatment before starting Rituximab.    - Continue PCP prophylaxis (for steroids) with atovaquone 1500mg suspension, started 7/25/19. Avoiding bactrim in setting of acute renal failure.   - Continue ulcer prophylaxis with pantoprazole.  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 Renal function stable. Continue to f/u with hepatology for HBV treatment.   - Follow up in rheumatology clinic within ~1 month of discarge to determine if further treatment with rituximab will be necessary. Will perform DEXA scan at that time.   - Recommend 1000 U Calcium and 1000 U Vit D3 daily.  - Heme/Onc recommend delaying LN biopsy at this time due to likely decreased diagnostic yield in light of steroid treatment and due to cryoglobulins being type 3 instead of type 1.

## 2019-08-06 NOTE — ASSESSMENT & PLAN NOTE
"Per heme research 7/26 "Pt also has a history of unprovoked thrombosis treated at [facility] approximately 4 years ago, none of which is visible in the "care everywhere" section of the chart. She states she had one episode of thrombosis in her left upper extremity that contained "many little clots" and required surgical thrombectomy. She also had a left renal vein thrombosis for which she received a stent. She states that for both episodes she was placed on a heparin drip, did outpatient lovenox and was continued on ASA 81 until now. She states that no one informed her of any findings suggesting a hypercoaguable state or if the clotting was provoked."  -Given her significant thromboembolic history, rheum 7/26 would like to work her up for antiphopholipid antibody syndrome. They ordered levels.   -She also had symptoms of sicca syndromes (dry eyes, dry mouth). Rheum will check for sjogrens syndrome (SSA/SSB)    "

## 2019-08-06 NOTE — ASSESSMENT & PLAN NOTE
"Hepatology consulted to see if her HBV can be causing the cryoglubins and if she needs treatment.  - entecavir 0.5 mg dosage has been adjusted as her renal function has improved.   -patient converted to normal dosing of 0.5mg daily as her CrCl remains >50 and LACI has resolved.     -      Per hepatology:  "Cryoglobulinemia usually associated with HCV, but rare association with Hep B. Presence of glomerulonephritis on preliminary renal biopsy. Overall, less likely that Hep B is related to this as ALT normal and viral load very low.  - Continue chronic Hep B treatment as patient is on immunosuppression, especially while on rituximab.  Continue entecavir  Monitor renal function and dose-adjust accordingly.  - Patient will need HCC screening going forward given her ethnicity (Liver U/S and AFP every 6 months)"    " Repair Performed By Another Provider Text (Leave Blank If You Do Not Want): After the tissue was excised the defect was repaired by another provider.

## 2019-08-06 NOTE — ASSESSMENT & PLAN NOTE
hypertensive at admission and per patient, has been undergoing multiple recent medication changes due to HTN as outpatient.  Suspect initially may have been exacerbated by NSAID use, now concern for multifactorial cause including renal failure with volume overload, steroid use, and inadequate dosing of home regimen.  Home regimen includes: lisinopril-HCTZ 20-12.5mg, clonidine 0.2mg bid, and hydralazine 25mg tid    -Nifedipine @ 90mg,   -Coreg 25mg bid, Clonidine 0.1 TID  -Hydralazine 100mg q8hrs.

## 2019-08-06 NOTE — PROGRESS NOTES
[facility] Hospital Medicine  Progress Note    Patient Name: Kendy Vital  MRN: [id number]  Patient Class: IP- Inpatient   Admission Date: 7/19/2019  Length of Stay: 17 days  Attending Physician: Lele Clay MD  Primary Care Provider: To Obtain [facility] Medicine Team: [facility] HOSP MED R Lele Clay MD    Subjective:     Principal Problem:Acute (diffuse) proliferative glomerulonephritis      HPI:  Ms. Vital is a 53 year old female with hypertension, anemia, and history of leiomyoma of the uterus who presents to ICU as a transfer from [facility] for evaluation of thrombocytopenia. Patient reports that prior to going to the hospital 3 days ago that she was experiencing symptoms of chills, feeling febrile, dry cough, shortness of breath and occasional nose bleeds for roughly 2 weeks. She also reports easy fatigueability and difficulty breathing when laying flat; currently sleeps with 2 pillows. Patient presented to hospital in [place] when her symptoms did not resolve. Initial work up showed a , worsening kidney function with creatinine of 2.9, and thrombocytopenia with platelets of 33k. In addition, chest x-ray showed interstitial opacities and follow up CT showed perihilar ground glass opacity. Patient was treated with rocephin and doxycycline as well as Bumex q12 due to her heart failure type symptoms. Lab work at the time showed WBC of 7.9 and a lactate of .8. In addition, she tested positive for strep A. Patient was transferred for further evaluation of her thrombocytopenia with concern for TTP and possible need for plasmapheresis.     At time of exam, patient is properly oriented to person time and places. She endorses headache, generalized myalgias, nausea and orthopnea. She denies SOB while sitting up, chest pain, abdominal pain, vomiting, and diarrhea. She has not felt febrile since 1 week prior to hospital stay.         Overview/Hospital Course:  Ms. Vital presented as transfer to ICU for suspected TTP. At that time, she had severe thrombocytopenia, renal failure, and bilateral infiltrates on chest CT concerning for PNA. She was stepped down when repeat CBC revealed normalizing platelets and hemolysis labs were unremarkable. Initially placed on vanc/zosyn/azithromycin for PNA, which has been de-escalated to rocephin/azithromycin for CAP and she has completed all antibiotics.      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  Patient does not have HCV, but is positive for HBV and has been started on anti-viral therapy with entecavir.  Due to concern of possible hematologic malignancy patient had bone marrow biopsy on 7/24 and results showed B-cell lymphoproliferative disorders.   Her serum cryoglobulins returned as Type 3 and this was not c/w with a hematologic malignancy etiology for crytoglobulinemia (would expect Type 1 per hematology).   -Rheumatology and Nephrology continued to follow for management of her Cryolobinemic Vasculitis with Glomerulonephritis.   She has been on Glucorticoid therapy with oral prednisone and also had 3 days of pulse steroids 1gm hydrocortisone, in addition to sessions of Plasma Exchange/Plasmapheresis (via Right IJ Trialysis since dc'd) .      Her platelet levels have normalized, and renal function improving since above treatments started. Rheumatology is following and next steps are for IVIG infusion and then rituxan.  Allergy/Immunology consulted to assist in determining dosing/timing with these next therapies.  They will re-evaluate patient on 8/5 provide further recommendations.  IVIG recommended @ 500mg/kg.         Interval History:   No events overnight, bored,  Has some fatigue     Allergy recs IVIG 500mg/kg - orderset  placed. Rheum to consult for both IVIG and Rituximab, process of IP approval for Rituximab is in process per Rheum.     Cr is 1.2 in normal range, informed pt.     Review of Systems   Constitutional: Positive for fatigue. Negative for fever.   Eyes: Negative for itching.   Respiratory: Negative for shortness of breath.    Cardiovascular: Negative for chest pain.   Gastrointestinal: Negative for abdominal pain.   Genitourinary: Negative for dysuria.   Neurological: Negative for headaches.     Objective:     Vital Signs (Most Recent):  Temp: 98.5 °F (36.9 °C) (08/05/19 1525)  Pulse: 73 (08/05/19 1525)  Resp: 19 (08/05/19 1126)  BP: (!) 108/54 (08/05/19 1525)  SpO2: 98 % (08/05/19 1525) Vital Signs (24h Range):  Temp:  [97.4 °F (36.3 °C)-98.7 °F (37.1 °C)] 98.5 °F (36.9 °C)  Pulse:  [61-75] 73  Resp:  [12-19] 19  SpO2:  [98 %-100 %] 98 %  BP: (108-156)/(54-84) 108/54     Weight: 108.4 kg (238 lb 15.7 oz)  Body mass index is 38.57 kg/m².    Intake/Output Summary (Last 24 hours) at 8/5/2019 2013  Last data filed at 8/5/2019 1800  Gross per 24 hour   Intake 1170 ml   Output 2800 ml   Net -1630 ml      Physical Exam   Constitutional: She is oriented to person, place, and time. She appears well-nourished. No distress.   obese   Cardiovascular: Normal rate, regular rhythm and normal heart sounds.   Pulmonary/Chest: Effort normal and breath sounds normal. No respiratory distress.   Abdominal: Soft. Bowel sounds are normal. She exhibits no distension.   Musculoskeletal: She exhibits no edema.   Neurological: She is alert and oriented to person, place, and time.   Skin:   Medial thigh rash scaly, hypopigmentation   Psychiatric: She has a normal mood and affect. Her behavior is normal.       Significant Labs:   All pertinent labs within the past 24 hours have been reviewed.   Results for MUNA HERNANDEZ (MRN [id number]) as of 8/4/2019 18:24   Ref. Range 7/26/2019 05:03 7/31/2019 14:14 8/3/2019 10:50   IgG - Serum Latest Ref Range:  650 - 1600 mg/dL 185 (L)  152 (L)   IgM Latest Ref Range: 50 - 300 mg/dL 139  173   IgA Latest Ref Range: 40 - 350 mg/dL 44  44       Significant Imaging: I have reviewed and interpreted all pertinent imaging results/findings within the past 24 hours.      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 - Awaiting final renal biopsy report  baseline creatinine of 1.0 roughly 1 month ago. BUN/Cr peaked at 154/6.0 and improved the day after PLEX started.  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  On 8/02 Prednisone decreased to 50mg for 2 weeks and then to go to 40mg daily 8/16 until f/u with rheumatology  7/24 Trialysis line placed for PLEX which was started q48h x3 treatements.   -- Nephrology/Rheumatology following      Acute renal failure  See glomerulonephritis  Renal dosing of medications      Cryoglobulinemic vasculitis  Seen on renal biopsy.  HBV is uncommon to cause this.  LPL/WM or even myeloma could potentially be the source of her cryoglobulins, however returned as Type 3 cryoglobulins and these are not consistent with a primary hematologic pathology causing cryoglobulinemia  Rheumatology consulted, Immunoglobulin levels re-checked and patient remains low.  Allergy/Immunology re-contacted to evaluate and recs IVIG 500mg/kg - order placed   -Rheumatology working on potential inpt Rituximab infusion      B-cell lymphoproliferative disorder  BMBX: MONOCLONAL PLASMA CELL POPULATION WITH ATYPICAL LYMPHOID AGGREGATES.  R/o malignancy.  Differential diagnosis includes lymphoplasmacytic lymphoma/Waldenstrom's  macroglobulinemia, multiple myeloma, and marginal zone lymphoma.   7/26 CT chest abd pelvis ordered to look for lymphadenopathy, only notable for small mediastinal lymph nodes/pre-carinal/hildar LN. and bone survey w/o acute findings.   - 7/26 rheum deferred rituximab choice  to heme.  Per rheum:IgG decreased at 185. They request allergy/immunology consult (spoke to them 7/29) regarding ability to start rituximab with decreased IgG.  -Cryoglobulin subtype is Type 3, renal biopsy pending, discussed with Dr. Rea (hematology) based on cryoglobulin subtype likely not a primary hematologic malignancy causing cryoglobulinemia.  Mediastinoscopy or LN biopsy is not recommended as an inpatient at this time.  May be necessary/indicated on outpatient bases. Further management of crygolobulinemia per Nephrology or Rheum services.       Thrombocytopenia, unspecified  Plt on admission 41. Resolved after treatments.  Seen by Heme/onc.   HIT Ab negative  - monitor      Other specified anemias  Stable.  FERRITIN 161, RETIC 4.7 (H), Bili 1, FOLATE 11.1, VITAMIN B12 843  Iron 40, sat 33%  - monitor              Steroid-induced hyperglycemia  On solumederol 1g qd she got very hyperglycemic (>300) and hypertensive.  A1c 5.7.  Continue on Prednisone.   -- titrated meds      Elevated serum immunoglobulin free light chains  See heme malignancy      Essential hypertension  hypertensive at admission and per patient, has been undergoing multiple recent medication changes due to HTN as outpatient.  Suspect initially may have been exacerbated by NSAID use, now concern for multifactorial cause including renal failure with volume overload, steroid use, and inadequate dosing of home regimen.  Home regimen includes: lisinopril-HCTZ 20-12.5mg, clonidine 0.2mg bid, and hydralazine 25mg tid    -Nifedipine @ 90mg,   -Coreg 25mg bid, Clonidine 0.1 TID  -Hydralazine increased to 100mg q8hrs.     Chronic hepatitis B  Hepatology consulted to see if her  "HBV can be causing the cryoglubins and if she needs treatment.  - entecavir 0.5 mg dosage has been adjusted as her renal function has improved.   -patient converted to normal dosing of 0.5mg daily as her CrCl remains >50 and LACI has resolved.     -      Per hepatology:  "Cryoglobulinemia usually associated with HCV, but rare association with Hep B. Presence of glomerulonephritis on preliminary renal biopsy. Overall, less likely that Hep B is related to this as ALT normal and viral load very low.  - Continue chronic Hep B treatment as patient is on immunosuppression, especially while on rituximab.  Continue entecavir  Monitor renal function and dose-adjust accordingly.  - Patient will need HCC screening going forward given her ethnicity (Liver U/S and AFP every 6 months)"      Immunosuppression  Recs per ID 7/26    1. Serologies in process:          - Quantiferon Gold is pending.  If positive, please consult ID. If  Indeterminate, please draw T spot.  If T spot positive, please consult ID.            - Strongyloides IgG is pending. If positive, please consult ID to initiate treatment with Ivermectin.         2. If the patient is anticipated to remain on a prolonged course of high dose systemic steroids (> 20 mg prednisone), recommend PCP prophylaxis with Atovaquone 1500 mg PO once daily. Avoid Bactrim in setting of ARF.     3. Continue Entecavir per Hepatology      4. Immunizations recommended:              - Influenza annually [NOT IN STOCK INPATIENT]              - Tetanus booster today (given 7/26) and again every 10 years              - Prevnar 13 today (given 7/26) followed by Pneumovax 23 in 8 weeks with booster every 5 years.              - Hepatitis A vaccine today (given 7/26) and in 6 months              - Shingrix (two doses at 0 and 2-6 months) [NOT AVAILABLE INPATIENT]      Rash of groin  Notes rash to medial thighs, some skin breakdown, discussed with wound care  -Miconazole ointment ordered to act as " "both barrier cream and antifungal  - apply BID      Grief reaction  Her brother recently .   service came by to talk to her.      History of renal vein thrombosis  Per heme research  "Pt also has a history of unprovoked thrombosis treated at [facility] approximately 4 years ago, none of which is visible in the "care everywhere" section of the chart. She states she had one episode of thrombosis in her left upper extremity that contained "many little clots" and required surgical thrombectomy. She also had a left renal vein thrombosis for which she received a stent. She states that for both episodes she was placed on a heparin drip, did outpatient lovenox and was continued on ASA 81 until now. She states that no one informed her of any findings suggesting a hypercoaguable state or if the clotting was provoked."  -Given her significant thromboembolic history, rheum  would like to work her up for antiphopholipid antibody syndrome. They ordered levels.   -She also had symptoms of sicca syndromes (dry eyes, dry mouth). Rheum will check for sjogrens syndrome (SSA/SSB)      Elevated uric acid in blood  -started on allopurinol  -resolved from admit level >14.  Trend Weekly on Monday   - = 4.4      Hypogammaglobulinemia  IVIG per immunology      Hypokalemia  Replete, 30meq TID x 3 doses f/u in AM        VTE Risk Mitigation (From admission, onward)        Ordered     Place sequential compression device  Until discontinued      19     IP VTE LOW RISK PATIENT  Once      19                Lele Clay MD  Department of Hospital Medicine   [facility]  "

## 2019-08-06 NOTE — SUBJECTIVE & OBJECTIVE
Interval History:   No events overnight, bored,  Has some fatigue     Allergy recs IVIG 500mg/kg - orderset placed. Rheum to consult for both IVIG and Rituximab, process of IP approval for Rituximab is in process per Rheum.     Cr is 1.2 in normal range, informed pt.     Review of Systems   Constitutional: Positive for fatigue. Negative for fever.   Eyes: Negative for itching.   Respiratory: Negative for shortness of breath.    Cardiovascular: Negative for chest pain.   Gastrointestinal: Negative for abdominal pain.   Genitourinary: Negative for dysuria.   Neurological: Negative for headaches.     Objective:     Vital Signs (Most Recent):  Temp: 98.5 °F (36.9 °C) (08/05/19 1525)  Pulse: 73 (08/05/19 1525)  Resp: 19 (08/05/19 1126)  BP: (!) 108/54 (08/05/19 1525)  SpO2: 98 % (08/05/19 1525) Vital Signs (24h Range):  Temp:  [97.4 °F (36.3 °C)-98.7 °F (37.1 °C)] 98.5 °F (36.9 °C)  Pulse:  [61-75] 73  Resp:  [12-19] 19  SpO2:  [98 %-100 %] 98 %  BP: (108-156)/(54-84) 108/54     Weight: 108.4 kg (238 lb 15.7 oz)  Body mass index is 38.57 kg/m².    Intake/Output Summary (Last 24 hours) at 8/5/2019 2013  Last data filed at 8/5/2019 1800  Gross per 24 hour   Intake 1170 ml   Output 2800 ml   Net -1630 ml      Physical Exam   Constitutional: She is oriented to person, place, and time. She appears well-nourished. No distress.   obese   Cardiovascular: Normal rate, regular rhythm and normal heart sounds.   Pulmonary/Chest: Effort normal and breath sounds normal. No respiratory distress.   Abdominal: Soft. Bowel sounds are normal. She exhibits no distension.   Musculoskeletal: She exhibits no edema.   Neurological: She is alert and oriented to person, place, and time.   Skin:   Medial thigh rash scaly, hypopigmentation   Psychiatric: She has a normal mood and affect. Her behavior is normal.       Significant Labs:   All pertinent labs within the past 24 hours have been reviewed.   Results for MUNA HERNANDEZ (MRN [id number]) as of  8/4/2019 18:24   Ref. Range 7/26/2019 05:03 7/31/2019 14:14 8/3/2019 10:50   IgG - Serum Latest Ref Range: 650 - 1600 mg/dL 185 (L)  152 (L)   IgM Latest Ref Range: 50 - 300 mg/dL 139  173   IgA Latest Ref Range: 40 - 350 mg/dL 44  44       Significant Imaging: I have reviewed and interpreted all pertinent imaging results/findings within the past 24 hours.

## 2019-08-06 NOTE — SUBJECTIVE & OBJECTIVE
Interval History: Patient seen and examined at bedside, no acute events overnight. Creatinine at 1.1. 2.2 liters of urine made overnight.     Review of patient's allergies indicates:  No Known Allergies  Current Facility-Administered Medications   Medication Frequency    0.9%  NaCl infusion Continuous    acetaminophen tablet 325 mg Q4H PRN    acetaminophen tablet 650 mg Q6H PRN    acetaminophen tablet 650 mg 1 time in Clinic/HOD    albuterol-ipratropium 2.5 mg-0.5 mg/3 mL nebulizer solution 3 mL Q6H PRN    allopurinol tablet 100 mg Daily    alteplase injection 2 mg PRN    artificial tears 0.5 % ophthalmic solution 1 drop TID    atovaquone suspension 1,500 mg Daily    bisacodyl suppository 10 mg Daily PRN    calcium carbonate (OS-DONTE) tablet 500 mg Daily    carvedilol tablet 25 mg BID    cloNIDine tablet 0.1 mg TID    diphenhydrAMINE capsule 25 mg 1 time in Clinic/HOD    entecavir tablet 0.5 mg Daily    famotidine tablet 20 mg 1 time in Clinic/HOD    glucagon (human recombinant) injection 1 mg PRN    glucose chewable tablet 16 g PRN    glucose chewable tablet 24 g PRN    heparin, porcine (PF) 100 unit/mL injection flush 500 Units PRN    hydrALAZINE tablet 100 mg Q8H    hydrALAZINE tablet 25 mg Q6H PRN    insulin aspart U-100 pen 1-10 Units QID (AC + HS) PRN    insulin aspart U-100 pen 11 Units BID WM    insulin aspart U-100 pen 15 Units with lunch    insulin detemir U-100 pen 5 Units QHS    methylPREDNISolone sodium succinate injection 100 mg 1 time in Clinic/HOD    miconazole nitrate 2% ointment BID    NIFEdipine 24 hr tablet 90 mg Daily    nitroGLYCERIN SL tablet 0.4 mg Q5 Min PRN    nystatin 100,000 unit/mL suspension 500,000 Units QID    ondansetron disintegrating tablet 8 mg Q8H PRN    oxyCODONE-acetaminophen 5-325 mg per tablet 1 tablet Q8H PRN    pantoprazole EC tablet 40 mg Before breakfast    predniSONE tablet 50 mg Daily    promethazine (PHENERGAN) 6.25 mg in dextrose 5 %  50 mL IVPB Q6H PRN    ramelteon tablet 8 mg QHS    riTUXimab (RITUXAN) 1,000 mg in sodium chloride 0.9% 1,000 mL infusion (conc: 1 mg/mL) 1 time in Clinic/HOD    sodium bicarbonate tablet 650 mg TID    sodium chloride 0.9% 250 mL flush bag 1 time in Clinic/HOD    sodium chloride 0.9% flush 10 mL PRN    sodium chloride 0.9% flush 10 mL PRN    Tdap vaccine injection 0.5 mL vaccine x 1 dose    vitamin D 1000 units tablet 1,000 Units Daily       Objective:     Vital Signs (Most Recent):  Temp: 98.1 °F (36.7 °C) (08/06/19 1345)  Pulse: 67 (08/06/19 1345)  Resp: 20 (08/06/19 1152)  BP: (!) 100/58 (08/06/19 1345)  SpO2: 100 % (08/06/19 1152)  O2 Device (Oxygen Therapy): room air (08/06/19 1345) Vital Signs (24h Range):  Temp:  [97.7 °F (36.5 °C)-100 °F (37.8 °C)] 98.1 °F (36.7 °C)  Pulse:  [61-77] 67  Resp:  [16-20] 20  SpO2:  [92 %-100 %] 100 %  BP: (100-166)/(54-78) 100/58     Weight: 108.4 kg (238 lb 15.7 oz) (08/02/19 2211)  Body mass index is 38.57 kg/m².  Body surface area is 2.25 meters squared.    I/O last 3 completed shifts:  In: 2310 [P.O.:1410; I.V.:900]  Out: 2800 [Urine:2800]    Physical Exam   Constitutional: She is oriented to person, place, and time. She appears well-nourished. No distress.   obese   Cardiovascular: Normal rate, regular rhythm and normal heart sounds.   Chest wall tenderness   Pulmonary/Chest: Effort normal and breath sounds normal. No respiratory distress.   Abdominal: Soft. Bowel sounds are normal. She exhibits no distension.   Musculoskeletal: She exhibits no edema.   Neurological: She is alert and oriented to person, place, and time.   Skin:   Medial thigh rash scaly, hypopigmentation   Psychiatric: She has a normal mood and affect. Her behavior is normal.       Significant Labs:  CBC:   Recent Labs   Lab 08/06/19  0508   WBC 8.97   RBC 3.30*   HGB 8.3*   HCT 26.4*      MCV 80*   MCH 25.2*   MCHC 31.4*     CMP:   Recent Labs   Lab 08/06/19  0508   *   CALCIUM 8.5*    ALBUMIN 3.3*   PROT 5.0*      K 3.5   CO2 22*      BUN 24*   CREATININE 1.1   ALKPHOS 74   ALT 45*   AST 16   BILITOT 0.5        Significant Imaging:  Labs: Reviewed

## 2019-08-06 NOTE — ASSESSMENT & PLAN NOTE
Seen on renal biopsy.  HBV is uncommon to cause this.  LPL/WM or even myeloma could potentially be the source of her cryoglobulins, however returned as Type 3 cryoglobulins and these are not consistent with a primary hematologic pathology causing cryoglobulinemia  Rheumatology consulted, Immunoglobulin levels re-checked and patient remains low.  Allergy/Immunology re-contacted to evaluate and recs IVIG 500mg/kg - order placed   -Rheumatology working on potential inpt Rituximab infusion

## 2019-08-06 NOTE — ASSESSMENT & PLAN NOTE
Due to Cryoglyobulinemia: Renal biopsy proven cryoglobulinemic DPGN with cryoplugs obliterating glomerular capillaries  - baseline creatinine of 1.0 roughly 1 month ago. BUN/Cr peaked at 154/6.0 and improved the day after PLEX started.  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  On 8/02 Prednisone decreased to 50mg for 2 weeks and then to go to 40mg daily 8/16 until f/u with rheumatology  7/24 Trialysis line placed for PLEX which was started q48h x3 treatements.   -- Nephrology/Rheumatology following

## 2019-08-06 NOTE — PROGRESS NOTES
[facility] Hospital Medicine  Progress Note    Patient Name: Kendy Vital  MRN: [id number]  Patient Class: IP- Inpatient   Admission Date: 7/19/2019  Length of Stay: 18 days  Attending Physician: Lloyd Arambula, *  Primary Care Provider: To Obtain Unable    [facility] [place] Lloyd Arambula MD    Subjective:     Principal Problem:Acute (diffuse) proliferative glomerulonephritis      HPI:  Ms. Vital is a 53 year old female with hypertension, anemia, and history of leiomyoma of the uterus who presents to ICU as a transfer from [facility] for evaluation of thrombocytopenia. Patient reports that prior to going to the hospital 3 days ago that she was experiencing symptoms of chills, feeling febrile, dry cough, shortness of breath and occasional nose bleeds for roughly 2 weeks. She also reports easy fatigueability and difficulty breathing when laying flat; currently sleeps with 2 pillows. Patient presented to hospital in [place] when her symptoms did not resolve. Initial work up showed a , worsening kidney function with creatinine of 2.9, and thrombocytopenia with platelets of 33k. In addition, chest x-ray showed interstitial opacities and follow up CT showed perihilar ground glass opacity. Patient was treated with rocephin and doxycycline as well as Bumex q12 due to her heart failure type symptoms. Lab work at the time showed WBC of 7.9 and a lactate of .8. In addition, she tested positive for strep A. Patient was transferred for further evaluation of her thrombocytopenia with concern for TTP and possible need for plasmapheresis.     At time of exam, patient is properly oriented to person time and places. She endorses headache, generalized myalgias, nausea and orthopnea. She denies SOB while sitting up, chest pain, abdominal pain, vomiting, and diarrhea. She has not felt febrile since 1 week prior to hospital stay.         Overview/Hospital Course:  Ms. Vital presented as transfer to ICU for suspected TTP. At that time, she had severe thrombocytopenia, renal failure, and bilateral infiltrates on chest CT concerning for PNA. She was stepped down when repeat CBC revealed normalizing platelets and hemolysis labs were unremarkable. Initially placed on vanc/zosyn/azithromycin for PNA, which has been de-escalated to rocephin/azithromycin for CAP and she has completed all antibiotics.     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  Patient does not have HCV, but is positive for HBV and has been started on anti-viral therapy with entecavir.  Due to concern of possible hematologic malignancy patient had bone marrow biopsy on 7/24 and results showed B-cell lymphoproliferative disorders.   Her serum cryoglobulins returned as Type 3 and this was not c/w with a hematologic malignancy etiology for crytoglobulinemia (would expect Type 1 per hematology).     Rheumatology and Nephrology continued to follow for management of her Cryolobinemic Vasculitis with Glomerulonephritis.   She has been on Glucorticoid therapy with oral prednisone and also had 3 days of pulse steroids 1gm hydrocortisone, in addition to sessions of Plasma Exchange/Plasmapheresis (via Right IJ Trialysis since dc'd) .      Her platelet levels have normalized, and renal function improving since above treatments started. Rheumatology is following and next steps are for IVIG infusion and then rituxan.  Allergy/Immunology consulted to assist in determining dosing/timing with these next therapies.  She will need very close f/u as outpatient with multiple services.        Interval History: NAEO. Unable to receive dose of IVIG last night due to pharmacy verification issue. Receiving IVIG now and planning on  rituxan dose to follow. Otherwise, should be ready for discharge after overnight monitoring for rituxan therapy. Will need many f/u appointments; [place] [facility] explained that this may be difficult due to her lack of insurance. Will discuss with CM/SW.    Review of Systems   Constitutional: Positive for fatigue. Negative for fever.   Eyes: Negative for itching.   Respiratory: Negative for shortness of breath.    Cardiovascular: Negative for chest pain.   Gastrointestinal: Negative for abdominal pain.   Genitourinary: Negative for dysuria.   Neurological: Negative for headaches.     Objective:     Vital Signs (Most Recent):  Temp: 98.1 °F (36.7 °C) (08/06/19 1345)  Pulse: 67 (08/06/19 1345)  Resp: 20 (08/06/19 1152)  BP: (!) 100/58 (08/06/19 1345)  SpO2: 100 % (08/06/19 1152) Vital Signs (24h Range):  Temp:  [97.7 °F (36.5 °C)-100 °F (37.8 °C)] 98.1 °F (36.7 °C)  Pulse:  [61-77] 67  Resp:  [16-20] 20  SpO2:  [92 %-100 %] 100 %  BP: (100-166)/(54-78) 100/58     Weight: 108.4 kg (238 lb 15.7 oz)  Body mass index is 38.57 kg/m².    Intake/Output Summary (Last 24 hours) at 8/6/2019 1428  Last data filed at 8/6/2019 1400  Gross per 24 hour   Intake 2160 ml   Output 1520 ml   Net 640 ml      Physical Exam   Constitutional: She is oriented to person, place, and time. She appears well-nourished. No distress.   obese   Cardiovascular: Normal rate, regular rhythm and normal heart sounds.   Pulmonary/Chest: Effort normal and breath sounds normal. No respiratory distress.   Abdominal: Soft. Bowel sounds are normal. She exhibits no distension.   Musculoskeletal: She exhibits no edema.   Neurological: She is alert and oriented to person, place, and time.   Skin:   Medial thigh rash scaly, hypopigmentation   Psychiatric: She has a normal mood and affect. Her behavior is normal.       Significant Labs:   Recent Lab Results       08/06/19  1211   08/06/19  0929   08/06/19  0508   08/05/19  2154   08/05/19  173         Albumin     3.3         Alkaline Phosphatase     74         ALT     45         Anion Gap     13         AST     16         Baso #     0.01         Basophil%     0.1         BILIRUBIN TOTAL     0.5  Comment:  For infants and newborns, interpretation of results should be based  on gestational age, weight and in agreement with clinical  observations.  Premature Infant recommended reference ranges:  Up to 24 hours.............<8.0 mg/dL  Up to 48 hours............<12.0 mg/dL  3-5 days..................<15.0 mg/dL  6-29 days.................<15.0 mg/dL           BUN, Bld     24         Calcium     8.5         Chloride     106         CO2     22         Creatinine     1.1         Differential Method     Automated         eGFR if      >60.0         eGFR if non      57.9  Comment:  Calculation used to obtain the estimated glomerular filtration  rate (eGFR) is the CKD-EPI equation.            Eos #     0.0         Eosinophil%     0.1         Glucose     125         Gran # (ANC)     6.1         Gran%     68.3         Hematocrit     26.4         Hemoglobin     8.3         Immature Grans (Abs)     0.11  Comment:  Mild elevation in immature granulocytes is non specific and   can be seen in a variety of conditions including stress response,   acute inflammation, trauma and pregnancy. Correlation with other   laboratory and clinical findings is essential.           Immature Granulocytes     1.2         Lymph #     2.0         Lymph%     21.7         MCH     25.2         MCHC     31.4         MCV     80         Mono #     0.8         Mono%     8.6         MPV     10.3         nRBC     0         Phosphorus     3.3         Platelets     266         POCT Glucose 153 196   208 293     Potassium     3.5         PROTEIN TOTAL     5.0         RBC     3.30         RDW     30.5         Sodium     141         WBC     8.97                            All pertinent labs within the past 24 hours have been  reviewed.    Significant Imaging: I have reviewed all pertinent imaging results/findings within the past 24 hours.      Assessment/Plan:      * Acute (diffuse) proliferative glomerulonephritis  Due to Cryoglyobulinemia: Renal biopsy proven cryoglobulinemic DPGN with cryoplugs obliterating glomerular capillaries  - baseline creatinine of 1.0 roughly 1 month ago. BUN/Cr peaked at 154/6.0 and improved the day after PLEX started.  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  On 8/02 Prednisone decreased to 50mg for 2 weeks and then to go to 40mg daily 8/16 until f/u with rheumatology  7/24 Trialysis line placed for PLEX which was started q48h x3 treatements.   -- Nephrology/Rheumatology following      Hypogammaglobulinemia  IVIG per immunology      Hypokalemia  Replete prn      Elevated uric acid in blood  -started on allopurinol  -resolved from admit level >14.  Trend Weekly on Monday   -8/5 = 4.4      Rash of groin  Notes rash to medial thighs, some skin breakdown, discussed with wound care  -Miconazole ointment ordered to act as both barrier cream and antifungal  - apply BID      B-cell lymphoproliferative disorder  BMBX: MONOCLONAL PLASMA CELL POPULATION WITH ATYPICAL LYMPHOID AGGREGATES.  R/o malignancy.  Differential diagnosis includes lymphoplasmacytic lymphoma/Waldenstrom's macroglobulinemia, multiple myeloma, and marginal zone lymphoma.   7/26 CT chest abd pelvis ordered to look for lymphadenopathy, only notable for small mediastinal lymph nodes/pre-carinal/hildar LN. and bone survey w/o acute findings.   - 7/26 rheum deferred rituximab choice  to heme.  Per rheum:IgG decreased at 185. They request allergy/immunology consult (spoke to them 7/29) regarding  "ability to start rituximab with decreased IgG.  -Cryoglobulin subtype is Type 3, renal biopsy pending, discussed with Dr. Rea (hematology) based on cryoglobulin subtype likely not a primary hematologic malignancy causing cryoglobulinemia.  Mediastinoscopy or LN biopsy is not recommended as an inpatient at this time.  May be necessary/indicated on outpatient bases. Further management of crygolobulinemia per Nephrology or Rheum services.       Iron deficiency anemia  - Hb 8.3, stable  - initiate supplements      Steroid-induced hyperglycemia  On solumederol 1g qd she got very hyperglycemic (>300) and hypertensive.  A1c 5.7.  Continue on Prednisone.   -- titrated meds      History of renal vein thrombosis  Per heme research 7/26 "Pt also has a history of unprovoked thrombosis treated at [facility] approximately 4 years ago, none of which is visible in the "care everywhere" section of the chart. She states she had one episode of thrombosis in her left upper extremity that contained "many little clots" and required surgical thrombectomy. She also had a left renal vein thrombosis for which she received a stent. She states that for both episodes she was placed on a heparin drip, did outpatient lovenox and was continued on ASA 81 until now. She states that no one informed her of any findings suggesting a hypercoaguable state or if the clotting was provoked."  -Given her significant thromboembolic history, rheum 7/26 would like to work her up for antiphopholipid antibody syndrome. They ordered levels.   -She also had symptoms of sicca syndromes (dry eyes, dry mouth). Rheum will check for sjogrens syndrome (SSA/SSB)      Immunosuppression  Recs per ID 7/26    1. Serologies in process:          - Quantiferon Gold is pending.  If positive, please consult ID. If  Indeterminate, please draw T spot.  If T spot positive, please consult ID.            - Strongyloides IgG is pending. If positive, please consult ID to initiate " "treatment with Ivermectin.         2. If the patient is anticipated to remain on a prolonged course of high dose systemic steroids (> 20 mg prednisone), recommend PCP prophylaxis with Atovaquone 1500 mg PO once daily. Avoid Bactrim in setting of ARF.     3. Continue Entecavir per Hepatology      4. Immunizations recommended:              - Influenza annually [NOT IN STOCK INPATIENT]              - Tetanus booster today (given 7/26) and again every 10 years              - Prevnar 13 today (given 7/26) followed by Pneumovax 23 in 8 weeks with booster every 5 years.              - Hepatitis A vaccine today (given 7/26) and in 6 months              - Shingrix (two doses at 0 and 2-6 months) [NOT AVAILABLE INPATIENT]      Chronic hepatitis B  Hepatology consulted to see if her HBV can be causing the cryoglubins and if she needs treatment.  - entecavir 0.5 mg dosage has been adjusted as her renal function has improved.   -patient converted to normal dosing of 0.5mg daily as her CrCl remains >50 and LACI has resolved  - will need close Hepatology f/u upon discharge      Per hepatology:  "Cryoglobulinemia usually associated with HCV, but rare association with Hep B. Presence of glomerulonephritis on preliminary renal biopsy. Overall, less likely that Hep B is related to this as ALT normal and viral load very low.  - Continue chronic Hep B treatment as patient is on immunosuppression, especially while on rituximab.  Continue entecavir  Monitor renal function and dose-adjust accordingly.  - Patient will need HCC screening going forward given her ethnicity (Liver U/S and AFP every 6 months)"      Elevated serum immunoglobulin free light chains  See heme malignancy      Cryoglobulinemic vasculitis  Seen on renal biopsy.  HBV is uncommon cause.  LPL/WM or even myeloma could potentially be the source of her cryoglobulins, however returned as Type 3 cryoglobulins and these are not consistent with a primary hematologic pathology " causing cryoglobulinemia  Rheumatology consulted, Immunoglobulin levels re-checked and patient remains low.  Allergy/Immunology re-contacted to evaluate and recs IVIG 500mg/kg - order placed and infusing today  -Rheumatology recommends inpt Rituximab infusion to follow    Other specified anemias  Stable.  FERRITIN 161, RETIC 4.7 (H), Bili 1, FOLATE 11.1, VITAMIN B12 843  Iron 40, sat 33%  - monitor              Acute renal failure  See glomerulonephritis  Renal dosing of medications      Essential hypertension  hypertensive at admission and per patient, has been undergoing multiple recent medication changes due to HTN as outpatient.  Suspect initially may have been exacerbated by NSAID use, now concern for multifactorial cause including renal failure with volume overload, steroid use, and inadequate dosing of home regimen.  Home regimen includes: lisinopril-HCTZ 20-12.5mg, clonidine 0.2mg bid, and hydralazine 25mg tid    -Nifedipine @ 90mg,   -Coreg 25mg bid, Clonidine 0.1 TID  -Hydralazine 100mg q8hrs.       VTE Risk Mitigation (From admission, onward)        Ordered     heparin, porcine (PF) 100 unit/mL injection flush 500 Units  As needed (PRN)      08/06/19 1407     Place sequential compression device  Until discontinued      07/19/19 0533     IP VTE LOW RISK PATIENT  Once      07/19/19 0502                Lloyd Arambula MD  Department of Hospital Medicine   [facility]

## 2019-08-06 NOTE — SUBJECTIVE & OBJECTIVE
Interval History: Feeling better at baseline. Kidney function continuing to improve. IVIG and Rituxan being given    Current Facility-Administered Medications   Medication Frequency    0.9%  NaCl infusion Continuous    acetaminophen tablet 325 mg Q4H PRN    acetaminophen tablet 650 mg Q6H PRN    albuterol-ipratropium 2.5 mg-0.5 mg/3 mL nebulizer solution 3 mL Q6H PRN    allopurinol tablet 100 mg Daily    artificial tears 0.5 % ophthalmic solution 1 drop TID    atovaquone suspension 1,500 mg Daily    bisacodyl suppository 10 mg Daily PRN    calcium carbonate (OS-DONTE) tablet 500 mg Daily    carvedilol tablet 25 mg BID    cloNIDine tablet 0.1 mg TID    entecavir tablet 0.5 mg Daily    glucagon (human recombinant) injection 1 mg PRN    glucose chewable tablet 16 g PRN    glucose chewable tablet 24 g PRN    hydrALAZINE tablet 100 mg Q8H    hydrALAZINE tablet 25 mg Q6H PRN    Immune Globulin G (IGG)-PRO-IGA 10 % injection (Privigen) 10 % injection 40 g Once    insulin aspart U-100 pen 1-10 Units QID (AC + HS) PRN    insulin aspart U-100 pen 11 Units BID WM    insulin aspart U-100 pen 15 Units with lunch    insulin detemir U-100 pen 5 Units QHS    miconazole nitrate 2% ointment BID    NIFEdipine 24 hr tablet 90 mg Daily    nitroGLYCERIN SL tablet 0.4 mg Q5 Min PRN    nystatin 100,000 unit/mL suspension 500,000 Units QID    ondansetron disintegrating tablet 8 mg Q8H PRN    oxyCODONE-acetaminophen 5-325 mg per tablet 1 tablet Q8H PRN    pantoprazole EC tablet 40 mg Before breakfast    predniSONE tablet 50 mg Daily    promethazine (PHENERGAN) 6.25 mg in dextrose 5 % 50 mL IVPB Q6H PRN    ramelteon tablet 8 mg QHS    sodium bicarbonate tablet 650 mg TID    sodium chloride 0.9% flush 10 mL PRN    Tdap vaccine injection 0.5 mL vaccine x 1 dose    vitamin D 1000 units tablet 1,000 Units Daily     Objective:     Vital Signs (Most Recent):  Temp: 97.7 °F (36.5 °C) (08/06/19 0915)  Pulse: 67  (08/06/19 0915)  Resp: 17 (08/06/19 0415)  BP: (!) 166/78 (08/06/19 0915)  SpO2: (!) 92 % (08/06/19 0915)  O2 Device (Oxygen Therapy): room air (08/06/19 0415) Vital Signs (24h Range):  Temp:  [97.7 °F (36.5 °C)-100 °F (37.8 °C)] 97.7 °F (36.5 °C)  Pulse:  [61-77] 67  Resp:  [17-19] 17  SpO2:  [92 %-98 %] 92 %  BP: (108-166)/(54-78) 166/78     Weight: 108.4 kg (238 lb 15.7 oz) (08/02/19 2211)  Body mass index is 38.57 kg/m².  Body surface area is 2.25 meters squared.      Intake/Output Summary (Last 24 hours) at 8/6/2019 1034  Last data filed at 8/6/2019 0600  Gross per 24 hour   Intake 1890 ml   Output 800 ml   Net 1090 ml       Physical Exam   Vitals reviewed.  Constitutional: She is oriented to person, place, and time and well-developed, well-nourished, and in no distress. No distress.   HENT:   Head: Normocephalic and atraumatic.   Mouth/Throat: No oropharyngeal exudate (resolved oral candidiasis).   Eyes: EOM are normal. Pupils are equal, round, and reactive to light. No scleral icterus.   Cardiovascular: Normal rate and regular rhythm.    No murmur heard.  Pulmonary/Chest: Effort normal and breath sounds normal. No respiratory distress. She has no rales.   Abdominal: Soft. Bowel sounds are normal. There is no tenderness (epigastric tenderness has resolved). There is no rebound and no guarding.   Neurological: She is alert and oriented to person, place, and time.   Skin: Skin is warm and dry. She is not diaphoretic.     Musculoskeletal: She exhibits edema (mild dependent pitting edema of right LE to 1/3 tibia). She exhibits no tenderness.         Significant Labs:  All pertinent lab results from the last 24 hours have been reviewed.    Significant Imaging:  Imaging results within the past 24 hours have been reviewed.

## 2019-08-06 NOTE — CONSULTS
PharmD Consult received for:     1.) Admit medication history/reconciliation:  · See Pharmacy MedRec note from 8/5/19     2.) Renally adjust all medications:  · Estimated Creatinine Clearance: 74.5 mL/min (based on SCr of 1.1 mg/dL).   · Medications reviewed; no medication adjustments needed today - pharmacy will continue to monitor     3.) Patient will need several medications on discharge but has no insurance - will likely need to get patient assistance involved for medications on discharge (specifically the atovaquone suspension 1500 mg po daily and entecavir 0.5 mg po daily)     Pharmacy will sign-off as LACI resolved and medications are dosed appropriately; please re-consult as needed.    Thanks for the consult!     Kristina Blevins PharmD, [facility]  [id number]     **Note: Consults are reviewed Monday-Friday 7:00am-3:30pm. The above recommendations are only suggested. The recommendations should be considered in conjunction with all patient factors.**

## 2019-08-06 NOTE — ASSESSMENT & PLAN NOTE
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  As per Dr. 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 She also has extensive ATN likely secondary to ischemia downstream the affected glomeruli. These features fit with the low C4 and the +RF. 90% of these cases are associated with HCV (but she is negative!) and only ~3% are associated with HBV. Despite her very high kappa/lambda ratio (K:L), she has no features of myeloma cast nephropathy or light chain deposition disease in her biopsy specimen. The high K:L could come from the cryoglobulinemic process (mono and polyclonal LC production). Of note, lymphoma can rarely cause cryoglobulinemic DPGN, so BM biopsy results will be important. No evidence of uric acid tubulopathy (tumor lysis syndrome) either. In retrospect, her NSAIDs use was a major confounding factor in this case."     Recommendations:  - Continue prednisone 60 mg daily   - creatinine downtrending, 1.2 today  - Cryoglobulin positive for type III (non-malignant), possibly associated with HBV (1.2 - 4% HBV patients can present with cryoglobulinemic vasculitis). Will await final renal bx results. Renal function continuing to improve (Cr 1.5 08/02)   - as per Rheumatology, Rituximab therapy after IVIG therapy, possible discharge tomorrow   - will need outpatient follow up for treatment of Lymphoma, possibly with Rituximab   - Continue treatment with entecavir for Hep B   - Follow up on official Kidney Biopsy report   - BP control to goal <140/90  - underwent PLEX 07/29 (3rd session)  - Continue sodium bicarb 1300 mg tid  - Will follow closely, possible discharge tomorrow   "

## 2019-08-06 NOTE — CONSULTS
Placed 20g, 8 cm Midline in left cephalic vein using u/s guidance.  Max dwell date 9/4/2019.  Lot # [id number]

## 2019-08-06 NOTE — ASSESSMENT & PLAN NOTE
Seen on renal biopsy.  HBV is uncommon cause.  LPL/WM or even myeloma could potentially be the source of her cryoglobulins, however returned as Type 3 cryoglobulins and these are not consistent with a primary hematologic pathology causing cryoglobulinemia  Rheumatology consulted, Immunoglobulin levels re-checked and patient remains low.  Allergy/Immunology re-contacted to evaluate and recs IVIG 500mg/kg - order placed and infusing today  -Rheumatology recommends inpt Rituximab infusion to follow

## 2019-08-06 NOTE — PROGRESS NOTES
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 in [place] after she was found to have pneumonia, LACI (cr 2.9) and thrombocytopenia. Hematology was consulted for thrombocytopenia. History was obtained from the patient, patient's cousin, patient's fiancee and chart review.     Pt endorses episodes of fevers, chills, fatigue, dry cough, SOB, and orthopnea for 1 week. She also states that 3 days ago she began to have neck and back muscle cramping and stiffness and decreased urinary output and decided to see her PCP, who diagnosed her with muscle spasms and the flu. A few days later she decided to go to the [facility] ED and was found to have Cr 2.9, increased from baseline Cr 1.0. She was also found to have , WBC 7.9, Plt 33, lactate 0.8. CXR showed bilateral interstitial infiltrates, and subsequent CT chest showed bilateral ground glass opacities. She was treated with rochephin, doxycycline, bumex and transferred here.     Here her labs were significant for Hgb 10, MCV 69, RDW 22.3, Plt 41. WBC 5.98. UA showed 3+ protein, 3+ glucose and 3+ blood but was not consistent with UTI. Cr 4.3 and  consistent with an LACI. Hemolytic labs were unremarkable.     Pt has a history of uncontrolled hypertension and states that she is currently taking lisinopril 20 mg-HCTZ 12.5 mg, hydralazine 25 mg TID, and clonidine 0.2 mg BID. She was treated at [facility] 3 weeks ago for chest pain and was  "admitted. We do not have those records but pt states they had her on multiple drips and did not have a cath procedure. Pt also has a history of unprovoked thrombosis treated at [facility] approximately 4 years ago, none of which is visible in the "care everywhere" section of the chart. She states she had one episode of thrombosis in her left upper extremity that contained "many little clots" and required surgical thrombectomy. She also had a left renal vein thrombosis for which she received a stent. She states that for both episodes she was placed on a heparin drip, did outpatient lovenox and was continued on ASA 81 until now. She states that no one informed her of any findings suggesting a hypercoaguable state or if the clotting was provoked.    Interval History: Patient seen and examined at bedside, no acute events overnight. Creatinine at 1.1. 2.2 liters of urine made overnight.     Review of patient's allergies indicates:  No Known Allergies  Current Facility-Administered Medications   Medication Frequency    0.9%  NaCl infusion Continuous    acetaminophen tablet 325 mg Q4H PRN    acetaminophen tablet 650 mg Q6H PRN    acetaminophen tablet 650 mg 1 time in Clinic/HOD    albuterol-ipratropium 2.5 mg-0.5 mg/3 mL nebulizer solution 3 mL Q6H PRN    allopurinol tablet 100 mg Daily    alteplase injection 2 mg PRN    artificial tears 0.5 % ophthalmic solution 1 drop TID    atovaquone suspension 1,500 mg Daily    bisacodyl suppository 10 mg Daily PRN    calcium carbonate (OS-DONTE) tablet 500 mg Daily    carvedilol tablet 25 mg BID    cloNIDine tablet 0.1 mg TID    diphenhydrAMINE capsule 25 mg 1 time in Clinic/HOD    entecavir tablet 0.5 mg Daily    famotidine tablet 20 mg 1 time in Clinic/HOD    glucagon (human recombinant) injection 1 mg PRN    glucose chewable tablet 16 g PRN    glucose chewable tablet 24 g PRN    heparin, porcine (PF) 100 unit/mL injection flush 500 Units PRN    hydrALAZINE tablet " 100 mg Q8H    hydrALAZINE tablet 25 mg Q6H PRN    insulin aspart U-100 pen 1-10 Units QID (AC + HS) PRN    insulin aspart U-100 pen 11 Units BID WM    insulin aspart U-100 pen 15 Units with lunch    insulin detemir U-100 pen 5 Units QHS    methylPREDNISolone sodium succinate injection 100 mg 1 time in Clinic/HOD    miconazole nitrate 2% ointment BID    NIFEdipine 24 hr tablet 90 mg Daily    nitroGLYCERIN SL tablet 0.4 mg Q5 Min PRN    nystatin 100,000 unit/mL suspension 500,000 Units QID    ondansetron disintegrating tablet 8 mg Q8H PRN    oxyCODONE-acetaminophen 5-325 mg per tablet 1 tablet Q8H PRN    pantoprazole EC tablet 40 mg Before breakfast    predniSONE tablet 50 mg Daily    promethazine (PHENERGAN) 6.25 mg in dextrose 5 % 50 mL IVPB Q6H PRN    ramelteon tablet 8 mg QHS    riTUXimab (RITUXAN) 1,000 mg in sodium chloride 0.9% 1,000 mL infusion (conc: 1 mg/mL) 1 time in Clinic/HOD    sodium bicarbonate tablet 650 mg TID    sodium chloride 0.9% 250 mL flush bag 1 time in Clinic/HOD    sodium chloride 0.9% flush 10 mL PRN    sodium chloride 0.9% flush 10 mL PRN    Tdap vaccine injection 0.5 mL vaccine x 1 dose    vitamin D 1000 units tablet 1,000 Units Daily       Objective:     Vital Signs (Most Recent):  Temp: 98.1 °F (36.7 °C) (08/06/19 1345)  Pulse: 67 (08/06/19 1345)  Resp: 20 (08/06/19 1152)  BP: (!) 100/58 (08/06/19 1345)  SpO2: 100 % (08/06/19 1152)  O2 Device (Oxygen Therapy): room air (08/06/19 1345) Vital Signs (24h Range):  Temp:  [97.7 °F (36.5 °C)-100 °F (37.8 °C)] 98.1 °F (36.7 °C)  Pulse:  [61-77] 67  Resp:  [16-20] 20  SpO2:  [92 %-100 %] 100 %  BP: (100-166)/(54-78) 100/58     Weight: 108.4 kg (238 lb 15.7 oz) (08/02/19 2211)  Body mass index is 38.57 kg/m².  Body surface area is 2.25 meters squared.    I/O last 3 completed shifts:  In: 2310 [P.O.:1410; I.V.:900]  Out: 2800 [Urine:2800]    Physical Exam   Constitutional: She is oriented to person, place, and time. She  "appears well-nourished. No distress.   obese   Cardiovascular: Normal rate, regular rhythm and normal heart sounds.   Chest wall tenderness   Pulmonary/Chest: Effort normal and breath sounds normal. No respiratory distress.   Abdominal: Soft. Bowel sounds are normal. She exhibits no distension.   Musculoskeletal: She exhibits no edema.   Neurological: She is alert and oriented to person, place, and time.   Skin:   Medial thigh rash scaly, hypopigmentation   Psychiatric: She has a normal mood and affect. Her behavior is normal.       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  As per Dr. 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 She also has extensive ATN likely secondary to ischemia downstream the affected glomeruli. These features fit with the low C4 and the +RF. 90% of these cases are associated with HCV (but she is negative!) and only ~3% are associated with HBV. Despite her very high kappa/lambda ratio (K:L), she has no features of myeloma cast nephropathy or light chain deposition disease in her biopsy specimen. The high K:L could come from the cryoglobulinemic process (mono and polyclonal LC production). Of note, lymphoma can rarely cause cryoglobulinemic DPGN, so BM biopsy results will be " "important. No evidence of uric acid tubulopathy (tumor lysis syndrome) either. In retrospect, her NSAIDs use was a major confounding factor in this case."     Recommendations:  - Continue prednisone 60 mg daily   - creatinine downtrending, 1.2 today  - Cryoglobulin positive for type III (non-malignant), possibly associated with HBV (1.2 - 4% HBV patients can present with cryoglobulinemic vasculitis). Will await final renal bx results. Renal function continuing to improve (Cr 1.5 08/02)   - as per Rheumatology, Rituximab therapy after IVIG therapy, possible discharge tomorrow   - will need outpatient follow up for treatment of Lymphoma, possibly with Rituximab   - Continue treatment with entecavir for Hep B   - Follow up on official Kidney Biopsy report   - BP control to goal <140/90  - underwent PLEX 07/29 (3rd session)  - Continue sodium bicarb 1300 mg tid  - Will follow closely, possible discharge tomorrow         Thank you for your consult. I will follow-up with patient. Please contact us if you have any additional questions.    Mustapha Reid MD  Nephrology  [facility]  "

## 2019-08-06 NOTE — SUBJECTIVE & OBJECTIVE
Interval History: NAEO. Unable to receive dose of IVIG last night due to pharmacy verification issue. Receiving IVIG now and planning on rituxan dose to follow. Otherwise, should be ready for discharge after overnight monitoring for rituxan therapy. Will need many f/u appointments; patient would like to f/u at [facility] but explained that this may be difficult due to her lack of insurance. Will discuss with CM/SW.    Review of Systems   Constitutional: Positive for fatigue. Negative for fever.   Eyes: Negative for itching.   Respiratory: Negative for shortness of breath.    Cardiovascular: Negative for chest pain.   Gastrointestinal: Negative for abdominal pain.   Genitourinary: Negative for dysuria.   Neurological: Negative for headaches.     Objective:     Vital Signs (Most Recent):  Temp: 98.1 °F (36.7 °C) (08/06/19 1345)  Pulse: 67 (08/06/19 1345)  Resp: 20 (08/06/19 1152)  BP: (!) 100/58 (08/06/19 1345)  SpO2: 100 % (08/06/19 1152) Vital Signs (24h Range):  Temp:  [97.7 °F (36.5 °C)-100 °F (37.8 °C)] 98.1 °F (36.7 °C)  Pulse:  [61-77] 67  Resp:  [16-20] 20  SpO2:  [92 %-100 %] 100 %  BP: (100-166)/(54-78) 100/58     Weight: 108.4 kg (238 lb 15.7 oz)  Body mass index is 38.57 kg/m².    Intake/Output Summary (Last 24 hours) at 8/6/2019 1428  Last data filed at 8/6/2019 1400  Gross per 24 hour   Intake 2160 ml   Output 1520 ml   Net 640 ml      Physical Exam   Constitutional: She is oriented to person, place, and time. She appears well-nourished. No distress.   obese   Cardiovascular: Normal rate, regular rhythm and normal heart sounds.   Pulmonary/Chest: Effort normal and breath sounds normal. No respiratory distress.   Abdominal: Soft. Bowel sounds are normal. She exhibits no distension.   Musculoskeletal: She exhibits no edema.   Neurological: She is alert and oriented to person, place, and time.   Skin:   Medial thigh rash scaly, hypopigmentation   Psychiatric: She has a normal mood and affect. Her behavior is  normal.       Significant Labs:   Recent Lab Results       08/06/19  1211   08/06/19  0929   08/06/19  0508   08/05/19  2154   08/05/19  1733        Albumin     3.3         Alkaline Phosphatase     74         ALT     45         Anion Gap     13         AST     16         Baso #     0.01         Basophil%     0.1         BILIRUBIN TOTAL     0.5  Comment:  For infants and newborns, interpretation of results should be based  on gestational age, weight and in agreement with clinical  observations.  Premature Infant recommended reference ranges:  Up to 24 hours.............<8.0 mg/dL  Up to 48 hours............<12.0 mg/dL  3-5 days..................<15.0 mg/dL  6-29 days.................<15.0 mg/dL           BUN, Bld     24         Calcium     8.5         Chloride     106         CO2     22         Creatinine     1.1         Differential Method     Automated         eGFR if      >60.0         eGFR if non      57.9  Comment:  Calculation used to obtain the estimated glomerular filtration  rate (eGFR) is the CKD-EPI equation.            Eos #     0.0         Eosinophil%     0.1         Glucose     125         Gran # (ANC)     6.1         Gran%     68.3         Hematocrit     26.4         Hemoglobin     8.3         Immature Grans (Abs)     0.11  Comment:  Mild elevation in immature granulocytes is non specific and   can be seen in a variety of conditions including stress response,   acute inflammation, trauma and pregnancy. Correlation with other   laboratory and clinical findings is essential.           Immature Granulocytes     1.2         Lymph #     2.0         Lymph%     21.7         MCH     25.2         MCHC     31.4         MCV     80         Mono #     0.8         Mono%     8.6         MPV     10.3         nRBC     0         Phosphorus     3.3         Platelets     266         POCT Glucose 153 196   208 293     Potassium     3.5         PROTEIN TOTAL     5.0         RBC     3.30          RDW     30.5         Sodium     141         WBC     8.97                            All pertinent labs within the past 24 hours have been reviewed.    Significant Imaging: I have reviewed all pertinent imaging results/findings within the past 24 hours.

## 2019-08-06 NOTE — PLAN OF CARE
Discharge planning: Called [facility] rheumatology clinic to inquire about follow up for uninsured patient. They need referral to be faxed with request for follow up . They will initiate financial assistance for patient and follow up with her.  Faxed referral including H&P, FS, progress notes, consult notes to [phone].    Called [facility] nephrology clinic to inquire about follow up. They will fax CM a referral form to be completed and returned with requested  information.

## 2019-08-06 NOTE — ASSESSMENT & PLAN NOTE
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 - Awaiting final renal biopsy report  baseline creatinine of 1.0 roughly 1 month ago. BUN/Cr peaked at 154/6.0 and improved the day after PLEX started.  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  On 8/02 Prednisone decreased to 50mg for 2 weeks and then to go to 40mg daily 8/16 until f/u with rheumatology  7/24 Trialysis line placed for PLEX which was started q48h x3 treatements.   -- Nephrology/Rheumatology following

## 2019-08-07 ENCOUNTER — TELEPHONE (OUTPATIENT)
Dept: RHEUMATOLOGY | Facility: CLINIC | Age: 54
End: 2019-08-07

## 2019-08-07 ENCOUNTER — TELEPHONE (OUTPATIENT)
Dept: PHARMACY | Facility: CLINIC | Age: 54
End: 2019-08-07

## 2019-08-07 VITALS
BODY MASS INDEX: 38.41 KG/M2 | WEIGHT: 239 LBS | RESPIRATION RATE: 17 BRPM | HEIGHT: 66 IN | TEMPERATURE: 98 F | SYSTOLIC BLOOD PRESSURE: 167 MMHG | HEART RATE: 79 BPM | OXYGEN SATURATION: 96 % | DIASTOLIC BLOOD PRESSURE: 90 MMHG

## 2019-08-07 DIAGNOSIS — D50.8 OTHER IRON DEFICIENCY ANEMIA: Primary | ICD-10-CM

## 2019-08-07 PROBLEM — E87.6 HYPOKALEMIA: Status: RESOLVED | Noted: 2019-08-03 | Resolved: 2019-08-07

## 2019-08-07 PROBLEM — B37.0 ORAL CANDIDIASIS: Status: RESOLVED | Noted: 2019-07-30 | Resolved: 2019-08-07

## 2019-08-07 LAB
ALBUMIN SERPL BCP-MCNC: 2.9 G/DL (ref 3.5–5.2)
ALP SERPL-CCNC: 73 U/L (ref 55–135)
ALT SERPL W/O P-5'-P-CCNC: 42 U/L (ref 10–44)
ANION GAP SERPL CALC-SCNC: 11 MMOL/L (ref 8–16)
ANION GAP SERPL CALC-SCNC: 11 MMOL/L (ref 8–16)
AST SERPL-CCNC: 15 U/L (ref 10–40)
BASOPHILS # BLD AUTO: 0 K/UL (ref 0–0.2)
BASOPHILS NFR BLD: 0 % (ref 0–1.9)
BILIRUB SERPL-MCNC: 0.4 MG/DL (ref 0.1–1)
BUN SERPL-MCNC: 25 MG/DL (ref 6–20)
BUN SERPL-MCNC: 26 MG/DL (ref 6–20)
CALCIUM SERPL-MCNC: 9 MG/DL (ref 8.7–10.5)
CALCIUM SERPL-MCNC: 9.2 MG/DL (ref 8.7–10.5)
CD3+CD4+ CELLS # BLD: 573 CELLS/UL (ref 300–1400)
CD3+CD4+ CELLS # BLD: 948 CELLS/UL (ref 300–1400)
CD3+CD4+ CELLS NFR BLD: 46 % (ref 28–57)
CD3+CD4+ CELLS NFR BLD: 48.8 % (ref 28–57)
CHLORIDE SERPL-SCNC: 102 MMOL/L (ref 95–110)
CHLORIDE SERPL-SCNC: 106 MMOL/L (ref 95–110)
CO2 SERPL-SCNC: 20 MMOL/L (ref 23–29)
CO2 SERPL-SCNC: 21 MMOL/L (ref 23–29)
CREAT SERPL-MCNC: 1.4 MG/DL (ref 0.5–1.4)
CREAT SERPL-MCNC: 1.4 MG/DL (ref 0.5–1.4)
DIFFERENTIAL METHOD: ABNORMAL
EOSINOPHIL # BLD AUTO: 0 K/UL (ref 0–0.5)
EOSINOPHIL NFR BLD: 0 % (ref 0–8)
ERYTHROCYTE [DISTWIDTH] IN BLOOD BY AUTOMATED COUNT: 30.2 % (ref 11.5–14.5)
EST. GFR  (AFRICAN AMERICAN): 49.8 ML/MIN/1.73 M^2
EST. GFR  (AFRICAN AMERICAN): 49.8 ML/MIN/1.73 M^2
EST. GFR  (NON AFRICAN AMERICAN): 43.2 ML/MIN/1.73 M^2
EST. GFR  (NON AFRICAN AMERICAN): 43.2 ML/MIN/1.73 M^2
GLUCOSE SERPL-MCNC: 225 MG/DL (ref 70–110)
GLUCOSE SERPL-MCNC: 283 MG/DL (ref 70–110)
HCT VFR BLD AUTO: 25.6 % (ref 37–48.5)
HGB BLD-MCNC: 8.1 G/DL (ref 12–16)
IMM GRANULOCYTES # BLD AUTO: 0.09 K/UL (ref 0–0.04)
IMM GRANULOCYTES NFR BLD AUTO: 1.1 % (ref 0–0.5)
LYMPHOCYTES # BLD AUTO: 0.4 K/UL (ref 1–4.8)
LYMPHOCYTES NFR BLD: 5 % (ref 18–48)
LYMPHOCYTES NFR CSF MANUAL: 12.5 % (ref 10–39)
LYMPHOCYTES NFR CSF MANUAL: 1230 CELLS/UL (ref 700–2100)
LYMPHOCYTES NFR CSF MANUAL: 125 CELLS/UL (ref 90–600)
LYMPHOCYTES NFR CSF MANUAL: 13.7 % (ref 10–39)
LYMPHOCYTES NFR CSF MANUAL: 156 CELLS/UL (ref 200–900)
LYMPHOCYTES NFR CSF MANUAL: 266 CELLS/UL (ref 200–900)
LYMPHOCYTES NFR CSF MANUAL: 27.3 % (ref 6–19)
LYMPHOCYTES NFR CSF MANUAL: 3.6 % (ref 0.9–3.6)
LYMPHOCYTES NFR CSF MANUAL: 3.67 % (ref 0.9–3.6)
LYMPHOCYTES NFR CSF MANUAL: 31.8 % (ref 6–19)
LYMPHOCYTES NFR CSF MANUAL: 417 CELLS/UL (ref 100–500)
LYMPHOCYTES NFR CSF MANUAL: 549 CELLS/UL (ref 100–500)
LYMPHOCYTES NFR CSF MANUAL: 59.2 % (ref 55–83)
LYMPHOCYTES NFR CSF MANUAL: 6.2 % (ref 7–31)
LYMPHOCYTES NFR CSF MANUAL: 63.4 % (ref 55–83)
LYMPHOCYTES NFR CSF MANUAL: 7 % (ref 7–31)
LYMPHOCYTES NFR CSF MANUAL: 737 CELLS/UL (ref 700–2100)
LYMPHOCYTES NFR CSF MANUAL: 92 CELLS/UL (ref 90–600)
MCH RBC QN AUTO: 25.4 PG (ref 27–31)
MCHC RBC AUTO-ENTMCNC: 31.6 G/DL (ref 32–36)
MCV RBC AUTO: 80 FL (ref 82–98)
MONOCYTES # BLD AUTO: 0.3 K/UL (ref 0.3–1)
MONOCYTES NFR BLD: 4 % (ref 4–15)
NEUTROPHILS # BLD AUTO: 7.2 K/UL (ref 1.8–7.7)
NEUTROPHILS NFR BLD: 89.9 % (ref 38–73)
NRBC BLD-RTO: 0 /100 WBC
PHOSPHATE SERPL-MCNC: 3.6 MG/DL (ref 2.7–4.5)
PLATELET # BLD AUTO: 240 K/UL (ref 150–350)
PMV BLD AUTO: 10.6 FL (ref 9.2–12.9)
POCT GLUCOSE: 132 MG/DL (ref 70–110)
POCT GLUCOSE: 151 MG/DL (ref 70–110)
POCT GLUCOSE: 258 MG/DL (ref 70–110)
POTASSIUM SERPL-SCNC: 3.5 MMOL/L (ref 3.5–5.1)
POTASSIUM SERPL-SCNC: 3.7 MMOL/L (ref 3.5–5.1)
PROT SERPL-MCNC: 5.8 G/DL (ref 6–8.4)
RBC # BLD AUTO: 3.19 M/UL (ref 4–5.4)
SODIUM SERPL-SCNC: 133 MMOL/L (ref 136–145)
SODIUM SERPL-SCNC: 138 MMOL/L (ref 136–145)
WBC # BLD AUTO: 7.96 K/UL (ref 3.9–12.7)

## 2019-08-07 PROCEDURE — 99239 PR HOSPITAL DISCHARGE DAY,>30 MIN: ICD-10-PCS | Mod: ,,, | Performed by: HOSPITALIST

## 2019-08-07 PROCEDURE — 63600175 PHARM REV CODE 636 W HCPCS: Performed by: HOSPITALIST

## 2019-08-07 PROCEDURE — 25000003 PHARM REV CODE 250: Performed by: STUDENT IN AN ORGANIZED HEALTH CARE EDUCATION/TRAINING PROGRAM

## 2019-08-07 PROCEDURE — 84100 ASSAY OF PHOSPHORUS: CPT

## 2019-08-07 PROCEDURE — 80053 COMPREHEN METABOLIC PANEL: CPT

## 2019-08-07 PROCEDURE — 85025 COMPLETE CBC W/AUTO DIFF WBC: CPT

## 2019-08-07 PROCEDURE — 99239 HOSP IP/OBS DSCHRG MGMT >30: CPT | Mod: ,,, | Performed by: HOSPITALIST

## 2019-08-07 PROCEDURE — 25000003 PHARM REV CODE 250: Performed by: HOSPITALIST

## 2019-08-07 PROCEDURE — 80048 BASIC METABOLIC PNL TOTAL CA: CPT

## 2019-08-07 PROCEDURE — 36415 COLL VENOUS BLD VENIPUNCTURE: CPT

## 2019-08-07 PROCEDURE — 94761 N-INVAS EAR/PLS OXIMETRY MLT: CPT

## 2019-08-07 RX ORDER — CARVEDILOL 25 MG/1
25 TABLET ORAL 2 TIMES DAILY
Qty: 60 TABLET | Refills: 11 | Status: SHIPPED | OUTPATIENT
Start: 2019-08-07 | End: 2021-07-08 | Stop reason: SDUPTHER

## 2019-08-07 RX ORDER — ATOVAQUONE 750 MG/5ML
1500 SUSPENSION ORAL DAILY
Qty: 100 ML | Refills: 0 | Status: SHIPPED | OUTPATIENT
Start: 2019-08-07 | End: 2019-08-07 | Stop reason: HOSPADM

## 2019-08-07 RX ORDER — NIFEDIPINE 90 MG/1
90 TABLET, EXTENDED RELEASE ORAL DAILY
Qty: 30 TABLET | Refills: 11 | Status: ON HOLD | OUTPATIENT
Start: 2019-08-07 | End: 2020-11-10

## 2019-08-07 RX ORDER — ENTECAVIR 0.5 MG/1
0.5 TABLET, FILM COATED ORAL DAILY
Qty: 30 TABLET | Refills: 0 | Status: SHIPPED | OUTPATIENT
Start: 2019-08-07 | End: 2019-09-06

## 2019-08-07 RX ORDER — SODIUM BICARBONATE 650 MG/1
650 TABLET ORAL 3 TIMES DAILY
Qty: 90 TABLET | Refills: 11 | Status: ON HOLD | COMMUNITY
Start: 2019-08-07 | End: 2020-11-10

## 2019-08-07 RX ORDER — PANTOPRAZOLE SODIUM 40 MG/1
40 TABLET, DELAYED RELEASE ORAL
Qty: 30 TABLET | Refills: 11 | Status: SHIPPED | OUTPATIENT
Start: 2019-08-08 | End: 2020-12-03 | Stop reason: SDUPTHER

## 2019-08-07 RX ORDER — LANCING DEVICE
1 EACH MISCELLANEOUS 2 TIMES DAILY WITH MEALS
Qty: 1 EACH | Refills: 0 | Status: SHIPPED | OUTPATIENT
Start: 2019-08-07 | End: 2023-07-18

## 2019-08-07 RX ORDER — DEXTROSE 4 G
TABLET,CHEWABLE ORAL
Qty: 1 EACH | Refills: 0 | Status: SHIPPED | OUTPATIENT
Start: 2019-08-07 | End: 2023-03-24

## 2019-08-07 RX ORDER — CHOLECALCIFEROL (VITAMIN D3) 25 MCG
1000 TABLET ORAL DAILY
COMMUNITY
Start: 2019-08-07 | End: 2023-03-24

## 2019-08-07 RX ORDER — CLONIDINE HYDROCHLORIDE 0.1 MG/1
0.1 TABLET ORAL 3 TIMES DAILY
Qty: 90 TABLET | Refills: 11 | Status: ON HOLD | OUTPATIENT
Start: 2019-08-07 | End: 2019-08-24 | Stop reason: SDUPTHER

## 2019-08-07 RX ORDER — DAPSONE 100 MG/1
100 TABLET ORAL DAILY
Qty: 30 TABLET | Refills: 11 | Status: ON HOLD | OUTPATIENT
Start: 2019-08-07 | End: 2020-11-10

## 2019-08-07 RX ORDER — PEN NEEDLE, DIABETIC 29 G X1/2"
1 NEEDLE, DISPOSABLE MISCELLANEOUS 2 TIMES DAILY WITH MEALS
Qty: 100 EACH | Refills: 11 | Status: ON HOLD | COMMUNITY
Start: 2019-08-07 | End: 2020-11-10

## 2019-08-07 RX ORDER — PEN NEEDLE, DIABETIC 30 GX3/16"
1 NEEDLE, DISPOSABLE MISCELLANEOUS 2 TIMES DAILY WITH MEALS
Qty: 100 EACH | Refills: 11 | COMMUNITY
Start: 2019-08-07 | End: 2019-08-07 | Stop reason: HOSPADM

## 2019-08-07 RX ORDER — FERROUS SULFATE 325(65) MG
325 TABLET, DELAYED RELEASE (ENTERIC COATED) ORAL DAILY
Refills: 0 | COMMUNITY
Start: 2019-08-07 | End: 2022-07-12 | Stop reason: SDUPTHER

## 2019-08-07 RX ORDER — POTASSIUM CHLORIDE 20 MEQ/1
40 TABLET, EXTENDED RELEASE ORAL ONCE
Status: COMPLETED | OUTPATIENT
Start: 2019-08-07 | End: 2019-08-07

## 2019-08-07 RX ORDER — HYDRALAZINE HYDROCHLORIDE 100 MG/1
100 TABLET, FILM COATED ORAL EVERY 8 HOURS
Qty: 90 TABLET | Refills: 11 | Status: ON HOLD | OUTPATIENT
Start: 2019-08-07 | End: 2020-11-10

## 2019-08-07 RX ORDER — CALCIUM CARBONATE 500(1250)
1000 TABLET ORAL DAILY
Refills: 0 | COMMUNITY
Start: 2019-08-07 | End: 2021-03-24

## 2019-08-07 RX ORDER — PREDNISONE 10 MG/1
TABLET ORAL
Qty: 126 TABLET | Refills: 10 | Status: ON HOLD | OUTPATIENT
Start: 2019-08-07 | End: 2019-08-24 | Stop reason: SDUPTHER

## 2019-08-07 RX ORDER — BLOOD-GLUCOSE CONTROL, NORMAL
1 EACH MISCELLANEOUS 2 TIMES DAILY WITH MEALS
Qty: 100 EACH | Refills: 11 | Status: SHIPPED | OUTPATIENT
Start: 2019-08-07 | End: 2023-06-15

## 2019-08-07 RX ADMIN — NYSTATIN 500000 UNITS: 100000 SUSPENSION ORAL at 09:08

## 2019-08-07 RX ADMIN — CALCIUM 1000 MG: 500 TABLET ORAL at 10:08

## 2019-08-07 RX ADMIN — INSULIN ASPART 6 UNITS: 100 INJECTION, SOLUTION INTRAVENOUS; SUBCUTANEOUS at 12:08

## 2019-08-07 RX ADMIN — POTASSIUM CHLORIDE 40 MEQ: 1500 TABLET, EXTENDED RELEASE ORAL at 08:08

## 2019-08-07 RX ADMIN — HYPROMELLOSE 2910 1 DROP: 5 SOLUTION OPHTHALMIC at 09:08

## 2019-08-07 RX ADMIN — HYDRALAZINE HYDROCHLORIDE 100 MG: 50 TABLET ORAL at 06:08

## 2019-08-07 RX ADMIN — ENTECAVIR 0.5 MG: 0.5 TABLET ORAL at 10:08

## 2019-08-07 RX ADMIN — NIFEDIPINE 90 MG: 60 TABLET, FILM COATED, EXTENDED RELEASE ORAL at 10:08

## 2019-08-07 RX ADMIN — MELATONIN 1000 UNITS: at 10:08

## 2019-08-07 RX ADMIN — MICONAZOLE NITRATE: 20 OINTMENT TOPICAL at 09:08

## 2019-08-07 RX ADMIN — SODIUM BICARBONATE 650 MG TABLET 650 MG: at 09:08

## 2019-08-07 RX ADMIN — PREDNISONE 50 MG: 20 TABLET ORAL at 10:08

## 2019-08-07 RX ADMIN — ACETAMINOPHEN 650 MG: 325 TABLET ORAL at 12:08

## 2019-08-07 RX ADMIN — SODIUM CHLORIDE 500 ML: 0.9 INJECTION, SOLUTION INTRAVENOUS at 09:08

## 2019-08-07 RX ADMIN — CLONIDINE HYDROCHLORIDE 0.1 MG: 0.1 TABLET ORAL at 10:08

## 2019-08-07 RX ADMIN — INSULIN ASPART 13 UNITS: 100 INJECTION, SOLUTION INTRAVENOUS; SUBCUTANEOUS at 12:08

## 2019-08-07 RX ADMIN — NYSTATIN 500000 UNITS: 100000 SUSPENSION ORAL at 12:08

## 2019-08-07 RX ADMIN — HYPROMELLOSE 2910 1 DROP: 5 SOLUTION OPHTHALMIC at 02:08

## 2019-08-07 RX ADMIN — INSULIN ASPART 11 UNITS: 100 INJECTION, SOLUTION INTRAVENOUS; SUBCUTANEOUS at 07:08

## 2019-08-07 RX ADMIN — PANTOPRAZOLE SODIUM 40 MG: 40 TABLET, DELAYED RELEASE ORAL at 06:08

## 2019-08-07 RX ADMIN — CARVEDILOL 25 MG: 25 TABLET, FILM COATED ORAL at 10:08

## 2019-08-07 RX ADMIN — ATOVAQUONE 1500 MG: 750 SUSPENSION ORAL at 09:08

## 2019-08-07 RX ADMIN — FERROUS SULFATE TAB EC 325 MG (65 MG FE EQUIVALENT) 325 MG: 325 (65 FE) TABLET DELAYED RESPONSE at 10:08

## 2019-08-07 RX ADMIN — POTASSIUM CHLORIDE 40 MEQ: 20 TABLET, EXTENDED RELEASE ORAL at 12:08

## 2019-08-07 RX ADMIN — ALLOPURINOL 100 MG: 100 TABLET ORAL at 08:08

## 2019-08-07 RX ADMIN — CLONIDINE HYDROCHLORIDE 0.1 MG: 0.1 TABLET ORAL at 02:08

## 2019-08-07 RX ADMIN — HYDRALAZINE HYDROCHLORIDE 100 MG: 50 TABLET ORAL at 02:08

## 2019-08-07 RX ADMIN — SODIUM BICARBONATE 650 MG TABLET 650 MG: at 02:08

## 2019-08-07 NOTE — ASSESSMENT & PLAN NOTE
hypertensive at admission and per patient, has been undergoing multiple recent medication changes due to HTN as outpatient.  Suspect initially may have been exacerbated by NSAID use, now concern for multifactorial cause including renal failure with volume overload, steroid use, and inadequate dosing of home regimen.  Home regimen at admission included: lisinopril-HCTZ 20-12.5mg, clonidine 0.2mg bid, and hydralazine 25mg tid    Discharge on:  -nifedipine @ 90mg  -coreg 25mg bid  -clonidine 0.1 TID  -hydralazine 100mg q8hrs.

## 2019-08-07 NOTE — ASSESSMENT & PLAN NOTE
Seen on renal biopsy.  HBV is uncommon cause.  LPL/WM or even myeloma could potentially be the source of her cryoglobulins, however returned as Type 3 cryoglobulins and these are not consistent with a primary hematologic pathology causing cryoglobulinemia  Rheumatology consulted, Immunoglobulin levels re-checked and patient remains low.  Allergy/Immunology re-contacted to evaluate and recs IVIG 500mg/kg - complete  -Rheumatology recommends inpt Rituximab infusion to follow - completed prior to discharge

## 2019-08-07 NOTE — ASSESSMENT & PLAN NOTE
"Per heme research 7/26 "Pt also has a history of unprovoked thrombosis treated at [facility] approximately 4 years ago, none of which is visible in the "care everywhere" section of the chart. She states she had one episode of thrombosis in her left upper extremity that contained "many little clots" and required surgical thrombectomy. She also had a left renal vein thrombosis for which she received a stent. She states that for both episodes she was placed on a heparin drip, did outpatient lovenox and was continued on ASA 81 until now. She states that no one informed her of any findings suggesting a hypercoaguable state or if the clotting was provoked."  -Given her significant thromboembolic history, rheum 7/26 would like to work her up for antiphopholipid antibody syndrome. They ordered levels.   -She also had symptoms of sicca syndromes (dry eyes, dry mouth). Rheum will check for sjogrens syndrome (SSA/SSB) - pending    "

## 2019-08-07 NOTE — NURSING
Patient discharge instructions given with understanding verbalized, pt s/o at side. Medications brought from pharmacy. IV dc'd in left upper arm. No distress noted with patient escorted down to 2nd floor parking lunch.

## 2019-08-07 NOTE — ASSESSMENT & PLAN NOTE
BM biopsy results noted and current suspicion for lymphoplasmacytic lymphoma vs Waldenstrom's, vs multiple myeloma.   Cryoglobulin positive for type III (non-malignant).    Plan:  - Continue prednisone to 50 mg for 2 weeks (final dose 08/16), then to 40 mg qd (starting 08/17)    - IVIG and Rituximab given 08/06. [place] Medicaid pending. Will schedule f/u in [facility] in 2 weeks where the patient would potentially receive second dose of Rituxan pending insurance. Patient reports that she will have transportation to make her appointment. Lymphocytic subsets and B-cell memory and naive panel pending. No need for additional antibiotics, antivirals, or antifungals and no specific timing post-IVIG treatment before starting Rituximab.    - Continue PCP prophylaxis (for steroids) with atovaquone 1500mg suspension, started 7/25/19. Avoiding bactrim in setting of acute renal failure.   - Continue ulcer prophylaxis with pantoprazole.  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 Renal function stable. Continue to f/u with hepatology for HBV treatment.   - Follow up in rheumatology clinic within ~1 month of discarge to determine if further treatment with rituximab will be necessary. Will perform DEXA scan at that time.   - Recommend 1000 U Calcium and 1000 U Vit D3 daily.  - Heme/Onc recommend delaying LN biopsy at this time due to likely decreased diagnostic yield in light of steroid treatment and due to cryoglobulins being type 3 instead of type 1.

## 2019-08-07 NOTE — ASSESSMENT & PLAN NOTE
-started on allopurinol at admission  -resolved from admit level >14  - continue to monitor levels as outpatient, no longer requiring allopurinol

## 2019-08-07 NOTE — SUBJECTIVE & OBJECTIVE
Interval History: Feeling better at baseline. IVIG and Rituxan given 08/06. Creatinine bumped from 1.1 to 1.4. Will have second set of labs taken to trend and will go home pending renal function per primary team.    Current Facility-Administered Medications   Medication Frequency    0.9%  NaCl infusion Continuous    acetaminophen tablet 325 mg Q4H PRN    acetaminophen tablet 650 mg Q6H PRN    albuterol-ipratropium 2.5 mg-0.5 mg/3 mL nebulizer solution 3 mL Q6H PRN    allopurinol tablet 100 mg Daily    artificial tears 0.5 % ophthalmic solution 1 drop TID    atovaquone suspension 1,500 mg Daily    bisacodyl suppository 10 mg Daily PRN    calcium carbonate (OS-DONTE) tablet 500 mg Daily    carvedilol tablet 25 mg BID    cloNIDine tablet 0.1 mg TID    entecavir tablet 0.5 mg Daily    ferrous sulfate EC tablet 325 mg Daily    glucagon (human recombinant) injection 1 mg PRN    glucose chewable tablet 16 g PRN    glucose chewable tablet 24 g PRN    heparin, porcine (PF) 100 unit/mL injection flush 500 Units PRN    hydrALAZINE tablet 100 mg Q8H    hydrALAZINE tablet 25 mg Q6H PRN    insulin aspart U-100 pen 1-10 Units QID (AC + HS) PRN    insulin aspart U-100 pen 11 Units BID WM    insulin aspart U-100 pen 13 Units with lunch    insulin detemir U-100 pen 5 Units QHS    miconazole nitrate 2% ointment BID    NIFEdipine 24 hr tablet 90 mg Daily    nitroGLYCERIN SL tablet 0.4 mg Q5 Min PRN    nystatin 100,000 unit/mL suspension 500,000 Units QID    ondansetron disintegrating tablet 8 mg Q8H PRN    oxyCODONE-acetaminophen 5-325 mg per tablet 1 tablet Q8H PRN    pantoprazole EC tablet 40 mg Before breakfast    potassium chloride SA CR tablet 40 mEq Once    predniSONE tablet 50 mg Daily    promethazine (PHENERGAN) 6.25 mg in dextrose 5 % 50 mL IVPB Q6H PRN    ramelteon tablet 8 mg QHS    sodium bicarbonate tablet 650 mg TID    sodium chloride 0.9% 250 mL flush bag 1 [facility] in Clinic[facility]    sodium  chloride 0.9% bolus 500 mL Once    sodium chloride 0.9% flush 10 mL PRN    sodium chloride 0.9% flush 10 mL PRN    Tdap vaccine injection 0.5 mL vaccine x 1 dose    vitamin D 1000 units tablet 1,000 Units Daily     Objective:     Vital Signs (Most Recent):  Temp: 97.6 °F (36.4 °C) (08/07/19 0813)  Pulse: 71 (08/07/19 0813)  Resp: 18 (08/07/19 0813)  BP: (!) 184/88 (08/07/19 0813)  SpO2: 100 % (08/07/19 0813)  O2 Device (Oxygen Therapy): room air (08/07/19 0012) Vital Signs (24h Range):  Temp:  [97.2 °F (36.2 °C)-98.7 °F (37.1 °C)] 97.6 °F (36.4 °C)  Pulse:  [61-99] 71  Resp:  [16-20] 18  SpO2:  [92 %-100 %] 100 %  BP: (100-184)/(56-88) 184/88     Weight: 108.4 kg (238 lb 15.7 oz) (08/02/19 2211)  Body mass index is 38.57 kg/m².  Body surface area is 2.25 meters squared.      Intake/Output Summary (Last 24 hours) at 8/7/2019 0906  Last data filed at 8/6/2019 1400  Gross per 24 hour   Intake 720 ml   Output 720 ml   Net 0 ml       Physical Exam   Vitals reviewed.  Constitutional: She is oriented to person, place, and time and well-developed, well-nourished, and in no distress. No distress.   HENT:   Head: Normocephalic and atraumatic.   Mouth/Throat: No oropharyngeal exudate (resolved oral candidiasis).   Eyes: EOM are normal. Pupils are equal, round, and reactive to light. No scleral icterus.   Cardiovascular: Normal rate and regular rhythm.    No murmur heard.  Pulmonary/Chest: Effort normal and breath sounds normal. No respiratory distress. She has no rales.   Abdominal: Soft. Bowel sounds are normal. There is no tenderness (epigastric tenderness has resolved). There is no rebound and no guarding.   Neurological: She is alert and oriented to person, place, and time.   Skin: Skin is warm and dry. She is not diaphoretic.     Musculoskeletal: She exhibits edema (mild dependent pitting edema of right LE to 1/3 tibia. Improved). She exhibits no tenderness.         Significant Labs:  All pertinent lab results from  the last 24 hours have been reviewed.    Significant Imaging:  Imaging results within the past 24 hours have been reviewed.

## 2019-08-07 NOTE — ASSESSMENT & PLAN NOTE
- On high dose steroids: will add on dapsone ppx for PCP (avoiding Bactrim given kidney disease and unable to use atovaquone due to excessive cost; patient has normal G6PD lvl)  - Continue Entecavir per Hepatology   - Immunizations recommended:              - Influenza annually [NOT IN STOCK INPATIENT]              - Tetanus booster today (given 7/26) and again every 10 years              - Prevnar 13 today (given 7/26) followed by Pneumovax 23 in 8 weeks with booster every 5 years.              - Hepatitis A vaccine today (given 7/26) and in 6 months              - Shingrix (two doses at 0 and 2-6 months) [NOT AVAILABLE INPATIENT]

## 2019-08-07 NOTE — PLAN OF CARE
Contacted [place] Medicaid rep here at hospital to discuss Medicaid application. She states that [place] medicaid [place] was [place] 7/29 and typically take  days.   Nephrology referral sent to [facility] including all documentation requested on form received. f [phone].  Discussed with patient that referrals have been sent to clinics. Also informed her that medications will be expensive and we are working with pharmacy to obtain most reasonable price. She has family members willing to assist with cost.    Spoke with Willow in specialty pharmacy. She is coordinating delivery of meds with [facility] pharmacy. Medications to be delivered to bedside. Patient family here and will cover cost. Provided insulin rx with instructions to fill at [facility].   Informed nurse.

## 2019-08-07 NOTE — DISCHARGE SUMMARY
[facility] Hospital Medicine  Discharge Summary      Patient Name: Kendy Vital  MRN: [id number]  Admission Date: 7/19/2019  Hospital Length of Stay: 19 days  Discharge Date and Time: 8/7/2019  5:34 PM  Attending Physician: No att. providers found   Discharging Provider: Lloyd Arambula MD  Primary Care Provider: To Obtain [facility] Medicine Team: [facility] HOSP MED  Lloyd Arambula MD    HPI:   Ms. iVtal is a 53 year old female with hypertension, anemia, and history of leiomyoma of the uterus who presents to ICU as a transfer from [facility] for evaluation of thrombocytopenia. Patient reports that prior to going to the hospital 3 days ago that she was experiencing symptoms of chills, feeling febrile, dry cough, shortness of breath and occasional nose bleeds for roughly 2 weeks. She also reports easy fatigueability and difficulty breathing when laying flat; currently sleeps with 2 pillows. Patient presented to hospital in [place] when her symptoms did not resolve. Initial work up showed a , worsening kidney function with creatinine of 2.9, and thrombocytopenia with platelets of 33k. In addition, chest x-ray showed interstitial opacities and follow up CT showed perihilar ground glass opacity. Patient was treated with rocephin and doxycycline as well as Bumex q12 due to her heart failure type symptoms. Lab work at the time showed WBC of 7.9 and a lactate of .8. In addition, she tested positive for strep A. Patient was transferred for further evaluation of her thrombocytopenia with concern for TTP and possible need for plasmapheresis.     At time of exam, patient is properly oriented to person time and places. She endorses headache, generalized myalgias, nausea and orthopnea. She denies SOB while sitting up, chest pain, abdominal pain, vomiting, and diarrhea. She has not felt febrile since 1 week prior to hospital stay.    Procedure(s)  (LRB):  MEDIASTINOSCOPY (N/A)      Hospital Course:   Ms. Vital presented as transfer to ICU for suspected TTP. At that time, she had severe thrombocytopenia, renal failure, and bilateral infiltrates on chest CT concerning for PNA. She was stepped down when repeat CBC revealed normalizing platelets and hemolysis labs were unremarkable. Initially placed on vanc/zosyn/azithromycin for PNA, which was de-escalated to rocephin/azithromycin for CAP and she has completed all antibiotics.     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  Patient does not have HCV, but is positive for HBV and has been started on anti-viral therapy with entecavir.  Due to concern of possible hematologic malignancy, patient had bone marrow biopsy on 7/24 and results showed B-cell lymphoproliferative disorders.  Her serum cryoglobulins returned as Type 3 and this was not c/w with a hematologic malignancy etiology for crytoglobulinemia (would expect Type 1 per hematology).     Rheumatology and Nephrology continued to follow for management of her Cryolobinemic Vasculitis with Glomerulonephritis. She has been on Glucorticoid therapy with oral prednisone and also had 3 days of pulse steroids 1gm hydrocortisone, in addition to sessions of Plasma Exchange/Plasmapheresis (via Right IJ Trialysis since dc'd) .      Her platelet levels have normalized, and renal function improving since above treatments started. Rheumatology is following and she received IVIG infusion (due to hypogammaglobulinemia) and then rituxan while in house.  Will need additional dose of rituxan in 2 weeks as outpatient. She will continue prednisone 50mg for 2 weeks (end date 8/16), then taper to 40mg daily until f/u. While on prednisone, will be on dapsone (G6PD wnl) for PCP ppx and PPI. She was also started  on insulin due to steroid induced hyperglycemia and will need close PCP follow up to manage this as her steroids are tapered. Also have made adjustments to BP medications as listed below. Will need coordination with multiple consultants upon discharge, including Heme/Onc, Rheumatology, Allergy/Immunology, Nephrology, and Hepatology. CM assisting with this coordination. Problem based discussion below.    Vitals:    08/07/19 1156   BP: (!) 167/90   Pulse: 79   Resp: 17   Temp: 98.4 °F (36.9 °C)     Physical Exam   Constitutional: She is oriented to person, place, and time. She appears well-nourished. No distress.   obese   Cardiovascular: Normal rate, regular rhythm and normal heart sounds.   Pulmonary/Chest: Effort normal and breath sounds normal. No respiratory distress.   Abdominal: Soft. Bowel sounds are normal. She exhibits no distension.   Musculoskeletal: She exhibits no edema.   Neurological: She is alert and oriented to person, place, and time.   Skin:   Medial thigh rash scaly, hypopigmentation   Psychiatric: She has a normal mood and affect. Her behavior is normal.       Consults:   Consults (From admission, onward)        Status Ordering Provider     [facility] Medicine PharmD Consult  Once     Provider:  (Not yet assigned)    Completed MADDISON SHOOK     Inpatient consult to Allergy  Once     Provider:  (Not yet assigned)    Completed ZEYNEP MCDONALD     Inpatient consult to Anesthesiology  Once     Provider:  (Not yet assigned)    Acknowledged ZEYNEP MCDONALD     Inpatient consult to Cardiothoracic Surgery  Once     Provider:  (Not yet assigned)    Completed ZEYNEP MCDONALD     Inpatient consult to Hematology/Oncology  Once     Provider:  (Not yet assigned)    Completed KAYE FROST     Inpatient consult to Hepatology  Once     Provider:  (Not yet assigned)    Completed ZEYNEP MCDONALD     Inpatient consult to Infectious Diseases  Once     Provider:  (Not yet assigned)    Completed  ZEYNEP MCDONALD     Inpatient consult to Midline team  Once     Provider:  (Not yet assigned)    Completed ANUPAM MORIN     Inpatient consult to Nephrology  Once     Provider:  (Not yet assigned)    Completed KAYE FROST     [facility]  Once     Provider:  (Not yet assigned)    Completed ZEYNEP MCDONALD     Inpatient consult to Ophthalmology  Once     Provider:  (Not yet assigned)    Completed MADDISON SHOOK     Inpatient consult to Rheumatology  Once     Provider:  (Not yet assigned)    Completed ZEYNEP MCDONALD     Inpatient consult to Spiritual Care  Once     Provider:  (Not yet assigned)    Completed ZEYNEP MCDONALD          * Acute (diffuse) proliferative glomerulonephritis  Due to Cryoglyobulinemia: Renal biopsy proven cryoglobulinemic DPGN with cryoplugs obliterating glomerular capillaries  - baseline creatinine of 1.0 roughly 1 month ago. BUN/Cr peaked at 154/6.0 and improved the day after PLEX started.  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  On 8/02 Prednisone decreased to 50mg for 2 weeks and then to go to 40mg daily 8/16 until f/u with rheumatology  7/24 Trialysis line placed for PLEX which was started q48h x3 treatements; line has since been removed  8/6 Received IVIG followed by Rituxan; will need additional dose of rituxan in 2 weeks, to be arranged as outpatient. Very important that she is compliant with entecavir while on rituxan due to risk for fulminant HBV  -- Nephrology/Rheumatology followed while in house, will need outpatient follow up    Hypogammaglobulinemia  IVIG per immunology      Elevated uric acid in blood  -started on allopurinol at admission  -resolved from  "admit level >14  - continue to monitor levels as outpatient, no longer requiring allopurinol      Rash of groin  Notes rash to medial thighs, some skin breakdown, discussed with wound care  -Miconazole ointment ordered to act as both barrier cream and antifungal  - apply BID      B-cell lymphoproliferative disorder  BMBX: MONOCLONAL PLASMA CELL POPULATION WITH ATYPICAL LYMPHOID AGGREGATES.  R/o malignancy.  Differential diagnosis includes lymphoplasmacytic lymphoma/Waldenstrom's macroglobulinemia, multiple myeloma, and marginal zone lymphoma.   7/26 CT chest abd pelvis ordered to look for lymphadenopathy, only notable for small mediastinal lymph nodes/pre-carinal/hildar LN and bone survey w/o acute findings.   - 7/26 rheum deferred rituximab choice to heme.  Per rheum:IgG decreased at 185. They request allergy/immunology consult (spoke to them 7/29) regarding ability to start rituximab with decreased IgG. Allergy recommended dose of IVIG prior to rituxan (completed 8/6)  -Cryoglobulin subtype is Type 3, renal biopsy results pending, discussed with Dr. Rea (hematology) based on cryoglobulin subtype likely not a primary hematologic malignancy causing cryoglobulinemia.  Mediastinoscopy or LN biopsy is not recommended as an inpatient at this time.  May be necessary/indicated on outpatient basis. Further management of crygolobulinemia per Nephrology or Rheum services. Will need Heme/Onc f/u as outpatient      Iron deficiency anemia  - Hb 8.3, stable  - initiate supplements      Steroid-induced hyperglycemia  On solumederol 1g qd she got very hyperglycemic (>300) and hypertensive.  A1c 5.7.  Continue on Prednisone as above   - will initiate on insulin  - started on NPH 6U bid (chosen due to cost); will need to f/u with PCP for further monitoring      History of renal vein thrombosis  Per heme research 7/26 "Pt also has a history of unprovoked thrombosis treated at [facility] approximately 4 years ago, none of which is " "visible in the "care everywhere" section of the chart. She states she had one episode of thrombosis in her left upper extremity that contained "many little clots" and required surgical thrombectomy. She also had a left renal vein thrombosis for which she received a stent. She states that for both episodes she was placed on a heparin drip, did outpatient lovenox and was continued on ASA 81 until now. She states that no one informed her of any findings suggesting a hypercoaguable state or if the clotting was provoked."  -Given her significant thromboembolic history, rheum 7/26 would like to work her up for antiphopholipid antibody syndrome. They ordered levels.   -She also had symptoms of sicca syndromes (dry eyes, dry mouth). Rheum will check for sjogrens syndrome (SSA/SSB) - pending      Immunosuppression  - On high dose steroids: will add on dapsone ppx for PCP (avoiding Bactrim given kidney disease and unable to use atovaquone due to excessive cost; patient has normal G6PD lvl)  - Continue Entecavir per Hepatology   - Immunizations recommended:              - Influenza annually [NOT IN STOCK INPATIENT]              - Tetanus booster today (given 7/26) and again every 10 years              - Prevnar 13 today (given 7/26) followed by Pneumovax 23 in 8 weeks with booster every 5 years.              - Hepatitis A vaccine today (given 7/26) and in 6 months              - Shingrix (two doses at 0 and 2-6 months) [NOT AVAILABLE INPATIENT]      Chronic hepatitis B  Hepatology consulted to see if her HBV can be causing the cryoglubins and if she needs treatment.  - entecavir 0.5 mg dosage has been adjusted as her renal function has improved.   -patient converted to normal dosing of 0.5mg daily as her CrCl remains >50 and LACI has resolved  - will need close Hepatology f/u upon discharge      Per hepatology:  "Cryoglobulinemia usually associated with HCV, but rare association with Hep B. Presence of glomerulonephritis on " "preliminary renal biopsy. Overall, less likely that Hep B is related to this as ALT normal and viral load very low.  - Continue chronic Hep B treatment as patient is on immunosuppression, especially while on rituximab.  Continue entecavir  Monitor renal function and dose-adjust accordingly.  - Patient will need HCC screening going forward given her ethnicity (Liver U/S and AFP every 6 months)"      Elevated serum immunoglobulin free light chains  See heme malignancy      Cryoglobulinemic vasculitis  Seen on renal biopsy.  HBV is uncommon cause.  LPL/WM or even myeloma could potentially be the source of her cryoglobulins, however returned as Type 3 cryoglobulins and these are not consistent with a primary hematologic pathology causing cryoglobulinemia  Rheumatology consulted, Immunoglobulin levels re-checked and patient remains low.  Allergy/Immunology re-contacted to evaluate and recs IVIG 500mg/kg - complete  -Rheumatology recommends inpt Rituximab infusion to follow - completed prior to discharge    Other specified anemias  Stable.  FERRITIN 161, RETIC 4.7 (H), Bili 1, FOLATE 11.1, VITAMIN B12 843  Iron 40, sat 33%  - monitor    Acute renal failure  See glomerulonephritis  Renal dosing of medications      Essential hypertension  hypertensive at admission and per patient, has been undergoing multiple recent medication changes due to HTN as outpatient.  Suspect initially may have been exacerbated by NSAID use, now concern for multifactorial cause including renal failure with volume overload, steroid use, and inadequate dosing of home regimen.  Home regimen at admission included: lisinopril-HCTZ 20-12.5mg, clonidine 0.2mg bid, and hydralazine 25mg tid    Discharge on:  -nifedipine @ 90mg  -coreg 25mg bid  -clonidine 0.1 TID  -hydralazine 100mg q8hrs.     Hypokalemia-resolved as of 8/7/2019  Replete prn      Oral candidiasis-resolved as of 8/7/2019          Final Active Diagnoses:    Diagnosis Date Noted POA    " PRINCIPAL PROBLEM:  Acute (diffuse) proliferative glomerulonephritis [N00.8]  Yes    Hypogammaglobulinemia [D80.1] 08/05/2019 No    Elevated uric acid in blood [E79.0] 08/01/2019 Yes    Rash of groin [R21] 07/31/2019 No    Iron deficiency anemia [D50.9] 07/30/2019 Yes    B-cell lymphoproliferative disorder [D47.9] 07/30/2019 Yes    Steroid-induced hyperglycemia [R73.9, T38.0X5A] 07/28/2019 No    History of renal vein thrombosis [Z86.718] 07/27/2019 Not Applicable    Immunosuppression [D89.9] 07/26/2019 No    Chronic hepatitis B [B18.1] 07/24/2019 Yes    Other specified anemias [D64.89] 07/23/2019 Yes    Cryoglobulinemic vasculitis [D89.1]  Yes    Elevated serum immunoglobulin free light chains [R76.8]  Yes    Essential hypertension [I10] 07/19/2019 Yes    Acute renal failure [N17.9] 07/19/2019 Yes      Problems Resolved During this Admission:    Diagnosis Date Noted Date Resolved POA    Hypokalemia [E87.6] 08/03/2019 08/07/2019 No    Oral candidiasis [B37.0] 07/30/2019 08/07/2019 No    Grief reaction [F43.21] 07/27/2019 08/06/2019 Yes    Thrombocytopenia, unspecified [D69.6] 07/23/2019 08/06/2019 Yes    Pneumonia of both lower lobes [J18.9] 07/19/2019 08/01/2019 Yes       Discharged Condition: fair    Disposition: Home or Self Care    Follow Up:  Follow-up Information     [facility].    Contact information:  [address] [facility] [address] [phone]                 Patient Instructions:      Ambulatory consult to Hematology   Referral Priority: Routine Referral Type: Consultation   Referral Reason: Specialty Services Required   Requested Specialty: Hematology   Number of Visits Requested: 1     Ambulatory consult to Nephrology   Referral Priority: Routine Referral Type: Consultation   Referral Reason: Specialty Services Required   Requested Specialty: Nephrology   Number of Visits Requested: 1     Ambulatory consult to Rheumatology   Referral Priority:  Routine Referral Type: Consultation   Referral Reason: Specialty Services Required   Requested Specialty: Rheumatology   Number of Visits Requested: 1     Ambulatory referral to Allergy   Referral Priority: Routine Referral Type: Allergy Testing   Referral Reason: Specialty Services Required   Requested Specialty: Allergy   Number of Visits Requested: 1     Ambulatory Referral to Hepatology   Referral Priority: Routine Referral Type: Consultation   Referral Reason: Specialty Services Required   Requested Specialty: Hepatology   Number of Visits Requested: 1     Diet diabetic     Notify your health care provider if you experience any of the following:  difficulty breathing or increased cough     Notify your health care provider if you experience any of the following:  persistent dizziness, light-headedness, or visual disturbances     Activity as tolerated       Significant Diagnostic Studies:   Labs:   CMP   Recent Labs   Lab 08/06/19  0508 08/07/19  0352 08/07/19  1043    138 133*   K 3.5 3.7 3.5    106 102   CO2 22* 21* 20*   * 225* 283*   BUN 24* 26* 25*   CREATININE 1.1 1.4 1.4   CALCIUM 8.5* 9.2 9.0   PROT 5.0* 5.8*  --    ALBUMIN 3.3* 2.9*  --    BILITOT 0.5 0.4  --    ALKPHOS 74 73  --    AST 16 15  --    ALT 45* 42  --    ANIONGAP 13 11 11   ESTGFRAFRICA >60.0 49.8* 49.8*   EGFRNONAA 57.9* 43.2* 43.2*   , CBC   Recent Labs   Lab 08/06/19  0508 08/07/19  0352   WBC 8.97 7.96   HGB 8.3* 8.1*   HCT 26.4* 25.6*    240   , INR   Lab Results   Component Value Date    INR 1.0 07/22/2019    INR 1.0 07/19/2019   , Lipid Panel No results found for: CHOL, HDL, LDLCALC, TRIG, CHOLHDL, Troponin   Recent Labs   Lab 08/03/19  1050   TROPONINI 0.027*   , A1C:   Recent Labs   Lab 07/28/19  0415   HGBA1C 5.7*    and All labs within the past 24 hours have been reviewed  Microbiology:   Blood Culture   Lab Results   Component Value Date    LABBLOO No growth after 5 days. 07/19/2019    and Urine Culture   "No results found for: LABURIN  Radiology: X-Ray: CXR: X-Ray Chest 1 View (CXR):   Results for orders placed or performed during the hospital encounter of 07/19/19   X-Ray Chest 1 View    Narrative    EXAMINATION:  XR CHEST 1 VIEW    CLINICAL HISTORY:  cough, febrile;    TECHNIQUE:  Single frontal view of the chest was performed.    COMPARISON:  None    FINDINGS:  Heart size normal.  Ill-defined patchy airspace consolidation and/or edema at the lung bases more on the right side.  Underlying pleural effusion cannot be ruled out.  The lung apices are clear.      Impression    See above      Electronically signed by: Ran Babcock MD  Date:    07/19/2019  Time:    08:19    and X-Ray Chest PA and Lateral (CXR): No results found for this visit on 07/19/19.  Specimen (12h ago, onward)    None          Pending Diagnostic Studies:     None         Medications:  Reconciled Home Medications:      Medication List      START taking these medications    calcium carbonate 500 mg calcium (1,250 mg) tablet  Commonly known as:  OS-DONTE  Take 2 tablets (1,000 mg total) by mouth once daily.     carvedilol 25 MG tablet  Commonly known as:  COREG  Take 1 tablet (25 mg total) by mouth 2 (two) times daily.     cloNIDine 0.1 MG tablet  Commonly known as:  CATAPRES  Take 1 tablet (0.1 mg total) by mouth 3 (three) times daily.     dapsone 100 MG Tab  Take 1 tablet (100 mg total) by mouth once daily.     entecavir 0.5 MG Tab  Commonly known as:  BARACLUDE  Take 1 tablet (0.5 mg total) by mouth once daily.     ferrous sulfate 325 (65 FE) MG EC tablet  Take 1 tablet (325 mg total) by mouth once daily.     hydrALAZINE 100 MG tablet  Commonly known as:  APRESOLINE  Take 1 tablet (100 mg total) by mouth every 8 (eight) hours.     insulin  unit/mL injection  Inject 6 Units into the skin 2 (two) times daily before meals.     insulin syringe-needle U-100 0.3 mL 30 gauge x 5/16" Syrg  1 each by Misc.(Non-Drug; Combo Route) route 2 (two) times " daily with meals.     lancing device Misc  1 Device by Misc.(Non-Drug; Combo Route) route 2 (two) times daily with meals.     NIFEdipine 90 MG (OSM) 24 hr tablet  Commonly known as:  PROCARDIA-XL  Take 1 tablet (90 mg total) by mouth once daily.     pantoprazole 40 MG tablet  Commonly known as:  PROTONIX  Take 1 tablet (40 mg total) by mouth before breakfast.  Start taking on:  8/8/2019     predniSONE 10 MG tablet  Commonly known as:  DELTASONE  Take 5 tablets (50 mg total) by mouth once daily for 9 days, THEN 4 tablets (40 mg total) once daily.  Start taking on:  8/7/2019     sodium bicarbonate 650 MG tablet  Take 1 tablet (650 mg total) by mouth 3 (three) times daily.     TRUE METRIX GLUCOSE METER Misc  Generic drug:  blood-glucose meter  Use as instructed     TRUE METRIX GLUCOSE TEST STRIP Strp  Generic drug:  blood sugar diagnostic  use to test blood gluocse 2 (two) times daily with meals.     TRUEPLUS LANCETS 30 gauge Misc  Generic drug:  lancets  use to test blood glucose 2 (two) times daily with meals.     vitamin D 1000 units Tab  Commonly known as:  VITAMIN D3  Take 1 tablet (1,000 Units total) by mouth once daily.        STOP taking these medications    amLODIPine 10 MG tablet  Commonly known as:  NORVASC     aspirin 81 MG EC tablet  Commonly known as:  ECOTRIN     chlorthalidone 50 MG Tab  Commonly known as:  HYGROTEN     cyclobenzaprine 10 MG tablet  Commonly known as:  FLEXERIL     lisinopril-hydrochlorothiazide 20-12.5 mg per tablet  Commonly known as:  PRINZIDE,ZESTORETIC            Indwelling Lines/Drains at time of discharge:   Lines/Drains/Airways          None          Time spent on the discharge of patient: 70 minutes  Patient was seen and examined on the date of discharge and determined to be suitable for discharge.         Lloyd Arambula MD  Department of Hospital Medicine  [facility]

## 2019-08-07 NOTE — PLAN OF CARE
Problem: Adult Inpatient Plan of Care  Goal: Plan of Care Review  Outcome: Ongoing (interventions implemented as appropriate)  POC was reviewed with pt. Showed the understanding. All due med given with tolerated. Slept on and off. No c/o pain voiced. No distress noted. Continue to monitor.

## 2019-08-07 NOTE — ASSESSMENT & PLAN NOTE
On solumederol 1g qd she got very hyperglycemic (>300) and hypertensive.  A1c 5.7.  Continue on Prednisone as above   - will initiate on insulin  - started on NPH 6U bid (chosen due to cost); will need to f/u with PCP for further monitoring

## 2019-08-07 NOTE — ASSESSMENT & PLAN NOTE
Due to Cryoglyobulinemia: Renal biopsy proven cryoglobulinemic DPGN with cryoplugs obliterating glomerular capillaries  - baseline creatinine of 1.0 roughly 1 month ago. BUN/Cr peaked at 154/6.0 and improved the day after PLEX started.  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  On 8/02 Prednisone decreased to 50mg for 2 weeks and then to go to 40mg daily 8/16 until f/u with rheumatology  7/24 Trialysis line placed for PLEX which was started q48h x3 treatements; line has since been removed  8/6 Received IVIG followed by Rituxan; will need additional dose of rituxan in 2 weeks, to be arranged as outpatient. Very important that she is compliant with entecavir while on rituxan due to risk for fulminant HBV  -- Nephrology/Rheumatology followed while in house, will need outpatient follow up

## 2019-08-07 NOTE — ASSESSMENT & PLAN NOTE
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  R/o malignancy.  Differential diagnosis includes lymphoplasmacytic lymphoma/Waldenstrom's macroglobulinemia, multiple myeloma, and marginal zone lymphoma.   7/26 CT chest abd pelvis ordered to look for lymphadenopathy, only notable for small mediastinal lymph nodes/pre-carinal/hildar LN and bone survey w/o acute findings.   - 7/26 rheum deferred rituximab choice to heme.  Per rheum:IgG decreased at 185. They request allergy/immunology consult (spoke to them 7/29) regarding ability to start rituximab with decreased IgG. Allergy recommended dose of IVIG prior to rituxan (completed 8/6)  -Cryoglobulin subtype is Type 3, renal biopsy results pending, discussed with Dr. Rea (hematology) based on cryoglobulin subtype likely not a primary hematologic malignancy causing cryoglobulinemia.  Mediastinoscopy or LN biopsy is not recommended as an inpatient at this time.  May be necessary/indicated on outpatient basis. Further management of crygolobulinemia per Nephrology or Rheum services. Will need Heme/Onc f/u as outpatient

## 2019-08-08 ENCOUNTER — TELEPHONE (OUTPATIENT)
Dept: RHEUMATOLOGY | Facility: CLINIC | Age: 54
End: 2019-08-08

## 2019-08-08 ENCOUNTER — TELEPHONE (OUTPATIENT)
Dept: PHARMACY | Facility: AMBULARY SURGERY CENTER | Age: 54
End: 2019-08-08

## 2019-08-08 ENCOUNTER — TELEPHONE (OUTPATIENT)
Dept: PHARMACY | Facility: CLINIC | Age: 54
End: 2019-08-08

## 2019-08-08 LAB — ARUP MISCELLANEOUS TEST 1: ABNORMAL

## 2019-08-08 NOTE — TELEPHONE ENCOUNTER
Entecavir 0.5 mg initial fill confirmed with Ms. Larry. [facility] bedside delivered entecavir to [facility] on . $262.65 copay- 004. Confirmed 2 patient identifiers - name and . Therapy appropriate at time of discharge.     Patient declined clinical consultation due to receiving medication information inpatient at time of discharge. Entecavir 0.5 mg #7/7d dispensed. Patient is pending [place] Medicaid and [facility] is working to obtain assistance through manufacture.  All questions answered and addressed to patients satisfaction. Pt verbalized understanding. [facility] to f/u on manufacture assistance.

## 2019-08-09 ENCOUNTER — TELEPHONE (OUTPATIENT)
Dept: RHEUMATOLOGY | Facility: CLINIC | Age: 54
End: 2019-08-09

## 2019-08-09 ENCOUNTER — PATIENT OUTREACH (OUTPATIENT)
Dept: ADMINISTRATIVE | Facility: CLINIC | Age: 54
End: 2019-08-09

## 2019-08-09 NOTE — TELEPHONE ENCOUNTER
C3 nurse attempted to contact patient. No answer.  C3 nurse attempted to contact Kendyhannah Dixonyeyo for a TCC post hospital discharge follow up call. No answer at phone number listed and no voicemail available. The patient does not have a scheduled HOSFU appointment within 7-14 days post hospital discharge date 8/7/19.

## 2019-08-09 NOTE — TELEPHONE ENCOUNTER
I put in the order for rituximab infusion due two weeks from the last. I spoke to pre-services regarding prior authorization, they are unable to work on prior authorization because the patient is still uninsured. When she was discharged, she had submitted an application for [place] Medicaid but it hasn't been approved yet. I have tried to call the patient's listed contact number twice thus far, but nobody answers, and I am unable to leave a message because her inbox is full. The patient does not have patient portal. I tried to call Griselda Becerra (listed emergency contacted), but that number is not working.       Rita Buck M.D.  Rheumatology, PGY 4

## 2019-08-13 ENCOUNTER — TELEPHONE (OUTPATIENT)
Dept: RHEUMATOLOGY | Facility: CLINIC | Age: 54
End: 2019-08-13

## 2019-08-14 ENCOUNTER — TELEPHONE (OUTPATIENT)
Dept: PHARMACY | Facility: CLINIC | Age: 54
End: 2019-08-14

## 2019-08-14 NOTE — TELEPHONE ENCOUNTER
Called pt. Lenin Adolfo to inform him of approval of Baraclude assistance through [employer]. Patient's number has been disconnected as well as her listed contact. Lenin's number is the only way to reach pt. at this time. Adolfo was informed of the approval until 08/12/2019 and I urged him to call [employer] at [phone] to schedule shipment since they only have patient's disconnected number. Adolfo said he would and he will f/u with us once he receives the medication. Just to be sure, I received permission from Adolfo to give his number to [employer] as preferred contact and I will try to have them update it ASAP. Swati from [id number] was able to update that contact number and stated they should be reattempting to call patient soon. We will inform [facility] of approval and f/u with pt. to ensure the receive the medication. -FRANCISCO @ 3p

## 2019-08-14 NOTE — TELEPHONE ENCOUNTER
ERNESTINA Pearson Dr.: Baraclude assistance approved by [employer] until 08/12/2020.    We will reach out to the patient to notify them of the approval, and to inform them on how schedule a delivery of the medication.     Thanks,  Bob Yang CPhT.  [facility] [id number]

## 2019-08-21 NOTE — PROGRESS NOTES
PATIENT: Kendy Vital  MRN: [id number]  DATE: 8/22/2019      Diagnosis:   1. B-cell lymphoproliferative disorder    2. Elevated serum immunoglobulin free light chains    3. Acute (diffuse) proliferative glomerulonephritis    4. Chronic hepatitis B        Chief Complaint: Edema (patient has swelling in both feet)      Subjective:   Ms. Vital is a 53-year-old female with lymphoproliferative disorder, GN, HBV, cryoglobulinemic vasculitis (type 3), who presents to the Hematology Clinic for follow-up.    Hematologic History  -Presented with severe thrombocytopenia, renal failure, and bilateral infiltrates on chest CT concerning for PNA.  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   -Patient does not have HCV, but is positive for HBV and has been started on anti-viral therapy with entecavir.  -Due to concern of possible hematologic malignancy, patient had bone marrow biopsy on 7/24 revealing B-cell lymphoproliferative disorders.  Her serum cryoglobulins returned as Type 3 and this was not c/w with a hematologic malignancy etiology for crytoglobulinemia  -Had 3 days of pulse steroids 1gm hydrocortisone, in addition to sessions of Plasma Exchange/Plasmapheresis  -She also received IVIG and Rituxan per Rheumatology    Interval History: Patient seen today with her , she c/o peripheral edema. Denies pain, night sweats, fevers/chills, lymphadenopathy, bleeding/bruising.    Past Medical History:   Past Medical History:   Diagnosis Date    Renal vein thrombosis 2015    s/p embolectomy and lovenox for 9 mos    Uterine leiomyoma     s/p resection       Past Surgical HIstory: No past surgical history on file.    Family History: No family history on file.    Social History:  reports that she has quit smoking. She has never used smokeless tobacco. She reports that she drinks alcohol. She reports that she does not use  "drugs.    Allergies:  Review of patient's allergies indicates:  No Known Allergies    Medications:  Current Outpatient Medications   Medication Sig Dispense Refill    blood sugar diagnostic Strp use to test blood gluocse 2 (two) times daily with meals. 100 strip 11    blood-glucose meter Misc Use as instructed 1 each 0    calcium carbonate (OS-DONTE) 500 mg calcium (1,250 mg) tablet Take 2 tablets (1,000 mg total) by mouth once daily.  0    carvedilol (COREG) 25 MG tablet Take 1 tablet (25 mg total) by mouth 2 (two) times daily. 60 tablet 11    cloNIDine (CATAPRES) 0.1 MG tablet Take 1 tablet (0.1 mg total) by mouth 3 (three) times daily. 90 tablet 11    dapsone 100 MG Tab Take 1 tablet (100 mg total) by mouth once daily. 30 tablet 11    entecavir (BARACLUDE) 0.5 MG Tab Take 1 tablet (0.5 mg total) by mouth once daily. 30 tablet 0    ferrous sulfate 325 (65 FE) MG EC tablet Take 1 tablet (325 mg total) by mouth once daily.  0    hydrALAZINE (APRESOLINE) 100 MG tablet Take 1 tablet (100 mg total) by mouth every 8 (eight) hours. 90 tablet 11    insulin  unit/mL injection Inject 6 Units into the skin 2 (two) times daily before meals. 10 mL 11    insulin syringe-needle U-100 0.3 mL 30 gauge x 5/16" Syrg 1 each by Misc.(Non-Drug; Combo Route) route 2 (two) times daily with meals. 100 each 11    lancets 30 gauge Misc use to test blood glucose 2 (two) times daily with meals. 100 each 11    lancing device Misc 1 Device by Misc.(Non-Drug; Combo Route) route 2 (two) times daily with meals. 1 each 0    NIFEdipine (PROCARDIA-XL) 90 MG (OSM) 24 hr tablet Take 1 tablet (90 mg total) by mouth once daily. 30 tablet 11    pantoprazole (PROTONIX) 40 MG tablet Take 1 tablet (40 mg total) by mouth before breakfast. 30 tablet 11    predniSONE (DELTASONE) 10 MG tablet Take 5 tablets (50 mg total) by mouth once daily for 9 days, THEN 4 tablets (40 mg total) once daily. 126 tablet 10    sodium bicarbonate 650 MG " "tablet Take 1 tablet (650 mg total) by mouth 3 (three) times daily. 90 tablet 11    vitamin D (VITAMIN D3) 1000 units Tab Take 1 tablet (1,000 Units total) by mouth once daily.       No current facility-administered medications for this visit.        Review of Systems   Constitutional: Negative for activity change, appetite change, chills, fatigue, fever and unexpected weight change.   HENT: Negative for mouth sores and nosebleeds.    Respiratory: Negative for cough and shortness of breath.    Cardiovascular: Positive for leg swelling. Negative for chest pain.   Gastrointestinal: Negative for abdominal pain, diarrhea, nausea and vomiting.   Endocrine: Negative for cold intolerance and heat intolerance.   Genitourinary: Negative for dysuria and hematuria.   Musculoskeletal: Negative for arthralgias and back pain.   Skin: Negative for rash.   Allergic/Immunologic: Negative for environmental allergies.   Neurological: Negative for light-headedness and numbness.   Hematological: Negative for adenopathy. Does not bruise/bleed easily.       ECOG Performance Status: 0   Objective:      Vitals:   Vitals:    08/22/19 1626   BP: (!) 163/77   Pulse: 86   Resp: 18   Temp: 98.7 °F (37.1 °C)   SpO2: (!) 94%   Weight: 106.3 kg (234 lb 5.6 oz)   Height: 5' 6" (1.676 m)     BMI: Body mass index is 37.82 kg/m².    Physical Exam   Constitutional: She is oriented to person, place, and time. She appears well-developed and well-nourished.   Eyes: EOM are normal.   Neck: Normal range of motion.   Cardiovascular: Normal rate and regular rhythm.   Pulmonary/Chest: Effort normal. No respiratory distress.   Abdominal: Soft. She exhibits no distension. There is no tenderness.   Musculoskeletal: She exhibits edema (+3 lower extremity).   Neurological: She is alert and oriented to person, place, and time.   Skin: Skin is warm and dry.   Psychiatric: She has a normal mood and affect. Her behavior is normal.       Laboratory Data:  Lab Visit on " 08/22/2019   Component Date Value Ref Range Status    WBC 08/22/2019 5.88  3.90 - 12.70 K/uL Final    RBC 08/22/2019 3.62* 4.00 - 5.40 M/uL Final    Hemoglobin 08/22/2019 9.7* 12.0 - 16.0 g/dL Final    Hematocrit 08/22/2019 32.3* 37.0 - 48.5 % Final    Mean Corpuscular Volume 08/22/2019 89  82 - 98 fL Final    Mean Corpuscular Hemoglobin 08/22/2019 26.8* 27.0 - 31.0 pg Final    Mean Corpuscular Hemoglobin Conc 08/22/2019 30.0* 32.0 - 36.0 g/dL Final    RDW 08/22/2019 25.8* 11.5 - 14.5 % Final    Platelets 08/22/2019 191  150 - 350 K/uL Final    MPV 08/22/2019 9.5  9.2 - 12.9 fL Final    Immature Granulocytes 08/22/2019 2.7* 0.0 - 0.5 % Final    Gran # (ANC) 08/22/2019 4.8  1.8 - 7.7 K/uL Final    Immature Grans (Abs) 08/22/2019 0.16* 0.00 - 0.04 K/uL Final    Comment: Mild elevation in immature granulocytes is non specific and   can be seen in a variety of conditions including stress response,   acute inflammation, trauma and pregnancy. Correlation with other   laboratory and clinical findings is essential.      Lymph # 08/22/2019 0.7* 1.0 - 4.8 K/uL Final    Mono # 08/22/2019 0.2* 0.3 - 1.0 K/uL Final    Eos # 08/22/2019 0.0  0.0 - 0.5 K/uL Final    Baso # 08/22/2019 0.01  0.00 - 0.20 K/uL Final    nRBC 08/22/2019 0  0 /100 WBC Final    Gran% 08/22/2019 81.8* 38.0 - 73.0 % Final    Lymph% 08/22/2019 11.7* 18.0 - 48.0 % Final    Mono% 08/22/2019 3.6* 4.0 - 15.0 % Final    Eosinophil% 08/22/2019 0.0  0.0 - 8.0 % Final    Basophil% 08/22/2019 0.2  0.0 - 1.9 % Final    Differential Method 08/22/2019 Automated   Final   BM biopsy 7/23/19:  -- MILDLY HYPERCELLULAR MARROW (60%) WITH TRILINEAGE HEMATOPOIESIS.  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  -- ADEQUATE NUMBER OF MEGAKARYOCYTES.  -- STAINABLE IRON IS NOT IDENTIFIED.  -- SEE COMMENT.  COMMENT:  CBC data shows microcytic anemia and thrombocytopenia.  Flow cytometric analysis of bone marrow detects a kappa  restricted plasma cell population that expresses , CD19, and bright CD38. CD20 and CD56 are negative. Polytypic B lymphocytes are detected. T lymphocytes are immunophenotypically unremarkable. Blasts gate is not increased. Flow differential: Lymphocytes 5.9%, Monocytes 4.6%, Granulocytes 85.8%, Blast 1.2%.  Immunohistochemical stains are performed on the biopsy core and clot section for greater sensitivity and further architectural assessment with adequate controls. -positive plasma cells comprise approximately 4-5% of thetotal cellularity. Immunoglobulin kappa and lambda light chains situ hybridization study reveals kappa light chainrestriction is some areas. The lymphoid aggregates are composed of mixed CD20-positive B-cells and CD3-positive T-cells. B-cells are more numerous than T-cells    Assessment:       1. B-cell lymphoproliferative disorder    2. Elevated serum immunoglobulin free light chains    3. Acute (diffuse) proliferative glomerulonephritis    4. 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  2) Elevated serum immunoglobulin free light chains  - Patient with new renal failure, has normal IgG.  - Skeletal survey (-) for lytic lesions  - CT neck/C/A/P done showed small mediastinal LN and precarinal LN 1.2x2cm  - Cryglobulin type III which does not indicate hematologic malignancy as a source  - No indication for treatment of lymphoma, continue to monitor    3) Iron deficiency anemia  -To continue PO ferrous sulfate    4) Peripheral edema  -Patient was at hospital yesterday, and was started on Lasix    5) Cryoglobulin vasculitis, GN  -Per rheumatology/nephrology, seeing rheumatology tomorrow    Follow-up: RTC in 3 months with labs prior (SPEP, MARCO, FLC, ferritin, CBC, CMP, LDH)    The following was staffed and discussed with supervising physician Dr. Overton.    Gunjan Montalvo MD  Hematology/Oncology fellow

## 2019-08-22 ENCOUNTER — OFFICE VISIT (OUTPATIENT)
Dept: HEMATOLOGY/ONCOLOGY | Facility: CLINIC | Age: 54
End: 2019-08-22
Payer: MEDICAID

## 2019-08-22 ENCOUNTER — DOCUMENTATION ONLY (OUTPATIENT)
Dept: HEMATOLOGY/ONCOLOGY | Facility: CLINIC | Age: 54
End: 2019-08-22

## 2019-08-22 ENCOUNTER — DOCUMENTATION ONLY (OUTPATIENT)
Dept: TRANSPLANT | Facility: CLINIC | Age: 54
End: 2019-08-22

## 2019-08-22 VITALS
HEART RATE: 86 BPM | DIASTOLIC BLOOD PRESSURE: 77 MMHG | WEIGHT: 234.38 LBS | SYSTOLIC BLOOD PRESSURE: 163 MMHG | HEIGHT: 66 IN | RESPIRATION RATE: 18 BRPM | OXYGEN SATURATION: 94 % | BODY MASS INDEX: 37.67 KG/M2 | TEMPERATURE: 99 F

## 2019-08-22 DIAGNOSIS — R76.8 ELEVATED SERUM IMMUNOGLOBULIN FREE LIGHT CHAINS: ICD-10-CM

## 2019-08-22 DIAGNOSIS — B18.1 CHRONIC HEPATITIS B: ICD-10-CM

## 2019-08-22 DIAGNOSIS — D47.9 B-CELL LYMPHOPROLIFERATIVE DISORDER: Primary | ICD-10-CM

## 2019-08-22 DIAGNOSIS — N00.8 ACUTE (DIFFUSE) PROLIFERATIVE GLOMERULONEPHRITIS: ICD-10-CM

## 2019-08-22 PROCEDURE — 99213 OFFICE O/P EST LOW 20 MIN: CPT | Mod: PBBFAC | Performed by: STUDENT IN AN ORGANIZED HEALTH CARE EDUCATION/TRAINING PROGRAM

## 2019-08-22 PROCEDURE — 99215 OFFICE O/P EST HI 40 MIN: CPT | Mod: S$PBB,,, | Performed by: STUDENT IN AN ORGANIZED HEALTH CARE EDUCATION/TRAINING PROGRAM

## 2019-08-22 PROCEDURE — 99999 PR PBB SHADOW E&M-EST. PATIENT-LVL III: ICD-10-PCS | Mod: PBBFAC,,, | Performed by: STUDENT IN AN ORGANIZED HEALTH CARE EDUCATION/TRAINING PROGRAM

## 2019-08-22 PROCEDURE — 99999 PR PBB SHADOW E&M-EST. PATIENT-LVL III: CPT | Mod: PBBFAC,,, | Performed by: STUDENT IN AN ORGANIZED HEALTH CARE EDUCATION/TRAINING PROGRAM

## 2019-08-22 PROCEDURE — 99215 PR OFFICE/OUTPT VISIT, EST, LEVL V, 40-54 MIN: ICD-10-PCS | Mod: S$PBB,,, | Performed by: STUDENT IN AN ORGANIZED HEALTH CARE EDUCATION/TRAINING PROGRAM

## 2019-08-22 NOTE — NURSING
Pt records reviewed.   Pt will be referred to Hepatology.  Chronic B  Initial referral received  from the [facility]que.   Referring Provider/diagnosis  Lloyd Arambula MD      Referral letter sent to patient.

## 2019-08-22 NOTE — LETTER
August 22, 2019    Kendy Vital  [address] [place] [address]      Dear Kendy Vital:    Your doctor has referred you to the [facility]. We are sending this letter to remind you to make an appointment with us to complete the referral process.     Please call us at [phone] to schedule an appointment. We look forward to seeing you soon.     If you received a call and have been scheduled, please disregard this letter.       Sincerely,        [facility]   [address] [place] [id number]  [phone]

## 2019-08-22 NOTE — PROGRESS NOTES
Received a call from Leny Brandt RN in Hematology requesting a reservation at the [facility] for [employer]. Spoke to Negrito at the [facility] and he informed that they did not have a vacancy for tonight. Dr Montalvo informed that the patient would not be seen for treatment at this clinic, but that she had appointments with Rheumatology tomorrow. She would be followed by Rheumatology for future treatments. Called the patient and informed her that a [facility] voucher would be faxed to the [facility] for a reduced rate and she was in agreement. Called the [facility] to inform them and e-mailed the voucher to them.

## 2019-08-23 ENCOUNTER — OFFICE VISIT (OUTPATIENT)
Dept: RHEUMATOLOGY | Facility: CLINIC | Age: 54
End: 2019-08-23
Payer: MEDICAID

## 2019-08-23 ENCOUNTER — HOSPITAL ENCOUNTER (INPATIENT)
Facility: HOSPITAL | Age: 54
LOS: 1 days | Discharge: HOME | End: 2019-08-24
Attending: EMERGENCY MEDICINE | Admitting: EMERGENCY MEDICINE
Payer: MEDICAID

## 2019-08-23 VITALS
HEIGHT: 67 IN | BODY MASS INDEX: 37.7 KG/M2 | SYSTOLIC BLOOD PRESSURE: 184 MMHG | DIASTOLIC BLOOD PRESSURE: 92 MMHG | WEIGHT: 240.19 LBS | HEART RATE: 80 BPM

## 2019-08-23 DIAGNOSIS — R60.1 ANASARCA: ICD-10-CM

## 2019-08-23 DIAGNOSIS — D69.6 THROMBOCYTOPENIA: ICD-10-CM

## 2019-08-23 DIAGNOSIS — R06.02 SHORTNESS OF BREATH: ICD-10-CM

## 2019-08-23 DIAGNOSIS — B19.10 HEPATITIS B INFECTION WITHOUT DELTA AGENT WITHOUT HEPATIC COMA, UNSPECIFIED CHRONICITY: ICD-10-CM

## 2019-08-23 DIAGNOSIS — N04.9 NEPHROTIC SYNDROME: ICD-10-CM

## 2019-08-23 DIAGNOSIS — D47.Z9 LOW GRADE B CELL LYMPHOPROLIFERATIVE DISORDER: ICD-10-CM

## 2019-08-23 DIAGNOSIS — D89.1 CRYOGLOBULINEMIC VASCULITIS: Primary | ICD-10-CM

## 2019-08-23 DIAGNOSIS — N03.3 CHRONIC (DIFFUSE) PROLIFERATIVE GLOMERULONEPHRITIS: ICD-10-CM

## 2019-08-23 LAB
ALBUMIN SERPL BCP-MCNC: 2.7 G/DL (ref 3.5–5.2)
ALP SERPL-CCNC: 78 U/L (ref 55–135)
ALT SERPL W/O P-5'-P-CCNC: 20 U/L (ref 10–44)
AMORPH CRY UR QL COMP ASSIST: ABNORMAL
ANION GAP SERPL CALC-SCNC: 10 MMOL/L (ref 8–16)
AST SERPL-CCNC: 11 U/L (ref 10–40)
BACTERIA #/AREA URNS AUTO: ABNORMAL /HPF
BASOPHILS # BLD AUTO: 0.03 K/UL (ref 0–0.2)
BASOPHILS NFR BLD: 0.5 % (ref 0–1.9)
BILIRUB SERPL-MCNC: 0.6 MG/DL (ref 0.1–1)
BILIRUB UR QL STRIP: NEGATIVE
BUN SERPL-MCNC: 22 MG/DL (ref 6–20)
C3 SERPL-MCNC: 78 MG/DL (ref 50–180)
C4 SERPL-MCNC: <3 MG/DL (ref 11–44)
CALCIUM SERPL-MCNC: 9.2 MG/DL (ref 8.7–10.5)
CHLORIDE SERPL-SCNC: 111 MMOL/L (ref 95–110)
CLARITY UR REFRACT.AUTO: CLEAR
CO2 SERPL-SCNC: 21 MMOL/L (ref 23–29)
COLOR UR AUTO: YELLOW
CREAT SERPL-MCNC: 0.9 MG/DL (ref 0.5–1.4)
CRP SERPL-MCNC: 3.4 MG/L (ref 0–8.2)
DIFFERENTIAL METHOD: ABNORMAL
EOSINOPHIL # BLD AUTO: 0 K/UL (ref 0–0.5)
EOSINOPHIL NFR BLD: 0.2 % (ref 0–8)
ERYTHROCYTE [DISTWIDTH] IN BLOOD BY AUTOMATED COUNT: 25.5 % (ref 11.5–14.5)
ERYTHROCYTE [SEDIMENTATION RATE] IN BLOOD BY WESTERGREN METHOD: 9 MM/HR (ref 0–36)
EST. GFR  (AFRICAN AMERICAN): >60 ML/MIN/1.73 M^2
EST. GFR  (NON AFRICAN AMERICAN): >60 ML/MIN/1.73 M^2
GLUCOSE SERPL-MCNC: 181 MG/DL (ref 70–110)
GLUCOSE UR QL STRIP: ABNORMAL
HCT VFR BLD AUTO: 31.7 % (ref 37–48.5)
HGB BLD-MCNC: 9.3 G/DL (ref 12–16)
HGB UR QL STRIP: NEGATIVE
HYALINE CASTS UR QL AUTO: 1 /LPF
IGA SERPL-MCNC: 74 MG/DL (ref 40–350)
IGG SERPL-MCNC: 360 MG/DL (ref 650–1600)
IGM SERPL-MCNC: 109 MG/DL (ref 50–300)
IMM GRANULOCYTES # BLD AUTO: 0.12 K/UL (ref 0–0.04)
IMM GRANULOCYTES NFR BLD AUTO: 2 % (ref 0–0.5)
KETONES UR QL STRIP: NEGATIVE
LEUKOCYTE ESTERASE UR QL STRIP: NEGATIVE
LYMPHOCYTES # BLD AUTO: 1.7 K/UL (ref 1–4.8)
LYMPHOCYTES NFR BLD: 28.1 % (ref 18–48)
MCH RBC QN AUTO: 26.8 PG (ref 27–31)
MCHC RBC AUTO-ENTMCNC: 29.3 G/DL (ref 32–36)
MCV RBC AUTO: 91 FL (ref 82–98)
MICROSCOPIC COMMENT: ABNORMAL
MONOCYTES # BLD AUTO: 0.6 K/UL (ref 0.3–1)
MONOCYTES NFR BLD: 10.6 % (ref 4–15)
NEUTROPHILS # BLD AUTO: 3.5 K/UL (ref 1.8–7.7)
NEUTROPHILS NFR BLD: 58.6 % (ref 38–73)
NITRITE UR QL STRIP: NEGATIVE
NRBC BLD-RTO: 0 /100 WBC
PH UR STRIP: 6 [PH] (ref 5–8)
PLATELET # BLD AUTO: 217 K/UL (ref 150–350)
PMV BLD AUTO: 10.2 FL (ref 9.2–12.9)
POCT GLUCOSE: 246 MG/DL (ref 70–110)
POCT GLUCOSE: 412 MG/DL (ref 70–110)
POTASSIUM SERPL-SCNC: 3.5 MMOL/L (ref 3.5–5.1)
PROT SERPL-MCNC: 5.1 G/DL (ref 6–8.4)
PROT UR QL STRIP: ABNORMAL
PROT UR-MCNC: 1015 MG/DL (ref 0–15)
RBC # BLD AUTO: 3.47 M/UL (ref 4–5.4)
RBC #/AREA URNS AUTO: 7 /HPF (ref 0–4)
SODIUM SERPL-SCNC: 142 MMOL/L (ref 136–145)
SP GR UR STRIP: 1.02 (ref 1–1.03)
URN SPEC COLLECT METH UR: ABNORMAL
WBC # BLD AUTO: 6.02 K/UL (ref 3.9–12.7)
WBC #/AREA URNS AUTO: 3 /HPF (ref 0–5)
YEAST UR QL AUTO: ABNORMAL

## 2019-08-23 PROCEDURE — 51702 INSERT TEMP BLADDER CATH: CPT

## 2019-08-23 PROCEDURE — 99223 PR INITIAL HOSPITAL CARE,LEVL III: ICD-10-PCS | Mod: ,,, | Performed by: HOSPITALIST

## 2019-08-23 PROCEDURE — 80053 COMPREHEN METABOLIC PANEL: CPT

## 2019-08-23 PROCEDURE — 63600175 PHARM REV CODE 636 W HCPCS: Performed by: INTERNAL MEDICINE

## 2019-08-23 PROCEDURE — 82784 ASSAY IGA/IGD/IGG/IGM EACH: CPT | Mod: 59

## 2019-08-23 PROCEDURE — 63600175 PHARM REV CODE 636 W HCPCS: Performed by: PHYSICIAN ASSISTANT

## 2019-08-23 PROCEDURE — 86160 COMPLEMENT ANTIGEN: CPT

## 2019-08-23 PROCEDURE — 85025 COMPLETE CBC W/AUTO DIFF WBC: CPT

## 2019-08-23 PROCEDURE — 63600175 PHARM REV CODE 636 W HCPCS: Performed by: HOSPITALIST

## 2019-08-23 PROCEDURE — 86160 COMPLEMENT ANTIGEN: CPT | Mod: 59

## 2019-08-23 PROCEDURE — 93010 EKG 12-LEAD: ICD-10-PCS | Mod: ,,, | Performed by: INTERNAL MEDICINE

## 2019-08-23 PROCEDURE — 86140 C-REACTIVE PROTEIN: CPT

## 2019-08-23 PROCEDURE — 99223 1ST HOSP IP/OBS HIGH 75: CPT | Mod: ,,, | Performed by: HOSPITALIST

## 2019-08-23 PROCEDURE — 99231 PR SUBSEQUENT HOSPITAL CARE,LEVL I: ICD-10-PCS | Mod: ,,, | Performed by: INTERNAL MEDICINE

## 2019-08-23 PROCEDURE — 82784 ASSAY IGA/IGD/IGG/IGM EACH: CPT

## 2019-08-23 PROCEDURE — 82595 ASSAY OF CRYOGLOBULIN: CPT

## 2019-08-23 PROCEDURE — 84156 ASSAY OF PROTEIN URINE: CPT

## 2019-08-23 PROCEDURE — 99285 EMERGENCY DEPT VISIT HI MDM: CPT | Mod: ,,, | Performed by: PHYSICIAN ASSISTANT

## 2019-08-23 PROCEDURE — 99231 SBSQ HOSP IP/OBS SF/LOW 25: CPT | Mod: ,,, | Performed by: INTERNAL MEDICINE

## 2019-08-23 PROCEDURE — 25000003 PHARM REV CODE 250: Performed by: INTERNAL MEDICINE

## 2019-08-23 PROCEDURE — 99999 PR PBB SHADOW E&M-EST. PATIENT-LVL III: ICD-10-PCS | Mod: PBBFAC,,, | Performed by: STUDENT IN AN ORGANIZED HEALTH CARE EDUCATION/TRAINING PROGRAM

## 2019-08-23 PROCEDURE — 25000003 PHARM REV CODE 250: Performed by: HOSPITALIST

## 2019-08-23 PROCEDURE — 81001 URINALYSIS AUTO W/SCOPE: CPT

## 2019-08-23 PROCEDURE — 93005 ELECTROCARDIOGRAM TRACING: CPT

## 2019-08-23 PROCEDURE — 85652 RBC SED RATE AUTOMATED: CPT

## 2019-08-23 PROCEDURE — 93010 ELECTROCARDIOGRAM REPORT: CPT | Mod: ,,, | Performed by: INTERNAL MEDICINE

## 2019-08-23 PROCEDURE — 99213 OFFICE O/P EST LOW 20 MIN: CPT | Mod: PBBFAC,25 | Performed by: STUDENT IN AN ORGANIZED HEALTH CARE EDUCATION/TRAINING PROGRAM

## 2019-08-23 PROCEDURE — 99285 EMERGENCY DEPT VISIT HI MDM: CPT | Mod: 25,27

## 2019-08-23 PROCEDURE — 12000002 HC ACUTE/MED SURGE SEMI-PRIVATE ROOM

## 2019-08-23 PROCEDURE — 99999 PR PBB SHADOW E&M-EST. PATIENT-LVL III: CPT | Mod: PBBFAC,,, | Performed by: STUDENT IN AN ORGANIZED HEALTH CARE EDUCATION/TRAINING PROGRAM

## 2019-08-23 PROCEDURE — 99285 PR EMERGENCY DEPT VISIT,LEVEL V: ICD-10-PCS | Mod: ,,, | Performed by: PHYSICIAN ASSISTANT

## 2019-08-23 RX ORDER — SODIUM CHLORIDE 0.9 % (FLUSH) 0.9 %
10 SYRINGE (ML) INJECTION
Status: DISCONTINUED | OUTPATIENT
Start: 2019-08-23 | End: 2019-08-24 | Stop reason: HOSPADM

## 2019-08-23 RX ORDER — HYDRALAZINE HYDROCHLORIDE 25 MG/1
100 TABLET, FILM COATED ORAL EVERY 8 HOURS
Status: DISCONTINUED | OUTPATIENT
Start: 2019-08-23 | End: 2019-08-24 | Stop reason: HOSPADM

## 2019-08-23 RX ORDER — FUROSEMIDE 10 MG/ML
40 INJECTION INTRAMUSCULAR; INTRAVENOUS 2 TIMES DAILY
Status: DISCONTINUED | OUTPATIENT
Start: 2019-08-23 | End: 2019-08-24 | Stop reason: HOSPADM

## 2019-08-23 RX ORDER — HEPARIN 100 UNIT/ML
500 SYRINGE INTRAVENOUS
Status: DISCONTINUED | OUTPATIENT
Start: 2019-08-23 | End: 2019-08-24 | Stop reason: HOSPADM

## 2019-08-23 RX ORDER — IBUPROFEN 200 MG
16 TABLET ORAL
Status: DISCONTINUED | OUTPATIENT
Start: 2019-08-23 | End: 2019-08-23

## 2019-08-23 RX ORDER — GLUCAGON 1 MG
1 KIT INJECTION
Status: DISCONTINUED | OUTPATIENT
Start: 2019-08-23 | End: 2019-08-24 | Stop reason: HOSPADM

## 2019-08-23 RX ORDER — DAPSONE 100 MG/1
100 TABLET ORAL DAILY
Status: DISCONTINUED | OUTPATIENT
Start: 2019-08-24 | End: 2019-08-24 | Stop reason: HOSPADM

## 2019-08-23 RX ORDER — CLONIDINE HYDROCHLORIDE 0.1 MG/1
0.1 TABLET ORAL 3 TIMES DAILY
Status: DISCONTINUED | OUTPATIENT
Start: 2019-08-23 | End: 2019-08-24

## 2019-08-23 RX ORDER — FAMOTIDINE 20 MG/1
20 TABLET, FILM COATED ORAL
Status: COMPLETED | OUTPATIENT
Start: 2019-08-23 | End: 2019-08-23

## 2019-08-23 RX ORDER — METHYLPREDNISOLONE SOD SUCC 125 MG
100 VIAL (EA) INJECTION
Status: CANCELLED | OUTPATIENT
Start: 2019-08-24

## 2019-08-23 RX ORDER — IBUPROFEN 200 MG
24 TABLET ORAL
Status: DISCONTINUED | OUTPATIENT
Start: 2019-08-23 | End: 2019-08-23

## 2019-08-23 RX ORDER — DIPHENHYDRAMINE HCL 25 MG
25 CAPSULE ORAL
Status: CANCELLED | OUTPATIENT
Start: 2019-08-24

## 2019-08-23 RX ORDER — SODIUM BICARBONATE 650 MG/1
650 TABLET ORAL 3 TIMES DAILY
Status: DISCONTINUED | OUTPATIENT
Start: 2019-08-23 | End: 2019-08-24 | Stop reason: HOSPADM

## 2019-08-23 RX ORDER — SODIUM CHLORIDE 0.9 % (FLUSH) 0.9 %
10 SYRINGE (ML) INJECTION
Status: CANCELLED | OUTPATIENT
Start: 2019-08-24

## 2019-08-23 RX ORDER — NIFEDIPINE 30 MG/1
90 TABLET, EXTENDED RELEASE ORAL DAILY
Status: DISCONTINUED | OUTPATIENT
Start: 2019-08-24 | End: 2019-08-24 | Stop reason: HOSPADM

## 2019-08-23 RX ORDER — ACETAMINOPHEN 325 MG/1
650 TABLET ORAL EVERY 8 HOURS PRN
Status: DISCONTINUED | OUTPATIENT
Start: 2019-08-23 | End: 2019-08-24 | Stop reason: HOSPADM

## 2019-08-23 RX ORDER — GLUCAGON 1 MG
1 KIT INJECTION
Status: DISCONTINUED | OUTPATIENT
Start: 2019-08-23 | End: 2019-08-23

## 2019-08-23 RX ORDER — DIPHENHYDRAMINE HCL 25 MG
25 CAPSULE ORAL
Status: DISCONTINUED | OUTPATIENT
Start: 2019-08-23 | End: 2019-08-24 | Stop reason: HOSPADM

## 2019-08-23 RX ORDER — PANTOPRAZOLE SODIUM 40 MG/1
40 TABLET, DELAYED RELEASE ORAL
Status: DISCONTINUED | OUTPATIENT
Start: 2019-08-24 | End: 2019-08-24 | Stop reason: HOSPADM

## 2019-08-23 RX ORDER — METHYLPREDNISOLONE SOD SUCC 125 MG
100 VIAL (EA) INJECTION
Status: COMPLETED | OUTPATIENT
Start: 2019-08-23 | End: 2019-08-23

## 2019-08-23 RX ORDER — CHOLECALCIFEROL (VITAMIN D3) 25 MCG
1000 TABLET ORAL DAILY
Status: DISCONTINUED | OUTPATIENT
Start: 2019-08-23 | End: 2019-08-24 | Stop reason: HOSPADM

## 2019-08-23 RX ORDER — FAMOTIDINE 20 MG/1
20 TABLET, FILM COATED ORAL
Status: CANCELLED | OUTPATIENT
Start: 2019-08-24

## 2019-08-23 RX ORDER — INSULIN ASPART 100 [IU]/ML
5 INJECTION, SOLUTION INTRAVENOUS; SUBCUTANEOUS
Status: DISCONTINUED | OUTPATIENT
Start: 2019-08-24 | End: 2019-08-24 | Stop reason: HOSPADM

## 2019-08-23 RX ORDER — ENTECAVIR 0.5 MG/1
0.5 TABLET, FILM COATED ORAL DAILY
Status: DISCONTINUED | OUTPATIENT
Start: 2019-08-24 | End: 2019-08-24 | Stop reason: HOSPADM

## 2019-08-23 RX ORDER — IBUPROFEN 200 MG
16 TABLET ORAL
Status: DISCONTINUED | OUTPATIENT
Start: 2019-08-23 | End: 2019-08-24 | Stop reason: HOSPADM

## 2019-08-23 RX ORDER — INSULIN ASPART 100 [IU]/ML
0-5 INJECTION, SOLUTION INTRAVENOUS; SUBCUTANEOUS
Status: DISCONTINUED | OUTPATIENT
Start: 2019-08-23 | End: 2019-08-23

## 2019-08-23 RX ORDER — PREDNISONE 20 MG/1
40 TABLET ORAL DAILY
Status: DISCONTINUED | OUTPATIENT
Start: 2019-08-24 | End: 2019-08-24 | Stop reason: HOSPADM

## 2019-08-23 RX ORDER — FERROUS SULFATE 325(65) MG
325 TABLET, DELAYED RELEASE (ENTERIC COATED) ORAL DAILY
Status: DISCONTINUED | OUTPATIENT
Start: 2019-08-23 | End: 2019-08-24 | Stop reason: HOSPADM

## 2019-08-23 RX ORDER — ACETAMINOPHEN 325 MG/1
650 TABLET ORAL
Status: CANCELLED | OUTPATIENT
Start: 2019-08-24

## 2019-08-23 RX ORDER — ACETAMINOPHEN 325 MG/1
650 TABLET ORAL
Status: DISCONTINUED | OUTPATIENT
Start: 2019-08-23 | End: 2019-08-24 | Stop reason: HOSPADM

## 2019-08-23 RX ORDER — CARVEDILOL 25 MG/1
25 TABLET ORAL 2 TIMES DAILY
Status: DISCONTINUED | OUTPATIENT
Start: 2019-08-23 | End: 2019-08-24 | Stop reason: HOSPADM

## 2019-08-23 RX ORDER — CALCIUM CARBONATE 500(1250)
1000 TABLET ORAL DAILY
Status: DISCONTINUED | OUTPATIENT
Start: 2019-08-24 | End: 2019-08-24 | Stop reason: HOSPADM

## 2019-08-23 RX ORDER — HEPARIN 100 UNIT/ML
500 SYRINGE INTRAVENOUS
Status: CANCELLED | OUTPATIENT
Start: 2019-08-24

## 2019-08-23 RX ORDER — INSULIN ASPART 100 [IU]/ML
1-10 INJECTION, SOLUTION INTRAVENOUS; SUBCUTANEOUS
Status: DISCONTINUED | OUTPATIENT
Start: 2019-08-23 | End: 2019-08-24 | Stop reason: HOSPADM

## 2019-08-23 RX ORDER — IBUPROFEN 200 MG
24 TABLET ORAL
Status: DISCONTINUED | OUTPATIENT
Start: 2019-08-23 | End: 2019-08-24 | Stop reason: HOSPADM

## 2019-08-23 RX ADMIN — INSULIN ASPART 2 UNITS: 100 INJECTION, SOLUTION INTRAVENOUS; SUBCUTANEOUS at 02:08

## 2019-08-23 RX ADMIN — FERROUS SULFATE TAB EC 325 MG (65 MG FE EQUIVALENT) 325 MG: 325 (65 FE) TABLET DELAYED RESPONSE at 02:08

## 2019-08-23 RX ADMIN — INSULIN ASPART 4 UNITS: 100 INJECTION, SOLUTION INTRAVENOUS; SUBCUTANEOUS at 10:08

## 2019-08-23 RX ADMIN — CARVEDILOL 25 MG: 25 TABLET, FILM COATED ORAL at 10:08

## 2019-08-23 RX ADMIN — INSULIN ASPART 5 UNITS: 100 INJECTION, SOLUTION INTRAVENOUS; SUBCUTANEOUS at 06:08

## 2019-08-23 RX ADMIN — CLONIDINE HYDROCHLORIDE 0.1 MG: 0.1 TABLET ORAL at 02:08

## 2019-08-23 RX ADMIN — VITAMIN D, TAB 1000IU (100/BT) 1000 UNITS: 25 TAB at 02:08

## 2019-08-23 RX ADMIN — FUROSEMIDE 40 MG: 10 INJECTION, SOLUTION INTRAMUSCULAR; INTRAVENOUS at 06:08

## 2019-08-23 RX ADMIN — SODIUM BICARBONATE 650 MG TABLET 650 MG: at 02:08

## 2019-08-23 RX ADMIN — HYDRALAZINE HYDROCHLORIDE 100 MG: 25 TABLET, FILM COATED ORAL at 10:08

## 2019-08-23 RX ADMIN — SODIUM CHLORIDE: 0.9 INJECTION, SOLUTION INTRAVENOUS at 11:08

## 2019-08-23 RX ADMIN — METHYLPREDNISOLONE SODIUM SUCCINATE 100 MG: 125 INJECTION, POWDER, FOR SOLUTION INTRAMUSCULAR; INTRAVENOUS at 10:08

## 2019-08-23 RX ADMIN — HYDRALAZINE HYDROCHLORIDE 100 MG: 25 TABLET, FILM COATED ORAL at 02:08

## 2019-08-23 RX ADMIN — CLONIDINE HYDROCHLORIDE 0.1 MG: 0.1 TABLET ORAL at 10:08

## 2019-08-23 RX ADMIN — FAMOTIDINE 20 MG: 20 TABLET, FILM COATED ORAL at 10:08

## 2019-08-23 RX ADMIN — SODIUM BICARBONATE 650 MG TABLET 650 MG: at 10:08

## 2019-08-23 ASSESSMENT — ROUTINE ASSESSMENT OF PATIENT INDEX DATA (RAPID3)
FATIGUE SCORE: 2.5
PAIN SCORE: 4
PATIENT GLOBAL ASSESSMENT SCORE: 2.5
MDHAQ FUNCTION SCORE: 1
PSYCHOLOGICAL DISTRESS SCORE: 3.3
TOTAL RAPID3 SCORE: 3.28
AM STIFFNESS SCORE: 0, NO

## 2019-08-23 NOTE — ED NOTES
"Pt c/o abd "tightness" and states that she has not urinated since 7 am.  Bladder scan done and recorded 662.  Messaged the MD regarding this.   "

## 2019-08-23 NOTE — ED TRIAGE NOTES
Kendy Vital, a 53 y.o. female presents to the ED w/ complaint of bilateral lower extremity swelling and hypertension, seen at her clinic appointment this morning and was told by her PCP to come to the ED for admission and lasix infusion to help reduce fluid.     Triage note:  Chief Complaint   Patient presents with    Leg Swelling     c/o swelling to bilateral lower extremities and high BP for 6 days, seen by clinic today and told to come to ED     Review of patient's allergies indicates:  No Known Allergies  Past Medical History:   Diagnosis Date    Renal vein thrombosis 2015    s/p embolectomy and lovenox for 9 mos    Uterine leiomyoma     s/p resection     LOC: Patient name and date of birth verified. The patient is awake, alert and aware of environment with an appropriate affect, the patient is oriented x 3 and speaking appropriately.   APPEARANCE: Patient resting comfortably, patient is clean and well groomed, patient's clothing is properly fastened.  SKIN: The skin is warm and dry, color consistent with ethnicity, patient has normal skin turgor and moist mucus membranes, skin intact, no breakdown or bruising noted.  MUSCULOSKELETAL: Patient moving all extremities well, obvious 2+ edema to lower extremities noted.   RESPIRATORY: Respirations are spontaneous, patient has a normal effort and rate, no accessory muscle use noted. Patient denies SOB at this time.   CARDIAC: Patient has a normal rate and rhythm, no periphreal edema noted, capillary refill < 3 seconds. Patient denies chest pain at this time  ABDOMEN: Soft and non tender to palpation, no distention noted. Bowel sounds present in all four quadrants.  NEUROLOGIC: Eyes open spontaneously, behavior appropriate to situation, follows commands, facial expression symmetrical, bilateral hand grasp equal and even, purposeful motor response noted, normal sensation in all extremities when touched with a finger.  HEENT:  Facial swelling noted but patient  reports swelling to face is normal for her when she retains fluid.

## 2019-08-23 NOTE — ED NOTES
MD notified of blood sugar 412; MD will be down shortly to do admission and discuss the POC with the pt. Pt resting comfortably and independently repositioned in stretcher with bed locked in lowest position for safety. NAD noted at this time. Respirations even and unlabored and visible chest rise noted.   Pt instructed to call if assistance is needed. Pt on continuous cardiac, BP, and O2 monitoring. Call light within reach.  No needs at this time. Will continue to monitor.

## 2019-08-23 NOTE — PROGRESS NOTES
Subjective:       Patient ID: Kendy Vital is a 53 y.o. female.    Chief Complaint: Shortness of Breath; Fatigue; and Swelling    HPI     Ms. Vital is a 53 year old female with hypertension, anemia, and h/o renal vein and left upper extremity thrombosis, who was recently admitted to the hospital for thrombocytopenia, renal failure, and pneumonia. 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 Due to concern for possible hematologic malignancy, the patient had a bone marrow biopsy on 7/24 which revealed a B-cell lymphoproliferative disorder.  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 She received entecavir for hepatitis B, 3 days of pulse steroids, plasmapheresis, IVIg, and rituximab x 1.     The plan on discharge was to continue prednisone 50mg + dapsone for PCP prophylaxis for 2 weeks (end date 8/16), then taper to 40mg daily until follow up. She is currently still on prednisone 40mg. She was supposed to get her second rituximab dose on 8/20 but was not able to get prior authorization because she does not have insurance. Currently, she complains of shortness of breath with minimal exertion, fatigue, left arm weakness, significant swelling all over her body, a purpuric rash over her abdomen, arms, and legs, and a cough.      Review of Systems   Constitutional: Positive for activity change, fatigue and unexpected weight change (Weight gain). Negative for appetite change, chills and fever.   HENT: Negative for mouth sores and nosebleeds.    Eyes: Positive for photophobia. Negative for discharge, redness and itching.   Respiratory: Positive for cough and shortness of breath. Negative for chest tightness,  "wheezing and stridor.    Cardiovascular: Positive for leg swelling. Negative for chest pain and palpitations.   Gastrointestinal: Positive for abdominal distention. Negative for abdominal pain, anal bleeding, blood in stool, constipation, diarrhea, nausea and vomiting.   Genitourinary: Negative for dysuria, frequency and hematuria.   Musculoskeletal: Negative for arthralgias, gait problem, joint swelling and myalgias.   Skin: Positive for rash.   Neurological: Positive for weakness. Negative for dizziness and headaches.         Objective:   BP (!) 184/92   Pulse 80   Ht 5' 7.2" (1.707 m)   Wt 109 kg (240 lb 3.2 oz)   BMI 37.40 kg/m²      Physical Exam   Constitutional: She is well-developed, well-nourished, and in no distress. No distress.   Appears tired  Anasarca   HENT:   Head: Normocephalic and atraumatic.   Mouth/Throat: Oropharynx is clear and moist. No oropharyngeal exudate.   Eyes: Right eye exhibits no discharge. Left eye exhibits no discharge. No scleral icterus.   Neck: No thyromegaly present.   Cardiovascular: Normal rate, regular rhythm and normal heart sounds.  Exam reveals no gallop and no friction rub.    No murmur heard.  Pulmonary/Chest: Effort normal and breath sounds normal. No respiratory distress. She has no wheezes. She has no rales.   Abdominal: Soft. Bowel sounds are normal. She exhibits distension. She exhibits no mass. There is no tenderness. There is no rebound and no guarding.   Lymphadenopathy:     She has no cervical adenopathy.   Skin: Skin is warm and dry. Rash (Palpable purpura over left arm, with purpuric lesions over both arms and abdomen) noted. She is not diaphoretic. No erythema. No pallor.     Musculoskeletal: She exhibits edema.   Strength 4.5/5 bilateral upper extremities  Pain on palpation of both knees, difficult to determine if there is a knee effusion due to anasarca  No small joint synovitis        UPC (7/31/2019)    CBC (8/22/2019): Hgb 9.7 normocytic  CMP: Crea " 1.2, GFR >60, albumin 3.1, LFTs normal   SSA, SSB, negative   Cryoglobulins positive    Results for MUNA HERNANDEZ (MRN [id number]) as of 8/23/2019 08:28   Ref. Range 8/3/2019 10:50   IgG - Serum Latest Ref Range: 650 - 1600 mg/dL 152 (L)   IgM Latest Ref Range: 50 - 300 mg/dL 173   IgA Latest Ref Range: 40 - 350 mg/dL 44     Assessment:       1. Cryoglobulinemic vasculitis    2. Chronic (diffuse) proliferative glomerulonephritis    3. Nephrotic syndrome    4. Hepatitis B infection without delta agent without hepatic coma, unspecified chronicity    5. Low grade B cell lymphoproliferative disorder        Plan:       This is a 53 year old  female with cryoglobulinemic vasculitis in flare, diffuse proliferative glomerulonephritis, nephrotic syndrome, uncontrolled hypertension, hypogammaglobulinemia, and hepatitis B. She is unable to get her rituximab as outpatient due to lack of insurance and is in active flare with anasarca, palpable purpura, and shortness of breath. Renal function, liver function, and CBC were stable on 8/22/2019 but we suspect that her nephrotic syndrome has worsened given her anasarca.     - Admit for rituximab 1000mg x 1 dose. Previously consented for rituximab on last admission with consent in [place].   - Will need to check immunoglobulins before next rituximab dose, patient may need additional IVIg.  - Check CBC, CMP, IgG, IgM, IgA, urinalysis, UPC, ESR, CRP, C3, C4, cryoglobulins  - Continue prednisone 40mg, entecavir, dapsone for PCP prophylaxis  - Would recommend Nephrology and Rheumatology inpatient consult  - She was seen by Heme/Onc on 8/22/2019. Per Heme/Onc skeletal survey showed no lytic lesions, the patient has no new renal failure, and there is no indication for lymphoma treatment at this time.       Patient seen and discussed with Dr. Will Buck M.D.  Rheumatology, PGY 4

## 2019-08-23 NOTE — ASSESSMENT & PLAN NOTE
Hypogammaglobulinemia  Immunosuppression  Acute proliferative glomerulonephritis  Referred to ED from Rheum clinic for admission and further workup/managment. Recs as follows:    - Admit for rituximab 1000mg x 1 dose. Previously consented for rituximab on last admission with consent in Media.   - Will need to check immunoglobulins before next rituximab dose, patient may need additional IVIg.  - Check CBC, CMP, IgG, IgM, IgA, urinalysis, UPC, ESR, CRP, C3, C4, cryoglobulins (ordered and pending)  - Continue prednisone 40mg, entecavir, dapsone for PCP prophylaxis  - Would recommend Nephrology and Rheumatology inpatient consult  - She was seen by Heme/Onc on 8/22/2019. Per Heme/Onc skeletal survey showed no lytic lesions, the patient has no new renal failure, and there is no indication for lymphoma treatment at this time.     Rheumatology consulted; appreciate further recommendations.   Continue supportive care.   Nephrology consulted for glomerulonephritis and nephrotic syndrome. Rpt UPCR pending.

## 2019-08-23 NOTE — ED NOTES
Pt resting comfortably and independently repositioned in stretcher with bed locked in lowest position for safety. NAD noted at this time. Respirations even and unlabored and visible chest rise noted. Pt instructed to call if assistance is needed. Pt on continuous cardiac, BP, and O2 monitoring. Call light within reach. No needs at this time. Will continue to monitor.

## 2019-08-23 NOTE — ASSESSMENT & PLAN NOTE
Anasarca  ESR/CRP unremarkable. Albumin 2.7. Urine PCR pending.   Nephrology consulted; appreciate recs.   IV lasix 40 mg BID started.   Rituxan per Rheumatology.  Daily weights.  Strict I/Os.

## 2019-08-23 NOTE — ASSESSMENT & PLAN NOTE
Hypertensive Urgency  Home medications consist of Coreg 25mg BID, clonidine .1 mg TID, hydralazine 100 mg q8h, nifedipine 90 mg daily.   Severely HTN on admission, systolic >200s. Home medications resumed. Will titrate accordingly.

## 2019-08-23 NOTE — ED PROVIDER NOTES
Encounter Date: 8/23/2019       History     Chief Complaint   Patient presents with    Leg Swelling     c/o swelling to bilateral lower extremities and high BP for 6 days, seen by clinic today and told to come to ED     53-year-old female with hypertension, hepatitis-B, COPD, B-cell lymphoproliferative disorder, cryoglobulinemic vasculitis and diffuse proliferative glomerulonephritis presents from rheumatology clinic for admission for anasarca and hypertension.  Patient is endorsing swelling of her bilateral lower legs, abdomen and face over the past 3-4 days.  She was seen at [facility] for the same complaint 8/21, she does endorse of improvement with Lasix.  She reports associated generalized fatigue, shortness of breath and atraumatic bruising on her bilateral arms.  She denies fever/chills, chest pain, abdominal pain, nausea/vomiting, changes in bowel movements or urinary symptoms.        Review of patient's allergies indicates:  No Known Allergies  Past Medical History:   Diagnosis Date    Renal vein thrombosis 2015    s/p embolectomy and lovenox for 9 mos    Uterine leiomyoma     s/p resection     History reviewed. No pertinent surgical history.  History reviewed. No pertinent family history.  Social History     Tobacco Use    Smoking status: Former Smoker    Smokeless tobacco: Never Used   Substance Use Topics    Alcohol use: Yes     Frequency: Monthly or less    Drug use: Never     Review of Systems   Constitutional: Positive for fatigue. Negative for chills, diaphoresis and fever.   Respiratory: Positive for shortness of breath. Negative for cough and chest tightness.    Cardiovascular: Positive for leg swelling. Negative for chest pain and palpitations.   Gastrointestinal: Negative for abdominal pain, blood in stool, diarrhea, nausea and vomiting.   Endocrine: Negative for polydipsia and polyuria.   Genitourinary: Positive for decreased urine volume. Negative for dysuria and  hematuria.   Musculoskeletal: Negative for back pain and myalgias.   Skin: Negative for color change, pallor, rash and wound.   Allergic/Immunologic: Positive for immunocompromised state. Negative for food allergies.   Neurological: Negative for light-headedness and headaches.   Hematological: Negative for adenopathy. Bruises/bleeds easily.       Physical Exam     Initial Vitals [08/23/19 0933]   BP Pulse Resp Temp SpO2   (!) 179/97 77 20 98.1 °F (36.7 °C) 98 %      MAP       --         Physical Exam    Vitals reviewed.  Constitutional: She is not diaphoretic. She is Obese .  Non-toxic appearance. She appears ill. No distress.   HENT:   Head: Normocephalic and atraumatic.   Eyes: EOM are normal. Pupils are equal, round, and reactive to light.   Neck: Normal range of motion. Neck supple.   Cardiovascular: Normal rate, regular rhythm, normal heart sounds and intact distal pulses. Exam reveals no gallop and no friction rub.    No murmur heard.  3+ pitting edema bilateral lower legs up to the calf.  No erythema, tenderness or warmth.  No skin changes.   Pulmonary/Chest: Effort normal. No accessory muscle usage. No tachypnea. No respiratory distress. She has decreased breath sounds in the right lower field and the left lower field. She has no wheezes. She has no rhonchi. She has no rales. She exhibits no tenderness.   Abdominal: Soft. Bowel sounds are normal. She exhibits distension. She exhibits no mass. There is no tenderness. There is no rebound and no guarding.   Musculoskeletal: Normal range of motion.   Neurological: She is alert and oriented to person, place, and time.   Skin: Skin is warm and dry.   Psychiatric: She has a normal mood and affect.         ED Course   Procedures  Labs Reviewed   CBC W/ AUTO DIFFERENTIAL - Abnormal; Notable for the following components:       Result Value    RBC 3.47 (*)     Hemoglobin 9.3 (*)     Hematocrit 31.7 (*)     Mean Corpuscular Hemoglobin 26.8 (*)     Mean Corpuscular  Hemoglobin Conc 29.3 (*)     RDW 25.5 (*)     Immature Granulocytes 2.0 (*)     Immature Grans (Abs) 0.12 (*)     All other components within normal limits   COMPREHENSIVE METABOLIC PANEL - Abnormal; Notable for the following components:    Chloride 111 (*)     CO2 21 (*)     Glucose 181 (*)     BUN, Bld 22 (*)     Total Protein 5.1 (*)     Albumin 2.7 (*)     All other components within normal limits   IGG - Abnormal; Notable for the following components:    IgG - Serum 360 (*)     All other components within normal limits   C4 COMPLEMENT - Abnormal; Notable for the following components:    Complement (C-4) <3 (*)     All other components within normal limits   SEDIMENTATION RATE   C-REACTIVE PROTEIN   IGM   IGA   C3 COMPLEMENT   URINALYSIS, REFLEX TO URINE CULTURE   CRYOGLOBULIN   PROTEIN, QUANTITATIVE, URINE RANDOM   POCT GLUCOSE MONITORING CONTINUOUS          Imaging Results          X-Ray Chest PA And Lateral (Final result)  Result time 08/23/19 12:30:35    Final result by Jason Sarmiento III, MD (08/23/19 12:30:35)                 Impression:      Possible mild edema small pleural effusions but improved.      Electronically signed by: Jason Sarmiento MD  Date:    08/23/2019  Time:    12:30             Narrative:    EXAMINATION:  XR CHEST PA AND LATERAL    CLINICAL HISTORY:  Shortness of breath    FINDINGS:  There is mild edema pleural fluid but improved from 07/24/2019.                                 Medical Decision Making:   History:   Old Records Summarized: records from another hospital and records from previous admission(s).       <> Summary of Records: Ms. Vital was recently discharged/07/2019 after 19 day admission for thrombocytopenia and acute renal failure.  She initially presented as an ICU transfer with concern for TTP, noted to have bilateral pulmonary infiltrates concerning for pneumonia and treated with vancomycin/Zosyn/azithromycin for CAP.  Hospital course complicated by acute kidney injury  ", per discharge summary:    "s/p renal biopsy (7/23) with preliminary report of diffuse proliferative glomerulonephritis due to cryoglobulinemic disease and extensive ischemic ATN.  Patient does not have HCV, but is positive for HBV and has been started on anti-viral therapy with entecavir.  Due to concern of possible hematologic malignancy, patient had bone marrow biopsy on 7/24 and results showed B-cell lymphoproliferative disorders.  Her serum cryoglobulins returned as Type 3 and this was not c/w with a hematologic malignancy etiology for crytoglobulinemia (would expect Type 1 per hematology)."    Patient seen 08/21 at [facility] ED for the same complaint.  She received IV Lasix with improvement of her swelling and was discharged.    She was seen this morning and rheumatology clinic for the same complaint and was for to the ED for admission for rituximab.  Initial Assessment:   53-year-old female presenting for anasarca, likely secondary to glomerular nephritis.  On ED arrival, she is hypertensive at 179/97 with otherwise normal vitals.  She does have diffuse ecchymoses scattered on bilateral arms.  She appears ill but nontoxic.  Differential Diagnosis:   Lymphedema secondary to glomerular nephritis  CHF  Renal failure    Independently Interpreted Test(s):   I have ordered and independently interpreted X-rays - see summary below.       <> Summary of X-Ray Reading(s): Trace bilateral pulmonary edema, improved from prior  Clinical Tests:   Lab Tests: Ordered and Reviewed       <> Summary of Lab: Stable anemia with hemoglobin of 9.3.  Normal platelet count.  Normal creatinine at 0.9.  Complement testing pending.  ED Management:  53-year-old female presenting from rheumatology clinic for admission for rituximab admitting for vasculitis.  Will check labs, do chest x-ray and reassess.    Patient will be admitted to Hospital Medicine with rheumatology consult for further management.  Patient comfortable " with admission.  I discussed this patient with my supervising physician.    Nisa Ahumada PA-C                        Clinical Impression:       ICD-10-CM ICD-9-CM   1. Cryoglobulinemic vasculitis D89.1 273.2   2. Shortness of breath R06.02 786.05   3. Anasarca R60.1 782.3         Disposition:   Disposition: Admitted  Condition: Fair                        Nisa Ahumada PA-C  08/23/19 1600

## 2019-08-23 NOTE — ED NOTES
Team called to inquire about a diet.  Pt resting comfortably and independently repositioned in stretcher with bed locked in lowest position for safety. NAD noted at this time. Respirations even and unlabored and visible chest rise noted.  Patient offered bathroom assistance and states that she is not able to offer a sample at this time. Pt instructed to call if assistance is needed. Pt on continuous cardiac, BP, and O2 monitoring. Call light within reach. No needs at this time. Will continue to monitor.

## 2019-08-23 NOTE — MEDICAL/APP STUDENT
[facility]  Nephrology  Consult Note    Patient Name: Kendy Vital  MRN: [id number]  Admission Date: 8/23/2019  Hospital Length of Stay: 0 days  Attending Provider: Atiya Cao*   Primary Care Physician: To Obtain Unable  Principal Problem:Cryoglobulinemic vasculitis    Consults  Subjective:     HPI: 52 yo F with HTN, anemia, T2DM and history of renal vein and left upper extremity thrombosis presented with worsening lower limb edema bilaterally and uncontrolled HTN over the past week. In August 2019, patient was admitted for work-up of TTP. Found to have an LACI (Cr 2.9-->6 on this admission). Patient was also treated with broad spectrum antibiotics for pneumonia.     Past Medical History:   Diagnosis Date    Renal vein thrombosis 2015    s/p embolectomy and lovenox for 9 mos    Uterine leiomyoma     s/p resection       History reviewed. No pertinent surgical history.    Review of patient's allergies indicates:  No Known Allergies  Current Facility-Administered Medications   Medication Frequency    acetaminophen tablet 650 mg Q8H PRN    [START ON 8/24/2019] calcium carbonate (OS-DONTE) tablet 500 mg Daily    carvedilol tablet 25 mg BID    cloNIDine tablet 0.1 mg TID    [START ON 8/24/2019] dapsone tablet 100 mg Daily    dextrose 10% (D10W) Bolus PRN    dextrose 10% (D10W) Bolus PRN    [START ON 8/24/2019] entecavir tablet 0.5 mg Daily    ferrous sulfate EC tablet 325 mg Daily    glucagon (human recombinant) injection 1 mg PRN    glucose chewable tablet 16 g PRN    glucose chewable tablet 24 g PRN    hydrALAZINE tablet 100 mg Q8H    insulin aspart U-100 pen 0-5 Units QID (AC + HS) PRN    [START ON 8/24/2019] NIFEdipine 24 hr tablet 90 mg Daily    [START ON 8/24/2019] pantoprazole EC tablet 40 mg Before breakfast    [START ON 8/24/2019] predniSONE tablet 40 mg Daily    sodium bicarbonate tablet 650 mg TID    sodium chloride 0.9% flush 10 mL PRN    vitamin D 1000 units  "tablet 1,000 Units Daily     Current Outpatient Medications   Medication    blood sugar diagnostic Strp    blood-glucose meter Misc    calcium carbonate (OS-DONTE) 500 mg calcium (1,250 mg) tablet    carvedilol (COREG) 25 MG tablet    cloNIDine (CATAPRES) 0.1 MG tablet    dapsone 100 MG Tab    entecavir (BARACLUDE) 0.5 MG Tab    ferrous sulfate 325 (65 FE) MG EC tablet    hydrALAZINE (APRESOLINE) 100 MG tablet    insulin  unit/mL injection    insulin syringe-needle U-100 0.3 mL 30 gauge x 5/16" Syrg    lancets 30 gauge Misc    lancing device Misc    NIFEdipine (PROCARDIA-XL) 90 MG (OSM) 24 hr tablet    pantoprazole (PROTONIX) 40 MG tablet    predniSONE (DELTASONE) 10 MG tablet    sodium bicarbonate 650 MG tablet    vitamin D (VITAMIN D3) 1000 units Tab     Family History     None        Tobacco Use    Smoking status: Former Smoker    Smokeless tobacco: Never Used   Substance and Sexual Activity    Alcohol use: Yes     Frequency: Monthly or less    Drug use: Never    Sexual activity: Not on file     Review of Systems  Objective:     Vital Signs (Most Recent):  Temp: 98.1 °F (36.7 °C) (08/23/19 0933)  Pulse: 76 (08/23/19 1456)  Resp: 20 (08/23/19 0933)  BP: (!) 163/78 (08/23/19 1456)  SpO2: 97 % (08/23/19 1456)  O2 Device (Oxygen Therapy): room air (08/23/19 0933) Vital Signs (24h Range):  Temp:  [98.1 °F (36.7 °C)-98.7 °F (37.1 °C)] 98.1 °F (36.7 °C)  Pulse:  [70-86] 76  Resp:  [18-20] 20  SpO2:  [94 %-98 %] 97 %  BP: (163-202)/(77-97) 163/78     Weight: 108.9 kg (240 lb) (08/23/19 0933)  Body mass index is 38.74 kg/m².  Body surface area is 2.25 meters squared.    No intake/output data recorded.    Physical Exam    Significant Labs:  {Select Labs:88896::"All labs within the past 24 hours have been reviewed."}    Significant Imaging:  {Results; Diagnostics:33287526::"***"}    Assessment/Plan:     Active Diagnoses:    Diagnosis Date Noted POA    PRINCIPAL PROBLEM:  Cryoglobulinemic " vasculitis [D89.1]  Yes    Anasarca [R60.1] 08/23/2019 Yes    Hypogammaglobulinemia [D80.1] 08/05/2019 Yes    Iron deficiency anemia [D50.9] 07/30/2019 Yes    Steroid-induced hyperglycemia [R73.9, T38.0X5A] 07/28/2019 Yes    Immunosuppression [D89.9] 07/26/2019 Yes    Chronic hepatitis B [B18.1] 07/24/2019 Yes    Acute (diffuse) proliferative glomerulonephritis [N00.8]  Yes    Essential hypertension [I10] 07/19/2019 Yes      Problems Resolved During this Admission:       ***    Thank you for your consult. {SIGN OFF/FOLLOW-UP:35945}    Sherri Fernandez  Nephrology  [facility]

## 2019-08-23 NOTE — ED NOTES
POCT glucose 246 prior to eating. MD notified and new orders given. Pt resting comfortably and independently repositioned in stretcher with bed locked in lowest position for safety. NAD noted at this time. Respirations even and unlabored and visible chest rise noted.  Patient offered bathroom assistance and denies need at this time. Pt instructed to call if assistance is needed. Pt on continuous cardiac, BP, and O2 monitoring. Call light within reach. No needs at this time. Will continue to monitor.

## 2019-08-23 NOTE — MEDICAL/APP STUDENT
PC   Kendy Vital is a 53F with BL LE edema, elevated BP x 6 days, direct referral from clinic for rituximab.     HPi     Recent admission 7/2019 for 19 days due to PNA, thrombocytopenia, renal failure.  Seen 8/21 ED in [facility] ED for same complaint. Also had LACI. Had vancomycin/zosyn/azithromycin.  Had cryoglobulinemic etiology of LACI per renal biopsy 7/23.  HCV negative, HBV positive, started on entecavir.  Bone marrow biopsy 7/24 showing lymphoproliferative disorder but cryoglobulinemia type 3 and not consistent with hematologic malignancy per heme/onc.   Received rituxan x1, IVIG, pulse steroids (solumedrol 100mg) x 3 days, PLEX. After pulse steroids, then given prednisone 60 and dose reduced to 50mg and planned to discharge on prednisone 50mg + dapsone PCP ppx then taper after 2 weeks but could not get prior authorization for 2nd dose on 8/20.  Now has SOB, fatigue, purpuric rash over abdomen arms legs and cough.     Admission history   Heme/onc consulted:  No indication for treatment of lymphoma, continue monitoring & f/u clinic for outpatient workup.   Rheumatology in clinic:      MHx   Renal vein thrombosis s/p embolectomy, lovenox x 9 months    SHx   Former smoker    VS  Vitals:    08/23/19 0933   BP: (!) 179/97   Pulse: 77   Resp: 20   Temp: 98.1 °F (36.7 °C)     PE   Constitutional - tired, uncomfortable appearing at bedside   CV - RR no MRG.  BL pitting edema up to mid thigh,    Resp - BS clear bilaterally, poor respiratory effort, tachypnea   Dermatological - scant, palpable erythematous rash, with tender skin overlying edematous areas              MSK - ROM is preserved    No elbow, MCP, PIP, DIP or knee effusions BL.   No tenderness on passive movement of abovementioned limbs.    Labs   WBC 6.02 stable   Hb 9.3 stable   Plts 217 stable     ESR 9   CRP 3.4     BUN 22 (from 29)   Cr 0.9 (from 1.2)     C3 78   C4 <3 (L)     IgG 360 (L)   IgM 109   IgA 74     Autoimmune panel   ANCA neg   SSA  neg     Assessment  1. Cryoglobulinemia type 3    Plan  1.

## 2019-08-23 NOTE — ED NOTES
Patient identifiers verified and correct for Kendy Vital.    LOC: The patient is awake, alert and aware of environment with an appropriate affect, the patient is oriented x 3 and speaking appropriately.  APPEARANCE: Patient resting comfortably and in no acute distress, patient is clean and well groomed, patient's clothing is properly fastened.  SKIN: The skin is warm and dry, color consistent with ethnicity, patient has normal skin turgor and moist mucus membranes, skin intact, no breakdown or bruising noted.  MUSCULOSKELETAL: Patient moving all extremities spontaneously, +4 swelling in the LE and pain with palpation 8/10  RESPIRATORY: Airway is open and patent, respirations are spontaneous, patient has a normal effort and rate, no accessory muscle use noted, bilateral breath sounds clear.  CARDIAC: Patient has a normal rate and regular rhythm, no periphreal edema noted, capillary refill < 3 seconds.  ABDOMEN: Soft and non tender to palpation, no distention noted, normoactive bowel sounds present in all four quadrants.  NEUROLOGIC: PERRL, eyes open spontaneously, behavior appropriate to situation, follows commands, facial expression symmetrical, bilateral hand grasp equal and even, purposeful motor response noted, normal sensation in all extremities when touched with a finger.

## 2019-08-24 VITALS
BODY MASS INDEX: 38.57 KG/M2 | SYSTOLIC BLOOD PRESSURE: 143 MMHG | HEART RATE: 72 BPM | WEIGHT: 240 LBS | DIASTOLIC BLOOD PRESSURE: 77 MMHG | RESPIRATION RATE: 18 BRPM | OXYGEN SATURATION: 94 % | HEIGHT: 66 IN | TEMPERATURE: 98 F

## 2019-08-24 PROBLEM — D69.6 THROMBOCYTOPENIA: Status: ACTIVE | Noted: 2019-08-24

## 2019-08-24 LAB
CREAT UR-MCNC: 109 MG/DL (ref 15–325)
POCT GLUCOSE: 241 MG/DL (ref 70–110)
POCT GLUCOSE: 260 MG/DL (ref 70–110)
POCT GLUCOSE: 314 MG/DL (ref 70–110)
PROT UR-MCNC: 1072 MG/DL (ref 0–15)
PROT/CREAT UR: 9.83 MG/G{CREAT} (ref 0–0.2)

## 2019-08-24 PROCEDURE — 25000003 PHARM REV CODE 250: Performed by: HOSPITALIST

## 2019-08-24 PROCEDURE — 25000003 PHARM REV CODE 250: Performed by: PHYSICIAN ASSISTANT

## 2019-08-24 PROCEDURE — 84156 ASSAY OF PROTEIN URINE: CPT

## 2019-08-24 PROCEDURE — 99239 PR HOSPITAL DISCHARGE DAY,>30 MIN: ICD-10-PCS | Mod: ,,, | Performed by: HOSPITALIST

## 2019-08-24 PROCEDURE — 63600175 PHARM REV CODE 636 W HCPCS: Performed by: INTERNAL MEDICINE

## 2019-08-24 PROCEDURE — 99231 SBSQ HOSP IP/OBS SF/LOW 25: CPT | Mod: ,,, | Performed by: INTERNAL MEDICINE

## 2019-08-24 PROCEDURE — 99239 HOSP IP/OBS DSCHRG MGMT >30: CPT | Mod: ,,, | Performed by: HOSPITALIST

## 2019-08-24 PROCEDURE — 99231 PR SUBSEQUENT HOSPITAL CARE,LEVL I: ICD-10-PCS | Mod: ,,, | Performed by: INTERNAL MEDICINE

## 2019-08-24 PROCEDURE — 63600175 PHARM REV CODE 636 W HCPCS: Performed by: HOSPITALIST

## 2019-08-24 RX ORDER — CLONIDINE HYDROCHLORIDE 0.1 MG/1
0.1 TABLET ORAL ONCE
Status: COMPLETED | OUTPATIENT
Start: 2019-08-24 | End: 2019-08-24

## 2019-08-24 RX ORDER — PREDNISONE 10 MG/1
40 TABLET ORAL DAILY
Qty: 120 TABLET | Refills: 11 | Status: SHIPPED | OUTPATIENT
Start: 2019-08-24 | End: 2020-08-23

## 2019-08-24 RX ORDER — CLONIDINE HYDROCHLORIDE 0.1 MG/1
0.2 TABLET ORAL 3 TIMES DAILY
Status: DISCONTINUED | OUTPATIENT
Start: 2019-08-24 | End: 2019-08-24 | Stop reason: HOSPADM

## 2019-08-24 RX ORDER — DIPHENHYDRAMINE HCL 25 MG
25 CAPSULE ORAL ONCE
Status: COMPLETED | OUTPATIENT
Start: 2019-08-24 | End: 2019-08-24

## 2019-08-24 RX ORDER — SODIUM CHLORIDE 0.9 % (FLUSH) 0.9 %
10 SYRINGE (ML) INJECTION
Status: DISCONTINUED | OUTPATIENT
Start: 2019-08-24 | End: 2019-08-24 | Stop reason: HOSPADM

## 2019-08-24 RX ORDER — INSULIN ASPART 100 [IU]/ML
1-10 INJECTION, SOLUTION INTRAVENOUS; SUBCUTANEOUS 3 TIMES DAILY
Qty: 108 ML | Refills: 0 | Status: SHIPPED | OUTPATIENT
Start: 2019-08-24 | End: 2021-08-26

## 2019-08-24 RX ORDER — CLONIDINE HYDROCHLORIDE 0.1 MG/1
0.2 TABLET ORAL 3 TIMES DAILY
Qty: 180 TABLET | Refills: 11 | Status: SHIPPED | OUTPATIENT
Start: 2019-08-24 | End: 2021-03-24

## 2019-08-24 RX ADMIN — CALCIUM 1000 MG: 500 TABLET ORAL at 08:08

## 2019-08-24 RX ADMIN — PREDNISONE 40 MG: 20 TABLET ORAL at 08:08

## 2019-08-24 RX ADMIN — CLONIDINE HYDROCHLORIDE 0.1 MG: 0.1 TABLET ORAL at 11:08

## 2019-08-24 RX ADMIN — FERROUS SULFATE TAB EC 325 MG (65 MG FE EQUIVALENT) 325 MG: 325 (65 FE) TABLET DELAYED RESPONSE at 08:08

## 2019-08-24 RX ADMIN — ACETAMINOPHEN 650 MG: 325 TABLET ORAL at 12:08

## 2019-08-24 RX ADMIN — DAPSONE 100 MG: 100 TABLET ORAL at 08:08

## 2019-08-24 RX ADMIN — CARVEDILOL 25 MG: 25 TABLET, FILM COATED ORAL at 08:08

## 2019-08-24 RX ADMIN — VITAMIN D, TAB 1000IU (100/BT) 1000 UNITS: 25 TAB at 08:08

## 2019-08-24 RX ADMIN — INSULIN ASPART 5 UNITS: 100 INJECTION, SOLUTION INTRAVENOUS; SUBCUTANEOUS at 11:08

## 2019-08-24 RX ADMIN — FUROSEMIDE 40 MG: 10 INJECTION, SOLUTION INTRAMUSCULAR; INTRAVENOUS at 08:08

## 2019-08-24 RX ADMIN — PANTOPRAZOLE SODIUM 40 MG: 40 TABLET, DELAYED RELEASE ORAL at 05:08

## 2019-08-24 RX ADMIN — NIFEDIPINE 90 MG: 30 TABLET, FILM COATED, EXTENDED RELEASE ORAL at 08:08

## 2019-08-24 RX ADMIN — SODIUM BICARBONATE 650 MG TABLET 650 MG: at 08:08

## 2019-08-24 RX ADMIN — ENTECAVIR 0.5 MG: 0.5 TABLET ORAL at 10:08

## 2019-08-24 RX ADMIN — HYDRALAZINE HYDROCHLORIDE 100 MG: 25 TABLET, FILM COATED ORAL at 05:08

## 2019-08-24 RX ADMIN — DIPHENHYDRAMINE HYDROCHLORIDE 25 MG: 25 CAPSULE ORAL at 12:08

## 2019-08-24 RX ADMIN — HYDRALAZINE HYDROCHLORIDE 100 MG: 25 TABLET, FILM COATED ORAL at 12:08

## 2019-08-24 RX ADMIN — INSULIN ASPART 4 UNITS: 100 INJECTION, SOLUTION INTRAVENOUS; SUBCUTANEOUS at 11:08

## 2019-08-24 RX ADMIN — INSULIN ASPART 5 UNITS: 100 INJECTION, SOLUTION INTRAVENOUS; SUBCUTANEOUS at 08:08

## 2019-08-24 RX ADMIN — CLONIDINE HYDROCHLORIDE 0.1 MG: 0.1 TABLET ORAL at 08:08

## 2019-08-24 RX ADMIN — INSULIN ASPART 6 UNITS: 100 INJECTION, SOLUTION INTRAVENOUS; SUBCUTANEOUS at 08:08

## 2019-08-24 RX ADMIN — RITUXIMAB 1000 MG: 10 INJECTION, SOLUTION INTRAVENOUS at 01:08

## 2019-08-24 NOTE — HOSPITAL COURSE
"She was admitted to [facility] medicine A. Rheumatology and Nephrology were consulted for assistance. Her urine protein was high (1072 mg). Nephrology consulted; no new interventions. Recommending treatment for her cryo vasculitis. Rheumatology ordered Rituxan and patient tolerated infusion without any adverse effects. Her BP improved, but was still elevated upon discharge; patient did not wish to remain for further titration of her regimen stating "It's always that high". Discussed the risks of untreated, uncontrolled BP including end-organ damage (kidneys, heart, eyes, nerves), stroke and death. She reassured me that she would follow up with her PCP for further titration. Her home medication, as listed below was continued.     For details on mgmt of IP problems, see below:   Cryoglobulinemic vasculitis  Hypogammaglobulinemia  Immunosuppression  Acute proliferative glomerulonephritis  Referred to ED from Rheum clinic for admission and further workup/managment. Recs as follows:    - Admit for rituximab 1000mg x 1 dose. Previously consented for rituximab on last admission with consent in Media.   - Will need to check immunoglobulins before next rituximab dose, patient may need additional IVIg.  - Check CBC, CMP, IgG (low; 30 mg/dl), IgM wnl, IgA wnl, urinalysis ++ protein, UPC 1072, ESR, CRP, C3 wnl, C4 (low), cryoglobulins (ordered and pending)  - Continue prednisone 40mg, entecavir, dapsone for PCP prophylaxis  - Would recommend Nephrology and Rheumatology inpatient consult  - She was seen by Heme/Onc on 8/22/2019. Per Heme/Onc skeletal survey showed no lytic lesions, the patient has no new renal failure, and there is no indication for lymphoma treatment at this time.      Rheumatology consulted; appreciate further recommendations. Rituxan infusion tolerated without incident. Recommended f/u in clinic.   Continue supportive care.   Nephrology consulted for glomerulonephritis and nephrotic syndrome. To follow up in " clinic.      Steroid-induced hyperglycemia  Home insulin regimen is unclear. MAR reports 6u NPH BID w/ meals. Patient, however, reports using 8u basal insulin (not listed) and 5 units of regular insulin with each meal. My impression upon questioning her is she is not a reliable historian and may not be very compliant with her insulin at all.   A1c one month prior 5.7%  Started prandial insulin 5U TID +LD SSI TID AC/HS. Titrate accordingly.  Diabetic diet.   Discharged with MD SOTO and instructions/teaching. To keep BGL log and bring with her to her next PCP appt. Contact information provided to her for [facility] for establishment of care.        Chronic hepatitis B  LFTs mildly elevated, overall improved.   Continue Etecavir.      Acute (diffuse) proliferative glomerulonephritis  Anasarca  ESR/CRP unremarkable. Albumin 2.7. Urine PCR pending.   Nephrology consulted; appreciate recs.   IV lasix 40 mg BID started.   Rituxan per Rheumatology.  Daily weights.  Strict I/Os.      Essential hypertension  Hypertensive Urgency  Home medications consist of Coreg 25mg BID, clonidine .1 mg TID, hydralazine 100 mg q8h, nifedipine 90 mg daily.   Severely HTN on admission, systolic >200s. Home medications resumed. Will titrate accordingly.  Clonidine titrated up with good response.   Patient did not wish to remain hospitalized for further titration of BP meds. Improved on discharge, but still needs tighter control given kidney disease. Discussed risks with patient, encouraged BP and sugar log and close f/u with her PCP.

## 2019-08-24 NOTE — ED NOTES
Pt notified of room assignment.  Pt resting comfortably and independently repositioned in stretcher with bed locked in lowest position for safety. NAD noted at this time. Respirations even and unlabored and visible chest rise noted. Pt instructed to call if assistance is needed. Pt on continuous cardiac, BP, and O2 monitoring. Call light within reach. No needs at this time. Will continue to monitor.

## 2019-08-24 NOTE — ASSESSMENT & PLAN NOTE
Home insulin regimen is unclear. MAR reports 6u NPH BID w/ meals. Patient, however, reports using 8u basal insulin (not listed) and 5 units of regular insulin with each meal. My impression upon questioning her is she is not a reliable historian and may not be very compliant with her insulin at all.   Check A1c in AM.   Started prandial insulin 5U TID +LD SSI TID AC/HS. Titrate accordingly.  Diabetic diet.

## 2019-08-24 NOTE — SUBJECTIVE & OBJECTIVE
"Past Medical History:   Diagnosis Date    Renal vein thrombosis 2015    s/p embolectomy and lovenox for 9 mos    Uterine leiomyoma     s/p resection       History reviewed. No pertinent surgical history.    Review of patient's allergies indicates:  No Known Allergies    No current facility-administered medications on file prior to encounter.      Current Outpatient Medications on File Prior to Encounter   Medication Sig    blood sugar diagnostic Strp use to test blood gluocse 2 (two) times daily with meals.    blood-glucose meter Misc Use as instructed    calcium carbonate (OS-DONTE) 500 mg calcium (1,250 mg) tablet Take 2 tablets (1,000 mg total) by mouth once daily.    carvedilol (COREG) 25 MG tablet Take 1 tablet (25 mg total) by mouth 2 (two) times daily.    cloNIDine (CATAPRES) 0.1 MG tablet Take 1 tablet (0.1 mg total) by mouth 3 (three) times daily.    dapsone 100 MG Tab Take 1 tablet (100 mg total) by mouth once daily.    entecavir (BARACLUDE) 0.5 MG Tab Take 1 tablet (0.5 mg total) by mouth once daily.    ferrous sulfate 325 (65 FE) MG EC tablet Take 1 tablet (325 mg total) by mouth once daily.    hydrALAZINE (APRESOLINE) 100 MG tablet Take 1 tablet (100 mg total) by mouth every 8 (eight) hours.    insulin  unit/mL injection Inject 6 Units into the skin 2 (two) times daily before meals.    insulin syringe-needle U-100 0.3 mL 30 gauge x 5/16" Syrg 1 each by Misc.(Non-Drug; Combo Route) route 2 (two) times daily with meals.    lancets 30 gauge Misc use to test blood glucose 2 (two) times daily with meals.    lancing device Misc 1 Device by Misc.(Non-Drug; Combo Route) route 2 (two) times daily with meals.    NIFEdipine (PROCARDIA-XL) 90 MG (OSM) 24 hr tablet Take 1 tablet (90 mg total) by mouth once daily.    pantoprazole (PROTONIX) 40 MG tablet Take 1 tablet (40 mg total) by mouth before breakfast.    predniSONE (DELTASONE) 10 MG tablet Take 5 tablets (50 mg total) by mouth once daily " for 9 days, THEN 4 tablets (40 mg total) once daily.    sodium bicarbonate 650 MG tablet Take 1 tablet (650 mg total) by mouth 3 (three) times daily.    vitamin D (VITAMIN D3) 1000 units Tab Take 1 tablet (1,000 Units total) by mouth once daily.     Family History     None        Tobacco Use    Smoking status: Former Smoker    Smokeless tobacco: Never Used   Substance and Sexual Activity    Alcohol use: Yes     Frequency: Monthly or less    Drug use: Never    Sexual activity: Not on file     Review of Systems   Constitutional: Positive for fatigue. Negative for chills and fever.   HENT: Positive for nosebleeds. Negative for mouth sores, trouble swallowing and voice change.    Eyes: Negative for pain and redness.   Respiratory: Positive for cough and shortness of breath.    Cardiovascular: Positive for leg swelling.   Gastrointestinal: Positive for constipation. Negative for abdominal pain, diarrhea, nausea and vomiting.   Genitourinary: Positive for difficulty urinating. Negative for dysuria.   Musculoskeletal: Positive for myalgias. Negative for arthralgias.   Neurological: Negative for headaches.   Hematological: Bruises/bleeds easily.   Psychiatric/Behavioral: Negative for agitation, behavioral problems and confusion.     Objective:     Vital Signs (Most Recent):  Temp: 98.3 °F (36.8 °C) (08/23/19 1839)  Pulse: 78 (08/23/19 1929)  Resp: 20 (08/23/19 0933)  BP: (!) 180/86 (08/23/19 1929)  SpO2: 96 % (08/23/19 1929) Vital Signs (24h Range):  Temp:  [98.1 °F (36.7 °C)-98.3 °F (36.8 °C)] 98.3 °F (36.8 °C)  Pulse:  [70-92] 78  Resp:  [20] 20  SpO2:  [96 %-98 %] 96 %  BP: (163-202)/(78-97) 180/86     Weight: 108.9 kg (240 lb)  Body mass index is 38.74 kg/m².    Physical Exam   Constitutional: She is oriented to person, place, and time. She appears well-developed and well-nourished. No distress.   Obese   HENT:   Head: Normocephalic and atraumatic.   Mouth/Throat: No oropharyngeal exudate.   Eyes: Pupils are  equal, round, and reactive to light. Conjunctivae and EOM are normal. No scleral icterus.   Neck: Normal range of motion. Neck supple. No tracheal deviation present. No thyromegaly present.   Cardiovascular: Normal rate, regular rhythm, normal heart sounds and intact distal pulses.   No murmur heard.  Pulmonary/Chest: Effort normal. No respiratory distress. She has no wheezes. She has rales (bilaterally). She exhibits no tenderness.   Abdominal: Soft. Bowel sounds are normal. She exhibits no distension. There is no tenderness. There is no rebound and no guarding.   Musculoskeletal: Normal range of motion. She exhibits edema. She exhibits no tenderness.   Anasarca   Lymphadenopathy:     She has no cervical adenopathy.   Neurological: She is alert and oriented to person, place, and time.   Skin: Skin is warm and dry. No rash noted. She is not diaphoretic. No erythema. No pallor.   Scattered small ecchymoses bilat UE, trunk   Psychiatric: She has a normal mood and affect. Her behavior is normal. Judgment and thought content normal.         CRANIAL NERVES     CN III, IV, VI   Pupils are equal, round, and reactive to light.  Extraocular motions are normal.        Significant Labs:   CBC:   Recent Labs   Lab 08/22/19  1603 08/23/19  1035   WBC 5.88 6.02   HGB 9.7* 9.3*   HCT 32.3* 31.7*    217     CMP:   Recent Labs   Lab 08/22/19  1603 08/23/19  1035    142   K 3.8 3.5    111*   CO2 21* 21*   * 181*   BUN 29* 22*   CREATININE 1.2 0.9   CALCIUM 8.9 9.2   PROT 5.7* 5.1*   ALBUMIN 3.1* 2.7*   BILITOT 0.6 0.6   ALKPHOS 89 78   AST 15 11   ALT 26 20   ANIONGAP 11 10   EGFRNONAA 52.1* >60.0     Coagulation: No results for input(s): PT, INR, APTT in the last 48 hours.    Significant Imaging: I have reviewed and interpreted all pertinent imaging results/findings within the past 24 hours.

## 2019-08-24 NOTE — DISCHARGE INSTRUCTIONS
08/24/2019      Kendy Vital  [address] [place] [address]          Hospital Medicine Dept.  [facility] [address] [place] [address]  [phone] after hours  [phone] fax Insulin Sliding Scale:     Monitor blood sugar before meals and at bedtime.  Call physician if blood sugar < 60 or > 350 for two consecutive readings.          Glucose  Novolog Insulin Subcutaneous        0 - 60   Orange juice or glucose tablet, hold insulin      No insulin   201-250  2 units   251-300  4 units   301-350  6 units   351-400  8 units   >400   10 units then call physician      _________________________________  Atiya Cao MD  08/24/2019

## 2019-08-24 NOTE — SUBJECTIVE & OBJECTIVE
Interval History: Patient states her breathing is much better today. She has had 2.4L UOP     Current Facility-Administered Medications   Medication Frequency    acetaminophen tablet 650 mg Q8H PRN    acetaminophen tablet 650 mg 1 time in Clinic/HOD    alteplase injection 2 mg PRN    calcium carbonate (OS-DONTE) tablet 500 mg Daily    carvedilol tablet 25 mg BID    cloNIDine tablet 0.1 mg TID    dapsone tablet 100 mg Daily    diphenhydrAMINE capsule 25 mg 1 time in Clinic/HOD    entecavir tablet 0.5 mg Daily    ferrous sulfate EC tablet 325 mg Daily    furosemide injection 40 mg BID    glucagon (human recombinant) injection 1 mg PRN    glucose chewable tablet 16 g PRN    glucose chewable tablet 24 g PRN    heparin, porcine (PF) 100 unit/mL injection flush 500 Units PRN    hydrALAZINE tablet 100 mg Q8H    insulin aspart U-100 pen 1-10 Units QID (AC + HS) PRN    insulin aspart U-100 pen 5 Units TIDWM    NIFEdipine 24 hr tablet 90 mg Daily    pantoprazole EC tablet 40 mg Before breakfast    predniSONE tablet 40 mg Daily    sodium bicarbonate tablet 650 mg TID    sodium chloride 0.9% flush 10 mL PRN    sodium chloride 0.9% flush 10 mL PRN    sodium chloride 0.9% flush 10 mL PRN    vitamin D 1000 units tablet 1,000 Units Daily     Objective:     Vital Signs (Most Recent):  Temp: 98.1 °F (36.7 °C) (08/24/19 0819)  Pulse: 71 (08/24/19 0819)  Resp: 18 (08/24/19 0819)  BP: (!) 171/83 (08/24/19 0819)  SpO2: 99 % (08/24/19 0819)  O2 Device (Oxygen Therapy): room air (08/24/19 0819) Vital Signs (24h Range):  Temp:  [98 °F (36.7 °C)-98.3 °F (36.8 °C)] 98.1 °F (36.7 °C)  Pulse:  [70-92] 71  Resp:  [18] 18  SpO2:  [93 %-99 %] 99 %  BP: (125-202)/(61-99) 171/83     Weight: 108.9 kg (240 lb) (08/23/19 0933)  Body mass index is 38.74 kg/m².  Body surface area is 2.25 meters squared.      Intake/Output Summary (Last 24 hours) at 8/24/2019 1110  Last data filed at 8/24/2019 0500  Gross per 24 hour   Intake --    Output 2400 ml   Net -2400 ml       Physical Exam   Constitutional: She is oriented to person, place, and time and well-developed, well-nourished, and in no distress. No distress.   HENT:   Head: Normocephalic and atraumatic.   Mouth/Throat: Oropharynx is clear and moist. No oropharyngeal exudate.   Eyes: Conjunctivae and EOM are normal. Pupils are equal, round, and reactive to light. No scleral icterus.   Neck: Normal range of motion.   Cardiovascular: Normal rate, regular rhythm and normal heart sounds.    Pulmonary/Chest: Effort normal.     Crackles in bases, much improved   Abdominal: Soft. She exhibits no distension. There is no tenderness.   Lymphadenopathy:     She has no cervical adenopathy.   Neurological: She is alert and oriented to person, place, and time.   Skin: Skin is warm and dry.     Few erythematous spots on upper ext and abd    Musculoskeletal: Normal range of motion.                     Significant Labs:  BMP: No results for input(s): GLU, NA, K, CL, CO2, BUN, CREATININE, CALCIUM, MG in the last 24 hours.  CBC: No results for input(s): WBC, HGB, HCT, PLT in the last 24 hours.    Significant Imaging:  Imaging results within the past 24 hours have been reviewed.

## 2019-08-24 NOTE — ASSESSMENT & PLAN NOTE
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 She is unable to get her rituximab as outpatient due to lack of insurance and is in active flare with anasarca, palpable purpura, and shortness of breath. Renal function, liver function, and CBC were stable on 8/22/2019 but we suspect that her nephrotic syndrome has worsened given her anasarca.   Current labs showing stable count (plts 217), renal function with Cr of 0.9, sed rate and crp WNL, C3 normal, C4 low, UA with 3+ protein, 7RBC, 3 WBC, rare yeast, UPC inceased from 7.02 to 9.83 cryo pending    Recommendations:     - s/p Rituxan 1g on 8/23 (this was her second dose)   - Continue prednisone 40mg, entecavir, dapsone for PCP prophylaxis  - Ok to discharge once her fluid status and BP are under better control   - she is to follow up at [facility] for Rheumatology follow up. Number to make appt provided to patient.

## 2019-08-24 NOTE — H&P
[facility] Hospital Medicine  History & Physical    Patient Name: Kendy Vital  MRN: [id number]  Admission Date: 8/23/2019  Attending Physician: Atiya Cao*   Primary Care Provider: To Obtain Unable    [facility] Medicine Team: [facility] HOSP MED A Atiya Cao MD     Patient information was obtained from patient and ER records.     Subjective:     Principal Problem:Cryoglobulinemic vasculitis    Chief Complaint:   Chief Complaint   Patient presents with    Leg Swelling     c/o swelling to bilateral lower extremities and high BP for 6 days, seen by clinic today and told to come to ED        HPI: Ms. 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  Currently, she complains of shortness of breath with minimal exertion, fatigue, left arm weakness, significant swelling all over her body coupled with 20 lb weight gain, a purpuric rash over her abdomen, arms, and legs, and a non-productive cough. Patient endorsed swelling of her bilateral lower legs, abdomen and face over the past 3-4 days. She was seen at [facility] for the same complaint 8/21. She was given IV lasix with improvement. She endorses fatigue and oliguria, denies dysuria, chest pain, fevers/chills.     Notably, she was recently admitted to the hospital 7/25, transferred from [facility] for thrombocytopenia, renal failure, and pneumonia. She presented with fevers, chills, SOB. She was found to have an LACI with Cr to 2.9, plt of 33, ADAMST WNL, low C3 and C4, hep B+, elevated , kappa: lamda free light chain increased, neg ANCA/MPO/PR3. Cryo positive. Renal biopsy on 7/23 showing diffuse proliferative glomerulonephritis due to cryoglobulinemic vasculitis and bone marrow biopsy on 7/24  showing B cell lymphoproliferative disorder.  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 Due to concern for possible hematologic malignancy, the patient had a bone marrow biopsy on 7/24 which revealed a B-cell lymphoproliferative disorder.  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 She received entecavir for hepatitis B, 3 days of pulse steroids, plasmapheresis, IVIg, and rituximab x 1 on 8/6. The plan on discharge was to continue prednisone 50mg + dapsone for PCP prophylaxis for 2 weeks (end date 8/16), then taper to 40mg daily until follow up. At presentation, she was still on prednisone 40mg. She was supposed to get her second rituximab dose on 8/20 but was not able to get prior authorization because she did not have insurance.     In the ED, she was hypertensive, SBP highest at 200, otherwise hemodynamically stable. Labs showed stable counts (plts 217), renal function with Cr of 0.9, sed rate and crp WNL, C3 normal, C4 low, proteinurea (1015 mg/dl, up from 330 one month ago). Cryo was pending.    Past Medical History:   Diagnosis Date    Renal vein thrombosis 2015    s/p embolectomy and lovenox for 9 mos    Uterine leiomyoma     s/p resection       History reviewed. No pertinent surgical history.    Review of patient's allergies indicates:  No Known Allergies    No current facility-administered medications on file prior to encounter.      Current Outpatient Medications on File Prior to Encounter   Medication Sig    blood sugar diagnostic Strp use to test blood gluocse 2 (two) times daily with meals.    blood-glucose meter Misc Use as instructed    calcium carbonate (OS-DONTE) 500 mg  "calcium (1,250 mg) tablet Take 2 tablets (1,000 mg total) by mouth once daily.    carvedilol (COREG) 25 MG tablet Take 1 tablet (25 mg total) by mouth 2 (two) times daily.    cloNIDine (CATAPRES) 0.1 MG tablet Take 1 tablet (0.1 mg total) by mouth 3 (three) times daily.    dapsone 100 MG Tab Take 1 tablet (100 mg total) by mouth once daily.    entecavir (BARACLUDE) 0.5 MG Tab Take 1 tablet (0.5 mg total) by mouth once daily.    ferrous sulfate 325 (65 FE) MG EC tablet Take 1 tablet (325 mg total) by mouth once daily.    hydrALAZINE (APRESOLINE) 100 MG tablet Take 1 tablet (100 mg total) by mouth every 8 (eight) hours.    insulin  unit/mL injection Inject 6 Units into the skin 2 (two) times daily before meals.    insulin syringe-needle U-100 0.3 mL 30 gauge x 5/16" Syrg 1 each by Misc.(Non-Drug; Combo Route) route 2 (two) times daily with meals.    lancets 30 gauge Misc use to test blood glucose 2 (two) times daily with meals.    lancing device Misc 1 Device by Misc.(Non-Drug; Combo Route) route 2 (two) times daily with meals.    NIFEdipine (PROCARDIA-XL) 90 MG (OSM) 24 hr tablet Take 1 tablet (90 mg total) by mouth once daily.    pantoprazole (PROTONIX) 40 MG tablet Take 1 tablet (40 mg total) by mouth before breakfast.    predniSONE (DELTASONE) 10 MG tablet Take 5 tablets (50 mg total) by mouth once daily for 9 days, THEN 4 tablets (40 mg total) once daily.    sodium bicarbonate 650 MG tablet Take 1 tablet (650 mg total) by mouth 3 (three) times daily.    vitamin D (VITAMIN D3) 1000 units Tab Take 1 tablet (1,000 Units total) by mouth once daily.     Family History     None        Tobacco Use    Smoking status: Former Smoker    Smokeless tobacco: Never Used   Substance and Sexual Activity    Alcohol use: Yes     Frequency: Monthly or less    Drug use: Never    Sexual activity: Not on file     Review of Systems   Constitutional: Positive for fatigue. Negative for chills and fever.   HENT: " Positive for nosebleeds. Negative for mouth sores, trouble swallowing and voice change.    Eyes: Negative for pain and redness.   Respiratory: Positive for cough and shortness of breath.    Cardiovascular: Positive for leg swelling.   Gastrointestinal: Positive for constipation. Negative for abdominal pain, diarrhea, nausea and vomiting.   Genitourinary: Positive for difficulty urinating. Negative for dysuria.   Musculoskeletal: Positive for myalgias. Negative for arthralgias.   Neurological: Negative for headaches.   Hematological: Bruises/bleeds easily.   Psychiatric/Behavioral: Negative for agitation, behavioral problems and confusion.     Objective:     Vital Signs (Most Recent):  Temp: 98.3 °F (36.8 °C) (08/23/19 1839)  Pulse: 78 (08/23/19 1929)  Resp: 20 (08/23/19 0933)  BP: (!) 180/86 (08/23/19 1929)  SpO2: 96 % (08/23/19 1929) Vital Signs (24h Range):  Temp:  [98.1 °F (36.7 °C)-98.3 °F (36.8 °C)] 98.3 °F (36.8 °C)  Pulse:  [70-92] 78  Resp:  [20] 20  SpO2:  [96 %-98 %] 96 %  BP: (163-202)/(78-97) 180/86     Weight: 108.9 kg (240 lb)  Body mass index is 38.74 kg/m².    Physical Exam   Constitutional: She is oriented to person, place, and time. She appears well-developed and well-nourished. No distress.   Obese   HENT:   Head: Normocephalic and atraumatic.   Mouth/Throat: No oropharyngeal exudate.   Eyes: Pupils are equal, round, and reactive to light. Conjunctivae and EOM are normal. No scleral icterus.   Neck: Normal range of motion. Neck supple. No tracheal deviation present. No thyromegaly present.   Cardiovascular: Normal rate, regular rhythm, normal heart sounds and intact distal pulses.   No murmur heard.  Pulmonary/Chest: Effort normal. No respiratory distress. She has no wheezes. She has rales (bilaterally). She exhibits no tenderness.   Abdominal: Soft. Bowel sounds are normal. She exhibits no distension. There is no tenderness. There is no rebound and no guarding.   Musculoskeletal: Normal range of  motion. She exhibits edema. She exhibits no tenderness.   Anasarca   Lymphadenopathy:     She has no cervical adenopathy.   Neurological: She is alert and oriented to person, place, and time.   Skin: Skin is warm and dry. No rash noted. She is not diaphoretic. No erythema. No pallor.   Scattered small ecchymoses bilat UE, trunk   Psychiatric: She has a normal mood and affect. Her behavior is normal. Judgment and thought content normal.         CRANIAL NERVES     CN III, IV, VI   Pupils are equal, round, and reactive to light.  Extraocular motions are normal.        Significant Labs:   CBC:   Recent Labs   Lab 08/22/19  1603 08/23/19  1035   WBC 5.88 6.02   HGB 9.7* 9.3*   HCT 32.3* 31.7*    217     CMP:   Recent Labs   Lab 08/22/19  1603 08/23/19  1035    142   K 3.8 3.5    111*   CO2 21* 21*   * 181*   BUN 29* 22*   CREATININE 1.2 0.9   CALCIUM 8.9 9.2   PROT 5.7* 5.1*   ALBUMIN 3.1* 2.7*   BILITOT 0.6 0.6   ALKPHOS 89 78   AST 15 11   ALT 26 20   ANIONGAP 11 10   EGFRNONAA 52.1* >60.0     Coagulation: No results for input(s): PT, INR, APTT in the last 48 hours.    Significant Imaging: I have reviewed and interpreted all pertinent imaging results/findings within the past 24 hours.    Assessment/Plan:     * Cryoglobulinemic vasculitis  Hypogammaglobulinemia  Immunosuppression  Acute proliferative glomerulonephritis  Referred to ED from Rheum clinic for admission and further workup/managment. Recs as follows:    - Admit for rituximab 1000mg x 1 dose. Previously consented for rituximab on last admission with consent in [place].   - Will need to check immunoglobulins before next rituximab dose, patient may need additional IVIg.  - Check CBC, CMP, IgG, IgM, IgA, urinalysis, UPC, ESR, CRP, C3, C4, cryoglobulins (ordered and pending)  - Continue prednisone 40mg, entecavir, dapsone for PCP prophylaxis  - Would recommend Nephrology and Rheumatology inpatient consult  - She was seen by Heme/Onc on  8/22/2019. Per Heme/Onc skeletal survey showed no lytic lesions, the patient has no new renal failure, and there is no indication for lymphoma treatment at this time.     Rheumatology consulted; appreciate further recommendations.   Continue supportive care.   Nephrology consulted for glomerulonephritis and nephrotic syndrome. Rpt UPCR pending.     Steroid-induced hyperglycemia  Home insulin regimen is unclear. MAR reports 6u NPH BID w/ meals. Patient, however, reports using 8u basal insulin (not listed) and 5 units of regular insulin with each meal. My impression upon questioning her is she is not a reliable historian and may not be very compliant with her insulin at all.   Check A1c in AM.   Started prandial insulin 5U TID +LD SSI TID AC/HS. Titrate accordingly.  Diabetic diet.     Chronic hepatitis B  LFTs mildly elevated, overall improved.   Continue Etecavir.     Acute (diffuse) proliferative glomerulonephritis  Anasarca  ESR/CRP unremarkable. Albumin 2.7. Urine PCR pending.   Nephrology consulted; appreciate recs.   IV lasix 40 mg BID started.   Rituxan per Rheumatology.  Daily weights.  Strict I/Os.     Essential hypertension  Hypertensive Urgency  Home medications consist of Coreg 25mg BID, clonidine .1 mg TID, hydralazine 100 mg q8h, nifedipine 90 mg daily.   Severely HTN on admission, systolic >200s. Home medications resumed. Will titrate accordingly.       VTE Risk Mitigation (From admission, onward)        Ordered     heparin, porcine (PF) 100 unit/mL injection flush 500 Units  As needed (PRN)      08/23/19 1840     IP VTE HIGH RISK PATIENT  Once      08/23/19 1350     Place RAY hose  Until discontinued      08/23/19 1351     Place sequential compression device  Until discontinued      08/23/19 1351             Atiya Cao MD  Department of Hospital Medicine   [facility]

## 2019-08-24 NOTE — HPI
Ms. 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  Currently, she complains of shortness of breath with minimal exertion, fatigue, left arm weakness, significant swelling all over her body coupled with 20 lb weight gain, a purpuric rash over her abdomen, arms, and legs, and a non-productive cough. Patient endorsed swelling of her bilateral lower legs, abdomen and face over the past 3-4 days. She was seen at [facility] for the same complaint 8/21. She was given IV lasix with improvement. She endorses fatigue and oliguria, denies dysuria, chest pain, fevers/chills.     Notably, she was recently admitted to the hospital 7/25, transferred from [facility] for thrombocytopenia, renal failure, and pneumonia. She presented with fevers, chills, SOB. She was found to have an LACI with Cr to 2.9, plt of 33, ADAMST WNL, low C3 and C4, hep B+, elevated , kappa: lamda free light chain increased, neg ANCA/MPO/PR3. Cryo positive. 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 Due to concern for possible hematologic malignancy, the patient had a bone marrow biopsy on 7/24 which revealed a B-cell lymphoproliferative disorder.  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 She received entecavir for  hepatitis B, 3 days of pulse steroids, plasmapheresis, IVIg, and rituximab x 1 on 8/6. The plan on discharge was to continue prednisone 50mg + dapsone for PCP prophylaxis for 2 weeks (end date 8/16), then taper to 40mg daily until follow up. At presentation, she was still on prednisone 40mg. She was supposed to get her second rituximab dose on 8/20 but was not able to get prior authorization because she did not have insurance.     In the ED, she was hypertensive, SBP highest at 200, otherwise hemodynamically stable. Labs showed stable counts (plts 217), renal function with Cr of 0.9, sed rate and crp WNL, C3 normal, C4 low, proteinurea (1015 mg/dl, up from 330 one month ago). Cryo was pending.

## 2019-08-24 NOTE — NURSING
Spoke with Dr. HORACE Washburn notified of pt arrival to unit. requested order for benadryl for premedication. Received ok to give rituximab as ordered. Pt stable.

## 2019-08-24 NOTE — PROGRESS NOTES
Patient arrived to floor from ED via stretcher. Patient is awake, alert, and oriented. Skin is warm and dry. Pulses are present in all extremities. Neurovascularly intact. Some crackles noted to lung fields. Abdomen is soft and nontender with hypoactive bowel sounds. Ambulates without assistance. Marcial catheter in place draining clear yellow urine. Patient is waiting for infusion to be initiated. Oriented to environment. Will continue to monitor.

## 2019-08-24 NOTE — PROGRESS NOTES
[facility]  Rheumatology  Progress Note    Patient Name: Kendy Vital  MRN: [id number]  Admission Date: 8/23/2019  Hospital Length of Stay: 1 days  Code Status: Full Code   Attending Provider: Atiya Cao*  Primary Care Physician: To Obtain Unable  Principal Problem: Cryoglobulinemic vasculitis    Subjective:     HPI: Ms. 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 Due to concern for possible hematologic malignancy, the patient had a bone marrow biopsy on 7/24 which revealed a B-cell lymphoproliferative disorder.  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 She received entecavir for hepatitis B, 3 days of pulse steroids, plasmapheresis, IVIg, and rituximab x 1 on 8/6     The plan on discharge was to continue prednisone 50mg + dapsone for PCP prophylaxis for 2 weeks (end date 8/16), then taper to 40mg daily until follow up. She is currently still on prednisone 40mg. She was supposed to get her second rituximab dose on 8/20 but was not able to get prior authorization because she does not have insurance. Currently, she complains of shortness of breath with minimal exertion, fatigue, left arm weakness, significant swelling all over her body, a purpuric rash over her abdomen, arms, and legs, and a cough.     Regarding prior hospitalization, [place] [facility] [place] on 7/25. She was initial in for fever, chills,  dry cough, shortness of breath, nose bleeds, orthopnea. She was found to have an LACI with Cr to 2.9, plt of 33, ADAMST WNL, low C3 and C4, hep B+, elevated , kappa: lamda free light chain increased, neg ANCA/MPO/PR3. Cryo positive. 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     Interval History: Patient states her breathing is much better today. She has had 2.4L UOP     Current Facility-Administered Medications   Medication Frequency    acetaminophen tablet 650 mg Q8H PRN    acetaminophen tablet 650 mg 1 time in Clinic/HOD    alteplase injection 2 mg PRN    calcium carbonate (OS-DONTE) tablet 500 mg Daily    carvedilol tablet 25 mg BID    cloNIDine tablet 0.1 mg TID    dapsone tablet 100 mg Daily    diphenhydrAMINE capsule 25 mg 1 time in Clinic/HOD    entecavir tablet 0.5 mg Daily    ferrous sulfate EC tablet 325 mg Daily    furosemide injection 40 mg BID    glucagon (human recombinant) injection 1 mg PRN    glucose chewable tablet 16 g PRN    glucose chewable tablet 24 g PRN    heparin, porcine (PF) 100 unit/mL injection flush 500 Units PRN    hydrALAZINE tablet 100 mg Q8H    insulin aspart U-100 pen 1-10 Units QID (AC + HS) PRN    insulin aspart U-100 pen 5 Units TIDWM    NIFEdipine 24 hr tablet 90 mg Daily    pantoprazole EC tablet 40 mg Before breakfast    predniSONE tablet 40 mg Daily    sodium bicarbonate tablet 650 mg TID    sodium chloride 0.9% flush 10 mL PRN    sodium chloride 0.9% flush 10 mL PRN    sodium chloride 0.9% flush 10 mL PRN    vitamin D 1000 units tablet 1,000 Units Daily     Objective:     Vital Signs (Most Recent):  Temp: 98.1 °F (36.7 °C) (08/24/19 0819)  Pulse: 71 (08/24/19 0819)  Resp: 18 (08/24/19 0819)  BP: (!) 171/83 (08/24/19 0819)  SpO2: 99 % (08/24/19 0819)  O2 Device (Oxygen Therapy): room air (08/24/19 0819) Vital  Signs (24h Range):  Temp:  [98 °F (36.7 °C)-98.3 °F (36.8 °C)] 98.1 °F (36.7 °C)  Pulse:  [70-92] 71  Resp:  [18] 18  SpO2:  [93 %-99 %] 99 %  BP: (125-202)/(61-99) 171/83     Weight: 108.9 kg (240 lb) (08/23/19 0933)  Body mass index is 38.74 kg/m².  Body surface area is 2.25 meters squared.      Intake/Output Summary (Last 24 hours) at 8/24/2019 1110  Last data filed at 8/24/2019 0500  Gross per 24 hour   Intake --   Output 2400 ml   Net -2400 ml       Physical Exam   Constitutional: She is oriented to person, place, and time and well-developed, well-nourished, and in no distress. No distress.   HENT:   Head: Normocephalic and atraumatic.   Mouth/Throat: Oropharynx is clear and moist. No oropharyngeal exudate.   Eyes: Conjunctivae and EOM are normal. Pupils are equal, round, and reactive to light. No scleral icterus.   Neck: Normal range of motion.   Cardiovascular: Normal rate, regular rhythm and normal heart sounds.    Pulmonary/Chest: Effort normal.     Crackles in bases, much improved   Abdominal: Soft. She exhibits no distension. There is no tenderness.   Lymphadenopathy:     She has no cervical adenopathy.   Neurological: She is alert and oriented to person, place, and time.   Skin: Skin is warm and dry.     Few erythematous spots on upper ext and abd    Musculoskeletal: Normal range of motion.                     Significant Labs:  BMP: No results for input(s): GLU, NA, K, CL, CO2, BUN, CREATININE, CALCIUM, MG in the last 24 hours.  CBC: No results for input(s): WBC, HGB, HCT, PLT in the last 24 hours.    Significant Imaging:  Imaging results within the past 24 hours have been reviewed.    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 She is unable to get her rituximab  as outpatient due to lack of insurance and is in active flare with anasarca, palpable purpura, and shortness of breath. Renal function, liver function, and CBC were stable on 8/22/2019 but we suspect that her nephrotic syndrome has worsened given her anasarca.   Current labs showing stable count (plts 217), renal function with Cr of 0.9, sed rate and crp WNL, C3 normal, C4 low, UA with 3+ protein, 7RBC, 3 WBC, rare yeast, UPC inceased from 7.02 to 9.83 cryo pending    Recommendations:     - s/p Rituxan 1g on 8/23 (this was her second dose)   - Continue prednisone 40mg, entecavir, dapsone for PCP prophylaxis  - Ok to discharge once her fluid status and BP are under better control   - she is to follow up at [facility] for Rheumatology follow up. Number to make appt provided to patient.         We will sign off at this time.      Magdalena Da Silva MD  Rheumatology  [facility]

## 2019-08-24 NOTE — CONSULTS
[facility]  Rheumatology  Consult Note    Patient Name: Kendy Vital  MRN: [id number]  Admission Date: 8/23/2019  Hospital Length of Stay: 0 days  Code Status: Prior   Attending Provider: Atiya Cao*  Primary Care Physician: To Obtain Unable  Principal Problem:Cryoglobulinemic vasculitis    Consults  Subjective:     HPI: Ms. 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 Due to concern for possible hematologic malignancy, the patient had a bone marrow biopsy on 7/24 which revealed a B-cell lymphoproliferative disorder.  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 She received entecavir for hepatitis B, 3 days of pulse steroids, plasmapheresis, IVIg, and rituximab x 1 on 8/6     The plan on discharge was to continue prednisone 50mg + dapsone for PCP prophylaxis for 2 weeks (end date 8/16), then taper to 40mg daily until follow up. She is currently still on prednisone 40mg. She was supposed to get her second rituximab dose on 8/20 but was not able to get prior authorization because she does not have insurance. Currently, she complains of shortness of breath with minimal exertion, fatigue, left arm weakness, significant swelling all over her body, a purpuric rash over her abdomen, arms, and legs, and a cough.     Regarding prior hospitalization, [place] [facility] [place] of care on 7/25. She was initial in for fever,  chills, dry cough, shortness of breath, nose bleeds, orthopnea. She was found to have an LACI with Cr to 2.9, plt of 33, ADAMST WNL, low C3 and C4, hep B+, elevated , kappa: lamda free light chain increased, neg ANCA/MPO/PR3. Cryo positive. 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     Past Medical History:   Diagnosis Date    Renal vein thrombosis 2015    s/p embolectomy and lovenox for 9 mos    Uterine leiomyoma     s/p resection       History reviewed. No pertinent surgical history.    Immunization History   Administered Date(s) Administered    Hepatitis A, Pediatric/Adolescent, 2 Dose 07/26/2019    Pneumococcal Conjugate - 13 Valent 07/26/2019    Td - PF (ADULT) 07/26/2019       Review of patient's allergies indicates:  No Known Allergies  Current Facility-Administered Medications   Medication Frequency    acetaminophen tablet 650 mg Q8H PRN    acetaminophen tablet 650 mg 1 time in Clinic/HOD    alteplase injection 2 mg PRN    [START ON 8/24/2019] calcium carbonate (OS-DONTE) tablet 500 mg Daily    carvedilol tablet 25 mg BID    cloNIDine tablet 0.1 mg TID    [START ON 8/24/2019] dapsone tablet 100 mg Daily    dextrose 10% (D10W) Bolus PRN    dextrose 10% (D10W) Bolus PRN    diphenhydrAMINE capsule 25 mg 1 time in Clinic/HOD    [START ON 8/24/2019] entecavir tablet 0.5 mg Daily    famotidine tablet 20 mg 1 time in Clinic/HOD    ferrous sulfate EC tablet 325 mg Daily    furosemide injection 40 mg BID    glucose chewable tablet 16 g PRN    glucose chewable tablet 24 g PRN    heparin, porcine (PF) 100 unit/mL injection flush 500 Units PRN    hydrALAZINE tablet 100 mg Q8H    [START ON 8/24/2019] insulin aspart U-100 pen 5 Units TIDWM    [START ON 8/24/2019] insulin detemir U-100 pen 8 Units Daily    methylPREDNISolone sodium succinate injection 100 mg  "1 time in Clinic/HOD    [START ON 8/24/2019] NIFEdipine 24 hr tablet 90 mg Daily    [START ON 8/24/2019] pantoprazole EC tablet 40 mg Before breakfast    [START ON 8/24/2019] predniSONE tablet 40 mg Daily    riTUXimab (RITUXAN) 1,000 mg in sodium chloride 0.9% 1,000 mL infusion (conc: 1 mg/mL) 1 time in Clinic/HOD    sodium bicarbonate tablet 650 mg TID    sodium chloride 0.9% 250 mL flush bag 1 time in Clinic/HOD    sodium chloride 0.9% flush 10 mL PRN    sodium chloride 0.9% flush 10 mL PRN    vitamin D 1000 units tablet 1,000 Units Daily     Current Outpatient Medications   Medication    blood sugar diagnostic Strp    blood-glucose meter Misc    calcium carbonate (OS-DONTE) 500 mg calcium (1,250 mg) tablet    carvedilol (COREG) 25 MG tablet    cloNIDine (CATAPRES) 0.1 MG tablet    dapsone 100 MG Tab    entecavir (BARACLUDE) 0.5 MG Tab    ferrous sulfate 325 (65 FE) MG EC tablet    hydrALAZINE (APRESOLINE) 100 MG tablet    insulin  unit/mL injection    insulin syringe-needle U-100 0.3 mL 30 gauge x 5/16" Syrg    lancets 30 gauge Misc    lancing device Misc    NIFEdipine (PROCARDIA-XL) 90 MG (OSM) 24 hr tablet    pantoprazole (PROTONIX) 40 MG tablet    predniSONE (DELTASONE) 10 MG tablet    sodium bicarbonate 650 MG tablet    vitamin D (VITAMIN D3) 1000 units Tab     Family History     None        Tobacco Use    Smoking status: Former Smoker    Smokeless tobacco: Never Used   Substance and Sexual Activity    Alcohol use: Yes     Frequency: Monthly or less    Drug use: Never    Sexual activity: Not on file     Review of Systems   Constitutional: Negative for chills and fever.   HENT: Positive for nosebleeds. Negative for mouth sores.    Eyes: Negative for pain and redness.   Respiratory: Positive for shortness of breath. Negative for cough.    Cardiovascular: Positive for leg swelling.   Gastrointestinal: Positive for constipation. Negative for abdominal pain and diarrhea. "   Genitourinary: Negative for dysuria.   Musculoskeletal: Negative for arthralgias.   Neurological: Negative for headaches.     Objective:     Vital Signs (Most Recent):  Temp: 98.3 °F (36.8 °C) (08/23/19 1839)  Pulse: 92 (08/23/19 1830)  Resp: 20 (08/23/19 0933)  BP: (!) 193/88 (08/23/19 1830)  SpO2: 96 % (08/23/19 1829)  O2 Device (Oxygen Therapy): room air (08/23/19 0933) Vital Signs (24h Range):  Temp:  [98.1 °F (36.7 °C)-98.3 °F (36.8 °C)] 98.3 °F (36.8 °C)  Pulse:  [70-92] 92  Resp:  [20] 20  SpO2:  [96 %-98 %] 96 %  BP: (163-202)/(78-97) 193/88     Weight: 108.9 kg (240 lb) (08/23/19 0933)  Body mass index is 38.74 kg/m².  Body surface area is 2.25 meters squared.      Intake/Output Summary (Last 24 hours) at 8/23/2019 1932  Last data filed at 8/23/2019 1634  Gross per 24 hour   Intake --   Output 500 ml   Net -500 ml       Physical Exam   Constitutional: She is oriented to person, place, and time. She appears distressed.   HENT:   Head: Normocephalic and atraumatic.   Mouth/Throat: Oropharynx is clear and moist. No oropharyngeal exudate.   Eyes: Conjunctivae and EOM are normal. Pupils are equal, round, and reactive to light. No scleral icterus.   Neck: Normal range of motion.   Cardiovascular: Normal rate, regular rhythm and normal heart sounds.    Pulmonary/Chest: Effort normal.   Decreased breath sounds in bases,   Crackles    Abdominal: Soft. She exhibits no distension. There is no tenderness.   Lymphadenopathy:     She has no cervical adenopathy.   Neurological: She is alert and oriented to person, place, and time.   Skin: Skin is warm and dry.     Few erythematous spots on upper ext and abd    Musculoskeletal: Normal range of motion.                     Significant Labs:  BMP:   Recent Labs   Lab 08/23/19  1035   *      K 3.5   *   CO2 21*   BUN 22*   CREATININE 0.9   CALCIUM 9.2     CBC:   Recent Labs   Lab 08/23/19  1035   WBC 6.02   HGB 9.3*   HCT 31.7*          Significant  Imaging:  Imaging results within the past 24 hours have been reviewed.    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 She is unable to get her rituximab as outpatient due to lack of insurance and is in active flare with anasarca, palpable purpura, and shortness of breath. Renal function, liver function, and CBC were stable on 8/22/2019 but we suspect that her nephrotic syndrome has worsened given her anasarca.   Current labs showing stable count (plts 217), renal function with Cr of 0.9, sed rate and crp WNL, C3 normal, C4 low, awaiting UA and UPC, cryo pending    Recommendations:     - Admit for rituximab 1000mg x 1 dose. Orders released  - Continue prednisone 40mg, entecavir, dapsone for PCP prophylaxis  - please consider diuresis              Thank you for your consult. I will follow-up with patient. Please contact us if you have any additional questions.    Magdalena Da Silva MD  Rheumatology  [facility]

## 2019-08-24 NOTE — ED NOTES
Telemetry Verification   Patient placed on Telemetry Box  Verified with War Room  Box #    Monitor Tech    Rate 66   Rhythm NSR

## 2019-08-24 NOTE — ED NOTES
RN discussed with receiving RN late medications.  Admitting Md was waiting to have medications administered on the floor and were released when it was thought the pt was going to the room.

## 2019-08-24 NOTE — ASSESSMENT & PLAN NOTE
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 She is unable to get her rituximab as outpatient due to lack of insurance and is in active flare with anasarca, palpable purpura, and shortness of breath. Renal function, liver function, and CBC were stable on 8/22/2019 but we suspect that her nephrotic syndrome has worsened given her anasarca.   Current labs showing stable count (plts 217), renal function with Cr of 0.9, sed rate and crp WNL, C3 normal, C4 low, awaiting UA and UPC, cryo pending    Recommendations:     - Admit for rituximab 1000mg x 1 dose. Orders released  - Continue prednisone 40mg, entecavir, dapsone for PCP prophylaxis  - please consider diuresis

## 2019-08-24 NOTE — HPI
Ms. 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 Due to concern for possible hematologic malignancy, the patient had a bone marrow biopsy on 7/24 which revealed a B-cell lymphoproliferative disorder.  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 She received entecavir for hepatitis B, 3 days of pulse steroids, plasmapheresis, IVIg, and rituximab x 1 on 8/6     The plan on discharge was to continue prednisone 50mg + dapsone for PCP prophylaxis for 2 weeks (end date 8/16), then taper to 40mg daily until follow up. She is currently still on prednisone 40mg. She was supposed to get her second rituximab dose on 8/20 but was not able to get prior authorization because she does not have insurance. Currently, she complains of shortness of breath with minimal exertion, fatigue, left arm weakness, significant swelling all over her body, a purpuric rash over her abdomen, arms, and legs, and a cough.     Regarding prior hospitalization, [place] [facility] [place] of care on 7/25. She was initial in for fever, chills, dry cough, shortness of breath, nose bleeds, orthopnea. She was found to have an LACI with Cr to 2.9, plt of 33, ADAMST WNL, low C3 and C4, hep B+, elevated , kappa: lamda free light chain increased, neg ANCA/MPO/PR3. Cryo positive. 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

## 2019-08-24 NOTE — ED NOTES
Second attempt to give report and  stated they are in report and can not take at this time.  Name and spectra link provided.

## 2019-08-24 NOTE — SUBJECTIVE & OBJECTIVE
Past Medical History:   Diagnosis Date    Renal vein thrombosis 2015    s/p embolectomy and lovenox for 9 mos    Uterine leiomyoma     s/p resection       History reviewed. No pertinent surgical history.    Immunization History   Administered Date(s) Administered    Hepatitis A, Pediatric/Adolescent, 2 Dose 07/26/2019    Pneumococcal Conjugate - 13 Valent 07/26/2019    Td - PF (ADULT) 07/26/2019       Review of patient's allergies indicates:  No Known Allergies  Current Facility-Administered Medications   Medication Frequency    acetaminophen tablet 650 mg Q8H PRN    acetaminophen tablet 650 mg 1 time in Clinic/HOD    alteplase injection 2 mg PRN    [START ON 8/24/2019] calcium carbonate (OS-DONTE) tablet 500 mg Daily    carvedilol tablet 25 mg BID    cloNIDine tablet 0.1 mg TID    [START ON 8/24/2019] dapsone tablet 100 mg Daily    dextrose 10% (D10W) Bolus PRN    dextrose 10% (D10W) Bolus PRN    diphenhydrAMINE capsule 25 mg 1 time in Clinic/HOD    [START ON 8/24/2019] entecavir tablet 0.5 mg Daily    famotidine tablet 20 mg 1 time in Clinic/HOD    ferrous sulfate EC tablet 325 mg Daily    furosemide injection 40 mg BID    glucose chewable tablet 16 g PRN    glucose chewable tablet 24 g PRN    heparin, porcine (PF) 100 unit/mL injection flush 500 Units PRN    hydrALAZINE tablet 100 mg Q8H    [START ON 8/24/2019] insulin aspart U-100 pen 5 Units TIDWM    [START ON 8/24/2019] insulin detemir U-100 pen 8 Units Daily    methylPREDNISolone sodium succinate injection 100 mg 1 time in Clinic/HOD    [START ON 8/24/2019] NIFEdipine 24 hr tablet 90 mg Daily    [START ON 8/24/2019] pantoprazole EC tablet 40 mg Before breakfast    [START ON 8/24/2019] predniSONE tablet 40 mg Daily    riTUXimab (RITUXAN) 1,000 mg in sodium chloride 0.9% 1,000 mL infusion (conc: 1 mg/mL) 1 time in Clinic/HOD    sodium bicarbonate tablet 650 mg TID    sodium chloride 0.9% 250 mL flush bag 1 time in Clinic/HOD     "sodium chloride 0.9% flush 10 mL PRN    sodium chloride 0.9% flush 10 mL PRN    vitamin D 1000 units tablet 1,000 Units Daily     Current Outpatient Medications   Medication    blood sugar diagnostic Strp    blood-glucose meter Misc    calcium carbonate (OS-DONTE) 500 mg calcium (1,250 mg) tablet    carvedilol (COREG) 25 MG tablet    cloNIDine (CATAPRES) 0.1 MG tablet    dapsone 100 MG Tab    entecavir (BARACLUDE) 0.5 MG Tab    ferrous sulfate 325 (65 FE) MG EC tablet    hydrALAZINE (APRESOLINE) 100 MG tablet    insulin  unit/mL injection    insulin syringe-needle U-100 0.3 mL 30 gauge x 5/16" Syrg    lancets 30 gauge Misc    lancing device Misc    NIFEdipine (PROCARDIA-XL) 90 MG (OSM) 24 hr tablet    pantoprazole (PROTONIX) 40 MG tablet    predniSONE (DELTASONE) 10 MG tablet    sodium bicarbonate 650 MG tablet    vitamin D (VITAMIN D3) 1000 units Tab     Family History     None        Tobacco Use    Smoking status: Former Smoker    Smokeless tobacco: Never Used   Substance and Sexual Activity    Alcohol use: Yes     Frequency: Monthly or less    Drug use: Never    Sexual activity: Not on file     Review of Systems   Constitutional: Negative for chills and fever.   HENT: Positive for nosebleeds. Negative for mouth sores.    Eyes: Negative for pain and redness.   Respiratory: Positive for shortness of breath. Negative for cough.    Cardiovascular: Positive for leg swelling.   Gastrointestinal: Positive for constipation. Negative for abdominal pain and diarrhea.   Genitourinary: Negative for dysuria.   Musculoskeletal: Negative for arthralgias.   Neurological: Negative for headaches.     Objective:     Vital Signs (Most Recent):  Temp: 98.3 °F (36.8 °C) (08/23/19 1839)  Pulse: 92 (08/23/19 1830)  Resp: 20 (08/23/19 0933)  BP: (!) 193/88 (08/23/19 1830)  SpO2: 96 % (08/23/19 1829)  O2 Device (Oxygen Therapy): room air (08/23/19 0933) Vital Signs (24h Range):  Temp:  [98.1 °F (36.7 °C)-98.3 " °F (36.8 °C)] 98.3 °F (36.8 °C)  Pulse:  [70-92] 92  Resp:  [20] 20  SpO2:  [96 %-98 %] 96 %  BP: (163-202)/(78-97) 193/88     Weight: 108.9 kg (240 lb) (08/23/19 0933)  Body mass index is 38.74 kg/m².  Body surface area is 2.25 meters squared.      Intake/Output Summary (Last 24 hours) at 8/23/2019 1932  Last data filed at 8/23/2019 1634  Gross per 24 hour   Intake --   Output 500 ml   Net -500 ml       Physical Exam   Constitutional: She is oriented to person, place, and time. She appears distressed.   HENT:   Head: Normocephalic and atraumatic.   Mouth/Throat: Oropharynx is clear and moist. No oropharyngeal exudate.   Eyes: Conjunctivae and EOM are normal. Pupils are equal, round, and reactive to light. No scleral icterus.   Neck: Normal range of motion.   Cardiovascular: Normal rate, regular rhythm and normal heart sounds.    Pulmonary/Chest: Effort normal.   Decreased breath sounds in bases,   Crackles    Abdominal: Soft. She exhibits no distension. There is no tenderness.   Lymphadenopathy:     She has no cervical adenopathy.   Neurological: She is alert and oriented to person, place, and time.   Skin: Skin is warm and dry.     Few erythematous spots on upper ext and abd    Musculoskeletal: Normal range of motion.                     Significant Labs:  BMP:   Recent Labs   Lab 08/23/19  1035   *      K 3.5   *   CO2 21*   BUN 22*   CREATININE 0.9   CALCIUM 9.2     CBC:   Recent Labs   Lab 08/23/19  1035   WBC 6.02   HGB 9.3*   HCT 31.7*          Significant Imaging:  Imaging results within the past 24 hours have been reviewed.

## 2019-08-27 ENCOUNTER — PATIENT OUTREACH (OUTPATIENT)
Dept: ADMINISTRATIVE | Facility: CLINIC | Age: 54
End: 2019-08-27

## 2019-08-27 ENCOUNTER — NURSE TRIAGE (OUTPATIENT)
Dept: ADMINISTRATIVE | Facility: CLINIC | Age: 54
End: 2019-08-27

## 2019-08-27 NOTE — DISCHARGE SUMMARY
[facility] Hospital Medicine  Discharge Summary      Patient Name: Kendy Vital  MRN: [id number]  Admission Date: 8/23/2019  Hospital Length of Stay: 1 days  Discharge Date and Time: 8/24/2019  1:39 PM  Attending Physician: No att. providers found   Discharging Provider: Atiya Cao MD  Primary Care Provider: To Obtain [facility]  [place]ospital Medicine Team: [facility] Atiya Cao MD    HPI:   Ms. 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  Currently, she complains of shortness of breath with minimal exertion, fatigue, left arm weakness, significant swelling all over her body coupled with 20 lb weight gain, a purpuric rash over her abdomen, arms, and legs, and a non-productive cough. Patient endorsed swelling of her bilateral lower legs, abdomen and face over the past 3-4 days. She was seen at [facility] for the same complaint 8/21. She was given IV lasix with improvement. She endorses fatigue and oliguria, denies dysuria, chest pain, fevers/chills.     Notably, she was recently admitted to the hospital 7/25, transferred from [facility] for thrombocytopenia, renal failure, and pneumonia. She presented with fevers, chills, SOB. She was found to have an LACI with Cr to 2.9, plt of 33, ADAMST WNL, low C3 and C4, hep B+, elevated , kappa: lamda free light chain increased, neg ANCA/MPO/PR3. Cryo positive. 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  Assitionally, she was +ve for hepatitis B, hypogammaglobulinemia, a B cell lymphoproliferative disorder, and diffuse proliferative glomerulonephritis due to  "cryoglobulinemic vasculitis. Due to concern for possible hematologic malignancy, the patient had a bone marrow biopsy on 7/24 which revealed a B-cell lymphoproliferative disorder.  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 She received entecavir for hepatitis B, 3 days of pulse steroids, plasmapheresis, IVIg, and rituximab x 1 on 8/6. The plan on discharge was to continue prednisone 50mg + dapsone for PCP prophylaxis for 2 weeks (end date 8/16), then taper to 40mg daily until follow up. At presentation, she was still on prednisone 40mg. She was supposed to get her second rituximab dose on 8/20 but was not able to get prior authorization because she did not have insurance.     In the ED, she was hypertensive, SBP highest at 200, otherwise hemodynamically stable. Labs showed stable counts (plts 217), renal function with Cr of 0.9, sed rate and crp WNL, C3 normal, C4 low, proteinurea (1015 mg/dl, up from 330 one month ago). Cryo was pending.    * No surgery found *      Hospital Course:   She was admitted to [facility] medicine A. Rheumatology and Nephrology were consulted for assistance. Her urine protein was high (1072 mg). Nephrology consulted; no new interventions. Recommending treatment for her cryo vasculitis. Rheumatology ordered Rituxan and patient tolerated infusion without any adverse effects. Her BP improved, but was still elevated upon discharge; patient did not wish to remain for further titration of her regimen stating "It's always that high". Discussed the risks of untreated, uncontrolled BP including end-organ damage (kidneys, heart, eyes, nerves), stroke and death. She reassured me that she would follow up with her PCP for further titration. Her home medication, as listed below was continued.     For details on mgmt of " IP problems, see below:   Cryoglobulinemic vasculitis  Hypogammaglobulinemia  Immunosuppression  Acute proliferative glomerulonephritis  Referred to ED from Rheum clinic for admission and further workup/managment. Recs as follows:    - Admit for rituximab 1000mg x 1 dose. Previously consented for rituximab on last admission with consent in [place].   - Will need to check immunoglobulins before next rituximab dose, patient may need additional IVIg.  - Check CBC, CMP, IgG (low; 30 mg/dl), IgM wnl, IgA wnl, urinalysis ++ protein, UPC 1072, ESR, CRP, C3 wnl, C4 (low), cryoglobulins (ordered and pending)  - Continue prednisone 40mg, entecavir, dapsone for PCP prophylaxis  - Would recommend Nephrology and Rheumatology inpatient consult  - She was seen by Heme/Onc on 8/22/2019. Per Heme/Onc skeletal survey showed no lytic lesions, the patient has no new renal failure, and there is no indication for lymphoma treatment at this time.      Rheumatology consulted; appreciate further recommendations. Rituxan infusion tolerated without incident. Recommended f/u in clinic.   Continue supportive care.   Nephrology consulted for glomerulonephritis and nephrotic syndrome. To follow up in clinic.      Steroid-induced hyperglycemia  Home insulin regimen is unclear. MAR reports 6u NPH BID w/ meals. Patient, however, reports using 8u basal insulin (not listed) and 5 units of regular insulin with each meal. My impression upon questioning her is she is not a reliable historian and may not be very compliant with her insulin at all.   A1c one month prior 5.7%  Started prandial insulin 5U TID +LD SSI TID AC/HS. Titrate accordingly.  Diabetic diet.   Discharged with MD SOTO and instructions/teaching. To keep BGL log and bring with her to her next PCP appt. Contact information provided to her for [facility] for establishment of care.        Chronic hepatitis B  LFTs mildly elevated, overall improved.   Continue Etecavir.      Acute (diffuse)  proliferative glomerulonephritis  Anasarca  ESR/CRP unremarkable. Albumin 2.7. Urine PCR pending.   Nephrology consulted; appreciate recs.   IV lasix 40 mg BID started.   Rituxan per Rheumatology.  Daily weights.  Strict I/Os.      Essential hypertension  Hypertensive Urgency  Home medications consist of Coreg 25mg BID, clonidine .1 mg TID, hydralazine 100 mg q8h, nifedipine 90 mg daily.   Severely HTN on admission, systolic >200s. Home medications resumed. Will titrate accordingly.  Clonidine titrated up with good response.   Patient did not wish to remain hospitalized for further titration of BP meds. Improved on discharge, but still needs tighter control given kidney disease. Discussed risks with patient, encouraged BP and sugar log and close f/u with her PCP.         Consults:   Consults (From admission, onward)        Status Ordering Provider     Inpatient consult to Rheumatology  Once     Provider:  (Not yet assigned)    KATIANA Arreola new Assessment & Plan notes have been filed under this hospital service since the last note was generated.  Service: Hospital Medicine    Final Active Diagnoses:    Diagnosis Date Noted POA    PRINCIPAL PROBLEM:  Cryoglobulinemic vasculitis [D89.1]  Yes    Thrombocytopenia [D69.6] 08/24/2019 Yes    Anasarca [R60.1] 08/23/2019 Yes    Hypogammaglobulinemia [D80.1] 08/05/2019 Yes    Iron deficiency anemia [D50.9] 07/30/2019 Yes    Steroid-induced hyperglycemia [R73.9, T38.0X5A] 07/28/2019 Yes    Immunosuppression [D89.9] 07/26/2019 Yes    Chronic hepatitis B [B18.1] 07/24/2019 Yes    Acute (diffuse) proliferative glomerulonephritis [N00.8]  Yes    Essential hypertension [I10] 07/19/2019 Yes      Problems Resolved During this Admission:       Discharged Condition: stable    Disposition: Home or Self Care    Follow Up:  Follow-up Information     [facility].    Why:  Needs PCP, rhematology follow up  Contact information:  [address] [facility] [id number]  [phone]                 Patient Instructions:   No discharge procedures on file.    Significant Diagnostic Studies: See MR    Pending Diagnostic Studies:     Procedure Component Value Units Date/Time    Cryoglobulin [[id number]] Collected:  08/23/19 1035    Order Status:  Sent Lab Status:  In process Updated:  08/23/19 1101    Specimen:  Blood          Medications:  Reconciled Home Medications:      Medication List      START taking these medications    insulin aspart U-100 100 unit/mL (3 mL) Inpn pen  Commonly known as:  NovoLOG  Inject 1-10 Units into the skin 3 (three) times daily. Use sliding scale provided in instructions        CHANGE how you take these medications    cloNIDine 0.1 MG tablet  Commonly known as:  CATAPRES  Take 2 tablets (0.2 mg total) by mouth 3 (three) times daily.  What changed:  how much to take     predniSONE 10 MG tablet  Commonly known as:  DELTASONE  Take 4 tablets (40 mg total) by mouth once daily.  What changed:  See the new instructions.        CONTINUE taking these medications    calcium carbonate 500 mg calcium (1,250 mg) tablet  Commonly known as:  OS-DONTE  Take 2 tablets (1,000 mg total) by mouth once daily.     carvedilol 25 MG tablet  Commonly known as:  COREG  Take 1 tablet (25 mg total) by mouth 2 (two) times daily.     dapsone 100 MG Tab  Take 1 tablet (100 mg total) by mouth once daily.     entecavir 0.5 MG Tab  Commonly known as:  BARACLUDE  Take 1 tablet (0.5 mg total) by mouth once daily.     ferrous sulfate 325 (65 FE) MG EC tablet  Take 1 tablet (325 mg total) by mouth once daily.     hydrALAZINE 100 MG tablet  Commonly known as:  APRESOLINE  Take 1 tablet (100 mg total) by mouth every 8 (eight) hours.     insulin syringe-needle U-100 0.3 mL 30 gauge x 5/16" Syrg  1 each by Misc.(Non-Drug; Combo Route) route 2 (two) times daily with meals.     lancing device Misc  1 Device by Misc.(Non-Drug; Combo Route) route 2 (two) times daily " with meals.     NIFEdipine 90 MG (OSM) 24 hr tablet  Commonly known as:  PROCARDIA-XL  Take 1 tablet (90 mg total) by mouth once daily.     pantoprazole 40 MG tablet  Commonly known as:  PROTONIX  Take 1 tablet (40 mg total) by mouth before breakfast.     sodium bicarbonate 650 MG tablet  Take 1 tablet (650 mg total) by mouth 3 (three) times daily.     TRUE METRIX GLUCOSE METER Misc  Generic drug:  blood-glucose meter  Use as instructed     TRUE METRIX GLUCOSE TEST STRIP Strp  Generic drug:  blood sugar diagnostic  use to test blood gluocse 2 (two) times daily with meals.     TRUEPLUS LANCETS 30 gauge Misc  Generic drug:  lancets  use to test blood glucose 2 (two) times daily with meals.     vitamin D 1000 units Tab  Commonly known as:  VITAMIN D3  Take 1 tablet (1,000 Units total) by mouth once daily.        STOP taking these medications    insulin  unit/mL injection            Indwelling Lines/Drains at time of discharge:   Lines/Drains/Airways          None          Time spent on the discharge of patient: 40 minutes  Patient was seen and examined on the date of discharge and determined to be suitable for discharge.         Atiya Cao MD  Department of Hospital Medicine  [facility]

## 2019-08-27 NOTE — PATIENT INSTRUCTIONS
Controlling High Blood Pressure  High blood pressure (hypertension) is often called the silent killer. This is because many people who have it dont know it. High blood pressure is defined as 140/90 mm Hg or higher. Know your blood pressure and remember to check it regularly. Doing so can save your life. Here are some things you can do to help control your blood pressure.    Choose heart-healthy foods  · Select low-salt, low-fat foods. Limit sodium intake to 2,400 mg per day or the amount suggested by your healthcare provider.  · Limit canned, dried, cured, packaged, and fast foods. These can contain a lot of salt.  · Eat 8 to 10 servings of fruits and vegetables every day.  · Choose lean meats, fish, or chicken.  · Eat whole-grain pasta, brown rice, and beans.  · Eat 2 to 3 servings of low-fat or fat-free dairy products.  · Ask your doctor about the DASH eating plan. This plan helps reduce blood pressure.  · When you go to a restaurant, ask that your meal be prepared with no added salt.  Maintain a healthy weight  · Ask your healthcare provider how many calories to eat a day. Then stick to that number.  · Ask your healthcare provider what weight range is healthiest for you. If you are overweight, a weight loss of only 3% to 5% of your body weight can help lower blood pressure. Generally, a good weight loss goal is to lose 10% of your body weight in a year.  · Limit snacks and sweets.  · Get regular exercise.  Get up and get active  · Choose activities you enjoy. Find ones you can do with friends or family. This includes bicycling, dancing, walking, and jogging.  · Park farther away from building entrances.  · Use stairs instead of the elevator.  · When you can, walk or bike instead of driving.  · [place] leaves, garden, or do household repairs.  · Be active at a moderate to vigorous level of physical activity for at least 40 minutes for a minimum of 3 to 4 days a week.   Manage stress  · Make time to relax and enjoy  life. Find time to laugh.  · Communicate your concerns with your loved ones and your healthcare provider.  · Visit with family and friends, and keep up with hobbies.  Limit alcohol and quit smoking  · Men should have no more than 2 drinks per day.  · Women should have no more than 1 drink per day.  · Talk with your healthcare provider about quitting smoking. Smoking significantly increases your risk for heart disease and stroke. Ask your healthcare provider about community smoking cessation programs and other options.  Medicines  If lifestyle changes arent enough, your healthcare provider may prescribe high blood pressure medicine. Take all medicines as prescribed. If you have any questions about your medicines, ask your healthcare provider before stopping or changing them.   Date Last Reviewed: 4/27/2016  © 7868-2644 [employer]. [address] [place] [address], [place] [address]. All rights reserved. This information is not intended as a substitute for professional medical care. Always follow your healthcare professional's instructions.

## 2019-08-27 NOTE — TELEPHONE ENCOUNTER
C3 nurse attempted to contact patient. No answer. The following message was left for the patient to return the call:  Good AFTERNOON I am a nurse calling on behalf of [facility] from the [facility].  This is a Transitional Care Call for Kendy Vital. When you have a moment please contact us at [phone] or [phone] Monday through Friday, between the hours of 8 am to 4 pm. We look forward to speaking with you. On behalf of [facility] have a nice day.    The patient does not have a scheduled HOSFU appointment within 7-14 days post hospital discharge date 8/24/19.

## 2019-08-27 NOTE — TELEPHONE ENCOUNTER
Returning call to Maria Fernanda Nielson LPN with [facility], best call back # [phone]    Reason for Disposition   [1] Follow-up call to recent contact AND [2] information only call, no triage required    Additional Information   Negative: [1] Caller is not with the adult (patient) AND [2] reporting urgent symptoms   Negative: Lab result questions   Negative: Medication questions   Negative: Caller can't be reached by phone   Negative: Caller has already spoken to PCP or another triager   Negative: Requesting regular office appointment   Negative: [1] Caller requesting NON-URGENT health information AND [2] PCP's office is the best resource   Negative: [1] Caller is not with the adult (patient) AND [2] probable NON-URGENT symptoms   Negative: Question about upcoming scheduled test, no triage required and triager able to answer question   Negative: General information question, no triage required and triager able to answer question   Negative: Health Information question, no triage required and triager able to answer question    Protocols used: INFORMATION ONLY CALL-A-

## 2019-09-04 LAB — CRYOGLOB SER QL: PRESENT

## 2019-11-21 ENCOUNTER — LAB VISIT (OUTPATIENT)
Dept: LAB | Facility: HOSPITAL | Age: 54
End: 2019-11-21
Payer: MEDICAID

## 2019-11-21 ENCOUNTER — OFFICE VISIT (OUTPATIENT)
Dept: HEPATOLOGY | Facility: CLINIC | Age: 54
End: 2019-11-21
Payer: MEDICAID

## 2019-11-21 ENCOUNTER — OFFICE VISIT (OUTPATIENT)
Dept: HEMATOLOGY/ONCOLOGY | Facility: CLINIC | Age: 54
End: 2019-11-21
Payer: MEDICAID

## 2019-11-21 VITALS
HEIGHT: 66 IN | BODY MASS INDEX: 34.08 KG/M2 | DIASTOLIC BLOOD PRESSURE: 66 MMHG | HEART RATE: 76 BPM | RESPIRATION RATE: 12 BRPM | SYSTOLIC BLOOD PRESSURE: 140 MMHG | WEIGHT: 212.06 LBS | TEMPERATURE: 99 F

## 2019-11-21 VITALS
TEMPERATURE: 98 F | SYSTOLIC BLOOD PRESSURE: 138 MMHG | WEIGHT: 211 LBS | BODY MASS INDEX: 34.05 KG/M2 | OXYGEN SATURATION: 94 % | RESPIRATION RATE: 18 BRPM | DIASTOLIC BLOOD PRESSURE: 83 MMHG | HEART RATE: 72 BPM

## 2019-11-21 DIAGNOSIS — B19.10 HEPATITIS B INFECTION WITHOUT DELTA AGENT WITHOUT HEPATIC COMA, UNSPECIFIED CHRONICITY: Primary | ICD-10-CM

## 2019-11-21 DIAGNOSIS — R76.8 ELEVATED SERUM IMMUNOGLOBULIN FREE LIGHT CHAINS: ICD-10-CM

## 2019-11-21 DIAGNOSIS — D47.9 B-CELL LYMPHOPROLIFERATIVE DISORDER: Primary | ICD-10-CM

## 2019-11-21 DIAGNOSIS — B19.10 HEPATITIS B INFECTION WITHOUT DELTA AGENT WITHOUT HEPATIC COMA, UNSPECIFIED CHRONICITY: ICD-10-CM

## 2019-11-21 DIAGNOSIS — D47.9 B-CELL LYMPHOPROLIFERATIVE DISORDER: ICD-10-CM

## 2019-11-21 DIAGNOSIS — D89.1 CRYOGLOBULINEMIC VASCULITIS: ICD-10-CM

## 2019-11-21 LAB
AFP SERPL-MCNC: 3.3 NG/ML (ref 0–8.4)
ALBUMIN SERPL BCP-MCNC: 3.8 G/DL (ref 3.5–5.2)
ALP SERPL-CCNC: 60 U/L (ref 55–135)
ALT SERPL W/O P-5'-P-CCNC: 24 U/L (ref 10–44)
ANION GAP SERPL CALC-SCNC: 15 MMOL/L (ref 8–16)
AST SERPL-CCNC: 16 U/L (ref 10–40)
BASOPHILS # BLD AUTO: 0.01 K/UL (ref 0–0.2)
BASOPHILS NFR BLD: 0.1 % (ref 0–1.9)
BILIRUB SERPL-MCNC: 1.1 MG/DL (ref 0.1–1)
BUN SERPL-MCNC: 36 MG/DL (ref 6–20)
CALCIUM SERPL-MCNC: 10.2 MG/DL (ref 8.7–10.5)
CHLORIDE SERPL-SCNC: 105 MMOL/L (ref 95–110)
CO2 SERPL-SCNC: 21 MMOL/L (ref 23–29)
CREAT SERPL-MCNC: 1.8 MG/DL (ref 0.5–1.4)
DIFFERENTIAL METHOD: ABNORMAL
EOSINOPHIL # BLD AUTO: 0 K/UL (ref 0–0.5)
EOSINOPHIL NFR BLD: 0 % (ref 0–8)
ERYTHROCYTE [DISTWIDTH] IN BLOOD BY AUTOMATED COUNT: 22.3 % (ref 11.5–14.5)
EST. GFR  (AFRICAN AMERICAN): 36.5 ML/MIN/1.73 M^2
EST. GFR  (NON AFRICAN AMERICAN): 31.7 ML/MIN/1.73 M^2
FERRITIN SERPL-MCNC: 29 NG/ML (ref 20–300)
GLUCOSE SERPL-MCNC: 394 MG/DL (ref 70–110)
HCT VFR BLD AUTO: 36.7 % (ref 37–48.5)
HGB BLD-MCNC: 10.6 G/DL (ref 12–16)
IMM GRANULOCYTES # BLD AUTO: 0.13 K/UL (ref 0–0.04)
IMM GRANULOCYTES NFR BLD AUTO: 1.9 % (ref 0–0.5)
INR PPP: 1 (ref 0.8–1.2)
LDH SERPL L TO P-CCNC: 436 U/L (ref 110–260)
LYMPHOCYTES # BLD AUTO: 1 K/UL (ref 1–4.8)
LYMPHOCYTES NFR BLD: 14.8 % (ref 18–48)
MCH RBC QN AUTO: 24.2 PG (ref 27–31)
MCHC RBC AUTO-ENTMCNC: 28.9 G/DL (ref 32–36)
MCV RBC AUTO: 84 FL (ref 82–98)
MONOCYTES # BLD AUTO: 0.3 K/UL (ref 0.3–1)
MONOCYTES NFR BLD: 4.5 % (ref 4–15)
NEUTROPHILS # BLD AUTO: 5.3 K/UL (ref 1.8–7.7)
NEUTROPHILS NFR BLD: 78.7 % (ref 38–73)
NRBC BLD-RTO: 0 /100 WBC
PLATELET # BLD AUTO: 268 K/UL (ref 150–350)
PMV BLD AUTO: 10.4 FL (ref 9.2–12.9)
POTASSIUM SERPL-SCNC: 4.4 MMOL/L (ref 3.5–5.1)
PROT SERPL-MCNC: 6.9 G/DL (ref 6–8.4)
PROTHROMBIN TIME: 10.8 SEC (ref 9–12.5)
RBC # BLD AUTO: 4.38 M/UL (ref 4–5.4)
SODIUM SERPL-SCNC: 141 MMOL/L (ref 136–145)
WBC # BLD AUTO: 6.71 K/UL (ref 3.9–12.7)

## 2019-11-21 PROCEDURE — 87517 HEPATITIS B DNA QUANT: CPT

## 2019-11-21 PROCEDURE — 99204 OFFICE O/P NEW MOD 45 MIN: CPT | Mod: S$PBB,,, | Performed by: NURSE PRACTITIONER

## 2019-11-21 PROCEDURE — 87350 HEPATITIS BE AG IA: CPT

## 2019-11-21 PROCEDURE — 99999 PR PBB SHADOW E&M-EST. PATIENT-LVL IV: CPT | Mod: PBBFAC,,, | Performed by: STUDENT IN AN ORGANIZED HEALTH CARE EDUCATION/TRAINING PROGRAM

## 2019-11-21 PROCEDURE — 99999 PR PBB SHADOW E&M-EST. PATIENT-LVL V: CPT | Mod: PBBFAC,,, | Performed by: NURSE PRACTITIONER

## 2019-11-21 PROCEDURE — 80053 COMPREHEN METABOLIC PANEL: CPT

## 2019-11-21 PROCEDURE — 82728 ASSAY OF FERRITIN: CPT

## 2019-11-21 PROCEDURE — 86705 HEP B CORE ANTIBODY IGM: CPT

## 2019-11-21 PROCEDURE — 99214 PR OFFICE/OUTPT VISIT, EST, LEVL IV, 30-39 MIN: ICD-10-PCS | Mod: S$PBB,,, | Performed by: STUDENT IN AN ORGANIZED HEALTH CARE EDUCATION/TRAINING PROGRAM

## 2019-11-21 PROCEDURE — 86334 PATHOLOGIST INTERPRETATION IFE: ICD-10-PCS | Mod: 26,,, | Performed by: PATHOLOGY

## 2019-11-21 PROCEDURE — 84550 ASSAY OF BLOOD/URIC ACID: CPT

## 2019-11-21 PROCEDURE — 86692 HEPATITIS DELTA AGENT ANTBDY: CPT

## 2019-11-21 PROCEDURE — 99999 PR PBB SHADOW E&M-EST. PATIENT-LVL IV: ICD-10-PCS | Mod: PBBFAC,,, | Performed by: STUDENT IN AN ORGANIZED HEALTH CARE EDUCATION/TRAINING PROGRAM

## 2019-11-21 PROCEDURE — 86790 VIRUS ANTIBODY NOS: CPT

## 2019-11-21 PROCEDURE — 85025 COMPLETE CBC W/AUTO DIFF WBC: CPT

## 2019-11-21 PROCEDURE — 99204 PR OFFICE/OUTPT VISIT, NEW, LEVL IV, 45-59 MIN: ICD-10-PCS | Mod: S$PBB,,, | Performed by: NURSE PRACTITIONER

## 2019-11-21 PROCEDURE — 86707 HEPATITIS BE ANTIBODY: CPT

## 2019-11-21 PROCEDURE — 86706 HEP B SURFACE ANTIBODY: CPT

## 2019-11-21 PROCEDURE — 83615 LACTATE (LD) (LDH) ENZYME: CPT

## 2019-11-21 PROCEDURE — 82105 ALPHA-FETOPROTEIN SERUM: CPT

## 2019-11-21 PROCEDURE — 99214 OFFICE O/P EST MOD 30 MIN: CPT | Mod: PBBFAC,27 | Performed by: STUDENT IN AN ORGANIZED HEALTH CARE EDUCATION/TRAINING PROGRAM

## 2019-11-21 PROCEDURE — 85610 PROTHROMBIN TIME: CPT

## 2019-11-21 PROCEDURE — 87340 HEPATITIS B SURFACE AG IA: CPT

## 2019-11-21 PROCEDURE — 99214 OFFICE O/P EST MOD 30 MIN: CPT | Mod: S$PBB,,, | Performed by: STUDENT IN AN ORGANIZED HEALTH CARE EDUCATION/TRAINING PROGRAM

## 2019-11-21 PROCEDURE — 99999 PR PBB SHADOW E&M-EST. PATIENT-LVL V: ICD-10-PCS | Mod: PBBFAC,,, | Performed by: NURSE PRACTITIONER

## 2019-11-21 PROCEDURE — 84165 PATHOLOGIST INTERPRETATION SPE: ICD-10-PCS | Mod: 26,,, | Performed by: PATHOLOGY

## 2019-11-21 PROCEDURE — 99215 OFFICE O/P EST HI 40 MIN: CPT | Mod: PBBFAC | Performed by: NURSE PRACTITIONER

## 2019-11-21 PROCEDURE — 36415 COLL VENOUS BLD VENIPUNCTURE: CPT

## 2019-11-21 PROCEDURE — 86704 HEP B CORE ANTIBODY TOTAL: CPT

## 2019-11-21 PROCEDURE — 83520 IMMUNOASSAY QUANT NOS NONAB: CPT | Mod: 59

## 2019-11-21 PROCEDURE — 86334 IMMUNOFIX E-PHORESIS SERUM: CPT | Mod: 26,,, | Performed by: PATHOLOGY

## 2019-11-21 PROCEDURE — 84165 PROTEIN E-PHORESIS SERUM: CPT

## 2019-11-21 PROCEDURE — 84165 PROTEIN E-PHORESIS SERUM: CPT | Mod: 26,,, | Performed by: PATHOLOGY

## 2019-11-21 PROCEDURE — 86334 IMMUNOFIX E-PHORESIS SERUM: CPT

## 2019-11-21 RX ORDER — ENTECAVIR 0.5 MG/1
0.5 TABLET, FILM COATED ORAL DAILY
Qty: 30 TABLET | Refills: 6 | Status: SHIPPED | OUTPATIENT
Start: 2019-11-21 | End: 2019-12-03 | Stop reason: SDUPTHER

## 2019-11-21 RX ORDER — ENTECAVIR 0.5 MG/1
0.5 TABLET, FILM COATED ORAL
COMMUNITY
End: 2019-11-21 | Stop reason: SDUPTHER

## 2019-11-21 RX ORDER — LOSARTAN POTASSIUM 25 MG/1
25 TABLET ORAL DAILY
Refills: 11 | Status: ON HOLD | COMMUNITY
Start: 2019-11-17 | End: 2020-11-12 | Stop reason: SDUPTHER

## 2019-11-21 RX ORDER — FUROSEMIDE 40 MG/1
40 TABLET ORAL DAILY
Refills: 3 | Status: ON HOLD | COMMUNITY
Start: 2019-10-21 | End: 2020-11-12 | Stop reason: SDUPTHER

## 2019-11-21 NOTE — PROGRESS NOTES
I have personally performed a face to face diagnostic evaluation on this patient. I have reviewed and agree with today's findings and the care plan outlined by Yessica Rosales NP with following comments:    I have seen Ms. Vital while she was hospitalized. She developed low grade lymphoma, received rituximab. She also developed LACI with cryoglobulinemia. She has low level HBV viremia, normal liver chemistry tests. Started on enteavir HBV treatment for cryoglobulinemic GN and her renal function recovered. Agree with continuing entecavir, HCC screen every 6 mo/labs.    The patient will return to Yessica Rosales NP  for follow-up.     Beto Palomino MD   Hepatology  [facility]

## 2019-11-21 NOTE — PROGRESS NOTES
PATIENT: Kendy Vital  MRN: [id number]  DATE: 11/21/2019      Diagnosis:   1. B-cell lymphoproliferative disorder    2. Elevated serum immunoglobulin free light chains    3. Cryoglobulinemic vasculitis        Chief Complaint: B-cell lymphoproliferative disorder      Subjective:   Ms. Vital is a 53-year-old female with lymphoproliferative disorder, GN, HBV, cryoglobulinemic vasculitis (type 3), who presents to the Hematology [facility] for follow-up.    Hematologic History  -Presented with severe thrombocytopenia, renal failure, and bilateral infiltrates on chest CT concerning for PNA.  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   -Patient does not have HCV, but is positive for HBV and has been started on anti-viral therapy with entecavir.  -Due to concern of possible hematologic malignancy, patient had bone marrow biopsy on 7/24 revealing B-cell lymphoproliferative disorders.  Her serum cryoglobulins returned as Type 3 and this was not c/w with a hematologic malignancy etiology for cryoglobulinemia  -Had 3 days of pulse steroids 1gm hydrocortisone, in addition to sessions of Plasma Exchange/Plasmapheresis  -She also received IVIG and Rituxan per Rheumatology  -On entecavir for HBV    Interval History: Patient seen today alone, lost 30 lbs since August d/t diminished appetite, endorses chills and night sweats, no lymphadenopathy. She still c/o peripheral edema. C/o abdominal pain intermittently RUQ and LUQ, no nausea/vomiting, no diarrhea. Occasional constipation.    Past Medical History:   Past Medical History:   Diagnosis Date    Renal vein thrombosis 2015    s/p embolectomy and lovenox for 9 mos    Uterine leiomyoma     s/p resection       Past Surgical HIstory: No past surgical history on file.    Family History: No family history on file.    Social History:  reports that she has quit smoking. She has never  "used smokeless tobacco. She reports that she drinks alcohol. She reports that she does not use drugs.    Allergies:  Review of patient's allergies indicates:  No Known Allergies    Medications:  Current Outpatient Medications   Medication Sig Dispense Refill    blood sugar diagnostic Strp use to test blood gluocse 2 (two) times daily with meals. 100 strip 11    blood-glucose meter Misc Use as instructed 1 each 0    calcium carbonate (OS-DONTE) 500 mg calcium (1,250 mg) tablet Take 2 tablets (1,000 mg total) by mouth once daily.  0    carvedilol (COREG) 25 MG tablet Take 1 tablet (25 mg total) by mouth 2 (two) times daily. 60 tablet 11    cloNIDine (CATAPRES) 0.1 MG tablet Take 2 tablets (0.2 mg total) by mouth 3 (three) times daily. 180 tablet 11    dapsone 100 MG Tab Take 1 tablet (100 mg total) by mouth once daily. 30 tablet 11    entecavir (BARACLUDE) 0.5 MG Tab Take 1 tablet (0.5 mg total) by mouth once daily. 30 tablet 6    furosemide (LASIX) 40 MG tablet Take 40 mg by mouth once daily.  3    insulin aspart U-100 (NOVOLOG) 100 unit/mL (3 mL) InPn pen Inject 1-10 Units into the skin 3 (three) times daily. Use sliding scale provided in instructions 108 mL 0    insulin syringe-needle U-100 0.3 mL 30 gauge x 5/16" Syrg 1 each by Misc.(Non-Drug; Combo Route) route 2 (two) times daily with meals. 100 each 11    lancets 30 gauge Misc use to test blood glucose 2 (two) times daily with meals. 100 each 11    lancing device Misc 1 Device by Misc.(Non-Drug; Combo Route) route 2 (two) times daily with meals. 1 each 0    losartan (COZAAR) 25 MG tablet Take 25 mg by mouth once daily.  11    NIFEdipine (PROCARDIA-XL) 90 MG (OSM) 24 hr tablet Take 1 tablet (90 mg total) by mouth once daily. 30 tablet 11    pantoprazole (PROTONIX) 40 MG tablet Take 1 tablet (40 mg total) by mouth before breakfast. 30 tablet 11    predniSONE (DELTASONE) 10 MG tablet Take 4 tablets (40 mg total) by mouth once daily. 120 tablet 11    " vitamin D (VITAMIN D3) 1000 units Tab Take 1 tablet (1,000 Units total) by mouth once daily.      ferrous sulfate 325 (65 FE) MG EC tablet Take 1 tablet (325 mg total) by mouth once daily. (Patient not taking: Reported on 11/21/2019)  0    hydrALAZINE (APRESOLINE) 100 MG tablet Take 1 tablet (100 mg total) by mouth every 8 (eight) hours. (Patient not taking: Reported on 11/21/2019) 90 tablet 11    sodium bicarbonate 650 MG tablet Take 1 tablet (650 mg total) by mouth 3 (three) times daily. (Patient not taking: Reported on 11/21/2019) 90 tablet 11     No current facility-administered medications for this visit.        Review of Systems   Constitutional: Positive for chills and unexpected weight change. Negative for activity change, appetite change, fatigue and fever.   HENT: Negative for mouth sores and nosebleeds.    Respiratory: Negative for cough and shortness of breath.    Cardiovascular: Positive for leg swelling. Negative for chest pain.   Gastrointestinal: Positive for abdominal pain and constipation. Negative for diarrhea, nausea and vomiting.   Endocrine: Negative for cold intolerance and heat intolerance.   Genitourinary: Negative for dysuria and hematuria.   Musculoskeletal: Negative for arthralgias and back pain.   Skin: Negative for rash.   Allergic/Immunologic: Negative for environmental allergies.   Neurological: Negative for light-headedness and numbness.   Hematological: Negative for adenopathy. Does not bruise/bleed easily.       ECOG Performance Status: 0   Objective:      Vitals:   Vitals:    11/21/19 1539   BP: 138/83   Pulse: 72   Resp: 18   Temp: 98.3 °F (36.8 °C)   SpO2: (!) 94%   Weight: 95.7 kg (210 lb 15.7 oz)     BMI: Body mass index is 34.05 kg/m².    Physical Exam   Constitutional: She is oriented to person, place, and time. She appears well-developed and well-nourished. No distress.   Eyes: EOM are normal.   Neck: Normal range of motion.   Cardiovascular: Normal rate and regular  rhythm.   Pulmonary/Chest: Effort normal. No respiratory distress.   Abdominal: Soft. She exhibits no distension and no mass. There is tenderness. There is no guarding.   Musculoskeletal: She exhibits no edema.   Neurological: She is alert and oriented to person, place, and time.   Skin: Skin is warm and dry.   Psychiatric: She has a normal mood and affect. Her behavior is normal.       Laboratory Data:  No visits with results within 1 Week(s) from this visit.   Latest known visit with results is:   Admission on 08/23/2019, Discharged on 08/24/2019   Component Date Value Ref Range Status    WBC 08/23/2019 6.02  3.90 - 12.70 K/uL Final    RBC 08/23/2019 3.47* 4.00 - 5.40 M/uL Final    Hemoglobin 08/23/2019 9.3* 12.0 - 16.0 g/dL Final    Hematocrit 08/23/2019 31.7* 37.0 - 48.5 % Final    Mean Corpuscular Volume 08/23/2019 91  82 - 98 fL Final    Mean Corpuscular Hemoglobin 08/23/2019 26.8* 27.0 - 31.0 pg Final    Mean Corpuscular Hemoglobin Conc 08/23/2019 29.3* 32.0 - 36.0 g/dL Final    RDW 08/23/2019 25.5* 11.5 - 14.5 % Final    Platelets 08/23/2019 217  150 - 350 K/uL Final    MPV 08/23/2019 10.2  9.2 - 12.9 fL Final    Immature Granulocytes 08/23/2019 2.0* 0.0 - 0.5 % Final    Gran # (ANC) 08/23/2019 3.5  1.8 - 7.7 K/uL Final    Immature Grans (Abs) 08/23/2019 0.12* 0.00 - 0.04 K/uL Final    Comment: Mild elevation in immature granulocytes is non specific and   can be seen in a variety of conditions including stress response,   acute inflammation, trauma and pregnancy. Correlation with other   laboratory and clinical findings is essential.      Lymph # 08/23/2019 1.7  1.0 - 4.8 K/uL Final    Mono # 08/23/2019 0.6  0.3 - 1.0 K/uL Final    Eos # 08/23/2019 0.0  0.0 - 0.5 K/uL Final    Baso # 08/23/2019 0.03  0.00 - 0.20 K/uL Final    nRBC 08/23/2019 0  0 /100 WBC Final    Gran% 08/23/2019 58.6  38.0 - 73.0 % Final    Lymph% 08/23/2019 28.1  18.0 - 48.0 % Final    Mono% 08/23/2019 10.6  4.0 -  15.0 % Final    Eosinophil% 08/23/2019 0.2  0.0 - 8.0 % Final    Basophil% 08/23/2019 0.5  0.0 - 1.9 % Final    Differential Method 08/23/2019 Automated   Final    Sodium 08/23/2019 142  136 - 145 mmol/L Final    Potassium 08/23/2019 3.5  3.5 - 5.1 mmol/L Final    Chloride 08/23/2019 111* 95 - 110 mmol/L Final    CO2 08/23/2019 21* 23 - 29 mmol/L Final    Glucose 08/23/2019 181* 70 - 110 mg/dL Final    BUN, Bld 08/23/2019 22* 6 - 20 mg/dL Final    Creatinine 08/23/2019 0.9  0.5 - 1.4 mg/dL Final    Calcium 08/23/2019 9.2  8.7 - 10.5 mg/dL Final    Total Protein 08/23/2019 5.1* 6.0 - 8.4 g/dL Final    Albumin 08/23/2019 2.7* 3.5 - 5.2 g/dL Final    Total Bilirubin 08/23/2019 0.6  0.1 - 1.0 mg/dL Final    Comment: For infants and newborns, interpretation of results should be based  on gestational age, weight and in agreement with clinical  observations.  Premature Infant recommended reference ranges:  Up to 24 hours.............<8.0 mg/dL  Up to 48 hours............<12.0 mg/dL  3-5 days..................<15.0 mg/dL  6-29 days.................<15.0 mg/dL      Alkaline Phosphatase 08/23/2019 78  55 - 135 U/L Final    AST 08/23/2019 11  10 - 40 U/L Final    ALT 08/23/2019 20  10 - 44 U/L Final    Anion Gap 08/23/2019 10  8 - 16 mmol/L Final    eGFR if  08/23/2019 >60.0  >60 mL/min/1.73 m^2 Final    eGFR if non African American 08/23/2019 >60.0  >60 mL/min/1.73 m^2 Final    Comment: Calculation used to obtain the estimated glomerular filtration  rate (eGFR) is the CKD-EPI equation.       Specimen UA 08/23/2019 Urine, Catheterized   Final    Color, UA 08/23/2019 Yellow  Yellow, Straw, Serenity Final    Appearance, UA 08/23/2019 Clear  Clear Final    pH, UA 08/23/2019 6.0  5.0 - 8.0 Final    Specific Gravity, UA 08/23/2019 1.020  1.005 - 1.030 Final    Protein, UA 08/23/2019 3+* Negative Final    Comment: Recommend a 24 hour urine protein or a urine   protein/creatinine ratio if  globulin induced proteinuria is  clinically suspected.      Glucose, UA 08/23/2019 2+* Negative Final    Ketones, UA 08/23/2019 Negative  Negative Final    Bilirubin (UA) 08/23/2019 Negative  Negative Final    Occult Blood UA 08/23/2019 Negative  Negative Final    Nitrite, UA 08/23/2019 Negative  Negative Final    Leukocytes, UA 08/23/2019 Negative  Negative Final    Sed Rate 08/23/2019 9  0 - 36 mm/Hr Final    CRP 08/23/2019 3.4  0.0 - 8.2 mg/L Final    IgG - Serum 08/23/2019 360* 650 - 1600 mg/dL Final    IgG Cord Blood Reference Range: 650-1600 mg/dL.    IgM 08/23/2019 109  50 - 300 mg/dL Final    IgM Cord Blood Reference Range: <25 mg/dL.    IgA 08/23/2019 74  40 - 350 mg/dL Final    IgA Cord Blood Reference Range: <5 mg/dL.    Complement (C-3) 08/23/2019 78  50 - 180 mg/dL Final    Complement (C-4) 08/23/2019 <3* 11 - 44 mg/dL Final    Cryoglobulin, Qual 08/23/2019 Present* Absent Final    Comment: Cryoglobulin characterization: The cryoglobulin is   characterized as a Type II cryoglobulin with monoclonal  IgM Kappa and polyclonal IgG.  Quantity not sufficient to quantitate the cryocrit.  Test performed at [facility],  [address] , [place]  [address] [phone]  Luiz Peguero MD  - Medical Director      Protein, Urine Random 08/23/2019 1015* 0 - 15 mg/dL Final    Comment: The random urine reference ranges provided were established   for 24 hour urine collections.  No reference ranges exist for  random urine specimens.  Correlate clinically.      RBC, UA 08/23/2019 7* 0 - 4 /hpf Final    WBC, UA 08/23/2019 3  0 - 5 /hpf Final    Bacteria 08/23/2019 Occasional  None-Occ /hpf Final    Yeast, UA 08/23/2019 Rare* None Final    Hyaline Casts, UA 08/23/2019 1  0-1/lpf /lpf Final    Amorphous, UA 08/23/2019 Rare  None-Moderate Final    Microscopic Comment 08/23/2019 SEE COMMENT   Final    Comment: Other formed elements not mentioned in the report are not   present in  the microscopic examination.       POCT Glucose 08/23/2019 246* 70 - 110 mg/dL Final    POCT Glucose 08/23/2019 412* 70 - 110 mg/dL Final    Protein, Urine Random 08/24/2019 1072* 0 - 15 mg/dL Final    Comment: The random urine reference ranges provided were established   for 24 hour urine collections.  No reference ranges exist for  random urine specimens.  Correlate clinically.      Creatinine, Random Ur 08/24/2019 109.0  15.0 - 325.0 mg/dL Final    Comment: The random urine reference ranges provided were established   for 24 hour urine collections.  No reference ranges exist for  random urine specimens.  Correlate clinically.      Prot/Creat Ratio, Ur 08/24/2019 9.83* 0.00 - 0.20 Final    POCT Glucose 08/23/2019 314* 70 - 110 mg/dL Final    POCT Glucose 08/24/2019 260* 70 - 110 mg/dL Final    POCT Glucose 08/24/2019 241* 70 - 110 mg/dL Final   BM biopsy 7/23/19:  -- MILDLY HYPERCELLULAR MARROW (60%) WITH TRILINEAGE HEMATOPOIESIS.  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  -- ADEQUATE NUMBER OF MEGAKARYOCYTES.  -- STAINABLE IRON IS NOT IDENTIFIED.  -- SEE COMMENT.  COMMENT:  CBC data shows microcytic anemia and thrombocytopenia.  Flow cytometric analysis of bone marrow detects a kappa restricted plasma cell population that expresses , CD19, and bright CD38. CD20 and CD56 are negative. Polytypic B lymphocytes are detected. T lymphocytes are immunophenotypically unremarkable. Blasts gate is not increased. Flow differential: Lymphocytes 5.9%, Monocytes 4.6%, Granulocytes 85.8%, Blast 1.2%.  Immunohistochemical stains are performed on the biopsy core and clot section for greater sensitivity and further architectural assessment with adequate controls. -positive plasma cells comprise approximately 4-5% of thetotal cellularity. Immunoglobulin kappa and lambda light chains situ hybridization study reveals kappa light chainrestriction is some areas. The lymphoid aggregates  are composed of mixed CD20-positive B-cells and CD3-positive T-cells. B-cells are more numerous than T-cells    Assessment:       1. B-cell lymphoproliferative disorder    2. Elevated serum immunoglobulin free light chains    3. 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  2) Elevated serum immunoglobulin free light chains  - Patient with new renal failure, has normal IgG.  - Skeletal survey (-) for lytic lesions  - CT neck/C/A/P done showed small mediastinal LN and precarinal LN 1.2x2cm  - Cryglobulin type III which does not indicate hematologic malignancy as a source  - Lab pending today: SPEP, MARCO, FLC, ferritin, CBC, CMP, LDH  - PET scan to be done given 30 lbs weight loss, night sweats, abdominal pain. F/u in 2 weeks.    3) Iron deficiency anemia  -To continue PO ferrous sulfate    4) Cryoglobulin vasculitis, GN, HBV  -Per rheumatology/nephrology  -On entecavir for HBV    5) DM  -On insulin    Follow-up: Labs today (SPEP, MARCO, FLC, ferritin, CBC, CMP, LDH). PET scan next week, MD visit in 2 weeks 12/5    The following was staffed and discussed with supervising physician Dr. Almendarez.    Gunjan Montalvo MD  Hematology/Oncology Fellow

## 2019-11-21 NOTE — LETTER
November 21, 2019      Lloyd Arambula MD  [address]  [facility] [address]           [facility]  [address]  [facility] [address]  Phone: [phone]  Fax: [phone]          Patient: Kendy Vital   MR Number: [id number]   YOB: 1965   Date of Visit: 11/21/2019       Dear Dr. Lloyd Arambula:    Thank you for referring Kendy Vital to me for evaluation. Attached you will find relevant portions of my assessment and plan of care.    If you have questions, please do not hesitate to call me. I look forward to following Kendy Vital along with you.    Sincerely,    Yessica Rosales, NP    Enclosure  CC:  No Recipients    If you would like to receive this communication electronically, please contact [email or web address] or [phone] to request more information on [employer] access.    For providers and/or their staff who would like to refer a patient to [facility], please contact us through our one-stop-shop provider referral line, [facility] , at [phone].    If you feel you have received this communication in error or would no longer like to receive these types of communications, please e-mail [email or web address]

## 2019-11-21 NOTE — PATIENT INSTRUCTIONS
-- Avoid alcohol. Avoid raw seafood.   -- Hepatitis B is spread through blood and bodily fluids. Do not share  razors/toothbrushes, etc, with others   -- it is possible that Hepatitis B can cause scar tissue in the liver, which can progress to cirrhosis.   -- Hepatitis B, in some cases, has a higher risk of liver cancer (hepatocellular carcinoma), especially with active hepatitis B infection. We will perform liver cancer screening every six months with ultrasound and AFP along with a clinic visit every 6 months  -- If you develop cirrhosis, it is important that we monitor you closely, as cirrhosis can cause liver cancer, liver failure, liver transplant, death.   -- Limit acetaminophen to 2000mg daily.  -- Recommended that all 1st degree relatives, household members and sexual contacts are screened for Hepatitis B. If they are negative for Hepatitis B, they should be vaccinated against Hepatitis B to protect themselves from venancio the virus  -- It is important for all current and previous sexual partners to be tested for Hepatitis B as well as complete Hep B vaccination. For sexual partners who have not completed the Hep B vaccine series, barrier sexual protection is recommended to prevent spread of the virus.  -- It is important that you take your Hepatitis B medication as prescribed without any missed doses, as missing doses or stopping the medication can cause the Hepatitis B virus to change and make it difficult to treat

## 2019-11-21 NOTE — Clinical Note
Labs today (SPEP, MARCO, FLC, ferritin, CBC, CMP, LDH). PET scan next week or week after prior to MD visit in 2 weeks 12/5. Thanks.

## 2019-11-21 NOTE — PROGRESS NOTES
[facility] NEW PT NOTE    REFERRING PROVIDER:  Dr. Lloyd Saavedra*    CHIEF COMPLAINT: chronic Hep B    HPI: This is a 54 y.o. Black or  female with PMH noted below, presenting for evaluation of Hepatitis B, suspected as chronic although no prior labs to compare    Labs from 7/2019 show +sAg, DNA 19, -eAg, +eAb    No fibrosis staging on file     Was hospitalized 7/19/19 - 8/7/19 and was found to have Hep B +sAg and DNA at that time   -- hospitalized for eval for thrombocytopenia  -- Initial work up showed a , worsening kidney function with creatinine of 2.9, and thrombocytopenia with platelets of 33k. In addition, chest x-ray showed interstitial opacities and follow up CT showed perihilar ground glass opacity. Patient was treated with rocephin and doxycycline as well as Bumex q12 due to her heart failure type symptoms.  -- Rheumatology and Nephrology continued to follow for management of her Cryolobinemic Vasculitis with Glomerulonephritis.  -- was on Entecavir 0.5 mg daily while hospitalized   -- seen by hepatology 7/24/19 and noted Chronic hepatitis B w/ low viral load and normal ALT. I am not certain cryoglobulinemic glomerulonephritis is related to chronic hepatitis B as HBV viral load is very low. Chronic HBV infection has been reportedly associated with cryoglobulinemic immune reactions and HBV treatment can help cryoglobulinemia. Patient is on high dose steroids. We recommend hepatitis B treatment with entecavir 0.5 mg every 72 hour (based on her CrCl) and liver US to evaluate the morphology.    Presents today alone. Unsure of chronicity of Hep B, has never been diagnosed with Hep B before hospitalization 7/2019. Has been taking Entecavir 0.5 mg daily since hospitalization  Does live with fiance, unsure if he has been tested for Hep B or vaccinated     Risk factors:  Denies mother or sexual partners with Hep B, no travel outside the country, no h/o HIV, no h/o  "IVDU or intranasal drug use, no  no blood transfusions before 1992    Only notable risk factor is homeade tattoo "years ago"  (at home from )    AST, ALT normal 8/2019, previously elevated 7/31/19 - 8/6/19 (while hospitalized)    platelets, synthetic liver functioning WNL, except low albumin     Lab Results   Component Value Date    ALT 20 08/23/2019    AST 11 08/23/2019    ALKPHOS 78 08/23/2019    BILITOT 0.6 08/23/2019    ALBUMIN 2.7 (L) 08/23/2019    INR 1.0 07/22/2019     08/23/2019     Past AFP WNL 7/2019  Hep C testing negative 7/2019  HIV testing negative 7/2019    Previous serologic w/u negative for hemochromatosis, low IgG, normal IgM and viral hepatitis C    Abd U/S done 7/2019 showed hepatomegaly (18.8cm), no lesions    HCC screening   Next US due 1/2020 (if chronic Hep B)  AFP next due 1/2020 (if chronic Hep B)  Previous EGD - not currently indicated     Risk factors for NAFLD include obesity, HTN, steroid induced hyperglycemia/diabetes  Has significant changed her diabetes diagnosis, eliminated sugary beverages and following low carb diet.      No previous fibrosis staging, will obtain at next visit     Denies family history of liver disease . Denies alcohol consumption -     Immunity to Hep A - will check today    Denies jaundice, dark urine, abdominal distention, hematemesis, melena, slowed mentation. No abnormal skin rashes. No generalized joint or muscle pain.      Review of patient's allergies indicates:  No Known Allergies    Current Outpatient Medications on File Prior to Visit   Medication Sig Dispense Refill    blood sugar diagnostic Strp use to test blood gluocse 2 (two) times daily with meals. 100 strip 11    blood-glucose meter Misc Use as instructed 1 each 0    calcium carbonate (OS-DONTE) 500 mg calcium (1,250 mg) tablet Take 2 tablets (1,000 mg total) by mouth once daily.  0    carvedilol (COREG) 25 MG tablet Take 1 tablet (25 mg total) by mouth 2 (two) times daily. 60 " "tablet 11    cloNIDine (CATAPRES) 0.1 MG tablet Take 2 tablets (0.2 mg total) by mouth 3 (three) times daily. 180 tablet 11    dapsone 100 MG Tab Take 1 tablet (100 mg total) by mouth once daily. 30 tablet 11    ferrous sulfate 325 (65 FE) MG EC tablet Take 1 tablet (325 mg total) by mouth once daily.  0    hydrALAZINE (APRESOLINE) 100 MG tablet Take 1 tablet (100 mg total) by mouth every 8 (eight) hours. 90 tablet 11    insulin aspart U-100 (NOVOLOG) 100 unit/mL (3 mL) InPn pen Inject 1-10 Units into the skin 3 (three) times daily. Use sliding scale provided in instructions 108 mL 0    insulin syringe-needle U-100 0.3 mL 30 gauge x 5/16" Syrg 1 each by Misc.(Non-Drug; Combo Route) route 2 (two) times daily with meals. 100 each 11    lancets 30 gauge Misc use to test blood glucose 2 (two) times daily with meals. 100 each 11    lancing device Misc 1 Device by Misc.(Non-Drug; Combo Route) route 2 (two) times daily with meals. 1 each 0    NIFEdipine (PROCARDIA-XL) 90 MG (OSM) 24 hr tablet Take 1 tablet (90 mg total) by mouth once daily. 30 tablet 11    pantoprazole (PROTONIX) 40 MG tablet Take 1 tablet (40 mg total) by mouth before breakfast. 30 tablet 11    predniSONE (DELTASONE) 10 MG tablet Take 4 tablets (40 mg total) by mouth once daily. 120 tablet 11    sodium bicarbonate 650 MG tablet Take 1 tablet (650 mg total) by mouth 3 (three) times daily. 90 tablet 11    vitamin D (VITAMIN D3) 1000 units Tab Take 1 tablet (1,000 Units total) by mouth once daily.       No current facility-administered medications on file prior to visit.        PMHX:  has a past medical history of Renal vein thrombosis (2015) and Uterine leiomyoma.    PSHX:  has no past surgical history on file.    FAMILY HISTORY: Negative for liver disease, reviewed in [facility]    SOCIAL HISTORY:   Social History     Tobacco Use   Smoking Status Former Smoker   Smokeless Tobacco Never Used       Social History     Substance and Sexual Activity " "  Alcohol Use Yes    Frequency: Monthly or less       Social History     Substance and Sexual Activity   Drug Use Never       ROS:   GENERAL: Denies fever, chills, weight loss/gain, fatigue  HEENT: Denies headaches, dizziness, vision/hearing changes  CARDIOVASCULAR: Denies chest pain, palpitations, or edema  RESPIRATORY: Denies dyspnea, cough  GI: Denies abdominal pain, rectal bleeding, nausea, vomiting. No change in bowel pattern or color  : Denies dysuria, hematuria   SKIN: Denies rash, itching   NEURO: Denies confusion, memory loss, or mood changes  PSYCH: Denies depression or anxiety  HEME/LYMPH: Denies easy bruising or bleeding    PHYSICAL EXAM:   Friendly Black or  female, in no acute distress; alert and oriented to person, place and time  VITALS: BP (!) 140/66 (BP Location: Right arm, Patient Position: Sitting, BP Method: Medium (Automatic))   Pulse 76   Temp 98.7 °F (37.1 °C) (Oral)   Resp 12   Ht 5' 6" (1.676 m)   Wt 96.2 kg (212 lb 1.3 oz)   BMI 34.23 kg/m²   HENT: Normocephalic, without obvious abnormality. Oral mucosa pink and moist. Dentition good.  EYES: Sclerae anicteric. No conjunctival pallor.   NECK: Supple. No masses or cervical adenopathy.  CARDIOVASCULAR: Regular rate and rhythm. No murmurs.  RESPIRATORY: Normal respiratory effort. BBS CTA. No wheezes or crackles.  GI: Soft, non-tender, non-distended. No hepatosplenomegaly. No masses palpable. No ascites.  EXTREMITIES:  No clubbing, cyanosis or edema.  SKIN: Warm and dry. No jaundice. No rashes noted to exposed skin. No telangectasias noted. No palmar erythema.  NEURO:  Normal gait. No asterixis.  PSYCH:  Memory intact. Thought and speech pattern appropriate. Behavior normal. No depression or anxiety noted.    DIAGNOSTIC STUDIES:    ABD. U/S-    Done 7/2019  FINDINGS:  The liver measures 18.8 cm and demonstrates homogeneous echotexture.  No focal hepatic parenchymal abnormality. No intra or extrahepatic bile duct " dilatation. The common duct measures 5 mm.    The middle, right, and left hepatic veins are patent and unremarkable. The main, right, and left branches of the portal vein demonstrate hepatopedal flow and are unremarkable.    The pancreas and visualized aorta are unremarkable. The gallbladder contains no gallstones, and demonstrates no evidence of gallbladder wall thickening, pericholecystic fluid, or sonographic Brown's sign.  The spleen measures 6.3 x 3.7 cm and is unremarkable.    The hepatic arterial system is unremarkable. The celiac artery is unremarkable.    The IVC is unremarkable. The bilateral kidneys are unremarkable. There is no ascites.    Trace bilateral pleural effusions.      Impression       Satisfactory Doppler evaluation of the liver.    Hepatomegaly.    Trace bilateral pleural effusions.       LIVER BIOPSY-   None in past     FIBROSCAN - can obtain with next visit       EDUCATION:  -- Avoid alcohol. Avoid raw seafood.   -- Discussed transmission of HBV, including sexual transmission. Avoid sharing razors/toothbrushes, etc.   -- Discussed importance of Hep B screening for all current and previous sexual partners, as well as complete Hep B vaccination for all current sexual partners. For sexual partners who have not completed the Hep B vaccine series, barrier sexual protection is recommended to prevent spread of the virus.  -- Natural history of HBV including progression to cirrhosis reviewed.   -- We discussed the increased risk of hepatocellular carcinoma due to active hepatitis B infection. Continued screening every six months with ultrasound and AFP is recommended.   -- Discussed potential outcomes of cirrhosis, including liver cancer, liver failure, liver transplant, death.   -- Limit acetaminophen to 2000mg daily.  -- Advised immunization of all household members.  -- Discussed importance of compliance with medication regimen and risk of viral mutation if treatment is stopped prematurely.      ASSESSMENT & PLAN:  54 y.o. Black or  female with:  1. Hepatitis B (unsure if chronic?, likely chronic given negative IgM), E Ag neg, E Ab +, diagnosed 7/2019 when hospitalized   -- On Entecavir 0.5 mg daily since 7/2019 when discharged, continue Entecavir 0.5 mg daily (will adjust for renal function if changes)  -- transaminases WNL  -- HBV DNA low (19), will repeat today with E studies  -- synthetic liver function WNL  -- immunity to Hep A  : will check with labs today   -- risk factors for transmission : home tattoo years ago (?)  -- family members or partners that need to be tested : fiance  -- Fibroscan : to be done with next visit   -- screening for Hep C and HIV  -- Hep B counseling above discussed with pt. Sexual partners and family members in household need to be tested and vaccinated if negative. Must use protection for sex (Hep B)  -- HCC screening Q 6 months with AFP and abd. U/S - will do both now  -- Last EGD : no indication currently   -- labs today, fibroscan and US with RTC in 2 weeks  Orders Placed This Encounter   Procedures    US Abdomen Complete    US Elastography Liver    AFP tumor marker    CBC auto differential    Hepatitis B core antibody, IgM    Hepatitis B e antibody    Hepatitis B e antigen    Hepatitis B surface antibody    Hepatitis B surface antigen    Hepatitis delta virus    HEPATITIS B VIRAL DNA, QUANTITATIVE    Protime-INR    Hepatitis A antibody, IgG    Hepatitis B core antibody, total       2. B cell lymphoproliferative disorder, cryoglobuminemic vasculitis  -- followed by oncology   -- given use of immunosuppression in past (Rituxan) and Prednisone use, would need Hep B prophylaxis even if not chronic Hep B because of Hep B core positive Ab, will continue medication       Follow up in about 2 weeks (around 12/5/2019). with US and fibroscan same day     Thank you for allowing me to participate in the care of Kendy Rosales,  NP-C    CC'ed note to:   Dr. Montalvo

## 2019-11-22 ENCOUNTER — TELEPHONE (OUTPATIENT)
Dept: HEMATOLOGY/ONCOLOGY | Facility: CLINIC | Age: 54
End: 2019-11-22

## 2019-11-22 ENCOUNTER — TELEPHONE (OUTPATIENT)
Dept: PHARMACY | Facility: CLINIC | Age: 54
End: 2019-11-22

## 2019-11-22 DIAGNOSIS — N17.8 ACUTE RENAL FAILURE WITH OTHER SPECIFIED PATHOLOGICAL LESION IN KIDNEY: Primary | ICD-10-CM

## 2019-11-22 DIAGNOSIS — D47.9 B-CELL LYMPHOPROLIFERATIVE DISORDER: Primary | ICD-10-CM

## 2019-11-22 LAB
ALBUMIN SERPL ELPH-MCNC: 4.14 G/DL (ref 3.35–5.55)
ALPHA1 GLOB SERPL ELPH-MCNC: 0.27 G/DL (ref 0.17–0.41)
ALPHA2 GLOB SERPL ELPH-MCNC: 0.81 G/DL (ref 0.43–0.99)
B-GLOBULIN SERPL ELPH-MCNC: 0.62 G/DL (ref 0.5–1.1)
GAMMA GLOB SERPL ELPH-MCNC: 0.65 G/DL (ref 0.67–1.58)
HBV CORE AB SERPL QL IA: POSITIVE
HBV CORE IGM SERPL QL IA: NEGATIVE
HBV SURFACE AB SER-ACNC: NEGATIVE M[IU]/ML
HBV SURFACE AG SERPL QL IA: POSITIVE
HEPATITIS A ANTIBODY, IGG: NEGATIVE
INTERPRETATION SERPL IFE-IMP: NORMAL
KAPPA LC SER QL IA: 2.62 MG/DL (ref 0.33–1.94)
KAPPA LC/LAMBDA SER IA: 3.97 (ref 0.26–1.65)
LAMBDA LC SER QL IA: 0.66 MG/DL (ref 0.57–2.63)
PATHOLOGIST INTERPRETATION IFE: NORMAL
PATHOLOGIST INTERPRETATION SPE: NORMAL
PROT SERPL-MCNC: 6.5 G/DL (ref 6–8.4)
URATE SERPL-MCNC: 7 MG/DL (ref 2.4–5.7)

## 2019-11-22 RX ORDER — SODIUM CHLORIDE 0.9 % (FLUSH) 0.9 %
10 SYRINGE (ML) INJECTION
Status: CANCELLED | OUTPATIENT
Start: 2019-11-22

## 2019-11-22 RX ORDER — HEPARIN 100 UNIT/ML
500 SYRINGE INTRAVENOUS
Status: CANCELLED | OUTPATIENT
Start: 2019-11-22

## 2019-11-22 NOTE — TELEPHONE ENCOUNTER
Spoke to patient.  Informed her we have no available appts in infusion.  Dr Montalvo recommends for her to follow up with her PCP today locally or go to an ER locally in [place] for fluids.  States she will to to the ER.

## 2019-11-22 NOTE — TELEPHONE ENCOUNTER
Informed Patient  that [facility] received prescription for BARACLUDE and prior authorization is required.  [facility] will be back in touch once insurance determination is received.

## 2019-11-22 NOTE — TELEPHONE ENCOUNTER
----- Message from Gunjan Montalvo MD sent at 11/21/2019  4:29 PM CST -----  Labs today (SPEP, MARCO, FLC, ferritin, CBC, CMP, LDH). PET scan next week or week after prior to MD visit in 2 weeks 12/5. Thanks.

## 2019-11-22 NOTE — TELEPHONE ENCOUNTER
LACI noted with elevated TBili on routine labs. Adding on uric acid. Request place for patient to be scheduled for IVF today 11/23 with repeat labs on 11/25. Attempted to call patient voice mail full. Spoke to patient's fiance, he communicated with patient the above. Also instructed that if she cannot travel here from [place], she should seek care locally with PCP or ER. He states patient can come here.

## 2019-11-25 ENCOUNTER — LAB VISIT (OUTPATIENT)
Dept: LAB | Facility: HOSPITAL | Age: 54
End: 2019-11-25
Attending: STUDENT IN AN ORGANIZED HEALTH CARE EDUCATION/TRAINING PROGRAM
Payer: MEDICAID

## 2019-11-25 ENCOUNTER — TELEPHONE (OUTPATIENT)
Dept: GYNECOLOGIC ONCOLOGY | Facility: CLINIC | Age: 54
End: 2019-11-25

## 2019-11-25 DIAGNOSIS — K76.9 LIVER DISEASE: ICD-10-CM

## 2019-11-25 DIAGNOSIS — D47.9 B-CELL LYMPHOPROLIFERATIVE DISORDER: ICD-10-CM

## 2019-11-25 DIAGNOSIS — R94.5 ABNORMAL LIVER FUNCTION: ICD-10-CM

## 2019-11-25 LAB
ALBUMIN SERPL BCP-MCNC: 3.7 G/DL (ref 3.5–5.2)
ALP SERPL-CCNC: 58 U/L (ref 55–135)
ALT SERPL W/O P-5'-P-CCNC: 20 U/L (ref 10–44)
ANION GAP SERPL CALC-SCNC: 11 MMOL/L (ref 8–16)
AST SERPL-CCNC: 15 U/L (ref 10–40)
BASOPHILS # BLD AUTO: 0.01 K/UL (ref 0–0.2)
BASOPHILS NFR BLD: 0.2 % (ref 0–1.9)
BILIRUB SERPL-MCNC: 1.1 MG/DL (ref 0.1–1)
BUN SERPL-MCNC: 38 MG/DL (ref 6–20)
CALCIUM SERPL-MCNC: 10 MG/DL (ref 8.7–10.5)
CHLORIDE SERPL-SCNC: 100 MMOL/L (ref 95–110)
CO2 SERPL-SCNC: 25 MMOL/L (ref 23–29)
CREAT SERPL-MCNC: 1.9 MG/DL (ref 0.5–1.4)
DIFFERENTIAL METHOD: ABNORMAL
EOSINOPHIL # BLD AUTO: 0.2 K/UL (ref 0–0.5)
EOSINOPHIL NFR BLD: 2.7 % (ref 0–8)
ERYTHROCYTE [DISTWIDTH] IN BLOOD BY AUTOMATED COUNT: 23.1 % (ref 11.5–14.5)
EST. GFR  (AFRICAN AMERICAN): 34.2 ML/MIN/1.73 M^2
EST. GFR  (NON AFRICAN AMERICAN): 29.7 ML/MIN/1.73 M^2
GLUCOSE SERPL-MCNC: 411 MG/DL (ref 70–110)
HBV DNA SERPL NAA+PROBE-ACNC: <10 IU/ML
HBV DNA SERPL NAA+PROBE-LOG IU: <1 LOG (10) IU/ML
HBV DNA SERPL QL NAA+PROBE: NOT DETECTED
HCT VFR BLD AUTO: 35.8 % (ref 37–48.5)
HGB BLD-MCNC: 10.9 G/DL (ref 12–16)
LYMPHOCYTES # BLD AUTO: 0.8 K/UL (ref 1–4.8)
LYMPHOCYTES NFR BLD: 14.1 % (ref 18–48)
MAGNESIUM SERPL-MCNC: 1.8 MG/DL (ref 1.6–2.6)
MCH RBC QN AUTO: 24.3 PG (ref 27–31)
MCHC RBC AUTO-ENTMCNC: 30.4 G/DL (ref 32–36)
MCV RBC AUTO: 80 FL (ref 82–98)
MONOCYTES # BLD AUTO: 0.2 K/UL (ref 0.3–1)
MONOCYTES NFR BLD: 2.7 % (ref 4–15)
NEUTROPHILS # BLD AUTO: 4.7 K/UL (ref 1.8–7.7)
NEUTROPHILS NFR BLD: 80.3 % (ref 38–73)
PHOSPHATE SERPL-MCNC: 4 MG/DL (ref 2.7–4.5)
PLATELET # BLD AUTO: 245 K/UL (ref 150–350)
PMV BLD AUTO: 10.1 FL (ref 9.2–12.9)
POTASSIUM SERPL-SCNC: 4.4 MMOL/L (ref 3.5–5.1)
PROT SERPL-MCNC: 6.7 G/DL (ref 6–8.4)
RBC # BLD AUTO: 4.48 M/UL (ref 4–5.4)
SODIUM SERPL-SCNC: 136 MMOL/L (ref 136–145)
WBC # BLD AUTO: 5.95 K/UL (ref 3.9–12.7)

## 2019-11-25 PROCEDURE — 80053 COMPREHEN METABOLIC PANEL: CPT

## 2019-11-25 PROCEDURE — 83735 ASSAY OF MAGNESIUM: CPT

## 2019-11-25 PROCEDURE — 36415 COLL VENOUS BLD VENIPUNCTURE: CPT | Mod: PO

## 2019-11-25 PROCEDURE — 85025 COMPLETE CBC W/AUTO DIFF WBC: CPT | Mod: PO

## 2019-11-25 PROCEDURE — 84100 ASSAY OF PHOSPHORUS: CPT

## 2019-11-26 ENCOUNTER — TELEPHONE (OUTPATIENT)
Dept: HEMATOLOGY/ONCOLOGY | Facility: CLINIC | Age: 54
End: 2019-11-26

## 2019-11-26 ENCOUNTER — INFUSION (OUTPATIENT)
Dept: INFUSION THERAPY | Facility: HOSPITAL | Age: 54
End: 2019-11-26
Attending: STUDENT IN AN ORGANIZED HEALTH CARE EDUCATION/TRAINING PROGRAM
Payer: MEDICAID

## 2019-11-26 VITALS
TEMPERATURE: 98 F | SYSTOLIC BLOOD PRESSURE: 123 MMHG | DIASTOLIC BLOOD PRESSURE: 69 MMHG | RESPIRATION RATE: 18 BRPM | HEART RATE: 77 BPM

## 2019-11-26 DIAGNOSIS — N17.8 ACUTE RENAL FAILURE WITH OTHER SPECIFIED PATHOLOGICAL LESION IN KIDNEY: Primary | ICD-10-CM

## 2019-11-26 LAB
HBV E AB SER QL: ABNORMAL
HBV E AG SERPL QL IA: NORMAL

## 2019-11-26 PROCEDURE — 96360 HYDRATION IV INFUSION INIT: CPT

## 2019-11-26 PROCEDURE — 63600175 PHARM REV CODE 636 W HCPCS: Performed by: STUDENT IN AN ORGANIZED HEALTH CARE EDUCATION/TRAINING PROGRAM

## 2019-11-26 RX ORDER — ALLOPURINOL 100 MG/1
100 TABLET ORAL DAILY
Qty: 30 TABLET | Refills: 2 | Status: SHIPPED | OUTPATIENT
Start: 2019-11-26 | End: 2020-11-25

## 2019-11-26 RX ORDER — HEPARIN 100 UNIT/ML
500 SYRINGE INTRAVENOUS
Status: DISCONTINUED | OUTPATIENT
Start: 2019-11-26 | End: 2019-11-26 | Stop reason: HOSPADM

## 2019-11-26 RX ORDER — SODIUM CHLORIDE 0.9 % (FLUSH) 0.9 %
10 SYRINGE (ML) INJECTION
Status: DISCONTINUED | OUTPATIENT
Start: 2019-11-26 | End: 2019-11-26 | Stop reason: HOSPADM

## 2019-11-26 RX ADMIN — SODIUM CHLORIDE 1000 ML: 0.9 INJECTION, SOLUTION INTRAVENOUS at 03:11

## 2019-11-26 NOTE — TELEPHONE ENCOUNTER
----- Message from Gunjan Montalvo MD sent at 11/26/2019  8:23 AM CST -----  Can this patient be scheduled for IVF 1L today in infusion? And repeat CMP/PO4/Mg/uric acid on 11/29 closer to [place]? Thank you

## 2019-11-26 NOTE — PLAN OF CARE
2299  Infusion completed, pt tolerated well; pt instructed to increase hydration of water-based fluids and discussed reason for same; discussed when to contact MD, when to report to ER; AVS given to and reviewed with pt, pt verbalized understanding of all discussed and when to report next

## 2019-11-26 NOTE — TELEPHONE ENCOUNTER
Patient with evidence of continued LACI Cr 1.9, and elevated Tbili 1.1. Starting allopurinol for TLS px. Attempting to schedule patient for fluids here and repeat labs later this week. She is feeling well, no electrolyte abnormalities. She will try to get apt with her PCP as recommended today or tomorrow. All questions answered.

## 2019-11-26 NOTE — NURSING
1450  Pt here for 1L NS, accompanied by spouse, no new complaints or concerns; pt reports hx of CHF and kidney stents; discussed treatment plan for today, all questions answered and pt agrees to proceed

## 2019-11-29 ENCOUNTER — LAB VISIT (OUTPATIENT)
Dept: LAB | Facility: HOSPITAL | Age: 54
End: 2019-11-29
Attending: STUDENT IN AN ORGANIZED HEALTH CARE EDUCATION/TRAINING PROGRAM
Payer: MEDICAID

## 2019-11-29 DIAGNOSIS — N17.8 ACUTE RENAL FAILURE WITH OTHER SPECIFIED PATHOLOGICAL LESION IN KIDNEY: ICD-10-CM

## 2019-11-29 LAB
ALBUMIN SERPL BCP-MCNC: 3.6 G/DL (ref 3.5–5.2)
ALP SERPL-CCNC: 50 U/L (ref 55–135)
ALT SERPL W/O P-5'-P-CCNC: 19 U/L (ref 10–44)
ANION GAP SERPL CALC-SCNC: 7 MMOL/L (ref 8–16)
AST SERPL-CCNC: 13 U/L (ref 10–40)
BILIRUB SERPL-MCNC: 1 MG/DL (ref 0.1–1)
BUN SERPL-MCNC: 34 MG/DL (ref 6–20)
CALCIUM SERPL-MCNC: 9.6 MG/DL (ref 8.7–10.5)
CHLORIDE SERPL-SCNC: 102 MMOL/L (ref 95–110)
CO2 SERPL-SCNC: 25 MMOL/L (ref 23–29)
CREAT SERPL-MCNC: 1.4 MG/DL (ref 0.5–1.4)
EST. GFR  (AFRICAN AMERICAN): 49.5 ML/MIN/1.73 M^2
EST. GFR  (NON AFRICAN AMERICAN): 42.9 ML/MIN/1.73 M^2
GLUCOSE SERPL-MCNC: 337 MG/DL (ref 70–110)
MAGNESIUM SERPL-MCNC: 1.9 MG/DL (ref 1.6–2.6)
PHOSPHATE SERPL-MCNC: 3.4 MG/DL (ref 2.7–4.5)
POTASSIUM SERPL-SCNC: 4.1 MMOL/L (ref 3.5–5.1)
PROT SERPL-MCNC: 6.7 G/DL (ref 6–8.4)
SODIUM SERPL-SCNC: 134 MMOL/L (ref 136–145)
URATE SERPL-MCNC: 4.9 MG/DL (ref 2.4–5.7)

## 2019-11-29 PROCEDURE — 83735 ASSAY OF MAGNESIUM: CPT

## 2019-11-29 PROCEDURE — 84100 ASSAY OF PHOSPHORUS: CPT

## 2019-11-29 PROCEDURE — 36415 COLL VENOUS BLD VENIPUNCTURE: CPT | Mod: PO

## 2019-11-29 PROCEDURE — 80053 COMPREHEN METABOLIC PANEL: CPT

## 2019-11-29 PROCEDURE — 84550 ASSAY OF BLOOD/URIC ACID: CPT

## 2019-12-02 LAB — HDV AB SER QL: NEGATIVE

## 2019-12-03 ENCOUNTER — TELEPHONE (OUTPATIENT)
Dept: PHARMACY | Facility: CLINIC | Age: 54
End: 2019-12-03

## 2019-12-03 DIAGNOSIS — B19.10 HEPATITIS B INFECTION WITHOUT DELTA AGENT WITHOUT HEPATIC COMA, UNSPECIFIED CHRONICITY: ICD-10-CM

## 2019-12-03 RX ORDER — ENTECAVIR 0.5 MG/1
0.5 TABLET, FILM COATED ORAL EVERY OTHER DAY
Qty: 15 TABLET | Refills: 6 | Status: SHIPPED | OUTPATIENT
Start: 2019-12-03 | End: 2020-01-14 | Stop reason: SDUPTHER

## 2019-12-03 RX ORDER — ENTECAVIR 0.5 MG/1
0.5 TABLET, FILM COATED ORAL EVERY OTHER DAY
Qty: 15 TABLET | Refills: 6 | Status: SHIPPED | OUTPATIENT
Start: 2019-12-03 | End: 2019-12-03

## 2019-12-03 NOTE — TELEPHONE ENCOUNTER
Attempted to contact patient regarding message from Ms. Juan but patient did not answer. Left patient a voicemail stating the purpose of the call. Awaiting patient call back.

## 2019-12-03 NOTE — TELEPHONE ENCOUNTER
Message received from [facility] pharmacy that pt cannot use our speciality pharmacy and needs to use pharmacy in [place] due to her insurance. Please notify pt that Hep B medication Entecavir was sent to the [facility] in [place]    Also, please notify her that the Entecavir dose should be taken every other (instead of every day), dose changed because of kidney function. Can discuss more at upcoming visit but she should now be taking it every other day

## 2019-12-03 NOTE — TELEPHONE ENCOUNTER
[facility]   [address]  [place], [phone] (Phone)  [phone] (Fax)

## 2019-12-04 ENCOUNTER — TELEPHONE (OUTPATIENT)
Dept: HEPATOLOGY | Facility: CLINIC | Age: 54
End: 2019-12-04

## 2019-12-04 NOTE — TELEPHONE ENCOUNTER
Contacted patient and informed her that I'm not sure who contacted her but it was not me. Informed patient that her chart was not noted with a encounter from anyone that I can route the call to. Patient stated ok.

## 2019-12-04 NOTE — TELEPHONE ENCOUNTER
----- Message from Shayla Mac sent at 12/4/2019  1:12 PM CST -----  Contact: Self  Pt is returning your call & requests you contact her.    She can be reached at [phone].    Thank you.

## 2019-12-05 ENCOUNTER — OFFICE VISIT (OUTPATIENT)
Dept: HEMATOLOGY/ONCOLOGY | Facility: CLINIC | Age: 54
End: 2019-12-05
Payer: MEDICAID

## 2019-12-05 ENCOUNTER — HOSPITAL ENCOUNTER (OUTPATIENT)
Dept: RADIOLOGY | Facility: HOSPITAL | Age: 54
Discharge: HOME | End: 2019-12-05
Attending: STUDENT IN AN ORGANIZED HEALTH CARE EDUCATION/TRAINING PROGRAM
Payer: MEDICAID

## 2019-12-05 VITALS
HEART RATE: 63 BPM | SYSTOLIC BLOOD PRESSURE: 123 MMHG | BODY MASS INDEX: 33.34 KG/M2 | RESPIRATION RATE: 18 BRPM | OXYGEN SATURATION: 97 % | HEIGHT: 66 IN | WEIGHT: 207.44 LBS | TEMPERATURE: 98 F | DIASTOLIC BLOOD PRESSURE: 67 MMHG

## 2019-12-05 DIAGNOSIS — N17.8 ACUTE RENAL FAILURE WITH OTHER SPECIFIED PATHOLOGICAL LESION IN KIDNEY: ICD-10-CM

## 2019-12-05 DIAGNOSIS — D47.9 B-CELL LYMPHOPROLIFERATIVE DISORDER: Primary | ICD-10-CM

## 2019-12-05 DIAGNOSIS — E79.0 ELEVATED URIC ACID IN BLOOD: ICD-10-CM

## 2019-12-05 DIAGNOSIS — D89.1 CRYOGLOBULINEMIC VASCULITIS: ICD-10-CM

## 2019-12-05 DIAGNOSIS — D47.9 B-CELL LYMPHOPROLIFERATIVE DISORDER: ICD-10-CM

## 2019-12-05 DIAGNOSIS — D50.8 OTHER IRON DEFICIENCY ANEMIA: ICD-10-CM

## 2019-12-05 DIAGNOSIS — R76.8 ELEVATED SERUM IMMUNOGLOBULIN FREE LIGHT CHAINS: ICD-10-CM

## 2019-12-05 LAB — POCT GLUCOSE: 237 MG/DL (ref 70–110)

## 2019-12-05 PROCEDURE — 99215 PR OFFICE/OUTPT VISIT, EST, LEVL V, 40-54 MIN: ICD-10-PCS | Mod: S$PBB,,, | Performed by: STUDENT IN AN ORGANIZED HEALTH CARE EDUCATION/TRAINING PROGRAM

## 2019-12-05 PROCEDURE — 99213 OFFICE O/P EST LOW 20 MIN: CPT | Mod: PBBFAC,25 | Performed by: STUDENT IN AN ORGANIZED HEALTH CARE EDUCATION/TRAINING PROGRAM

## 2019-12-05 PROCEDURE — 99999 PR PBB SHADOW E&M-EST. PATIENT-LVL III: ICD-10-PCS | Mod: PBBFAC,,, | Performed by: STUDENT IN AN ORGANIZED HEALTH CARE EDUCATION/TRAINING PROGRAM

## 2019-12-05 PROCEDURE — 99999 PR PBB SHADOW E&M-EST. PATIENT-LVL III: CPT | Mod: PBBFAC,,, | Performed by: STUDENT IN AN ORGANIZED HEALTH CARE EDUCATION/TRAINING PROGRAM

## 2019-12-05 PROCEDURE — 78815 PET IMAGE W/CT SKULL-THIGH: CPT | Mod: TC

## 2019-12-05 PROCEDURE — 99215 OFFICE O/P EST HI 40 MIN: CPT | Mod: S$PBB,,, | Performed by: STUDENT IN AN ORGANIZED HEALTH CARE EDUCATION/TRAINING PROGRAM

## 2019-12-05 PROCEDURE — 78815 NM PET CT ROUTINE: ICD-10-PCS | Mod: 26,PS,, | Performed by: RADIOLOGY

## 2019-12-05 PROCEDURE — 78815 PET IMAGE W/CT SKULL-THIGH: CPT | Mod: 26,PS,, | Performed by: RADIOLOGY

## 2019-12-05 PROCEDURE — A9552 F18 FDG: HCPCS

## 2019-12-05 NOTE — PROGRESS NOTES
PATIENT: Kendy Vital  MRN: [id number]  DATE: 12/5/2019      Diagnosis:   1. B-cell lymphoproliferative disorder    2. Other iron deficiency anemia    3. Elevated serum immunoglobulin free light chains    4. Cryoglobulinemic vasculitis    5. Acute renal failure with other specified pathological lesion in kidney    6. Elevated uric acid in blood        Chief Complaint: B-cell lymphoproliferative disorder      Subjective:   Ms. Vital is a 53-year-old female with lymphoproliferative disorder, GN, HBV, cryoglobulinemic vasculitis (type 3), who presents to the [facility]ology [facility] for follow-up.    Hematologic History  -Presented with severe thrombocytopenia, renal failure, and bilateral infiltrates on chest CT concerning for PNA.  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   -Patient does not have HCV, but is positive for HBV and has been started on anti-viral therapy with entecavir.  -Due to concern of possible hematologic malignancy, patient had bone marrow biopsy on 7/24 revealing B-cell lymphoproliferative disorders.  Her serum cryoglobulins returned as Type 3 and this was not c/w with a hematologic malignancy etiology for cryoglobulinemia  -Had 3 days of pulse steroids 1gm hydrocortisone, in addition to sessions of Plasma Exchange/Plasmapheresis  -She also received IVIG and Rituxan per Rheumatology  -On entecavir for HBV  -PET done 12/5/19 without significant adenopathy    Interval History: Patient seen today alone, still losing weight. Continues to have night sweat and hot flashes. Abdominal pain improved. No N/V/D. Peripheral edema improved.    Past Medical History:   Past Medical History:   Diagnosis Date    Renal vein thrombosis 2015    s/p embolectomy and lovenox for 9 mos    Uterine leiomyoma     s/p resection       Past Surgical HIstory: History reviewed. No pertinent surgical history.    Family  "History: History reviewed. No pertinent family history.    Social History:  reports that she has quit smoking. She has never used smokeless tobacco. She reports that she drinks alcohol. She reports that she does not use drugs.    Allergies:  Review of patient's allergies indicates:  No Known Allergies    Medications:  Current Outpatient Medications   Medication Sig Dispense Refill    allopurinol (ZYLOPRIM) 100 MG tablet Take 1 tablet (100 mg total) by mouth once daily. 30 tablet 2    blood sugar diagnostic Strp use to test blood gluocse 2 (two) times daily with meals. 100 strip 11    blood-glucose meter Misc Use as instructed 1 each 0    calcium carbonate (OS-DONTE) 500 mg calcium (1,250 mg) tablet Take 2 tablets (1,000 mg total) by mouth once daily.  0    carvedilol (COREG) 25 MG tablet Take 1 tablet (25 mg total) by mouth 2 (two) times daily. 60 tablet 11    cloNIDine (CATAPRES) 0.1 MG tablet Take 2 tablets (0.2 mg total) by mouth 3 (three) times daily. 180 tablet 11    dapsone 100 MG Tab Take 1 tablet (100 mg total) by mouth once daily. 30 tablet 11    entecavir (BARACLUDE) 0.5 MG Tab Take 1 tablet (0.5 mg total) by mouth every other day. 15 tablet 6    ferrous sulfate 325 (65 FE) MG EC tablet Take 1 tablet (325 mg total) by mouth once daily.  0    furosemide (LASIX) 40 MG tablet Take 40 mg by mouth once daily.  3    hydrALAZINE (APRESOLINE) 100 MG tablet Take 1 tablet (100 mg total) by mouth every 8 (eight) hours. 90 tablet 11    insulin aspart U-100 (NOVOLOG) 100 unit/mL (3 mL) InPn pen Inject 1-10 Units into the skin 3 (three) times daily. Use sliding scale provided in instructions 108 mL 0    insulin syringe-needle U-100 0.3 mL 30 gauge x 5/16" Syrg 1 each by Misc.(Non-Drug; Combo Route) route 2 (two) times daily with meals. 100 each 11    lancets 30 gauge Misc use to test blood glucose 2 (two) times daily with meals. 100 each 11    lancing device Misc 1 Device by Misc.(Non-Drug; Combo Route) " "route 2 (two) times daily with meals. 1 each 0    losartan (COZAAR) 25 MG tablet Take 25 mg by mouth once daily.  11    NIFEdipine (PROCARDIA-XL) 90 MG (OSM) 24 hr tablet Take 1 tablet (90 mg total) by mouth once daily. 30 tablet 11    pantoprazole (PROTONIX) 40 MG tablet Take 1 tablet (40 mg total) by mouth before breakfast. 30 tablet 11    predniSONE (DELTASONE) 10 MG tablet Take 4 tablets (40 mg total) by mouth once daily. 120 tablet 11    sodium bicarbonate 650 MG tablet Take 1 tablet (650 mg total) by mouth 3 (three) times daily. 90 tablet 11    vitamin D (VITAMIN D3) 1000 units Tab Take 1 tablet (1,000 Units total) by mouth once daily.       No current facility-administered medications for this visit.        Review of Systems   Constitutional: Positive for chills, fatigue and unexpected weight change. Negative for activity change, appetite change and fever.   HENT: Negative for mouth sores and nosebleeds.    Respiratory: Negative for cough and shortness of breath.    Cardiovascular: Positive for leg swelling. Negative for chest pain.   Gastrointestinal: Positive for abdominal pain. Negative for constipation, diarrhea, nausea and vomiting.   Endocrine: Negative for cold intolerance and heat intolerance.   Genitourinary: Negative for dysuria and hematuria.   Musculoskeletal: Negative for arthralgias and back pain.   Skin: Negative for rash.   Allergic/Immunologic: Negative for environmental allergies.   Neurological: Negative for light-headedness and numbness.   Hematological: Negative for adenopathy. Does not bruise/bleed easily.       ECOG Performance Status: 0   Objective:      Vitals:   Vitals:    12/05/19 1400   BP: 123/67   BP Location: Left arm   Patient Position: Sitting   BP Method: Large (Automatic)   Pulse: 63   Resp: 18   Temp: 98.2 °F (36.8 °C)   TempSrc: Oral   SpO2: 97%   Weight: 94.1 kg (207 lb 7.3 oz)   Height: 5' 6" (1.676 m)     BMI: Body mass index is 33.48 kg/m².    Physical Exam "   Constitutional: She is oriented to person, place, and time. She appears well-developed and well-nourished. No distress.   Eyes: EOM are normal.   Neck: Normal range of motion.   Cardiovascular: Normal rate and regular rhythm.   Pulmonary/Chest: Effort normal. No respiratory distress.   Abdominal: Soft. She exhibits no distension and no mass. There is no tenderness. There is no guarding.   Musculoskeletal: She exhibits no edema.   Neurological: She is alert and oriented to person, place, and time.   Skin: Skin is warm and dry.   Psychiatric: She has a normal mood and affect. Her behavior is normal.       Laboratory Data:  Hospital Outpatient Visit on 12/05/2019   Component Date Value Ref Range Status    POCT Glucose 12/05/2019 237* 70 - 110 mg/dL Final   Lab Visit on 11/29/2019   Component Date Value Ref Range Status    Sodium 11/29/2019 134* 136 - 145 mmol/L Final    Potassium 11/29/2019 4.1  3.5 - 5.1 mmol/L Final    Chloride 11/29/2019 102  95 - 110 mmol/L Final    CO2 11/29/2019 25  23 - 29 mmol/L Final    Glucose 11/29/2019 337* 70 - 110 mg/dL Final    BUN, Bld 11/29/2019 34* 6 - 20 mg/dL Final    Creatinine 11/29/2019 1.4  0.5 - 1.4 mg/dL Final    Calcium 11/29/2019 9.6  8.7 - 10.5 mg/dL Final    Total Protein 11/29/2019 6.7  6.0 - 8.4 g/dL Final    Albumin 11/29/2019 3.6  3.5 - 5.2 g/dL Final    Total Bilirubin 11/29/2019 1.0  0.1 - 1.0 mg/dL Final    Comment: For infants and newborns, interpretation of results should be based  on gestational age, weight and in agreement with clinical  observations.  Premature Infant recommended reference ranges:  Up to 24 hours.............<8.0 mg/dL  Up to 48 hours............<12.0 mg/dL  3-5 days..................<15.0 mg/dL  6-29 days.................<15.0 mg/dL      Alkaline Phosphatase 11/29/2019 50* 55 - 135 U/L Final    AST 11/29/2019 13  10 - 40 U/L Final    ALT 11/29/2019 19  10 - 44 U/L Final    Anion Gap 11/29/2019 7* 8 - 16 mmol/L Final    eGFR  if  11/29/2019 49.5* >60 mL/min/1.73 m^2 Final    eGFR if non African American 11/29/2019 42.9* >60 mL/min/1.73 m^2 Final    Comment: Calculation used to obtain the estimated glomerular filtration  rate (eGFR) is the CKD-EPI equation.       Magnesium 11/29/2019 1.9  1.6 - 2.6 mg/dL Final    Phosphorus 11/29/2019 3.4  2.7 - 4.5 mg/dL Final    Uric Acid 11/29/2019 4.9  2.4 - 5.7 mg/dL Final   BM biopsy 7/23/19:  -- MILDLY HYPERCELLULAR MARROW (60%) WITH TRILINEAGE HEMATOPOIESIS.  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  -- ADEQUATE NUMBER OF MEGAKARYOCYTES.  -- STAINABLE IRON IS NOT IDENTIFIED.  -- SEE COMMENT.  COMMENT:  CBC data shows microcytic anemia and thrombocytopenia.  Flow cytometric analysis of bone marrow detects a kappa restricted plasma cell population that expresses , CD19, and bright CD38. CD20 and CD56 are negative. Polytypic B lymphocytes are detected. T lymphocytes are immunophenotypically unremarkable. Blasts gate is not increased. Flow differential: Lymphocytes 5.9%, Monocytes 4.6%, Granulocytes 85.8%, Blast 1.2%.  Immunohistochemical stains are performed on the biopsy core and clot section for greater sensitivity and further architectural assessment with adequate controls. -positive plasma cells comprise approximately 4-5% of thetotal cellularity. Immunoglobulin kappa and lambda light chains situ hybridization study reveals kappa light chainrestriction is some areas. The lymphoid aggregates are composed of mixed CD20-positive B-cells and CD3-positive T-cells. B-cells are more numerous than T-cells      Narrative     EXAMINATION:  NM PET CT ROUTINE    CLINICAL HISTORY:  lymphoma; Neoplasm of uncertain behavior of lymphoid, hematopoietic and related tissue, unspecified    TECHNIQUE:  13.2 mCi of F18-FDG was administered intravenously in the right antecubital fossa.  After an approximately 60 min distribution time, PET/CT images were  acquired from the skull base to the mid thigh. Transmission images were acquired to correct for attenuation using a whole body low-dose CT scan without contrast with the arms positioned above the head. Glycemia at the time of injection was 237 mg/dL.    COMPARISON:  CT chest abdomen pelvis with contrast 07/26/2019    FINDINGS:  Quality of the study: Adequate.    In the head and neck, there are no hypermetabolic lesions worrisome for malignancy. There are no hypermetabolic mucosal lesions, and there are no pathologically enlarged or hypermetabolic lymph nodes.    In the chest, there are no hypermetabolic lesions worrisome for malignancy.  There are no concerning pulmonary nodules or masses, and there are no pathologically enlarged or hypermetabolic lymph nodes.    In the abdomen and pelvis, there is physiologic tracer distribution within the abdominal organs and excretion into the genitourinary system.    The spleen has normal uptake and is normal in size measuring 6.4 cm in craniocaudal dimension.    In the bones, there is diffuse mildly increased uptake throughout the axial skeleton, slightly less than that of background hepatic uptake.    Incidental CT findings: A stent is present in the proximal right renal artery.  There is a stable 0.7 cm solid nodule in the inferior lingular segment (axial series 3, image 75) with no readily apparent increased radiotracer uptake.  An additional nodule is present in the superior or anteromedial basal segment of the left lower lobe (series 3, image 73), which was likely present on prior study of 07/26/2019 on axial image 39, though appears more conspicuous on today's study.  A non-hypermetabolic fluid density collection is present in the right hemipelvis, nonspecific in etiology, though unchanged compared to prior study.      Impression       1.  In this patient with biopsy-proven B-cell lymphoma, there is diffuse mildly increased FDG avidity throughout the axial skeleton, which  could reflect underlying physiologic marrow activity or low-grade malignancy.  No evidence of transformed disease.    2. Two solid nodules measuring up to 0.7 cm in the left lung, one appears stable and the other more conspicuous than on prior study of 07/26/2019, both of which likely fall beneath the minimum size criteria for metabolic assessment.  In the setting of malignancy clinical consideration will determine the role and schedule for continued surveillance.    3. Stable non-hypermetabolic fluid collection in the right hemipelvis, unchanged compared to prior CT.    4.  Additional findings as detailed above.    I, Jey Wallace MD, attest that I reviewed and interpreted the images.       Assessment:       1. B-cell lymphoproliferative disorder    2. Other iron deficiency anemia    3. Elevated serum immunoglobulin free light chains    4. Cryoglobulinemic vasculitis    5. Acute renal failure with other specified pathological lesion in kidney    6. 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  2) IgM kappa MGUS  - Patient with new renal failure, has normal IgG.  - Skeletal survey (-) for lytic lesions  - CT neck/C/A/P done showed small mediastinal LN and precarinal LN 1.2x2cm  - Cryglobulin type III which does not indicate hematologic malignancy as a source  - PET 12/5/19 without significant adenopathy  - Patient got Rituxan per rheumatology in July 2019, will hold off further treatment at this time from lymphoma standpoint  - Labs today: CBC, CMP, uric acid, cryoglobulin, LDH.     3) Iron deficiency anemia  -To continue PO ferrous sulfate    4) Cryoglobulin vasculitis, GN, HBV  -Per rheumatology/nephrology  -On entecavir for HBV    5) DM  -On insulin    6) LACI  -Improved  -To f/u with nephrology  -Continue allopurinol    Follow-up: Labs today: CBC, CMP, uric acid, cryoglobulin, LDH. Visit to be determined based on lab  results    The following was staffed and discussed with supervising physician Dr. Overton.    Gunjan Montalvo MD  Hematology/Oncology Fellow

## 2019-12-05 NOTE — TELEPHONE ENCOUNTER
Entecavir f/u -  Patient notified that [facility] [place]. She picked it up yesterday, 12/5 and has started.     Reviewed with her that entecavir dose should be taken every other day (instead of every day), dose changed because of kidney function. Aware office with f/u with labs and alert her if any further adjustment is required.

## 2019-12-19 ENCOUNTER — HOSPITAL ENCOUNTER (OUTPATIENT)
Dept: RADIOLOGY | Facility: HOSPITAL | Age: 54
Discharge: HOME | End: 2019-12-19
Attending: NURSE PRACTITIONER
Payer: MEDICAID

## 2019-12-19 DIAGNOSIS — B19.10 HEPATITIS B INFECTION WITHOUT DELTA AGENT WITHOUT HEPATIC COMA, UNSPECIFIED CHRONICITY: ICD-10-CM

## 2019-12-19 PROCEDURE — 76700 US ABDOMEN COMPLETE: ICD-10-PCS | Mod: 26,,, | Performed by: RADIOLOGY

## 2019-12-19 PROCEDURE — 76700 US EXAM ABDOM COMPLETE: CPT | Mod: 26,,, | Performed by: RADIOLOGY

## 2019-12-19 PROCEDURE — 76700 US EXAM ABDOM COMPLETE: CPT | Mod: TC

## 2019-12-20 ENCOUNTER — TELEPHONE (OUTPATIENT)
Dept: HEPATOLOGY | Facility: CLINIC | Age: 54
End: 2019-12-20

## 2019-12-20 NOTE — TELEPHONE ENCOUNTER
Attempted to contact patient and reschedule missed follow up appointment along with fibroscan but patient did not answer. Left patient a call stating the purpose for the call. Awaiting a call back.

## 2019-12-20 NOTE — TELEPHONE ENCOUNTER
----- Message from Pily Khan sent at 12/20/2019 11:38 AM CST -----  discuss liver CT results bc pt states her stomach is swelling and she's having discomfort/CT completed yesterday [facility]    Pt contact [phone]

## 2019-12-20 NOTE — TELEPHONE ENCOUNTER
I did not order a CT so she may be referring to something else. She did have an US done yesterday and no significant abnormality noted on the US. So if she is having abd swelling and/or discomfort, she should be evaluate by PCP, urgent care, or GI    She was supposed to return for f/u with me with fibroscan on the same day but she no showed, please reschedule visit with me with fibroscan on the same day so that we can review all of her testing and determine her f/u plan    Thanks

## 2020-01-03 ENCOUNTER — TELEPHONE (OUTPATIENT)
Dept: HEPATOLOGY | Facility: CLINIC | Age: 55
End: 2020-01-03

## 2020-01-03 NOTE — TELEPHONE ENCOUNTER
Attempted to contact patient and reschedule missed appointments and to see which results she were inquiring about. Left patient a voicemail stating the purpose for the call. Awaiting a call back.

## 2020-01-03 NOTE — TELEPHONE ENCOUNTER
"----- Message from Rosario Nguyen sent at 1/3/2020  2:03 PM CST -----  Contact: Kendy   Requesting Results of Testing    Name of Test (Lab/Mammo/Etc):    Date of Test: 12/19  Ordering Provider:  Bobby Juan MD  Where the test was performed?: [facility]  n[place] User?:  No   Caller: Kendy Vital   Contact Preference?: [phone]     Additional Information:  "Thank you for all that you do for our patients'"  "

## 2020-01-03 NOTE — TELEPHONE ENCOUNTER
Contacted patient and scheduled appointment as instructed by Ms. Juan. Patient agreed to time and date of appointment. Sent reminder in the mail.

## 2020-01-14 ENCOUNTER — OFFICE VISIT (OUTPATIENT)
Dept: HEPATOLOGY | Facility: CLINIC | Age: 55
End: 2020-01-14
Payer: MEDICAID

## 2020-01-14 ENCOUNTER — PROCEDURE VISIT (OUTPATIENT)
Dept: HEPATOLOGY | Facility: CLINIC | Age: 55
End: 2020-01-14
Payer: MEDICAID

## 2020-01-14 VITALS
TEMPERATURE: 99 F | RESPIRATION RATE: 18 BRPM | WEIGHT: 217.13 LBS | HEIGHT: 66 IN | DIASTOLIC BLOOD PRESSURE: 70 MMHG | HEART RATE: 70 BPM | BODY MASS INDEX: 34.9 KG/M2 | SYSTOLIC BLOOD PRESSURE: 129 MMHG | OXYGEN SATURATION: 99 %

## 2020-01-14 DIAGNOSIS — R94.5 ABNORMAL LIVER FUNCTION: ICD-10-CM

## 2020-01-14 DIAGNOSIS — E66.9 OBESITY (BMI 30-39.9): ICD-10-CM

## 2020-01-14 DIAGNOSIS — Z23 NEED FOR HEPATITIS A VACCINATION: ICD-10-CM

## 2020-01-14 DIAGNOSIS — K76.9 LIVER DISEASE: ICD-10-CM

## 2020-01-14 DIAGNOSIS — I10 ESSENTIAL HYPERTENSION: ICD-10-CM

## 2020-01-14 DIAGNOSIS — B18.1 CHRONIC VIRAL HEPATITIS B WITHOUT DELTA AGENT AND WITHOUT COMA: Primary | ICD-10-CM

## 2020-01-14 PROBLEM — D80.1 HYPOGAMMAGLOBULINEMIA: Status: RESOLVED | Noted: 2019-08-05 | Resolved: 2020-01-14

## 2020-01-14 PROCEDURE — 99214 PR OFFICE/OUTPT VISIT, EST, LEVL IV, 30-39 MIN: ICD-10-PCS | Mod: S$PBB,,, | Performed by: NURSE PRACTITIONER

## 2020-01-14 PROCEDURE — 99999 PR PBB SHADOW E&M-EST. PATIENT-LVL V: ICD-10-PCS | Mod: PBBFAC,,, | Performed by: NURSE PRACTITIONER

## 2020-01-14 PROCEDURE — 91200 FIBROSCAN (VIBRATION CONTROLLED TRANSIENT ELASTOGRAPHY): ICD-10-PCS | Mod: 26,S$PBB,, | Performed by: NURSE PRACTITIONER

## 2020-01-14 PROCEDURE — 91200 LIVER ELASTOGRAPHY: CPT | Mod: PBBFAC | Performed by: NURSE PRACTITIONER

## 2020-01-14 PROCEDURE — 91200 LIVER ELASTOGRAPHY: CPT | Mod: 26,S$PBB,, | Performed by: NURSE PRACTITIONER

## 2020-01-14 PROCEDURE — 99215 OFFICE O/P EST HI 40 MIN: CPT | Mod: PBBFAC,25 | Performed by: NURSE PRACTITIONER

## 2020-01-14 PROCEDURE — 99999 PR PBB SHADOW E&M-EST. PATIENT-LVL V: CPT | Mod: PBBFAC,,, | Performed by: NURSE PRACTITIONER

## 2020-01-14 PROCEDURE — 99214 OFFICE O/P EST MOD 30 MIN: CPT | Mod: S$PBB,,, | Performed by: NURSE PRACTITIONER

## 2020-01-14 RX ORDER — ENTECAVIR 0.5 MG/1
0.5 TABLET, FILM COATED ORAL EVERY OTHER DAY
Qty: 15 TABLET | Refills: 6 | Status: SHIPPED | OUTPATIENT
Start: 2020-01-14 | End: 2021-01-20 | Stop reason: SDUPTHER

## 2020-01-14 NOTE — PROCEDURES
FibroScan (Vibration Controlled Transient Elastography)  Date/Time: 1/14/2020 8:45 AM  Performed by: Yessica Rosales NP  Authorized by: Beto Palomino MD     Diagnosis:  HBV    Probe:  M    Universal Protocol: Patient's identity, procedure and site were verified, confirmatory pause was performed.  Discussed procedure including risks and potential complications.  Questions answered.  Patient verbalizes understanding and wishes to proceed with VCTE.     Procedure: After providing explanations of the procedure, patient was placed in the supine position with right arm in maximum abduction to allow optimal exposure of right lateral abdomen.  Patient was briefly assessed, Testing was performed in the mid-axillary location, 50Hz Shear Wave pulses were applied and the resulting Shear Wave and Propagation Speed detected with a 3.5 MHz ultrasonic signal, using the FibroScan probe, Skin to liver capsule distance and liver parenchyma were accessed during the entire examination with the FibroScan probe, Patient was instructed to breathe normally and to abstain from sudden movements during the procedure, allowing for random measurements of liver stiffness. At least 10 Shear Waves were produced, Individual measurements of each Shear Wave were calculated.  Patient tolerated the procedure well with no complications.  Meets discharge criteria as was dismissed.  Rates pain 0 out of 10.  Patient will follow up with ordering provider to review results.      Findings  Median liver stiffness score:  4.1  CAP Reading: dB/m:  257    IQR/med %:  2  Interpretation  Fibrosis interpretation is based on medial liver stiffness - Kilopascal (kPa).    Fibrosis Stage:  F 0-1  Steatosis interpretation is based on controlled attenuation parameter - (dB/m).    Steatosis Grade:  S1

## 2020-01-14 NOTE — PROGRESS NOTES
"[facility] Established Patient Visit    Reason for Visit:  Chronic Hep B    PCP: To Obtain Unable    HPI:  This is a 54 y.o. female with PMH noted below, here for follow up of   chronic Hepatitis B     Was diagnosed during hospitalization 7/2019 (hospitalized for eval of thrombocytopenia, Rheumatology and Nephrology continued to follow for management of her Cryolobinemic Vasculitis with Glomerulonephritis) , no history or knowledge of Hep B before then. Was starting on Entecavir during hospitalization 7/2019    Fibroscan 1/2020 noted no fibrosis (kPA 4.1), S1 steatosis ()    Interval HPI: Presents today alone. Doing well. Fibroscan today noted no fibrosis. Reports timur should be getting tested/vaccinated for Hep B soon with his PCP. No other people living with pt     Risk factors:  Denies mother or sexual partners with Hep B, no travel outside the country, no h/o HIV, no h/o IVDU or intranasal drug use, no  no blood transfusions before 1992  Only notable risk factor is homeade tattoo "years ago"  (at home from )    Labs done 12/2019 show normal transaminase levels, synthetic liver function  normal    Lab Results   Component Value Date    ALT 19 12/05/2019    AST 14 12/05/2019    ALKPHOS 53 (L) 12/05/2019    BILITOT 1.1 (H) 12/05/2019    ALBUMIN 3.7 12/05/2019    INR 1.0 11/21/2019     12/05/2019       Hep C testing negative  HIV testing negative    Hep B labs done 11/2019:  -- + eAb, - eAg  -- DNA undetected   -- + sAg  Negative Delta    Pevious serologic w/u negative for hemochromatosis    HCC screening:  Abd U/S done 12/2019 showed no liver lesions. Normal spleen - next due 6/2020  AFP WNL, next due 6/2020  Previous EGD -not indicated currently     Risk factors for NAFLD include obesity, HTN, Steroid induced DM    Denies family history of liver disease . Denies alcohol consumption      Immunity to Hep A  NEEDS VACCINE, started 7/2019, last due 1/2020    Denies jaundice, dark " urine, abdominal distention, hematemesis, melena, slowed mentation. No abnormal skin rashes. No generalized joint or muscle pain.     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    PSHX:  has no past surgical history on file.    The patient's social and family histories were reviewed by me and updated in the appropriate section of the electronic medical record.    Review of patient's allergies indicates:  No Known Allergies    Current Outpatient Medications on File Prior to Visit   Medication Sig Dispense Refill    allopurinol (ZYLOPRIM) 100 MG tablet Take 1 tablet (100 mg total) by mouth once daily. 30 tablet 2    blood sugar diagnostic Strp use to test blood gluocse 2 (two) times daily with meals. 100 strip 11    blood-glucose meter Misc Use as instructed 1 each 0    calcium carbonate (OS-DONTE) 500 mg calcium (1,250 mg) tablet Take 2 tablets (1,000 mg total) by mouth once daily.  0    carvedilol (COREG) 25 MG tablet Take 1 tablet (25 mg total) by mouth 2 (two) times daily. 60 tablet 11    cloNIDine (CATAPRES) 0.1 MG tablet Take 2 tablets (0.2 mg total) by mouth 3 (three) times daily. 180 tablet 11    dapsone 100 MG Tab Take 1 tablet (100 mg total) by mouth once daily. 30 tablet 11    entecavir (BARACLUDE) 0.5 MG Tab Take 1 tablet (0.5 mg total) by mouth every other day. 15 tablet 6    ferrous sulfate 325 (65 FE) MG EC tablet Take 1 tablet (325 mg total) by mouth once daily.  0    furosemide (LASIX) 40 MG tablet Take 40 mg by mouth once daily.  3    hydrALAZINE  "(APRESOLINE) 100 MG tablet Take 1 tablet (100 mg total) by mouth every 8 (eight) hours. 90 tablet 11    insulin aspart U-100 (NOVOLOG) 100 unit/mL (3 mL) InPn pen Inject 1-10 Units into the skin 3 (three) times daily. Use sliding scale provided in instructions 108 mL 0    insulin syringe-needle U-100 0.3 mL 30 gauge x 5/16" Syrg 1 each by Misc.(Non-Drug; Combo Route) route 2 (two) times daily with meals. 100 each 11    lancets 30 gauge Misc use to test blood glucose 2 (two) times daily with meals. 100 each 11    lancing device Misc 1 Device by Misc.(Non-Drug; Combo Route) route 2 (two) times daily with meals. 1 each 0    losartan (COZAAR) 25 MG tablet Take 25 mg by mouth once daily.  11    NIFEdipine (PROCARDIA-XL) 90 MG (OSM) 24 hr tablet Take 1 tablet (90 mg total) by mouth once daily. 30 tablet 11    pantoprazole (PROTONIX) 40 MG tablet Take 1 tablet (40 mg total) by mouth before breakfast. 30 tablet 11    predniSONE (DELTASONE) 10 MG tablet Take 4 tablets (40 mg total) by mouth once daily. 120 tablet 11    sodium bicarbonate 650 MG tablet Take 1 tablet (650 mg total) by mouth 3 (three) times daily. 90 tablet 11    vitamin D (VITAMIN D3) 1000 units Tab Take 1 tablet (1,000 Units total) by mouth once daily.       No current facility-administered medications on file prior to visit.        SOCIAL HISTORY:   Social History     Tobacco Use   Smoking Status Former Smoker   Smokeless Tobacco Never Used       Social History     Substance and Sexual Activity   Alcohol Use Not Currently       Social History     Substance and Sexual Activity   Drug Use Never       ROS:   GENERAL: Denies fever, chills, weight loss/gain, fatigue  HEENT: Denies headaches, dizziness, vision/hearing changes  CARDIOVASCULAR: Denies chest pain, palpitations, or edema  RESPIRATORY: Denies dyspnea, cough  GI: Denies abdominal pain, rectal bleeding, nausea, vomiting. No change in bowel pattern or color  : Denies dysuria, hematuria   SKIN: " "Denies rash, itching   NEURO: Denies confusion, memory loss, or mood changes  PSYCH: Denies depression or anxiety  HEME/LYMPH: Denies easy bruising or bleeding    Objective Findings:    PHYSICAL EXAM:   Friendly Black or  female, in no acute distress; alert and oriented to person, place and time  VITALS: /70 (BP Location: Right arm, Patient Position: Sitting, BP Method: Medium (Automatic))   Pulse 70   Temp 98.8 °F (37.1 °C) (Oral)   Resp 18   Ht 5' 6" (1.676 m)   Wt 98.5 kg (217 lb 2.5 oz)   SpO2 99%   BMI 35.05 kg/m²   HENT: Normocephalic, without obvious abnormality. Oral mucosa pink and moist. Dentition good.  EYES: Sclerae anicteric. No conjunctival pallor.   NECK: Supple. No masses or cervical adenopathy.  CARDIOVASCULAR: Regular rate and rhythm. No murmurs.  RESPIRATORY: Normal respiratory effort. BBS CTA. No wheezes or crackles.  GI: Soft, non-tender, non-distended. No hepatosplenomegaly. No masses palpable. No ascites.  EXTREMITIES:  No clubbing, cyanosis or edema.  SKIN: Warm and dry. No jaundice. No rashes noted to exposed skin. No telangectasias noted. No palmar erythema.  NEURO:  Normal gait. No asterixis.  PSYCH:  Memory intact. Thought and speech pattern appropriate. Behavior normal. No depression or anxiety noted.    DIAGNOSTIC STUDIES:    ABD. U/S-    Done 12/2019  FINDINGS:  Liver: Normal in size, measuring 16.6 cm. Homogeneous echotexture.  No focal hepatic lesions.    Gallbladder: No calculi, wall thickening, or pericholecystic fluid.  No sonographic Brown's sign.    Biliary system: The common duct is not dilated, measuring 4 mm.  No intrahepatic ductal dilatation.    Spleen: Normal in size with a homogeneous echotexture, measuring 8.3 x 4.2 cm.    Pancreas: The visualized portions of pancreas appear normal.    Right kidney: Normal in size with no hydronephrosis, measuring 11.3 cm.    Left kidney:  Normal in size with no hydronephrosis, measuring 12.3 cm.  Simple " "renal cyst measuring approximately 0.7 x 0.5 x 0.8 cm.    Aorta: No aneurysm.    Inferior vena cava: Normal in appearance.    Miscellaneous: No ascites.      Impression       No significant sonographic abnormality.       LIVER BIOPSY-   None in past    FIBROSCAN - done 1/2020  Median liver stiffness score:  4.1  CAP Reading: dB/m:  257  Fibrosis Stage:  F 0-1  Steatosis Grade:  S1      EDUCATION:  -- Avoid alcohol. Avoid raw seafood.   -- Discussed transmission of HBV, including sexual transmission. Avoid sharing razors/toothbrushes, etc.   -- Discussed importance of Hep B screening for all current and previous sexual partners, as well as complete Hep B vaccination for all current sexual partners. For sexual partners who have not completed the Hep B vaccine series, barrier sexual protection is recommended to prevent spread of the virus.  -- Natural history of HBV including progression to cirrhosis reviewed.   -- We discussed the increased risk of hepatocellular carcinoma due to active hepatitis B infection. Continued screening every six months with ultrasound and AFP is recommended.   -- Discussed potential outcomes of cirrhosis, including liver cancer, liver failure, liver transplant, death.   -- Limit acetaminophen to 2000mg daily.  -- Advised immunization of all household members.  -- Discussed importance of compliance with medication regimen and risk of viral mutation if treatment is stopped prematurely.       ASSESSMENT & PLAN:  54 y.o. Black or  female with:  1. Chronic hepatitis B, E Ag neg, E Ab pos, diagnosed 7/2019  -- On Enctevair 05 mg every other day, continue dose due to renal function (GFR 30-49), refill sent to local pharmacy   -- transaminases WNL  -- HBV DNA undetectable 11/2019  -- synthetic liver function WNL  -- immunity to Hep A per labs : needs last vaccine 1/2020  -- Only notable risk factor is homeade tattoo "years ago"  (at home from )  -- family members or partners " that need to be tested : fiance  -- Fibroscan 1/2020 noted  No fibrosis, repeat in 2022  -- screening for Hep C and HIV negatve  -- HCC screening Q 6 months with AFP and abd. U/S - both next due 6/2020  -- Last EGD not indicated at this time   -- plan for eAg negative CH[facility] since DNA  <2000), CMP and DNA every 6 months, fibroscan every 2-3 years  -- Sexual partners and family members in household need to be tested and vaccinated if negative. Must use protection for sex (Hep B)        Follow up in about 6 months (around 7/14/2020). with US and labs same day (due to travel)  Fibroscan repeat due 2022    Thank you for allowing me to participate in the care of SHARON WaltonC    CC'ed note to:   No ref. provider found  To Obtain Unable

## 2020-01-14 NOTE — PATIENT INSTRUCTIONS
1. Fibroscan today to look for fat or scar tissue in the liver - did not show any scar tissue in the liver  2. Continue Entecavir 0.5 mg every other day for Hep B    -- Avoid alcohol. Avoid raw seafood.   -- Hepatitis B is spread through blood and bodily fluids. Do not share  razors/toothbrushes, etc, with others   -- it is possible that Hepatitis B can cause scar tissue in the liver, which can progress to cirrhosis.   -- Hepatitis B, in some cases, has a higher risk of liver cancer (hepatocellular carcinoma), especially with active hepatitis B infection. We will perform liver cancer screening every six months with ultrasound and AFP along with a clinic visit every 6 months  -- If you develop cirrhosis, it is important that we monitor you closely, as cirrhosis can cause liver cancer, liver failure, liver transplant, death.   -- Limit acetaminophen to 2000mg daily.  -- Recommended that all 1st degree relatives, household members and sexual contacts are screened for Hepatitis B. If they are negative for Hepatitis B, they should be vaccinated against Hepatitis B to protect themselves from venancio the virus  -- It is important for all current and previous sexual partners to be tested for Hepatitis B as well as complete Hep B vaccination. For sexual partners who have not completed the Hep B vaccine series, barrier sexual protection is recommended to prevent spread of the virus.  -- It is important that you take your Hepatitis B medication as prescribed without any missed doses, as missing doses or stopping the medication can cause the Hepatitis B virus to change and make it difficult to treat       However, your immunity markers show that you do NOT have immunity against Hepatitis A, so I recommend that you receive the Hepatitis A vaccine. This will protect your liver from the virus, which can make your liver very sick. The vaccine series is 2 vaccines: one now and one 6 months after the 1st one. I sent the vaccine  to the [facility] pharmacy. They will determine if the vaccine is covered by your insurance and arrange the vaccine if it is covered. Their number is [phone] if you have any questions or need to contact them.     If the vaccine is not covered at the pharmacy level, let me know and I can send an order and arrange for it to be given to you in the infectious disease department    Let me know if you have any questions

## 2020-01-16 ENCOUNTER — LAB VISIT (OUTPATIENT)
Dept: LAB | Facility: HOSPITAL | Age: 55
End: 2020-01-16
Payer: MEDICAID

## 2020-01-16 ENCOUNTER — OFFICE VISIT (OUTPATIENT)
Dept: HEMATOLOGY/ONCOLOGY | Facility: CLINIC | Age: 55
End: 2020-01-16
Payer: MEDICAID

## 2020-01-16 VITALS
BODY MASS INDEX: 35.5 KG/M2 | TEMPERATURE: 98 F | HEIGHT: 66 IN | SYSTOLIC BLOOD PRESSURE: 142 MMHG | RESPIRATION RATE: 18 BRPM | HEART RATE: 75 BPM | WEIGHT: 220.88 LBS | DIASTOLIC BLOOD PRESSURE: 81 MMHG | OXYGEN SATURATION: 99 %

## 2020-01-16 DIAGNOSIS — N00.8 ACUTE (DIFFUSE) PROLIFERATIVE GLOMERULONEPHRITIS: ICD-10-CM

## 2020-01-16 DIAGNOSIS — N17.8 ACUTE RENAL FAILURE WITH OTHER SPECIFIED PATHOLOGICAL LESION IN KIDNEY: ICD-10-CM

## 2020-01-16 DIAGNOSIS — D89.1 CRYOGLOBULINEMIC VASCULITIS: ICD-10-CM

## 2020-01-16 DIAGNOSIS — R76.8 ELEVATED SERUM IMMUNOGLOBULIN FREE LIGHT CHAINS: ICD-10-CM

## 2020-01-16 DIAGNOSIS — D64.89 OTHER SPECIFIED ANEMIAS: ICD-10-CM

## 2020-01-16 DIAGNOSIS — D47.9 B-CELL LYMPHOPROLIFERATIVE DISORDER: Primary | ICD-10-CM

## 2020-01-16 DIAGNOSIS — D80.1 HYPOGAMMAGLOBULINEMIA: ICD-10-CM

## 2020-01-16 DIAGNOSIS — D47.9 B-CELL LYMPHOPROLIFERATIVE DISORDER: ICD-10-CM

## 2020-01-16 LAB
ALBUMIN SERPL BCP-MCNC: 3.5 G/DL (ref 3.5–5.2)
ALP SERPL-CCNC: 61 U/L (ref 55–135)
ALT SERPL W/O P-5'-P-CCNC: 23 U/L (ref 10–44)
ANION GAP SERPL CALC-SCNC: 9 MMOL/L (ref 8–16)
AST SERPL-CCNC: 19 U/L (ref 10–40)
BILIRUB SERPL-MCNC: 0.2 MG/DL (ref 0.1–1)
BUN SERPL-MCNC: 31 MG/DL (ref 6–20)
CALCIUM SERPL-MCNC: 9.8 MG/DL (ref 8.7–10.5)
CHLORIDE SERPL-SCNC: 106 MMOL/L (ref 95–110)
CO2 SERPL-SCNC: 24 MMOL/L (ref 23–29)
CREAT SERPL-MCNC: 1.1 MG/DL (ref 0.5–1.4)
EST. GFR  (AFRICAN AMERICAN): >60 ML/MIN/1.73 M^2
EST. GFR  (NON AFRICAN AMERICAN): 57.5 ML/MIN/1.73 M^2
GLUCOSE SERPL-MCNC: 244 MG/DL (ref 70–110)
PHOSPHATE SERPL-MCNC: 3.2 MG/DL (ref 2.7–4.5)
POTASSIUM SERPL-SCNC: 4.4 MMOL/L (ref 3.5–5.1)
PROT SERPL-MCNC: 7.1 G/DL (ref 6–8.4)
SODIUM SERPL-SCNC: 139 MMOL/L (ref 136–145)
URATE SERPL-MCNC: 4.2 MG/DL (ref 2.4–5.7)

## 2020-01-16 PROCEDURE — 84100 ASSAY OF PHOSPHORUS: CPT

## 2020-01-16 PROCEDURE — 99215 OFFICE O/P EST HI 40 MIN: CPT | Mod: S$PBB,,, | Performed by: STUDENT IN AN ORGANIZED HEALTH CARE EDUCATION/TRAINING PROGRAM

## 2020-01-16 PROCEDURE — 99213 OFFICE O/P EST LOW 20 MIN: CPT | Mod: PBBFAC | Performed by: STUDENT IN AN ORGANIZED HEALTH CARE EDUCATION/TRAINING PROGRAM

## 2020-01-16 PROCEDURE — 80053 COMPREHEN METABOLIC PANEL: CPT

## 2020-01-16 PROCEDURE — 99215 PR OFFICE/OUTPT VISIT, EST, LEVL V, 40-54 MIN: ICD-10-PCS | Mod: S$PBB,,, | Performed by: STUDENT IN AN ORGANIZED HEALTH CARE EDUCATION/TRAINING PROGRAM

## 2020-01-16 PROCEDURE — 84550 ASSAY OF BLOOD/URIC ACID: CPT

## 2020-01-16 PROCEDURE — 99999 PR PBB SHADOW E&M-EST. PATIENT-LVL III: CPT | Mod: PBBFAC,,, | Performed by: STUDENT IN AN ORGANIZED HEALTH CARE EDUCATION/TRAINING PROGRAM

## 2020-01-16 PROCEDURE — 36415 COLL VENOUS BLD VENIPUNCTURE: CPT

## 2020-01-16 PROCEDURE — 99999 PR PBB SHADOW E&M-EST. PATIENT-LVL III: ICD-10-PCS | Mod: PBBFAC,,, | Performed by: STUDENT IN AN ORGANIZED HEALTH CARE EDUCATION/TRAINING PROGRAM

## 2020-01-16 NOTE — PROGRESS NOTES
PATIENT: Kendy Vital  MRN: [id number]  DATE: 1/16/2020      Diagnosis:   1. B-cell lymphoproliferative disorder    2. Other specified anemias    3. Elevated serum immunoglobulin free light chains    4. Hypogammaglobulinemia    5. Acute (diffuse) proliferative glomerulonephritis    6. Acute renal failure with other specified pathological lesion in kidney    7. Cryoglobulinemic vasculitis        Chief Complaint: No chief complaint on file.      Subjective:   Ms. Vital is a 53-year-old female with lymphoproliferative disorder, GN, HBV, cryoglobulinemic vasculitis (type 3), who presents to the [facility] for follow-up.    Hematologic History  -Presented with severe thrombocytopenia, renal failure, and bilateral infiltrates on chest CT concerning for PNA.  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   -Patient does not have HCV, but is positive for HBV and has been started on anti-viral therapy with entecavir.  -Due to concern of possible hematologic malignancy, patient had bone marrow biopsy on 7/24 revealing B-cell lymphoproliferative disorders.  Her serum cryoglobulins returned as Type 3 and this was not c/w with a hematologic malignancy etiology for cryoglobulinemia  -Had 3 days of pulse steroids 1gm hydrocortisone, in addition to sessions of Plasma Exchange/Plasmapheresis  -She also received IVIG and Rituxan per Rheumatology  -On entecavir for HBV  -PET done 12/5/19 without significant adenopathy    Interval History: Patient seen today alone, gained 13 lbs in last 6 weeks. Denies night sweat and hot flashes. Denies lymphadenopathy. Still some abdominal pain 5/10. No N/V/D. Peripheral edema still present.    Past Medical History:   Past Medical History:   Diagnosis Date    Acute (diffuse) proliferative glomerulonephritis     Acute renal failure 7/19/2019    B-cell lymphoproliferative disorder  7/30/2019    Chronic obstructive lung disease 7/27/2019    Chronic viral hepatitis B without delta agent and without coma 7/24/2019    Cryoglobulinemic vasculitis     Hypogammaglobulinemia 8/5/2019    Iron deficiency anemia 7/30/2019    Renal vein thrombosis 2015    s/p embolectomy and lovenox for 9 mos    Steroid-induced hyperglycemia 7/28/2019    Uterine leiomyoma     s/p resection       Past Surgical HIstory: History reviewed. No pertinent surgical history.    Family History: History reviewed. No pertinent family history.    Social History:  reports that she has quit smoking. She has never used smokeless tobacco. She reports that she drank alcohol. She reports that she does not use drugs.    Allergies:  Review of patient's allergies indicates:  No Known Allergies    Medications:  Current Outpatient Medications   Medication Sig Dispense Refill    allopurinol (ZYLOPRIM) 100 MG tablet Take 1 tablet (100 mg total) by mouth once daily. 30 tablet 2    blood sugar diagnostic Strp use to test blood gluocse 2 (two) times daily with meals. 100 strip 11    blood-glucose meter Misc Use as instructed 1 each 0    calcium carbonate (OS-DONTE) 500 mg calcium (1,250 mg) tablet Take 2 tablets (1,000 mg total) by mouth once daily.  0    carvedilol (COREG) 25 MG tablet Take 1 tablet (25 mg total) by mouth 2 (two) times daily. 60 tablet 11    cloNIDine (CATAPRES) 0.1 MG tablet Take 2 tablets (0.2 mg total) by mouth 3 (three) times daily. 180 tablet 11    dapsone 100 MG Tab Take 1 tablet (100 mg total) by mouth once daily. 30 tablet 11    entecavir (BARACLUDE) 0.5 MG Tab Take 1 tablet (0.5 mg total) by mouth every other day. 15 tablet 6    ferrous sulfate 325 (65 FE) MG EC tablet Take 1 tablet (325 mg total) by mouth once daily.  0    furosemide (LASIX) 40 MG tablet Take 40 mg by mouth once daily.  3    hydrALAZINE (APRESOLINE) 100 MG tablet Take 1 tablet (100 mg total) by mouth every 8 (eight) hours. 90 tablet 11     "insulin aspart U-100 (NOVOLOG) 100 unit/mL (3 mL) InPn pen Inject 1-10 Units into the skin 3 (three) times daily. Use sliding scale provided in instructions 108 mL 0    insulin syringe-needle U-100 0.3 mL 30 gauge x 5/16" Syrg 1 each by Misc.(Non-Drug; Combo Route) route 2 (two) times daily with meals. 100 each 11    lancets 30 gauge Misc use to test blood glucose 2 (two) times daily with meals. 100 each 11    lancing device Misc 1 Device by Misc.(Non-Drug; Combo Route) route 2 (two) times daily with meals. 1 each 0    losartan (COZAAR) 25 MG tablet Take 25 mg by mouth once daily.  11    NIFEdipine (PROCARDIA-XL) 90 MG (OSM) 24 hr tablet Take 1 tablet (90 mg total) by mouth once daily. 30 tablet 11    pantoprazole (PROTONIX) 40 MG tablet Take 1 tablet (40 mg total) by mouth before breakfast. 30 tablet 11    predniSONE (DELTASONE) 10 MG tablet Take 4 tablets (40 mg total) by mouth once daily. 120 tablet 11    sodium bicarbonate 650 MG tablet Take 1 tablet (650 mg total) by mouth 3 (three) times daily. 90 tablet 11    vitamin D (VITAMIN D3) 1000 units Tab Take 1 tablet (1,000 Units total) by mouth once daily.       No current facility-administered medications for this visit.        Review of Systems   Constitutional: Negative for activity change, appetite change, chills, fatigue, fever and unexpected weight change.   HENT: Negative for mouth sores and nosebleeds.    Respiratory: Negative for cough and shortness of breath.    Cardiovascular: Positive for leg swelling. Negative for chest pain.   Gastrointestinal: Positive for abdominal pain. Negative for constipation, diarrhea, nausea and vomiting.   Endocrine: Negative for cold intolerance and heat intolerance.   Genitourinary: Negative for dysuria and hematuria.   Musculoskeletal: Negative for arthralgias and back pain.   Skin: Negative for rash.   Allergic/Immunologic: Negative for environmental allergies.   Neurological: Negative for light-headedness and " "numbness.   Hematological: Negative for adenopathy. Does not bruise/bleed easily.       ECOG Performance Status: 0   Objective:      Vitals:   Vitals:    01/16/20 1332   BP: (!) 142/81   Pulse: 75   Resp: 18   Temp: 98.4 °F (36.9 °C)   SpO2: 99%   Weight: 100.2 kg (220 lb 14.4 oz)   Height: 5' 6" (1.676 m)     BMI: Body mass index is 35.65 kg/m².    Physical Exam   Constitutional: She is oriented to person, place, and time. She appears well-developed and well-nourished. No distress.   Eyes: EOM are normal.   Neck: Normal range of motion.   Cardiovascular: Normal rate and regular rhythm.   Pulmonary/Chest: Effort normal. No respiratory distress.   Abdominal: Soft. She exhibits no distension and no mass. There is no tenderness. There is no guarding.   Musculoskeletal: She exhibits edema (+1 b/l lower extremity).   Neurological: She is alert and oriented to person, place, and time.   Skin: Skin is warm and dry.   Psychiatric: She has a normal mood and affect. Her behavior is normal.       Laboratory Data:  Lab Visit on 01/16/2020   Component Date Value Ref Range Status    Uric Acid 01/16/2020 4.2  2.4 - 5.7 mg/dL Final    Sodium 01/16/2020 139  136 - 145 mmol/L Final    Potassium 01/16/2020 4.4  3.5 - 5.1 mmol/L Final    Chloride 01/16/2020 106  95 - 110 mmol/L Final    CO2 01/16/2020 24  23 - 29 mmol/L Final    Glucose 01/16/2020 244* 70 - 110 mg/dL Final    BUN, Bld 01/16/2020 31* 6 - 20 mg/dL Final    Creatinine 01/16/2020 1.1  0.5 - 1.4 mg/dL Final    Calcium 01/16/2020 9.8  8.7 - 10.5 mg/dL Final    Total Protein 01/16/2020 7.1  6.0 - 8.4 g/dL Final    Albumin 01/16/2020 3.5  3.5 - 5.2 g/dL Final    Total Bilirubin 01/16/2020 0.2  0.1 - 1.0 mg/dL Final    Comment: For infants and newborns, interpretation of results should be based  on gestational age, weight and in agreement with clinical  observations.  Premature Infant recommended reference ranges:  Up to 24 hours.............<8.0 mg/dL  Up to 48 " hours............<12.0 mg/dL  3-5 days..................<15.0 mg/dL  6-29 days.................<15.0 mg/dL      Alkaline Phosphatase 01/16/2020 61  55 - 135 U/L Final    AST 01/16/2020 19  10 - 40 U/L Final    *Result may be interfered by visible hemolysis    ALT 01/16/2020 23  10 - 44 U/L Final    Anion Gap 01/16/2020 9  8 - 16 mmol/L Final    eGFR if African American 01/16/2020 >60.0  >60 mL/min/1.73 m^2 Final    eGFR if non African American 01/16/2020 57.5* >60 mL/min/1.73 m^2 Final    Comment: Calculation used to obtain the estimated glomerular filtration  rate (eGFR) is the CKD-EPI equation.       Phosphorus 01/16/2020 3.2  2.7 - 4.5 mg/dL Final   BM biopsy 7/23/19:  -- MILDLY HYPERCELLULAR MARROW (60%) WITH TRILINEAGE HEMATOPOIESIS.  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  -- ADEQUATE NUMBER OF MEGAKARYOCYTES.  -- STAINABLE IRON IS NOT IDENTIFIED.  -- SEE COMMENT.  COMMENT:  CBC data shows microcytic anemia and thrombocytopenia.  Flow cytometric analysis of bone marrow detects a kappa restricted plasma cell population that expresses , CD19, and bright CD38. CD20 and CD56 are negative. Polytypic B lymphocytes are detected. T lymphocytes are immunophenotypically unremarkable. Blasts gate is not increased. Flow differential: Lymphocytes 5.9%, Monocytes 4.6%, Granulocytes 85.8%, Blast 1.2%.  Immunohistochemical stains are performed on the biopsy core and clot section for greater sensitivity and further architectural assessment with adequate controls. -positive plasma cells comprise approximately 4-5% of thetotal cellularity. Immunoglobulin kappa and lambda light chains situ hybridization study reveals kappa light chainrestriction is some areas. The lymphoid aggregates are composed of mixed CD20-positive B-cells and CD3-positive T-cells. B-cells are more numerous than T-cells      Narrative     EXAMINATION:  NM PET CT ROUTINE    CLINICAL HISTORY:  lymphoma; Neoplasm  of uncertain behavior of lymphoid, hematopoietic and related tissue, unspecified    TECHNIQUE:  13.2 mCi of F18-FDG was administered intravenously in the right antecubital fossa.  After an approximately 60 min distribution time, PET/CT images were acquired from the skull base to the mid thigh. Transmission images were acquired to correct for attenuation using a whole body low-dose CT scan without contrast with the arms positioned above the head. Glycemia at the time of injection was 237 mg/dL.    COMPARISON:  CT chest abdomen pelvis with contrast 07/26/2019    FINDINGS:  Quality of the study: Adequate.    In the head and neck, there are no hypermetabolic lesions worrisome for malignancy. There are no hypermetabolic mucosal lesions, and there are no pathologically enlarged or hypermetabolic lymph nodes.    In the chest, there are no hypermetabolic lesions worrisome for malignancy.  There are no concerning pulmonary nodules or masses, and there are no pathologically enlarged or hypermetabolic lymph nodes.    In the abdomen and pelvis, there is physiologic tracer distribution within the abdominal organs and excretion into the genitourinary system.    The spleen has normal uptake and is normal in size measuring 6.4 cm in craniocaudal dimension.    In the bones, there is diffuse mildly increased uptake throughout the axial skeleton, slightly less than that of background hepatic uptake.    Incidental CT findings: A stent is present in the proximal right renal artery.  There is a stable 0.7 cm solid nodule in the inferior lingular segment (axial series 3, image 75) with no readily apparent increased radiotracer uptake.  An additional nodule is present in the superior or anteromedial basal segment of the left lower lobe (series 3, image 73), which was likely present on prior study of 07/26/2019 on axial image 39, though appears more conspicuous on today's study.  A non-hypermetabolic fluid density collection is present in  the right hemipelvis, nonspecific in etiology, though unchanged compared to prior study.      Impression       1.  In this patient with biopsy-proven B-cell lymphoma, there is diffuse mildly increased FDG avidity throughout the axial skeleton, which could reflect underlying physiologic marrow activity or low-grade malignancy.  No evidence of transformed disease.    2. Two solid nodules measuring up to 0.7 cm in the left lung, one appears stable and the other more conspicuous than on prior study of 07/26/2019, both of which likely fall beneath the minimum size criteria for metabolic assessment.  In the setting of malignancy clinical consideration will determine the role and schedule for continued surveillance.    3. Stable non-hypermetabolic fluid collection in the right hemipelvis, unchanged compared to prior CT.    4.  Additional findings as detailed above.    I, Jey Wallace MD, attest that I reviewed and interpreted the images.       Assessment:       1. B-cell lymphoproliferative disorder    2. Other specified anemias    3. Elevated serum immunoglobulin free light chains    4. Hypogammaglobulinemia    5. Acute (diffuse) proliferative glomerulonephritis    6. Acute renal failure with other specified pathological lesion in kidney    7. 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  2) IgM kappa MGUS  - Patient with new renal failure, has normal IgG.  - Skeletal survey (-) for lytic lesions  - CT neck/C/A/P done showed small mediastinal LN and precarinal LN 1.2x2cm  - Cryglobulin type III which does not indicate hematologic malignancy as a source  - PET 12/5/19 without significant adenopathy  - Patient got Rituxan per rheumatology in July 2019, will hold off further treatment at this time from lymphoma standpoint  - Repeat labs (CBC, CMP, LDH) and follow-up in 4 months    3) Iron deficiency anemia  -To continue PO ferrous  sulfate    4) Cryoglobulin vasculitis, GN, HBV  -Per rheumatology/nephrology  -On entecavir for HBV    5) DM  -On insulin    6) LACI  -Improved  -To f/u with nephrology  -Continue allopurinol    Follow-up: 4 mo follow-up with labs prior (CBC, CMP, LDH)    The following was staffed and discussed with supervising physician Dr. Overton.    Gunjan Montalvo MD  Hematology/Oncology Fellow

## 2020-04-17 ENCOUNTER — TELEPHONE (OUTPATIENT)
Dept: HEMATOLOGY/ONCOLOGY | Facility: CLINIC | Age: 55
End: 2020-04-17

## 2020-04-21 ENCOUNTER — TELEPHONE (OUTPATIENT)
Dept: HEMATOLOGY/ONCOLOGY | Facility: CLINIC | Age: 55
End: 2020-04-21

## 2020-05-07 ENCOUNTER — TELEPHONE (OUTPATIENT)
Dept: HEMATOLOGY/ONCOLOGY | Facility: CLINIC | Age: 55
End: 2020-05-07

## 2020-05-08 NOTE — TELEPHONE ENCOUNTER
----- Message from Noemi Suarez sent at 5/7/2020  1:31 PM CDT -----  Contact: Maryann burns/ [facility] with [facility] states they received lab order for patient but they cannot complete labs since he is not a current patient of Dr. Yang (patient only seen once in the hospital).     He is being seen by another physician at another facility. If you have any questions or concerns, please contact us at [phone] Option 1

## 2020-05-14 ENCOUNTER — OFFICE VISIT (OUTPATIENT)
Dept: HEMATOLOGY/ONCOLOGY | Facility: CLINIC | Age: 55
End: 2020-05-14
Payer: MEDICAID

## 2020-05-14 ENCOUNTER — TELEPHONE (OUTPATIENT)
Dept: HEMATOLOGY/ONCOLOGY | Facility: CLINIC | Age: 55
End: 2020-05-14

## 2020-05-14 DIAGNOSIS — D89.1 CRYOGLOBULINEMIC VASCULITIS: ICD-10-CM

## 2020-05-14 DIAGNOSIS — B18.1 CHRONIC VIRAL HEPATITIS B WITHOUT DELTA AGENT AND WITHOUT COMA: ICD-10-CM

## 2020-05-14 DIAGNOSIS — N00.8 ACUTE (DIFFUSE) PROLIFERATIVE GLOMERULONEPHRITIS: ICD-10-CM

## 2020-05-14 DIAGNOSIS — D47.9 B-CELL LYMPHOPROLIFERATIVE DISORDER: Primary | ICD-10-CM

## 2020-05-14 PROCEDURE — 99443 PR PHYSICIAN TELEPHONE EVALUATION 21-30 MIN: ICD-10-PCS | Mod: GT,,, | Performed by: STUDENT IN AN ORGANIZED HEALTH CARE EDUCATION/TRAINING PROGRAM

## 2020-05-14 PROCEDURE — 99443 PR PHYSICIAN TELEPHONE EVALUATION 21-30 MIN: CPT | Mod: GT,,, | Performed by: STUDENT IN AN ORGANIZED HEALTH CARE EDUCATION/TRAINING PROGRAM

## 2020-05-14 NOTE — TELEPHONE ENCOUNTER
----- Message from Mike Mejias RN sent at 5/14/2020  9:21 AM CDT -----  Contact: pt      ----- Message -----  From: Zaina WEEBR August  Sent: 5/14/2020   8:36 AM CDT  To: [facility] Staff    Reason: Pt states she have a lab appt today and was supposed to get it faxed to [facility] but they are not there Please fax and call when done she asks. Thanks    Communication: fax [phone]// [phone]

## 2020-05-14 NOTE — PROGRESS NOTES
PATIENT: Kendy Vital  MRN: [id number]  DATE: 5/14/2020      Diagnosis:   1. B-cell lymphoproliferative disorder    2. Cryoglobulinemic vasculitis    3. Chronic viral hepatitis B without delta agent and without coma    4. Acute (diffuse) proliferative glomerulonephritis        Chief Complaint: No chief complaint on file.      Subjective:   Ms. Vital is a 54 y.o. female with lymphoproliferative disorder, GN, HBV, cryoglobulinemic vasculitis (type 3), who presents to the Hematology [facility] for follow-up.    Hematologic History  -Presented with severe thrombocytopenia, renal failure, and bilateral infiltrates on chest CT concerning for PNA.  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   -Patient does not have HCV, but is positive for HBV and has been started on anti-viral therapy with entecavir.  -Due to concern of possible hematologic malignancy, patient had bone marrow biopsy on 7/24 revealing B-cell lymphoproliferative disorders.  Her serum cryoglobulins returned as Type 3 and this was not c/w with a hematologic malignancy etiology for cryoglobulinemia  -Had 3 days of pulse steroids 1gm hydrocortisone, in addition to sessions of Plasma Exchange/Plasmapheresis  -She also received IVIG and Rituxan per Rheumatology  -On entecavir for HBV  -PET done 12/5/19 without significant adenopathy  -Was seen by hematology at [facility] in January 2020. Has seen GI, rheum, nephrology at [facility] early May, considering another course of Rituxan.  -Labs reviewed from today 5/14/20: Hb 12.5, plt 232, WBC 4.2, , Cr 1.2, LFTs wnl    Interval History: Patient seen today alone, gaining weight. Notices fingernails are turning black 3-4 weeks ago. Red spots on legs about 1 month ago. Denies night sweat and hot flashes. Denies lymphadenopathy. Still some abdominal pain 5/10. No N/V/D. Peripheral edema still present.    The  patient location is: home  The chief complaint leading to consultation is: lymphoproliferative disorder  Visit type: audio only  Total time spent with patient: 30 min  Each patient to whom he or she provides medical services by telemedicine is:  (1) informed of the relationship between the physician and patient and the respective role of any other health care provider with respect to management of the patient; and (2) notified that he or she may decline to receive medical services by telemedicine and may withdraw from such care at any time.    Past Medical History:   Past Medical History:   Diagnosis Date    Acute (diffuse) proliferative glomerulonephritis     Acute renal failure 7/19/2019    B-cell lymphoproliferative disorder 7/30/2019    Chronic obstructive lung disease 7/27/2019    Chronic viral hepatitis B without delta agent and without coma 7/24/2019    Cryoglobulinemic vasculitis     Hypogammaglobulinemia 8/5/2019    Iron deficiency anemia 7/30/2019    Renal vein thrombosis 2015    s/p embolectomy and lovenox for 9 mos    Steroid-induced hyperglycemia 7/28/2019    Uterine leiomyoma     s/p resection       Past Surgical HIstory: No past surgical history on file.    Family History: No family history on file.    Social History:  reports that she has quit smoking. She has never used smokeless tobacco. She reports that she drank alcohol. She reports that she does not use drugs.    Allergies:  Review of patient's allergies indicates:  No Known Allergies    Medications:  Current Outpatient Medications   Medication Sig Dispense Refill    allopurinol (ZYLOPRIM) 100 MG tablet Take 1 tablet (100 mg total) by mouth once daily. 30 tablet 2    blood sugar diagnostic Strp use to test blood gluocse 2 (two) times daily with meals. 100 strip 11    blood-glucose meter Misc Use as instructed 1 each 0    calcium carbonate (OS-DONTE) 500 mg calcium (1,250 mg) tablet Take 2 tablets (1,000 mg total) by mouth once  "daily.  0    carvedilol (COREG) 25 MG tablet Take 1 tablet (25 mg total) by mouth 2 (two) times daily. 60 tablet 11    cloNIDine (CATAPRES) 0.1 MG tablet Take 2 tablets (0.2 mg total) by mouth 3 (three) times daily. 180 tablet 11    dapsone 100 MG Tab Take 1 tablet (100 mg total) by mouth once daily. 30 tablet 11    entecavir (BARACLUDE) 0.5 MG Tab Take 1 tablet (0.5 mg total) by mouth every other day. 15 tablet 6    ferrous sulfate 325 (65 FE) MG EC tablet Take 1 tablet (325 mg total) by mouth once daily.  0    furosemide (LASIX) 40 MG tablet Take 40 mg by mouth once daily.  3    hydrALAZINE (APRESOLINE) 100 MG tablet Take 1 tablet (100 mg total) by mouth every 8 (eight) hours. 90 tablet 11    insulin aspart U-100 (NOVOLOG) 100 unit/mL (3 mL) InPn pen Inject 1-10 Units into the skin 3 (three) times daily. Use sliding scale provided in instructions 108 mL 0    insulin syringe-needle U-100 0.3 mL 30 gauge x 5/16" Syrg 1 each by Misc.(Non-Drug; Combo Route) route 2 (two) times daily with meals. 100 each 11    lancets 30 gauge Misc use to test blood glucose 2 (two) times daily with meals. 100 each 11    lancing device Misc 1 Device by Misc.(Non-Drug; Combo Route) route 2 (two) times daily with meals. 1 each 0    losartan (COZAAR) 25 MG tablet Take 25 mg by mouth once daily.  11    NIFEdipine (PROCARDIA-XL) 90 MG (OSM) 24 hr tablet Take 1 tablet (90 mg total) by mouth once daily. 30 tablet 11    pantoprazole (PROTONIX) 40 MG tablet Take 1 tablet (40 mg total) by mouth before breakfast. 30 tablet 11    predniSONE (DELTASONE) 10 MG tablet Take 4 tablets (40 mg total) by mouth once daily. 120 tablet 11    sodium bicarbonate 650 MG tablet Take 1 tablet (650 mg total) by mouth 3 (three) times daily. 90 tablet 11    vitamin D (VITAMIN D3) 1000 units Tab Take 1 tablet (1,000 Units total) by mouth once daily.       No current facility-administered medications for this visit.        Review of Systems "   Constitutional: Negative for activity change, appetite change, chills, fatigue, fever and unexpected weight change.   HENT: Negative for mouth sores and nosebleeds.    Respiratory: Negative for cough and shortness of breath.    Cardiovascular: Positive for leg swelling. Negative for chest pain.   Gastrointestinal: Positive for abdominal distention. Negative for abdominal pain, constipation, diarrhea, nausea and vomiting.   Endocrine: Negative for cold intolerance and heat intolerance.   Genitourinary: Negative for dysuria and hematuria.   Musculoskeletal: Negative for arthralgias and back pain.   Skin: Positive for rash.   Allergic/Immunologic: Negative for environmental allergies.   Neurological: Negative for light-headedness and numbness.   Hematological: Negative for adenopathy. Does not bruise/bleed easily.       ECOG Performance Status: 0   Objective:      Vitals:   There were no vitals filed for this visit.  BMI: There is no height or weight on file to calculate BMI.    Physical Exam   Patient not examined d/t telemedicine audio visit only in light of COVID-19 precautions    Laboratory Data:  No visits with results within 1 Week(s) from this visit.   Latest known visit with results is:   Lab Visit on 01/16/2020   Component Date Value Ref Range Status    Uric Acid 01/16/2020 4.2  2.4 - 5.7 mg/dL Final    Sodium 01/16/2020 139  136 - 145 mmol/L Final    Potassium 01/16/2020 4.4  3.5 - 5.1 mmol/L Final    Chloride 01/16/2020 106  95 - 110 mmol/L Final    CO2 01/16/2020 24  23 - 29 mmol/L Final    Glucose 01/16/2020 244* 70 - 110 mg/dL Final    BUN, Bld 01/16/2020 31* 6 - 20 mg/dL Final    Creatinine 01/16/2020 1.1  0.5 - 1.4 mg/dL Final    Calcium 01/16/2020 9.8  8.7 - 10.5 mg/dL Final    Total Protein 01/16/2020 7.1  6.0 - 8.4 g/dL Final    Albumin 01/16/2020 3.5  3.5 - 5.2 g/dL Final    Total Bilirubin 01/16/2020 0.2  0.1 - 1.0 mg/dL Final    Comment: For infants and newborns, interpretation of  results should be based  on gestational age, weight and in agreement with clinical  observations.  Premature Infant recommended reference ranges:  Up to 24 hours.............<8.0 mg/dL  Up to 48 hours............<12.0 mg/dL  3-5 days..................<15.0 mg/dL  6-29 days.................<15.0 mg/dL      Alkaline Phosphatase 01/16/2020 61  55 - 135 U/L Final    AST 01/16/2020 19  10 - 40 U/L Final    *Result may be interfered by visible hemolysis    ALT 01/16/2020 23  10 - 44 U/L Final    Anion Gap 01/16/2020 9  8 - 16 mmol/L Final    eGFR if African American 01/16/2020 >60.0  >60 mL/min/1.73 m^2 Final    eGFR if non African American 01/16/2020 57.5* >60 mL/min/1.73 m^2 Final    Comment: Calculation used to obtain the estimated glomerular filtration  rate (eGFR) is the CKD-EPI equation.       Phosphorus 01/16/2020 3.2  2.7 - 4.5 mg/dL Final   BM biopsy 7/23/19:  -- MILDLY HYPERCELLULAR MARROW (60%) WITH TRILINEAGE HEMATOPOIESIS.  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  -- ADEQUATE NUMBER OF MEGAKARYOCYTES.  -- STAINABLE IRON IS NOT IDENTIFIED.  -- SEE COMMENT.  COMMENT:  CBC data shows microcytic anemia and thrombocytopenia.  Flow cytometric analysis of bone marrow detects a kappa restricted plasma cell population that expresses , CD19, and bright CD38. CD20 and CD56 are negative. Polytypic B lymphocytes are detected. T lymphocytes are immunophenotypically unremarkable. Blasts gate is not increased. Flow differential: Lymphocytes 5.9%, Monocytes 4.6%, Granulocytes 85.8%, Blast 1.2%.  Immunohistochemical stains are performed on the biopsy core and clot section for greater sensitivity and further architectural assessment with adequate controls. -positive plasma cells comprise approximately 4-5% of thetotal cellularity. Immunoglobulin kappa and lambda light chains situ hybridization study reveals kappa light chainrestriction is some areas. The lymphoid aggregates are  composed of mixed CD20-positive B-cells and CD3-positive T-cells. B-cells are more numerous than T-cells      Narrative     EXAMINATION:  NM PET CT ROUTINE    CLINICAL HISTORY:  lymphoma; Neoplasm of uncertain behavior of lymphoid, hematopoietic and related tissue, unspecified    TECHNIQUE:  13.2 mCi of F18-FDG was administered intravenously in the right antecubital fossa.  After an approximately 60 min distribution time, PET/CT images were acquired from the skull base to the mid thigh. Transmission images were acquired to correct for attenuation using a whole body low-dose CT scan without contrast with the arms positioned above the head. Glycemia at the time of injection was 237 mg/dL.    COMPARISON:  CT chest abdomen pelvis with contrast 07/26/2019    FINDINGS:  Quality of the study: Adequate.    In the head and neck, there are no hypermetabolic lesions worrisome for malignancy. There are no hypermetabolic mucosal lesions, and there are no pathologically enlarged or hypermetabolic lymph nodes.    In the chest, there are no hypermetabolic lesions worrisome for malignancy.  There are no concerning pulmonary nodules or masses, and there are no pathologically enlarged or hypermetabolic lymph nodes.    In the abdomen and pelvis, there is physiologic tracer distribution within the abdominal organs and excretion into the genitourinary system.    The spleen has normal uptake and is normal in size measuring 6.4 cm in craniocaudal dimension.    In the bones, there is diffuse mildly increased uptake throughout the axial skeleton, slightly less than that of background hepatic uptake.    Incidental CT findings: A stent is present in the proximal right renal artery.  There is a stable 0.7 cm solid nodule in the inferior lingular segment (axial series 3, image 75) with no readily apparent increased radiotracer uptake.  An additional nodule is present in the superior or anteromedial basal segment of the left lower lobe (series 3,  image 73), which was likely present on prior study of 07/26/2019 on axial image 39, though appears more conspicuous on today's study.  A non-hypermetabolic fluid density collection is present in the right hemipelvis, nonspecific in etiology, though unchanged compared to prior study.      Impression       1.  In this patient with biopsy-proven B-cell lymphoma, there is diffuse mildly increased FDG avidity throughout the axial skeleton, which could reflect underlying physiologic marrow activity or low-grade malignancy.  No evidence of transformed disease.    2. Two solid nodules measuring up to 0.7 cm in the left lung, one appears stable and the other more conspicuous than on prior study of 07/26/2019, both of which likely fall beneath the minimum size criteria for metabolic assessment.  In the setting of malignancy clinical consideration will determine the role and schedule for continued surveillance.    3. Stable non-hypermetabolic fluid collection in the right hemipelvis, unchanged compared to prior CT.    4.  Additional findings as detailed above.    I, Jey Wallace MD, attest that I reviewed and interpreted the images.       Assessment:       1. B-cell lymphoproliferative disorder    2. Cryoglobulinemic vasculitis    3. Chronic viral hepatitis B without delta agent and without coma    4. 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  2) IgM kappa MGUS  - Patient with new renal failure, has normal IgG.  - Skeletal survey (-) for lytic lesions  - CT neck/C/A/P done showed small mediastinal LN and precarinal LN 1.2x2cm  - Cryglobulin type III which does not indicate hematologic malignancy as a source  - PET 12/5/19 without significant adenopathy  - Patient got Rituxan per rheumatology in July 2019, will hold off further treatment at this time from lymphoma standpoint  - FLC 3.11 checked January 2020,  SPEP/MARCO were normal at that time  - Labs faxed & reviewed from today 5/14/20: Hb 12.5, plt 232, WBC 4.2, , Cr 1.2, LFTs wnls, patient will f/u at [facility]     3) Anemia, d/t likely alpha thalassemia trait  -To continue PO ferrous sulfate    4) Cryoglobulin vasculitis, GN, HBV  -Per rheumatology/nephrology/GI  -On entecavir for HBV  -Still on prednisone - 20 mg. Per rheumatology at [facility], taper over 4 weeks to 10 mg/day. Seems to have mild/moderate activity- Skin manifestations, fatigue, arthralgias, they are considering another course of rituxan.    5) DM  -On insulin    6) LACI  -Resolved  -Followed by nephrology  -Continue allopurinol    Follow-up: patient to f/u at [facility] for convenience, can f/u here as needed in the future    The following was staffed and discussed with supervising physician Dr. Overton.    Gunjan Montalvo MD  Hematology/Oncology Fellow    Attending addendum:    I have reviewed Dr. Montalvo's documentation above and directly participated in reviewing all relevant records and developing the plan above. I agree with the findings.    Ms. Vital continues to have no evidence of symptomatic B-cell lymphoproliferative disorder. She has elected to follow-up at [facility] given that it is closer in proximity. She has appropriate specialty follow-up. We will remain available should she desire to come here in the future.    Álvaro Overton MD  Hematology/Oncology and Stem Cell Transplant

## 2020-05-15 ENCOUNTER — TELEPHONE (OUTPATIENT)
Dept: HEMATOLOGY/ONCOLOGY | Facility: CLINIC | Age: 55
End: 2020-05-15

## 2020-05-15 NOTE — TELEPHONE ENCOUNTER
----- Message from Mike Mejias RN sent at 5/15/2020  9:13 AM CDT -----  Contact: PT      ----- Message -----  From: Anjana Kaufman  Sent: 5/14/2020  10:27 AM CDT  To: [facility]ins Staff    PT called in regards to blood work that was supposed to be sent to her other facility she goes to in [facility].     Callback: [phone]

## 2020-07-17 ENCOUNTER — OFFICE VISIT (OUTPATIENT)
Dept: HEPATOLOGY | Facility: CLINIC | Age: 55
End: 2020-07-17
Payer: MEDICAID

## 2020-07-17 ENCOUNTER — HOSPITAL ENCOUNTER (OUTPATIENT)
Dept: RADIOLOGY | Facility: HOSPITAL | Age: 55
Discharge: HOME | End: 2020-07-17
Attending: NURSE PRACTITIONER
Payer: MEDICAID

## 2020-07-17 VITALS
DIASTOLIC BLOOD PRESSURE: 71 MMHG | HEART RATE: 90 BPM | WEIGHT: 224.63 LBS | OXYGEN SATURATION: 96 % | SYSTOLIC BLOOD PRESSURE: 125 MMHG | BODY MASS INDEX: 36.1 KG/M2 | HEIGHT: 66 IN

## 2020-07-17 DIAGNOSIS — B18.1 CHRONIC VIRAL HEPATITIS B WITHOUT DELTA AGENT AND WITHOUT COMA: ICD-10-CM

## 2020-07-17 DIAGNOSIS — B18.1 CHRONIC VIRAL HEPATITIS B WITHOUT DELTA AGENT AND WITHOUT COMA: Primary | ICD-10-CM

## 2020-07-17 PROCEDURE — 99999 PR PBB SHADOW E&M-EST. PATIENT-LVL V: CPT | Mod: PBBFAC,,, | Performed by: NURSE PRACTITIONER

## 2020-07-17 PROCEDURE — 99214 OFFICE O/P EST MOD 30 MIN: CPT | Mod: S$PBB,,, | Performed by: NURSE PRACTITIONER

## 2020-07-17 PROCEDURE — 99999 PR PBB SHADOW E&M-EST. PATIENT-LVL V: ICD-10-PCS | Mod: PBBFAC,,, | Performed by: NURSE PRACTITIONER

## 2020-07-17 PROCEDURE — 76700 US EXAM ABDOM COMPLETE: CPT | Mod: 26,,, | Performed by: INTERNAL MEDICINE

## 2020-07-17 PROCEDURE — 99214 PR OFFICE/OUTPT VISIT, EST, LEVL IV, 30-39 MIN: ICD-10-PCS | Mod: S$PBB,,, | Performed by: NURSE PRACTITIONER

## 2020-07-17 PROCEDURE — 76700 US EXAM ABDOM COMPLETE: CPT | Mod: TC

## 2020-07-17 PROCEDURE — 99215 OFFICE O/P EST HI 40 MIN: CPT | Mod: PBBFAC,25 | Performed by: NURSE PRACTITIONER

## 2020-07-17 PROCEDURE — 76700 US ABDOMEN COMPLETE: ICD-10-PCS | Mod: 26,,, | Performed by: INTERNAL MEDICINE

## 2020-07-17 NOTE — PROGRESS NOTES
"[facility] Established Patient Visit    Reason for Visit:  Chronic Hep B    PCP: To Obtain Unable    HPI:  This is a 54 y.o. female with PMH noted below, here for follow up of   chronic Hepatitis B     Was diagnosed during hospitalization 7/2019 (hospitalized for eval of thrombocytopenia, Rheumatology and Nephrology continued to follow for management of her Cryolobinemic Vasculitis with Glomerulonephritis) , no history or knowledge of Hep B before then. Was starting on Entecavir during hospitalization 7/2019    Risk factors:  Denies mother or sexual partners with Hep B, no travel outside the country, no h/o HIV, no h/o IVDU or intranasal drug use, no  no blood transfusions before 1992  Only notable risk factor is homeade tattoo "years ago"  (at home from )    Previous serologic w/u negative for hemochromatosis    Liver fibrosis staging:   -- Fibroscan 1/2020 noted no fibrosis (kPA 4.1), S1 steatosis ()    Interval HPI: Presents today alone. Doing well. Except c/o dizziness, suspects low BG. However, BG in clinic 340s. Patient reports she took her insulin as prescribed, her local provider has been adjusting her insulin doses because BG 300s-500s in past week at home.    Reports timur was tested and vaccinated for Hep B. No other people living with pt     Labs done 1/2020 show normal transaminase levels, synthetic liver function  Normal  Pending repeat labs today    Lab Results   Component Value Date    ALT 23 01/16/2020    AST 19 01/16/2020    ALKPHOS 61 01/16/2020    BILITOT 0.2 01/16/2020    ALBUMIN 3.5 01/16/2020    INR 1.0 11/21/2019     12/05/2019     Hep C testing negative  HIV testing negative    Hep B labs done 11/2019: - pending repeat labs today   -- + eAb, - eAg  -- DNA undetected   -- + sAg  Negative Delta    HCC screening:  Abd U/S pending today, then next due 1/2021  AFP pending today then next due 1/2021  Previous EGD -not indicated currently     Risk factors for " NAFLD include obesity, HTN, Steroid induced DM    Denies family history of liver disease . Denies alcohol consumption      Immunity to Hep A  Needs to complete vaccine, started 7/2019, last overdue, pt aware    Denies jaundice, dark urine, abdominal distention, hematemesis, melena, slowed mentation. No abnormal skin rashes. No generalized joint or muscle pain.     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    PSHX:  has no past surgical history on file.    The patient's social and family histories were reviewed by me and updated in the appropriate section of the electronic medical record.    Review of patient's allergies indicates:  No Known Allergies    Current Outpatient Medications on File Prior to Visit   Medication Sig Dispense Refill    allopurinol (ZYLOPRIM) 100 MG tablet Take 1 tablet (100 mg total) by mouth once daily. 30 tablet 2    blood sugar diagnostic Strp use to test blood gluocse 2 (two) times daily with meals. 100 strip 11    blood-glucose meter Misc Use as instructed 1 each 0    calcium carbonate (OS-DONTE) 500 mg calcium (1,250 mg) tablet Take 2 tablets (1,000 mg total) by mouth once daily.  0    carvedilol (COREG) 25 MG tablet Take 1 tablet (25 mg total) by mouth 2 (two) times daily. 60 tablet 11    cloNIDine (CATAPRES) 0.1 MG tablet Take 2 tablets (0.2 mg total) by mouth 3 (three) times daily. 180 tablet 11    dapsone 100 MG Tab Take 1 tablet (100 mg total) by mouth once daily. 30 tablet 11    entecavir (BARACLUDE) 0.5 MG Tab Take 1 tablet (0.5 mg total)  "by mouth every other day. 15 tablet 6    ferrous sulfate 325 (65 FE) MG EC tablet Take 1 tablet (325 mg total) by mouth once daily.  0    furosemide (LASIX) 40 MG tablet Take 40 mg by mouth once daily.  3    hydrALAZINE (APRESOLINE) 100 MG tablet Take 1 tablet (100 mg total) by mouth every 8 (eight) hours. 90 tablet 11    insulin aspart U-100 (NOVOLOG) 100 unit/mL (3 mL) InPn pen Inject 1-10 Units into the skin 3 (three) times daily. Use sliding scale provided in instructions 108 mL 0    insulin syringe-needle U-100 0.3 mL 30 gauge x 5/16" Syrg 1 each by Misc.(Non-Drug; Combo Route) route 2 (two) times daily with meals. 100 each 11    lancets 30 gauge Misc use to test blood glucose 2 (two) times daily with meals. 100 each 11    lancing device Misc 1 Device by Misc.(Non-Drug; Combo Route) route 2 (two) times daily with meals. 1 each 0    losartan (COZAAR) 25 MG tablet Take 25 mg by mouth once daily.  11    NIFEdipine (PROCARDIA-XL) 90 MG (OSM) 24 hr tablet Take 1 tablet (90 mg total) by mouth once daily. 30 tablet 11    pantoprazole (PROTONIX) 40 MG tablet Take 1 tablet (40 mg total) by mouth before breakfast. 30 tablet 11    predniSONE (DELTASONE) 10 MG tablet Take 4 tablets (40 mg total) by mouth once daily. 120 tablet 11    sodium bicarbonate 650 MG tablet Take 1 tablet (650 mg total) by mouth 3 (three) times daily. 90 tablet 11    vitamin D (VITAMIN D3) 1000 units Tab Take 1 tablet (1,000 Units total) by mouth once daily.       No current facility-administered medications on file prior to visit.        SOCIAL HISTORY:   Social History     Tobacco Use   Smoking Status Former Smoker   Smokeless Tobacco Never Used       Social History     Substance and Sexual Activity   Alcohol Use Not Currently       Social History     Substance and Sexual Activity   Drug Use Never       ROS:   GENERAL: Denies fever, chills, weight loss/gain, fatigue  HEENT: Denies headaches, + dizziness  CARDIOVASCULAR: Denies chest " "pain, palpitations, or edema  RESPIRATORY: Denies dyspnea, cough  GI: Denies abdominal pain, rectal bleeding, nausea, vomiting. No change in bowel pattern or color  : Denies dysuria, hematuria   SKIN: Denies rash, itching   NEURO: Denies confusion, memory loss, or mood changes  PSYCH: Denies depression or anxiety  HEME/LYMPH: Denies easy bruising or bleeding    Objective Findings:    PHYSICAL EXAM:   Friendly Black or  female, in no acute distress; alert and oriented to person, place and time  VITALS: /71 (BP Location: Right arm, Patient Position: Sitting, BP Method: Medium (Automatic))   Pulse 90   Ht 5' 6" (1.676 m)   Wt 101.9 kg (224 lb 10.4 oz)   SpO2 96%   BMI 36.26 kg/m²   HENT: Normocephalic, without obvious abnormality. Oral mucosa pink and moist. Dentition good.  EYES: Sclerae anicteric. No conjunctival pallor.   NECK: Supple. No masses or cervical adenopathy.  CARDIOVASCULAR: Regular rate and rhythm. No murmurs.  RESPIRATORY: Normal respiratory effort. BBS CTA. No wheezes or crackles.  GI: Soft, non-tender, non-distended. No hepatosplenomegaly. No masses palpable. No ascites.  EXTREMITIES:  No clubbing, cyanosis or edema.  SKIN: Warm and dry. No jaundice. No rashes noted to exposed skin. No telangectasias noted. No palmar erythema.  NEURO:  Normal gait. No asterixis.  PSYCH:  Memory intact. Thought and speech pattern appropriate. Behavior normal. No depression or anxiety noted.    DIAGNOSTIC STUDIES:    ABD. U/S-    Done 7/2020  FINDINGS:  The pancreatic parenchyma is within normal limits.  The pancreatic duct is upper normal, 3 mm.     The aorta is not aneurysmal.  Visualized inferior vena cava is unremarkable.     The gallbladder demonstrates no gallstone.  The common duct is normal caliber, 5 mm.     Liver size is 17.3 cm.  The parenchyma demonstrates no abnormality.  There is no ascites.     Kidneys measure 11.9 cm on the right and 12.8 cm on the left.  There is no " "hydronephrosis.     The spleen is not enlarged, 7.3 cm.     Impression:     No evidence of hepatic mass      LIVER BIOPSY-   None in past    FIBROSCAN - done 1/2020  Median liver stiffness score:  4.1  CAP Reading: dB/m:  257  Fibrosis Stage:  F 0-1  Steatosis Grade:  S1      EDUCATION:  -- Avoid alcohol. Avoid raw seafood.   -- Discussed transmission of HBV, including sexual transmission. Avoid sharing razors/toothbrushes, etc.   -- Discussed importance of Hep B screening for all current and previous sexual partners, as well as complete Hep B vaccination for all current sexual partners. For sexual partners who have not completed the Hep B vaccine series, barrier sexual protection is recommended to prevent spread of the virus.  -- Natural history of HBV including progression to cirrhosis reviewed.   -- We discussed the increased risk of hepatocellular carcinoma due to active hepatitis B infection. Continued screening every six months with ultrasound and AFP is recommended.   -- Discussed potential outcomes of cirrhosis, including liver cancer, liver failure, liver transplant, death.   -- Limit acetaminophen to 2000mg daily.  -- Advised immunization of all household members.  -- Discussed importance of compliance with medication regimen and risk of viral mutation if treatment is stopped prematurely.       ASSESSMENT & PLAN:  54 y.o. Black or  female with:  1. Chronic hepatitis B, E Ag neg, E Ab pos, diagnosed 7/2019  -- On Enctevair 0.5 mg every other day, continue dose due to renal function (GFR 30-49), refill sent to local pharmacy   -- transaminases WNL  -- HBV DNA undetectable 11/2019  -- synthetic liver function WNL  -- immunity to Hep A per labs : overdue for last vaccine, pt aware  -- Only notable risk factor is homeade tattoo "years ago"  (at home from )  -- family members or partners that need to be tested : timur  -- Fibroscan 1/2020 noted  No fibrosis, repeat in 2022  -- screening " for Hep C and HIV negatve  -- HCC screening Q 6 months with AFP and abd. U/S - pending results today, then both next due 1/2021  -- Last EGD not indicated at this time   -- Sexual partners and family members in household need to be tested and vaccinated if negative. Must use protection for sex (Hep B)    2. Elevated BG  -- recommended to patient that she contact her DM provider on her way home for guidance given report BG has been 300s-500s for at least 1 week    Follow up in about 6 months (around 1/17/2021). with US and labs same day (due to travel)  Fibroscan repeat due 2022    Thank you for allowing me to participate in the care of VIKAS Walton    CC'ed note to:

## 2020-07-17 NOTE — PATIENT INSTRUCTIONS
1. I will mail you your labs once I receive them all, in about a week      -- Avoid alcohol. Avoid raw seafood.   -- Hepatitis B is spread through blood and bodily fluids. Do not share  razors/toothbrushes, etc, with others   -- it is possible that Hepatitis B can cause scar tissue in the liver, which can progress to cirrhosis.   -- Hepatitis B, in some cases, has a higher risk of liver cancer (hepatocellular carcinoma), especially with active hepatitis B infection. We will perform liver cancer screening every six months with ultrasound and AFP along with a clinic visit every 6 months  -- If you develop cirrhosis, it is important that we monitor you closely, as cirrhosis can cause liver cancer, liver failure, liver transplant, death.   -- Limit acetaminophen to 2000mg daily.  -- Recommended that all 1st degree relatives, household members and sexual contacts are screened for Hepatitis B. If they are negative for Hepatitis B, they should be vaccinated against Hepatitis B to protect themselves from venancio the virus  -- It is important for all current and previous sexual partners to be tested for Hepatitis B as well as complete Hep B vaccination. For sexual partners who have not completed the Hep B vaccine series, barrier sexual protection is recommended to prevent spread of the virus.  -- It is important that you take your Hepatitis B medication as prescribed without any missed doses, as missing doses or stopping the medication can cause the Hepatitis B virus to change and make it difficult to treat

## 2020-11-07 ENCOUNTER — HOSPITAL ENCOUNTER (INPATIENT)
Facility: HOSPITAL | Age: 55
LOS: 5 days | Discharge: HOME HEALTH | End: 2020-11-12
Attending: EMERGENCY MEDICINE | Admitting: STUDENT IN AN ORGANIZED HEALTH CARE EDUCATION/TRAINING PROGRAM
Payer: MEDICAID

## 2020-11-07 DIAGNOSIS — R91.8 PULMONARY INFILTRATE: ICD-10-CM

## 2020-11-07 DIAGNOSIS — D89.1 CRYOGLOBULINEMIC VASCULITIS: ICD-10-CM

## 2020-11-07 DIAGNOSIS — R06.02 SOB (SHORTNESS OF BREATH): ICD-10-CM

## 2020-11-07 DIAGNOSIS — R06.00 DYSPNEA: ICD-10-CM

## 2020-11-07 DIAGNOSIS — R10.9 ABDOMINAL PAIN: ICD-10-CM

## 2020-11-07 DIAGNOSIS — D47.9 B-CELL LYMPHOPROLIFERATIVE DISORDER: ICD-10-CM

## 2020-11-07 PROBLEM — J18.9 BILATERAL PNEUMONIA: Status: ACTIVE | Noted: 2020-11-07

## 2020-11-07 PROBLEM — N18.9 ACUTE KIDNEY INJURY SUPERIMPOSED ON CHRONIC KIDNEY DISEASE: Status: ACTIVE | Noted: 2020-11-07

## 2020-11-07 PROBLEM — I50.32 CHRONIC DIASTOLIC HEART FAILURE: Status: ACTIVE | Noted: 2020-11-07

## 2020-11-07 PROBLEM — E11.9 TYPE 2 DIABETES MELLITUS: Status: ACTIVE | Noted: 2020-11-07

## 2020-11-07 PROBLEM — N17.9 ACUTE KIDNEY INJURY SUPERIMPOSED ON CHRONIC KIDNEY DISEASE: Status: ACTIVE | Noted: 2020-11-07

## 2020-11-07 LAB
ALBUMIN SERPL BCP-MCNC: 2.9 G/DL (ref 3.5–5.2)
ALP SERPL-CCNC: 150 U/L (ref 55–135)
ALT SERPL W/O P-5'-P-CCNC: 30 U/L (ref 10–44)
ANION GAP SERPL CALC-SCNC: 16 MMOL/L (ref 8–16)
AST SERPL-CCNC: 19 U/L (ref 10–40)
BACTERIA #/AREA URNS AUTO: ABNORMAL /HPF
BASOPHILS # BLD AUTO: 0.06 K/UL (ref 0–0.2)
BASOPHILS NFR BLD: 0.8 % (ref 0–1.9)
BILIRUB SERPL-MCNC: 0.3 MG/DL (ref 0.1–1)
BILIRUB UR QL STRIP: NEGATIVE
BNP SERPL-MCNC: 28 PG/ML (ref 0–99)
BUN SERPL-MCNC: 22 MG/DL (ref 6–20)
CALCIUM SERPL-MCNC: 10.1 MG/DL (ref 8.7–10.5)
CHLORIDE SERPL-SCNC: 102 MMOL/L (ref 95–110)
CLARITY UR REFRACT.AUTO: ABNORMAL
CO2 SERPL-SCNC: 23 MMOL/L (ref 23–29)
COLOR UR AUTO: YELLOW
CREAT SERPL-MCNC: 2.6 MG/DL (ref 0.5–1.4)
CTP QC/QA: YES
D DIMER PPP IA.FEU-MCNC: 1.2 MG/L FEU
DIFFERENTIAL METHOD: ABNORMAL
EOSINOPHIL # BLD AUTO: 0.1 K/UL (ref 0–0.5)
EOSINOPHIL NFR BLD: 1.1 % (ref 0–8)
ERYTHROCYTE [DISTWIDTH] IN BLOOD BY AUTOMATED COUNT: 18.9 % (ref 11.5–14.5)
EST. GFR  (AFRICAN AMERICAN): 23.2 ML/MIN/1.73 M^2
EST. GFR  (NON AFRICAN AMERICAN): 20.2 ML/MIN/1.73 M^2
GLUCOSE SERPL-MCNC: 133 MG/DL (ref 70–110)
GLUCOSE UR QL STRIP: NEGATIVE
HCT VFR BLD AUTO: 46.8 % (ref 37–48.5)
HGB BLD-MCNC: 14.5 G/DL (ref 12–16)
HGB UR QL STRIP: NEGATIVE
HYALINE CASTS UR QL AUTO: 0 /LPF
IMM GRANULOCYTES # BLD AUTO: 0.03 K/UL (ref 0–0.04)
IMM GRANULOCYTES NFR BLD AUTO: 0.4 % (ref 0–0.5)
KETONES UR QL STRIP: NEGATIVE
LACTATE SERPL-SCNC: 2 MMOL/L (ref 0.5–2.2)
LEUKOCYTE ESTERASE UR QL STRIP: ABNORMAL
LIPASE SERPL-CCNC: 50 U/L (ref 4–60)
LYMPHOCYTES # BLD AUTO: 1.6 K/UL (ref 1–4.8)
LYMPHOCYTES NFR BLD: 19.5 % (ref 18–48)
MCH RBC QN AUTO: 25 PG (ref 27–31)
MCHC RBC AUTO-ENTMCNC: 31 G/DL (ref 32–36)
MCV RBC AUTO: 81 FL (ref 82–98)
MICROSCOPIC COMMENT: ABNORMAL
MONOCYTES # BLD AUTO: 1 K/UL (ref 0.3–1)
MONOCYTES NFR BLD: 12.3 % (ref 4–15)
NEUTROPHILS # BLD AUTO: 5.3 K/UL (ref 1.8–7.7)
NEUTROPHILS NFR BLD: 65.9 % (ref 38–73)
NITRITE UR QL STRIP: NEGATIVE
NON-SQ EPI CELLS #/AREA URNS AUTO: 2 /HPF
NRBC BLD-RTO: 0 /100 WBC
PH UR STRIP: 5 [PH] (ref 5–8)
PLATELET # BLD AUTO: 521 K/UL (ref 150–350)
PMV BLD AUTO: 9.3 FL (ref 9.2–12.9)
POCT GLUCOSE: 107 MG/DL (ref 70–110)
POTASSIUM SERPL-SCNC: 3.8 MMOL/L (ref 3.5–5.1)
PROT SERPL-MCNC: 7.5 G/DL (ref 6–8.4)
PROT UR QL STRIP: ABNORMAL
RBC # BLD AUTO: 5.81 M/UL (ref 4–5.4)
RBC #/AREA URNS AUTO: 3 /HPF (ref 0–4)
SARS-COV-2 RDRP RESP QL NAA+PROBE: NEGATIVE
SODIUM SERPL-SCNC: 141 MMOL/L (ref 136–145)
SP GR UR STRIP: 1.01 (ref 1–1.03)
SQUAMOUS #/AREA URNS AUTO: 3 /HPF
TROPONIN I SERPL DL<=0.01 NG/ML-MCNC: 0.01 NG/ML (ref 0–0.03)
URN SPEC COLLECT METH UR: ABNORMAL
WBC # BLD AUTO: 8 K/UL (ref 3.9–12.7)
WBC #/AREA URNS AUTO: 41 /HPF (ref 0–5)

## 2020-11-07 PROCEDURE — 96361 HYDRATE IV INFUSION ADD-ON: CPT

## 2020-11-07 PROCEDURE — 80053 COMPREHEN METABOLIC PANEL: CPT

## 2020-11-07 PROCEDURE — 87086 URINE CULTURE/COLONY COUNT: CPT

## 2020-11-07 PROCEDURE — 81001 URINALYSIS AUTO W/SCOPE: CPT

## 2020-11-07 PROCEDURE — 93010 ELECTROCARDIOGRAM REPORT: CPT | Mod: ,,, | Performed by: INTERNAL MEDICINE

## 2020-11-07 PROCEDURE — 87040 BLOOD CULTURE FOR BACTERIA: CPT | Mod: 59

## 2020-11-07 PROCEDURE — 85025 COMPLETE CBC W/AUTO DIFF WBC: CPT

## 2020-11-07 PROCEDURE — 83690 ASSAY OF LIPASE: CPT

## 2020-11-07 PROCEDURE — 87449 NOS EACH ORGANISM AG IA: CPT

## 2020-11-07 PROCEDURE — 63600175 PHARM REV CODE 636 W HCPCS: Performed by: EMERGENCY MEDICINE

## 2020-11-07 PROCEDURE — 83605 ASSAY OF LACTIC ACID: CPT

## 2020-11-07 PROCEDURE — 11000001 HC ACUTE MED/SURG PRIVATE ROOM

## 2020-11-07 PROCEDURE — 83880 ASSAY OF NATRIURETIC PEPTIDE: CPT

## 2020-11-07 PROCEDURE — 99285 EMERGENCY DEPT VISIT HI MDM: CPT | Mod: CS,,, | Performed by: EMERGENCY MEDICINE

## 2020-11-07 PROCEDURE — 36415 COLL VENOUS BLD VENIPUNCTURE: CPT

## 2020-11-07 PROCEDURE — 84484 ASSAY OF TROPONIN QUANT: CPT

## 2020-11-07 PROCEDURE — 99285 EMERGENCY DEPT VISIT HI MDM: CPT | Mod: 25

## 2020-11-07 PROCEDURE — U0002 COVID-19 LAB TEST NON-CDC: HCPCS | Performed by: EMERGENCY MEDICINE

## 2020-11-07 PROCEDURE — 87077 CULTURE AEROBIC IDENTIFY: CPT

## 2020-11-07 PROCEDURE — 25000003 PHARM REV CODE 250: Performed by: EMERGENCY MEDICINE

## 2020-11-07 PROCEDURE — 99285 PR EMERGENCY DEPT VISIT,LEVEL V: ICD-10-PCS | Mod: CS,,, | Performed by: EMERGENCY MEDICINE

## 2020-11-07 PROCEDURE — 87088 URINE BACTERIA CULTURE: CPT

## 2020-11-07 PROCEDURE — 93005 ELECTROCARDIOGRAM TRACING: CPT

## 2020-11-07 PROCEDURE — 96375 TX/PRO/DX INJ NEW DRUG ADDON: CPT

## 2020-11-07 PROCEDURE — 82962 GLUCOSE BLOOD TEST: CPT

## 2020-11-07 PROCEDURE — 25000003 PHARM REV CODE 250: Performed by: STUDENT IN AN ORGANIZED HEALTH CARE EDUCATION/TRAINING PROGRAM

## 2020-11-07 PROCEDURE — 96365 THER/PROPH/DIAG IV INF INIT: CPT

## 2020-11-07 PROCEDURE — 85379 FIBRIN DEGRADATION QUANT: CPT

## 2020-11-07 PROCEDURE — 87186 SC STD MICRODIL/AGAR DIL: CPT

## 2020-11-07 PROCEDURE — 93010 EKG 12-LEAD: ICD-10-PCS | Mod: ,,, | Performed by: INTERNAL MEDICINE

## 2020-11-07 RX ORDER — HEPARIN SODIUM 5000 [USP'U]/ML
5000 INJECTION, SOLUTION INTRAVENOUS; SUBCUTANEOUS EVERY 8 HOURS
Status: DISCONTINUED | OUTPATIENT
Start: 2020-11-08 | End: 2020-11-10

## 2020-11-07 RX ORDER — IPRATROPIUM BROMIDE AND ALBUTEROL SULFATE 2.5; .5 MG/3ML; MG/3ML
3 SOLUTION RESPIRATORY (INHALATION) EVERY 4 HOURS PRN
Status: DISCONTINUED | OUTPATIENT
Start: 2020-11-07 | End: 2020-11-12 | Stop reason: HOSPADM

## 2020-11-07 RX ORDER — IBUPROFEN 200 MG
24 TABLET ORAL
Status: DISCONTINUED | OUTPATIENT
Start: 2020-11-07 | End: 2020-11-12 | Stop reason: HOSPADM

## 2020-11-07 RX ORDER — IBUPROFEN 200 MG
16 TABLET ORAL
Status: DISCONTINUED | OUTPATIENT
Start: 2020-11-07 | End: 2020-11-12 | Stop reason: HOSPADM

## 2020-11-07 RX ORDER — ALLOPURINOL 100 MG/1
100 TABLET ORAL DAILY
Status: DISCONTINUED | OUTPATIENT
Start: 2020-11-08 | End: 2020-11-12 | Stop reason: HOSPADM

## 2020-11-07 RX ORDER — SODIUM CHLORIDE 9 MG/ML
INJECTION, SOLUTION INTRAVENOUS CONTINUOUS
Status: ACTIVE | OUTPATIENT
Start: 2020-11-07 | End: 2020-11-08

## 2020-11-07 RX ORDER — FUROSEMIDE 40 MG/1
40 TABLET ORAL DAILY
Status: DISCONTINUED | OUTPATIENT
Start: 2020-11-08 | End: 2020-11-08

## 2020-11-07 RX ORDER — SODIUM CHLORIDE 0.9 % (FLUSH) 0.9 %
10 SYRINGE (ML) INJECTION
Status: DISCONTINUED | OUTPATIENT
Start: 2020-11-07 | End: 2020-11-12 | Stop reason: HOSPADM

## 2020-11-07 RX ORDER — HYDRALAZINE HYDROCHLORIDE 50 MG/1
50 TABLET, FILM COATED ORAL EVERY 8 HOURS
Status: DISCONTINUED | OUTPATIENT
Start: 2020-11-08 | End: 2020-11-07

## 2020-11-07 RX ORDER — NIFEDIPINE 30 MG/1
60 TABLET, EXTENDED RELEASE ORAL DAILY
Status: DISCONTINUED | OUTPATIENT
Start: 2020-11-08 | End: 2020-11-12 | Stop reason: HOSPADM

## 2020-11-07 RX ORDER — ACETAMINOPHEN 325 MG/1
650 TABLET ORAL EVERY 8 HOURS PRN
Status: DISCONTINUED | OUTPATIENT
Start: 2020-11-07 | End: 2020-11-08

## 2020-11-07 RX ORDER — AMOXICILLIN 250 MG
1 CAPSULE ORAL 2 TIMES DAILY PRN
Status: DISCONTINUED | OUTPATIENT
Start: 2020-11-07 | End: 2020-11-12 | Stop reason: HOSPADM

## 2020-11-07 RX ORDER — CLONIDINE HYDROCHLORIDE 0.1 MG/1
0.2 TABLET ORAL 3 TIMES DAILY
Status: DISCONTINUED | OUTPATIENT
Start: 2020-11-08 | End: 2020-11-12 | Stop reason: HOSPADM

## 2020-11-07 RX ORDER — GLUCAGON 1 MG
1 KIT INJECTION
Status: DISCONTINUED | OUTPATIENT
Start: 2020-11-07 | End: 2020-11-12 | Stop reason: HOSPADM

## 2020-11-07 RX ORDER — ENTECAVIR 0.5 MG/1
0.5 TABLET, FILM COATED ORAL EVERY OTHER DAY
Status: DISCONTINUED | OUTPATIENT
Start: 2020-11-08 | End: 2020-11-12 | Stop reason: HOSPADM

## 2020-11-07 RX ORDER — INSULIN ASPART 100 [IU]/ML
0-5 INJECTION, SOLUTION INTRAVENOUS; SUBCUTANEOUS
Status: DISCONTINUED | OUTPATIENT
Start: 2020-11-07 | End: 2020-11-12 | Stop reason: HOSPADM

## 2020-11-07 RX ORDER — SODIUM BICARBONATE 650 MG/1
650 TABLET ORAL 3 TIMES DAILY
Status: DISCONTINUED | OUTPATIENT
Start: 2020-11-08 | End: 2020-11-07

## 2020-11-07 RX ORDER — TALC
6 POWDER (GRAM) TOPICAL NIGHTLY PRN
Status: DISCONTINUED | OUTPATIENT
Start: 2020-11-07 | End: 2020-11-12 | Stop reason: HOSPADM

## 2020-11-07 RX ORDER — CARVEDILOL 12.5 MG/1
25 TABLET ORAL 2 TIMES DAILY
Status: DISCONTINUED | OUTPATIENT
Start: 2020-11-08 | End: 2020-11-12 | Stop reason: HOSPADM

## 2020-11-07 RX ORDER — PANTOPRAZOLE SODIUM 40 MG/1
40 TABLET, DELAYED RELEASE ORAL
Status: DISCONTINUED | OUTPATIENT
Start: 2020-11-08 | End: 2020-11-12 | Stop reason: HOSPADM

## 2020-11-07 RX ORDER — ONDANSETRON 4 MG/1
4 TABLET, ORALLY DISINTEGRATING ORAL EVERY 8 HOURS PRN
Status: DISCONTINUED | OUTPATIENT
Start: 2020-11-07 | End: 2020-11-12 | Stop reason: HOSPADM

## 2020-11-07 RX ADMIN — SODIUM CHLORIDE 1000 ML: 0.9 INJECTION, SOLUTION INTRAVENOUS at 07:11

## 2020-11-07 RX ADMIN — PIPERACILLIN AND TAZOBACTAM 4.5 G: 4; .5 INJECTION, POWDER, LYOPHILIZED, FOR SOLUTION INTRAVENOUS; PARENTERAL at 07:11

## 2020-11-07 RX ADMIN — VANCOMYCIN HYDROCHLORIDE 1500 MG: 1.5 INJECTION, POWDER, LYOPHILIZED, FOR SOLUTION INTRAVENOUS at 08:11

## 2020-11-07 RX ADMIN — SODIUM CHLORIDE: 0.9 INJECTION, SOLUTION INTRAVENOUS at 11:11

## 2020-11-07 NOTE — ED PROVIDER NOTES
CC: Fatigue (Increased fatigue x several weeks, recent hospitalization for pneumonia. Denies SOB or CP. )      History provided by:   Patient   Family    HPI: Kendy Vital is a 55 y.o. year old female past medical history of glomerulonephritis, chronic viral be hepatitis, cryoglobulinemic vasculitis,  renal vein thrombosis who presents to the ED complaining of shortness of breath  Patient reports being admitted in [place] October 26 diagnosed with pneumonia stayed in the hospital for 5 days was discharged on antibiotics that she is still taking however she does not know the name of the medication however her shortness of breath did not improve has persistent cough with white phlegm and persistent fevers of 101 at home.  Reports diarrhea 2-3 episodes today nonbloody, no dysuria no frequency of urination no skin rash.  Patient reports not doing well since the hospital discharge with fatigue poor appetite and persistent shortness of breath.  Some nocturnal chest pain    Significant other in the room with food, patient would like to eat chicken, advised the patient weight and in the workup is available            Past Medical History:   Diagnosis Date    Acute (diffuse) proliferative glomerulonephritis     Acute renal failure 7/19/2019    B-cell lymphoproliferative disorder 7/30/2019    Chronic obstructive lung disease 7/27/2019    Chronic viral hepatitis B without delta agent and without coma 7/24/2019    Cryoglobulinemic vasculitis     Hypogammaglobulinemia 8/5/2019    Iron deficiency anemia 7/30/2019    Renal vein thrombosis 2015    s/p embolectomy and lovenox for 9 mos    Steroid-induced hyperglycemia 7/28/2019    Uterine leiomyoma     s/p resection     History reviewed. No pertinent surgical history.  History reviewed. No pertinent family history.  No current facility-administered medications on file prior to encounter.      Current Outpatient Medications on File Prior to Encounter   Medication Sig  "Dispense Refill    allopurinol (ZYLOPRIM) 100 MG tablet Take 1 tablet (100 mg total) by mouth once daily. 30 tablet 2    blood sugar diagnostic Strp use to test blood gluocse 2 (two) times daily with meals. 100 strip 11    blood-glucose meter Misc Use as instructed 1 each 0    calcium carbonate (OS-DONTE) 500 mg calcium (1,250 mg) tablet Take 2 tablets (1,000 mg total) by mouth once daily.  0    carvedilol (COREG) 25 MG tablet Take 1 tablet (25 mg total) by mouth 2 (two) times daily. 60 tablet 11    cloNIDine (CATAPRES) 0.1 MG tablet Take 2 tablets (0.2 mg total) by mouth 3 (three) times daily. 180 tablet 11    dapsone 100 MG Tab Take 1 tablet (100 mg total) by mouth once daily. 30 tablet 11    entecavir (BARACLUDE) 0.5 MG Tab Take 1 tablet (0.5 mg total) by mouth every other day. 15 tablet 6    ferrous sulfate 325 (65 FE) MG EC tablet Take 1 tablet (325 mg total) by mouth once daily.  0    furosemide (LASIX) 40 MG tablet Take 40 mg by mouth once daily.  3    hydrALAZINE (APRESOLINE) 100 MG tablet Take 1 tablet (100 mg total) by mouth every 8 (eight) hours. 90 tablet 11    insulin aspart U-100 (NOVOLOG) 100 unit/mL (3 mL) InPn pen Inject 1-10 Units into the skin 3 (three) times daily. Use sliding scale provided in instructions 108 mL 0    insulin syringe-needle U-100 0.3 mL 30 gauge x 5/16" Syrg 1 each by Misc.(Non-Drug; Combo Route) route 2 (two) times daily with meals. 100 each 11    lancets 30 gauge Misc use to test blood glucose 2 (two) times daily with meals. 100 each 11    lancing device Misc 1 Device by Misc.(Non-Drug; Combo Route) route 2 (two) times daily with meals. 1 each 0    losartan (COZAAR) 25 MG tablet Take 25 mg by mouth once daily.  11    NIFEdipine (PROCARDIA-XL) 90 MG (OSM) 24 hr tablet Take 1 tablet (90 mg total) by mouth once daily. 30 tablet 11    pantoprazole (PROTONIX) 40 MG tablet Take 1 tablet (40 mg total) by mouth before breakfast. 30 tablet 11    sodium bicarbonate 650 " MG tablet Take 1 tablet (650 mg total) by mouth 3 (three) times daily. 90 tablet 11    vitamin D (VITAMIN D3) 1000 units Tab Take 1 tablet (1,000 Units total) by mouth once daily.       Patient has no known allergies.  Social History     Socioeconomic History    Marital status:      Spouse name: Not on file    Number of children: Not on file    Years of education: Not on file    Highest education level: Not on file   Occupational History    Not on file   Social Needs    Financial resource strain: Not on file    Food insecurity     Worry: Not on file     Inability: Not on file    Transportation needs     Medical: Not on file     Non-medical: Not on file   Tobacco Use    Smoking status: Former Smoker    Smokeless tobacco: Never Used   Substance and Sexual Activity    Alcohol use: Not Currently    Drug use: Never    Sexual activity: Not on file   Lifestyle    Physical activity     Days per week: Not on file     Minutes per session: Not on file    Stress: Not on file   Relationships    Social connections     Talks on phone: Not on file     Gets together: Not on file     Attends [employer] service: Not on file     Active member of club or organization: Not on file     Attends meetings of clubs or organizations: Not on file     Relationship status: Not on file   Other Topics Concern    Not on file   Social History Narrative    Not on file       ROS:     Constitutional : neg for fever, neg for weakness, fatigue, poor appetite  HENT neg for head injury, neg for sore throat  Eyes: neg for visual changes, neg for eye pain  Resp positive for persistent shortness of breath and cough since discharge from outside hospital approximately 1 week ago for pneumonia  Cardiac  neg for chest pain, neg for palpitations  GI neg for abd pain, neg for nausea, neg for vomiting   neg for urinary changes  Neuro neg for focal weakness or numbness  Skin neg for skin rash  MSK: neg for myalgia, neg for arthralgia  ALL:  Patient has no known allergies.    PHYSICAL EXAM:  Vitals:    11/07/20 2136   BP:    Pulse:    Resp: (!) 22   Temp:          PHYSICAL EXAM:     general: comfortable, in no acute distress, pleasant, well nourished  VS: triage VS reviewed  HENT: NC/AT  Eyes: PERRL, EOMI  CV: RRR, no  murmurs, no rubs, no gallops, no LE edema  Resp: comfortable breathing, speaks in full sentences, CTAB, no wheezing, no crackles, no ronchi  ABD:  soft, ND, + normal BS, diffuse tenderness to palpation  Renal: No CVAT  Neuro: AAO x 3, 5/5 strength x 4 extremities, sensation intact, face symmetric, speech normal  MSK: no deformity, no edema            DATA & INTERVENTIONS:    LABS reviewed:  Labs Reviewed   COMPREHENSIVE METABOLIC PANEL - Abnormal; Notable for the following components:       Result Value    Glucose 133 (*)     BUN 22 (*)     Creatinine 2.6 (*)     Albumin 2.9 (*)     Alkaline Phosphatase 150 (*)     eGFR if  23.2 (*)     eGFR if non  20.2 (*)     All other components within normal limits   CBC W/ AUTO DIFFERENTIAL - Abnormal; Notable for the following components:    RBC 5.81 (*)     MCV 81 (*)     MCH 25.0 (*)     MCHC 31.0 (*)     RDW 18.9 (*)     Platelets 521 (*)     All other components within normal limits   URINALYSIS, REFLEX TO URINE CULTURE - Abnormal; Notable for the following components:    Appearance, UA Hazy (*)     Protein, UA 1+ (*)     Leukocytes, UA 2+ (*)     All other components within normal limits    Narrative:     Specimen Source->Urine   D DIMER, QUANTITATIVE - Abnormal; Notable for the following components:    D-Dimer 1.20 (*)     All other components within normal limits   URINALYSIS MICROSCOPIC - Abnormal; Notable for the following components:    WBC, UA 41 (*)     Non-Squam Epith 2 (*)     All other components within normal limits    Narrative:     Specimen Source->Urine   SARS-COV-2 RDRP GENE - Normal    Narrative:     This test utilizes isothermal nucleic acid  amplification   technology to detect the SARS-CoV-2 RdRp nucleic acid segment.   The analytical sensitivity (limit of detection) is 125 genome   equivalents/mL.   A POSITIVE result implies infection with the SARS-CoV-2 virus;   the patient is presumed to be contagious.     A NEGATIVE result means that SARS-CoV-2 nucleic acids are not   present above the limit of detection. A NEGATIVE result should be   treated as presumptive. It does not rule out the possibility of   COVID-19 and should not be the sole basis for treatment decisions.   If COVID-19 is strongly suspected based on clinical and exposure   history, re-testing using an alternate molecular assay should be   considered.   This test is only for use under the Food and Drug   Administration s Emergency Use Authorization (EUA).   Commercial kits are provided by [employer].   Performance characteristics of the EUA have been independently   verified by [facility] Department of   Pathology and Laboratory Medicine.   _________________________________________________________________   The authorized Fact Sheet for Healthcare Providers and the authorized Fact   Sheet for Patients of the ID NOW COVID-19 are available on the FDA   website:     https://www.fda.gov/media/182283/download  https://www.fda.gov/media/089863/download       CULTURE, BLOOD   CULTURE, BLOOD   CULTURE, URINE   LACTIC ACID, PLASMA   LIPASE   TROPONIN I   B-TYPE NATRIURETIC PEPTIDE   POCT GLUCOSE   POCT GLUCOSE MONITORING CONTINUOUS       RADIOLOGY reviewed:  Imaging Results          CT Chest Abdomen Pelvis Without Contrast (XPD) (In process)  Result time 11/07/20 23:10:03               X-Ray Abdomen Flat And Erect (Final result)  Result time 11/07/20 18:15:45    Final result by Abdelrahman Swan MD (11/07/20 18:15:45)                 Impression:      No acute radiographic abnormality.      Electronically signed by: Abdelrahman Swan  Date:    11/07/2020  Time:    18:15              Narrative:    EXAMINATION:  XR ABDOMEN FLAT AND ERECT    CLINICAL HISTORY:  Unspecified abdominal pain    TECHNIQUE:  Flat and erect AP views of the abdomen were performed.    COMPARISON:  None    FINDINGS:  Mild gaseous distention of the colon.  No radiographic mass, organomegaly or pathologic calcification.  No evidence of bowel obstruction.  No acute osseous abnormality.    No free air is detected.  Slight motion artifact.                                X-Ray Chest PA And Lateral (Final result)  Result time 11/07/20 18:13:50    Final result by Abdelrahman Swan MD (11/07/20 18:13:50)                 Impression:      Interval detrimental change with interstitial and parenchymal airspace disease bilaterally.  Findings could represent pneumonia.  Lymphangitic neoplastic process is not excluded.  Recommend follow-up.    This report was flagged in Epic as abnormal.      Electronically signed by: Abdelrahman Swan  Date:    11/07/2020  Time:    18:13             Narrative:    EXAMINATION:  XR CHEST PA AND LATERAL    CLINICAL HISTORY:  Dyspnea, unspecified, pneumonia, lymphoma    TECHNIQUE:  PA and lateral views of the chest were performed.    COMPARISON:  08/23/2019    FINDINGS:  Reticulonodular interstitial changes with patchy perihilar and bibasilar alveolar infiltrates and possible mild consolidation.    No effusion or pneumothorax.  No acute osseous abnormality.    Heart appears normal size.  No edema is detected.                                MEDICATIONS/FLUIDS:  Medications   sodium chloride 0.9% flush 10 mL (has no administration in time range)   allopurinoL tablet 100 mg (has no administration in time range)   carvediloL tablet 25 mg (has no administration in time range)   cloNIDine tablet 0.2 mg (has no administration in time range)   entecavir tablet 0.5 mg (has no administration in time range)   furosemide tablet 40 mg (has no administration in time range)   NIFEdipine 24 hr tablet 60 mg (has no administration  in time range)   pantoprazole EC tablet 40 mg (has no administration in time range)   sodium bicarbonate tablet 650 mg (has no administration in time range)   piperacillin-tazobactam 4.5 g in sodium chloride 0.9% 100 mL IVPB (ready to mix system) (has no administration in time range)   sodium chloride 0.9% flush 10 mL (has no administration in time range)   glucose chewable tablet 16 g (has no administration in time range)   glucose chewable tablet 24 g (has no administration in time range)   dextrose 50% injection 12.5 g (has no administration in time range)   dextrose 50% injection 25 g (has no administration in time range)   glucagon (human recombinant) injection 1 mg (has no administration in time range)   heparin (porcine) injection 5,000 Units (has no administration in time range)   senna-docusate 8.6-50 mg per tablet 1 tablet (has no administration in time range)   ondansetron disintegrating tablet 4 mg (has no administration in time range)   insulin aspart U-100 pen 0-5 Units (has no administration in time range)   albuterol-ipratropium 2.5 mg-0.5 mg/3 mL nebulizer solution 3 mL (has no administration in time range)   acetaminophen tablet 650 mg (has no administration in time range)   melatonin tablet 6 mg (has no administration in time range)   0.9%  NaCl infusion (has no administration in time range)   hydrALAZINE tablet 50 mg (has no administration in time range)   sodium chloride 0.9% bolus 1,000 mL (0 mLs Intravenous Stopped 11/7/20 2140)   piperacillin-tazobactam 4.5 g in sodium chloride 0.9% 100 mL IVPB (ready to mix system) (4.5 g Intravenous New Bag 11/7/20 1926)   vancomycin 1.5 g in dextrose 5 % 250 mL IVPB (ready to mix) (0 mg/kg × 101.6 kg Intravenous Stopped 11/7/20 2140)         MDM:  Kedny Vital is a 55 y.o. year old female who presents to the ED complaining of persistent shortness of breath, cough, poor appetite, fatigue since discharge from the hospital a week ago  Reports still on  antibiotics however does not know which  Patient looks well  No lower extremity edema no JVD, lungs clear to auscultation no findings to suggest fluid overload  Patient reports fever, will initiate infectious workup    DDX includes but not limited to:  Her pneumonia versus PE versus ACS versus intra-abdominal infection.  Diarrhea with 2-3 episodes a day unlikely C diff.      Labs ordered and reviewed:   UA +leukocytes  Urine microscopy with occasional bacteria, 41 wbc's  Blood culture  CBC with normal white count hemoglobin, platelets elevated 521  CMP with creatinine at 2.6 elevated from baseline of 1  Lactic acid normal  Lipase within normal limits  Troponin within normal limits  BNP 28 not suggestive CHF  Point of care COVID negative  Point of care glucose 107  D-dimer 1 point.  In the picture of the chest x-ray finding, I do not believe this is significant for PE but an inflammatory marker  Medication given in the ED:  Zosyn, vancomycin    CXR, abdominal x-ray (ordered and reviewed): Interval detrimental change with interstitial and parenchymal airspace disease bilaterally.  Findings could represent pneumonia.  Lymphangitic neoplastic process is not excluded.  Recommend follow-up.   Imagings independently visualized: yes    EKG (independantly reviewed):  Ventricular rate 86, normal sinus rhythm, no acute ischemic changes    Old records obtained and reviewed:   [facility] note from August 13, 2020.  Renal biopsy of diffuse proliferative glomerulonephritis due to cryoglobulinemic disease extensive ATN.  Concern for possible hematologic malignancy small population of cup light chain restricted plasma cell, cryoglobulinemic vasculitis with glomerulonephritis, received steroid therapy with pulse dose steroids in addition to sessions of plasma exchange  May 2020 Dr. Montalvo  Heme-Onc diagnosis of B-cell lymphoproliferative disorder, cryoglobulinemic vasculitis, chronic viral hepatitis B.   She had plasma exchange, pulse steroids, IVIG retook sent    Patient with bilateral pulmonary disease on the chest x-ray unknown if stable or worse compared to the previous admission in [place], given Zosyn and vancomycin in the emergency department for broad-spectrum coverage.  Patient also with elevated creatinine, possibly prerenal versus renal (unknown which antibiotic she was discharged on vs secondary to glomerulonephritis)    IMPRESSION:  1.) LACI  2.)  Bilateral pulmonary disease  3.  UTI    Dispo:  Admit to medicine    Critical Care Time: N/A       Maureen Dominguez MD  11/07/20 5709

## 2020-11-08 PROBLEM — R91.8 PULMONARY INFILTRATE: Status: ACTIVE | Noted: 2020-11-07

## 2020-11-08 PROBLEM — J96.01 ACUTE RESPIRATORY FAILURE WITH HYPOXIA: Status: ACTIVE | Noted: 2020-11-08

## 2020-11-08 PROBLEM — R21 RASH OF GROIN: Status: RESOLVED | Noted: 2019-07-31 | Resolved: 2020-11-08

## 2020-11-08 LAB
ABO + RH BLD: NORMAL
ALBUMIN SERPL BCP-MCNC: 2.5 G/DL (ref 3.5–5.2)
ALP SERPL-CCNC: 131 U/L (ref 55–135)
ALT SERPL W/O P-5'-P-CCNC: 22 U/L (ref 10–44)
ANION GAP SERPL CALC-SCNC: 14 MMOL/L (ref 8–16)
AST SERPL-CCNC: 14 U/L (ref 10–40)
BASOPHILS # BLD AUTO: 0.05 K/UL (ref 0–0.2)
BASOPHILS NFR BLD: 0.5 % (ref 0–1.9)
BILIRUB SERPL-MCNC: 0.4 MG/DL (ref 0.1–1)
BLD GP AB SCN CELLS X3 SERPL QL: NORMAL
BUN SERPL-MCNC: 20 MG/DL (ref 6–20)
CA-I BLDV-SCNC: 1.2 MMOL/L (ref 1.06–1.42)
CALCIUM SERPL-MCNC: 9.5 MG/DL (ref 8.7–10.5)
CHLORIDE SERPL-SCNC: 105 MMOL/L (ref 95–110)
CO2 SERPL-SCNC: 22 MMOL/L (ref 23–29)
CREAT SERPL-MCNC: 2.4 MG/DL (ref 0.5–1.4)
CREAT UR-MCNC: 36 MG/DL (ref 15–325)
CREAT UR-MCNC: 36 MG/DL (ref 15–325)
DIFFERENTIAL METHOD: ABNORMAL
EOSINOPHIL # BLD AUTO: 0.1 K/UL (ref 0–0.5)
EOSINOPHIL NFR BLD: 1.3 % (ref 0–8)
ERYTHROCYTE [DISTWIDTH] IN BLOOD BY AUTOMATED COUNT: 17.9 % (ref 11.5–14.5)
EST. GFR  (AFRICAN AMERICAN): 25.6 ML/MIN/1.73 M^2
EST. GFR  (NON AFRICAN AMERICAN): 22.2 ML/MIN/1.73 M^2
ESTIMATED AVG GLUCOSE: 237 MG/DL (ref 68–131)
GLUCOSE SERPL-MCNC: 124 MG/DL (ref 70–110)
HBA1C MFR BLD HPLC: 9.9 % (ref 4–5.6)
HCT VFR BLD AUTO: 40.1 % (ref 37–48.5)
HGB BLD-MCNC: 12.7 G/DL (ref 12–16)
IGG SERPL-MCNC: 623 MG/DL (ref 650–1600)
IGM SERPL-MCNC: 69 MG/DL (ref 50–300)
IMM GRANULOCYTES # BLD AUTO: 0.03 K/UL (ref 0–0.04)
IMM GRANULOCYTES NFR BLD AUTO: 0.3 % (ref 0–0.5)
INR PPP: 1 (ref 0.8–1.2)
LDH SERPL L TO P-CCNC: 216 U/L (ref 110–260)
LYMPHOCYTES # BLD AUTO: 1.3 K/UL (ref 1–4.8)
LYMPHOCYTES NFR BLD: 12.7 % (ref 18–48)
MAGNESIUM SERPL-MCNC: 1.5 MG/DL (ref 1.6–2.6)
MCH RBC QN AUTO: 25.5 PG (ref 27–31)
MCHC RBC AUTO-ENTMCNC: 31.7 G/DL (ref 32–36)
MCV RBC AUTO: 80 FL (ref 82–98)
MONOCYTES # BLD AUTO: 1.2 K/UL (ref 0.3–1)
MONOCYTES NFR BLD: 12 % (ref 4–15)
NEUTROPHILS # BLD AUTO: 7.5 K/UL (ref 1.8–7.7)
NEUTROPHILS NFR BLD: 73.2 % (ref 38–73)
NRBC BLD-RTO: 0 /100 WBC
PHOSPHATE SERPL-MCNC: 4.1 MG/DL (ref 2.7–4.5)
PHOSPHATE SERPL-MCNC: 4.2 MG/DL (ref 2.7–4.5)
PLATELET # BLD AUTO: 461 K/UL (ref 150–350)
PMV BLD AUTO: 9.8 FL (ref 9.2–12.9)
POCT GLUCOSE: 126 MG/DL (ref 70–110)
POCT GLUCOSE: 140 MG/DL (ref 70–110)
POCT GLUCOSE: 161 MG/DL (ref 70–110)
POTASSIUM SERPL-SCNC: 3.7 MMOL/L (ref 3.5–5.1)
POTASSIUM SERPL-SCNC: 3.9 MMOL/L (ref 3.5–5.1)
POTASSIUM UR-SCNC: 15 MMOL/L (ref 15–95)
PROCALCITONIN SERPL IA-MCNC: 0.13 NG/ML
PROT SERPL-MCNC: 6.5 G/DL (ref 6–8.4)
PROT UR-MCNC: 15 MG/DL (ref 0–15)
PROT/CREAT UR: 0.42 MG/G{CREAT} (ref 0–0.2)
PROTHROMBIN TIME: 11.3 SEC (ref 9–12.5)
RBC # BLD AUTO: 4.99 M/UL (ref 4–5.4)
SODIUM SERPL-SCNC: 141 MMOL/L (ref 136–145)
SODIUM UR-SCNC: 80 MMOL/L (ref 20–250)
URATE SERPL-MCNC: 5.4 MG/DL (ref 2.4–5.7)
WBC # BLD AUTO: 10.2 K/UL (ref 3.9–12.7)

## 2020-11-08 PROCEDURE — 82164 ANGIOTENSIN I ENZYME TEST: CPT

## 2020-11-08 PROCEDURE — 99233 SBSQ HOSP IP/OBS HIGH 50: CPT | Mod: ,,, | Performed by: INTERNAL MEDICINE

## 2020-11-08 PROCEDURE — 82784 ASSAY IGA/IGD/IGG/IGM EACH: CPT | Mod: 59

## 2020-11-08 PROCEDURE — 99223 PR INITIAL HOSPITAL CARE,LEVL III: ICD-10-PCS | Mod: ,,, | Performed by: HOSPITALIST

## 2020-11-08 PROCEDURE — 99232 PR SUBSEQUENT HOSPITAL CARE,LEVL II: ICD-10-PCS | Mod: ,,, | Performed by: INTERNAL MEDICINE

## 2020-11-08 PROCEDURE — 85025 COMPLETE CBC W/AUTO DIFF WBC: CPT

## 2020-11-08 PROCEDURE — 86850 RBC ANTIBODY SCREEN: CPT

## 2020-11-08 PROCEDURE — 84145 PROCALCITONIN (PCT): CPT

## 2020-11-08 PROCEDURE — 87449 NOS EACH ORGANISM AG IA: CPT

## 2020-11-08 PROCEDURE — 99232 SBSQ HOSP IP/OBS MODERATE 35: CPT | Mod: ,,, | Performed by: INTERNAL MEDICINE

## 2020-11-08 PROCEDURE — 84133 ASSAY OF URINE POTASSIUM: CPT

## 2020-11-08 PROCEDURE — 84300 ASSAY OF URINE SODIUM: CPT

## 2020-11-08 PROCEDURE — 82784 ASSAY IGA/IGD/IGG/IGM EACH: CPT

## 2020-11-08 PROCEDURE — 25000003 PHARM REV CODE 250: Performed by: STUDENT IN AN ORGANIZED HEALTH CARE EDUCATION/TRAINING PROGRAM

## 2020-11-08 PROCEDURE — 83036 HEMOGLOBIN GLYCOSYLATED A1C: CPT

## 2020-11-08 PROCEDURE — 84132 ASSAY OF SERUM POTASSIUM: CPT

## 2020-11-08 PROCEDURE — 84100 ASSAY OF PHOSPHORUS: CPT | Mod: 91

## 2020-11-08 PROCEDURE — 94761 N-INVAS EAR/PLS OXIMETRY MLT: CPT

## 2020-11-08 PROCEDURE — 99233 PR SUBSEQUENT HOSPITAL CARE,LEVL III: ICD-10-PCS | Mod: ,,, | Performed by: INTERNAL MEDICINE

## 2020-11-08 PROCEDURE — 87899 AGENT NOS ASSAY W/OPTIC: CPT

## 2020-11-08 PROCEDURE — 83735 ASSAY OF MAGNESIUM: CPT

## 2020-11-08 PROCEDURE — 84156 ASSAY OF PROTEIN URINE: CPT

## 2020-11-08 PROCEDURE — 11000001 HC ACUTE MED/SURG PRIVATE ROOM

## 2020-11-08 PROCEDURE — 85610 PROTHROMBIN TIME: CPT

## 2020-11-08 PROCEDURE — 27000221 HC OXYGEN, UP TO 24 HOURS

## 2020-11-08 PROCEDURE — 36415 COLL VENOUS BLD VENIPUNCTURE: CPT

## 2020-11-08 PROCEDURE — 80053 COMPREHEN METABOLIC PANEL: CPT

## 2020-11-08 PROCEDURE — 84550 ASSAY OF BLOOD/URIC ACID: CPT

## 2020-11-08 PROCEDURE — 82330 ASSAY OF CALCIUM: CPT

## 2020-11-08 PROCEDURE — 83615 LACTATE (LD) (LDH) ENZYME: CPT

## 2020-11-08 PROCEDURE — 84100 ASSAY OF PHOSPHORUS: CPT

## 2020-11-08 PROCEDURE — 63600175 PHARM REV CODE 636 W HCPCS: Performed by: STUDENT IN AN ORGANIZED HEALTH CARE EDUCATION/TRAINING PROGRAM

## 2020-11-08 PROCEDURE — 99223 1ST HOSP IP/OBS HIGH 75: CPT | Mod: ,,, | Performed by: HOSPITALIST

## 2020-11-08 RX ORDER — ACETAMINOPHEN 325 MG/1
650 TABLET ORAL EVERY 8 HOURS PRN
Status: DISCONTINUED | OUTPATIENT
Start: 2020-11-08 | End: 2020-11-12 | Stop reason: HOSPADM

## 2020-11-08 RX ORDER — MAGNESIUM SULFATE HEPTAHYDRATE 40 MG/ML
2 INJECTION, SOLUTION INTRAVENOUS ONCE
Status: COMPLETED | OUTPATIENT
Start: 2020-11-08 | End: 2020-11-08

## 2020-11-08 RX ADMIN — CLONIDINE HYDROCHLORIDE 0.2 MG: 0.1 TABLET ORAL at 08:11

## 2020-11-08 RX ADMIN — MAGNESIUM SULFATE 2 G: 2 INJECTION INTRAVENOUS at 08:11

## 2020-11-08 RX ADMIN — PIPERACILLIN AND TAZOBACTAM 4.5 G: 4; .5 INJECTION, POWDER, LYOPHILIZED, FOR SOLUTION INTRAVENOUS; PARENTERAL at 04:11

## 2020-11-08 RX ADMIN — CLONIDINE HYDROCHLORIDE 0.2 MG: 0.1 TABLET ORAL at 03:11

## 2020-11-08 RX ADMIN — HEPARIN SODIUM 5000 UNITS: 5000 INJECTION INTRAVENOUS; SUBCUTANEOUS at 10:11

## 2020-11-08 RX ADMIN — CLONIDINE HYDROCHLORIDE 0.2 MG: 0.1 TABLET ORAL at 09:11

## 2020-11-08 RX ADMIN — ACETAMINOPHEN 650 MG: 325 TABLET ORAL at 08:11

## 2020-11-08 RX ADMIN — HEPARIN SODIUM 5000 UNITS: 5000 INJECTION INTRAVENOUS; SUBCUTANEOUS at 05:11

## 2020-11-08 RX ADMIN — HEPARIN SODIUM 5000 UNITS: 5000 INJECTION INTRAVENOUS; SUBCUTANEOUS at 02:11

## 2020-11-08 RX ADMIN — CARVEDILOL 25 MG: 12.5 TABLET, FILM COATED ORAL at 09:11

## 2020-11-08 RX ADMIN — CARVEDILOL 25 MG: 12.5 TABLET, FILM COATED ORAL at 08:11

## 2020-11-08 RX ADMIN — PANTOPRAZOLE SODIUM 40 MG: 40 TABLET, DELAYED RELEASE ORAL at 05:11

## 2020-11-08 RX ADMIN — ALLOPURINOL 100 MG: 100 TABLET ORAL at 09:11

## 2020-11-08 RX ADMIN — ENTECAVIR 0.5 MG: 0.5 TABLET ORAL at 09:11

## 2020-11-08 RX ADMIN — ACETAMINOPHEN 650 MG: 325 TABLET ORAL at 12:11

## 2020-11-08 RX ADMIN — PIPERACILLIN AND TAZOBACTAM 4.5 G: 4; .5 INJECTION, POWDER, LYOPHILIZED, FOR SOLUTION INTRAVENOUS; PARENTERAL at 12:11

## 2020-11-08 RX ADMIN — NIFEDIPINE 60 MG: 30 TABLET, FILM COATED, EXTENDED RELEASE ORAL at 09:11

## 2020-11-08 NOTE — ED NOTES
LOC: The patient is awake, alert, and oriented to place, time, situation. Affect is appropriate.  Speech is appropriate and clear.     APPEARANCE: Patient resting on stretcher in no acute distress.  Patient is clean and well groomed.    SKIN: The skin is warm and dry; color consistent with ethnicity.  Patient has normal skin turgor and moist mucus membranes.  Skin intact; no breakdown or bruising noted.     MUSCULOSKELETAL: Patient moving upper and lower extremities without difficulty.  Denies weakness of the extremities but complains of generalized weakness.     RESPIRATORY: Airway is open and patent. Respirations spontaneous, even, easy, and non-labored.  Patient has a normal effort and rate.  No accessory muscle use noted. Denies cough but complains of SOB.     CARDIAC:  Normal rhythm and rate noted.  No peripheral edema noted. Complains of mid sternal chest discomfort.      ABDOMEN: Soft and non tender to palpation though pt complains of mid upper abdominal discomfort. Pt reports poor appetite and nausea.     NEUROLOGIC: Eyes open spontaneously.  Behavior appropriate to situation.  Follows commands; facial expression symmetrical.  Purposeful motor response noted; normal sensation in all extremities.

## 2020-11-08 NOTE — PLAN OF CARE
Problem: Adult Inpatient Plan of Care  Goal: Plan of Care Review  Outcome: Ongoing, Progressing  Goal: Absence of Hospital-Acquired Illness or Injury  Outcome: Ongoing, Progressing  Goal: Optimal Comfort and Wellbeing  Outcome: Ongoing, Progressing   Pt aaox4; pt participates in poc; pt continues on IV abx; NADN

## 2020-11-08 NOTE — SUBJECTIVE & OBJECTIVE
Subjective:     54 yo female with PMHx RA, gout, HTN, DM2, diffuse proliferative glomerulonephritis 2/2 2/2 cryoglobulinemic vasculitis, CKD, HBV on entecavir, hypogammaglobulinemia, biopsy proven b-cell lymphoma, and COPD. Was admitted 1 week ago and treated for pneumonia but has not improved since that time so presents for re-evaluation. Complains of intermittent fevers, diaphoresis, drenching night sweats, pleuritic chest pain, achy bones, cough productive of white sputum, poor appetite, and 10lb weight loss. Was found to have low grade B-cell lymphoma via bone marrow biopsy during hospitalization in August of 2019. Was treated with Rituximab, pulse dose steroids, IVIG, and plasmapheresis during that admission. Pt had decided to follow up at [facility] and did follow up for one appointment but was lost to follow up thereafter. It was decided not to treat the low-grade B cell lymphoma at that time given the indolent nature of the disease and to monitor. CT CAP this admission shows innumerable pulmonary nodules concerning for lymphoproliferative disorder. Heme/Onc consulted for recs.     Review of Systems   Constitutional: Positive for fever, malaise/fatigue and weight loss.   HENT: Negative for nosebleeds and sinus pain.    Eyes: Negative for blurred vision and double vision.   Respiratory: Positive for cough. Negative for hemoptysis and shortness of breath.    Cardiovascular: Negative for chest pain, palpitations and leg swelling.   Gastrointestinal: Negative for abdominal pain, diarrhea, nausea and vomiting.   Genitourinary: Negative for dysuria and urgency.   Musculoskeletal: Positive for myalgias. Negative for neck pain.   Skin: Negative for itching and rash.   Neurological: Negative for dizziness and headaches.   Endo/Heme/Allergies: Negative for polydipsia. Does not bruise/bleed easily.   Psychiatric/Behavioral: Negative for depression. The patient is not nervous/anxious.          Objective:     Vital Signs  (Most Recent):  Temp: 98.8 °F (37.1 °C) (11/08/20 1020)  Pulse: 82 (11/08/20 1728)  Resp: (!) 30 (11/08/20 1728)  BP: (!) 140/73 (11/08/20 1728)  SpO2: (!) 93 % (11/08/20 1728) Vital Signs (24h Range):  Temp:  [98.8 °F (37.1 °C)-101.4 °F (38.6 °C)] 98.8 °F (37.1 °C)  Pulse:  [82-92] 82  Resp:  [17-30] 30  SpO2:  [93 %-96 %] 93 %  BP: (129-166)/(71-90) 140/73     Weight: 101.6 kg (224 lb)  Body mass index is 36.15 kg/m².  Body surface area is 2.18 meters squared.    ECOG SCORE           Intake/Output - Last 3 Shifts       11/06 0700 - 11/07 0659 11/07 0700 - 11/08 0659 11/08 0700 - 11/09 0659    P.O.   240    IV Piggyback  1250     Total Intake(mL/kg)  1250 (12.3) 240 (2.4)    Urine (mL/kg/hr)   800 (0.7)    Total Output   800    Net  +1250 -560           Urine Occurrence  2 x 2 x          Physical Exam  Vitals signs reviewed.   Constitutional:       Appearance: Normal appearance.   HENT:      Head: Normocephalic and atraumatic.      Mouth/Throat:      Mouth: Mucous membranes are moist.   Eyes:      Extraocular Movements: Extraocular movements intact.      Conjunctiva/sclera: Conjunctivae normal.   Neck:      Musculoskeletal: Normal range of motion and neck supple.   Cardiovascular:      Rate and Rhythm: Normal rate and regular rhythm.   Pulmonary:      Effort: Pulmonary effort is normal.      Breath sounds: Normal breath sounds.   Abdominal:      General: Bowel sounds are normal.      Palpations: Abdomen is soft.   Skin:     General: Skin is warm and dry.   Neurological:      General: No focal deficit present.      Mental Status: She is alert and oriented to person, place, and time.   Psychiatric:         Mood and Affect: Affect normal.         Behavior: Behavior is cooperative.         Significant Labs:   CBC:   Recent Labs   Lab 11/07/20  1744 11/08/20  0416   WBC 8.00 10.20   HGB 14.5 12.7   HCT 46.8 40.1   * 461*   , CMP:   Recent Labs   Lab 11/07/20  1744 11/08/20  0417 11/08/20  0955    141  --     K 3.8 3.7 3.9    105  --    CO2 23 22*  --    * 124*  --    BUN 22* 20  --    CREATININE 2.6* 2.4*  --    CALCIUM 10.1 9.5  --    PROT 7.5 6.5  --    ALBUMIN 2.9* 2.5*  --    BILITOT 0.3 0.4  --    ALKPHOS 150* 131  --    AST 19 14  --    ALT 30 22  --    ANIONGAP 16 14  --    EGFRNONAA 20.2* 22.2*  --     and All pertinent labs from the last 24 hours have been reviewed.    Diagnostic Results:  I have reviewed all pertinent imaging results/findings within the past 24 hours.

## 2020-11-08 NOTE — HPI
Ms. 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 She was recently hospitalized in [place] for one week for the same symptoms and discharged 1 week ago after treatment for pneumonia. She was discharged on antibiotics however does not recall the name. She says her symptoms failed to improve since her discharge and now she has decreased appetite which prompted return to the ED. She denies hemoptysis. She endorses weight loss of unknown amount, night sweats, and sharp pleuritic chest pain. She denies abdominal pain but has had watery diarrhea for the past week. She is a previous smoker with 13 pack year history. No history of covid.    Of note, patient has complex medical history. She was hospitalized in 2019 for renal failure, thrombocytopenia, bilateral cxr infiltrates. Subsequently had renal bx and bone marrow bx. 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 Bone marrow bx showed b cell lymphoma. She received rituxan, pulse steroids 1 gm x 3 days, IVIG/plasmapheresis. No further treatment of her b cell lymphoma which from chart review appears to have been asymptomatic. She does take entecavir for her HBV and gets follow up for HCC monitoring. She last filled a dose of prednisone in August which was a 1 month prescription.      ED Course:  VSS. CBC wnl. Creatinine 2.6 (baseline 1-1.1), d-dimer 1.2. CXR with bilateral pulmonary infiltrates. BNP/troponin wnl. Abdominal xray unremarkable. CT CAP pending. EKG no ischemic changes. Given 1 dose vanc/zosyn and 1 liter IVF.

## 2020-11-08 NOTE — PROGRESS NOTES
[facility]  Hematology  Bone Marrow Transplant  Progress Note    Patient Name: Kendy Vital  Admission Date: 11/7/2020  Hospital Length of Stay: 1 days  Code Status: Full Code    Subjective:     56 yo female with PMHx RA, gout, HTN, DM2, diffuse proliferative glomerulonephritis 2/2 2/2 cryoglobulinemic vasculitis, CKD, HBV on entecavir, hypogammaglobulinemia, biopsy proven b-cell lymphoma, and COPD. Was admitted 1 week ago and treated for pneumonia but has not improved since that time so presents for re-evaluation. Complains of intermittent fevers, diaphoresis, drenching night sweats, pleuritic chest pain, achy bones, cough productive of white sputum, poor appetite, and 10lb weight loss. Was found to have low grade B-cell lymphoma via bone marrow biopsy during hospitalization in August of 2019. Was treated with Rituximab, pulse dose steroids, IVIG, and plasmapheresis during that admission. Pt had decided to follow up at [facility] and did follow up for one appointment but was lost to follow up thereafter. It was decided not to treat the low-grade B cell lymphoma at that time given the indolent nature of the disease and to monitor. CT CAP this admission shows innumerable pulmonary nodules concerning for lymphoproliferative disorder. Heme/Onc consulted for recs.     Review of Systems   Constitutional: Positive for fever, malaise/fatigue and weight loss.   HENT: Negative for nosebleeds and sinus pain.    Eyes: Negative for blurred vision and double vision.   Respiratory: Positive for cough. Negative for hemoptysis and shortness of breath.    Cardiovascular: Negative for chest pain, palpitations and leg swelling.   Gastrointestinal: Negative for abdominal pain, diarrhea, nausea and vomiting.   Genitourinary: Negative for dysuria and urgency.   Musculoskeletal: Positive for myalgias. Negative for neck pain.   Skin: Negative for itching and rash.   Neurological: Negative for dizziness and headaches.    Endo/Heme/Allergies: Negative for polydipsia. Does not bruise/bleed easily.   Psychiatric/Behavioral: Negative for depression. The patient is not nervous/anxious.          Objective:     Vital Signs (Most Recent):  Temp: 98.8 °F (37.1 °C) (11/08/20 1020)  Pulse: 82 (11/08/20 1728)  Resp: (!) 30 (11/08/20 1728)  BP: (!) 140/73 (11/08/20 1728)  SpO2: (!) 93 % (11/08/20 1728) Vital Signs (24h Range):  Temp:  [98.8 °F (37.1 °C)-101.4 °F (38.6 °C)] 98.8 °F (37.1 °C)  Pulse:  [82-92] 82  Resp:  [17-30] 30  SpO2:  [93 %-96 %] 93 %  BP: (129-166)/(71-90) 140/73     Weight: 101.6 kg (224 lb)  Body mass index is 36.15 kg/m².  Body surface area is 2.18 meters squared.    ECOG SCORE           Intake/Output - Last 3 Shifts       11/06 0700 - 11/07 0659 11/07 0700 - 11/08 0659 11/08 0700 - 11/09 0659    P.O.   240    IV Piggyback  1250     Total Intake(mL/kg)  1250 (12.3) 240 (2.4)    Urine (mL/kg/hr)   800 (0.7)    Total Output   800    Net  +1250 -560           Urine Occurrence  2 x 2 x          Physical Exam  Vitals signs reviewed.   Constitutional:       Appearance: Normal appearance.   HENT:      Head: Normocephalic and atraumatic.      Mouth/Throat:      Mouth: Mucous membranes are moist.   Eyes:      Extraocular Movements: Extraocular movements intact.      Conjunctiva/sclera: Conjunctivae normal.   Neck:      Musculoskeletal: Normal range of motion and neck supple.   Cardiovascular:      Rate and Rhythm: Normal rate and regular rhythm.   Pulmonary:      Effort: Pulmonary effort is normal.      Breath sounds: Normal breath sounds.   Abdominal:      General: Bowel sounds are normal.      Palpations: Abdomen is soft.   Skin:     General: Skin is warm and dry.   Neurological:      General: No focal deficit present.      Mental Status: She is alert and oriented to person, place, and time.   Psychiatric:         Mood and Affect: Affect normal.         Behavior: Behavior is cooperative.         Significant Labs:   CBC:    Recent Labs   Lab 11/07/20  1744 11/08/20  0416   WBC 8.00 10.20   HGB 14.5 12.7   HCT 46.8 40.1   * 461*   , CMP:   Recent Labs   Lab 11/07/20  1744 11/08/20 0417 11/08/20  0955    141  --    K 3.8 3.7 3.9    105  --    CO2 23 22*  --    * 124*  --    BUN 22* 20  --    CREATININE 2.6* 2.4*  --    CALCIUM 10.1 9.5  --    PROT 7.5 6.5  --    ALBUMIN 2.9* 2.5*  --    BILITOT 0.3 0.4  --    ALKPHOS 150* 131  --    AST 19 14  --    ALT 30 22  --    ANIONGAP 16 14  --    EGFRNONAA 20.2* 22.2*  --     and All pertinent labs from the last 24 hours have been reviewed.    Diagnostic Results:  I have reviewed all pertinent imaging results/findings within the past 24 hours.    Assessment/Plan:     B-cell lymphoproliferative disorder  56 yo female with PMHx RA, gout, HTN, DM2, diffuse proliferative glomerulonephritis 2/2 2/2 cryoglobulinemic vasculitis, CKD, HBV on entecavir, hypogammaglobulinemia, biopsy proven b-cell lymphoma, and COPD. Complains of intermittent fevers, diaphoresis, drenching night sweats, pleuritic chest pain, achy bones, cough productive of white sputum, poor appetite, and 10lb weight loss. Was found to have low grade B-cell lymphoma via bone marrow biopsy during hospitalization in August of 2019. It was decided not to treat the low-grade B cell lymphoma at that time given the indolent nature of the disease and to monitor.  Pt had decided to follow up at [facility] [place] [facility] and did follow up for one appointment but was lost to follow up thereafter. CT CAP this admission shows innumerable pulmonary nodules concerning for lymphoproliferative disorder. LDH not elevated. Heme/Onc consulted for recs.     Recs:  -- Agree with pulm consult for potential bronch if they believe they can obtain specimen for pathology to review  -- Attempt to obtain tissue sample as appropriate, a biopsy would be ideal but if unable to obtain biopsy cytology would be appropriate if a sample is able to be obtained  --  Will continue to follow        VTE Risk Mitigation (From admission, onward)         Ordered     heparin (porcine) injection 5,000 Units  Every 8 hours      11/07/20 2257     IP VTE HIGH RISK PATIENT  Once      11/07/20 2257     Place sequential compression device  Until discontinued      11/07/20 2257                Disposition: Management per primary team    Bernabe Alegria MD  Bone Marrow Transplant  [facility]

## 2020-11-08 NOTE — PLAN OF CARE
"Admission complete. Pt placed on 2LNC d/t sats 88-89% while sleeping. Mildy tachypneic, but in NAD. Urine collected. Pt temp trending up, but per MAR no tylenol until above 101. Still pending sputum collection. IVF & IV abx tayo. PRN tylenol for HA. Pt remained NPO. Safety maintained.    /77   Pulse 85   Temp 99.9 °F (37.7 °C) (Oral)   Resp (!) 24   Ht 5' 6" (1.676 m)   Wt 101.6 kg (224 lb)   SpO2 95%   Breastfeeding No   BMI 36.15 kg/m²     "

## 2020-11-08 NOTE — H&P
[facility] Hospital Medicine  History & Physical    Patient Name: Kendy Vital  MRN: [id number]  Admission Date: 11/7/2020  Attending Physician: Riaz Gill MD   Primary Care Provider: To Obtain Unable    Hospital Medicine Team: [facility] 2 Elsa Colin MD     Patient information was obtained from patient, past medical records and ER records.     Subjective:     Principal Problem:Pulmonary infiltrate    Chief Complaint:   Chief Complaint   Patient presents with    Fatigue     Increased fatigue x several weeks, recent hospitalization for pneumonia. Denies SOB or CP.         HPI: Ms. 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 She was recently hospitalized in [place] for one week for the same symptoms and discharged 1 week ago after treatment for pneumonia. She was discharged on antibiotics however does not recall the name. She says her symptoms failed to improve since her discharge and now she has decreased appetite which prompted return to the ED. She denies hemoptysis. She endorses weight loss of unknown amount, night sweats, and sharp pleuritic chest pain. She denies abdominal pain but has had watery diarrhea for the past week. She is a previous smoker with 13 pack year history. No history of covid.    Of note, patient has complex medical history. She was hospitalized in 2019 for renal failure, thrombocytopenia, bilateral cxr infiltrates. Subsequently had renal bx and bone marrow bx. 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 Bone marrow bx showed b cell lymphoma. She received rituxan, pulse steroids 1 gm x 3 days, IVIG/plasmapheresis. No  further treatment of her b cell lymphoma which from chart review appears to have been asymptomatic. She does take entecavir for her HBV and gets follow up for HCC monitoring. She last filled a dose of prednisone in August which was a 1 month prescription.      ED Course:  VSS. CBC wnl. Creatinine 2.6 (baseline 1-1.1), d-dimer 1.2. CXR with bilateral pulmonary infiltrates. BNP/troponin wnl. Abdominal xray unremarkable. CT CAP pending. EKG no ischemic changes. Given 1 dose vanc/zosyn and 1 liter IVF.    Past Medical History:   Diagnosis Date    Acute (diffuse) proliferative glomerulonephritis     Acute renal failure 7/19/2019    B-cell lymphoproliferative disorder 7/30/2019    Chronic obstructive lung disease 7/27/2019    Chronic viral hepatitis B without delta agent and without coma 7/24/2019    Cryoglobulinemic vasculitis     Hypogammaglobulinemia 8/5/2019    Iron deficiency anemia 7/30/2019    Renal vein thrombosis 2015    s/p embolectomy and lovenox for 9 mos    Steroid-induced hyperglycemia 7/28/2019    Uterine leiomyoma     s/p resection       History reviewed. No pertinent surgical history.    Review of patient's allergies indicates:  No Known Allergies    No current facility-administered medications on file prior to encounter.      Current Outpatient Medications on File Prior to Encounter   Medication Sig    allopurinol (ZYLOPRIM) 100 MG tablet Take 1 tablet (100 mg total) by mouth once daily.    blood sugar diagnostic Strp use to test blood gluocse 2 (two) times daily with meals.    blood-glucose meter Misc Use as instructed    calcium carbonate (OS-DONTE) 500 mg calcium (1,250 mg) tablet Take 2 tablets (1,000 mg total) by mouth once daily.    carvedilol (COREG) 25 MG tablet Take 1 tablet (25 mg total) by mouth 2 (two) times daily.    cloNIDine (CATAPRES) 0.1 MG tablet Take 2 tablets (0.2 mg total) by mouth 3 (three) times daily.    dapsone 100 MG Tab Take 1 tablet (100 mg total) by mouth once  "daily.    entecavir (BARACLUDE) 0.5 MG Tab Take 1 tablet (0.5 mg total) by mouth every other day.    ferrous sulfate 325 (65 FE) MG EC tablet Take 1 tablet (325 mg total) by mouth once daily.    furosemide (LASIX) 40 MG tablet Take 40 mg by mouth once daily.    hydrALAZINE (APRESOLINE) 100 MG tablet Take 1 tablet (100 mg total) by mouth every 8 (eight) hours.    insulin aspart U-100 (NOVOLOG) 100 unit/mL (3 mL) InPn pen Inject 1-10 Units into the skin 3 (three) times daily. Use sliding scale provided in instructions    insulin syringe-needle U-100 0.3 mL 30 gauge x 5/16" Syrg 1 each by Misc.(Non-Drug; Combo Route) route 2 (two) times daily with meals.    lancets 30 gauge Misc use to test blood glucose 2 (two) times daily with meals.    lancing device Misc 1 Device by Misc.(Non-Drug; Combo Route) route 2 (two) times daily with meals.    losartan (COZAAR) 25 MG tablet Take 25 mg by mouth once daily.    NIFEdipine (PROCARDIA-XL) 90 MG (OSM) 24 hr tablet Take 1 tablet (90 mg total) by mouth once daily.    pantoprazole (PROTONIX) 40 MG tablet Take 1 tablet (40 mg total) by mouth before breakfast.    sodium bicarbonate 650 MG tablet Take 1 tablet (650 mg total) by mouth 3 (three) times daily.    vitamin D (VITAMIN D3) 1000 units Tab Take 1 tablet (1,000 Units total) by mouth once daily.     Family History     None        Tobacco Use    Smoking status: Former Smoker    Smokeless tobacco: Never Used   Substance and Sexual Activity    Alcohol use: Not Currently    Drug use: Never    Sexual activity: Not on file     Review of Systems   Constitutional: Positive for activity change, appetite change, chills, diaphoresis, fatigue, fever and unexpected weight change.   HENT: Negative for facial swelling, rhinorrhea, sore throat, tinnitus and trouble swallowing.    Eyes: Negative for visual disturbance.   Respiratory: Positive for cough, chest tightness and shortness of breath. Negative for wheezing.  "   Cardiovascular: Positive for chest pain. Negative for palpitations and leg swelling.   Gastrointestinal: Positive for diarrhea. Negative for abdominal distention, abdominal pain, constipation, nausea and vomiting.   Endocrine: Negative for polyuria.   Genitourinary: Negative for difficulty urinating and dysuria.   Musculoskeletal: Negative for back pain.   Skin: Negative for pallor and rash.   Neurological: Positive for weakness (global) and headaches. Negative for syncope and light-headedness.   Hematological: Negative for adenopathy.   Psychiatric/Behavioral: Negative for agitation and confusion.     Objective:     Vital Signs (Most Recent):  Temp: 99.6 °F (37.6 °C) (11/07/20 1441)  Pulse: 90 (11/07/20 2300)  Resp: (!) 22 (11/07/20 2136)  BP: (!) 146/77 (11/07/20 2131)  SpO2: 95 % (11/07/20 2132) Vital Signs (24h Range):  Temp:  [99.6 °F (37.6 °C)] 99.6 °F (37.6 °C)  Pulse:  [83-92] 90  Resp:  [17-22] 22  SpO2:  [95 %-96 %] 95 %  BP: (134-166)/(71-92) 146/77     Weight: 101.6 kg (224 lb)  Body mass index is 36.15 kg/m².    Physical Exam  Constitutional:       General: She is not in acute distress.     Appearance: Normal appearance. She is obese. She is not ill-appearing, toxic-appearing or diaphoretic.   HENT:      Head: Normocephalic and atraumatic.      Mouth/Throat:      Mouth: Mucous membranes are moist.      Pharynx: No oropharyngeal exudate.   Eyes:      General: No scleral icterus.     Conjunctiva/sclera: Conjunctivae normal.      Pupils: Pupils are equal, round, and reactive to light.   Neck:      Musculoskeletal: Normal range of motion.   Cardiovascular:      Rate and Rhythm: Normal rate and regular rhythm.      Pulses: Normal pulses.      Heart sounds: No murmur.   Pulmonary:      Effort: Pulmonary effort is normal. No respiratory distress.      Breath sounds: Normal breath sounds.   Chest:      Chest wall: Tenderness present.   Abdominal:      General: Abdomen is flat. Bowel sounds are normal. There  is no distension.      Palpations: Abdomen is soft.      Tenderness: There is no abdominal tenderness. There is no guarding or rebound.   Musculoskeletal:      Right lower leg: No edema.      Left lower leg: No edema.   Lymphadenopathy:      Cervical: No cervical adenopathy.   Skin:     General: Skin is warm and dry.      Capillary Refill: Capillary refill takes less than 2 seconds.      Coloration: Skin is not pale.   Neurological:      General: No focal deficit present.      Mental Status: She is alert and oriented to person, place, and time. Mental status is at baseline.   Psychiatric:         Mood and Affect: Mood normal.         Behavior: Behavior normal.           CRANIAL NERVES     CN III, IV, VI   Pupils are equal, round, and reactive to light.       Significant Labs:   CBC:   Recent Labs   Lab 11/07/20  1744   WBC 8.00   HGB 14.5   HCT 46.8   *     CMP:   Recent Labs   Lab 11/07/20  1744      K 3.8      CO2 23   *   BUN 22*   CREATININE 2.6*   CALCIUM 10.1   PROT 7.5   ALBUMIN 2.9*   BILITOT 0.3   ALKPHOS 150*   AST 19   ALT 30   ANIONGAP 16   EGFRNONAA 20.2*     Lactic Acid:   Recent Labs   Lab 11/07/20  1744   LACTATE 2.0     Troponin:   Recent Labs   Lab 11/07/20  1744   TROPONINI 0.009     Urine Culture: No results for input(s): LABURIN in the last 48 hours.  Urine Studies:   Recent Labs   Lab 11/07/20  1809   COLORU Yellow   APPEARANCEUA Hazy*   PHUR 5.0   SPECGRAV 1.010   PROTEINUA 1+*   GLUCUA Negative   KETONESU Negative   BILIRUBINUA Negative   OCCULTUA Negative   NITRITE Negative   LEUKOCYTESUR 2+*   RBCUA 3   WBCUA 41*   BACTERIA Occasional   SQUAMEPITHEL 3   HYALINECASTS 0       Significant Imaging: I have reviewed all pertinent imaging results/findings within the past 24 hours.    Assessment/Plan:     * Pulmonary infiltrate  Hx COPD, asymptomatic b-cell lymphoma, cryoglobinemic vasculitis, hepatitis B on entecavir, recent glucocorticoid use presenting for intermittent  fevers, myalgia, pleuritic chest pain, weight loss, night sweats, productive sputum x 3 weeks in setting of recent hospitalization in [place] for pneumonia, discharged 1 week prior without improvement in her symptoms. At [facility] ED, VSS and not septic picture. WBC wnl.   - covid negative, no hx of covid per patient  - BNP, troponin WNL  - CXR with bilateral pulmonary infiltrates  - d-dimer 1.2  - DDX: malignancy (?lymphoma), fungal pneumonia, follicular bronchiolitis, sarcoidosis, lymphoid interstitial pneumonia    Plan:  - zosyn -- can add MRSA coverage if decompensates given recent hospitalization   - CT CAP -- suggestive of pulmonary lymphoproliferative disorder  - pulmonary consult in AM for evaluation for bronchoscopy vs biopsy  - consider heme/onc consult  - ACE level  - LDH  - IgG/ IgM  - consider HIV (last negative July 2019) or EBV testing  - pneumonia workup:   - procalcitonin  - gram stain, sputum cultures  - legionella urine antigen  - fungitell  - s pneumoniae urine antigen   - acetaminophen if febrile, limit to 2000 mg/day      Acute kidney injury superimposed on chronic kidney disease  Hx MPGN, cryoglobinemic vasculitis, and HBV. Baseline creatinine around 1-1.1. On presentation creatinine 2.6. Patient admits to decreased oral intake 2/2 pneumonia. S/p 1 liter IVF in ED. UA in ED with 1+ protein. Suspect pre-renal etiology.     Plan:  - urine lytes  - urine protein/creatinine  - gentle IVF x 10 hours   - CMP daily  - renally dose medications, avoid nephrotoxins  - consider nephrology consultation given co-morbidities     Essential hypertension  Hx hypertension, on losartan, nifedipine, clonidine, carvedilol. Hypertensive in ED. Has prescription for hydralazine but has not filled this recently.    Plan:  - continue nifedipine 60 mg  - continue clonidine 0.2 mg TID  - continue carvedilol 25 BID  - holding ARB in setting of LACI      Cryoglobulinemic vasculitis  Hx diffuse proliferative GN 2/2  cryoglobulinemia disease in setting of HBV. Confirmed on renal bx in July 2019. Has previously been on IVIG and rituxan and also treated with high dose pulse glucocorticoids. It appears last steroid course was in August 2020.    Plan:  - continue entecavir  - consider nephrology consult given LACI      Chronic obstructive lung disease  Hx COPD, uses albuterol at home    Plan:  - duo-nebs PRN for dyspnea      B-cell lymphoproliferative disorder  Biopsy proven b-cell lymphoma. Per last note with heme/onc from May, does not appear patient was receiving treatment as she was asymptomatic and she preferred to follow up with heme/onc in [place].     Plan:  - consider heme/onc consult    Chronic diastolic heart failure  TTE from July 2019 with EF 60%, indeterminate LV function, CVP 15. Taking lasix at home. Does not appear fluid overloaded on exam. Received 1 unit IVF in ED, BNP wnl.    Plan:  - consider repeat TTE  - furosemide 40 mg daily  - cautious 10 hours gentle IVF for LACI      Type 2 diabetes mellitus  Hx DM2, current regimen of 45 units long acting insulin and 10 units mealtime. Last A1C on file from 2019 was 5.7    Plan:  - A1C   - LDSSI for now while NPO  - poct glucose q 4 hours  - consider adding insulin regimen once patient eating      Hypogammaglobulinemia  Hx of hypogammaglobulinemia. 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    Plan:  - fu IgM/IgG      Hepatitis B  Hx hepatitis B diagnosed in 2019, only risk factor is homemade tattoo. Has been getting regular follow up and screening for HCC q 6 months.   - LFTs wnl  - last AFP July 2020 wnl    Plan:  - continue entecavir every other day  - patient may be due for repeat abdominal US (last on record from January 2020), can consider repeat while inpatient  - limit acetaminophen to 2000 mg/day      VTE Risk Mitigation (From admission, onward)         Ordered     heparin (porcine)  injection 5,000 Units  Every 8 hours      11/07/20 2257     IP VTE HIGH RISK PATIENT  Once      11/07/20 2257     Place sequential compression device  Until discontinued      11/07/20 2257                   Elsa Colin MD  Department of Hospital Medicine   [facility]

## 2020-11-08 NOTE — ED NOTES
Pt ambulatory to the restroom independently; steady gait noted.  Pt returned to room 15 without incident.  Pt replaced on continuous cardiac and pulse ox monitoring with non-invasion blood pressure to cycle every 30 minutes.  Bed locked in lowest position; side rails up and locked x 2; call light, bedside table, and personal belongings within reach. Pt instructed to alert nurse for assistance and before attempting to get out of bed; verbalizes understanding  Will continue to monitor pt.

## 2020-11-08 NOTE — HPI
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 Was admitted 1 week ago and treated for pneumonia but has not improved since that time so presents for re-evaluation. Complains of intermittent fevers, diaphoresis, drenching night sweats, pleuritic chest pain, achy bones, cough productive of white sputum, poor appetite, and 10lb weight loss. Was found to have low grade B-cell lymphoma via bone marrow biopsy during hospitalization in August of 2019. Was treated with Rituximab, pulse dose steroids, IVIG, and plasmapheresis during that admission. Pt had decided to follow up at [facility] and did follow up for one appointment but was lost to follow up thereafter. It was decided not to treat the low-grade B cell lymphoma at that time given the indolent nature of the disease and to monitor. CT CAP this admission shows innumerable pulmonary nodules concerning for lymphoproliferative disorder. Heme/Onc consulted for recs.

## 2020-11-08 NOTE — CONSULTS
Pulm/CC Fellow Consult Note    Attending Physician: Dr Gonzales     Date of Admit: 11/7/2020    Chief Complaint: pulmonary infiltrates     History of Present Illness:  Kendy Vital is a 55 y.o.  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    Patient states that she first noted having SOB a few months ago, but only noticed it with exertion or with laying flat occasionally. Over the past 3 weeks, she endorses progressively worsening SOB and persistent dry cough, although she has not had any sputum production or hemoptysis. During the past 3 weeks she has also had almost daily fevers despite taking tylenol. She also endorses profuse night sweats which soak the bed, and a 10lb weight loss. Of note, she was recently hospitalized in [place] and given a course of steroids for pneumonia, but did not have improvement of her symptoms.    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 At one point the decision was made to just monitor.    Past Medical History:  Past Medical History:   Diagnosis Date    Acute (diffuse) proliferative glomerulonephritis     Acute renal failure 7/19/2019    B-cell lymphoproliferative disorder 7/30/2019    Chronic obstructive lung disease 7/27/2019    Chronic viral hepatitis B without delta agent and without coma 7/24/2019    Cryoglobulinemic vasculitis     Hypogammaglobulinemia 8/5/2019    Iron deficiency anemia 7/30/2019    Renal vein thrombosis 2015    s/p embolectomy and lovenox for 9 mos    Steroid-induced hyperglycemia 7/28/2019    Uterine leiomyoma     s/p resection       Past  "Surgical History:  History reviewed. No pertinent surgical history.    Allergies:  Review of patient's allergies indicates:  No Known Allergies      Family History:  History reviewed. No pertinent family history.    Social History:  Social History     Tobacco Use    Smoking status: Former Smoker    Smokeless tobacco: Never Used   Substance Use Topics    Alcohol use: Not Currently    Drug use: Never       Review of Systems:  Comprehensive ROS negative except as per HPI       Objective:   Last 24 Hour Vital Signs:  BP  Min: 129/77  Max: 166/82  Temp  Av.9 °F (37.7 °C)  Min: 98.8 °F (37.1 °C)  Max: 101.4 °F (38.6 °C)  Pulse  Av.3  Min: 83  Max: 92  Resp  Av.5  Min: 17  Max: 24  SpO2  Av.1 %  Min: 95 %  Max: 96 %  Height  Av' 6" (167.6 cm)  Min: 5' 6" (167.6 cm)  Max: 5' 6" (167.6 cm)  Weight  Av.6 kg (224 lb)  Min: 101.6 kg (224 lb)  Max: 101.6 kg (224 lb)  Body mass index is 36.15 kg/m².  I/O last 3 completed shifts:  In: 1250 [IV Piggyback:1250]  Out: -     Physical Exam:  General: Alert and awake in NAD  HENT:  NCAT; anicteric sclera; OP clear with MMM  Cardio:  Regular rate and rhythm with normal S1 and S2; no murmurs or rubs  Resp: CTAB; respirations unlabored; no wheezes, crackles or rhonchi  Abdom:Soft, NT with normoactive bowel sounds  Extrem:WWP with no clubbing, cyanosis or edema  Pulses:2+ and symmetric distally  Neuro: AAOx3; cooperative and pleasant with no focal deficits    Personal Review and Summary of Interval Diagnostics    Laboratory Studies:   No results for input(s): PH, PCO2, PO2, HCO3, POCSATURATED, BE in the last 24 hours.  Recent Labs   Lab 20   WBC 10.20   RBC 4.99   HGB 12.7   HCT 40.1   *   MCV 80*   MCH 25.5*   MCHC 31.7*     Recent Labs   Lab 20  0955     --    K 3.7 3.9     --    CO2 22*  --    BUN 20  --    CREATININE 2.4*  --    MG 1.5*  --        Microbiology Data:   Microbiology Results (last 7 days) "     Procedure Component Value Units Date/Time    Blood culture #2 **CANNOT BE ORDERED STAT** [[id number]] Collected: 11/07/20 1752    Order Status: Completed Specimen: Blood from Peripheral, Hand, Right Updated: 11/08/20 0715     Blood Culture, Routine No Growth to date    Blood culture #1 **CANNOT BE ORDERED STAT** [[id number]] Collected: 11/07/20 1743    Order Status: Completed Specimen: Blood from Peripheral, Antecubital, Left Updated: 11/08/20 0715     Blood Culture, Routine No Growth to date    Culture, Respiratory with Gram Stain [[id number]]     Order Status: No result Specimen: Respiratory     Urine culture [[id number]] Collected: 11/07/20 1809    Order Status: No result Specimen: Urine Updated: 11/07/20 1846    Influenza A & B by Molecular [[id number]]     Order Status: Canceled Specimen: Nasopharyngeal Swab           Chest Imaging:   CT Chest 11/7  Impression:     Innumerable pulmonary nodules and micro nodules with coalescence to more dominant consolidative masses in the lower lobes.  Findings concerning for pulmonary lymphoproliferative disorder such as lymphomatoid granulomatosis in the setting of biopsy-proven B-cell lymphoma.  Consultation with hematology oncology and/or pulmonary medicine recommended.     Assessment & Plan:     Kendy Vital is a 55 y.o.  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    -Atypical appearance on CT. B symptoms concerning for lymphocytic process. Distribution appears to be bronchovascular  -Continue abx for coverage. Will plan for bronchosopy with BAL and transbronchial bx next week    Thank you for involving me in the care of this patient. I will continue to follow. Please contact with any  issues.    Givoana Palacio MD  Fellow  Pulmonary & CCM

## 2020-11-08 NOTE — ED TRIAGE NOTES
Pt to the ER with complaints of generalized weakness with loss of appetite, loss of energy, chest and abdominal discomfort, SOB, and nausea since being discharge from the hospital one week ago for treatment of pneumonia.

## 2020-11-08 NOTE — SUBJECTIVE & OBJECTIVE
Past Medical History:   Diagnosis Date    Acute (diffuse) proliferative glomerulonephritis     Acute renal failure 7/19/2019    B-cell lymphoproliferative disorder 7/30/2019    Chronic obstructive lung disease 7/27/2019    Chronic viral hepatitis B without delta agent and without coma 7/24/2019    Cryoglobulinemic vasculitis     Hypogammaglobulinemia 8/5/2019    Iron deficiency anemia 7/30/2019    Renal vein thrombosis 2015    s/p embolectomy and lovenox for 9 mos    Steroid-induced hyperglycemia 7/28/2019    Uterine leiomyoma     s/p resection       History reviewed. No pertinent surgical history.    Review of patient's allergies indicates:  No Known Allergies    No current facility-administered medications on file prior to encounter.      Current Outpatient Medications on File Prior to Encounter   Medication Sig    allopurinol (ZYLOPRIM) 100 MG tablet Take 1 tablet (100 mg total) by mouth once daily.    blood sugar diagnostic Strp use to test blood gluocse 2 (two) times daily with meals.    blood-glucose meter Misc Use as instructed    calcium carbonate (OS-DONTE) 500 mg calcium (1,250 mg) tablet Take 2 tablets (1,000 mg total) by mouth once daily.    carvedilol (COREG) 25 MG tablet Take 1 tablet (25 mg total) by mouth 2 (two) times daily.    cloNIDine (CATAPRES) 0.1 MG tablet Take 2 tablets (0.2 mg total) by mouth 3 (three) times daily.    dapsone 100 MG Tab Take 1 tablet (100 mg total) by mouth once daily.    entecavir (BARACLUDE) 0.5 MG Tab Take 1 tablet (0.5 mg total) by mouth every other day.    ferrous sulfate 325 (65 FE) MG EC tablet Take 1 tablet (325 mg total) by mouth once daily.    furosemide (LASIX) 40 MG tablet Take 40 mg by mouth once daily.    hydrALAZINE (APRESOLINE) 100 MG tablet Take 1 tablet (100 mg total) by mouth every 8 (eight) hours.    insulin aspart U-100 (NOVOLOG) 100 unit/mL (3 mL) InPn pen Inject 1-10 Units into the skin 3 (three) times daily. Use sliding scale  "provided in instructions    insulin syringe-needle U-100 0.3 mL 30 gauge x 5/16" Syrg 1 each by Misc.(Non-Drug; Combo Route) route 2 (two) times daily with meals.    lancets 30 gauge Misc use to test blood glucose 2 (two) times daily with meals.    lancing device Misc 1 Device by Misc.(Non-Drug; Combo Route) route 2 (two) times daily with meals.    losartan (COZAAR) 25 MG tablet Take 25 mg by mouth once daily.    NIFEdipine (PROCARDIA-XL) 90 MG (OSM) 24 hr tablet Take 1 tablet (90 mg total) by mouth once daily.    pantoprazole (PROTONIX) 40 MG tablet Take 1 tablet (40 mg total) by mouth before breakfast.    sodium bicarbonate 650 MG tablet Take 1 tablet (650 mg total) by mouth 3 (three) times daily.    vitamin D (VITAMIN D3) 1000 units Tab Take 1 tablet (1,000 Units total) by mouth once daily.     Family History     None        Tobacco Use    Smoking status: Former Smoker    Smokeless tobacco: Never Used   Substance and Sexual Activity    Alcohol use: Not Currently    Drug use: Never    Sexual activity: Not on file     Review of Systems   Constitutional: Positive for activity change, appetite change, chills, diaphoresis, fatigue, fever and unexpected weight change.   HENT: Negative for facial swelling, rhinorrhea, sore throat, tinnitus and trouble swallowing.    Eyes: Negative for visual disturbance.   Respiratory: Positive for cough, chest tightness and shortness of breath. Negative for wheezing.    Cardiovascular: Positive for chest pain. Negative for palpitations and leg swelling.   Gastrointestinal: Positive for diarrhea. Negative for abdominal distention, abdominal pain, constipation, nausea and vomiting.   Endocrine: Negative for polyuria.   Genitourinary: Negative for difficulty urinating and dysuria.   Musculoskeletal: Negative for back pain.   Skin: Negative for pallor and rash.   Neurological: Positive for weakness (global) and headaches. Negative for syncope and light-headedness. "   Hematological: Negative for adenopathy.   Psychiatric/Behavioral: Negative for agitation and confusion.     Objective:     Vital Signs (Most Recent):  Temp: 99.6 °F (37.6 °C) (11/07/20 1441)  Pulse: 90 (11/07/20 2300)  Resp: (!) 22 (11/07/20 2136)  BP: (!) 146/77 (11/07/20 2131)  SpO2: 95 % (11/07/20 2132) Vital Signs (24h Range):  Temp:  [99.6 °F (37.6 °C)] 99.6 °F (37.6 °C)  Pulse:  [83-92] 90  Resp:  [17-22] 22  SpO2:  [95 %-96 %] 95 %  BP: (134-166)/(71-92) 146/77     Weight: 101.6 kg (224 lb)  Body mass index is 36.15 kg/m².    Physical Exam  Constitutional:       General: She is not in acute distress.     Appearance: Normal appearance. She is obese. She is not ill-appearing, toxic-appearing or diaphoretic.   HENT:      Head: Normocephalic and atraumatic.      Mouth/Throat:      Mouth: Mucous membranes are moist.      Pharynx: No oropharyngeal exudate.   Eyes:      General: No scleral icterus.     Conjunctiva/sclera: Conjunctivae normal.      Pupils: Pupils are equal, round, and reactive to light.   Neck:      Musculoskeletal: Normal range of motion.   Cardiovascular:      Rate and Rhythm: Normal rate and regular rhythm.      Pulses: Normal pulses.      Heart sounds: No murmur.   Pulmonary:      Effort: Pulmonary effort is normal. No respiratory distress.      Breath sounds: Normal breath sounds.   Chest:      Chest wall: Tenderness present.   Abdominal:      General: Abdomen is flat. Bowel sounds are normal. There is no distension.      Palpations: Abdomen is soft.      Tenderness: There is no abdominal tenderness. There is no guarding or rebound.   Musculoskeletal:      Right lower leg: No edema.      Left lower leg: No edema.   Lymphadenopathy:      Cervical: No cervical adenopathy.   Skin:     General: Skin is warm and dry.      Capillary Refill: Capillary refill takes less than 2 seconds.      Coloration: Skin is not pale.   Neurological:      General: No focal deficit present.      Mental Status: She is  alert and oriented to person, place, and time. Mental status is at baseline.   Psychiatric:         Mood and Affect: Mood normal.         Behavior: Behavior normal.           CRANIAL NERVES     CN III, IV, VI   Pupils are equal, round, and reactive to light.       Significant Labs:   CBC:   Recent Labs   Lab 11/07/20  1744   WBC 8.00   HGB 14.5   HCT 46.8   *     CMP:   Recent Labs   Lab 11/07/20  1744      K 3.8      CO2 23   *   BUN 22*   CREATININE 2.6*   CALCIUM 10.1   PROT 7.5   ALBUMIN 2.9*   BILITOT 0.3   ALKPHOS 150*   AST 19   ALT 30   ANIONGAP 16   EGFRNONAA 20.2*     Lactic Acid:   Recent Labs   Lab 11/07/20  1744   LACTATE 2.0     Troponin:   Recent Labs   Lab 11/07/20  1744   TROPONINI 0.009     Urine Culture: No results for input(s): LABURIN in the last 48 hours.  Urine Studies:   Recent Labs   Lab 11/07/20  1809   COLORU Yellow   APPEARANCEUA Hazy*   PHUR 5.0   SPECGRAV 1.010   PROTEINUA 1+*   GLUCUA Negative   KETONESU Negative   BILIRUBINUA Negative   OCCULTUA Negative   NITRITE Negative   LEUKOCYTESUR 2+*   RBCUA 3   WBCUA 41*   BACTERIA Occasional   SQUAMEPITHEL 3   HYALINECASTS 0       Significant Imaging: I have reviewed all pertinent imaging results/findings within the past 24 hours.

## 2020-11-09 PROBLEM — R91.8 MULTIPLE PULMONARY NODULES: Status: ACTIVE | Noted: 2020-11-09

## 2020-11-09 PROBLEM — N39.0 UTI (URINARY TRACT INFECTION): Status: ACTIVE | Noted: 2020-11-09

## 2020-11-09 LAB
ALBUMIN SERPL BCP-MCNC: 2.4 G/DL (ref 3.5–5.2)
ALP SERPL-CCNC: 133 U/L (ref 55–135)
ALT SERPL W/O P-5'-P-CCNC: 22 U/L (ref 10–44)
ANION GAP SERPL CALC-SCNC: 11 MMOL/L (ref 8–16)
AST SERPL-CCNC: 19 U/L (ref 10–40)
BASOPHILS # BLD AUTO: 0.08 K/UL (ref 0–0.2)
BASOPHILS NFR BLD: 1 % (ref 0–1.9)
BILIRUB SERPL-MCNC: 0.4 MG/DL (ref 0.1–1)
BUN SERPL-MCNC: 20 MG/DL (ref 6–20)
CALCIUM SERPL-MCNC: 9.7 MG/DL (ref 8.7–10.5)
CHLORIDE SERPL-SCNC: 104 MMOL/L (ref 95–110)
CO2 SERPL-SCNC: 25 MMOL/L (ref 23–29)
CREAT SERPL-MCNC: 2.2 MG/DL (ref 0.5–1.4)
DIFFERENTIAL METHOD: ABNORMAL
EOSINOPHIL # BLD AUTO: 0.2 K/UL (ref 0–0.5)
EOSINOPHIL NFR BLD: 1.8 % (ref 0–8)
ERYTHROCYTE [DISTWIDTH] IN BLOOD BY AUTOMATED COUNT: 18.3 % (ref 11.5–14.5)
EST. GFR  (AFRICAN AMERICAN): 28.5 ML/MIN/1.73 M^2
EST. GFR  (NON AFRICAN AMERICAN): 24.7 ML/MIN/1.73 M^2
GLUCOSE SERPL-MCNC: 102 MG/DL (ref 70–110)
HCT VFR BLD AUTO: 41.5 % (ref 37–48.5)
HGB BLD-MCNC: 12.5 G/DL (ref 12–16)
IMM GRANULOCYTES # BLD AUTO: 0.03 K/UL (ref 0–0.04)
IMM GRANULOCYTES NFR BLD AUTO: 0.4 % (ref 0–0.5)
LYMPHOCYTES # BLD AUTO: 1.7 K/UL (ref 1–4.8)
LYMPHOCYTES NFR BLD: 20.5 % (ref 18–48)
MAGNESIUM SERPL-MCNC: 1.7 MG/DL (ref 1.6–2.6)
MCH RBC QN AUTO: 24.7 PG (ref 27–31)
MCHC RBC AUTO-ENTMCNC: 30.1 G/DL (ref 32–36)
MCV RBC AUTO: 82 FL (ref 82–98)
MONOCYTES # BLD AUTO: 1 K/UL (ref 0.3–1)
MONOCYTES NFR BLD: 12.1 % (ref 4–15)
NEUTROPHILS # BLD AUTO: 5.2 K/UL (ref 1.8–7.7)
NEUTROPHILS NFR BLD: 64.2 % (ref 38–73)
NRBC BLD-RTO: 0 /100 WBC
PHOSPHATE SERPL-MCNC: 4.2 MG/DL (ref 2.7–4.5)
PLATELET # BLD AUTO: 464 K/UL (ref 150–350)
PMV BLD AUTO: 9.8 FL (ref 9.2–12.9)
POCT GLUCOSE: 111 MG/DL (ref 70–110)
POCT GLUCOSE: 161 MG/DL (ref 70–110)
POTASSIUM SERPL-SCNC: 3.6 MMOL/L (ref 3.5–5.1)
PROT SERPL-MCNC: 6.3 G/DL (ref 6–8.4)
RBC # BLD AUTO: 5.07 M/UL (ref 4–5.4)
SODIUM SERPL-SCNC: 140 MMOL/L (ref 136–145)
WBC # BLD AUTO: 8.16 K/UL (ref 3.9–12.7)

## 2020-11-09 PROCEDURE — 97802 MEDICAL NUTRITION INDIV IN: CPT

## 2020-11-09 PROCEDURE — 84100 ASSAY OF PHOSPHORUS: CPT

## 2020-11-09 PROCEDURE — 63600175 PHARM REV CODE 636 W HCPCS: Performed by: STUDENT IN AN ORGANIZED HEALTH CARE EDUCATION/TRAINING PROGRAM

## 2020-11-09 PROCEDURE — 85025 COMPLETE CBC W/AUTO DIFF WBC: CPT

## 2020-11-09 PROCEDURE — 99233 PR SUBSEQUENT HOSPITAL CARE,LEVL III: ICD-10-PCS | Mod: ,,, | Performed by: STUDENT IN AN ORGANIZED HEALTH CARE EDUCATION/TRAINING PROGRAM

## 2020-11-09 PROCEDURE — 99233 PR SUBSEQUENT HOSPITAL CARE,LEVL III: ICD-10-PCS | Mod: ,,, | Performed by: INTERNAL MEDICINE

## 2020-11-09 PROCEDURE — 80053 COMPREHEN METABOLIC PANEL: CPT

## 2020-11-09 PROCEDURE — 97161 PT EVAL LOW COMPLEX 20 MIN: CPT

## 2020-11-09 PROCEDURE — 99233 SBSQ HOSP IP/OBS HIGH 50: CPT | Mod: ,,, | Performed by: STUDENT IN AN ORGANIZED HEALTH CARE EDUCATION/TRAINING PROGRAM

## 2020-11-09 PROCEDURE — 25000003 PHARM REV CODE 250: Performed by: STUDENT IN AN ORGANIZED HEALTH CARE EDUCATION/TRAINING PROGRAM

## 2020-11-09 PROCEDURE — 36415 COLL VENOUS BLD VENIPUNCTURE: CPT

## 2020-11-09 PROCEDURE — 11000001 HC ACUTE MED/SURG PRIVATE ROOM

## 2020-11-09 PROCEDURE — 83735 ASSAY OF MAGNESIUM: CPT

## 2020-11-09 PROCEDURE — 97165 OT EVAL LOW COMPLEX 30 MIN: CPT

## 2020-11-09 PROCEDURE — 97535 SELF CARE MNGMENT TRAINING: CPT

## 2020-11-09 PROCEDURE — 99233 SBSQ HOSP IP/OBS HIGH 50: CPT | Mod: ,,, | Performed by: INTERNAL MEDICINE

## 2020-11-09 RX ORDER — CEFEPIME HYDROCHLORIDE 2 G/1
2 INJECTION, POWDER, FOR SOLUTION INTRAVENOUS
Status: DISCONTINUED | OUTPATIENT
Start: 2020-11-09 | End: 2020-11-11

## 2020-11-09 RX ORDER — MAGNESIUM SULFATE HEPTAHYDRATE 40 MG/ML
2 INJECTION, SOLUTION INTRAVENOUS ONCE
Status: COMPLETED | OUTPATIENT
Start: 2020-11-09 | End: 2020-11-09

## 2020-11-09 RX ADMIN — ALLOPURINOL 100 MG: 100 TABLET ORAL at 09:11

## 2020-11-09 RX ADMIN — HEPARIN SODIUM 5000 UNITS: 5000 INJECTION INTRAVENOUS; SUBCUTANEOUS at 01:11

## 2020-11-09 RX ADMIN — HEPARIN SODIUM 5000 UNITS: 5000 INJECTION INTRAVENOUS; SUBCUTANEOUS at 05:11

## 2020-11-09 RX ADMIN — PANTOPRAZOLE SODIUM 40 MG: 40 TABLET, DELAYED RELEASE ORAL at 05:11

## 2020-11-09 RX ADMIN — CARVEDILOL 25 MG: 12.5 TABLET, FILM COATED ORAL at 09:11

## 2020-11-09 RX ADMIN — PIPERACILLIN AND TAZOBACTAM 4.5 G: 4; .5 INJECTION, POWDER, LYOPHILIZED, FOR SOLUTION INTRAVENOUS; PARENTERAL at 12:11

## 2020-11-09 RX ADMIN — CLONIDINE HYDROCHLORIDE 0.2 MG: 0.1 TABLET ORAL at 03:11

## 2020-11-09 RX ADMIN — MAGNESIUM SULFATE 2 G: 2 INJECTION INTRAVENOUS at 09:11

## 2020-11-09 RX ADMIN — CLONIDINE HYDROCHLORIDE 0.2 MG: 0.1 TABLET ORAL at 08:11

## 2020-11-09 RX ADMIN — CEFEPIME 2 G: 2 INJECTION, POWDER, FOR SOLUTION INTRAVENOUS at 01:11

## 2020-11-09 RX ADMIN — NIFEDIPINE 60 MG: 30 TABLET, FILM COATED, EXTENDED RELEASE ORAL at 09:11

## 2020-11-09 RX ADMIN — CARVEDILOL 25 MG: 12.5 TABLET, FILM COATED ORAL at 08:11

## 2020-11-09 RX ADMIN — CLONIDINE HYDROCHLORIDE 0.2 MG: 0.1 TABLET ORAL at 09:11

## 2020-11-09 NOTE — PT/OT/SLP EVAL
Physical Therapy Evaluation    Patient Name:  Kendy Vital   MRN:  [id number]    Recommendations:     Discharge Recommendations:  home health PT   Discharge Equipment Recommendations: none   Barriers to discharge: None    Assessment:     Kendy Vital is a 55 y.o. female admitted with a medical diagnosis of Pulmonary infiltrate.  She presents with the following impairments/functional limitations:  impaired balance, weakness, impaired functional mobilty. Patient tolerated initial evaluation fairly. On entry to patient room, patient lethargic and intermittently falling asleep/snoring throughout subjective interview. Patient ambulated to/from bathroom with CGA and no AD and demo'd unsteady gait but no overt LOB. Kendy Vital would benefit from acute PT intervention to address listed functional deficits, provide patient/caregiver education, reduce fall risk, and maximize (I) and safety with functional mobility.    Rehab Prognosis: Good; patient would benefit from acute skilled PT services to address these deficits and reach maximum level of function.      Plan:     During this hospitalization, patient to be seen 3 x/week to address the identified rehab impairments via gait training, therapeutic activities, therapeutic exercises, neuromuscular re-education and progress toward the following goals:    · Plan of Care Expires:  12/09/20    This plan of care has been discussed with the patient/caregiver, who was included in its development and is in agreement with the identified goals and treatment plan.     Subjective     Communicated with RN prior to session.  Patient agreeable to participate.     Chief Complaint: tired  Patient/Family Comments/goals: to go back to sleep    Pain/Comfort:  · Pain Rating 1: 0/10  · Pain Rating Post-Intervention 1: 0/10    Patients cultural, spiritual, Oriental orthodox conflicts given the current situation: no    Living Environment: Pt lives with alfieter in a mobile home with a ramp to enter.  Bathroom set-up: tub/shower combo  Prior Level of Function: required caregiver assist for mobility and ADLs including bathing, dressing  DME used: bedside commode, walker, rolling  DME owned (not currently used): none  Upon discharge, patient will have assistance from: granddaughtertimur     Objective:     Patient found supine with peripheral IV, telemetry  upon PT entry to room.    General Precautions: Standard, fall   Orthopedic Precautions:N/A   Braces: N/A     Exams:     Cognition:  o Pt is alert and oriented x 4     Sensation:   o Light touch sensation: Intact BLEs     Gross Motor Coordination: No deficits noted during functional mobility tasks      Edema: none noted     Lower Extremity Range of Motion:  o Right Lower Extremity: WFL  o Left Lower Extremity: WFL     Lower Extremity Strength:  o Right Lower Extremity: WFL  o Left Lower Extremity: WFL    Functional Mobility:    Bed Mobility:  · Supine > Sit: Stand-by Assistance  · Sit > Supine: Stand-by Assistance    Transfers:   · Sit <> Stand Transfer: Stand-by Assistance from bed with no AD             Gait:  · Patient ambulated 12 feet x 2 with Contact Guard Assistance and none; *try RW next time  · Gait Assessment: decreased gait speed and unsteady gait     Balance:  · Static Sit: Supervision at EOB x 5 minutes   · Static Stand: Stand-By Assist with no AD      Therapeutic Activities/Exercises       AM-PAC 6 CLICK MOBILITY  Total Score:19     Patient left supine with all lines intact and call button in reach.      Patient/Caregiver Education:     Therapist educated pt/caregiver regarding:    Therapist provided facilitation and instruction of proper body mechanics, energy conservation, and fall prevention strategies during tasks listed above.   PT POC and goals for therapy    Instruction on use of call button and importance of calling nursing staff for assistance with mobility/transfers    Patient/caregiver able to verbalize understanding; will  follow-up with pt/caregiver during current admit for additional questions/concerns within scope of practice.     White board updated.       History/Goals:     PAST MEDICAL HISTORY:  Past Medical History:   Diagnosis Date    Acute (diffuse) proliferative glomerulonephritis     Acute renal failure 2019    B-cell lymphoproliferative disorder 2019    Chronic obstructive lung disease 2019    Chronic viral hepatitis B without delta agent and without coma 2019    Cryoglobulinemic vasculitis     Hypogammaglobulinemia 2019    Iron deficiency anemia 2019    Renal vein thrombosis 2015    s/p embolectomy and lovenox for 9 mos    Steroid-induced hyperglycemia 2019    Uterine leiomyoma     s/p resection       History reviewed. No pertinent surgical history.    GOALS:   Multidisciplinary Problems     Physical Therapy Goals        Problem: Physical Therapy Goal    Goal Priority Disciplines Outcome Goal Variances Interventions   Physical Therapy Goal     PT, PT/OT Ongoing, Progressing     Description: Goals to be met by: 2020     Patient will increase functional independence with mobility by performin. Supine to sit with [place]  2. Sit to supine with [place]  3. Sit to stand transfer with [place]  4. Gait  x 150 feet with Supervision using Rolling Walker or none.                      Time Tracking:     PT Received On: 20  PT Start Time: 1100     PT Stop Time: 1115  PT Total Time (min): 15 min     Billable Minutes: Evaluation 15      NICK POLLACK PT  2020

## 2020-11-09 NOTE — PLAN OF CARE
Problem: Occupational Therapy Goal  Goal: Occupational Therapy Goal  Description: Goals to be met by: 11/18/2020     Patient will increase functional independence with ADLs by performing:    UE Dressing with Modified [place].  LE Dressing with Stand-by Assistance.  Grooming while bedside chair with Modified [place].  Toileting from bedside commode with Supervision for hygiene and clothing management.   Supine to sit with Modified [place].  Step transfer with Supervision  Toilet transfer to bedside commode with Supervision.     Outcome: Ongoing, Progressing     Pt evaluated and POC established

## 2020-11-09 NOTE — SUBJECTIVE & OBJECTIVE
Interval History: Febrile overnight to 101.5, responsive to Tylenol. Has been hypertensive, otherwise vital signs stable. Urine culture now growing Pseudomonas; on Cefepime therapy.    Review of Systems   Constitutional: Positive for activity change, appetite change, chills, fatigue and fever.   HENT: Negative for sore throat and trouble swallowing.    Eyes: Negative for photophobia and visual disturbance.   Respiratory: Positive for cough, chest tightness and shortness of breath. Negative for wheezing.    Cardiovascular: Positive for chest pain. Negative for palpitations and leg swelling.   Gastrointestinal: Negative for abdominal distention, abdominal pain, nausea and vomiting.   Endocrine: Negative for polyuria.   Genitourinary: Negative for difficulty urinating and dysuria.   Musculoskeletal: Negative for back pain.   Skin: Negative for pallor and rash.   Neurological: Positive for weakness (global). Negative for syncope, light-headedness and headaches.   Psychiatric/Behavioral: Negative for agitation and confusion.     Objective:     Vital Signs (Most Recent):  Temp: 99.8 °F (37.7 °C) (11/09/20 0920)  Pulse: 78 (11/09/20 1520)  Resp: 20 (11/09/20 1506)  BP: (!) 174/77 (11/09/20 1506)  SpO2: (!) 93 % (11/09/20 1506) Vital Signs (24h Range):  Temp:  [98.7 °F (37.1 °C)-101.5 °F (38.6 °C)] 99.8 °F (37.7 °C)  Pulse:  [68-87] 78  Resp:  [17-30] 20  SpO2:  [93 %-95 %] 93 %  BP: (129-174)/(65-77) 174/77     Weight: 101.6 kg (224 lb)  Body mass index is 36.15 kg/m².    Intake/Output Summary (Last 24 hours) at 11/9/2020 1523  Last data filed at 11/9/2020 0351  Gross per 24 hour   Intake --   Output 1300 ml   Net -1300 ml      Physical Exam  Vitals signs reviewed.   Constitutional:       General: She is not in acute distress.     Appearance: Normal appearance. She is obese. She is not ill-appearing.   HENT:      Head: Normocephalic and atraumatic.      Nose: Nose normal.      Mouth/Throat:      Mouth: Mucous membranes are  moist.      Pharynx: Oropharynx is clear. No oropharyngeal exudate.   Eyes:      General: No scleral icterus.     Extraocular Movements: Extraocular movements intact.      Conjunctiva/sclera: Conjunctivae normal.      Pupils: Pupils are equal, round, and reactive to light.   Neck:      Musculoskeletal: Normal range of motion and neck supple.   Cardiovascular:      Rate and Rhythm: Normal rate and regular rhythm.      Pulses: Normal pulses.      Heart sounds: No murmur.   Pulmonary:      Effort: Pulmonary effort is normal. No respiratory distress.      Breath sounds: Normal breath sounds.   Abdominal:      General: Abdomen is flat. Bowel sounds are normal. There is no distension.      Palpations: Abdomen is soft.      Tenderness: There is no abdominal tenderness.   Musculoskeletal:      Right lower leg: No edema.      Left lower leg: No edema.   Lymphadenopathy:      Cervical: No cervical adenopathy.   Skin:     General: Skin is warm and dry.      Capillary Refill: Capillary refill takes less than 2 seconds.   Neurological:      General: No focal deficit present.      Mental Status: She is alert and oriented to person, place, and time. Mental status is at baseline.   Psychiatric:         Mood and Affect: Mood and affect normal.         Behavior: Behavior normal. Behavior is cooperative.       Significant Labs:   CBC:   Recent Labs   Lab 11/07/20  1744 11/08/20  0416 11/09/20  0335   WBC 8.00 10.20 8.16   HGB 14.5 12.7 12.5   HCT 46.8 40.1 41.5   * 461* 464*     CMP:   Recent Labs   Lab 11/07/20  1744 11/08/20  0417 11/08/20  0955 11/09/20  0335    141  --  140   K 3.8 3.7 3.9 3.6    105  --  104   CO2 23 22*  --  25   * 124*  --  102   BUN 22* 20  --  20   CREATININE 2.6* 2.4*  --  2.2*   CALCIUM 10.1 9.5  --  9.7   PROT 7.5 6.5  --  6.3   ALBUMIN 2.9* 2.5*  --  2.4*   BILITOT 0.3 0.4  --  0.4   ALKPHOS 150* 131  --  133   AST 19 14  --  19   ALT 30 22  --  22   ANIONGAP 16 14  --  11    EGFRNONAA 20.2* 22.2*  --  24.7*     Urine Culture:   Recent Labs   Lab 11/07/20  1809   LABURIN PRESUMPTIVE PSEUDOMONAS SPECIES  10,000 - 49,999 cfu/ml  Identification and susceptibility pending  *     Urine Studies:   Recent Labs   Lab 11/07/20 1809   COLORU Yellow   APPEARANCEUA Hazy*   PHUR 5.0   SPECGRAV 1.010   PROTEINUA 1+*   GLUCUA Negative   KETONESU Negative   BILIRUBINUA Negative   OCCULTUA Negative   NITRITE Negative   LEUKOCYTESUR 2+*   RBCUA 3   WBCUA 41*   BACTERIA Occasional   SQUAMEPITHEL 3   HYALINECASTS 0     All pertinent labs within the past 24 hours have been reviewed.    Significant Imaging: I have reviewed all pertinent imaging results/findings within the past 24 hours.

## 2020-11-09 NOTE — PLAN OF CARE
"Mildy tachypneic, but in NAD. Pt temp trending up, given tylenol x1 for temp 101.5 recheck 99. MD notified. Still pending sputum collection. IV abx tayo. Pt NPO after MN. Safety maintained.    /65 (BP Location: Right arm, Patient Position: Lying)   Pulse 74   Temp 99.1 °F (37.3 °C) (Oral)   Resp (!) 21   Ht 5' 6" (1.676 m)   Wt 101.6 kg (224 lb)   SpO2 (!) 93%   Breastfeeding No   BMI 36.15 kg/m²     "

## 2020-11-09 NOTE — PROGRESS NOTES
[facility]  Pulmonology  Progress Note    Patient Name: Kendy Vital  MRN: [id number]  Admission Date: 11/7/2020  Hospital Length of Stay: 2 days  Code Status: Full Code  Attending Provider: Riaz Gill MD  Primary Care Provider: To Obtain Unable   Principal Problem: Pulmonary infiltrate    Subjective:     Interval History: NAEON. Febrile with Tmax 101.5. Off supplemental O2.     Objective:     Vital Signs (Most Recent):  Temp: 99.8 °F (37.7 °C) (11/09/20 0920)  Pulse: 74 (11/09/20 0351)  Resp: (!) 21 (11/09/20 0032)  BP: (!) 173/76 (11/09/20 0923)  SpO2: (!) 93 % (11/09/20 0351) Vital Signs (24h Range):  Temp:  [98.7 °F (37.1 °C)-101.5 °F (38.6 °C)] 99.8 °F (37.7 °C)  Pulse:  [68-87] 74  Resp:  [19-30] 21  SpO2:  [93 %-95 %] 93 %  BP: (129-173)/(65-76) 173/76     Weight: 101.6 kg (224 lb)  Body mass index is 36.15 kg/m².      Intake/Output Summary (Last 24 hours) at 11/9/2020 0946  Last data filed at 11/9/2020 0351  Gross per 24 hour   Intake 240 ml   Output 1300 ml   Net -1060 ml       Physical Exam  Constitutional:       General: She is not in acute distress.     Appearance: She is well-developed.   HENT:      Head: Normocephalic and atraumatic.      Nose: No rhinorrhea.      Mouth/Throat:      Pharynx: No oropharyngeal exudate.   Eyes:      Conjunctiva/sclera: Conjunctivae normal.      Pupils: Pupils are equal, round, and reactive to light.   Neck:      Musculoskeletal: Neck supple.   Cardiovascular:      Rate and Rhythm: Normal rate and regular rhythm.      Heart sounds: Normal heart sounds. No murmur.   Pulmonary:      Effort: Pulmonary effort is normal. No respiratory distress.      Breath sounds: Rales (diffuse BL inspiratory rales) present. No wheezing.      Comments: Decreased tactile fremitus on L side  Abdominal:      General: Bowel sounds are normal. There is no distension.      Palpations: Abdomen is soft.      Tenderness: There is no abdominal tenderness. There is no  guarding.   Skin:     General: Skin is warm and dry.   Neurological:      Mental Status: She is alert and oriented to person, place, and time.      Motor: No abnormal muscle tone.   Psychiatric:         Behavior: Behavior normal.         Vents:       Lines/Drains/Airways     Peripheral Intravenous Line                 Peripheral IV - Single Lumen 11/08/20 1602 22 G Anterior;Right Wrist less than 1 day                Significant Labs:    CBC/Anemia Profile:  Recent Labs   Lab 11/07/20  1744 11/08/20 0416 11/09/20  0335   WBC 8.00 10.20 8.16   HGB 14.5 12.7 12.5   HCT 46.8 40.1 41.5   * 461* 464*   MCV 81* 80* 82   RDW 18.9* 17.9* 18.3*        Chemistries:  Recent Labs   Lab 11/07/20 1744 11/08/20 0417 11/08/20  0955 11/09/20  0335    141  --  140   K 3.8 3.7 3.9 3.6    105  --  104   CO2 23 22*  --  25   BUN 22* 20  --  20   CREATININE 2.6* 2.4*  --  2.2*   CALCIUM 10.1 9.5  --  9.7   ALBUMIN 2.9* 2.5*  --  2.4*   PROT 7.5 6.5  --  6.3   BILITOT 0.3 0.4  --  0.4   ALKPHOS 150* 131  --  133   ALT 30 22  --  22   AST 19 14  --  19   MG  --  1.5*  --  1.7   PHOS  --  4.2 4.1 4.2       All pertinent labs within the past 24 hours have been reviewed.    Significant Imaging:  I have reviewed and interpreted all pertinent imaging results/findings within the past 24 hours.    Assessment/Plan:     Multiple pulmonary nodules  56 yo F admitted with subacute worsening of chronic SOB associated with fevers/chills, night sweats, productive cough. Smoking Hx: 1ppd x 13 years, quit 1.5-2 years ago). 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 Denies recent travel. CT showed extensive pulmonary nodules + infiltrates. Distribution appears to be bronchovascular. Given h/o lymphoma + B symptoms, concern for lymphoproliferative process vs chronic infection.     Plan:  - Discussed purpose, risks, and  benefits of bronchoscopy with patient  - Bronchoscopy planned for tomorrow morning  - NPO at midnight         Luiz Echevarria MD  Pulmonology  [facility]

## 2020-11-09 NOTE — HPI
Kendy Vital is a 55 y.o.  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     Patient states that she first noted having SOB a few months ago, but only noticed it with exertion or with laying flat occasionally. Over the past 3 weeks, she endorses progressively worsening SOB and persistent dry cough, although she has not had any sputum production or hemoptysis. During the past 3 weeks she has also had almost daily fevers despite taking tylenol. She also endorses profuse night sweats which soak the bed, and a 10lb weight loss. Of note, she was recently hospitalized in [place] and given a course of steroids for pneumonia, but did not have improvement of her symptoms.     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 At one point the decision was made to just monitor. Smoking Hx: 1ppd x 13 years, quit 1.5-2 years ago). 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 Denies recent travel.

## 2020-11-09 NOTE — CONSULTS
"  [facility]  Adult Nutrition  Consult Note    SUMMARY     Recommendations  1. Continue diabetic diet as tolerated.   2. Add Boost Glucose Control TID (strawberry per pt preference) to aid in intake.   3. RD to monitor.    Goals: PO intake > 50% by RD follow up  Nutrition Goal Status: new  Communication of RD Recs: other (comment)(POC)    Reason for Assessment    Reason For Assessment: consult  Diagnosis: (Pulmonary infiltrate)  Relevant Medical History: HTN, DM2, CKD, COPD  Interdisciplinary Rounds: did not attend  General Information Comments: Pt resting in bed with family at bedside this afternoon. Pt reports improving appetite today and states she consumed ~25% of lunch. Pt reports decreased PO intake for a few weeks PTA, with a 5-10# wt loss from her UBW of 224#. Unable to confirm per chart, wt stable 220-224# x 10 months per chart. NFPE completed today with no physical s/s of malnutrition noted. Pt is at risk due to decreased appetite. Pt agreeable to ONS to aid in intake.  Nutrition Discharge Planning: Adequate PO intake on diabetic diet    Nutrition Risk Screen    Nutrition Risk Screen: no indicators present    Nutrition/Diet History    Spiritual, Cultural Beliefs, Orthodox Practices, Values that Affect Care: no    Anthropometrics    Temp: 99.8 °F (37.7 °C)  Height Method: Stated  Height: 5' 6" (167.6 cm)  Height (inches): 66 in  Weight Method: Standard Scale  Weight: 101.6 kg (224 lb)  Weight (lb): 224 lb  Ideal Body Weight (IBW), Female: 130 lb  % Ideal Body Weight, Female (lb): 172.31 %  BMI (Calculated): 36.2    Lab/Procedures/Meds    Pertinent Labs Reviewed: reviewed  Pertinent Labs Comments: Cr 2.2, GFR 28.5, Alb 2.4  Pertinent Medications Reviewed: reviewed  Pertinent Medications Comments: heparin, insulin, pantoprazole, senna-docusate    Estimated/Assessed Needs    Weight Used For Calorie Calculations: 101.5 kg (223 lb 12.3 oz)  Energy Calorie Requirements (kcal): 1632 " kcal/day  Energy Need Method: [place](x 1.0 PAL 2/2 obesity)  Protein Requirements:  g/day(0.8-1 g/kg)  Weight Used For Protein Calculations: 101.6 kg (223 lb 15.8 oz)  Fluid Requirements (mL): per MD or 1 mL/kcal     RDA Method (mL): 1632  CHO Requirement: 204 g/day    Nutrition Prescription Ordered    Current Diet Order: Diabetic    Evaluation of Received Nutrient/Fluid Intake    Comments: LBM 11/7  % Intake of Estimated Energy Needs: 25%  % Meal Intake: 25%    Nutrition Risk    Level of Risk/Frequency of Follow-up: low     Assessment and Plan    Nutrition Problem  Inadequate energy intake    Related to (etiology):   Decreased appetite    Signs and Symptoms (as evidenced by):   Pt consuming 25% of meals    Interventions (treatment strategy):  Collaboration with other providers    Nutrition Diagnosis Status:   New     Monitor and Evaluation    Food and Nutrient Intake: energy intake, food and beverage intake  Food and Nutrient Adminstration: diet order  Anthropometric Measurements: weight, weight change, body mass index  Biochemical Data, Medical Tests and Procedures: electrolyte and renal panel, gastrointestinal profile, glucose/endocrine profile, inflammatory profile, lipid profile  Nutrition-Focused Physical Findings: overall appearance     Malnutrition Assessment  Orbital Region (Subcutaneous Fat Loss): well nourished  Upper Arm Region (Subcutaneous Fat Loss): well nourished   [place] Region (Muscle Loss): well nourished  Clavicle Bone Region (Muscle Loss): well nourished  Clavicle and Acromion Bone Region (Muscle Loss): well nourished  Dorsal Hand (Muscle Loss): well nourished  Anterior Thigh Region (Muscle Loss): well nourished  Posterior Calf Region (Muscle Loss): well nourished     Nutrition Follow-Up    RD Follow-up?: Yes

## 2020-11-09 NOTE — HOSPITAL COURSE
Consulted pulmonology and heme/onc on 11/8. Had bronchoscopy on 11/10 w/ BAL and biopsies. Urinalysis grew Pseudomonas and she was started on Cefepime, RP US negative for pyelo. On 11/11 transitioned Cefepime to Levaquin to be continued after discharge. Home health was set up and she was discharged home one 11/12 with f/u appointments scheduled.

## 2020-11-09 NOTE — PLAN OF CARE
Problem: Physical Therapy Goal  Goal: Physical Therapy Goal  Description: Goals to be met by: 2020     Patient will increase functional independence with mobility by performin. Supine to sit with [place]  2. Sit to supine with [place]  3. Sit to stand transfer with [place]  4. Gait  x 150 feet with Supervision using Rolling Walker or none.     Outcome: Ongoing, Progressing     Initial Evaluation performed and Physical Therapy POC established this date. Patient is appropriate for acute PT services and is in agreement with established goals.     Rimma Rivera, PT, DPT  2020  Pager# [phone]

## 2020-11-09 NOTE — PLAN OF CARE
Pt free of falls/trauma/injuries. Bed in low position, wheels locked, and call light within reach. Skin integrity remains unchanged. IV antibiotic given as ordered. Magnesium replaced. US retroperitoneal complete completed during shift. No distress noted/reported at this time. VSS and afebrile. Will continue to monitor.

## 2020-11-09 NOTE — PLAN OF CARE
11/09/20 1420   Discharge Assessment   Assessment Type Discharge Planning Assessment   Confirmed/corrected address and phone number on facesheet? Yes   Assessment information obtained from? Patient   Expected Length of Stay (days) 6   Communicated expected length of stay with patient/caregiver yes   Prior to hospitilization cognitive status: Alert/Oriented   Prior to hospitalization functional status: Assistive Equipment   Current cognitive status: Alert/Oriented   Current Functional Status: Needs Assistance   Lives With significant other  (s.o., Adolfo Maynard ([phone]))   Able to Return to Prior Arrangements yes   Is patient able to care for self after discharge? Yes   Patient's perception of discharge disposition home or selfcare   Readmission Within the Last 30 Days no previous admission in last 30 days   Patient currently being followed by outpatient case management? No   Patient currently receives any other outside agency services? No   Equipment Currently Used at Home bedside commode;cane, straight;shower chair;grab bar;glucometer;other (see comments)  (BP machine)   Do you have any problems affording any of your prescribed medications? No   Is the patient taking medications as prescribed? yes   Does the patient have transportation home? Yes   Transportation Anticipated family or friend will provide  (s.o.)   Does the patient receive services at the [facility]? No   Discharge Plan A Home with family   Discharge Plan B Home Health   DME Needed Upon Discharge  other (see comments)  (tbd)   Patient/Family in Agreement with Plan yes       Dx: pulmonary infiltrate  Consult: pulm, bone marrow tranplant, & PT/OT      [id number] [place] - [address]  [address] [place] [address]  Phone: [phone] Fax: [phone]    [id number] [place] - [address] AT [facility] [address] [facility]  [address] [place] [address]  Phone: [phone]  Fax: [phone]    Payor: [place] MEDICAID / Plan: [place] MEDICA[place] [facility] COMMUNITY PLAN [place] / Product Type: Managed Medicaid /     Dr. Augustin Urbina (PCP)  [address] [place], [address]  (p) [phone]    Patient is scheduled to have a bronch done tomorrow.     Will continue to follow.

## 2020-11-09 NOTE — SUBJECTIVE & OBJECTIVE
Interval History: NAEON. Febrile with Tmax 101.5. Off supplemental O2.     Objective:     Vital Signs (Most Recent):  Temp: 99.8 °F (37.7 °C) (11/09/20 0920)  Pulse: 74 (11/09/20 0351)  Resp: (!) 21 (11/09/20 0032)  BP: (!) 173/76 (11/09/20 0923)  SpO2: (!) 93 % (11/09/20 0351) Vital Signs (24h Range):  Temp:  [98.7 °F (37.1 °C)-101.5 °F (38.6 °C)] 99.8 °F (37.7 °C)  Pulse:  [68-87] 74  Resp:  [19-30] 21  SpO2:  [93 %-95 %] 93 %  BP: (129-173)/(65-76) 173/76     Weight: 101.6 kg (224 lb)  Body mass index is 36.15 kg/m².      Intake/Output Summary (Last 24 hours) at 11/9/2020 0946  Last data filed at 11/9/2020 0351  Gross per 24 hour   Intake 240 ml   Output 1300 ml   Net -1060 ml       Physical Exam  Constitutional:       General: She is not in acute distress.     Appearance: She is well-developed.   HENT:      Head: Normocephalic and atraumatic.      Nose: No rhinorrhea.      Mouth/Throat:      Pharynx: No oropharyngeal exudate.   Eyes:      Conjunctiva/sclera: Conjunctivae normal.      Pupils: Pupils are equal, round, and reactive to light.   Neck:      Musculoskeletal: Neck supple.   Cardiovascular:      Rate and Rhythm: Normal rate and regular rhythm.      Heart sounds: Normal heart sounds. No murmur.   Pulmonary:      Effort: Pulmonary effort is normal. No respiratory distress.      Breath sounds: Rales (diffuse BL inspiratory rales) present. No wheezing.      Comments: Decreased tactile fremitus on L side  Abdominal:      General: Bowel sounds are normal. There is no distension.      Palpations: Abdomen is soft.      Tenderness: There is no abdominal tenderness. There is no guarding.   Skin:     General: Skin is warm and dry.   Neurological:      Mental Status: She is alert and oriented to person, place, and time.      Motor: No abnormal muscle tone.   Psychiatric:         Behavior: Behavior normal.         Vents:       Lines/Drains/Airways     Peripheral Intravenous Line                 Peripheral IV - Single  Lumen 11/08/20 1602 22 G Anterior;Right Wrist less than 1 day                Significant Labs:    CBC/Anemia Profile:  Recent Labs   Lab 11/07/20  1744 11/08/20 0416 11/09/20  0335   WBC 8.00 10.20 8.16   HGB 14.5 12.7 12.5   HCT 46.8 40.1 41.5   * 461* 464*   MCV 81* 80* 82   RDW 18.9* 17.9* 18.3*        Chemistries:  Recent Labs   Lab 11/07/20 1744 11/08/20 0417 11/08/20 0955 11/09/20  0335    141  --  140   K 3.8 3.7 3.9 3.6    105  --  104   CO2 23 22*  --  25   BUN 22* 20  --  20   CREATININE 2.6* 2.4*  --  2.2*   CALCIUM 10.1 9.5  --  9.7   ALBUMIN 2.9* 2.5*  --  2.4*   PROT 7.5 6.5  --  6.3   BILITOT 0.3 0.4  --  0.4   ALKPHOS 150* 131  --  133   ALT 30 22  --  22   AST 19 14  --  19   MG  --  1.5*  --  1.7   PHOS  --  4.2 4.1 4.2       All pertinent labs within the past 24 hours have been reviewed.    Significant Imaging:  I have reviewed and interpreted all pertinent imaging results/findings within the past 24 hours.

## 2020-11-09 NOTE — PLAN OF CARE
Recommendations  1. Continue diabetic diet as tolerated.   2. Add Boost Glucose Control TID (strawberry per pt preference) to aid in intake.   3. RD to monitor.     Goals: PO intake > 50% by RD follow up  Nutrition Goal Status: new  Communication of RD Recs: other (comment)(POC)

## 2020-11-09 NOTE — PROGRESS NOTES
[facility] Hospital Medicine  Progress Note    Patient Name: Kendy Vital  MRN: [id number]  Patient Class: IP- Inpatient   Admission Date: 11/7/2020  Length of Stay: 2 days  Attending Physician: Riaz Gill MD  Primary Care Provider: To Obtain Unable    Hospital Medicine Team: [facility] 2 Luis Angel Castro MD    Subjective:     Principal Problem:Pulmonary infiltrate    HPI:  Ms. 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 She was recently hospitalized in [place] for one week for the same symptoms and discharged 1 week ago after treatment for pneumonia. She was discharged on antibiotics however does not recall the name. She says her symptoms failed to improve since her discharge and now she has decreased appetite which prompted return to the ED. She denies hemoptysis. She endorses weight loss of unknown amount, night sweats, and sharp pleuritic chest pain. She denies abdominal pain but has had watery diarrhea for the past week. She is a previous smoker with 13 pack year history. No history of covid.    Of note, patient has complex medical history. She was hospitalized in 2019 for renal failure, thrombocytopenia, bilateral cxr infiltrates. Subsequently had renal bx and bone marrow bx. 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 Bone marrow bx showed b cell lymphoma. She received rituxan, pulse steroids 1 gm x 3 days, IVIG/plasmapheresis. No further treatment of her b cell lymphoma which from chart review appears to have been asymptomatic. She does take entecavir for her HBV and gets follow up for HCC monitoring. She last filled a dose of prednisone in  August which was a 1 month prescription.      ED Course:  VSS. CBC wnl. Creatinine 2.6 (baseline 1-1.1), d-dimer 1.2. CXR with bilateral pulmonary infiltrates. BNP/troponin wnl. Abdominal xray unremarkable. CT CAP pending. EKG no ischemic changes. Given 1 dose vanc/zosyn and 1 liter IVF.    Overview/Hospital Course:  Consulted pulmonology and heme/onc on 11/8. Bronchoscopy on 11/10 w/ BAL and biopsies. Urinalysis grew Pseudomonas and she was started on Cefepime.    Interval History: Febrile overnight to 101.5, responsive to Tylenol. Has been hypertensive, otherwise vital signs stable. Urine culture now growing Pseudomonas; on Cefepime therapy.    Review of Systems   Constitutional: Positive for activity change, appetite change, chills, fatigue and fever.   HENT: Negative for sore throat and trouble swallowing.    Eyes: Negative for photophobia and visual disturbance.   Respiratory: Positive for cough, chest tightness and shortness of breath. Negative for wheezing.    Cardiovascular: Positive for chest pain. Negative for palpitations and leg swelling.   Gastrointestinal: Negative for abdominal distention, abdominal pain, nausea and vomiting.   Endocrine: Negative for polyuria.   Genitourinary: Negative for difficulty urinating and dysuria.   Musculoskeletal: Negative for back pain.   Skin: Negative for pallor and rash.   Neurological: Positive for weakness (global). Negative for syncope, light-headedness and headaches.   Psychiatric/Behavioral: Negative for agitation and confusion.     Objective:     Vital Signs (Most Recent):  Temp: 99.8 °F (37.7 °C) (11/09/20 0920)  Pulse: 78 (11/09/20 1520)  Resp: 20 (11/09/20 1506)  BP: (!) 174/77 (11/09/20 1506)  SpO2: (!) 93 % (11/09/20 1506) Vital Signs (24h Range):  Temp:  [98.7 °F (37.1 °C)-101.5 °F (38.6 °C)] 99.8 °F (37.7 °C)  Pulse:  [68-87] 78  Resp:  [17-30] 20  SpO2:  [93 %-95 %] 93 %  BP: (129-174)/(65-77) 174/77     Weight: 101.6 kg (224 lb)  Body mass index is 36.15  kg/m².    Intake/Output Summary (Last 24 hours) at 11/9/2020 1523  Last data filed at 11/9/2020 0351  Gross per 24 hour   Intake --   Output 1300 ml   Net -1300 ml      Physical Exam  Vitals signs reviewed.   Constitutional:       General: She is not in acute distress.     Appearance: Normal appearance. She is obese. She is not ill-appearing.   HENT:      Head: Normocephalic and atraumatic.      Nose: Nose normal.      Mouth/Throat:      Mouth: Mucous membranes are moist.      Pharynx: Oropharynx is clear. No oropharyngeal exudate.   Eyes:      General: No scleral icterus.     Extraocular Movements: Extraocular movements intact.      Conjunctiva/sclera: Conjunctivae normal.      Pupils: Pupils are equal, round, and reactive to light.   Neck:      Musculoskeletal: Normal range of motion and neck supple.   Cardiovascular:      Rate and Rhythm: Normal rate and regular rhythm.      Pulses: Normal pulses.      Heart sounds: No murmur.   Pulmonary:      Effort: Pulmonary effort is normal. No respiratory distress.      Breath sounds: Normal breath sounds.   Abdominal:      General: Abdomen is flat. Bowel sounds are normal. There is no distension.      Palpations: Abdomen is soft.      Tenderness: There is no abdominal tenderness.   Musculoskeletal:      Right lower leg: No edema.      Left lower leg: No edema.   Lymphadenopathy:      Cervical: No cervical adenopathy.   Skin:     General: Skin is warm and dry.      Capillary Refill: Capillary refill takes less than 2 seconds.   Neurological:      General: No focal deficit present.      Mental Status: She is alert and oriented to person, place, and time. Mental status is at baseline.   Psychiatric:         Mood and Affect: Mood and affect normal.         Behavior: Behavior normal. Behavior is cooperative.       Significant Labs:   CBC:   Recent Labs   Lab 11/07/20  1744 11/08/20  0416 11/09/20  0335   WBC 8.00 10.20 8.16   HGB 14.5 12.7 12.5   HCT 46.8 40.1 41.5   *  461* 464*     CMP:   Recent Labs   Lab 11/07/20  1744 11/08/20  0417 11/08/20  0955 11/09/20  0335    141  --  140   K 3.8 3.7 3.9 3.6    105  --  104   CO2 23 22*  --  25   * 124*  --  102   BUN 22* 20  --  20   CREATININE 2.6* 2.4*  --  2.2*   CALCIUM 10.1 9.5  --  9.7   PROT 7.5 6.5  --  6.3   ALBUMIN 2.9* 2.5*  --  2.4*   BILITOT 0.3 0.4  --  0.4   ALKPHOS 150* 131  --  133   AST 19 14  --  19   ALT 30 22  --  22   ANIONGAP 16 14  --  11   EGFRNONAA 20.2* 22.2*  --  24.7*     Urine Culture:   Recent Labs   Lab 11/07/20  1809   LABURIN PRESUMPTIVE PSEUDOMONAS SPECIES  10,000 - 49,999 cfu/ml  Identification and susceptibility pending  *     Urine Studies:   Recent Labs   Lab 11/07/20  1809   COLORU Yellow   APPEARANCEUA Hazy*   PHUR 5.0   SPECGRAV 1.010   PROTEINUA 1+*   GLUCUA Negative   KETONESU Negative   BILIRUBINUA Negative   OCCULTUA Negative   NITRITE Negative   LEUKOCYTESUR 2+*   RBCUA 3   WBCUA 41*   BACTERIA Occasional   SQUAMEPITHEL 3   HYALINECASTS 0     All pertinent labs within the past 24 hours have been reviewed.    Significant Imaging: I have reviewed all pertinent imaging results/findings within the past 24 hours.      Assessment/Plan:      * Pulmonary infiltrate  Hx COPD, asymptomatic b-cell lymphoma, cryoglobinemic vasculitis, hepatitis B on Entecavir, recent glucocorticoid use presenting for intermittent fevers, myalgia, pleuritic chest pain, weight loss, night sweats, productive sputum x 3 weeks in setting of recent hospitalization in [place] for pneumonia, discharged 1 week prior without improvement in her symptoms. At [facility] ED, VSS and not septic picture. WBC wnl.   - covid negative, no hx of covid per patient  - BNP, troponin WNL  - CXR with bilateral pulmonary infiltrates  - d-dimer 1.2  - DDX: malignancy (?lymphoma), fungal pneumonia, follicular bronchiolitis, sarcoidosis, lymphoid interstitial pneumonia    Plan:  - D/c Zosyn  - CT CAP -- suggestive of pulmonary  lymphoproliferative disorder  - Bronchoscopy w/ BAL and biopsy on 11/10  - Consult Heme/onc  - Consider HIV (last negative July 2019) or EBV testing  - pneumonia workup:   - procalcitonin: wnl  - gram stain, sputum cultures  - legionella urine antigen  - fungitell  - s pneumoniae urine antigen   - acetaminophen if febrile, limit to 2000 mg/day      UTI (urinary tract infection)  Urine culture now growing Pseudomonas and pt complaining of urgency. L sided CVA tenderness on exam.    Plan:  - Cefepime 2g BID for 14 total days (started 11/9)      Acute kidney injury superimposed on chronic kidney disease  Hx MPGN, cryoglobinemic vasculitis, and HBV. Baseline creatinine around 1-1.1. On presentation creatinine 2.6. Patient admits to decreased oral intake 2/2 pneumonia. S/p 1 liter IVF in ED. UA in ED with 1+ protein. Suspect pre-renal etiology. Has been improving since admission; 2.2 on 11/9.    Plan:  - CMP daily  - Renally dose medications, avoid nephrotoxins  - Consider nephrology consultation if worsening    Type 2 diabetes mellitus without complication, without long-term current use of insulin  Hx DM2, current regimen of 45 units long acting insulin and 10 units mealtime. Last A1C on file from 2019 was 5.7. A1c 9.9% now.    Plan:  - LDSSI for now while NPO  - Glucose q 4 hours  - Consider adding insulin regimen once patient eating      Chronic diastolic heart failure  TTE from July 2019 with EF 60%, indeterminate LV function, CVP 15. Taking lasix at home. Does not appear fluid overloaded on exam. Received 1 unit IVF in ED, BNP wnl.    Plan:  - Consider repeat TTE      Hypogammaglobulinemia  Hx of hypogammaglobulinemia. 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 IgM wnl and IgG low at 623.      B-cell lymphoproliferative disorder  Biopsy proven b-cell lymphoma. Per last note with heme/onc from May, does not appear patient was receiving treatment  as she was asymptomatic and she preferred to follow up with heme/onc in [place].     Plan:  - Heme/onc consult  - F/u lung biopsy on 11/10    Chronic obstructive lung disease  Hx COPD, uses albuterol at home    Plan:  - Duo-nebs PRN for dyspnea      Hepatitis B  Hx hepatitis B diagnosed in 2019, only risk factor is homemade tattoo. Has been getting regular follow up and screening for HCC q 6 months.   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 Confirmed on renal bx in July 2019. Has previously been on IVIG and rituxan and also treated with high dose pulse glucocorticoids. It appears last steroid course was in August 2020.    Plan:  - Continue Entecavir  - Consider nephrology if LACI worsens      Essential hypertension  Hx hypertension, on losartan, nifedipine, clonidine, carvedilol. Hypertensive in ED. Has prescription for hydralazine but has not filled this recently.    Plan:  - Continue Nifedipine 60 mg  - Continue Clonidine 0.2 mg TID  - Continue Carvedilol 25 BID  - Holding ARB in setting of LACI        VTE Risk Mitigation (From admission, onward)         Ordered     heparin (porcine) injection 5,000 Units  Every 8 hours      11/07/20 2257     IP VTE HIGH RISK PATIENT  Once      11/07/20 2257     Place sequential compression device  Until discontinued      11/07/20 2257                Discharge Planning   MARYA: 11/12/2020     Code Status: Full Code   Is the patient medically ready for discharge?:     Reason for patient still in hospital (select all that apply): Patient trending condition, Laboratory test and Treatment           Luis Angel Castro MD  Department of Hospital Medicine   [facility]

## 2020-11-09 NOTE — PT/OT/SLP EVAL
Occupational Therapy   Evaluation    Name: Kendy Vital  MRN: [id number]  Admitting Diagnosis:  Pulmonary infiltrate      Recommendations:     Discharge Recommendations: home with home health  Discharge Equipment Recommendations:  none  Barriers to discharge:  none    Assessment:     Kendy Vital is a 55 y.o. female with a medical diagnosis of Pulmonary infiltrate.  She presents with decreased ADL and functional mobility performance. Pt becomes SOB quickly and is a fall risk. Performance deficits affecting function: weakness, impaired endurance, impaired self care skills, impaired functional mobilty, decreased lower extremity function, impaired cardiopulmonary response to activity.      Rehab Prognosis: Good; patient would benefit from acute skilled OT services to address these deficits and reach maximum level of function.       Plan:     Patient to be seen 3 x/week to address the above listed problems via self-care/home management, therapeutic activities, therapeutic exercises  · Plan of Care Expires: 12/09/20  · Plan of Care Reviewed with: patient    Subjective   Pt agreeable to session  Chief Complaint: SOB  Patient/Family Comments/goals: return home    Occupational Profile:  Living Environment: Pt lives with granddaughter in mobile home with ramp 1 HR to enter. Pt has regular toilet and tub/shower and walk-in shower with built in seat. Pt also has BSC that she keeps close to her bed for emergencies. Pt reports that for the last month she has been weaker and that she needs assistance to get OOB, out of tub, and with lower body dressing. Pt's granddaughter is home daily to help and her fiance comes over daily to help as well. Pt is on disability. Pt will have assistance form granddaughter and fiance upon discharge. Pt does not drive and is R handed.  Equipment Used at Home:  bedside commode, walker, rolling    Pain/Comfort:  Pain Rating 1: 0/10  Pain Rating Post-Intervention 1: 0/10    Patients cultural,  spiritual, Taoism conflicts given the current situation: no    Objective:     Communicated with: nursing prior to session.  Patient found up in chair with peripheral IV, telemetry upon OT entry to room.    General Precautions: Standard, fall   Orthopedic Precautions:N/A   Braces: N/A     Occupational Performance:    Bed Mobility:    · Patient completed Scooting/Bridging with stand by assistance to HOB  · Patient completed Sit to Supine with stand by assistance    Functional Mobility/Transfers:  · Patient completed Sit <> Stand Transfer from bedside chair with stand by assistance  with  no assistive device   · Patient completed chair <> bed Transfer using Step Transfer technique with contact guard assistance with no assistive device  · Functional Mobility: Pt ambulated in room with CGA and no AD to assess functional mobility endurance and balance.    Activities of Daily Living:  · Upper Body Dressing: stand by assistance to juve gown seated EOB  · Lower Body Dressing: moderate assistance Pt initially leaned forward in chair requirng max assist to juve sock. Pt educated to cross foot over knee and was able to juve R sock with SBA    Cognitive/Visual Perceptual:  Cognitive/Psychosocial Skills:     -       Oriented to: Person, Place, Time and Situation   -       Follows Commands/attention:Follows multistep  commands  -       Communication: clear/fluent  -       Memory: No Deficits noted  -       Safety awareness/insight to disability: intact   -       Mood/Affect/Coping skills/emotional control: Appropriate to situation    Physical Exam:  Postural examination/scapula alignment:    -       No postural abnormalities identified  Skin integrity: Visible skin intact  Edema:  None noted  Sensation:    -       Intact  Upper Extremity Range of Motion:     -       Right Upper Extremity: WFL  -       Left Upper Extremity: WFL  Upper Extremity Strength:    -       Right Upper Extremity: WFL  -       Left Upper Extremity:  WFL   Strength:    -       Right Upper Extremity: WFL  -       Left Upper Extremity: WFL  Fine Motor Coordination:    -       Intact  Gross motor coordination:   WFL    AMPAC 6 Click ADL:  AMPAC Total Score: 18    Treatment & Education:  Pt educated to call for assistance with OOB activities.    Vitals remained stable throughout session.    Pt educated on taking rest breaks during component parts of dressing activity.    Pt edu on role of OT, POC, safety when performing self care tasks , benefit of performing OOB activity, and safety when performing functional transfers and mobility.  - White board updated  - Self care tasks completed-- as noted above     Education:    Patient left supine with all lines intact, call button in reach and nursing notified    GOALS:   Multidisciplinary Problems     Occupational Therapy Goals        Problem: Occupational Therapy Goal    Goal Priority Disciplines Outcome Interventions   Occupational Therapy Goal     OT, PT/OT Ongoing, Progressing    Description: Goals to be met by: 11/18/2020     Patient will increase functional independence with ADLs by performing:    UE Dressing with Modified [place].  LE Dressing with Stand-by Assistance.  Grooming while bedside chair with Modified [place].  Toileting from bedside commode with Supervision for hygiene and clothing management.   Supine to sit with Modified [place].  Step transfer with Supervision  Toilet transfer to bedside commode with Supervision.                             History:     Past Medical History:   Diagnosis Date    Acute (diffuse) proliferative glomerulonephritis     Acute renal failure 7/19/2019    B-cell lymphoproliferative disorder 7/30/2019    Chronic obstructive lung disease 7/27/2019    Chronic viral hepatitis B without delta agent and without coma 7/24/2019    Cryoglobulinemic vasculitis     Hypogammaglobulinemia 8/5/2019    Iron deficiency anemia 7/30/2019    Renal vein thrombosis 2015     s/p embolectomy and lovenox for 9 mos    Steroid-induced hyperglycemia 7/28/2019    Uterine leiomyoma     s/p resection       History reviewed. No pertinent surgical history.    Time Tracking:     OT Date of Treatment: 11/09/20  OT Start Time: 1014  OT Stop Time: 1038  OT Total Time (min): 24 min    Billable Minutes:Evaluation 10  Self Care/Home Management 14    ORVILLE Rivas  11/9/2020

## 2020-11-10 LAB
ACE SERPL-CCNC: 53 U/L (ref 16–85)
ALBUMIN SERPL BCP-MCNC: 2.4 G/DL (ref 3.5–5.2)
ALP SERPL-CCNC: 129 U/L (ref 55–135)
ALT SERPL W/O P-5'-P-CCNC: 19 U/L (ref 10–44)
ANION GAP SERPL CALC-SCNC: 15 MMOL/L (ref 8–16)
ANISOCYTOSIS BLD QL SMEAR: SLIGHT
APPEARANCE FLD: NORMAL
AST SERPL-CCNC: 19 U/L (ref 10–40)
BACTERIA UR CULT: ABNORMAL
BASOPHILS # BLD AUTO: 0.08 K/UL (ref 0–0.2)
BASOPHILS NFR BLD: 1.1 % (ref 0–1.9)
BILIRUB SERPL-MCNC: 0.3 MG/DL (ref 0.1–1)
BODY FLD TYPE: NORMAL
BUN SERPL-MCNC: 20 MG/DL (ref 6–20)
CALCIUM SERPL-MCNC: 9.7 MG/DL (ref 8.7–10.5)
CHLORIDE SERPL-SCNC: 104 MMOL/L (ref 95–110)
CO2 SERPL-SCNC: 18 MMOL/L (ref 23–29)
COLOR FLD: NORMAL
CREAT SERPL-MCNC: 1.9 MG/DL (ref 0.5–1.4)
DIFFERENTIAL METHOD: ABNORMAL
EOSINOPHIL # BLD AUTO: 0.1 K/UL (ref 0–0.5)
EOSINOPHIL NFR BLD: 1.3 % (ref 0–8)
ERYTHROCYTE [DISTWIDTH] IN BLOOD BY AUTOMATED COUNT: 18.3 % (ref 11.5–14.5)
EST. GFR  (AFRICAN AMERICAN): 34 ML/MIN/1.73 M^2
EST. GFR  (NON AFRICAN AMERICAN): 29.5 ML/MIN/1.73 M^2
GLUCOSE SERPL-MCNC: 101 MG/DL (ref 70–110)
HCT VFR BLD AUTO: 40.4 % (ref 37–48.5)
HGB BLD-MCNC: 12.6 G/DL (ref 12–16)
IMM GRANULOCYTES # BLD AUTO: 0.06 K/UL (ref 0–0.04)
IMM GRANULOCYTES NFR BLD AUTO: 0.8 % (ref 0–0.5)
LYMPHOCYTES # BLD AUTO: 1.5 K/UL (ref 1–4.8)
LYMPHOCYTES NFR BLD: 19.5 % (ref 18–48)
LYMPHOCYTES NFR FLD MANUAL: 29 %
MAGNESIUM SERPL-MCNC: 1.9 MG/DL (ref 1.6–2.6)
MCH RBC QN AUTO: 25.4 PG (ref 27–31)
MCHC RBC AUTO-ENTMCNC: 31.2 G/DL (ref 32–36)
MCV RBC AUTO: 81 FL (ref 82–98)
MONOCYTES # BLD AUTO: 0.8 K/UL (ref 0.3–1)
MONOCYTES NFR BLD: 10.8 % (ref 4–15)
MONOS+MACROS NFR FLD MANUAL: 68 %
NEUTROPHILS # BLD AUTO: 5 K/UL (ref 1.8–7.7)
NEUTROPHILS NFR BLD: 66.5 % (ref 38–73)
NEUTROPHILS NFR FLD MANUAL: 3 %
NRBC BLD-RTO: 0 /100 WBC
PHOSPHATE SERPL-MCNC: 3.4 MG/DL (ref 2.7–4.5)
PLATELET # BLD AUTO: 415 K/UL (ref 150–350)
PLATELET BLD QL SMEAR: ABNORMAL
PMV BLD AUTO: 10 FL (ref 9.2–12.9)
POCT GLUCOSE: 110 MG/DL (ref 70–110)
POCT GLUCOSE: 125 MG/DL (ref 70–110)
POCT GLUCOSE: 132 MG/DL (ref 70–110)
POCT GLUCOSE: 144 MG/DL (ref 70–110)
POCT GLUCOSE: 151 MG/DL (ref 70–110)
POTASSIUM SERPL-SCNC: 4.6 MMOL/L (ref 3.5–5.1)
PROT SERPL-MCNC: 6.6 G/DL (ref 6–8.4)
RBC # BLD AUTO: 4.97 M/UL (ref 4–5.4)
SODIUM SERPL-SCNC: 137 MMOL/L (ref 136–145)
WBC # BLD AUTO: 7.5 K/UL (ref 3.9–12.7)
WBC # FLD: 151 /CU MM

## 2020-11-10 PROCEDURE — 88312 SPECIAL STAINS GROUP 1: CPT | Mod: 26,,, | Performed by: PATHOLOGY

## 2020-11-10 PROCEDURE — 31628 PR BRONCHOSCOPY,TRANSBRONCH BIOPSY: ICD-10-PCS | Mod: RT,,, | Performed by: INTERNAL MEDICINE

## 2020-11-10 PROCEDURE — 88305 TISSUE EXAM BY PATHOLOGIST: ICD-10-PCS | Mod: 26,,, | Performed by: PATHOLOGY

## 2020-11-10 PROCEDURE — 88112 PR  CYTOPATH, CELL ENHANCE TECH: ICD-10-PCS | Mod: 26,59,, | Performed by: PATHOLOGY

## 2020-11-10 PROCEDURE — 88112 CYTOPATH CELL ENHANCE TECH: CPT | Mod: 59 | Performed by: PATHOLOGY

## 2020-11-10 PROCEDURE — 99232 PR SUBSEQUENT HOSPITAL CARE,LEVL II: ICD-10-PCS | Mod: 25,,, | Performed by: INTERNAL MEDICINE

## 2020-11-10 PROCEDURE — 87206 SMEAR FLUORESCENT/ACID STAI: CPT

## 2020-11-10 PROCEDURE — 99232 PR SUBSEQUENT HOSPITAL CARE,LEVL II: ICD-10-PCS | Mod: ,,, | Performed by: STUDENT IN AN ORGANIZED HEALTH CARE EDUCATION/TRAINING PROGRAM

## 2020-11-10 PROCEDURE — 11000001 HC ACUTE MED/SURG PRIVATE ROOM

## 2020-11-10 PROCEDURE — 88189 FLOWCYTOMETRY/READ 16 & >: CPT | Mod: ,,, | Performed by: PATHOLOGY

## 2020-11-10 PROCEDURE — 63600175 PHARM REV CODE 636 W HCPCS: Performed by: STUDENT IN AN ORGANIZED HEALTH CARE EDUCATION/TRAINING PROGRAM

## 2020-11-10 PROCEDURE — 89051 BODY FLUID CELL COUNT: CPT

## 2020-11-10 PROCEDURE — 99232 SBSQ HOSP IP/OBS MODERATE 35: CPT | Mod: 25,,, | Performed by: INTERNAL MEDICINE

## 2020-11-10 PROCEDURE — 25000003 PHARM REV CODE 250: Performed by: INTERNAL MEDICINE

## 2020-11-10 PROCEDURE — 25000003 PHARM REV CODE 250: Performed by: STUDENT IN AN ORGANIZED HEALTH CARE EDUCATION/TRAINING PROGRAM

## 2020-11-10 PROCEDURE — 88112 CYTOPATH CELL ENHANCE TECH: CPT | Mod: 26,59,, | Performed by: PATHOLOGY

## 2020-11-10 PROCEDURE — 88341 PR IHC OR ICC EACH ADD'L SINGLE ANTIBODY  STAINPR: ICD-10-PCS | Mod: 26,,, | Performed by: PATHOLOGY

## 2020-11-10 PROCEDURE — 83735 ASSAY OF MAGNESIUM: CPT

## 2020-11-10 PROCEDURE — 88341 IMHCHEM/IMCYTCHM EA ADD ANTB: CPT | Mod: 26,,, | Performed by: PATHOLOGY

## 2020-11-10 PROCEDURE — 31623 DX BRONCHOSCOPE/BRUSH: CPT | Mod: 51,RT,, | Performed by: INTERNAL MEDICINE

## 2020-11-10 PROCEDURE — 88185 FLOWCYTOMETRY/TC ADD-ON: CPT | Performed by: PATHOLOGY

## 2020-11-10 PROCEDURE — 27201011 HC FORCEPS DISPOSABLE: Performed by: INTERNAL MEDICINE

## 2020-11-10 PROCEDURE — 84100 ASSAY OF PHOSPHORUS: CPT

## 2020-11-10 PROCEDURE — 88305 TISSUE EXAM BY PATHOLOGIST: CPT | Mod: 59 | Performed by: PATHOLOGY

## 2020-11-10 PROCEDURE — 88342 IMHCHEM/IMCYTCHM 1ST ANTB: CPT | Performed by: PATHOLOGY

## 2020-11-10 PROCEDURE — 63600175 PHARM REV CODE 636 W HCPCS: Performed by: INTERNAL MEDICINE

## 2020-11-10 PROCEDURE — 31628 BRONCHOSCOPY/LUNG BX EACH: CPT | Mod: RT,,, | Performed by: INTERNAL MEDICINE

## 2020-11-10 PROCEDURE — 99152 MOD SED SAME PHYS/QHP 5/>YRS: CPT | Performed by: INTERNAL MEDICINE

## 2020-11-10 PROCEDURE — 88305 TISSUE EXAM BY PATHOLOGIST: CPT | Mod: 26,59,, | Performed by: PATHOLOGY

## 2020-11-10 PROCEDURE — 31623 DX BRONCHOSCOPE/BRUSH: CPT | Performed by: INTERNAL MEDICINE

## 2020-11-10 PROCEDURE — 88313 PR  SPECIAL STAINS,GROUP II: ICD-10-PCS | Mod: 26,,, | Performed by: PATHOLOGY

## 2020-11-10 PROCEDURE — 87116 MYCOBACTERIA CULTURE: CPT

## 2020-11-10 PROCEDURE — 88342 IMHCHEM/IMCYTCHM 1ST ANTB: CPT | Mod: 26,,, | Performed by: PATHOLOGY

## 2020-11-10 PROCEDURE — 99153 MOD SED SAME PHYS/QHP EA: CPT | Performed by: INTERNAL MEDICINE

## 2020-11-10 PROCEDURE — 88312 PR  SPECIAL STAINS,GROUP I: ICD-10-PCS | Mod: 26,,, | Performed by: PATHOLOGY

## 2020-11-10 PROCEDURE — 88312 SPECIAL STAINS GROUP 1: CPT | Performed by: PATHOLOGY

## 2020-11-10 PROCEDURE — 99232 SBSQ HOSP IP/OBS MODERATE 35: CPT | Mod: ,,, | Performed by: STUDENT IN AN ORGANIZED HEALTH CARE EDUCATION/TRAINING PROGRAM

## 2020-11-10 PROCEDURE — 88189 PR  FLOWCYTOMETRY/READ, 16 & > MARKERS: ICD-10-PCS | Mod: ,,, | Performed by: PATHOLOGY

## 2020-11-10 PROCEDURE — 88305 TISSUE EXAM BY PATHOLOGIST: ICD-10-PCS | Mod: 26,59,, | Performed by: PATHOLOGY

## 2020-11-10 PROCEDURE — 36415 COLL VENOUS BLD VENIPUNCTURE: CPT

## 2020-11-10 PROCEDURE — 31624 PR BRONCHOSCOPY,DIAG2STIC W LAVAGE: ICD-10-PCS | Mod: 59,RT,, | Performed by: INTERNAL MEDICINE

## 2020-11-10 PROCEDURE — 88184 FLOWCYTOMETRY/ TC 1 MARKER: CPT | Performed by: PATHOLOGY

## 2020-11-10 PROCEDURE — 88313 SPECIAL STAINS GROUP 2: CPT | Mod: 59 | Performed by: PATHOLOGY

## 2020-11-10 PROCEDURE — 31628 BRONCHOSCOPY/LUNG BX EACH: CPT | Performed by: INTERNAL MEDICINE

## 2020-11-10 PROCEDURE — 80053 COMPREHEN METABOLIC PANEL: CPT

## 2020-11-10 PROCEDURE — 87015 SPECIMEN INFECT AGNT CONCNTJ: CPT

## 2020-11-10 PROCEDURE — 88341 IMHCHEM/IMCYTCHM EA ADD ANTB: CPT | Mod: 59 | Performed by: PATHOLOGY

## 2020-11-10 PROCEDURE — 88305 TISSUE EXAM BY PATHOLOGIST: CPT | Performed by: PATHOLOGY

## 2020-11-10 PROCEDURE — 85025 COMPLETE CBC W/AUTO DIFF WBC: CPT

## 2020-11-10 PROCEDURE — 87205 SMEAR GRAM STAIN: CPT

## 2020-11-10 PROCEDURE — 88342 CHG IMMUNOCYTOCHEMISTRY: ICD-10-PCS | Mod: 26,,, | Performed by: PATHOLOGY

## 2020-11-10 PROCEDURE — 88313 SPECIAL STAINS GROUP 2: CPT | Mod: 26,,, | Performed by: PATHOLOGY

## 2020-11-10 PROCEDURE — 88112 CYTOPATH CELL ENHANCE TECH: CPT | Performed by: PATHOLOGY

## 2020-11-10 PROCEDURE — 88305 TISSUE EXAM BY PATHOLOGIST: CPT | Mod: 26,,, | Performed by: PATHOLOGY

## 2020-11-10 PROCEDURE — 31624 DX BRONCHOSCOPE/LAVAGE: CPT | Performed by: INTERNAL MEDICINE

## 2020-11-10 PROCEDURE — 87070 CULTURE OTHR SPECIMN AEROBIC: CPT

## 2020-11-10 PROCEDURE — 31623 PR BRONCHOSCOPY,DIAGNOSTIC W BRUSH: ICD-10-PCS | Mod: 51,RT,, | Performed by: INTERNAL MEDICINE

## 2020-11-10 PROCEDURE — 87102 FUNGUS ISOLATION CULTURE: CPT

## 2020-11-10 PROCEDURE — 31624 DX BRONCHOSCOPE/LAVAGE: CPT | Mod: 59,RT,, | Performed by: INTERNAL MEDICINE

## 2020-11-10 RX ORDER — ALBUTEROL SULFATE 90 UG/1
2 AEROSOL, METERED RESPIRATORY (INHALATION) EVERY 6 HOURS PRN
COMMUNITY
End: 2021-08-26 | Stop reason: SDUPTHER

## 2020-11-10 RX ORDER — LIDOCAINE HYDROCHLORIDE 10 MG/ML
INJECTION INFILTRATION; PERINEURAL CODE/TRAUMA/SEDATION MEDICATION
Status: COMPLETED | OUTPATIENT
Start: 2020-11-10 | End: 2020-11-10

## 2020-11-10 RX ORDER — LIDOCAINE HYDROCHLORIDE 20 MG/ML
SOLUTION OROPHARYNGEAL CODE/TRAUMA/SEDATION MEDICATION
Status: COMPLETED | OUTPATIENT
Start: 2020-11-10 | End: 2020-11-10

## 2020-11-10 RX ORDER — MULTIVITAMIN
1 TABLET ORAL DAILY
COMMUNITY

## 2020-11-10 RX ORDER — TRAZODONE HYDROCHLORIDE 100 MG/1
100-200 TABLET ORAL NIGHTLY PRN
COMMUNITY
End: 2021-08-26 | Stop reason: SDUPTHER

## 2020-11-10 RX ORDER — NIFEDIPINE 60 MG/1
60 TABLET, EXTENDED RELEASE ORAL DAILY
COMMUNITY
End: 2021-07-08 | Stop reason: SDUPTHER

## 2020-11-10 RX ORDER — ASPIRIN 81 MG/1
81 TABLET ORAL DAILY
COMMUNITY

## 2020-11-10 RX ORDER — POLYETHYLENE GLYCOL 3350 17 G/17G
17 POWDER, FOR SOLUTION ORAL DAILY PRN
COMMUNITY
End: 2022-07-12 | Stop reason: SDUPTHER

## 2020-11-10 RX ORDER — FENTANYL CITRATE 50 UG/ML
INJECTION, SOLUTION INTRAMUSCULAR; INTRAVENOUS CODE/TRAUMA/SEDATION MEDICATION
Status: COMPLETED | OUTPATIENT
Start: 2020-11-10 | End: 2020-11-10

## 2020-11-10 RX ORDER — POLYETHYLENE GLYCOL 3350 17 G/17G
17 POWDER, FOR SOLUTION ORAL DAILY
Status: DISCONTINUED | OUTPATIENT
Start: 2020-11-10 | End: 2020-11-12 | Stop reason: HOSPADM

## 2020-11-10 RX ORDER — CYANOCOBALAMIN (VITAMIN B-12) 250 MCG
250 TABLET ORAL DAILY
COMMUNITY

## 2020-11-10 RX ORDER — LIDOCAINE HYDROCHLORIDE 20 MG/ML
INJECTION, SOLUTION INFILTRATION; PERINEURAL CODE/TRAUMA/SEDATION MEDICATION
Status: COMPLETED | OUTPATIENT
Start: 2020-11-10 | End: 2020-11-10

## 2020-11-10 RX ORDER — MIDAZOLAM HYDROCHLORIDE 5 MG/ML
INJECTION INTRAMUSCULAR; INTRAVENOUS CODE/TRAUMA/SEDATION MEDICATION
Status: COMPLETED | OUTPATIENT
Start: 2020-11-10 | End: 2020-11-10

## 2020-11-10 RX ORDER — INSULIN GLARGINE 100 [IU]/ML
50 INJECTION, SOLUTION SUBCUTANEOUS EVERY MORNING
COMMUNITY
End: 2021-08-26 | Stop reason: SDUPTHER

## 2020-11-10 RX ORDER — ACETAMINOPHEN 500 MG
500 TABLET ORAL 3 TIMES DAILY PRN
COMMUNITY

## 2020-11-10 RX ADMIN — PANTOPRAZOLE SODIUM 40 MG: 40 TABLET, DELAYED RELEASE ORAL at 12:11

## 2020-11-10 RX ADMIN — CLONIDINE HYDROCHLORIDE 0.2 MG: 0.1 TABLET ORAL at 09:11

## 2020-11-10 RX ADMIN — CLONIDINE HYDROCHLORIDE 0.2 MG: 0.1 TABLET ORAL at 03:11

## 2020-11-10 RX ADMIN — ENTECAVIR 0.5 MG: 0.5 TABLET ORAL at 12:11

## 2020-11-10 RX ADMIN — FENTANYL CITRATE 50 MCG: 50 INJECTION INTRAMUSCULAR; INTRAVENOUS at 09:11

## 2020-11-10 RX ADMIN — CARVEDILOL 25 MG: 12.5 TABLET, FILM COATED ORAL at 09:11

## 2020-11-10 RX ADMIN — NIFEDIPINE 60 MG: 30 TABLET, FILM COATED, EXTENDED RELEASE ORAL at 01:11

## 2020-11-10 RX ADMIN — FENTANYL CITRATE 50 MCG: 50 INJECTION INTRAMUSCULAR; INTRAVENOUS at 10:11

## 2020-11-10 RX ADMIN — ALLOPURINOL 100 MG: 100 TABLET ORAL at 12:11

## 2020-11-10 RX ADMIN — POLYETHYLENE GLYCOL 3350 17 G: 17 POWDER, FOR SOLUTION ORAL at 12:11

## 2020-11-10 RX ADMIN — CEFEPIME 2 G: 2 INJECTION, POWDER, FOR SOLUTION INTRAVENOUS at 01:11

## 2020-11-10 RX ADMIN — MIDAZOLAM HYDROCHLORIDE 2 MG: 5 INJECTION, SOLUTION INTRAMUSCULAR; INTRAVENOUS at 10:11

## 2020-11-10 RX ADMIN — LIDOCAINE HYDROCHLORIDE 8 ML: 10 INJECTION, SOLUTION INFILTRATION; PERINEURAL at 10:11

## 2020-11-10 RX ADMIN — LIDOCAINE HYDROCHLORIDE 5 ML: 20 SOLUTION ORAL; TOPICAL at 09:11

## 2020-11-10 RX ADMIN — MIDAZOLAM HYDROCHLORIDE 2 MG: 5 INJECTION, SOLUTION INTRAMUSCULAR; INTRAVENOUS at 09:11

## 2020-11-10 RX ADMIN — CARVEDILOL 25 MG: 12.5 TABLET, FILM COATED ORAL at 12:11

## 2020-11-10 RX ADMIN — LIDOCAINE HYDROCHLORIDE 6 ML: 20 INJECTION, SOLUTION INFILTRATION; PERINEURAL at 10:11

## 2020-11-10 RX ADMIN — TOPICAL ANESTHETIC 0.5 ML: 200 SPRAY DENTAL; PERIODONTAL at 09:11

## 2020-11-10 NOTE — SEDATION DOCUMENTATION
Specimens obtained during Bronchoscopy:  BAL  ml nacl in 65 ml return, TBBX RLL, [place] RLL.  Verbal report given to Lis to include documentation charted in procedural sedation documentation.  Patient to be NPO 1 hour post procedure and place in PO tolerance at 1110, CXR needed at bedside.  Moderate concious sedation was performed and cardiorespiratory functions were monitored the entire procedure by Mecca Isaac RN.  Sedation began at 0949  and concluded at 1042.  The patient tolerated the procedure well.  Mecca Isaac RN

## 2020-11-10 NOTE — PROGRESS NOTES
Patient transported from Pulmonary Lab back to room 1123 via stretcher and transported by escort. No patient complaints.

## 2020-11-10 NOTE — SUBJECTIVE & OBJECTIVE
Interval History: NAEON. Afebrile overnight. S/p bronchoscopy this AM.     Review of Systems   Constitutional: Positive for activity change, appetite change and fatigue. Negative for chills and fever.   HENT: Negative for sore throat and trouble swallowing.    Eyes: Negative for photophobia and visual disturbance.   Respiratory: Positive for cough and chest tightness. Negative for shortness of breath and wheezing.    Cardiovascular: Negative for chest pain, palpitations and leg swelling.   Gastrointestinal: Negative for abdominal distention, abdominal pain, nausea and vomiting.   Endocrine: Negative for polyuria.   Genitourinary: Negative for difficulty urinating and dysuria.   Musculoskeletal: Negative for back pain.   Skin: Negative for pallor and rash.   Neurological: Positive for weakness (global). Negative for syncope, light-headedness and headaches.   Psychiatric/Behavioral: Negative for agitation and confusion.     Objective:     Vital Signs (Most Recent):  Temp: 99.5 °F (37.5 °C) (11/10/20 1149)  Pulse: 78 (11/10/20 1149)  Resp: 14 (11/10/20 1149)  BP: (!) 113/56 (11/10/20 1149)  SpO2: (!) 94 % (11/10/20 1149) Vital Signs (24h Range):  Temp:  [99 °F (37.2 °C)-100.1 °F (37.8 °C)] 99.5 °F (37.5 °C)  Pulse:  [73-82] 78  Resp:  [14-29] 14  SpO2:  [92 %-98 %] 94 %  BP: (113-176)/(56-89) 113/56     Weight: 101.6 kg (224 lb)  Body mass index is 36.15 kg/m².    Intake/Output Summary (Last 24 hours) at 11/10/2020 1331  Last data filed at 11/10/2020 0500  Gross per 24 hour   Intake 240 ml   Output 1600 ml   Net -1360 ml      Physical Exam  Vitals signs and nursing note reviewed.   Constitutional:       General: She is not in acute distress.     Appearance: Normal appearance. She is obese. She is not ill-appearing.   HENT:      Head: Normocephalic and atraumatic.      Nose: Nose normal.      Mouth/Throat:      Mouth: Mucous membranes are moist.      Pharynx: Oropharynx is clear. No oropharyngeal exudate.   Eyes:       General: No scleral icterus.     Extraocular Movements: Extraocular movements intact.      Conjunctiva/sclera: Conjunctivae normal.      Pupils: Pupils are equal, round, and reactive to light.   Neck:      Musculoskeletal: Normal range of motion and neck supple.   Cardiovascular:      Rate and Rhythm: Normal rate and regular rhythm.      Pulses: Normal pulses.      Heart sounds: No murmur.   Pulmonary:      Effort: Pulmonary effort is normal. No respiratory distress.      Breath sounds: Normal breath sounds.   Abdominal:      General: Abdomen is flat. Bowel sounds are normal. There is no distension.      Palpations: Abdomen is soft.      Tenderness: There is no abdominal tenderness.   Musculoskeletal:      Right lower leg: No edema.      Left lower leg: No edema.   Lymphadenopathy:      Cervical: No cervical adenopathy.   Skin:     General: Skin is warm and dry.      Capillary Refill: Capillary refill takes less than 2 seconds.   Neurological:      General: No focal deficit present.      Mental Status: She is alert and oriented to person, place, and time. Mental status is at baseline.   Psychiatric:         Mood and Affect: Mood and affect normal.         Behavior: Behavior normal. Behavior is cooperative.       Significant Labs:   CBC:   Recent Labs   Lab 11/09/20 0335 11/10/20  0255   WBC 8.16 7.50   HGB 12.5 12.6   HCT 41.5 40.4   * 415*     CMP:   Recent Labs   Lab 11/09/20  0335 11/10/20  0255    137   K 3.6 4.6    104   CO2 25 18*    101   BUN 20 20   CREATININE 2.2* 1.9*   CALCIUM 9.7 9.7   PROT 6.3 6.6   ALBUMIN 2.4* 2.4*   BILITOT 0.4 0.3   ALKPHOS 133 129   AST 19 19   ALT 22 19   ANIONGAP 11 15   EGFRNONAA 24.7* 29.5*     Urine Culture:   No results for input(s): LABURIN in the last 48 hours.  Urine Studies:   No results for input(s): COLORU, APPEARANCEUA, PHUR, SPECGRAV, PROTEINUA, GLUCUA, KETONESU, BILIRUBINUA, OCCULTUA, NITRITE, UROBILINOGEN, LEUKOCYTESUR, RBCUA, WBCUA,  BACTERIA, SQUAMEPITHEL, HYALINECASTS in the last 48 hours.    Invalid input(s): LINDA  All pertinent labs within the past 24 hours have been reviewed.    Significant Imaging: I have reviewed all pertinent imaging results/findings within the past 24 hours.

## 2020-11-10 NOTE — SEDATION DOCUMENTATION
H and P updated-yes, patient placed on cardiac monitor, anesthesia Plan:  Conscious sedation, ASA verified-yes, Airway exam performed-yes, Personal or Family history of anesthesia complications-No  Consent signed and witnessed, Mecca Isaac RN

## 2020-11-10 NOTE — PROGRESS NOTES
[facility] Hospital Medicine  Progress Note    Patient Name: Kendy Vital  MRN: [id number]  Patient Class: IP- Inpatient   Admission Date: 11/7/2020  Length of Stay: 3 days  Attending Physician: Riaz Gill MD  Primary Care Provider: To Obtain Unable    Hospital Medicine Team: [facility] HOSP MED 2 Kota Fleming MD    Subjective:     Principal Problem:Pulmonary infiltrate        HPI:  Ms. 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 She was recently hospitalized in [place] for one week for the same symptoms and discharged 1 week ago after treatment for pneumonia. She was discharged on antibiotics however does not recall the name. She says her symptoms failed to improve since her discharge and now she has decreased appetite which prompted return to the ED. She denies hemoptysis. She endorses weight loss of unknown amount, night sweats, and sharp pleuritic chest pain. She denies abdominal pain but has had watery diarrhea for the past week. She is a previous smoker with 13 pack year history. No history of covid.    Of note, patient has complex medical history. She was hospitalized in 2019 for renal failure, thrombocytopenia, bilateral cxr infiltrates. Subsequently had renal bx and bone marrow bx. 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 Bone marrow bx showed b cell lymphoma. She received rituxan, pulse steroids 1 gm x 3 days, IVIG/plasmapheresis. No further treatment of her b cell lymphoma which from chart review appears to have been asymptomatic. She does take entecavir for her HBV and gets follow up for HCC monitoring. She last filled a dose of prednisone  in August which was a 1 month prescription.      ED Course:  VSS. CBC wnl. Creatinine 2.6 (baseline 1-1.1), d-dimer 1.2. CXR with bilateral pulmonary infiltrates. BNP/troponin wnl. Abdominal xray unremarkable. CT CAP pending. EKG no ischemic changes. Given 1 dose vanc/zosyn and 1 liter IVF.    Overview/Hospital Course:  Consulted pulmonology and heme/onc on 11/8. Had bronchoscopy on 11/10 w/ BAL and biopsies. Urinalysis grew Pseudomonas and she was started on Cefepime, RP US negative for pyelo.     Interval History: NAEON. Afebrile overnight. S/p bronchoscopy this AM.     Review of Systems   Constitutional: Positive for activity change, appetite change and fatigue. Negative for chills and fever.   HENT: Negative for sore throat and trouble swallowing.    Eyes: Negative for photophobia and visual disturbance.   Respiratory: Positive for cough and chest tightness. Negative for shortness of breath and wheezing.    Cardiovascular: Negative for chest pain, palpitations and leg swelling.   Gastrointestinal: Negative for abdominal distention, abdominal pain, nausea and vomiting.   Endocrine: Negative for polyuria.   Genitourinary: Negative for difficulty urinating and dysuria.   Musculoskeletal: Negative for back pain.   Skin: Negative for pallor and rash.   Neurological: Positive for weakness (global). Negative for syncope, light-headedness and headaches.   Psychiatric/Behavioral: Negative for agitation and confusion.     Objective:     Vital Signs (Most Recent):  Temp: 99.5 °F (37.5 °C) (11/10/20 1149)  Pulse: 78 (11/10/20 1149)  Resp: 14 (11/10/20 1149)  BP: (!) 113/56 (11/10/20 1149)  SpO2: (!) 94 % (11/10/20 1149) Vital Signs (24h Range):  Temp:  [99 °F (37.2 °C)-100.1 °F (37.8 °C)] 99.5 °F (37.5 °C)  Pulse:  [73-82] 78  Resp:  [14-29] 14  SpO2:  [92 %-98 %] 94 %  BP: (113-176)/(56-89) 113/56     Weight: 101.6 kg (224 lb)  Body mass index is 36.15 kg/m².    Intake/Output Summary (Last 24 hours) at 11/10/2020  1331  Last data filed at 11/10/2020 0500  Gross per 24 hour   Intake 240 ml   Output 1600 ml   Net -1360 ml      Physical Exam  Vitals signs and nursing note reviewed.   Constitutional:       General: She is not in acute distress.     Appearance: Normal appearance. She is obese. She is not ill-appearing.   HENT:      Head: Normocephalic and atraumatic.      Nose: Nose normal.      Mouth/Throat:      Mouth: Mucous membranes are moist.      Pharynx: Oropharynx is clear. No oropharyngeal exudate.   Eyes:      General: No scleral icterus.     Extraocular Movements: Extraocular movements intact.      Conjunctiva/sclera: Conjunctivae normal.      Pupils: Pupils are equal, round, and reactive to light.   Neck:      Musculoskeletal: Normal range of motion and neck supple.   Cardiovascular:      Rate and Rhythm: Normal rate and regular rhythm.      Pulses: Normal pulses.      Heart sounds: No murmur.   Pulmonary:      Effort: Pulmonary effort is normal. No respiratory distress.      Breath sounds: Normal breath sounds.   Abdominal:      General: Abdomen is flat. Bowel sounds are normal. There is no distension.      Palpations: Abdomen is soft.      Tenderness: There is no abdominal tenderness.   Musculoskeletal:      Right lower leg: No edema.      Left lower leg: No edema.   Lymphadenopathy:      Cervical: No cervical adenopathy.   Skin:     General: Skin is warm and dry.      Capillary Refill: Capillary refill takes less than 2 seconds.   Neurological:      General: No focal deficit present.      Mental Status: She is alert and oriented to person, place, and time. Mental status is at baseline.   Psychiatric:         Mood and Affect: Mood and affect normal.         Behavior: Behavior normal. Behavior is cooperative.       Significant Labs:   CBC:   Recent Labs   Lab 11/09/20 0335 11/10/20  0255   WBC 8.16 7.50   HGB 12.5 12.6   HCT 41.5 40.4   * 415*     CMP:   Recent Labs   Lab 11/09/20  0335 11/10/20  0255   NA  140 137   K 3.6 4.6    104   CO2 25 18*    101   BUN 20 20   CREATININE 2.2* 1.9*   CALCIUM 9.7 9.7   PROT 6.3 6.6   ALBUMIN 2.4* 2.4*   BILITOT 0.4 0.3   ALKPHOS 133 129   AST 19 19   ALT 22 19   ANIONGAP 11 15   EGFRNONAA 24.7* 29.5*     Urine Culture:   No results for input(s): LABURIN in the last 48 hours.  Urine Studies:   No results for input(s): COLORU, APPEARANCEUA, PHUR, SPECGRAV, PROTEINUA, GLUCUA, KETONESU, BILIRUBINUA, OCCULTUA, NITRITE, UROBILINOGEN, LEUKOCYTESUR, RBCUA, WBCUA, BACTERIA, SQUAMEPITHEL, HYALINECASTS in the last 48 hours.    Invalid input(s): WRIGHTSUR  All pertinent labs within the past 24 hours have been reviewed.    Significant Imaging: I have reviewed all pertinent imaging results/findings within the past 24 hours.      Assessment/Plan:      * Pulmonary infiltrate  Hx COPD, asymptomatic b-cell lymphoma, cryoglobinemic vasculitis, hepatitis B on Entecavir, recent glucocorticoid use presenting for intermittent fevers, myalgia, pleuritic chest pain, weight loss, night sweats, productive sputum x 3 weeks in setting of recent hospitalization in [place] for pneumonia, discharged 1 week prior without improvement in her symptoms. [place] [facility] [place] and not septic picture. WBC wnl.   - covid negative, no hx of covid per patient  - BNP, troponin WNL  - CXR with bilateral pulmonary infiltrates  - d-dimer 1.2  - DDX: malignancy (?lymphoma), fungal pneumonia, follicular bronchiolitis, sarcoidosis, lymphoid interstitial pneumonia    Plan:  - D/c Zosyn, switched to cefepime  - CT CAP -- suggestive of pulmonary lymphoproliferative disorder  - Bronchoscopy w/ BAL and biopsy on 11/10   - awaiting results  - Consult Heme/onc, appreciate recs after bronch results  - pneumonia workup:   - procalcitonin: wnl  - gram stain, sputum cultures  - legionella urine antigen  - fungitell  - s pneumoniae urine antigen   - acetaminophen if febrile, limit to 2000 mg/day      UTI (urinary tract  infection)  Urine culture now growing Pseudomonas and pt complaining of urgency. L sided CVA tenderness on exam.    Plan:  - Cefepime 2g BID for 14 total days (started 11/9)      Acute kidney injury superimposed on chronic kidney disease  Hx MPGN, cryoglobinemic vasculitis, and HBV. Baseline creatinine around 1-1.1. On presentation creatinine 2.6. Patient admits to decreased oral intake 2/2 pneumonia. S/p 1 liter IVF in ED. UA in ED with 1+ protein. Suspect pre-renal etiology. Has been improving since admission; 2.2 on 11/9.    Improving    Plan:  - CMP daily  - Renally dose medications, avoid nephrotoxins  - Consider nephrology consultation if worsening    Type 2 diabetes mellitus without complication, without long-term current use of insulin  Hx DM2, current regimen of 45 units long acting insulin and 10 units mealtime. Last A1C on file from 2019 was 5.7. A1c 9.9% now.    Plan:  - LDSSI for now while NPO  - Glucose q 4 hours  - Consider adding insulin regimen once patient eating      Chronic diastolic heart failure  TTE from July 2019 with EF 60%, indeterminate LV function, CVP 15. Taking lasix at home. Does not appear fluid overloaded on exam. Received 1 unit IVF in ED, BNP wnl.    Plan:  - Consider repeat TTE      Hypogammaglobulinemia  Hx of hypogammaglobulinemia. 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 IgM wnl and IgG low at 623.      B-cell lymphoproliferative disorder  Biopsy proven b-cell lymphoma. Per last note with heme/onc from May, does not appear patient was receiving treatment as she was asymptomatic and she preferred to follow up with heme/onc in [place].     Plan:  - Heme/onc consult  - F/u lung biopsy on 11/10    Chronic obstructive lung disease  Hx COPD, uses albuterol at home    Plan:  - Duo-nebs PRN for dyspnea      Hepatitis B  Hx hepatitis B diagnosed in 2019, only risk factor is homemade tattoo. Has been getting  regular follow up and screening for HCC q 6 months.   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 Confirmed on renal bx in July 2019. Has previously been on IVIG and rituxan and also treated with high dose pulse glucocorticoids. It appears last steroid course was in August 2020.    Plan:  - Continue Entecavir  - Consider nephrology if LACI worsens      Essential hypertension  Hx hypertension, on losartan, nifedipine, clonidine, carvedilol. Hypertensive in ED. Has prescription for hydralazine but has not filled this recently.    Plan:  - Continue Nifedipine 60 mg  - Continue Clonidine 0.2 mg TID  - Continue Carvedilol 25 BID  - Holding ARB in setting of LACI        VTE Risk Mitigation (From admission, onward)         Ordered     IP VTE HIGH RISK PATIENT  Once      11/07/20 2257     Place sequential compression device  Until discontinued      11/07/20 2257                Discharge Planning   MARYA: 11/12/2020     Code Status: Full Code   Is the patient medically ready for discharge?:     Reason for patient still in hospital (select all that apply): Patient trending condition, Laboratory test, Treatment, Consult recommendations and Pending disposition  Discharge Plan A: Home with family            Kota Fleming MD  Department of Hospital Medicine   [facility]

## 2020-11-10 NOTE — PLAN OF CARE
Hospital follow up appointment scheduled for the patient with Dr. Augustin Tyson (PCP) on 11/25/2020 at 1300. Patient had a bronchoscopy done this AM.     Will continue to follow.

## 2020-11-10 NOTE — PROGRESS NOTES
Patient transported to 9th floor pulmonary lab for procedure via stretcher and transported by escort. No patient complaints. Patient awake, alert and oriented to person, place, time and current events.

## 2020-11-10 NOTE — PROGRESS NOTES
Pulmonary & Critical Care Medicine Progress Note    Subjective:   Patient feels well this AM. Planned for bronchoscopy with BAL and transbronchial BX    H&P reviewed and agree with findings, no new updates    Past medical, surgical, family, and social history from initial consult was reviewed and verified during today's visit.     Vital Signs:   Vitals:    11/10/20 0418   BP:    Pulse:    Resp:    Temp: 99.1 °F (37.3 °C)       Fluid Balance:     Intake/Output Summary (Last 24 hours) at 11/10/2020 0718  Last data filed at 11/10/2020 0500  Gross per 24 hour   Intake 480 ml   Output 1900 ml   Net -1420 ml       Review of Systems:   A comprehensive 12-point review of systems was performed, and is negative except for those items mentioned above in the HPI section of this note.     Physical Exam:  General: Alert and awake in NAD  HENT:  NCAT; anicteric sclera; OP clear with MMM  Cardio:  Regular rate and rhythm with normal S1 and S2; no murmurs or rubs, ASA II, Mallampati III-IV  Resp:   CTAB; respirations unlabored; no wheezes, crackles or rhonchi  Abdom:Soft, NT with normoactive bowel sounds  Extrem:WWP with no clubbing, cyanosis or edema  Pulses:2+ and symmetric distally  Neuro: AAOx3; cooperative and pleasant with no focal deficits    Personal Review and Summary of Interval Diagnostics    Laboratory Studies:   No results for input(s): PH, PCO2, PO2, HCO3, POCSATURATED, BE in the last 24 hours.  Recent Labs   Lab 11/10/20  0255   WBC 7.50   RBC 4.97   HGB 12.6   HCT 40.4   MCV 81*   MCH 25.4*   MCHC 31.2*     Recent Labs   Lab 11/10/20  0255      K 4.6      CO2 18*   BUN 20   CREATININE 1.9*   MG 1.9       Microbiology Data:   Microbiology Results (last 7 days)     Procedure Component Value Units Date/Time    Blood culture #1 **CANNOT BE ORDERED STAT** [[id number]] Collected: 11/07/20 9533    Order Status: Completed Specimen: Blood from Peripheral, Antecubital, Left Updated: 11/09/20 2212     Blood Culture,  Routine No Growth to date      No Growth to date      No Growth to date    Blood culture #2 **CANNOT BE ORDERED STAT** [[id number]] Collected: 11/07/20 1752    Order Status: Completed Specimen: Blood from Peripheral, Hand, Right Updated: 11/09/20 2212     Blood Culture, Routine No Growth to date      No Growth to date      No Growth to date    Urine culture [[id number]]  (Abnormal) Collected: 11/07/20 1809    Order Status: Completed Specimen: Urine Updated: 11/09/20 0840     Urine Culture, Routine PRESUMPTIVE PSEUDOMONAS SPECIES  10,000 - 49,999 cfu/ml  Identification and susceptibility pending      Narrative:      Specimen Source->Urine    Culture, Respiratory with Gram Stain [[id number]]     Order Status: No result Specimen: Respiratory     Influenza A & B by Molecular [[id number]]     Order Status: Canceled Specimen: Nasopharyngeal Swab         Chest Imaging:   Reviewed    Assessment/Plan:     Multiple pulmonary nodules  56 yo F admitted with subacute worsening of chronic SOB associated with fevers/chills, night sweats, productive cough. Smoking Hx: 1ppd x 13 years, quit 1.5-2 years ago). 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 Denies recent travel. CT showed extensive pulmonary nodules + infiltrates. Distribution appears to be bronchovascular. Given h/o lymphoma + B symptoms, concern for lymphoproliferative process vs chronic infection.      Plan:  - Discussed purpose, risks, and benefits of bronchoscopy with patient  - Bronchoscopy with BAL/TB Bx planned for 11/10. All labs and imaging reviewed. Will send samples for culture, cell count, and cytology    Thank you for allowing us to participate in the care of this patient. Pulmonology will continue to follow. Please contact me with any questions.     Giovana Palacio MD  Pulmonary and Critical Care Fellow  11/10/2020  7:20 AM

## 2020-11-11 LAB
ALBUMIN SERPL BCP-MCNC: 2.4 G/DL (ref 3.5–5.2)
ALP SERPL-CCNC: 133 U/L (ref 55–135)
ALT SERPL W/O P-5'-P-CCNC: 16 U/L (ref 10–44)
ANION GAP SERPL CALC-SCNC: 13 MMOL/L (ref 8–16)
AST SERPL-CCNC: 19 U/L (ref 10–40)
BASOPHILS # BLD AUTO: 0.08 K/UL (ref 0–0.2)
BASOPHILS NFR BLD: 1 % (ref 0–1.9)
BILIRUB SERPL-MCNC: 0.3 MG/DL (ref 0.1–1)
BUN SERPL-MCNC: 18 MG/DL (ref 6–20)
CALCIUM SERPL-MCNC: 9.5 MG/DL (ref 8.7–10.5)
CHLORIDE SERPL-SCNC: 104 MMOL/L (ref 95–110)
CO2 SERPL-SCNC: 22 MMOL/L (ref 23–29)
CREAT SERPL-MCNC: 1.8 MG/DL (ref 0.5–1.4)
DIFFERENTIAL METHOD: ABNORMAL
EOSINOPHIL # BLD AUTO: 0.1 K/UL (ref 0–0.5)
EOSINOPHIL NFR BLD: 1.4 % (ref 0–8)
ERYTHROCYTE [DISTWIDTH] IN BLOOD BY AUTOMATED COUNT: 18.1 % (ref 11.5–14.5)
EST. GFR  (AFRICAN AMERICAN): 36.3 ML/MIN/1.73 M^2
EST. GFR  (NON AFRICAN AMERICAN): 31.5 ML/MIN/1.73 M^2
FLOW CYTOMETRY ANTIBODIES ANALYZED - FLUID: NORMAL
FLOW CYTOMETRY COMMENT - FLUID: NORMAL
FLOW CYTOMETRY INTERPRETATION - FLUID: NORMAL
FLUID TYPE: NORMAL
GLUCOSE SERPL-MCNC: 119 MG/DL (ref 70–110)
HCT VFR BLD AUTO: 40.5 % (ref 37–48.5)
HGB BLD-MCNC: 12.3 G/DL (ref 12–16)
IMM GRANULOCYTES # BLD AUTO: 0.03 K/UL (ref 0–0.04)
IMM GRANULOCYTES NFR BLD AUTO: 0.4 % (ref 0–0.5)
L PNEUMO AG UR QL IA: NEGATIVE
LYMPHOCYTES # BLD AUTO: 1.5 K/UL (ref 1–4.8)
LYMPHOCYTES NFR BLD: 18.2 % (ref 18–48)
MAGNESIUM SERPL-MCNC: 1.8 MG/DL (ref 1.6–2.6)
MCH RBC QN AUTO: 24.7 PG (ref 27–31)
MCHC RBC AUTO-ENTMCNC: 30.4 G/DL (ref 32–36)
MCV RBC AUTO: 81 FL (ref 82–98)
MONOCYTES # BLD AUTO: 0.8 K/UL (ref 0.3–1)
MONOCYTES NFR BLD: 10.1 % (ref 4–15)
NEUTROPHILS # BLD AUTO: 5.6 K/UL (ref 1.8–7.7)
NEUTROPHILS NFR BLD: 68.9 % (ref 38–73)
NRBC BLD-RTO: 0 /100 WBC
PHOSPHATE SERPL-MCNC: 3.9 MG/DL (ref 2.7–4.5)
PLATELET # BLD AUTO: 480 K/UL (ref 150–350)
PMV BLD AUTO: 9.7 FL (ref 9.2–12.9)
POCT GLUCOSE: 137 MG/DL (ref 70–110)
POCT GLUCOSE: 140 MG/DL (ref 70–110)
POCT GLUCOSE: 153 MG/DL (ref 70–110)
POTASSIUM SERPL-SCNC: 3.6 MMOL/L (ref 3.5–5.1)
PROT SERPL-MCNC: 6.6 G/DL (ref 6–8.4)
RBC # BLD AUTO: 4.98 M/UL (ref 4–5.4)
SODIUM SERPL-SCNC: 139 MMOL/L (ref 136–145)
WBC # BLD AUTO: 8.09 K/UL (ref 3.9–12.7)

## 2020-11-11 PROCEDURE — 11000001 HC ACUTE MED/SURG PRIVATE ROOM

## 2020-11-11 PROCEDURE — 80053 COMPREHEN METABOLIC PANEL: CPT

## 2020-11-11 PROCEDURE — 25000003 PHARM REV CODE 250: Performed by: STUDENT IN AN ORGANIZED HEALTH CARE EDUCATION/TRAINING PROGRAM

## 2020-11-11 PROCEDURE — 99232 SBSQ HOSP IP/OBS MODERATE 35: CPT | Mod: ,,, | Performed by: STUDENT IN AN ORGANIZED HEALTH CARE EDUCATION/TRAINING PROGRAM

## 2020-11-11 PROCEDURE — 83735 ASSAY OF MAGNESIUM: CPT

## 2020-11-11 PROCEDURE — 63600175 PHARM REV CODE 636 W HCPCS: Performed by: STUDENT IN AN ORGANIZED HEALTH CARE EDUCATION/TRAINING PROGRAM

## 2020-11-11 PROCEDURE — 84100 ASSAY OF PHOSPHORUS: CPT

## 2020-11-11 PROCEDURE — 36415 COLL VENOUS BLD VENIPUNCTURE: CPT

## 2020-11-11 PROCEDURE — 85025 COMPLETE CBC W/AUTO DIFF WBC: CPT

## 2020-11-11 PROCEDURE — 99232 PR SUBSEQUENT HOSPITAL CARE,LEVL II: ICD-10-PCS | Mod: ,,, | Performed by: STUDENT IN AN ORGANIZED HEALTH CARE EDUCATION/TRAINING PROGRAM

## 2020-11-11 PROCEDURE — 97116 GAIT TRAINING THERAPY: CPT

## 2020-11-11 RX ORDER — ASPIRIN 81 MG/1
81 TABLET ORAL DAILY
Status: DISCONTINUED | OUTPATIENT
Start: 2020-11-11 | End: 2020-11-12 | Stop reason: HOSPADM

## 2020-11-11 RX ORDER — ASPIRIN 81 MG/1
81 TABLET ORAL DAILY
Status: DISCONTINUED | OUTPATIENT
Start: 2020-11-12 | End: 2020-11-11

## 2020-11-11 RX ORDER — CLONIDINE HYDROCHLORIDE 0.1 MG/1
0.1 TABLET ORAL 3 TIMES DAILY
Status: CANCELLED | OUTPATIENT
Start: 2020-11-11 | End: 2020-11-12

## 2020-11-11 RX ORDER — CLONIDINE HYDROCHLORIDE 0.1 MG/1
0.1 TABLET ORAL DAILY
Status: CANCELLED | OUTPATIENT
Start: 2020-11-14

## 2020-11-11 RX ORDER — LEVOFLOXACIN 750 MG/1
750 TABLET ORAL EVERY OTHER DAY
Status: DISCONTINUED | OUTPATIENT
Start: 2020-11-11 | End: 2020-11-11

## 2020-11-11 RX ORDER — CLONIDINE HYDROCHLORIDE 0.1 MG/1
0.1 TABLET ORAL 2 TIMES DAILY
Status: CANCELLED | OUTPATIENT
Start: 2020-11-12 | End: 2020-11-13

## 2020-11-11 RX ORDER — LEVOFLOXACIN 750 MG/1
750 TABLET ORAL EVERY OTHER DAY
Status: DISCONTINUED | OUTPATIENT
Start: 2020-11-11 | End: 2020-11-12 | Stop reason: HOSPADM

## 2020-11-11 RX ORDER — HEPARIN SODIUM 5000 [USP'U]/ML
5000 INJECTION, SOLUTION INTRAVENOUS; SUBCUTANEOUS EVERY 8 HOURS
Status: DISCONTINUED | OUTPATIENT
Start: 2020-11-11 | End: 2020-11-12 | Stop reason: HOSPADM

## 2020-11-11 RX ADMIN — ACETAMINOPHEN 650 MG: 325 TABLET ORAL at 08:11

## 2020-11-11 RX ADMIN — PANTOPRAZOLE SODIUM 40 MG: 40 TABLET, DELAYED RELEASE ORAL at 05:11

## 2020-11-11 RX ADMIN — LEVOFLOXACIN 750 MG: 750 TABLET, FILM COATED ORAL at 09:11

## 2020-11-11 RX ADMIN — ASPIRIN 81 MG: 81 TABLET, COATED ORAL at 05:11

## 2020-11-11 RX ADMIN — CLONIDINE HYDROCHLORIDE 0.2 MG: 0.1 TABLET ORAL at 08:11

## 2020-11-11 RX ADMIN — CARVEDILOL 25 MG: 12.5 TABLET, FILM COATED ORAL at 08:11

## 2020-11-11 RX ADMIN — POLYETHYLENE GLYCOL 3350 17 G: 17 POWDER, FOR SOLUTION ORAL at 08:11

## 2020-11-11 RX ADMIN — HEPARIN SODIUM 5000 UNITS: 5000 INJECTION INTRAVENOUS; SUBCUTANEOUS at 02:11

## 2020-11-11 RX ADMIN — CLONIDINE HYDROCHLORIDE 0.2 MG: 0.1 TABLET ORAL at 02:11

## 2020-11-11 RX ADMIN — CEFEPIME 2 G: 2 INJECTION, POWDER, FOR SOLUTION INTRAVENOUS at 12:11

## 2020-11-11 RX ADMIN — ALLOPURINOL 100 MG: 100 TABLET ORAL at 08:11

## 2020-11-11 RX ADMIN — HEPARIN SODIUM 5000 UNITS: 5000 INJECTION INTRAVENOUS; SUBCUTANEOUS at 10:11

## 2020-11-11 RX ADMIN — NIFEDIPINE 60 MG: 30 TABLET, FILM COATED, EXTENDED RELEASE ORAL at 08:11

## 2020-11-11 NOTE — PT/OT/SLP PROGRESS
Occupational Therapy      Patient Name:  Kendy Vital   MRN:  [id number]    Patient remains appropriate for continued therapy & will be treated next on 11/12/2020.    EDILBERTO Lepe  11/11/2020

## 2020-11-11 NOTE — PHARMACY MED REC
"Admission Medication Reconciliation - Pharmacy Consult Note    The home medication history was taken by Callie Castro Pharmacy Tech.     Based on information gathered and subsequent review by the clinical pharmacist, the items below may need attention.     You may go to "Admission" then "Reconcile Home Medications" tabs to review and/or act upon these items.     Potentially problematic discrepancies with current MAR  o Patient IS taking the following which was not ordered upon admit  o Aspirin 81 mg PO daily  o Calcium carbonate 1000 mg PO daily  o Ferrous sulfate 325 mg PO daily  o Furosemide 40 mg PO daily (holding for LACI)  o Insulin glargine 45 units daily (detemir is formulary)  o Losartan 25 mg PO daily (holding for LACI)  o Trazodone 100-200 mg PO nightly prn   o Vitamin D 2000 units PO daily  o Patient is taking a drug DIFFERENTLY than how ordered upon admit  o Takes clonidine 0.1 mg PO daily (consider tapering down inpatient dose and possibly d/c on discharge)    Please address this information as you see fit.  Feel free to contact us if you have any questions or require assistance.    Obinna James, Pharm.D., BCPS  [phone]                    .    .            "

## 2020-11-11 NOTE — PLAN OF CARE
Pt continues to progress towards treatment goals established in POC. Will reassess as appropriate.      Problem: Physical Therapy Goal  Goal: Physical Therapy Goal  Description: Goals to be met by: 2020     Patient will increase functional independence with mobility by performin. Supine to sit with [place]  2. Sit to supine with [place]  3. Sit to stand transfer with [place]  4. Gait  x 150 feet with Supervision using Rolling Walker or none.     Outcome: Ongoing, Progressing

## 2020-11-11 NOTE — PT/OT/SLP PROGRESS
"Physical Therapy Treatment    Patient Name:  Kendy Vital   MRN:  [id number]    Recommendations:     Discharge Recommendations:  home health PT   Discharge Equipment Recommendations: Rolling walker  Barriers to discharge: None    Assessment:     Kendy Vital is a 55 y.o. female admitted with a medical diagnosis of Pulmonary infiltrate.  She presents with the following impairments/functional limitations:  impaired balance, impaired cardiopulmonary response to activity, weakness, and  impaired functional mobility.    Pt met in bedside chair with fiance in room, agreeable to PT treatment. She stated that she was not in pain today, but has been feeling weak and SOB. Today's PT focus was on gait training with RW to increase pt's cardiopulmonary endurance and tolerance to activity so that she may return to ADLs. O2 sat was constantly monitored during session and stayed above 94% on room air. Pt is progressing towards her goals and was able to ambulate 60ft with RW today. A RW was ordered for pt's room and she was encouraged to walk in the evenings with nursing staff.    Rehab Prognosis: Good; patient would benefit from acute skilled PT services to address these deficits and reach maximum level of function.    Recent Surgery: * No surgery found *      Plan:     During this hospitalization, patient to be seen 3 x/week to address the identified rehab impairments via gait training, therapeutic activities, therapeutic exercises, neuromuscular re-education and progress toward the following goals:    · Plan of Care Expires:  12/09/20    Subjective     Chief Complaint: "I feel a little short of breath"  Patient/Family Comments/goals: "I would really like to walk today and try the walker. I think it will help my balance. I have been trying to sit in the chair more too, I don't like being in bed."  Pain/Comfort:  · Pain Rating 1: 0/10  · Pain Rating Post-Intervention 1: 0/10      Objective:     Communicated with RN prior to " session.  Patient found up in chair with telemetry, peripheral IV, pulse ox upon PT entry to room.     General Precautions: Standard, fall   Orthopedic Precautions:N/A   Braces: N/A     Functional Mobility:  · Transfers:     · Sit to Stand:  minimum assistance with rolling walker  · Pt required assistance for raising hips from chair.   · Given cues for glute tightening  · Gait: Pt ambulated 60ft in hallway at [facility] with RW  · Required multiple cues for upright posture and forward gaze while relaxing shoulders  · Step-to pattern, decreased morales and step length  · Required 4 standing rest breaks due to SOB  · O2 sats stayed above 94% on room air.   · Balance:   · Sitting in chair: Independent  · Standing: CGA      AM-PAC 6 CLICK MOBILITY  Turning over in bed (including adjusting bedclothes, sheets and blankets)?: 4  Sitting down on and standing up from a chair with arms (e.g., wheelchair, bedside commode, etc.): 3  Moving from lying on back to sitting on the side of the bed?: 4  Moving to and from a bed to a chair (including a wheelchair)?: 3  Need to walk in hospital room?: 3  Climbing 3-5 steps with a railing?: 2  Basic Mobility Total Score: 19       Patient left up in chair with all lines intact, call button in reach, RN notified and  present..    GOALS:   Multidisciplinary Problems     Physical Therapy Goals        Problem: Physical Therapy Goal    Goal Priority Disciplines Outcome Goal Variances Interventions   Physical Therapy Goal     PT, PT/OT Ongoing, Progressing     Description: Goals to be met by: 2020     Patient will increase functional independence with mobility by performin. Supine to sit with [place]  2. Sit to supine with [place]  3. Sit to stand transfer with [place]  4. Gait  x 150 feet with Supervision using Rolling Walker or none.                      Time Tracking:     PT Received On: 20  PT Start Time: 1140     PT Stop Time: 1203  PT Total Time (min): 23  min     Billable Minutes: Gait Training 23    Treatment Type: Treatment  PT/PTA: PT     PTA Visit Number: 0     Buffy Gallagher, PT  11/11/2020

## 2020-11-11 NOTE — SUBJECTIVE & OBJECTIVE
Interval History: NAEON. Afebrile; some tachypnea and hypertension. Satting well on RA. LACI has been improving. Transition to Levaquin.    Review of Systems   Constitutional: Positive for activity change. Negative for chills, fatigue and fever.   HENT: Negative for sore throat and trouble swallowing.    Eyes: Negative for photophobia and visual disturbance.   Respiratory: Positive for cough. Negative for chest tightness, shortness of breath and wheezing.    Cardiovascular: Negative for chest pain, palpitations and leg swelling.   Gastrointestinal: Negative for abdominal distention, abdominal pain, constipation, diarrhea, nausea and vomiting.   Endocrine: Negative for polyuria.   Genitourinary: Negative for difficulty urinating, dysuria, flank pain and urgency.   Musculoskeletal: Negative for back pain and myalgias.   Skin: Negative for pallor and rash.   Neurological: Positive for weakness (global). Negative for syncope, light-headedness and headaches.   Psychiatric/Behavioral: Negative for agitation and confusion.     Objective:     Vital Signs (Most Recent):  Temp: 99.5 °F (37.5 °C) (11/11/20 0736)  Pulse: 79 (11/11/20 0736)  Resp: 16 (11/11/20 0736)  BP: 123/67 (11/11/20 0736)  SpO2: 95 % (11/11/20 0736) Vital Signs (24h Range):  Temp:  [98.6 °F (37 °C)-99.7 °F (37.6 °C)] 99.5 °F (37.5 °C)  Pulse:  [75-82] 79  Resp:  [14-29] 16  SpO2:  [92 %-99 %] 95 %  BP: (113-159)/(56-89) 123/67     Weight: 101.6 kg (224 lb)  Body mass index is 36.15 kg/m².    Intake/Output Summary (Last 24 hours) at 11/11/2020 0920  Last data filed at 11/11/2020 0814  Gross per 24 hour   Intake 1160 ml   Output 1000 ml   Net 160 ml      Physical Exam  Vitals signs and nursing note reviewed.   Constitutional:       General: She is not in acute distress.     Appearance: Normal appearance. She is obese. She is not ill-appearing.   HENT:      Head: Normocephalic and atraumatic.      Nose: Nose normal.      Mouth/Throat:      Mouth: Mucous  membranes are moist.      Pharynx: Oropharynx is clear. No oropharyngeal exudate.   Eyes:      General: No scleral icterus.     Extraocular Movements: Extraocular movements intact.      Conjunctiva/sclera: Conjunctivae normal.      Pupils: Pupils are equal, round, and reactive to light.   Neck:      Musculoskeletal: Normal range of motion and neck supple.   Cardiovascular:      Rate and Rhythm: Normal rate and regular rhythm.      Pulses: Normal pulses.      Heart sounds: Normal heart sounds. No murmur.   Pulmonary:      Effort: Pulmonary effort is normal. No respiratory distress.      Breath sounds: Normal breath sounds.   Abdominal:      General: Abdomen is flat. Bowel sounds are normal. There is no distension.      Palpations: Abdomen is soft.      Tenderness: There is no abdominal tenderness.   Musculoskeletal:         General: No swelling or tenderness.      Right lower leg: No edema.      Left lower leg: No edema.   Lymphadenopathy:      Cervical: No cervical adenopathy.   Skin:     General: Skin is warm and dry.      Capillary Refill: Capillary refill takes less than 2 seconds.   Neurological:      General: No focal deficit present.      Mental Status: She is alert and oriented to person, place, and time. Mental status is at baseline.   Psychiatric:         Mood and Affect: Mood and affect normal.         Behavior: Behavior normal. Behavior is cooperative.         Significant Labs:   CBC:   Recent Labs   Lab 11/10/20  0255 11/11/20  0330   WBC 7.50 8.09   HGB 12.6 12.3   HCT 40.4 40.5   * 480*     CMP:   Recent Labs   Lab 11/10/20  0255 11/11/20  0330    139   K 4.6 3.6    104   CO2 18* 22*    119*   BUN 20 18   CREATININE 1.9* 1.8*   CALCIUM 9.7 9.5   PROT 6.6 6.6   ALBUMIN 2.4* 2.4*   BILITOT 0.3 0.3   ALKPHOS 129 133   AST 19 19   ALT 19 16   ANIONGAP 15 13   EGFRNONAA 29.5* 31.5*     All pertinent labs within the past 24 hours have been reviewed.    Significant Imaging: I have  reviewed all pertinent imaging results/findings within the past 24 hours.

## 2020-11-11 NOTE — PROGRESS NOTES
[facility] Hospital Medicine  Progress Note    Patient Name: Kendy Vital  MRN: [id number]  Patient Class: IP- Inpatient   Admission Date: 11/7/2020  Length of Stay: 4 days  Attending Physician: Riaz Gill MD  Primary Care Provider: To Obtain Unable    Hospital Medicine Team: [facility] HOSP MED 2 Luis Angel Castro MD    Subjective:     Principal Problem:Pulmonary infiltrate        HPI:  Ms. 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 She was recently hospitalized in [place] for one week for the same symptoms and discharged 1 week ago after treatment for pneumonia. She was discharged on antibiotics however does not recall the name. She says her symptoms failed to improve since her discharge and now she has decreased appetite which prompted return to the ED. She denies hemoptysis. She endorses weight loss of unknown amount, night sweats, and sharp pleuritic chest pain. She denies abdominal pain but has had watery diarrhea for the past week. She is a previous smoker with 13 pack year history. No history of covid.    Of note, patient has complex medical history. She was hospitalized in 2019 for renal failure, thrombocytopenia, bilateral cxr infiltrates. Subsequently had renal bx and bone marrow bx. 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 Bone marrow bx showed b cell lymphoma. She received rituxan, pulse steroids 1 gm x 3 days, IVIG/plasmapheresis. No further treatment of her b cell lymphoma which from chart review appears to have been asymptomatic. She does take entecavir for her HBV and gets follow up for HCC monitoring. She last filled a dose of prednisone in  August which was a 1 month prescription.      ED Course:  VSS. CBC wnl. Creatinine 2.6 (baseline 1-1.1), d-dimer 1.2. CXR with bilateral pulmonary infiltrates. BNP/troponin wnl. Abdominal xray unremarkable. CT CAP pending. EKG no ischemic changes. Given 1 dose vanc/zosyn and 1 liter IVF.    Overview/Hospital Course:  Consulted pulmonology and heme/onc on 11/8. Had bronchoscopy on 11/10 w/ BAL and biopsies. Urinalysis grew Pseudomonas and she was started on Cefepime, RP US negative for pyelo. On 11/11 transitioned Cefepime to Levaquin.     Interval History: NAEON. Afebrile; some tachypnea and hypertension. Satting well on RA. LACI has been improving. Transition to Levaquin.    Review of Systems   Constitutional: Positive for activity change. Negative for chills, fatigue and fever.   HENT: Negative for sore throat and trouble swallowing.    Eyes: Negative for photophobia and visual disturbance.   Respiratory: Positive for cough. Negative for chest tightness, shortness of breath and wheezing.    Cardiovascular: Negative for chest pain, palpitations and leg swelling.   Gastrointestinal: Negative for abdominal distention, abdominal pain, constipation, diarrhea, nausea and vomiting.   Endocrine: Negative for polyuria.   Genitourinary: Negative for difficulty urinating, dysuria, flank pain and urgency.   Musculoskeletal: Negative for back pain and myalgias.   Skin: Negative for pallor and rash.   Neurological: Positive for weakness (global). Negative for syncope, light-headedness and headaches.   Psychiatric/Behavioral: Negative for agitation and confusion.     Objective:     Vital Signs (Most Recent):  Temp: 99.5 °F (37.5 °C) (11/11/20 0736)  Pulse: 79 (11/11/20 0736)  Resp: 16 (11/11/20 0736)  BP: 123/67 (11/11/20 0736)  SpO2: 95 % (11/11/20 0736) Vital Signs (24h Range):  Temp:  [98.6 °F (37 °C)-99.7 °F (37.6 °C)] 99.5 °F (37.5 °C)  Pulse:  [75-82] 79  Resp:  [14-29] 16  SpO2:  [92 %-99 %] 95 %  BP: (113-159)/(56-89)  123/67     Weight: 101.6 kg (224 lb)  Body mass index is 36.15 kg/m².    Intake/Output Summary (Last 24 hours) at 11/11/2020 0920  Last data filed at 11/11/2020 0814  Gross per 24 hour   Intake 1160 ml   Output 1000 ml   Net 160 ml      Physical Exam  Vitals signs and nursing note reviewed.   Constitutional:       General: She is not in acute distress.     Appearance: Normal appearance. She is obese. She is not ill-appearing.   HENT:      Head: Normocephalic and atraumatic.      Nose: Nose normal.      Mouth/Throat:      Mouth: Mucous membranes are moist.      Pharynx: Oropharynx is clear. No oropharyngeal exudate.   Eyes:      General: No scleral icterus.     Extraocular Movements: Extraocular movements intact.      Conjunctiva/sclera: Conjunctivae normal.      Pupils: Pupils are equal, round, and reactive to light.   Neck:      Musculoskeletal: Normal range of motion and neck supple.   Cardiovascular:      Rate and Rhythm: Normal rate and regular rhythm.      Pulses: Normal pulses.      Heart sounds: Normal heart sounds. No murmur.   Pulmonary:      Effort: Pulmonary effort is normal. No respiratory distress.      Breath sounds: Normal breath sounds.   Abdominal:      General: Abdomen is flat. Bowel sounds are normal. There is no distension.      Palpations: Abdomen is soft.      Tenderness: There is no abdominal tenderness.   Musculoskeletal:         General: No swelling or tenderness.      Right lower leg: No edema.      Left lower leg: No edema.   Lymphadenopathy:      Cervical: No cervical adenopathy.   Skin:     General: Skin is warm and dry.      Capillary Refill: Capillary refill takes less than 2 seconds.   Neurological:      General: No focal deficit present.      Mental Status: She is alert and oriented to person, place, and time. Mental status is at baseline.   Psychiatric:         Mood and Affect: Mood and affect normal.         Behavior: Behavior normal. Behavior is cooperative.         Significant  Labs:   CBC:   Recent Labs   Lab 11/10/20  0255 11/11/20  0330   WBC 7.50 8.09   HGB 12.6 12.3   HCT 40.4 40.5   * 480*     CMP:   Recent Labs   Lab 11/10/20  0255 11/11/20  0330    139   K 4.6 3.6    104   CO2 18* 22*    119*   BUN 20 18   CREATININE 1.9* 1.8*   CALCIUM 9.7 9.5   PROT 6.6 6.6   ALBUMIN 2.4* 2.4*   BILITOT 0.3 0.3   ALKPHOS 129 133   AST 19 19   ALT 19 16   ANIONGAP 15 13   EGFRNONAA 29.5* 31.5*     All pertinent labs within the past 24 hours have been reviewed.    Significant Imaging: I have reviewed all pertinent imaging results/findings within the past 24 hours.      Assessment/Plan:      * Pulmonary infiltrate  Hx COPD, asymptomatic b-cell lymphoma, cryoglobinemic vasculitis, hepatitis B on Entecavir, recent glucocorticoid use presenting for intermittent fevers, myalgia, pleuritic chest pain, weight loss, night sweats, productive sputum x 3 weeks in setting of recent hospitalization in [place] for pneumonia, discharged 1 week prior without improvement in her symptoms. [place] [facility]SS and not septic picture. WBC wnl.   - covid negative, no hx of covid per patient  - BNP, troponin WNL  - CXR with bilateral pulmonary infiltrates  - d-dimer 1.2  - CT CAP -- suggestive of pulmonary lymphoproliferative disorder  - DDX: malignancy (?lymphoma), fungal pneumonia, follicular bronchiolitis, sarcoidosis, lymphoid interstitial pneumonia    Plan:  - D/c Cefepime; switch to Levaquin q2days for 6 total doses    - Bronchoscopy w/ BAL and biopsy on 11/10   - awaiting results  - Consult Heme/onc, appreciate recs after bronch results  - pneumonia workup:   - procalcitonin: wnl  - gram stain, sputum cultures  - legionella urine antigen  - fungitell  - s pneumoniae urine antigen   - acetaminophen if febrile, limit to 2000 mg/day      UTI (urinary tract infection)  Urine culture now growing Pseudomonas and pt complaining of urgency. L sided CVA tenderness on exam.    Plan:  - D/c Cefepime  (started 11/9); started Levaquin q2days for 6 total dosease      Acute respiratory failure with hypoxia        Acute kidney injury superimposed on chronic kidney disease  Hx MPGN, cryoglobinemic vasculitis, and HBV. Baseline creatinine around 1-1.1. On presentation creatinine 2.6. Patient admits to decreased oral intake 2/2 pneumonia. S/p 1 liter IVF in ED. UA in ED with 1+ protein. Suspect pre-renal etiology. Has been improving since admission; 1.8 on 11/11    Improving    Plan:  - CMP daily  - Renally dose medications, avoid nephrotoxins  - Consider nephrology consultation if worsening    Type 2 diabetes mellitus without complication, without long-term current use of insulin  Hx DM2, current regimen of 45 units long acting insulin and 10 units mealtime. Last A1C on file from 2019 was 5.7. A1c 9.9% now.    Plan:  - LDSSI  - Diabetic diet; 2000cal  - Glucose q 4 hours  - Consider adding insulin regimen once patient eating      Chronic diastolic heart failure  TTE from July 2019 with EF 60%, indeterminate LV function, CVP 15. Taking lasix at home. Does not appear fluid overloaded on exam. Received 1 unit IVF in ED, BNP wnl.    Plan:  - Consider repeat TTE      Hypogammaglobulinemia  Hx of hypogammaglobulinemia. 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 IgM wnl and IgG low at 623.      B-cell lymphoproliferative disorder  Biopsy proven b-cell lymphoma. Per last note with heme/onc from May, does not appear patient was receiving treatment as she was asymptomatic and she preferred to follow up with heme/onc in [place].     Plan:  - Heme/onc consult; appreciate recs  - F/u lung biopsy results on 11/10    Chronic obstructive lung disease  Hx COPD, uses albuterol at home    Plan:  - Duo-nebs PRN for dyspnea      Hepatitis B  Hx hepatitis B diagnosed in 2019, only risk factor is homemade tattoo. Has been getting regular follow up and screening for  HCC q 6 months.   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 Confirmed on renal bx in July 2019. Has previously been on IVIG and rituxan and also treated with high dose pulse glucocorticoids. It appears last steroid course was in August 2020.    Plan:  - Continue Entecavir  - Consider nephrology if LACI worsens      Essential hypertension  Hx hypertension, on losartan, nifedipine, clonidine, carvedilol. Hypertensive in ED. Has prescription for hydralazine but has not filled this recently.    Plan:  - Continue Nifedipine 60 mg  - Continue Clonidine 0.2 mg TID  - Continue Carvedilol 25 BID  - Holding ARB in setting of LACI        VTE Risk Mitigation (From admission, onward)         Ordered     heparin (porcine) injection 5,000 Units  Every 8 hours      11/11/20 0913     IP VTE HIGH RISK PATIENT  Once      11/07/20 2257     Place sequential compression device  Until discontinued      11/07/20 2257                Discharge Planning   MARYA: 11/13/2020     Code Status: Full Code   Is the patient medically ready for discharge?:     Reason for patient still in hospital (select all that apply): Laboratory test, Treatment and Consult recommendations  Discharge Plan A: Home with family          Luis Angel Castro MD  Department of Hospital Medicine   [facility]

## 2020-11-11 NOTE — PROGRESS NOTES
Home Oxygen Evaluation    Date Performed: 2020    1) Patient's Home O2 Sat on room air, while at rest: 96%        If O2 sats on room air at rest are 88% or below, patient qualifies. No additional testing needed. Document N/A in steps 2 and 3. If 89% or above, complete steps 2.      2) Patient's O2 Sat on room air while exercisin%      If O2 sats on room air while exercising remain 89% or above patient does not qualify, no further testing needed Document N/A in step 3. If O2 sats on room air while exercising are 88% or below, continue to step 3.      3) Patient's O2 Sat while exercising on O2:N/A         (Must show improvement from #2 for patients to qualify)    If O2 sats improve on oxygen, patient qualifies for portable oxygen. If not, the patient does not qualify.

## 2020-11-12 VITALS
WEIGHT: 224 LBS | RESPIRATION RATE: 18 BRPM | SYSTOLIC BLOOD PRESSURE: 119 MMHG | HEIGHT: 66 IN | OXYGEN SATURATION: 94 % | BODY MASS INDEX: 36 KG/M2 | HEART RATE: 75 BPM | DIASTOLIC BLOOD PRESSURE: 65 MMHG | TEMPERATURE: 99 F

## 2020-11-12 DIAGNOSIS — D89.1 CRYOGLOBULINEMIC VASCULITIS: Primary | ICD-10-CM

## 2020-11-12 LAB
1,3 BETA GLUCAN SER-MCNC: <31 PG/ML
ALBUMIN SERPL BCP-MCNC: 2.4 G/DL (ref 3.5–5.2)
ALP SERPL-CCNC: 133 U/L (ref 55–135)
ALT SERPL W/O P-5'-P-CCNC: 15 U/L (ref 10–44)
ANION GAP SERPL CALC-SCNC: 16 MMOL/L (ref 8–16)
AST SERPL-CCNC: 16 U/L (ref 10–40)
BACTERIA BLD CULT: NORMAL
BACTERIA BLD CULT: NORMAL
BACTERIA SPEC AEROBE CULT: NO GROWTH
BASOPHILS # BLD AUTO: 0.06 K/UL (ref 0–0.2)
BASOPHILS NFR BLD: 0.9 % (ref 0–1.9)
BILIRUB SERPL-MCNC: 0.3 MG/DL (ref 0.1–1)
BUN SERPL-MCNC: 15 MG/DL (ref 6–20)
CALCIUM SERPL-MCNC: 10.3 MG/DL (ref 8.7–10.5)
CHLORIDE SERPL-SCNC: 106 MMOL/L (ref 95–110)
CO2 SERPL-SCNC: 20 MMOL/L (ref 23–29)
CREAT SERPL-MCNC: 1.6 MG/DL (ref 0.5–1.4)
DIFFERENTIAL METHOD: ABNORMAL
EOSINOPHIL # BLD AUTO: 0.1 K/UL (ref 0–0.5)
EOSINOPHIL NFR BLD: 1.6 % (ref 0–8)
ERYTHROCYTE [DISTWIDTH] IN BLOOD BY AUTOMATED COUNT: 17.6 % (ref 11.5–14.5)
EST. GFR  (AFRICAN AMERICAN): 41.8 ML/MIN/1.73 M^2
EST. GFR  (NON AFRICAN AMERICAN): 36.3 ML/MIN/1.73 M^2
FINAL PATHOLOGIC DIAGNOSIS: NORMAL
FUNGITELL COMMENTS: NEGATIVE
GLUCOSE SERPL-MCNC: 130 MG/DL (ref 70–110)
GRAM STN SPEC: NORMAL
HCT VFR BLD AUTO: 41.5 % (ref 37–48.5)
HGB BLD-MCNC: 12.7 G/DL (ref 12–16)
IMM GRANULOCYTES # BLD AUTO: 0.02 K/UL (ref 0–0.04)
IMM GRANULOCYTES NFR BLD AUTO: 0.3 % (ref 0–0.5)
LYMPHOCYTES # BLD AUTO: 1.2 K/UL (ref 1–4.8)
LYMPHOCYTES NFR BLD: 18.3 % (ref 18–48)
MAGNESIUM SERPL-MCNC: 1.7 MG/DL (ref 1.6–2.6)
MCH RBC QN AUTO: 24.5 PG (ref 27–31)
MCHC RBC AUTO-ENTMCNC: 30.6 G/DL (ref 32–36)
MCV RBC AUTO: 80 FL (ref 82–98)
MONOCYTES # BLD AUTO: 0.8 K/UL (ref 0.3–1)
MONOCYTES NFR BLD: 12.4 % (ref 4–15)
NEUTROPHILS # BLD AUTO: 4.5 K/UL (ref 1.8–7.7)
NEUTROPHILS NFR BLD: 66.5 % (ref 38–73)
NRBC BLD-RTO: 0 /100 WBC
PHOSPHATE SERPL-MCNC: 3.8 MG/DL (ref 2.7–4.5)
PLATELET # BLD AUTO: 483 K/UL (ref 150–350)
PMV BLD AUTO: 9.3 FL (ref 9.2–12.9)
POCT GLUCOSE: 140 MG/DL (ref 70–110)
POTASSIUM SERPL-SCNC: 3.7 MMOL/L (ref 3.5–5.1)
PROT SERPL-MCNC: 6.7 G/DL (ref 6–8.4)
RBC # BLD AUTO: 5.18 M/UL (ref 4–5.4)
SODIUM SERPL-SCNC: 142 MMOL/L (ref 136–145)
WBC # BLD AUTO: 6.76 K/UL (ref 3.9–12.7)

## 2020-11-12 PROCEDURE — 25000003 PHARM REV CODE 250: Performed by: INTERNAL MEDICINE

## 2020-11-12 PROCEDURE — 63600175 PHARM REV CODE 636 W HCPCS: Performed by: STUDENT IN AN ORGANIZED HEALTH CARE EDUCATION/TRAINING PROGRAM

## 2020-11-12 PROCEDURE — 85025 COMPLETE CBC W/AUTO DIFF WBC: CPT

## 2020-11-12 PROCEDURE — 99239 PR HOSPITAL DISCHARGE DAY,>30 MIN: ICD-10-PCS | Mod: ,,, | Performed by: INTERNAL MEDICINE

## 2020-11-12 PROCEDURE — 25000003 PHARM REV CODE 250: Performed by: STUDENT IN AN ORGANIZED HEALTH CARE EDUCATION/TRAINING PROGRAM

## 2020-11-12 PROCEDURE — 84100 ASSAY OF PHOSPHORUS: CPT

## 2020-11-12 PROCEDURE — 83735 ASSAY OF MAGNESIUM: CPT

## 2020-11-12 PROCEDURE — 97535 SELF CARE MNGMENT TRAINING: CPT

## 2020-11-12 PROCEDURE — 99239 HOSP IP/OBS DSCHRG MGMT >30: CPT | Mod: ,,, | Performed by: INTERNAL MEDICINE

## 2020-11-12 PROCEDURE — 36415 COLL VENOUS BLD VENIPUNCTURE: CPT

## 2020-11-12 PROCEDURE — 80053 COMPREHEN METABOLIC PANEL: CPT

## 2020-11-12 PROCEDURE — 97110 THERAPEUTIC EXERCISES: CPT

## 2020-11-12 RX ORDER — LEVOFLOXACIN 750 MG/1
750 TABLET ORAL EVERY OTHER DAY
Qty: 5 TABLET | Refills: 0 | Status: SHIPPED | OUTPATIENT
Start: 2020-11-13 | End: 2020-11-27 | Stop reason: CLARIF

## 2020-11-12 RX ORDER — FLUCONAZOLE 150 MG/1
150 TABLET ORAL ONCE
Status: COMPLETED | OUTPATIENT
Start: 2020-11-12 | End: 2020-11-12

## 2020-11-12 RX ORDER — LOSARTAN POTASSIUM 25 MG/1
TABLET ORAL
Refills: 11
Start: 2020-11-12 | End: 2020-11-19

## 2020-11-12 RX ORDER — FUROSEMIDE 40 MG/1
TABLET ORAL
Refills: 3
Start: 2020-11-12 | End: 2021-07-08 | Stop reason: SDUPTHER

## 2020-11-12 RX ADMIN — HEPARIN SODIUM 5000 UNITS: 5000 INJECTION INTRAVENOUS; SUBCUTANEOUS at 05:11

## 2020-11-12 RX ADMIN — ACETAMINOPHEN 650 MG: 325 TABLET ORAL at 06:11

## 2020-11-12 RX ADMIN — CARVEDILOL 25 MG: 12.5 TABLET, FILM COATED ORAL at 09:11

## 2020-11-12 RX ADMIN — ALLOPURINOL 100 MG: 100 TABLET ORAL at 09:11

## 2020-11-12 RX ADMIN — ENTECAVIR 0.5 MG: 0.5 TABLET ORAL at 09:11

## 2020-11-12 RX ADMIN — FLUCONAZOLE 150 MG: 150 TABLET ORAL at 09:11

## 2020-11-12 RX ADMIN — ASPIRIN 81 MG: 81 TABLET, COATED ORAL at 09:11

## 2020-11-12 RX ADMIN — CLONIDINE HYDROCHLORIDE 0.2 MG: 0.1 TABLET ORAL at 09:11

## 2020-11-12 RX ADMIN — PANTOPRAZOLE SODIUM 40 MG: 40 TABLET, DELAYED RELEASE ORAL at 05:11

## 2020-11-12 RX ADMIN — NIFEDIPINE 60 MG: 30 TABLET, FILM COATED, EXTENDED RELEASE ORAL at 09:11

## 2020-11-12 NOTE — NURSING
DC instructions provided at this time. Patient voices understanding and voices no questions or concerns. Patient wheeled down via wheelchair with personal belongings to private auto.

## 2020-11-12 NOTE — PT/OT/SLP PROGRESS
Occupational Therapy   Treatment    Name: Kendy Vital  MRN: [id number]  Admitting Diagnosis:  Pulmonary infiltrate       Recommendations:     Discharge Recommendations: home with home health  Discharge Equipment Recommendations:  (TBD)  Barriers to discharge:  None    Assessment:     Kendy Vital is a 55 y.o. female with a medical diagnosis of Pulmonary infiltrate.  She presents with improved ADL and functional mobility. Pt remains a fall risk. Performance deficits affecting function are weakness, impaired endurance, impaired balance, impaired cardiopulmonary response to activity, impaired functional mobilty.     Rehab Prognosis:  Good; patient would benefit from acute skilled OT services to address these deficits and reach maximum level of function.       Plan:     Patient to be seen 3 x/week to address the above listed problems via self-care/home management, therapeutic activities, therapeutic exercises  · Plan of Care Expires: 12/09/20  · Plan of Care Reviewed with: patient    Subjective   Pt agreeable to session.  Pain/Comfort:  · Pain Rating 1: (did not rate)  · Pain Rating Post-Intervention 1: (did not rate)    Objective:     Communicated with: nursing prior to session.  Patient found supine with telemetry upon OT entry to room.    General Precautions: Standard, fall   Orthopedic Precautions:N/A   Braces: N/A     Occupational Performance:     Bed Mobility:    · Patient completed Scooting/Bridging with supervision to EOB  · Patient completed Supine to Sit with supervision     Functional Mobility/Transfers:  · Patient completed Sit <> Stand Transfer from EOB x1 trials and from chair x4 trials with stand by assistance  with  rolling walker   · Patient completed Bed <> Chair Transfer using Step Transfer technique with stand by assistance with rolling walker and increased time to perform activity  · Functional Mobility: Pt ambulated in hallway with SBA and RW with increased time to perform activity.    Activities  of Daily Living:  · Upper Body Dressing: supervision to juve gown seated EOB  · Lower Body Dressing: supervision to juve socks seated in chair      [facility] 6 Click ADL: 20    Treatment & Education:  Therex: Seated in chair with towel used as dowel  -BUE AROM shoulder flex x2 sets x10 reps  -BUE AROM elbow flex x2 sets x10 reps  -BUE AROM chest press x2 sets x10 reps  -BLE AROM knee flexion x2 sets x10 reps  -BLE AROM marches x2 set x 10 reps  -Sit to stand from chair x4 trials    Pt educated that purpose of therapeutic exercises is to facilitate increased strength and endurance.    Pt edu on role of OT, POC, safety when performing self care tasks , benefit of performing OOB activity, and safety when performing functional transfers and mobility.  - White board updated  - Self care tasks completed-- as noted above       Patient left up in chair with all lines intact, call button in reach, nursing notified and MD presentEducation:      GOALS:   Multidisciplinary Problems     Occupational Therapy Goals        Problem: Occupational Therapy Goal    Goal Priority Disciplines Outcome Interventions   Occupational Therapy Goal     OT, PT/OT Ongoing, Progressing    Description: Goals to be met by: 11/18/2020     Patient will increase functional independence with ADLs by performing:    UE Dressing with Modified [place].  LE Dressing with Modified [place]..  Grooming while bedside chair with Modified [place].  Toileting from bedside commode with Supervision for hygiene and clothing management.   Supine to sit with Modified [place].  Step transfer with Supervision  Toilet transfer to bedside commode with Supervision.                             Time Tracking:     OT Date of Treatment: 11/12/20  OT Start Time: 0859  OT Stop Time: 0925  OT Total Time (min): 26 min    Billable Minutes:Self Care/Home Management 10  Therapeutic Exercise 16    ORVILLE Rivas  11/12/2020

## 2020-11-12 NOTE — DISCHARGE INSTRUCTIONS
During your hospital stay you had a bronchoscopy and are still awaiting the results of the biopsy taken during that procedure. As well, it was found that you have a urinary tract infection and are being treated with an antibiotic called Levaquin. You will take this every other day for 5 total pills. You will follow up with Hematology/Oncology on 11/19/20.    Please return to the hospital if you have any worsening of symptoms, chest pain, worsening cough, or fevers.

## 2020-11-12 NOTE — PLAN OF CARE
[facility]    HOME HEALTH ORDERS  FACE TO FACE ENCOUNTER    Patient Name: Kendy Vital  YOB: 1965    PCP: To Obtain Unable   PCP Address: None  PCP Phone Number: None  PCP Fax: None    Encounter Date: 11/12/2020    Admit to Home Health    Diagnoses:  Active Hospital Problems    Diagnosis  POA    *Pulmonary infiltrate [R91.8]  Yes    Multiple pulmonary nodules [R91.8]  Yes    UTI (urinary tract infection) [N39.0]  Yes    Acute respiratory failure with hypoxia [J96.01]  Yes    Chronic diastolic heart failure [I50.32]  Yes    Type 2 diabetes mellitus without complication, without long-term current use of insulin [E11.9]  Yes    Acute kidney injury superimposed on chronic kidney disease [N17.9, N18.9]  Yes    Hypogammaglobulinemia [D80.1]  Yes    B-cell lymphoproliferative disorder [D47.9]  Yes    Chronic obstructive lung disease [J44.9]  Yes    Hepatitis B [B19.10]  Yes    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       Future Appointments   Date Time Provider Department Center   1/18/2021 11:00 AM [facility] MASTER [facility] ULTR [facility]   1/18/2021 12:20 PM LAB, APPOINTMENT [facility] LAB VNP [facility]   1/18/2021  1:30 PM Yessica Rosales NP [facility]     Follow-up Information     Dr. Augustin Urbina On 11/25/2020.    Why: at 1:00pm; PCP hospital follow up appointment  Contact information:  [place], [address] (p) [phone] (f) [phone]                   I have seen and examined this patient face to face today. My clinical findings that support the need for the home health skilled services and home bound status are the following:  Weakness/numbness causing balance and gait disturbance due to Malignancy/Cancer making it taxing to leave home.    Allergies:Review of patient's allergies indicates:  No Known  Allergies    Diet: diabetic diet: 2000 calorie    Activities: activity as tolerated    Nursing:   SN to complete comprehensive assessment including routine vital signs. Instruct on disease process and s/s of complications to report to MD. Review/verify medication list sent home with the patient at time of discharge  and instruct patient/caregiver as needed. Frequency may be adjusted depending on start of care date.    Notify MD if SBP > 160 or < 90; DBP > 90 or < 50; HR > 120 or < 50; Temp > 101;       CONSULTS:    Physical Therapy to evaluate and treat. Evaluate for home safety and equipment needs; Establish/upgrade home exercise program. Perform / instruct on therapeutic exercises, gait training, transfer training, and Range of Motion.  Occupational Therapy to evaluate and treat. Evaluate home environment for safety and equipment needs. Perform/Instruct on transfers, ADL training, ROM, and therapeutic exercises.    MISCELLANEOUS CARE:  N/A    WOUND CARE ORDERS  n/a      Medications: Review discharge medications with patient and family and provide education.      Current Discharge Medication List      START taking these medications    Details   levoFLOXacin (LEVAQUIN) 750 MG tablet Take 1 tablet (750 mg total) by mouth every other day.  Qty: 5 tablet, Refills: 0         CONTINUE these medications which have CHANGED    Details   furosemide (LASIX) 40 MG tablet HOLD FOR 7 DAYS WHILE YOUR KIDNEYS RECOVER. RESTART ON 11/19/20  Refills: 3      losartan (COZAAR) 25 MG tablet HOLD FOR 7 DAYS WHILE YOUR KIDNEYS RECOVER. RESTART ON 11/19/20  Refills: 11    Comments: .         CONTINUE these medications which have NOT CHANGED    Details   acetaminophen (TYLENOL) 500 MG tablet Take 500 mg by mouth 3 (three) times daily as needed for Pain (or fever).      albuterol (PROVENTIL/VENTOLIN HFA) 90 mcg/actuation inhaler Inhale 2 puffs into the lungs every 6 (six) hours as needed for Wheezing or Shortness of Breath. Rescue       allopurinol (ZYLOPRIM) 100 MG tablet Take 1 tablet (100 mg total) by mouth once daily.  Qty: 30 tablet, Refills: 2    Associated Diagnoses: Acute renal failure with other specified pathological lesion in kidney      aspirin (ECOTRIN) 81 MG EC tablet Take 81 mg by mouth once daily.      blood sugar diagnostic Strp use to test blood gluocse 2 (two) times daily with meals.  Qty: 100 strip, Refills: 11      blood-glucose meter Misc Use as instructed  Qty: 1 each, Refills: 0      calcium carbonate (OS-DONTE) 500 mg calcium (1,250 mg) tablet Take 2 tablets (1,000 mg total) by mouth once daily.  Refills: 0      carvedilol (COREG) 25 MG tablet Take 1 tablet (25 mg total) by mouth 2 (two) times daily.  Qty: 60 tablet, Refills: 11      cloNIDine (CATAPRES) 0.1 MG tablet Take 2 tablets (0.2 mg total) by mouth 3 (three) times daily.  Qty: 180 tablet, Refills: 11      cyanocobalamin (VITAMIN B-12) 250 MCG tablet Take 250 mcg by mouth once daily.      diphenhydrAMINE-acetaminophen (TYLENOL PM)  mg Tab Take 1 tablet by mouth nightly as needed (fever or pain).      entecavir (BARACLUDE) 0.5 MG Tab Take 1 tablet (0.5 mg total) by mouth every other day.  Qty: 15 tablet, Refills: 6    Associated Diagnoses: Chronic viral hepatitis B without delta agent and without coma      ferrous sulfate 325 (65 FE) MG EC tablet Take 1 tablet (325 mg total) by mouth once daily.  Refills: 0      insulin (LANTUS SOLOSTAR U-100 INSULIN) glargine 100 units/mL (3mL) SubQ pen Inject 45 Units into the skin every morning.      insulin aspart U-100 (NOVOLOG) 100 unit/mL (3 mL) InPn pen Inject 1-10 Units into the skin 3 (three) times daily. Use sliding scale provided in instructions  Qty: 108 mL, Refills: 0      lancets 30 gauge Misc use to test blood glucose 2 (two) times daily with meals.  Qty: 100 each, Refills: 11      lancing device Misc 1 Device by Misc.(Non-Drug; Combo Route) route 2 (two) times daily with meals.  Qty: 1 each, Refills: 0       multivitamin (ONE DAILY MULTIVITAMIN) per tablet Take 1 tablet by mouth once daily.      NIFEdipine (PROCARDIA-XL) 60 MG (OSM) 24 hr tablet Take 60 mg by mouth once daily.    Comments: .      pantoprazole (PROTONIX) 40 MG tablet Take 1 tablet (40 mg total) by mouth before breakfast.  Qty: 30 tablet, Refills: 11      polyethylene glycol (GLYCOLAX) 17 gram/dose powder Take 17 g by mouth daily as needed (constipation).      traZODone (DESYREL) 100 MG tablet Take 100-200 mg by mouth nightly as needed for Insomnia.      vitamin D (VITAMIN D3) 1000 units Tab Take 1 tablet (1,000 Units total) by mouth once daily.             I certify that this patient is confined to her home and needs physical therapy and occupational therapy.

## 2020-11-12 NOTE — DISCHARGE SUMMARY
[facility] Hospital Medicine  Discharge Summary      Patient Name: Kendy Vital  MRN: [id number]  Admission Date: 11/7/2020  Hospital Length of Stay: 5 days  Discharge Date and Time:  11/12/2020 1:18 PM  Attending Physician: Eunice Leon MD   Discharging Provider: Luis Angel Castro MD  Primary Care Provider: To Obtain Unable  Hospital Medicine Team: [facility] MED 2 Luis Angel Castro MD    HPI:   Ms. 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 She was recently hospitalized in [place] for one week for the same symptoms and discharged 1 week ago after treatment for pneumonia. She was discharged on antibiotics however does not recall the name. She says her symptoms failed to improve since her discharge and now she has decreased appetite which prompted return to the ED. She denies hemoptysis. She endorses weight loss of unknown amount, night sweats, and sharp pleuritic chest pain. She denies abdominal pain but has had watery diarrhea for the past week. She is a previous smoker with 13 pack year history. No history of covid.    Of note, patient has complex medical history. She was hospitalized in 2019 for renal failure, thrombocytopenia, bilateral cxr infiltrates. Subsequently had renal bx and bone marrow bx. 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 Bone marrow bx showed b cell lymphoma. She received rituxan, pulse steroids 1 gm x 3 days, IVIG/plasmapheresis. No further treatment of her b cell lymphoma which from chart review appears to have been asymptomatic. She does take entecavir for her HBV and gets follow up for HCC monitoring. She last filled a dose of prednisone  in August which was a 1 month prescription.      ED Course:  VSS. CBC wnl. Creatinine 2.6 (baseline 1-1.1), d-dimer 1.2. CXR with bilateral pulmonary infiltrates. BNP/troponin wnl. Abdominal xray unremarkable. CT CAP pending. EKG no ischemic changes. Given 1 dose vanc/zosyn and 1 liter IVF.    * No surgery found *      Hospital Course:   Consulted pulmonology and heme/onc on 11/8. Had bronchoscopy on 11/10 w/ BAL and biopsies. Urinalysis grew Pseudomonas and she was started on Cefepime, RP US negative for pyelo. On 11/11 transitioned Cefepime to Levaquin to be continued after discharge. Home health was set up and she was discharged home one 11/12 with f/u appointments scheduled.     Physical Exam   Constitutional: She is oriented to person, place, and time and well-developed, well-nourished, and in no distress. No distress.   HENT:   Head: Normocephalic and atraumatic.   Eyes: Pupils are equal, round, and reactive to light. Conjunctivae are normal. No scleral icterus.   Neck: Normal range of motion. Neck supple.   Cardiovascular: Normal rate, regular rhythm and normal heart sounds.   No murmur heard.  Pulmonary/Chest: Effort normal and breath sounds normal. No respiratory distress. She has no wheezes.   Abdominal: Soft. Bowel sounds are normal. There is no abdominal tenderness.   Musculoskeletal:         General: No tenderness or edema.   Neurological: She is alert and oriented to person, place, and time.   Skin: Skin is warm and dry. She is not diaphoretic. No erythema.   Psychiatric: Mood and affect normal.     Consults:   Consults (From admission, onward)        Status Ordering Provider     Inpatient consult to Hematology/Oncology  Once     Provider:  (Not yet assigned)    Completed SOFIA ABBOTT     Inpatient consult to Pulmonology  Once     Provider:  (Not yet assigned)    Completed SOFIA ABBOTT     Inpatient consult to Registered Dietitian/Nutritionist  Once     Provider:  (Not yet  assigned)    Completed CLARA RIDER          * Pulmonary infiltrate  Hx COPD, asymptomatic b-cell lymphoma, cryoglobinemic vasculitis, hepatitis B on Entecavir, recent glucocorticoid use presenting for intermittent fevers, myalgia, pleuritic chest pain, weight loss, night sweats, productive sputum x 3 weeks in setting of recent hospitalization in [place] for pneumonia, discharged 1 week prior without improvement in her symptoms. At [facility] ED, VSS and not septic picture. WBC wnl.     - covid negative, no hx of covid per patient  - BNP, troponin WNL  - CXR with bilateral pulmonary infiltrates  - d-dimer 1.2  - CT CAP -- suggestive of pulmonary lymphoproliferative disorder  - DDX: malignancy (?lymphoma), fungal pneumonia, follicular bronchiolitis, sarcoidosis, lymphoid interstitial pneumonia    Plan:  - Levaquin q2days for 6 total doses; start 11/11 and end 11/23  - Bronchoscopy w/ BAL and biopsy on 11/10  - F/u w/ Heme/Onc as an outpatient; 11/19/20 scheduled    UTI (urinary tract infection)  Urine culture now growing Pseudomonas and pt complaining of urgency. L sided CVA tenderness on exam that has been improving.    Plan:  - Levaquin q2days for 6 total doses starting 11/11 and ending on 11/23      Acute kidney injury superimposed on chronic kidney disease  Hx MPGN, cryoglobinemic vasculitis, and HBV. Baseline creatinine around 1-1.1. On presentation creatinine 2.6. Patient admits to decreased oral intake 2/2 pneumonia. S/p 1 liter IVF in ED. UA in ED with 1+ protein. Suspect pre-renal etiology. Has been improving since admission; 1.6 on 11/12    Improving    Type 2 diabetes mellitus without complication, without long-term current use of insulin  Hx DM2, current regimen of 45 units long acting insulin and 10 units mealtime. Last A1C on file from 2019 was 5.7. A1c 9.9% now.  Well controlled b/w 140-180 while inpatient.    Plan:  - LDSSI while inpatient      Chronic diastolic heart failure  TTE from July  2019 with EF 60%, indeterminate LV function, CVP 15. Taking lasix at home. Does not appear fluid overloaded on exam. Received 1 unit IVF in ED, BNP wnl.      Hypogammaglobulinemia  Hx of hypogammaglobulinemia. 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   IgM wnl and IgG low at 623.      B-cell lymphoproliferative disorder  Biopsy proven b-cell lymphoma. Per last note with heme/onc from May, does not appear patient was receiving treatment as she was asymptomatic and she preferred to follow up with heme/onc in [place].     Plan:  - F/u lung biopsy results from 11/10/20  - F/u w/ Heme/Onc on 11/19/20    Chronic obstructive lung disease  Hx COPD, uses albuterol at home. She is not on home O2.    Plan:  - Duo-nebs PRN for dyspnea      Hepatitis B  Hx hepatitis B diagnosed in 2019, only risk factor is homemade tattoo. Has been getting regular follow up and screening for HCC q 6 months.   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 Confirmed on renal bx in July 2019. Has previously been on IVIG and rituxan and also treated with high dose pulse glucocorticoids. It appears last steroid course was in August 2020.    Plan:  - Continue Entecavir      Essential hypertension  Hx hypertension, on losartan, nifedipine, clonidine, carvedilol. Hypertensive in ED. Has prescription for hydralazine but has not filled this recently.    Plan:  - Continue Nifedipine 60 mg  - Continue Clonidine 0.2 mg TID  - Continue Carvedilol 25 BID  - Holding ARB in setting of LACI      Final Active Diagnoses:    Diagnosis Date Noted POA    PRINCIPAL PROBLEM:  Pulmonary infiltrate [R91.8] 11/07/2020 Yes    Multiple pulmonary nodules [R91.8] 11/09/2020 Yes    UTI (urinary tract infection)  [N39.0] 11/09/2020 Yes    Acute respiratory failure with hypoxia [J96.01] 11/08/2020 Yes    Chronic diastolic heart failure [I50.32] 11/07/2020 Yes    Type 2 diabetes mellitus without complication, without long-term current use of insulin [E11.9] 11/07/2020 Yes    Acute kidney injury superimposed on chronic kidney disease [N17.9, N18.9] 11/07/2020 Yes    Hypogammaglobulinemia [D80.1] 08/05/2019 Yes    B-cell lymphoproliferative disorder [D47.9] 07/30/2019 Yes    Chronic obstructive lung disease [J44.9] 07/27/2019 Yes    Hepatitis B [B19.10] 07/24/2019 Yes    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 Augustin Urbina On 11/25/2020.    Why: at 1:00pm; PCP hospital follow up appointment  Contact information:  [address] [place], [address] (p) [phone] (f) [phone]           Gunjan Montalvo MD On 11/19/2020.    Specialty: Hematology and Oncology  Why: at 2:00pm; hem/onc hospital follow up appointment  Contact information:  [address] [facility] [address] [phone]                 Patient Instructions:   No discharge procedures on file.    Significant Diagnostic Studies:   Labs:   CMP   Recent Labs   Lab 11/11/20 0330 11/12/20  0513    142   K 3.6 3.7    106   CO2 22* 20*   * 130*   BUN 18 15   CREATININE 1.8* 1.6*   CALCIUM 9.5 10.3   PROT 6.6 6.7   ALBUMIN 2.4* 2.4*   BILITOT 0.3 0.3   ALKPHOS 133 133   AST 19 16   ALT 16 15   ANIONGAP 13 16   ESTGFRAFRICA 36.3* 41.8*   EGFRNONAA 31.5* 36.3*   , CBC   Recent Labs   Lab 11/11/20 0330 11/12/20  0513   WBC 8.09 6.76   HGB 12.3 12.7   HCT 40.5 41.5   * 483*    and All labs within the past 24 hours have been reviewed    Microbiology:   Urine Culture    Lab Results   Component Value Date    LABURIN  PSEUDOMONAS AERUGINOSA  10,000 - 49,999 cfu/ml   (A) 11/07/2020     Bronchoscopy: Normal airway  Specimen (12h ago, onward)    None          Pending Diagnostic Studies:     Procedure Component Value Units Date/Time    Cytology, Fluid/Wash/Brush [[id number]] Collected: 11/10/20 1040    Order Status: Sent Lab Status: In process Updated: 11/11/20 1619    Cytology, Fluid/Wash/Brush [[id number]] Collected: 11/10/20 1040    Order Status: Sent Lab Status: In process Updated: 11/11/20 0632    Specimen to Pathology, Surgery Pulmonary and Thoracic [[id number]] Collected: 11/10/20 1040    Order Status: Sent Lab Status: In process Updated: 11/10/20 1251    Strep Pneumo AG Urine [[id number]] Collected: 11/08/20 0035    Order Status: Sent Lab Status: In process Updated: 11/08/20 0519    Specimen: Urine, Clean Catch          Medications:  Reconciled Home Medications:      Medication List      START taking these medications    levoFLOXacin 750 MG tablet  Commonly known as: LEVAQUIN  Take 1 tablet (750 mg total) by mouth every other day.  Start taking on: November 13, 2020        CHANGE how you take these medications    calcium carbonate 500 mg calcium (1,250 mg) tablet  Commonly known as: OS-DONTE  Take 2 tablets (1,000 mg total) by mouth once daily.  What changed: how much to take     cloNIDine 0.1 MG tablet  Commonly known as: CATAPRES  Take 2 tablets (0.2 mg total) by mouth 3 (three) times daily.  What changed:   · how much to take  · when to take this     furosemide 40 MG tablet  Commonly known as: LASIX  HOLD FOR 7 DAYS WHILE YOUR KIDNEYS RECOVER. RESTART ON 11/19/20  What changed:   · how much to take  · how to take this  · when to take this  · additional instructions     losartan 25 MG tablet  Commonly known as: COZAAR  HOLD FOR 7 DAYS WHILE YOUR KIDNEYS RECOVER. RESTART ON 11/19/20  What changed:   · how much to take  · how to take this  · when to take this  · additional instructions     TRUE METRIX GLUCOSE TEST STRIP  Strp  Generic drug: blood sugar diagnostic  use to test blood gluocse 2 (two) times daily with meals.  What changed: when to take this     TRUEPLUS LANCETS 30 gauge Misc  Generic drug: lancets  use to test blood glucose 2 (two) times daily with meals.  What changed: when to take this     vitamin D 1000 units Tab  Commonly known as: VITAMIN D3  Take 1 tablet (1,000 Units total) by mouth once daily.  What changed: how much to take        CONTINUE taking these medications    acetaminophen 500 MG tablet  Commonly known as: TYLENOL  Take 500 mg by mouth 3 (three) times daily as needed for Pain (or fever).     albuterol 90 mcg/actuation inhaler  Commonly known as: PROVENTIL/VENTOLIN HFA  Inhale 2 puffs into the lungs every 6 (six) hours as needed for Wheezing or Shortness of Breath. Rescue     allopurinoL 100 MG tablet  Commonly known as: ZYLOPRIM  Take 1 tablet (100 mg total) by mouth once daily.     aspirin 81 MG EC tablet  Commonly known as: ECOTRIN  Take 81 mg by mouth once daily.     carvediloL 25 MG tablet  Commonly known as: COREG  Take 1 tablet (25 mg total) by mouth 2 (two) times daily.     cyanocobalamin 250 MCG tablet  Commonly known as: VITAMIN B-12  Take 250 mcg by mouth once daily.     diphenhydrAMINE-acetaminophen  mg Tab  Commonly known as: TYLENOL PM  Take 1 tablet by mouth nightly as needed (fever or pain).     entecavir 0.5 MG Tab  Commonly known as: BARACLUDE  Take 1 tablet (0.5 mg total) by mouth every other day.     ferrous sulfate 325 (65 FE) MG EC tablet  Take 1 tablet (325 mg total) by mouth once daily.     insulin aspart U-100 100 unit/mL (3 mL) Inpn pen  Commonly known as: NovoLOG  Inject 1-10 Units into the skin 3 (three) times daily. Use sliding scale provided in instructions     lancing device Misc  1 Device by Misc.(Non-Drug; Combo Route) route 2 (two) times daily with meals.     LANTUS SOLOSTAR U-100 INSULIN glargine 100 units/mL (3mL) SubQ pen  Generic drug: insulin  Inject 45 Units  into the skin every morning.     NIFEdipine 60 MG (OSM) 24 hr tablet  Commonly known as: PROCARDIA-XL  Take 60 mg by mouth once daily.     ONE DAILY MULTIVITAMIN per tablet  Generic drug: multivitamin  Take 1 tablet by mouth once daily.     pantoprazole 40 MG tablet  Commonly known as: PROTONIX  Take 1 tablet (40 mg total) by mouth before breakfast.     polyethylene glycol 17 gram/dose powder  Commonly known as: GLYCOLAX  Take 17 g by mouth daily as needed (constipation).     traZODone 100 MG tablet  Commonly known as: DESYREL  Take 100-200 mg by mouth nightly as needed for Insomnia.     TRUE METRIX GLUCOSE METER Misc  Generic drug: blood-glucose meter  Use as instructed            Indwelling Lines/Drains at time of discharge:   Lines/Drains/Airways     None                 Time spent on the discharge of patient: 35 minutes  Patient was seen and examined on the date of discharge and determined to be suitable for discharge.    Luis Angel Castro MD  Department of Hospital Medicine  [facility]

## 2020-11-12 NOTE — PLAN OF CARE
Patient awake & alert sitting in recliner when CM rounded. Patient in agreement with plan to discharge home with HH today & denied the need for assistance with transportation at time of discharge. Patient stated that she has not received HH care in the past & does not have a preference. CM informed patient that NACHO Chavez is checking to see if the patient has HH benefits with [place] [employer].     CM informed the patient of hospital follow up appointments scheduled with Dr. Montalvo (hem/onc) on 11/19/2020 at 1400 & Dr. Augustin Tyson (PCP) on 11/25/2020 at 1300. Patient verbalized understanding.     CM informed nurse Marshall ([id number]) of discharge status.     Will continue to follow.

## 2020-11-12 NOTE — PLAN OF CARE
Problem: Occupational Therapy Goal  Goal: Occupational Therapy Goal  Description: Goals to be met by: 11/18/2020     Patient will increase functional independence with ADLs by performing:    UE Dressing with Modified Benzie.  LE Dressing with Modified Benzie..  Grooming while bedside chair with Modified Benzie.  Toileting from bedside commode with Supervision for hygiene and clothing management.   Supine to sit with Modified Benzie.  Step transfer with Supervision  Toilet transfer to bedside commode with Supervision.      Outcome: Ongoing, Progressing  Goals updated.

## 2020-11-12 NOTE — PLAN OF CARE
NACHO faxed HH [facility] to [facility] via  for review. NACHO will continue to follow.      11/12/20 1021   Post-Acute Status   Post-Acute Authorization Home Health   Home Health Status Referrals Sent     Kathy Guthrie LMSW   - [facility]  Ext. [phone]

## 2020-11-12 NOTE — PLAN OF CARE
Pt remains free from falls and injuries during shift. Awake, alert, and oriented x 4. Vital signs stable. PRN medication given for a headache. No signs of acute distress noted at this time. Bed in lowest position, wheels locked, and call light in reach. Will continue to monitor, safety maintained.

## 2020-11-13 ENCOUNTER — PATIENT OUTREACH (OUTPATIENT)
Dept: ADMINISTRATIVE | Facility: CLINIC | Age: 55
End: 2020-11-13

## 2020-11-13 LAB — S PNEUM AG UR QL: NOT DETECTED

## 2020-11-13 NOTE — PATIENT INSTRUCTIONS

## 2020-11-13 NOTE — PLAN OF CARE
11/13/2020 1455    CM was informed by post-acute that [facility] was not able to accept the patient. Voicemail message left for [place] [employer] JANELLE Garner ([phone]) requesting a return call to discuss above.     1540  CM received a return call from Patsy. CM informed Patsy of above & faxed the patient's face sheet, HH orders, discharge summary & name and contact information for the patient's PCP Dr Augustin Urbina to (f) [phone].

## 2020-11-13 NOTE — PHYSICIAN QUERY
PT Name: Kendy Vital  MR #: [id number]     Documentation Clarification      CDS/: Serenity Sun RN               Contact information:[email or web address]    This form is a permanent document in the medical record.     Query Date: 2020    By submitting this query, we are merely seeking further clarification of documentation. Please utilize your independent clinical judgment when addressing the question(s) below.    The Medical Record reflects the following:    Clinical Findings Location in Medical Records   presented with acute hypoxic respiratory failure after being treated 1 week ago for PNA at OSH.     Resp positive for persistent shortness of breath and cough since discharge from outside hospital approximately 1 week ago for pneumonia     Resp: comfortable breathing, speaks in full sentences, CTAB, no wheezing, no crackles, no ronchi     CXR with bilateral pulmonary infiltrates  CT CAP -- suggestive of pulmonary lymphoproliferative disorder     Resp: [17-22]   SpO2: [95 %-96 %]     Resp: [17-30]  SpO2: [93 %-96 %]      Effort: Pulmonary effort is normal. No respiratory distress.   Breath sounds: Normal breath sounds.     Resp: CTAB; respirations unlabored; no wheezes, crackles or rhonchi       Resp: [14-29]   SpO2: [92 %-98 %]    DS       ED Provider Note             H&P             BMT PN         HM PN       Pulm CN        PN 11/10   Patient's Home O2 Sat on room air, while at rest: 96%   Patient's O2 Sat on room air while exercisin%       RESPIRATORY: Airway is open and patent. Respirations spontaneous, even, easy, and non-labored.  Patient has a normal effort and rate.  No accessory muscle use noted. Denies cough but complains of SOB.     Pt placed on 2LNC d/t sats 88-89% while sleeping. Mildy tachypneic, but in NAD RN PN  1410        ED Notes  1740        RN POC note  7510                                                                             Provider, please clarify the diagnosis of _ acute hypoxic respiratory failure__:    [ X  ] Acute hypoxic respiratory failure is confirmed and additional clinical support/decision-making indicators for the diagnosis include (please specify):__Pneumonia______________   [   ] Acute hypoxic respiratory failure is not confirmed and/or it has been ruled out   [   ] Acute hypoxic respiratory failure is not confirmed and/or it has been ruled out, other diagnosis ruled in (please specify):_______________   [   ] Other clarification (please specify): ___________________   [  ] Clinically undetermined

## 2020-11-13 NOTE — PLAN OF CARE
11/13/20 0731   Final Note   Assessment Type Final Discharge Note       Patient discharged home with [facility]  on 11/12/2020. Discharge summary faxed to Dr. Augustin Urbina (PCP) f [phone] & [place] [employer].

## 2020-11-13 NOTE — PROGRESS NOTES
Patient states that she has not heard from . Spoke with Regina at [facility], states that patient was not accepted due to insurance and PCP not signing. Spoke with JANELLE Gerard, states that they were told that patient was accepted. Turned over to case mgmt.

## 2020-11-17 LAB
COMMENT: NORMAL
FINAL PATHOLOGIC DIAGNOSIS: NORMAL
FINAL PATHOLOGIC DIAGNOSIS: NORMAL
GROSS: NORMAL
Lab: NORMAL

## 2020-11-18 ENCOUNTER — TELEPHONE (OUTPATIENT)
Dept: PULMONOLOGY | Facility: CLINIC | Age: 55
End: 2020-11-18

## 2020-11-18 ENCOUNTER — TELEPHONE (OUTPATIENT)
Dept: PULMONOLOGY | Facility: HOSPITAL | Age: 55
End: 2020-11-18

## 2020-11-18 DIAGNOSIS — R93.89 ABNORMAL CT OF THE CHEST: Primary | ICD-10-CM

## 2020-11-18 NOTE — TELEPHONE ENCOUNTER
Spoke with patient, informed her that I'm contacting him in regards to scheduling her a appointment with one of our providers. I advised patient that I can schedule her appointment with Dr Mejias on 11/20/20 for 1:00 pm. Patient verbalized that she understand and excepted the appointment.

## 2020-11-18 NOTE — TELEPHONE ENCOUNTER
Called Ms Vital to discuss results of BAL, bronchial brush, and transbronchial biopsy. Patient states that she is doing OK overall, although she still feels very fatigued and has very little appetite. Her fevers and night sweats have decreased somewhat but she still gets them occasionally.    Informed her that results thus far from the studies are non diagnostic. Will place referral and message to get her into pulm clinic soon to discuss further options for workup. Patient expressed understanding and is interested in coming back here for pulm clinic.    Of note, called flow cytometry to discuss elevated CD4/CD8 ratio and it is 81% CD4, 9% CD8.    Giovana Palacio MD  Pulmonary and Critical Care Fellow  11/18/2020  10:33 AM

## 2020-11-18 NOTE — TELEPHONE ENCOUNTER
----- Message from Giovana Sevilla MD sent at 11/18/2020 10:35 AM CST -----  Regarding: Clinic f/u  Hey there! Hoping to get this lady scheduled into pulm clinic with any provider in the next 2-4 weeks. She underwent transbronchial biopsy and bronchoscopy while inpatient last week.    Thank you!    Giovana (pulm fellow)

## 2020-11-19 ENCOUNTER — INFUSION (OUTPATIENT)
Dept: INFUSION THERAPY | Facility: HOSPITAL | Age: 55
End: 2020-11-19
Attending: STUDENT IN AN ORGANIZED HEALTH CARE EDUCATION/TRAINING PROGRAM
Payer: MEDICAID

## 2020-11-19 ENCOUNTER — OFFICE VISIT (OUTPATIENT)
Dept: HEMATOLOGY/ONCOLOGY | Facility: CLINIC | Age: 55
End: 2020-11-19
Payer: MEDICAID

## 2020-11-19 ENCOUNTER — LAB VISIT (OUTPATIENT)
Dept: LAB | Facility: HOSPITAL | Age: 55
End: 2020-11-19
Payer: MEDICAID

## 2020-11-19 VITALS
WEIGHT: 205.38 LBS | BODY MASS INDEX: 33.01 KG/M2 | HEIGHT: 66 IN | DIASTOLIC BLOOD PRESSURE: 70 MMHG | RESPIRATION RATE: 16 BRPM | HEART RATE: 81 BPM | SYSTOLIC BLOOD PRESSURE: 116 MMHG | OXYGEN SATURATION: 95 % | TEMPERATURE: 98 F

## 2020-11-19 VITALS
RESPIRATION RATE: 18 BRPM | TEMPERATURE: 99 F | SYSTOLIC BLOOD PRESSURE: 114 MMHG | OXYGEN SATURATION: 97 % | HEART RATE: 80 BPM | DIASTOLIC BLOOD PRESSURE: 74 MMHG

## 2020-11-19 DIAGNOSIS — D47.9 B-CELL LYMPHOPROLIFERATIVE DISORDER: Primary | ICD-10-CM

## 2020-11-19 DIAGNOSIS — I10 ESSENTIAL HYPERTENSION: ICD-10-CM

## 2020-11-19 DIAGNOSIS — N17.9 ACUTE KIDNEY INJURY SUPERIMPOSED ON CHRONIC KIDNEY DISEASE: ICD-10-CM

## 2020-11-19 DIAGNOSIS — N17.8 ACUTE RENAL FAILURE WITH OTHER SPECIFIED PATHOLOGICAL LESION IN KIDNEY: ICD-10-CM

## 2020-11-19 DIAGNOSIS — N18.9 ACUTE KIDNEY INJURY SUPERIMPOSED ON CHRONIC KIDNEY DISEASE: ICD-10-CM

## 2020-11-19 DIAGNOSIS — D89.1 CRYOGLOBULINEMIC VASCULITIS: ICD-10-CM

## 2020-11-19 DIAGNOSIS — R91.8 MULTIPLE PULMONARY NODULES: ICD-10-CM

## 2020-11-19 DIAGNOSIS — E83.52 HYPERCALCEMIA: ICD-10-CM

## 2020-11-19 DIAGNOSIS — I50.32 CHRONIC DIASTOLIC HEART FAILURE: ICD-10-CM

## 2020-11-19 DIAGNOSIS — N00.8 ACUTE (DIFFUSE) PROLIFERATIVE GLOMERULONEPHRITIS: ICD-10-CM

## 2020-11-19 DIAGNOSIS — E83.52 HYPERCALCEMIA: Primary | ICD-10-CM

## 2020-11-19 LAB
ALBUMIN SERPL BCP-MCNC: 2.8 G/DL (ref 3.5–5.2)
ALP SERPL-CCNC: 126 U/L (ref 55–135)
ALT SERPL W/O P-5'-P-CCNC: 14 U/L (ref 10–44)
ANION GAP SERPL CALC-SCNC: 14 MMOL/L (ref 8–16)
AST SERPL-CCNC: 20 U/L (ref 10–40)
BASOPHILS # BLD AUTO: 0.08 K/UL (ref 0–0.2)
BASOPHILS NFR BLD: 1.3 % (ref 0–1.9)
BILIRUB SERPL-MCNC: 0.4 MG/DL (ref 0.1–1)
BUN SERPL-MCNC: 19 MG/DL (ref 6–20)
CALCIUM SERPL-MCNC: 10.9 MG/DL (ref 8.7–10.5)
CHLORIDE SERPL-SCNC: 107 MMOL/L (ref 95–110)
CO2 SERPL-SCNC: 21 MMOL/L (ref 23–29)
CREAT SERPL-MCNC: 1.5 MG/DL (ref 0.5–1.4)
DIFFERENTIAL METHOD: ABNORMAL
EOSINOPHIL # BLD AUTO: 0.1 K/UL (ref 0–0.5)
EOSINOPHIL NFR BLD: 2.3 % (ref 0–8)
ERYTHROCYTE [DISTWIDTH] IN BLOOD BY AUTOMATED COUNT: 18.6 % (ref 11.5–14.5)
EST. GFR  (AFRICAN AMERICAN): 45.2 ML/MIN/1.73 M^2
EST. GFR  (NON AFRICAN AMERICAN): 39.2 ML/MIN/1.73 M^2
GLUCOSE SERPL-MCNC: 139 MG/DL (ref 70–110)
HCT VFR BLD AUTO: 45.5 % (ref 37–48.5)
HGB BLD-MCNC: 13.6 G/DL (ref 12–16)
IMM GRANULOCYTES # BLD AUTO: 0.01 K/UL (ref 0–0.04)
IMM GRANULOCYTES NFR BLD AUTO: 0.2 % (ref 0–0.5)
LDH SERPL L TO P-CCNC: 171 U/L (ref 110–260)
LYMPHOCYTES # BLD AUTO: 1.7 K/UL (ref 1–4.8)
LYMPHOCYTES NFR BLD: 26.7 % (ref 18–48)
MCH RBC QN AUTO: 24.8 PG (ref 27–31)
MCHC RBC AUTO-ENTMCNC: 29.9 G/DL (ref 32–36)
MCV RBC AUTO: 83 FL (ref 82–98)
MONOCYTES # BLD AUTO: 0.7 K/UL (ref 0.3–1)
MONOCYTES NFR BLD: 12 % (ref 4–15)
NEUTROPHILS # BLD AUTO: 3.6 K/UL (ref 1.8–7.7)
NEUTROPHILS NFR BLD: 57.5 % (ref 38–73)
NRBC BLD-RTO: 0 /100 WBC
PLATELET # BLD AUTO: 386 K/UL (ref 150–350)
PMV BLD AUTO: 9.3 FL (ref 9.2–12.9)
POTASSIUM SERPL-SCNC: 4 MMOL/L (ref 3.5–5.1)
PROT SERPL-MCNC: 7.2 G/DL (ref 6–8.4)
RBC # BLD AUTO: 5.48 M/UL (ref 4–5.4)
SODIUM SERPL-SCNC: 142 MMOL/L (ref 136–145)
WBC # BLD AUTO: 6.18 K/UL (ref 3.9–12.7)

## 2020-11-19 PROCEDURE — 25000003 PHARM REV CODE 250: Performed by: STUDENT IN AN ORGANIZED HEALTH CARE EDUCATION/TRAINING PROGRAM

## 2020-11-19 PROCEDURE — 36415 COLL VENOUS BLD VENIPUNCTURE: CPT

## 2020-11-19 PROCEDURE — 99999 PR PBB SHADOW E&M-EST. PATIENT-LVL V: ICD-10-PCS | Mod: PBBFAC,,, | Performed by: STUDENT IN AN ORGANIZED HEALTH CARE EDUCATION/TRAINING PROGRAM

## 2020-11-19 PROCEDURE — 85025 COMPLETE CBC W/AUTO DIFF WBC: CPT

## 2020-11-19 PROCEDURE — 99215 OFFICE O/P EST HI 40 MIN: CPT | Mod: PBBFAC,25 | Performed by: STUDENT IN AN ORGANIZED HEALTH CARE EDUCATION/TRAINING PROGRAM

## 2020-11-19 PROCEDURE — 99999 PR PBB SHADOW E&M-EST. PATIENT-LVL V: CPT | Mod: PBBFAC,,, | Performed by: STUDENT IN AN ORGANIZED HEALTH CARE EDUCATION/TRAINING PROGRAM

## 2020-11-19 PROCEDURE — 99215 OFFICE O/P EST HI 40 MIN: CPT | Mod: S$PBB,,, | Performed by: STUDENT IN AN ORGANIZED HEALTH CARE EDUCATION/TRAINING PROGRAM

## 2020-11-19 PROCEDURE — 96361 HYDRATE IV INFUSION ADD-ON: CPT

## 2020-11-19 PROCEDURE — 83615 LACTATE (LD) (LDH) ENZYME: CPT

## 2020-11-19 PROCEDURE — 96360 HYDRATION IV INFUSION INIT: CPT

## 2020-11-19 PROCEDURE — 99215 PR OFFICE/OUTPT VISIT, EST, LEVL V, 40-54 MIN: ICD-10-PCS | Mod: S$PBB,,, | Performed by: STUDENT IN AN ORGANIZED HEALTH CARE EDUCATION/TRAINING PROGRAM

## 2020-11-19 PROCEDURE — 80053 COMPREHEN METABOLIC PANEL: CPT

## 2020-11-19 RX ORDER — HEPARIN 100 UNIT/ML
500 SYRINGE INTRAVENOUS
Status: DISCONTINUED | OUTPATIENT
Start: 2020-11-19 | End: 2020-11-19 | Stop reason: HOSPADM

## 2020-11-19 RX ORDER — SODIUM CHLORIDE 0.9 % (FLUSH) 0.9 %
10 SYRINGE (ML) INJECTION
Status: CANCELLED | OUTPATIENT
Start: 2020-11-19

## 2020-11-19 RX ORDER — SODIUM CHLORIDE 0.9 % (FLUSH) 0.9 %
10 SYRINGE (ML) INJECTION
Status: DISCONTINUED | OUTPATIENT
Start: 2020-11-19 | End: 2020-11-19 | Stop reason: HOSPADM

## 2020-11-19 RX ORDER — HEPARIN 100 UNIT/ML
500 SYRINGE INTRAVENOUS
Status: CANCELLED | OUTPATIENT
Start: 2020-11-19

## 2020-11-19 RX ADMIN — SODIUM CHLORIDE 1000 ML: 0.9 INJECTION, SOLUTION INTRAVENOUS at 04:11

## 2020-11-19 NOTE — PROGRESS NOTES
PATIENT: Kendy Vital  MRN: [id number]  DATE: 11/19/2020      Diagnosis:   1. B-cell lymphoproliferative disorder    2. Cryoglobulinemic vasculitis    3. Acute (diffuse) proliferative glomerulonephritis    4. Acute kidney injury superimposed on chronic kidney disease    5. Chronic diastolic heart failure    6. Essential hypertension    7. Multiple pulmonary nodules    8. Hypercalcemia        Chief Complaint: No chief complaint on file.      Subjective:   Ms. Vital is a 55 y.o. female with lymphoproliferative disorder, GN, HBV, cryoglobulinemic vasculitis (type 3), who presents to the Hematology Clinic for follow-up.    Hematologic History  -Presented with severe thrombocytopenia, renal failure, and bilateral infiltrates on chest CT concerning for PNA.  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   -Patient does not have HCV, but is positive for HBV and has been started on anti-viral therapy with entecavir.  -Due to concern of possible hematologic malignancy, patient had bone marrow biopsy on 7/24 revealing B-cell lymphoproliferative disorders.  Her serum cryoglobulins returned as Type 3 and this was not c/w with a hematologic malignancy etiology for cryoglobulinemia  -Had 3 days of pulse steroids 1gm hydrocortisone, in addition to sessions of Plasma Exchange/Plasmapheresis  -She also received IVIG and Rituxan per Rheumatology  -On entecavir for HBV  -PET done 12/5/19 without significant adenopathy  -Admitted 11/7-11/12/20 presented with acute hypoxic respiratory failure after being treated 1 week ago for PNA at OSH.  Presented with persistent cough and hemoptysis.  CT CAP showed pulm nodules concerning for lymphoproliferative d/o. Pulm performed bronch 11/10 for tissue dx. This was negative for malignancy.    Interval History: Patient seen today alone, continues to have dyspnea. Lost 20 lbs. Having night  sweats and chills. Denies lymphadenopathy. Denies cough, chest pain, abdominal pain, bruising/bleeding.    Past Medical History:   Past Medical History:   Diagnosis Date    Acute (diffuse) proliferative glomerulonephritis     Acute renal failure 7/19/2019    B-cell lymphoproliferative disorder 7/30/2019    Chronic obstructive lung disease 7/27/2019    Chronic viral hepatitis B without delta agent and without coma 7/24/2019    Cryoglobulinemic vasculitis     Hypogammaglobulinemia 8/5/2019    Iron deficiency anemia 7/30/2019    Renal vein thrombosis 2015    s/p embolectomy and lovenox for 9 mos    Steroid-induced hyperglycemia 7/28/2019    Uterine leiomyoma     s/p resection       Past Surgical HIstory: No past surgical history on file.    Family History: No family history on file.    Social History:  reports that she has quit smoking. She has never used smokeless tobacco. She reports previous alcohol use. She reports that she does not use drugs.    Allergies:  Review of patient's allergies indicates:  No Known Allergies    Medications:  Current Outpatient Medications   Medication Sig Dispense Refill    acetaminophen (TYLENOL) 500 MG tablet Take 500 mg by mouth 3 (three) times daily as needed for Pain (or fever).      albuterol (PROVENTIL/VENTOLIN HFA) 90 mcg/actuation inhaler Inhale 2 puffs into the lungs every 6 (six) hours as needed for Wheezing or Shortness of Breath. Rescue      allopurinol (ZYLOPRIM) 100 MG tablet Take 1 tablet (100 mg total) by mouth once daily. 30 tablet 2    aspirin (ECOTRIN) 81 MG EC tablet Take 81 mg by mouth once daily.      blood sugar diagnostic Strp use to test blood gluocse 2 (two) times daily with meals. (Patient taking differently: 1 strip by Misc.(Non-Drug; Combo Route) route 3 (three) times daily. ) 100 strip 11    blood-glucose meter Misc Use as instructed 1 each 0    cyanocobalamin (VITAMIN B-12) 250 MCG tablet Take 250 mcg by mouth once daily.       diphenhydrAMINE-acetaminophen (TYLENOL PM)  mg Tab Take 1 tablet by mouth nightly as needed (fever or pain).      entecavir (BARACLUDE) 0.5 MG Tab Take 1 tablet (0.5 mg total) by mouth every other day. 15 tablet 6    ferrous sulfate 325 (65 FE) MG EC tablet Take 1 tablet (325 mg total) by mouth once daily.  0    insulin (LANTUS SOLOSTAR U-100 INSULIN) glargine 100 units/mL (3mL) SubQ pen Inject 45 Units into the skin every morning.      lancets 30 gauge Misc use to test blood glucose 2 (two) times daily with meals. (Patient taking differently: 1 lancet by Misc.(Non-Drug; Combo Route) route 3 (three) times daily. ) 100 each 11    levoFLOXacin (LEVAQUIN) 750 MG tablet Take 1 tablet (750 mg total) by mouth every other day. 5 tablet 0    multivitamin (ONE DAILY MULTIVITAMIN) per tablet Take 1 tablet by mouth once daily.      NIFEdipine (PROCARDIA-XL) 60 MG (OSM) 24 hr tablet Take 60 mg by mouth once daily.      polyethylene glycol (GLYCOLAX) 17 gram/dose powder Take 17 g by mouth daily as needed (constipation).      traZODone (DESYREL) 100 MG tablet Take 100-200 mg by mouth nightly as needed for Insomnia.      vitamin D (VITAMIN D3) 1000 units Tab Take 1 tablet (1,000 Units total) by mouth once daily. (Patient taking differently: Take 2,000 Units by mouth once daily. )      calcium carbonate (OS-DONTE) 500 mg calcium (1,250 mg) tablet Take 2 tablets (1,000 mg total) by mouth once daily. (Patient taking differently: Take 1 tablet by mouth once daily. )  0    carvedilol (COREG) 25 MG tablet Take 1 tablet (25 mg total) by mouth 2 (two) times daily. (Patient not taking: Reported on 11/13/2020) 60 tablet 11    cloNIDine (CATAPRES) 0.1 MG tablet Take 2 tablets (0.2 mg total) by mouth 3 (three) times daily. (Patient taking differently: Take 0.1 mg by mouth once daily. ) 180 tablet 11    furosemide (LASIX) 40 MG tablet HOLD FOR 7 DAYS WHILE YOUR KIDNEYS RECOVER. RESTART ON 11/19/20 (Patient not taking:  "Reported on 11/13/2020)  3    insulin aspart U-100 (NOVOLOG) 100 unit/mL (3 mL) InPn pen Inject 1-10 Units into the skin 3 (three) times daily. Use sliding scale provided in instructions 108 mL 0    lancing device Misc 1 Device by Misc.(Non-Drug; Combo Route) route 2 (two) times daily with meals. 1 each 0    pantoprazole (PROTONIX) 40 MG tablet Take 1 tablet (40 mg total) by mouth before breakfast. 30 tablet 11     No current facility-administered medications for this visit.        Review of Systems   Constitutional: Negative for activity change, appetite change, chills, fatigue, fever and unexpected weight change.   HENT: Negative for mouth sores and nosebleeds.    Respiratory: Negative for cough and shortness of breath.    Cardiovascular: Positive for leg swelling. Negative for chest pain.   Gastrointestinal: Positive for abdominal pain. Negative for constipation, diarrhea, nausea and vomiting.   Endocrine: Negative for cold intolerance and heat intolerance.   Genitourinary: Negative for dysuria and hematuria.   Musculoskeletal: Negative for arthralgias and back pain.   Skin: Negative for rash.   Allergic/Immunologic: Negative for environmental allergies.   Neurological: Negative for light-headedness and numbness.   Hematological: Negative for adenopathy. Does not bruise/bleed easily.       ECOG Performance Status: 0   Objective:      Vitals:   Vitals:    11/19/20 1404   BP: 116/70   Pulse: 81   Resp: 16   Temp: 98.2 °F (36.8 °C)   TempSrc: Oral   SpO2: 95%   Weight: 93.2 kg (205 lb 5.7 oz)   Height: 5' 6" (1.676 m)     BMI: Body mass index is 33.15 kg/m².    Physical Exam  Constitutional:       General: She is not in acute distress.     Appearance: She is well-developed.   Neck:      Musculoskeletal: Normal range of motion.   Cardiovascular:      Rate and Rhythm: Normal rate and regular rhythm.   Pulmonary:      Effort: Pulmonary effort is normal. No respiratory distress.   Abdominal:      General: There is no " distension.      Palpations: Abdomen is soft. There is no mass.      Tenderness: There is no abdominal tenderness. There is no guarding.   Skin:     General: Skin is warm and dry.   Neurological:      Mental Status: She is alert and oriented to person, place, and time.   Psychiatric:         Behavior: Behavior normal.         Laboratory Data:  Lab Visit on 11/19/2020   Component Date Value Ref Range Status    WBC 11/19/2020 6.18  3.90 - 12.70 K/uL Final    RBC 11/19/2020 5.48* 4.00 - 5.40 M/uL Final    Hemoglobin 11/19/2020 13.6  12.0 - 16.0 g/dL Final    Hematocrit 11/19/2020 45.5  37.0 - 48.5 % Final    MCV 11/19/2020 83  82 - 98 fL Final    MCH 11/19/2020 24.8* 27.0 - 31.0 pg Final    MCHC 11/19/2020 29.9* 32.0 - 36.0 g/dL Final    RDW 11/19/2020 18.6* 11.5 - 14.5 % Final    Platelets 11/19/2020 386* 150 - 350 K/uL Final    MPV 11/19/2020 9.3  9.2 - 12.9 fL Final    Immature Granulocytes 11/19/2020 0.2  0.0 - 0.5 % Final    Gran # (ANC) 11/19/2020 3.6  1.8 - 7.7 K/uL Final    Immature Grans (Abs) 11/19/2020 0.01  0.00 - 0.04 K/uL Final    Comment: Mild elevation in immature granulocytes is non specific and   can be seen in a variety of conditions including stress response,   acute inflammation, trauma and pregnancy. Correlation with other   laboratory and clinical findings is essential.      Lymph # 11/19/2020 1.7  1.0 - 4.8 K/uL Final    Mono # 11/19/2020 0.7  0.3 - 1.0 K/uL Final    Eos # 11/19/2020 0.1  0.0 - 0.5 K/uL Final    Baso # 11/19/2020 0.08  0.00 - 0.20 K/uL Final    nRBC 11/19/2020 0  0 /100 WBC Final    Gran % 11/19/2020 57.5  38.0 - 73.0 % Final    Lymph % 11/19/2020 26.7  18.0 - 48.0 % Final    Mono % 11/19/2020 12.0  4.0 - 15.0 % Final    Eosinophil % 11/19/2020 2.3  0.0 - 8.0 % Final    Basophil % 11/19/2020 1.3  0.0 - 1.9 % Final    Differential Method 11/19/2020 Automated   Final    Sodium 11/19/2020 142  136 - 145 mmol/L Final    Potassium 11/19/2020 4.0  3.5 - 5.1  mmol/L Final    Chloride 11/19/2020 107  95 - 110 mmol/L Final    CO2 11/19/2020 21* 23 - 29 mmol/L Final    Glucose 11/19/2020 139* 70 - 110 mg/dL Final    BUN 11/19/2020 19  6 - 20 mg/dL Final    Creatinine 11/19/2020 1.5* 0.5 - 1.4 mg/dL Final    Calcium 11/19/2020 10.9* 8.7 - 10.5 mg/dL Final    Total Protein 11/19/2020 7.2  6.0 - 8.4 g/dL Final    Albumin 11/19/2020 2.8* 3.5 - 5.2 g/dL Final    Total Bilirubin 11/19/2020 0.4  0.1 - 1.0 mg/dL Final    Comment: For infants and newborns, interpretation of results should be based  on gestational age, weight and in agreement with clinical  observations.  Premature Infant recommended reference ranges:  Up to 24 hours.............<8.0 mg/dL  Up to 48 hours............<12.0 mg/dL  3-5 days..................<15.0 mg/dL  6-29 days.................<15.0 mg/dL      Alkaline Phosphatase 11/19/2020 126  55 - 135 U/L Final    AST 11/19/2020 20  10 - 40 U/L Final    ALT 11/19/2020 14  10 - 44 U/L Final    Anion Gap 11/19/2020 14  8 - 16 mmol/L Final    eGFR if African American 11/19/2020 45.2* >60 mL/min/1.73 m^2 Final    eGFR if non African American 11/19/2020 39.2* >60 mL/min/1.73 m^2 Final    Comment: Calculation used to obtain the estimated glomerular filtration  rate (eGFR) is the CKD-EPI equation.       LD 11/19/2020 171  110 - 260 U/L Final    Results are increased in hemolyzed samples.   BM biopsy 7/23/19:  -- MILDLY HYPERCELLULAR MARROW (60%) WITH TRILINEAGE HEMATOPOIESIS.  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  -- ADEQUATE NUMBER OF MEGAKARYOCYTES.  -- STAINABLE IRON IS NOT IDENTIFIED.  -- SEE COMMENT.  COMMENT:  CBC data shows microcytic anemia and thrombocytopenia.  Flow cytometric analysis of bone marrow detects a kappa restricted plasma cell population that expresses , CD19, and bright CD38. CD20 and CD56 are negative. Polytypic B lymphocytes are detected. T lymphocytes are immunophenotypically unremarkable.  Blasts gate is not increased. Flow differential: Lymphocytes 5.9%, Monocytes 4.6%, Granulocytes 85.8%, Blast 1.2%.  Immunohistochemical stains are performed on the biopsy core and clot section for greater sensitivity and further architectural assessment with adequate controls. -positive plasma cells comprise approximately 4-5% of thetotal cellularity. Immunoglobulin kappa and lambda light chains situ hybridization study reveals kappa light chainrestriction is some areas. The lymphoid aggregates are composed of mixed CD20-positive B-cells and CD3-positive T-cells. B-cells are more numerous than T-cells    COMPARISON:  FDG PET-CT     FINDINGS:  Soft tissue structures the base neck are unremarkable.  Demonstrated portions of thyroid gland are normal configuration.  No abnormal lower cervical lymphadenopathy.     There is a left-sided 3 vessel nonaneurysmal aortic arch with mild calcific atherosclerosis.  The heart is normal in size.  No pericardial effusion.  Mediastinal structures are midline.  Scattered mildly prominent mediastinal lymph nodes are identified, for example a precarinal lymph node measuring 0.8 cm in short axis (axial image 41.)  Pulmonary arteries distribute normally and there 4 pulmonary veins that return to the left atrium.     The trachea and proximal airways are patent.  Lungs are symmetrically expanded.  Innumerable pulmonary nodules and micro nodules are present throughout both lungs with coalescence to form more consolidative masses in the bilateral lower lobes measuring up to 5.1 cm on the right and 5.3 cm on the left.  Findings concerning for pulmonary lymphoproliferative disorder.  No pleural effusion or pneumothorax.     Liver is mildly enlarged with no focal hepatic abnormality.  Gallbladder is unremarkable with no calcified gallstones or pericholecystic fluid.  No intra or extrahepatic biliary dilatation.  The spleen, adrenal glands, and pancreas reveal nothing unusual.     Kidneys  are normal in size and location.  Wedge-shaped cortical defect lower pole right kidney suggest prior infarct.  No nephrolithiasis or hydronephrosis.  Ureters normal in course and caliber with no asymmetric periureteral fat stranding.  Urinary bladder is unremarkable.  Reproductive structures reveal nothing unusual.     Esophagus is normal course and contour.  Stomach reveals nothing unusual.  Visualized loops of small and large bowel reveal no evidence of obstruction or inflammation.  The appendix is unremarkable.  Shotty periaortic lymph nodes are identified.  No bulky abdominopelvic lymphadenopathy, intraperitoneal free air, or abdominopelvic ascites.     The abdominal aorta is normal course and caliber with moderate calcific atherosclerosis.  Vascular stent in the proximal right renal artery.     Osseous structures demonstrate age-appropriate degenerative change.  No acute fracture.  No suspicious lytic or sclerotic lesions identified.  Extraperitoneal soft tissue structures are unremarkable.     Impression:     Innumerable pulmonary nodules and micro nodules with coalescence to more dominant consolidative masses in the lower lobes.  Findings concerning for pulmonary lymphoproliferative disorder such as lymphomatoid granulomatosis in the setting of biopsy-proven B-cell lymphoma.  Consultation with hematology oncology and/or pulmonary medicine recommended.     Vascular stent in the proximal right renal artery.  Wedge-shaped defect lower pole right kidney suggests prior remote infarct.     Mild hepatomegaly.     Additional findings detailed above.    Assessment:       1. B-cell lymphoproliferative disorder    2. Cryoglobulinemic vasculitis    3. Acute (diffuse) proliferative glomerulonephritis    4. Acute kidney injury superimposed on chronic kidney disease    5. Chronic diastolic heart failure    6. Essential hypertension    7. Multiple pulmonary nodules    8. 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  2) IgM kappa MGUS  - Patient with new renal failure, has normal IgG.  - Skeletal survey (-) for lytic lesions  - CT neck/C/A/P done showed small mediastinal LN and precarinal LN 1.2x2cm  - Cryglobulin type III which does not indicate hematologic malignancy as a source  - PET 12/5/19 without significant adenopathy  - Patient got Rituxan per rheumatology in July 2019  - STAT PET and IR biopsy given negative bronchoscopy and concern for lymphoma in setting of hypercalcemia and B symptoms    3) Pulmonary nodules  - New pulmonary nodules seen, bronchoscopy negative for malignancy  - To see pulm tomorrow, recommend walking O2 test    4) Cryoglobulin vasculitis, GN, HBV  -Per rheumatology/nephrology  -On entecavir for HBV    5) DM  -On insulin    6) CKD  -Monitor Cr    7) Iron deficiency anemia  -To continue PO ferrous sulfate    8) Hypercalcemia  -Corrected 11.9  -Repeat tomorrow  -Encouraged to hydrate  -1L NS    Follow-up: STAT PET and IR lung biopsy, IVF for high calcium today or tomorrow (1L), RTC in 2 weeks with labs prior (CBC, CMP, LDH)    The following was staffed and discussed with supervising physician Dr. Overton.    Gunjan Montalvo MD  Hematology/Oncology Fellow

## 2020-11-19 NOTE — PLAN OF CARE
Arrived per w/c with s/o for 1 L NS.  CHF hx noted.  C/o per assessment noted.  Labs noted.  POC reviewed with pt.  Pt requested [facility] discounted rate.  Spk with NACHO Wagoner.  Discount faxed and given to pt.  PIV started hydration given at ordered rated over 2 hours.

## 2020-11-19 NOTE — Clinical Note
STAT PET (ideally tomorrow) and IR lung biopsy, IVF for high calcium today or tomorrow (1L), RTC in 2 weeks with labs prior (CBC, CMP, LDH)

## 2020-11-20 ENCOUNTER — HOSPITAL ENCOUNTER (OUTPATIENT)
Dept: PULMONOLOGY | Facility: CLINIC | Age: 55
Discharge: HOME | End: 2020-11-20
Payer: MEDICAID

## 2020-11-20 ENCOUNTER — HOSPITAL ENCOUNTER (OUTPATIENT)
Dept: RADIOLOGY | Facility: HOSPITAL | Age: 55
Discharge: HOME | End: 2020-11-20
Attending: STUDENT IN AN ORGANIZED HEALTH CARE EDUCATION/TRAINING PROGRAM
Payer: MEDICAID

## 2020-11-20 ENCOUNTER — OFFICE VISIT (OUTPATIENT)
Dept: PULMONOLOGY | Facility: CLINIC | Age: 55
End: 2020-11-20
Payer: MEDICAID

## 2020-11-20 VITALS
DIASTOLIC BLOOD PRESSURE: 83 MMHG | HEART RATE: 95 BPM | BODY MASS INDEX: 32.78 KG/M2 | WEIGHT: 204 LBS | OXYGEN SATURATION: 96 % | BODY MASS INDEX: 32.78 KG/M2 | SYSTOLIC BLOOD PRESSURE: 130 MMHG | WEIGHT: 204 LBS | HEIGHT: 66 IN | HEIGHT: 66 IN

## 2020-11-20 DIAGNOSIS — R91.8 MULTIPLE PULMONARY NODULES: Primary | ICD-10-CM

## 2020-11-20 DIAGNOSIS — D47.9 B-CELL LYMPHOPROLIFERATIVE DISORDER: ICD-10-CM

## 2020-11-20 DIAGNOSIS — R06.02 SOB (SHORTNESS OF BREATH): ICD-10-CM

## 2020-11-20 DIAGNOSIS — R06.02 SOB (SHORTNESS OF BREATH): Primary | ICD-10-CM

## 2020-11-20 DIAGNOSIS — R93.89 ABNORMAL CT OF THE CHEST: Primary | ICD-10-CM

## 2020-11-20 LAB — POCT GLUCOSE: 126 MG/DL (ref 70–110)

## 2020-11-20 PROCEDURE — 99214 OFFICE O/P EST MOD 30 MIN: CPT | Mod: PBBFAC,25 | Performed by: INTERNAL MEDICINE

## 2020-11-20 PROCEDURE — 99214 PR OFFICE/OUTPT VISIT, EST, LEVL IV, 30-39 MIN: ICD-10-PCS | Mod: 25,S$PBB,, | Performed by: INTERNAL MEDICINE

## 2020-11-20 PROCEDURE — 94618 PULMONARY STRESS TESTING: CPT | Mod: 26,S$PBB,, | Performed by: INTERNAL MEDICINE

## 2020-11-20 PROCEDURE — 99999 PR PBB SHADOW E&M-EST. PATIENT-LVL IV: ICD-10-PCS | Mod: PBBFAC,,, | Performed by: INTERNAL MEDICINE

## 2020-11-20 PROCEDURE — 99214 OFFICE O/P EST MOD 30 MIN: CPT | Mod: 25,S$PBB,, | Performed by: INTERNAL MEDICINE

## 2020-11-20 PROCEDURE — 99999 PR PBB SHADOW E&M-EST. PATIENT-LVL IV: CPT | Mod: PBBFAC,,, | Performed by: INTERNAL MEDICINE

## 2020-11-20 PROCEDURE — 94618 PULMONARY STRESS TESTING: ICD-10-PCS | Mod: 26,S$PBB,, | Performed by: INTERNAL MEDICINE

## 2020-11-20 PROCEDURE — 78815 PET IMAGE W/CT SKULL-THIGH: CPT | Mod: TC

## 2020-11-20 PROCEDURE — 78815 NM PET CT ROUTINE: ICD-10-PCS | Mod: 26,PS,, | Performed by: RADIOLOGY

## 2020-11-20 PROCEDURE — A9552 F18 FDG: HCPCS

## 2020-11-20 PROCEDURE — 78815 PET IMAGE W/CT SKULL-THIGH: CPT | Mod: 26,PS,, | Performed by: RADIOLOGY

## 2020-11-20 PROCEDURE — 94618 PULMONARY STRESS TESTING: CPT | Mod: PBBFAC | Performed by: INTERNAL MEDICINE

## 2020-11-20 NOTE — PROCEDURES
Kendy Vital is a 55 y.o.  female patient, who presents for a 6 minute walk test ordered by MD Randell.  The diagnosis is Shortness of Breath.  The patient's BMI is 32.9 kg/m2.  Predicted distance (lower limit of normal) is 357.88 meters.      Test Results:    The test was completed with stops.  The patient stopped 3 times for a total of 38 seconds.  The total time walked was 322 seconds.  During walking, the patient reported:  Dyspnea, Leg pain.  The patient used a wheelchair for assistance during testing.     11/20/2020---------Distance: 60.96 meters (200 feet)     O2 Sat % Supplemental Oxygen Heart Rate Blood Pressure Michelle Scale   Pre-exercise  (Resting) 98 % Room Air 99 bpm 128/85 mmHg 0.5   During Exercise 95 % Room Air 105 bpm 130/83 mmHg 4   Post-exercise  (Recovery) 98 % Room Air  98 bpm       Recovery Time: 117 seconds    Performing nurse/tech: Tiburcio CERVANTES      PREVIOUS STUDY:   The patient has not had a previous study.      CLINICAL INTERPRETATION:  Six minute walk distance is 60.96 meters (200 feet) with somewhat heavy dyspnea.  During exercise, there was desaturation while breathing room air.  Both blood pressure and heart rate remained stable with walking.  The patient reported non-pulmonary symptoms during exercise.  Severe exercise impairment is likely due to subjective symptoms.  The patient did complete the study, walking 322 seconds of the 360 second test.  No previous study performed.  Based upon age and body mass index, exercise capacity is less than predicted.

## 2020-11-20 NOTE — PROGRESS NOTES
[facility]  Pulmonary Clinic Note      Reason for Visit:  Shortness of breath and abnormal CT scan     History of Present Illness:  Kendy Vital is a 55 y.o.  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 Patient states that she first noted having SOB a few months ago, but only noticed it with exertion or with laying flat occasionally. Over the past 3 weeks, she endorses progressively worsening SOB and persistent dry cough, although she has not had any sputum production or hemoptysis. During the past 3 weeks she has also had almost daily fevers despite taking tylenol. She also endorses profuse night sweats which soak the bed, and a 10lb weight loss. Of note, she was recently hospitalized in [place] and given a course of steroids for pneumonia, but did not have improvement of her symptoms. 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     During the admission, she underwent bronchoscopy with transbronchial biopsies and BAL. Pathology was negative for malignancy with no growth on cultures (GMS not submitted). The CD4/CD8 ratio was elevated.     Since admission, she has not notices any improvement in her symptoms. She requires assistance from her fiance with most of her ADLs. She denies fevers, chills, sick contacts, cough with sputum production, or chest pain.     Pertinent History:  Medications: albuterol  Tobacco/Vape hx: Quit 2 years ago. 1ppd for 15 years.   EtOH/illicit drug use: social drinking. 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 Inside dog   Travel/TB Hx: No history TB  Allergies/Rhinitis: none  Previous known hx of lung disease: none      Past Medical History:   Diagnosis Date    Acute (diffuse) proliferative glomerulonephritis     Acute renal failure 7/19/2019    B-cell lymphoproliferative disorder 7/30/2019    Chronic obstructive lung disease 7/27/2019    Chronic viral hepatitis B without delta agent and without coma 7/24/2019    Cryoglobulinemic vasculitis     Hypogammaglobulinemia 8/5/2019    Iron deficiency anemia 7/30/2019    Renal vein thrombosis 2015    s/p embolectomy and lovenox for 9 mos    Steroid-induced hyperglycemia 7/28/2019    Uterine leiomyoma     s/p resection     No past surgical history on file.  No family history on file.  Social History     Socioeconomic History    Marital status:      Spouse name: Not on file    Number of children: Not on file    Years of education: Not on file    Highest education level: Not on file   Occupational History    Not on file   Social Needs    Financial resource strain: Not on file    Food insecurity     Worry: Not on file     Inability: Not on file    Transportation needs     Medical: Not on file     Non-medical: Not on file   Tobacco Use    Smoking status: Former Smoker    Smokeless tobacco: Never Used   Substance and Sexual Activity    Alcohol use: Not Currently    Drug use: Never    Sexual activity: Not on file   Lifestyle    Physical activity     Days per week: Not on file     Minutes per session: Not on file    Stress: Not on file   Relationships    Social connections     Talks on phone: Not on file     Gets together: Not on file     Attends [employer] service: Not on file     Active member of club or organization: Not on file     Attends meetings of clubs or organizations: Not on file     Relationship status: Not on file   Other Topics Concern    Not on file   Social History Narrative    Not on file  "    Review of patient's allergies indicates:  No Known Allergies    Review of Systems:  Constitutional: Negative for fever, chills. Positive weight loss, activity change, decreased appetite, fatigue, night sweats and weakness.   HENT: Negative for postnasal drip, rhinorrhea and congestion.    Respiratory: Positive  shortness of breath, dyspnea on exertion No cough. Negative for snoring, hemoptysis, sputum production, choking, chest tightness, wheezing,   Cardiovascular: Negative for chest pain, palpitations and leg swelling.   Musculoskeletal: Negative for arthralgias and back pain.   Gastrointestinal: Negative for nausea, vomiting, abdominal pain and abdominal distention.   Neurological: Negative for dizziness, syncope and headaches.   Psychiatric/Behavioral: Negative for confusion.       OBJECTIVE:     Vital Signs  Vitals:    11/20/20 1309   BP: 130/83   BP Location: Left arm   Patient Position: Sitting   Pulse: 95   SpO2: 96%   Weight: 92.5 kg (204 lb)   Height: 5' 6" (1.676 m)     Body mass index is 32.93 kg/m².    Physical Exam:  General: well developed, well nourished, no distress. Patient sitting in wheelchair.   HENT:   Head: Normocephalic.   Eyes:  PERRLA, conjunctivae/corneas clear  Nose: nasal mucosa moist, no discharge, no post nasal drip  Mouth/Throat: Mallampati Score: 2  Neck: Normal range of motion. Neck supple. No JVD present.   Pulmonary/Chest:  normal respiratory effort, no wheezes, no rales,   Cardiovascular: regular rate and rhythm and no murmur  Abdominal: soft, nontender, nondistended  Musculoskeletal: no cyanosis, no edema, no clubbing  Skin: No rashes or lesions. good skin turgor  Neurological: alert, oriented, thought content appropriate  Psychiatric: normal mood and affect. behavior is normal. Judgment and thought content normal    Laboratory:      Chemistry        Component Value Date/Time     11/19/2020 1256    K 4.0 11/19/2020 1256     11/19/2020 1256    CO2 21 (L) " 11/19/2020 1256    BUN 19 11/19/2020 1256    CREATININE 1.5 (H) 11/19/2020 1256     (H) 11/19/2020 1256        Component Value Date/Time    CALCIUM 10.9 (H) 11/19/2020 1256    ALKPHOS 126 11/19/2020 1256    AST 20 11/19/2020 1256    ALT 14 11/19/2020 1256    BILITOT 0.4 11/19/2020 1256    ESTGFRAFRICA 45.2 (A) 11/19/2020 1256    EGFRNONAA 39.2 (A) 11/19/2020 1256               No results found for: PREFEV1, GDL0HKLEAA, PREFVC, FVCPREREF, QRMDVA7AAC, ZBP2AVJZZRU, HHJZ7IVB, HLDM1PRI, PREDLCO, DLCOSBPRRF, DLCOADJPRE, DLCOCSBRPRRF, POSTFEV1, POSTFVC, ICDSATM4UKX   X-Ray Chest 1 View  Narrative: EXAMINATION:  XR CHEST 1 VIEW    CLINICAL HISTORY:  eval for post TBBx pneumothorax;    TECHNIQUE:  Single frontal view of the chest was performed.    COMPARISON:  11/07/2020.    FINDINGS:  No pneumothorax or other complication of transbronchial biopsy identified.    The patient has extensive mixed interstitial and airspace disease similar to the recent chest radiograph of 11/07/2020.  Impression: Please see above.    Electronically signed by: Leia Green MD  Date:    11/10/2020  Time:    15:15     11/10/20 Pathology  Histologic sections of the specimen show lymphocytic infiltrates.   Immunohistochemical stains are performed with adequate controls.  The   lymphocytic infiltrates are composed of predominantly CD3-positive T   lymphocytes.  Rare CD20-positive B lymphocytes and -positive plasma   cells are seen.   Taken together, there is no evidence of lymphoproliferative disorder at this       ASSESSMENT/PLAN:   Problems:  Multiple Bilateral pulmonary nodules  Diffuse proliferative glomerulonephritis 2/2   Cryoglobulinemic vasculitis,   HBV on entecavir,   Hypogammaglobulinemia  Biopsy-proven beta cell lymphoma  bone marrow bx in 2019  HTN  DM2    Ms. Vital is a 56 yo female with the above history who presented for evaluation of multiple pulmonary nodules. The nodules are bronchovascularly distributed and seem to  coalesce at the bases of her lungs bilaterally (R>L). Given her history and CT findings and rapid progression of disease, we must consider fungal vs. Vasculitis origin of disease. Sarcoidosis can be considered given the elevated CD4/CD8 ratio noted in cytology; however, sarcoidosis is a diagnosis of exclusion and we must complete workup for ddx.  Definitive diagnosis could be made with tissue pathology. VATS with biopsy would provide a greater yield of tissue. Heme/onc has been consulted and has discussed with the patient with plans to proceed with IR-guided biospy.    - Ordered fungal workup with histoplasma antigen and antibody, blastomyces   - ordered full vasculitis work up with anca, crp, and esr  - completed complement and cryoglobulin level.   - Patient to return to clinic in one month following testing.     Cody Mejias MD  [facility]  Pager #: [phone]

## 2020-11-20 NOTE — NURSING
Tolerated treatment well.  Advised to call MD for any problems or concerns.  Discharged to [facility] per w/c with s/o.  NAD noted upon discharge.

## 2020-11-25 ENCOUNTER — TELEPHONE (OUTPATIENT)
Dept: HEMATOLOGY/ONCOLOGY | Facility: CLINIC | Age: 55
End: 2020-11-25

## 2020-11-25 DIAGNOSIS — D47.9 B-CELL LYMPHOPROLIFERATIVE DISORDER: Primary | ICD-10-CM

## 2020-11-25 NOTE — TELEPHONE ENCOUNTER
Spoke with patient and reviewed IR appointment at [facility] 12/1. Arrive at 7am. Gave address and where to check in. Reviewed no fasting and no aspirin. Patient understands.

## 2020-11-30 ENCOUNTER — TELEPHONE (OUTPATIENT)
Dept: HEMATOLOGY/ONCOLOGY | Facility: CLINIC | Age: 55
End: 2020-11-30

## 2020-11-30 NOTE — TELEPHONE ENCOUNTER
Spoke to patient regarding PET results and IR lung biopsy tomorrow. Will plan to repeat labs tomorrow. She states she is feeling better. Will move visit from 12/3 to 12/10 given her IR biopsy is unlikely to result this week [place]. All questions answered. She knows to call if any issues in the interim.

## 2020-11-30 NOTE — TELEPHONE ENCOUNTER
----- Message from Pedro Rivera sent at 11/30/2020  9:52 AM CST -----  Contact: self  Pt is asking for a call back in regards to her IR and Biopsy, she stated she has questions     Contact [facility]- [phone]

## 2020-12-01 ENCOUNTER — HOSPITAL ENCOUNTER (OUTPATIENT)
Facility: HOSPITAL | Age: 55
Discharge: HOME | End: 2020-12-01
Attending: RADIOLOGY | Admitting: RADIOLOGY
Payer: MEDICAID

## 2020-12-01 ENCOUNTER — HOSPITAL ENCOUNTER (OUTPATIENT)
Dept: PREADMISSION TESTING | Facility: HOSPITAL | Age: 55
Discharge: HOME | End: 2020-12-01
Attending: STUDENT IN AN ORGANIZED HEALTH CARE EDUCATION/TRAINING PROGRAM
Payer: MEDICAID

## 2020-12-01 ENCOUNTER — TELEPHONE (OUTPATIENT)
Dept: HEPATOLOGY | Facility: CLINIC | Age: 55
End: 2020-12-01

## 2020-12-01 ENCOUNTER — HOSPITAL ENCOUNTER (OUTPATIENT)
Dept: INTERVENTIONAL RADIOLOGY/VASCULAR | Facility: HOSPITAL | Age: 55
Discharge: HOME | End: 2020-12-01
Attending: STUDENT IN AN ORGANIZED HEALTH CARE EDUCATION/TRAINING PROGRAM
Payer: MEDICAID

## 2020-12-01 VITALS
RESPIRATION RATE: 17 BRPM | DIASTOLIC BLOOD PRESSURE: 71 MMHG | HEART RATE: 87 BPM | SYSTOLIC BLOOD PRESSURE: 143 MMHG | OXYGEN SATURATION: 100 %

## 2020-12-01 VITALS
RESPIRATION RATE: 20 BRPM | OXYGEN SATURATION: 100 % | WEIGHT: 203.94 LBS | BODY MASS INDEX: 32.91 KG/M2 | DIASTOLIC BLOOD PRESSURE: 68 MMHG | TEMPERATURE: 98 F | SYSTOLIC BLOOD PRESSURE: 142 MMHG | HEART RATE: 87 BPM

## 2020-12-01 DIAGNOSIS — R91.8 MULTIPLE PULMONARY NODULES: ICD-10-CM

## 2020-12-01 DIAGNOSIS — Z01.818 PREOP TESTING: ICD-10-CM

## 2020-12-01 DIAGNOSIS — D86.9 SARCOIDOSIS: Primary | ICD-10-CM

## 2020-12-01 DIAGNOSIS — Z01.818 PREOP TESTING: Primary | ICD-10-CM

## 2020-12-01 DIAGNOSIS — D47.9 B-CELL LYMPHOPROLIFERATIVE DISORDER: Primary | ICD-10-CM

## 2020-12-01 LAB
GRAM STN SPEC: NORMAL
GRAM STN SPEC: NORMAL
SARS-COV-2 RDRP RESP QL NAA+PROBE: NEGATIVE

## 2020-12-01 PROCEDURE — U0002 COVID-19 LAB TEST NON-CDC: HCPCS

## 2020-12-01 PROCEDURE — 88184 FLOWCYTOMETRY/ TC 1 MARKER: CPT | Performed by: PATHOLOGY

## 2020-12-01 PROCEDURE — 27200937 IR BIOPSY LUNG

## 2020-12-01 PROCEDURE — 77012 CT GUIDED NEEDLE PLACEMENT: ICD-10-PCS | Mod: 26,,, | Performed by: RADIOLOGY

## 2020-12-01 PROCEDURE — 87070 CULTURE OTHR SPECIMN AEROBIC: CPT

## 2020-12-01 PROCEDURE — 63600175 PHARM REV CODE 636 W HCPCS: Performed by: RADIOLOGY

## 2020-12-01 PROCEDURE — 87075 CULTR BACTERIA EXCEPT BLOOD: CPT

## 2020-12-01 PROCEDURE — 88185 FLOWCYTOMETRY/TC ADD-ON: CPT | Performed by: PATHOLOGY

## 2020-12-01 PROCEDURE — 88305 TISSUE EXAM BY PATHOLOGIST: CPT | Performed by: PATHOLOGY

## 2020-12-01 PROCEDURE — 82962 GLUCOSE BLOOD TEST: CPT

## 2020-12-01 PROCEDURE — 88312 SPECIAL STAINS GROUP 1: CPT | Performed by: PATHOLOGY

## 2020-12-01 PROCEDURE — 77012 CT SCAN FOR NEEDLE BIOPSY: CPT | Mod: 26,,, | Performed by: RADIOLOGY

## 2020-12-01 PROCEDURE — 77012 CT SCAN FOR NEEDLE BIOPSY: CPT | Mod: TC

## 2020-12-01 PROCEDURE — 32405 IR BIOPSY LUNG: ICD-10-PCS | Mod: LT,,, | Performed by: RADIOLOGY

## 2020-12-01 PROCEDURE — 32405 IR BIOPSY LUNG: CPT | Mod: LT,,, | Performed by: RADIOLOGY

## 2020-12-01 PROCEDURE — 87205 SMEAR GRAM STAIN: CPT

## 2020-12-01 RX ORDER — MIDAZOLAM HYDROCHLORIDE 1 MG/ML
INJECTION INTRAMUSCULAR; INTRAVENOUS CODE/TRAUMA/SEDATION MEDICATION
Status: COMPLETED | OUTPATIENT
Start: 2020-12-01 | End: 2020-12-01

## 2020-12-01 RX ORDER — FENTANYL CITRATE 50 UG/ML
INJECTION, SOLUTION INTRAMUSCULAR; INTRAVENOUS CODE/TRAUMA/SEDATION MEDICATION
Status: COMPLETED | OUTPATIENT
Start: 2020-12-01 | End: 2020-12-01

## 2020-12-01 RX ADMIN — MIDAZOLAM HYDROCHLORIDE 1 MG: 1 INJECTION, SOLUTION INTRAMUSCULAR; INTRAVENOUS at 09:12

## 2020-12-01 RX ADMIN — FENTANYL CITRATE 50 MCG: 50 INJECTION, SOLUTION INTRAMUSCULAR; INTRAVENOUS at 09:12

## 2020-12-01 NOTE — TELEPHONE ENCOUNTER
Called patient reschedule her appt on 1/18 due to holiday she accepted 1/20 mailed new appt reminder.

## 2020-12-01 NOTE — DISCHARGE INSTRUCTIONS
BATHING:  ? You may shower in 2 days.  DRESSING:  ? Remove dressing in 2 days.        ACTIVITY LEVEL: If you have received sedation or an anesthetic, you may feel sleepy for several hours. Rest until you are more awake. Gradually resume your normal activities    Do not drive, drink alcohol, or sign legal documents for 24 hours, or if taking narcotic pain medication.      DIET: You may resume your home diet. If nausea is present, increase your diet gradually with fluids and bland foods.    Medications: Pain medication should be taken only if needed and as directed. If antibiotics are prescribed, the medication should be taken until completed. You will be given an updated list of you medications.  ? No driving, alcoholic beverages or signing legal documents for next 24 hours if you have had sedation, or while taking pain medication    CALL THE DOCTOR:   For any obvious bleeding (some dried blood over the incision is normal).     Redness, swelling, foul smell around incision or fever over 101.  Shortness of breath.  Persistent pain or nausea not relieved by medication.  Call  [phone]     to speak with an Interventional Radiologist    If any unusual problems or difficulties occur contact your doctor. If you cannot contact your doctor but feel your signs and symptoms warrant a physicians attention return to the emergency room.             Fall Prevention  Millions of people fall every year and injure themselves. You may have had anesthesia or sedation which may increase your risk of falling. You may have health issues that put you at an increased risk of falling.     Here are ways to reduce your risk of falling.  ·   · Make your home safe by keeping walkways clear of objects you may trip over.  · Use non-slip pads under rugs. Do not use area rugs or small throw rugs.  · Use non-slip mats in bathtubs and showers.  · Install handrails and lights on staircases.  · Do not walk in poorly lit areas.  · Do not stand on chairs  or wobbly ladders.  · Use caution when reaching overhead or looking upward. This position can cause a loss of balance.  · Be sure your shoes fit properly, have non-slip bottoms and are in good condition.   · Wear shoes both inside and out. Avoid going barefoot or wearing slippers.  · Be cautious when going up and down stairs, curbs, and when walking on uneven sidewalks.  · If your balance is poor, consider using a cane or walker.  · If your fall was related to alcohol use, stop or limit alcohol intake.   · If your fall was related to use of sleeping medicines, talk to your doctor about this. You may need to reduce your dosage at bedtime if you awaken during the night to go to the bathroom.    · To reduce the need for nighttime bathroom trips:  ¨ Avoid drinking fluids for several hours before going to bed  ¨ Empty your bladder before going to bed  ¨ Men can keep a urinal at the bedside  · Stay as active as you can. Balance, flexibility, strength, and endurance all come from exercise. They all play a role in preventing falls. Ask your healthcare provider which types of activity are right for you.  · Get your vision checked on a regular basis.  · If you have pets, know where they are before you stand up or walk so you don't trip over them.  Use night lights.

## 2020-12-01 NOTE — DISCHARGE SUMMARY
[facility]  Discharge Summary    Radiology Discharge Summary      Admit date: 12/1/2020  8:19 AM  Discharge date: December 1, 2020    Instructions Given to patient: YesVerbal    Diet: Regular    Activity:Restriction as listed: No strenuous activities for 48 hours    Medications on discharge (List): Refer to Discharge Medication List    Hospital Course: CT guided biopsy of lert pulmonary nodules    Description of Condition on Discharge: stable    Discharge Disposition: Home    Discharge Diagnosis: Bilateral pulmonary nodules    Discharge Procedure Orders   Diet general     Call MD for:  redness, tenderness, or signs of infection (pain, swelling, redness, odor or green/yellow discharge around incision site)     Other restrictions (specify):   Order Comments: No strenuous activities for 48 hours     Call MD for:  temperature >100.4     Call MD for:  severe uncontrolled pain     Call MD for:  persistent dizziness or light-headedness     Call MD for:  difficulty breathing, headache or visual disturbances     Remove dressing in 24 hours

## 2020-12-01 NOTE — H&P
[facility]  History & Physical - Short Stay  Interventional Radiology    SUBJECTIVE:     Chief Complaint/Reason for Admission: Lung nodules    Informant(s):  self and Electronic Health Record    History of Present Illness:  Kendy Vital is a 55 y.o. female with a history of bilateral lung nodules.    Patient presents for CT guided lung biopsy.    Scheduled Meds:   Continuous Infusions:   PRN Meds:     Review of patient's allergies indicates:  No Known Allergies    Past Medical History:   Diagnosis Date    Acute (diffuse) proliferative glomerulonephritis     Acute renal failure 7/19/2019    B-cell lymphoproliferative disorder 7/30/2019    CHF (congestive heart failure)     Chronic obstructive lung disease 7/27/2019    Chronic viral hepatitis B without delta agent and without coma 7/24/2019    Cryoglobulinemic vasculitis     Hypogammaglobulinemia 8/5/2019    Iron deficiency anemia 7/30/2019    Renal vein thrombosis 2015    s/p embolectomy and lovenox for 9 mos    Steroid-induced hyperglycemia 7/28/2019    Uterine leiomyoma     s/p resection     No past surgical history on file.  No family history on file.  Social History     Tobacco Use    Smoking status: Former Smoker    Smokeless tobacco: Never Used   Substance Use Topics    Alcohol use: Not Currently    Drug use: Never        Review of Systems:  ROS not obtained    OBJECTIVE:     Vital Signs (Most Recent):       Physical Exam:  Lungs: No respiratory distress  Cardiac: regular rate and rhythm  Alert and oriented    Laboratory  CBC:   Lab Results   Component Value Date/Time    WBC 6.18 11/19/2020 12:56 PM    RBC 5.48 (H) 11/19/2020 12:56 PM    HGB 13.6 11/19/2020 12:56 PM    HCT 45.5 11/19/2020 12:56 PM     (H) 11/19/2020 12:56 PM    MCV 83 11/19/2020 12:56 PM    MCH 24.8 (L) 11/19/2020 12:56 PM    MCHC 29.9 (L) 11/19/2020 12:56 PM     Coagulation:   Lab Results   Component Value Date/Time    INR 1.0 11/08/2020 09:54 AM          ASSESSMENT/PLAN:     Bilateral pulmonary nodules.    Patient will undergo CT guided lung biopsy.    Sedation/Anesthesia Assessment:  ASA Classification: II = Mild systemic disease  Mallampati Score: II (hard and soft palate, upper portion of tonsils anduvula visible)    Sedation History: No problems    Sedation Plan: Conscious sedation

## 2020-12-01 NOTE — PROCEDURES
[facility]  Interventional Radiology  High Risk Procedure - Outpatient    Date: 12/01/2020 Time: 10:32 AM    Pre-Op Diagnosis: Pulmonary nodules, bilateral    Post-Op Diagnosis: same    Procedure Performed by: Livan Casey MD    Assistant: none    Procedure: CT guided lung biopsy    Specimen/Tissue Removed: 11 x 20 gauge cores, 3 for micro and 8 for both flow cytometry and histology    Estimated Blood Loss: Less than 5 mL    Procedure Note/Findings: CT guided lung biopsy performed of coalescence of pulmonary nodules in left lung. 11 x 20 gauge cores obtained. No immediate post-procedure complications noted.            Please refer to dictated report for additional details.

## 2020-12-01 NOTE — SEDATION DOCUMENTATION
Pt tolerated procedure well. Bandaid to left posterior scapula. Report given to [facility] nurse.

## 2020-12-02 LAB
FLOW CYTOMETRY ANTIBODIES ANALYZED - LYMPH NODE: NORMAL
FLOW CYTOMETRY COMMENT - LYMPH NODE: NORMAL
FLOW CYTOMETRY INTERPRETATION - LYMPH NODE: NORMAL

## 2020-12-03 ENCOUNTER — OFFICE VISIT (OUTPATIENT)
Dept: HEMATOLOGY/ONCOLOGY | Facility: CLINIC | Age: 55
End: 2020-12-03
Payer: MEDICAID

## 2020-12-03 ENCOUNTER — LAB VISIT (OUTPATIENT)
Dept: LAB | Facility: HOSPITAL | Age: 55
End: 2020-12-03
Attending: STUDENT IN AN ORGANIZED HEALTH CARE EDUCATION/TRAINING PROGRAM
Payer: MEDICAID

## 2020-12-03 ENCOUNTER — TELEPHONE (OUTPATIENT)
Dept: ADMINISTRATIVE | Facility: CLINIC | Age: 55
End: 2020-12-03

## 2020-12-03 VITALS
HEIGHT: 66 IN | HEART RATE: 81 BPM | OXYGEN SATURATION: 95 % | SYSTOLIC BLOOD PRESSURE: 136 MMHG | TEMPERATURE: 98 F | BODY MASS INDEX: 33.59 KG/M2 | RESPIRATION RATE: 16 BRPM | DIASTOLIC BLOOD PRESSURE: 79 MMHG | WEIGHT: 209 LBS

## 2020-12-03 DIAGNOSIS — E83.52 HYPERCALCEMIA: ICD-10-CM

## 2020-12-03 DIAGNOSIS — B18.1 CHRONIC VIRAL HEPATITIS B WITHOUT DELTA AGENT AND WITHOUT COMA: ICD-10-CM

## 2020-12-03 DIAGNOSIS — E66.9 OBESITY (BMI 30-39.9): ICD-10-CM

## 2020-12-03 DIAGNOSIS — D47.9 B-CELL LYMPHOPROLIFERATIVE DISORDER: ICD-10-CM

## 2020-12-03 DIAGNOSIS — D89.1 CRYOGLOBULINEMIC VASCULITIS: ICD-10-CM

## 2020-12-03 DIAGNOSIS — E11.9 TYPE 2 DIABETES MELLITUS WITHOUT COMPLICATION, WITHOUT LONG-TERM CURRENT USE OF INSULIN: ICD-10-CM

## 2020-12-03 DIAGNOSIS — N00.8 ACUTE (DIFFUSE) PROLIFERATIVE GLOMERULONEPHRITIS: ICD-10-CM

## 2020-12-03 DIAGNOSIS — R91.8 MULTIPLE PULMONARY NODULES: Primary | ICD-10-CM

## 2020-12-03 DIAGNOSIS — R91.8 PULMONARY INFILTRATE: ICD-10-CM

## 2020-12-03 DIAGNOSIS — D50.8 OTHER IRON DEFICIENCY ANEMIA: ICD-10-CM

## 2020-12-03 LAB
ALBUMIN SERPL BCP-MCNC: 3 G/DL (ref 3.5–5.2)
ALP SERPL-CCNC: 93 U/L (ref 55–135)
ALT SERPL W/O P-5'-P-CCNC: 13 U/L (ref 10–44)
ANION GAP SERPL CALC-SCNC: 11 MMOL/L (ref 8–16)
AST SERPL-CCNC: 18 U/L (ref 10–40)
BASOPHILS # BLD AUTO: 0.06 K/UL (ref 0–0.2)
BASOPHILS NFR BLD: 1.3 % (ref 0–1.9)
BILIRUB SERPL-MCNC: 0.4 MG/DL (ref 0.1–1)
BUN SERPL-MCNC: 22 MG/DL (ref 6–20)
CALCIUM SERPL-MCNC: 11.2 MG/DL (ref 8.7–10.5)
CHLORIDE SERPL-SCNC: 107 MMOL/L (ref 95–110)
CO2 SERPL-SCNC: 22 MMOL/L (ref 23–29)
CREAT SERPL-MCNC: 1.4 MG/DL (ref 0.5–1.4)
DIFFERENTIAL METHOD: ABNORMAL
EOSINOPHIL # BLD AUTO: 0.2 K/UL (ref 0–0.5)
EOSINOPHIL NFR BLD: 4.8 % (ref 0–8)
ERYTHROCYTE [DISTWIDTH] IN BLOOD BY AUTOMATED COUNT: 18.5 % (ref 11.5–14.5)
EST. GFR  (AFRICAN AMERICAN): 49.1 ML/MIN/1.73 M^2
EST. GFR  (NON AFRICAN AMERICAN): 42.6 ML/MIN/1.73 M^2
GLUCOSE SERPL-MCNC: 202 MG/DL (ref 70–110)
HCT VFR BLD AUTO: 41.1 % (ref 37–48.5)
HGB BLD-MCNC: 12.5 G/DL (ref 12–16)
IMM GRANULOCYTES # BLD AUTO: 0.01 K/UL (ref 0–0.04)
IMM GRANULOCYTES NFR BLD AUTO: 0.2 % (ref 0–0.5)
LDH SERPL L TO P-CCNC: 167 U/L (ref 110–260)
LYMPHOCYTES # BLD AUTO: 1.5 K/UL (ref 1–4.8)
LYMPHOCYTES NFR BLD: 31 % (ref 18–48)
MCH RBC QN AUTO: 25.4 PG (ref 27–31)
MCHC RBC AUTO-ENTMCNC: 30.4 G/DL (ref 32–36)
MCV RBC AUTO: 84 FL (ref 82–98)
MONOCYTES # BLD AUTO: 0.6 K/UL (ref 0.3–1)
MONOCYTES NFR BLD: 11.5 % (ref 4–15)
NEUTROPHILS # BLD AUTO: 2.4 K/UL (ref 1.8–7.7)
NEUTROPHILS NFR BLD: 51.2 % (ref 38–73)
NRBC BLD-RTO: 0 /100 WBC
PLATELET # BLD AUTO: 263 K/UL (ref 150–350)
PLATELET BLD QL SMEAR: ABNORMAL
PMV BLD AUTO: 9.9 FL (ref 9.2–12.9)
POTASSIUM SERPL-SCNC: 3.4 MMOL/L (ref 3.5–5.1)
PROT SERPL-MCNC: 6.7 G/DL (ref 6–8.4)
RBC # BLD AUTO: 4.92 M/UL (ref 4–5.4)
SODIUM SERPL-SCNC: 140 MMOL/L (ref 136–145)
WBC # BLD AUTO: 4.77 K/UL (ref 3.9–12.7)

## 2020-12-03 PROCEDURE — 36415 COLL VENOUS BLD VENIPUNCTURE: CPT

## 2020-12-03 PROCEDURE — 99215 OFFICE O/P EST HI 40 MIN: CPT | Mod: S$PBB,,, | Performed by: STUDENT IN AN ORGANIZED HEALTH CARE EDUCATION/TRAINING PROGRAM

## 2020-12-03 PROCEDURE — 99215 PR OFFICE/OUTPT VISIT, EST, LEVL V, 40-54 MIN: ICD-10-PCS | Mod: S$PBB,,, | Performed by: STUDENT IN AN ORGANIZED HEALTH CARE EDUCATION/TRAINING PROGRAM

## 2020-12-03 PROCEDURE — 80053 COMPREHEN METABOLIC PANEL: CPT

## 2020-12-03 PROCEDURE — 99215 OFFICE O/P EST HI 40 MIN: CPT | Mod: PBBFAC | Performed by: STUDENT IN AN ORGANIZED HEALTH CARE EDUCATION/TRAINING PROGRAM

## 2020-12-03 PROCEDURE — 99999 PR PBB SHADOW E&M-EST. PATIENT-LVL V: ICD-10-PCS | Mod: PBBFAC,,, | Performed by: STUDENT IN AN ORGANIZED HEALTH CARE EDUCATION/TRAINING PROGRAM

## 2020-12-03 PROCEDURE — 83615 LACTATE (LD) (LDH) ENZYME: CPT

## 2020-12-03 PROCEDURE — 99999 PR PBB SHADOW E&M-EST. PATIENT-LVL V: CPT | Mod: PBBFAC,,, | Performed by: STUDENT IN AN ORGANIZED HEALTH CARE EDUCATION/TRAINING PROGRAM

## 2020-12-03 PROCEDURE — 85025 COMPLETE CBC W/AUTO DIFF WBC: CPT

## 2020-12-03 RX ORDER — SULFAMETHOXAZOLE AND TRIMETHOPRIM 400; 80 MG/1; MG/1
2 TABLET ORAL
Qty: 24 TABLET | Refills: 1 | Status: SHIPPED | OUTPATIENT
Start: 2020-12-04 | End: 2021-04-19

## 2020-12-03 RX ORDER — PANTOPRAZOLE SODIUM 40 MG/1
40 TABLET, DELAYED RELEASE ORAL
Qty: 30 TABLET | Refills: 11 | Status: SHIPPED | OUTPATIENT
Start: 2020-12-03 | End: 2021-07-08 | Stop reason: SDUPTHER

## 2020-12-03 RX ORDER — PREDNISONE 20 MG/1
40 TABLET ORAL DAILY
Qty: 60 TABLET | Refills: 0 | Status: SHIPPED | OUTPATIENT
Start: 2020-12-03 | End: 2020-12-09 | Stop reason: SDUPTHER

## 2020-12-03 NOTE — PROGRESS NOTES
PATIENT: Kendy Vital  MRN: [id number]  DATE: 12/3/2020      Diagnosis:   1. Multiple pulmonary nodules    2. Pulmonary infiltrate    3. B-cell lymphoproliferative disorder    4. Other iron deficiency anemia    5. Cryoglobulinemic vasculitis    6. Acute (diffuse) proliferative glomerulonephritis    7. Type 2 diabetes mellitus without complication, without long-term current use of insulin    8. Obesity (BMI 30-39.9)    9. Chronic viral hepatitis B without delta agent and without coma    10. Hypercalcemia        Chief Complaint: No chief complaint on file.      Subjective:   Ms. Vital is a 55 y.o. female with lymphoproliferative disorder, GN, HBV, cryoglobulinemic vasculitis (type 3), who presents to the Hematology Clinic for follow-up.    Hematologic History  -Presented with severe thrombocytopenia, renal failure, and bilateral infiltrates on chest CT concerning for PNA.  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   -Patient does not have HCV, but is positive for HBV and has been started on anti-viral therapy with entecavir.  -Due to concern of possible hematologic malignancy, patient had bone marrow biopsy on 7/24 revealing B-cell lymphoproliferative disorders.  Her serum cryoglobulins returned as Type 3 and this was not c/w with a hematologic malignancy etiology for cryoglobulinemia  -Had 3 days of pulse steroids 1gm hydrocortisone, in addition to sessions of Plasma Exchange/Plasmapheresis  -She also received IVIG and Rituxan per Rheumatology  -On entecavir for HBV  -PET done 12/5/19 without significant adenopathy  -Admitted 11/7-11/12/20 presented with acute hypoxic respiratory failure after being treated 1 week ago for PNA at OSH.  Presented with persistent cough and hemoptysis.  CT CAP showed pulm nodules concerning for lymphoproliferative d/o. Pulm performed bronch 11/10 for tissue dx. This was negative for malignancy.  -  PET was done 11/20/20 showing innumerable hypermetabolic pulmonary nodules with coalescence into consolidative masses in the bilateral lower lobes  - IR lung biopsy done 12/1/20 showed Granulomatous inflammation on a background of fibrosis, chronic inflammation, and reactive alveolar lining cell hyperplasia  - Starting prednisone 40mg Qd on 12/3/20    Interval History: Patient seen today alone, continues to have dyspnea. Feeling fatigued. Has been having constipation. C/o arthralgias. Has lost 20 lbs. Having night sweats and chills. Denies lymphadenopathy. Denies cough, chest pain, abdominal pain, bruising/bleeding.    Past Medical History:   Past Medical History:   Diagnosis Date    Acute (diffuse) proliferative glomerulonephritis     Acute renal failure 7/19/2019    B-cell lymphoproliferative disorder 7/30/2019    CHF (congestive heart failure)     Chronic obstructive lung disease 7/27/2019    Chronic viral hepatitis B without delta agent and without coma 7/24/2019    Cryoglobulinemic vasculitis     Hypogammaglobulinemia 8/5/2019    Iron deficiency anemia 7/30/2019    Renal vein thrombosis 2015    s/p embolectomy and lovenox for 9 mos    Steroid-induced hyperglycemia 7/28/2019    Uterine leiomyoma     s/p resection       Past Surgical HIstory:   Past Surgical History:   Procedure Laterality Date    LUNG BIOPSY N/A 12/1/2020    Procedure: BIOPSY, LUNG;  Surgeon: [facility];  Location: [facility] OR;  Service: Radiology;  Laterality: N/A;  8 A.M. START  PHONE PREOP DONE 11/27/2020  PT/INR, CBC, CMP AND COVID ON AM OF PROCEDURE   ARRIVAL AT 7:00 FOR 8:30 APPT       Family History: No family history on file.    Social History:  reports that she has quit smoking. She has never used smokeless tobacco. She reports previous alcohol use. She reports that she does not use drugs.    Allergies:  Review of patient's allergies indicates:  No Known Allergies    Medications:  Current Outpatient Medications    Medication Sig Dispense Refill    acetaminophen (TYLENOL) 500 MG tablet Take 500 mg by mouth 3 (three) times daily as needed for Pain (or fever).      albuterol (PROVENTIL/VENTOLIN HFA) 90 mcg/actuation inhaler Inhale 2 puffs into the lungs every 6 (six) hours as needed for Wheezing or Shortness of Breath. Rescue      aspirin (ECOTRIN) 81 MG EC tablet Take 81 mg by mouth once daily.      blood sugar diagnostic Strp use to test blood gluocse 2 (two) times daily with meals. (Patient taking differently: 1 strip by Misc.(Non-Drug; Combo Route) route 3 (three) times daily. ) 100 strip 11    blood-glucose meter Misc Use as instructed 1 each 0    cyanocobalamin (VITAMIN B-12) 250 MCG tablet Take 250 mcg by mouth once daily.      diphenhydrAMINE-acetaminophen (TYLENOL PM)  mg Tab Take 1 tablet by mouth nightly as needed (fever or pain).      entecavir (BARACLUDE) 0.5 MG Tab Take 1 tablet (0.5 mg total) by mouth every other day. 15 tablet 6    ferrous sulfate 325 (65 FE) MG EC tablet Take 1 tablet (325 mg total) by mouth once daily.  0    furosemide (LASIX) 40 MG tablet HOLD FOR 7 DAYS WHILE YOUR KIDNEYS RECOVER. RESTART ON 11/19/20  3    insulin (LANTUS SOLOSTAR U-100 INSULIN) glargine 100 units/mL (3mL) SubQ pen Inject 45 Units into the skin every morning.      lancets 30 gauge Misc use to test blood glucose 2 (two) times daily with meals. (Patient taking differently: 1 lancet by Misc.(Non-Drug; Combo Route) route 3 (three) times daily. ) 100 each 11    multivitamin (ONE DAILY MULTIVITAMIN) per tablet Take 1 tablet by mouth once daily.      NIFEdipine (PROCARDIA-XL) 60 MG (OSM) 24 hr tablet Take 60 mg by mouth once daily.      polyethylene glycol (GLYCOLAX) 17 gram/dose powder Take 17 g by mouth daily as needed (constipation).      traZODone (DESYREL) 100 MG tablet Take 100-200 mg by mouth nightly as needed for Insomnia.      vitamin D (VITAMIN D3) 1000 units Tab Take 1 tablet (1,000 Units total)  by mouth once daily. (Patient taking differently: Take 2,000 Units by mouth once daily. )      calcium carbonate (OS-DONTE) 500 mg calcium (1,250 mg) tablet Take 2 tablets (1,000 mg total) by mouth once daily. (Patient taking differently: Take 1 tablet by mouth once daily. )  0    carvedilol (COREG) 25 MG tablet Take 1 tablet (25 mg total) by mouth 2 (two) times daily. 60 tablet 11    cloNIDine (CATAPRES) 0.1 MG tablet Take 2 tablets (0.2 mg total) by mouth 3 (three) times daily. (Patient taking differently: Take 0.1 mg by mouth once daily. ) 180 tablet 11    insulin aspart U-100 (NOVOLOG) 100 unit/mL (3 mL) InPn pen Inject 1-10 Units into the skin 3 (three) times daily. Use sliding scale provided in instructions 108 mL 0    lancing device Misc 1 Device by Misc.(Non-Drug; Combo Route) route 2 (two) times daily with meals. 1 each 0    pantoprazole (PROTONIX) 40 MG tablet Take 1 tablet (40 mg total) by mouth before breakfast. 30 tablet 11    predniSONE (DELTASONE) 20 MG tablet Take 2 tablets (40 mg total) by mouth once daily. 60 tablet 0    [START ON 12/4/2020] sulfamethoxazole-trimethoprim 400-80mg (BACTRIM,SEPTRA) 400-80 mg per tablet Take 2 tablets by mouth every Mon, Wed, Fri. 24 tablet 1     No current facility-administered medications for this visit.        Review of Systems   Constitutional: Positive for fatigue. Negative for activity change, appetite change, chills, fever and unexpected weight change.   HENT: Negative for mouth sores and nosebleeds.    Respiratory: Negative for cough and shortness of breath.    Cardiovascular: Positive for leg swelling. Negative for chest pain.   Gastrointestinal: Positive for abdominal pain and constipation. Negative for diarrhea, nausea and vomiting.   Endocrine: Negative for cold intolerance and heat intolerance.   Genitourinary: Negative for dysuria and hematuria.   Musculoskeletal: Positive for arthralgias. Negative for back pain.   Skin: Negative for rash.  "  Allergic/Immunologic: Negative for environmental allergies.   Neurological: Negative for light-headedness and numbness.   Hematological: Negative for adenopathy. Does not bruise/bleed easily.       ECOG Performance Status: 0   Objective:      Vitals:   Vitals:    12/03/20 1528   BP: 136/79   Pulse: 81   Resp: 16   Temp: 98.3 °F (36.8 °C)   TempSrc: Oral   SpO2: 95%   Weight: 94.8 kg (208 lb 15.9 oz)   Height: 5' 6" (1.676 m)     BMI: Body mass index is 33.73 kg/m².    Physical Exam  Constitutional:       General: She is not in acute distress.     Appearance: She is well-developed.   Neck:      Musculoskeletal: Normal range of motion.   Cardiovascular:      Rate and Rhythm: Normal rate and regular rhythm.   Pulmonary:      Effort: Pulmonary effort is normal. No respiratory distress.   Abdominal:      General: There is no distension.      Palpations: Abdomen is soft. There is no mass.      Tenderness: There is no abdominal tenderness. There is no guarding.   Skin:     General: Skin is warm and dry.   Neurological:      Mental Status: She is alert and oriented to person, place, and time.   Psychiatric:         Behavior: Behavior normal.         Laboratory Data:  Lab Visit on 12/03/2020   Component Date Value Ref Range Status    WBC 12/03/2020 4.77  3.90 - 12.70 K/uL Final    RBC 12/03/2020 4.92  4.00 - 5.40 M/uL Final    Hemoglobin 12/03/2020 12.5  12.0 - 16.0 g/dL Final    Hematocrit 12/03/2020 41.1  37.0 - 48.5 % Final    MCV 12/03/2020 84  82 - 98 fL Final    MCH 12/03/2020 25.4* 27.0 - 31.0 pg Final    MCHC 12/03/2020 30.4* 32.0 - 36.0 g/dL Final    RDW 12/03/2020 18.5* 11.5 - 14.5 % Final    Platelets 12/03/2020 263  150 - 350 K/uL Final    MPV 12/03/2020 9.9  9.2 - 12.9 fL Final    Immature Granulocytes 12/03/2020 0.2  0.0 - 0.5 % Final    Gran # (ANC) 12/03/2020 2.4  1.8 - 7.7 K/uL Final    Immature Grans (Abs) 12/03/2020 0.01  0.00 - 0.04 K/uL Final    Comment: Mild elevation in immature " granulocytes is non specific and   can be seen in a variety of conditions including stress response,   acute inflammation, trauma and pregnancy. Correlation with other   laboratory and clinical findings is essential.      Lymph # 12/03/2020 1.5  1.0 - 4.8 K/uL Final    Mono # 12/03/2020 0.6  0.3 - 1.0 K/uL Final    Eos # 12/03/2020 0.2  0.0 - 0.5 K/uL Final    Baso # 12/03/2020 0.06  0.00 - 0.20 K/uL Final    nRBC 12/03/2020 0  0 /100 WBC Final    Gran % 12/03/2020 51.2  38.0 - 73.0 % Final    Lymph % 12/03/2020 31.0  18.0 - 48.0 % Final    Mono % 12/03/2020 11.5  4.0 - 15.0 % Final    Eosinophil % 12/03/2020 4.8  0.0 - 8.0 % Final    Basophil % 12/03/2020 1.3  0.0 - 1.9 % Final    Platelet Estimate 12/03/2020 Appears normal   Final    Differential Method 12/03/2020 Automated   Final    Sodium 12/03/2020 140  136 - 145 mmol/L Final    Potassium 12/03/2020 3.4* 3.5 - 5.1 mmol/L Final    Chloride 12/03/2020 107  95 - 110 mmol/L Final    CO2 12/03/2020 22* 23 - 29 mmol/L Final    Glucose 12/03/2020 202* 70 - 110 mg/dL Final    BUN 12/03/2020 22* 6 - 20 mg/dL Final    Creatinine 12/03/2020 1.4  0.5 - 1.4 mg/dL Final    Calcium 12/03/2020 11.2* 8.7 - 10.5 mg/dL Final    Total Protein 12/03/2020 6.7  6.0 - 8.4 g/dL Final    Albumin 12/03/2020 3.0* 3.5 - 5.2 g/dL Final    Total Bilirubin 12/03/2020 0.4  0.1 - 1.0 mg/dL Final    Comment: For infants and newborns, interpretation of results should be based  on gestational age, weight and in agreement with clinical  observations.  Premature Infant recommended reference ranges:  Up to 24 hours.............<8.0 mg/dL  Up to 48 hours............<12.0 mg/dL  3-5 days..................<15.0 mg/dL  6-29 days.................<15.0 mg/dL      Alkaline Phosphatase 12/03/2020 93  55 - 135 U/L Final    AST 12/03/2020 18  10 - 40 U/L Final    ALT 12/03/2020 13  10 - 44 U/L Final    Anion Gap 12/03/2020 11  8 - 16 mmol/L Final    eGFR if   "12/03/2020 49.1* >60 mL/min/1.73 m^2 Final    eGFR if non African American 12/03/2020 42.6* >60 mL/min/1.73 m^2 Final    Comment: Calculation used to obtain the estimated glomerular filtration  rate (eGFR) is the CKD-EPI equation.       LD 12/03/2020 167  110 - 260 U/L Final    Results are increased in hemolyzed samples.   Hospital Outpatient Visit on 12/01/2020   Component Date Value Ref Range Status    Gram Stain Result 12/01/2020 Few WBC's   Final    Gram Stain Result 12/01/2020 No organisms seen   Final    Anaerobic Culture 12/01/2020 Culture in progress   Preliminary    Aerobic Bacterial Culture 12/01/2020 No growth   Preliminary    Lymph Node Interpretation 12/01/2020    Final                    Value:Nondiagnostic study, limited by low cellularity.  No significant population  of lymphocytes detected.  Correlation with tissue biopsy results is required.      Interp By Carmelina Rivera MD, Signed on 12/02/2020 at 14:14    Lymph Node Antibodies Analyzed 12/01/2020    Final                    Value:All analyzed: CD2, CD3, CD4, CD5, CD7, CD8, CD10, CD13, CD19, CD20, CD34,  , KAPPA, LAMBDA,CD45 and 7AAD.      Lymph Node Comment 12/01/2020    Final                    Value:Flow cytometric analysis of "left lung" lymph node is a limited study with  low cellularity showing less than 1000 events per tube.  No significant  population of lymphocytes is detected (less than 20 events per tube). The  blast gate is not increased.  Correlation with tissue biopsy results is  required.  Flow differential:  Lymphocytes  1.8 %, Monocytes 0.8%, Granulocytes 94.3%, Blast 0.4%, Debris/nRBC 1.3%,  Viability 87.0%.      Comment: This test was developed and its performance characteristics   determined by [facility].    It has not been cleared or approved by the U.S. Food and Drug   Administration. The FDA has determined that such clearance or   approval is not necessary.  This " "test is used for clinical purposes.    It should not be regarded as investigational or for research.  This   laboratory is certified under CLIA-88 as qualified to perform high   complexity clinical laboratory testing.      Final Pathologic Diagnosis 12/01/2020    Final                    Value:Fragments of pulmonary parenchyma (submitted as left lung biopsy):  -Granulomatous inflammation on a background of fibrosis, chronic  inflammation, and reactive alveolar lining cell hyperplasia  -No malignancy is identified  -Special stains for acid-fast bacilli and fungi will be performed with a  supplemental report to follow      Interp By Kevin Rordiguez M.D., Signed on 12/02/2020 at 14:13    Gross 12/01/2020    Final                    Value:Container Label: Surgery MRN and Pathology MRN:  [id number]  Received in RPMI solution labeled "left lung biopsy " and consist of multiple  less than 0.1 cm diameter pink-tan soft tissuecores and tissue debris  measuring in aggregate 1.0 x 0.3 x 0.1 cm.  Portion of tissue is submitted  for flow cytometry. The rest of specimen is filtered, wrapped and Hematoxylin  is added.  Entirely submitted in cassette WXP--1A.  Grossed by:  Melanie Wiggins      Microscopic Exam 12/01/2020    Final                    Value:Represented are fragments of pulmonary parenchyma exhibiting prominent  fibrosis that appear somewhat diffuse in nature in which exhibits  superimposed nodule formation that includes lymphocytes and macrophages  suggestive of fibrotic granulomas.  Occasional lung high on-type giant cells  are identified.  Adjacent alveolar septi are slightly thickened and contain a  lymphocytic inflammatory infiltrate.  Lining cells are prominent, slightly  enlarged, and exhibit slightly enlarged hyperchromatic nuclei that are  otherwise uniform.  No infiltration is identified.  Within the fibrous tissue  a some foci that are slightly more cellular and exhibit degenerative changes  bordering on " necrosis.  Although lymphocytes are identified within the  fibrous tissue.  There small numbers and morphologic features do not suggest  an infiltrative lymphoid process.     Hospital Outpatient Visit on 12/01/2020   Component Date Value Ref Range Status    SARS-CoV-2 RNA, Amplification, Qual 12/01/2020 Negative  Negative Final    Comment: This test utilizes isothermal nucleic acid amplification   technology to detect the SARS-CoV-2 RdRp nucleic acid segment.   The analytical sensitivity (limit of detection) is 125 genome   equivalents/mL.   A POSITIVE result implies infection with the SARS-CoV-2 virus;  the patient is presumed to be contagious.    A NEGATIVE result means that SARS-CoV-2 nucleic acids are not  present above the limit of detection. A NEGATIVE result should be   treated as presumptive. It does not rule out the possibility of   COVID-19 and should not be the sole basis for treatment decisions.   If COVID-19 is strongly suspected based on clinical and exposure   history, re-testing using an alternate molecular assay should be   considered.   This test is only for use under the Food and Drug   Administration s Emergency Use Authorization (EUA).   Commercial kits are provided by [employer].   Performance characteristics of the EUA have been independently  verified by [facility] Department o                           f  Pathology and Laboratory Medicine.   _________________________________________________________________  The ID NOW COVID-19 Letter of Authorization, along with the   authorized Fact Sheet for Healthcare Providers, the authorized Fact  Sheet for Patients, and authorized labeling are available on the FDA   website:  www.fda.gov/MedicalDevices/Safety/EmergencySituations/fuh374456.htm     BM biopsy 7/23/19:  -- MILDLY HYPERCELLULAR MARROW (60%) WITH TRILINEAGE HEMATOPOIESIS.  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  -- ADEQUATE NUMBER OF  MEGAKARYOCYTES.  -- STAINABLE IRON IS NOT IDENTIFIED.  -- SEE COMMENT.  COMMENT:  CBC data shows microcytic anemia and thrombocytopenia.  Flow cytometric analysis of bone marrow detects a kappa restricted plasma cell population that expresses , CD19, and bright CD38. CD20 and CD56 are negative. Polytypic B lymphocytes are detected. T lymphocytes are immunophenotypically unremarkable. Blasts gate is not increased. Flow differential: Lymphocytes 5.9%, Monocytes 4.6%, Granulocytes 85.8%, Blast 1.2%.  Immunohistochemical stains are performed on the biopsy core and clot section for greater sensitivity and further architectural assessment with adequate controls. -positive plasma cells comprise approximately 4-5% of thetotal cellularity. Immunoglobulin kappa and lambda light chains situ hybridization study reveals kappa light chainrestriction is some areas. The lymphoid aggregates are composed of mixed CD20-positive B-cells and CD3-positive T-cells. B-cells are more numerous than T-cells    TECHNIQUE:  12.95 mCi of F18-FDG was administered intravenously in the right hand.  After an approximately 60 min distribution time, PET/CT images were acquired from the skull base to mid thigh.  Transmission images were acquired to correct for attenuation using a whole body low-dose CT scan without contrast with the arms positioned above the head. Glycemia at the time of injection was 126 mg/dL.     COMPARISON:  FDG PET-CT 12/05/2019. CT chest abdomen pelvis without contrast 11/07/2020.     FINDINGS:  Quality of the study: Adequate.     In the head and neck, there are no hypermetabolic lesions worrisome for malignancy. There are no hypermetabolic mucosal lesions, and there are no pathologically enlarged or hypermetabolic lymph nodes.     In the chest, there are innumerable pulmonary nodules throughout both lungs with coalescence into consolidative masses in the lower lobes similar to prior CT 11/07/2020.  The largest  conglomerate of nodules in the right lower lobe measures approximately 5.6 x 3.0 cm and demonstrating SUV max of 8.2 (axial fused/series 3, image 69).  There is an adjacent conglomerate of nodules in the right upper lobe measuring approximately 4.1 x 4.2 cm and demonstrating SUV max of 6.3 (axial fused/series 3, image 69).  The largest conglomerate of nodules in the left lower lobe measures approximately 5.8 x 5.0 cm and demonstrates SUV max of 7.6 (axial fused/series 3, image 68).     In the abdomen and pelvis, there is physiologic tracer distribution within the abdominal organs and excretion into the genitourinary system.  There is a vascular stent in the right renal artery.  Wedge-shaped defect in the lower pole of the right kidney suggestive of remote infarct.     The spleen has normal uptake and is normal in size at 7.2 cm in craniocaudal dimension.     In the bones, there are no hypermetabolic lesions worrisome for malignancy.     Impression:     1.  In this patient with biopsy-proven B-cell lymphoma, there are innumerable hypermetabolic pulmonary nodules with coalescence into consolidative masses in the bilateral lower lobes, similar to prior CT 11/07/2020.  These findings are concerning for pulmonary lymphoproliferative disorder.  Adequate tissue sampling would be requried for definitive diagnosis.     2.  No other findings suspicious for malignancy such as pathologically enlarged or hypermetabolic lymph nodes.     This report was flagged in Epic as abnormal.    Assessment:       1. Multiple pulmonary nodules    2. Pulmonary infiltrate    3. B-cell lymphoproliferative disorder    4. Other iron deficiency anemia    5. Cryoglobulinemic vasculitis    6. Acute (diffuse) proliferative glomerulonephritis    7. Type 2 diabetes mellitus without complication, without long-term current use of insulin    8. Obesity (BMI 30-39.9)    9. Chronic viral hepatitis B without delta agent and without coma    10. 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  2) IgM kappa MGUS  - Patient with new renal failure, has normal IgG.  - Skeletal survey (-) for lytic lesions  - CT neck/C/A/P done showed small mediastinal LN and precarinal LN 1.2x2cm  - Cryglobulin type III which does not indicate hematologic malignancy as a source  - PET 12/5/19 without significant adenopathy  - Patient got Rituxan per rheumatology in July 2019  - Now with symptoms and results more suggestive of autoimmune disease rather then malignancy, see #3    3) Granulomatous pulmonary disease, ddx vasculitis, autoimmune, infectious  - Has been seen by pulmonary  - PET was done 11/20/20 showing innumerable hypermetabolic pulmonary nodules with coalescence into consolidative masses in the bilateral lower lobes  - IR lung biopsy done 12/1/20 showed Granulomatous inflammation on a background of fibrosis, chronic inflammation, and reactive alveolar lining cell hyperplasia  - Starting prednisone 40mg Qd on 12/3/20 with bactrim MWF ppx, as well as PPI until rheumatology and pulmonary follow-up. Msg sent to both departments to try and expedite follow-up given patient's significant symptoms.    4) Cryoglobulin vasculitis, GN, HBV  -Per rheumatology/nephrology  -On entecavir for HBV    5) DM  -On insulin  -Monitor closely while on steroids    6) CKD  -Monitor Cr    7) Iron deficiency anemia  -To continue PO ferrous sulfate    8) Hypercalcemia likely d/t underlying autoimmune disease  -Corrected 12.0  -Encouraged to hydrate  -Start prednisone 40mg Qd until rheum/pulmonary follow-up    Follow-up:  -Rheumatology referral ASAP  -RTC in 2 weeks with labs prior (CBC, CMP)    The following was staffed and discussed with supervising physician Dr. Overton.    Gunjan Montalvo MD  Hematology/Oncology Fellow

## 2020-12-05 LAB
BACTERIA SPEC AEROBE CULT: NO GROWTH
BACTERIA SPEC ANAEROBE CULT: NORMAL

## 2020-12-07 ENCOUNTER — TELEPHONE (OUTPATIENT)
Dept: RHEUMATOLOGY | Facility: CLINIC | Age: 55
End: 2020-12-07

## 2020-12-07 DIAGNOSIS — D47.9 B-CELL LYMPHOPROLIFERATIVE DISORDER: Primary | ICD-10-CM

## 2020-12-07 LAB
FINAL PATHOLOGIC DIAGNOSIS: NORMAL
GROSS: NORMAL
MICROSCOPIC EXAM: NORMAL
SUPPLEMENTAL DIAGNOSIS: NORMAL

## 2020-12-07 NOTE — TELEPHONE ENCOUNTER
Left a message for the patient letting her know that we had no available appointments for her insurance type and to call her insurance company to see whom else she can see

## 2020-12-07 NOTE — TELEPHONE ENCOUNTER
----- Message from Edy Velez sent at 12/4/2020  3:15 PM CST -----  Good afternoon,    Pt has a referral in for rheumatology.    Thanks!

## 2020-12-09 ENCOUNTER — LAB VISIT (OUTPATIENT)
Dept: LAB | Facility: HOSPITAL | Age: 55
End: 2020-12-09
Payer: MEDICAID

## 2020-12-09 ENCOUNTER — OFFICE VISIT (OUTPATIENT)
Dept: RHEUMATOLOGY | Facility: CLINIC | Age: 55
End: 2020-12-09
Payer: MEDICAID

## 2020-12-09 VITALS
DIASTOLIC BLOOD PRESSURE: 76 MMHG | SYSTOLIC BLOOD PRESSURE: 127 MMHG | HEART RATE: 81 BPM | BODY MASS INDEX: 35.26 KG/M2 | WEIGHT: 211.63 LBS | HEIGHT: 65 IN

## 2020-12-09 DIAGNOSIS — Z79.4 TYPE 2 DIABETES MELLITUS WITH STAGE 3 CHRONIC KIDNEY DISEASE, WITH LONG-TERM CURRENT USE OF INSULIN, UNSPECIFIED WHETHER STAGE 3A OR 3B CKD: ICD-10-CM

## 2020-12-09 DIAGNOSIS — E11.22 TYPE 2 DIABETES MELLITUS WITH STAGE 3 CHRONIC KIDNEY DISEASE, WITH LONG-TERM CURRENT USE OF INSULIN, UNSPECIFIED WHETHER STAGE 3A OR 3B CKD: ICD-10-CM

## 2020-12-09 DIAGNOSIS — D86.9 SARCOIDOSIS: ICD-10-CM

## 2020-12-09 DIAGNOSIS — R06.02 SHORTNESS OF BREATH: ICD-10-CM

## 2020-12-09 DIAGNOSIS — N18.30 TYPE 2 DIABETES MELLITUS WITH STAGE 3 CHRONIC KIDNEY DISEASE, WITH LONG-TERM CURRENT USE OF INSULIN, UNSPECIFIED WHETHER STAGE 3A OR 3B CKD: ICD-10-CM

## 2020-12-09 DIAGNOSIS — D89.1 CRYOGLOBULINEMIC VASCULITIS: ICD-10-CM

## 2020-12-09 DIAGNOSIS — R91.8 MULTIPLE PULMONARY NODULES: ICD-10-CM

## 2020-12-09 DIAGNOSIS — D89.1 CRYOGLOBULINEMIC VASCULITIS: Primary | ICD-10-CM

## 2020-12-09 LAB
CA-I BLDV-SCNC: 1.55 MMOL/L (ref 1.06–1.42)
CK SERPL-CCNC: 64 U/L (ref 20–180)

## 2020-12-09 PROCEDURE — 99215 OFFICE O/P EST HI 40 MIN: CPT | Mod: PBBFAC | Performed by: STUDENT IN AN ORGANIZED HEALTH CARE EDUCATION/TRAINING PROGRAM

## 2020-12-09 PROCEDURE — 83516 IMMUNOASSAY NONANTIBODY: CPT

## 2020-12-09 PROCEDURE — 99999 PR PBB SHADOW E&M-EST. PATIENT-LVL V: CPT | Mod: PBBFAC,,, | Performed by: STUDENT IN AN ORGANIZED HEALTH CARE EDUCATION/TRAINING PROGRAM

## 2020-12-09 PROCEDURE — 85549 MURAMIDASE: CPT

## 2020-12-09 PROCEDURE — 82085 ASSAY OF ALDOLASE: CPT

## 2020-12-09 PROCEDURE — 99215 OFFICE O/P EST HI 40 MIN: CPT | Mod: S$PBB,,, | Performed by: STUDENT IN AN ORGANIZED HEALTH CARE EDUCATION/TRAINING PROGRAM

## 2020-12-09 PROCEDURE — 83520 IMMUNOASSAY QUANT NOS NONAB: CPT | Mod: 91

## 2020-12-09 PROCEDURE — 30000890 MAYO MISCELLANEOUS TEST (REFLEX): Performed by: STUDENT IN AN ORGANIZED HEALTH CARE EDUCATION/TRAINING PROGRAM

## 2020-12-09 PROCEDURE — 82550 ASSAY OF CK (CPK): CPT

## 2020-12-09 PROCEDURE — 83516 IMMUNOASSAY NONANTIBODY: CPT | Mod: 59

## 2020-12-09 PROCEDURE — 82652 VIT D 1 25-DIHYDROXY: CPT

## 2020-12-09 PROCEDURE — 99999 PR PBB SHADOW E&M-EST. PATIENT-LVL V: ICD-10-PCS | Mod: PBBFAC,,, | Performed by: STUDENT IN AN ORGANIZED HEALTH CARE EDUCATION/TRAINING PROGRAM

## 2020-12-09 PROCEDURE — 30000890 ARUP MISCELLANEOUS TEST 1: Mod: 91

## 2020-12-09 PROCEDURE — 99215 PR OFFICE/OUTPT VISIT, EST, LEVL V, 40-54 MIN: ICD-10-PCS | Mod: S$PBB,,, | Performed by: STUDENT IN AN ORGANIZED HEALTH CARE EDUCATION/TRAINING PROGRAM

## 2020-12-09 PROCEDURE — 82595 ASSAY OF CRYOGLOBULIN: CPT

## 2020-12-09 PROCEDURE — 81443 GENETIC TSTG SEVERE INH COND: CPT | Performed by: STUDENT IN AN ORGANIZED HEALTH CARE EDUCATION/TRAINING PROGRAM

## 2020-12-09 PROCEDURE — 82330 ASSAY OF CALCIUM: CPT

## 2020-12-09 RX ORDER — PREDNISONE 20 MG/1
40 TABLET ORAL DAILY
Qty: 180 TABLET | Refills: 1 | Status: SHIPPED | OUTPATIENT
Start: 2020-12-09 | End: 2021-01-07 | Stop reason: SDUPTHER

## 2020-12-09 RX ORDER — ZOSTER VACCINE RECOMBINANT, ADJUVANTED 50 MCG/0.5
0.5 KIT INTRAMUSCULAR
Qty: 2 EACH | Refills: 0 | Status: SHIPPED | OUTPATIENT
Start: 2020-12-09 | End: 2021-03-10

## 2020-12-09 NOTE — PROGRESS NOTES
Subjective:       Patient ID: Kendy Vital is a 55 y.o. female.    Chief Complaint: Disease Management (Cryoglobulinemic vasculitis)    HPI     Ms. 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 Subsequently, she was followed by [facility] in [place]. She was last seen by them in August 2020 and at that time was advised to taper to 10mg prednisone. She was admitted 11/7-11/12/20 for acute hypoxic respiratory failure with persistent cough and hemoptysis. A CT CAP showed pulmonary nodules. PET (11/20/20) showed innumerable hypermetabolic pulmonary nodules with coalescence into consolidative masses in the lower lobes. IR lung biopsy done 12/1/20 showed granulomatous inflammation on a background of fibrosis, chronic inflammation, and reactive alveolar lining cell hyperplasia. Hematology increased prednisone to 40mg QD and started  prophylactic Bactrim on 12/3/20 out of concern for sarcoidosis. Ms. Vital presents today to reestablish care with Rheumatology at [facility].     The patient reports blurring of vision, dry eyes, shortness of breath, arthralgias and swelling in her legs and feet. She has noted some improvement since she increased her prednisone to 40mg. She denies hair loss, rashes, fevers, oral/nasal ulcers, ear infections, sinusitis, epistaxis, cough, abdominal pain, diarrhea, bloody stool, and Raynaud's.     Review of Systems   Constitutional: Negative for activity change, appetite change, fatigue and fever.   HENT: Negative for mouth sores and sore throat.    Eyes: Negative for pain, redness and visual disturbance.   Respiratory:  "Positive for shortness of breath. Negative for cough.    Cardiovascular: Negative for chest pain, palpitations and leg swelling.   Gastrointestinal: Negative for abdominal pain, constipation, diarrhea, nausea and vomiting.   Genitourinary: Negative for dysuria and hematuria.   Musculoskeletal: Positive for arthralgias. Negative for joint swelling.   Skin: Negative for rash.   Neurological: Negative for weakness, numbness and headaches.   Psychiatric/Behavioral: Negative for confusion and decreased concentration.         Objective:   /76   Pulse 81   Ht 5' 5" (1.651 m)   Wt 96 kg (211 lb 9.6 oz)   BMI 35.21 kg/m²      Physical Exam   Constitutional: She is well-developed, well-nourished, and in no distress. No distress.   HENT:   Head: Normocephalic and atraumatic.   Mouth/Throat: No oropharyngeal exudate.   Eyes: Conjunctivae are normal. Right eye exhibits no discharge. Left eye exhibits no discharge. No scleral icterus.   Neck: Normal range of motion. Neck supple.   Cardiovascular: Normal rate, regular rhythm and normal heart sounds.    Pulmonary/Chest: Effort normal and breath sounds normal.   Abdominal: Soft. Bowel sounds are normal.   Skin: Skin is warm and dry. No rash noted. She is not diaphoretic.     Musculoskeletal:      Comments: Small effusions both knees  Mild swelling 1st to 3rd MCPs right  Bipedal edema          No data to display     CBC (12/3/20): WNL   CMP: Cr 1.4, GFR 49.1, Ca 11.2 LFTs WNL   ESR 11/20 92  CRP 9.6  Negative ANCA (11/20/20)  C2 and C3 elevated C4 WNL   IgG reduced (623)  IgM WNL   UA (11/7/20): RBC 3, WBC 41, UPC 0.42    Assessment:       1. Cryoglobulinemic vasculitis    2. Sarcoidosis    3. Shortness of breath    4. Type 2 diabetes mellitus with stage 3 chronic kidney disease, with long-term current use of insulin, unspecified whether stage 3a or 3b CKD    5. Multiple pulmonary nodules        Ms. 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 She was admitted 11/7-11/12/20 for acute hypoxic respiratory failure with persistent cough and hemoptysis. CT CAP showed pulmonary nodules. PET (11/20/20) showed innumerable hypermetabolic pulmonary nodules with coalescence into consolidative masses in the lower lobes. IR lung biopsy done 12/1/20 showed granulomatous inflammation on a background of fibrosis, chronic inflammation, and reactive alveolar lining cell hyperplasia. She is now on 40mg QD (dose increased 12/3/20) and complains of blurring of vision, dry eyes, shortness of breath, and swelling in her legs (these symptoms have improved on the increased prednisone dose). PET scan seems to show that the sarcoid  only involves the lungs and she has a pulmonary appointment on 12/18. Calcium is elevated likely related to sarcoidosis and we advised her to stop taking calcium and vitamin D.     In terms of her CKD, her creatinine is increased from when she was last seen by [facility] (1.4 <- baseline 1-1.2) and UPC is elevated at 0.67. She will need to establish care with [facility].     Plan:       Problem List Items Addressed This Visit        Pulmonary    Multiple pulmonary nodules    Relevant Medications    predniSONE (DELTASONE) 20 MG tablet       Immunology/Multi System    Cryoglobulinemic vasculitis - Primary    Relevant Orders    Immunofixation, 24 hour Urine    Urinalysis    Protein/Creatinine Ratio, Urine    Protein, urine, timed    Immunofixation, 24 hour Urine    Echo Color Flow Doppler? Yes    Myeloperoxidase Antibody (MPO)    Proteinase  3 Autoantibodies    CK    Aldolase    Ambulatory referral/consult to Optometry    Ambulatory referral/consult to Nephrology    DXA Bone Density Spine And Hip    CALCIUM, IONIZED    Calcium, Timed Urine [facility]; 24 Hours    Calcitriol    Lysozyme (Muramidase), Serum    Cryoglobulin    Ambulatory referral/consult to Endocrinology    Misc Sendout Test, Blood Chitotriosidase (Completed)    Misc Sendout Test, Blood Neopterin (Completed)    Misc Sendout Test, Blood KL-6 (Completed)    Misc Sendout Test, Blood Serum amyloid A      Other Visit Diagnoses     Sarcoidosis        Relevant Orders    Immunofixation, 24 hour Urine    Urinalysis    Protein/Creatinine Ratio, Urine    Protein, urine, timed    Immunofixation, 24 hour Urine    Echo Color Flow Doppler? Yes    Myeloperoxidase Antibody (MPO)    Proteinase 3 Autoantibodies    CK    Aldolase    Ambulatory referral/consult to Optometry    Ambulatory referral/consult to Nephrology    DXA Bone Density Spine And Hip    CALCIUM, IONIZED    Calcium, Timed Urine [facility]; 24 Hours    Calcitriol    Lysozyme (Muramidase), Serum    Cryoglobulin    Misc Sendout Test, Blood Chitotriosidase (Completed)    Misc Sendout Test, Blood Neopterin (Completed)    Misc Sendout Test, Blood KL-6 (Completed)    Misc Sendout Test, Blood Serum amyloid A    Shortness of breath        Relevant Orders    Immunofixation, 24 hour Urine    Urinalysis    Protein/Creatinine Ratio, Urine    Protein, urine, timed    Immunofixation, 24 hour Urine    Echo Color Flow Doppler? Yes    Myeloperoxidase Antibody (MPO)    Proteinase 3 Autoantibodies    CK    Aldolase    Ambulatory referral/consult to Optometry    Ambulatory referral/consult to Nephrology    DXA Bone Density Spine And Hip    CALCIUM, IONIZED    Calcium, Timed Urine [facility]; 24 Hours    Calcitriol    Lysozyme (Muramidase), Serum    Cryoglobulin    Misc Sendout Test, Blood Chitotriosidase (Completed)    Misc Sendout Test, Blood Neopterin (Completed)    Misc  Sendout Test, Blood KL-6 (Completed)    Misc Sendout Test, Blood Serum amyloid A    Type 2 diabetes mellitus with stage 3 chronic kidney disease, with long-term current use of insulin, unspecified whether stage 3a or 3b CKD        Relevant Orders    Ambulatory referral/consult to Endocrinology    Misc Sendout Test, Blood Chitotriosidase (Completed)    Misc Sendout Test, Blood Neopterin (Completed)    Misc Sendout Test, Blood KL-6 (Completed)    Misc Sendout Test, Blood Serum amyloid A        - Continue prednisone 40mg, Bactrim, and Protonix  - Check UA and UPC, 24 hour urine protein and MARCO, 24 hour urine calcium, SPEP, MPO, PR3, CPK, aldolase, cryoglobulins, immunoglobulins  - Sarcoidosis labs: Calcitriol, lysozyme, chitotriosidase, serum amyloid A, KL-6, neopterin, serum amyloid A  - Request for TTE  - Request for DEXA scan  - Refer to Optometry for sarcoid eye exam   - Refer to Nephrology  - Refer to Endocrinology for DM control  - Patient needs a shingles vaccine and a flu vaccine  - Patient will need repeat preDMARD labs done if she will require further immunosuppression      Patient seen and discussed with Dr. Solis    C in 4 weeks    Rita Buck M.D.  Rheumatology, PGY 5

## 2020-12-10 ENCOUNTER — TELEPHONE (OUTPATIENT)
Dept: RHEUMATOLOGY | Facility: CLINIC | Age: 55
End: 2020-12-10

## 2020-12-10 DIAGNOSIS — E83.52 HYPERCALCEMIA: Primary | ICD-10-CM

## 2020-12-10 LAB — FUNGUS SPEC CULT: NORMAL

## 2020-12-10 NOTE — TELEPHONE ENCOUNTER
I spoke with Ms. Vital regarding her urinalysis. She has hematuria and hyaline casts on UA. I did message Nephrology asking them to schedule her for an ASAP appointment but I don't see an appointment scheduled yet. I've asked her to please contact me in a week to make sure that an appointment with Nephro has been scheduled. She will also need a repeat calcium level next week to ensure this normalizes off calcium and vitamin D. All questions answered.

## 2020-12-11 LAB
MAYO MISCELLANEOUS RESULT (REF): NORMAL
PROTEINASE3 IGG SER-ACNC: <0.2 U

## 2020-12-13 LAB — LYSOZYME SERPL-MCNC: 3.99 UG/ML

## 2020-12-14 DIAGNOSIS — D86.9 SARCOIDOSIS: Primary | ICD-10-CM

## 2020-12-14 LAB
1,25(OH)2D3 SERPL-MCNC: 82 PG/ML (ref 20–79)
ALDOLASE SERPL-CCNC: <1.2 U/L (ref 1.2–7.6)
MYELOPEROXIDASE AB SER-ACNC: 3 UNITS

## 2020-12-15 ENCOUNTER — TELEPHONE (OUTPATIENT)
Dept: RHEUMATOLOGY | Facility: CLINIC | Age: 55
End: 2020-12-15

## 2020-12-15 NOTE — TELEPHONE ENCOUNTER
"Received from [facility] [place] [facility] 12/12/20:    24 hr urine calcium 129 mg/24 hr           "        Protein 265mg/24hr  "

## 2020-12-17 ENCOUNTER — CLINICAL SUPPORT (OUTPATIENT)
Dept: INFECTIOUS DISEASES | Facility: CLINIC | Age: 55
End: 2020-12-17
Payer: MEDICAID

## 2020-12-17 ENCOUNTER — OFFICE VISIT (OUTPATIENT)
Dept: HEMATOLOGY/ONCOLOGY | Facility: CLINIC | Age: 55
End: 2020-12-17
Payer: MEDICAID

## 2020-12-17 ENCOUNTER — HOSPITAL ENCOUNTER (OUTPATIENT)
Dept: CARDIOLOGY | Facility: HOSPITAL | Age: 55
Discharge: HOME | End: 2020-12-17
Attending: STUDENT IN AN ORGANIZED HEALTH CARE EDUCATION/TRAINING PROGRAM
Payer: MEDICAID

## 2020-12-17 VITALS
DIASTOLIC BLOOD PRESSURE: 84 MMHG | HEART RATE: 65 BPM | HEIGHT: 65 IN | WEIGHT: 211 LBS | BODY MASS INDEX: 35.16 KG/M2 | SYSTOLIC BLOOD PRESSURE: 160 MMHG

## 2020-12-17 VITALS
TEMPERATURE: 99 F | BODY MASS INDEX: 35.5 KG/M2 | WEIGHT: 213.06 LBS | RESPIRATION RATE: 17 BRPM | HEART RATE: 81 BPM | OXYGEN SATURATION: 98 % | HEIGHT: 65 IN | DIASTOLIC BLOOD PRESSURE: 81 MMHG | SYSTOLIC BLOOD PRESSURE: 140 MMHG

## 2020-12-17 DIAGNOSIS — D89.1 CRYOGLOBULINEMIC VASCULITIS: ICD-10-CM

## 2020-12-17 DIAGNOSIS — D86.9 SARCOIDOSIS: ICD-10-CM

## 2020-12-17 DIAGNOSIS — R06.02 SHORTNESS OF BREATH: ICD-10-CM

## 2020-12-17 DIAGNOSIS — B18.1 CHRONIC VIRAL HEPATITIS B WITHOUT DELTA AGENT AND WITHOUT COMA: ICD-10-CM

## 2020-12-17 DIAGNOSIS — I50.32 CHRONIC DIASTOLIC HEART FAILURE: ICD-10-CM

## 2020-12-17 DIAGNOSIS — D50.8 OTHER IRON DEFICIENCY ANEMIA: ICD-10-CM

## 2020-12-17 DIAGNOSIS — D47.9 B-CELL LYMPHOPROLIFERATIVE DISORDER: ICD-10-CM

## 2020-12-17 DIAGNOSIS — E11.9 TYPE 2 DIABETES MELLITUS WITHOUT COMPLICATION, WITHOUT LONG-TERM CURRENT USE OF INSULIN: ICD-10-CM

## 2020-12-17 DIAGNOSIS — I10 ESSENTIAL HYPERTENSION: ICD-10-CM

## 2020-12-17 DIAGNOSIS — R91.8 MULTIPLE PULMONARY NODULES: Primary | ICD-10-CM

## 2020-12-17 LAB
ARUP MISCELLANEOUS TEST 1: ABNORMAL
ASCENDING AORTA: 2.73 CM
AV INDEX (PROSTH): 0.93
AV MEAN GRADIENT: 5 MMHG
AV PEAK GRADIENT: 12 MMHG
AV VALVE AREA: 2.99 CM2
AV VELOCITY RATIO: 0.95
BSA FOR ECHO PROCEDURE: 2.09 M2
CV ECHO LV RWT: 0.36 CM
DOP CALC AO PEAK VEL: 1.71 M/S
DOP CALC AO VTI: 32.59 CM
DOP CALC LVOT AREA: 3.2 CM2
DOP CALC LVOT DIAMETER: 2.02 CM
DOP CALC LVOT PEAK VEL: 1.62 M/S
DOP CALC LVOT STROKE VOLUME: 97.5 CM3
DOP CALCLVOT PEAK VEL VTI: 30.44 CM
E WAVE DECELERATION TIME: 187.31 MSEC
E/A RATIO: 1.37
E/E' RATIO: 8.19 M/S
ECHO LV POSTERIOR WALL: 0.82 CM (ref 0.6–1.1)
FRACTIONAL SHORTENING: 32 % (ref 28–44)
INTERVENTRICULAR SEPTUM: 0.72 CM (ref 0.6–1.1)
IVRT: 88.49 MSEC
LA MAJOR: 5.5 CM
LA MINOR: 5.59 CM
LA WIDTH: 4.26 CM
LEFT ATRIUM SIZE: 3.45 CM
LEFT ATRIUM VOLUME INDEX MOD: 29.6 ML/M2
LEFT ATRIUM VOLUME INDEX: 34.2 ML/M2
LEFT ATRIUM VOLUME MOD: 59.95 CM3
LEFT ATRIUM VOLUME: 69.27 CM3
LEFT INTERNAL DIMENSION IN SYSTOLE: 3.11 CM (ref 2.1–4)
LEFT VENTRICLE DIASTOLIC VOLUME INDEX: 47.92 ML/M2
LEFT VENTRICLE DIASTOLIC VOLUME: 96.98 ML
LEFT VENTRICLE MASS INDEX: 55 G/M2
LEFT VENTRICLE SYSTOLIC VOLUME INDEX: 18.9 ML/M2
LEFT VENTRICLE SYSTOLIC VOLUME: 38.17 ML
LEFT VENTRICULAR INTERNAL DIMENSION IN DIASTOLE: 4.59 CM (ref 3.5–6)
LEFT VENTRICULAR MASS: 111.79 G
LV LATERAL E/E' RATIO: 7.17 M/S
LV SEPTAL E/E' RATIO: 9.56 M/S
MV A" WAVE DURATION": 8.28 MSEC
MV PEAK A VEL: 0.63 M/S
MV PEAK E VEL: 0.86 M/S
PISA TR MAX VEL: 2.14 M/S
PULM VEIN S/D RATIO: 0.8
PV PEAK D VEL: 0.49 M/S
PV PEAK S VEL: 0.39 M/S
RA MAJOR: 4.61 CM
RA PRESSURE: 3 MMHG
RA WIDTH: 3.27 CM
RIGHT VENTRICULAR END-DIASTOLIC DIMENSION: 3.26 CM
RV TISSUE DOPPLER FREE WALL SYSTOLIC VELOCITY 1 (APICAL 4 CHAMBER VIEW): 13.73 CM/S
SINUS: 2.66 CM
STJ: 2.41 CM
TDI LATERAL: 0.12 M/S
TDI SEPTAL: 0.09 M/S
TDI: 0.11 M/S
TR MAX PG: 18 MMHG
TRICUSPID ANNULAR PLANE SYSTOLIC EXCURSION: 2.24 CM
TV REST PULMONARY ARTERY PRESSURE: 21 MMHG

## 2020-12-17 PROCEDURE — 93306 ECHO (CUPID ONLY): ICD-10-PCS | Mod: 26,,, | Performed by: INTERNAL MEDICINE

## 2020-12-17 PROCEDURE — 93306 TTE W/DOPPLER COMPLETE: CPT

## 2020-12-17 PROCEDURE — 99999 PR PBB SHADOW E&M-EST. PATIENT-LVL V: CPT | Mod: PBBFAC,,, | Performed by: STUDENT IN AN ORGANIZED HEALTH CARE EDUCATION/TRAINING PROGRAM

## 2020-12-17 PROCEDURE — 99213 OFFICE O/P EST LOW 20 MIN: CPT | Mod: PBBFAC,25

## 2020-12-17 PROCEDURE — 93306 TTE W/DOPPLER COMPLETE: CPT | Mod: 26,,, | Performed by: INTERNAL MEDICINE

## 2020-12-17 PROCEDURE — 99215 PR OFFICE/OUTPT VISIT, EST, LEVL V, 40-54 MIN: ICD-10-PCS | Mod: S$PBB,,, | Performed by: STUDENT IN AN ORGANIZED HEALTH CARE EDUCATION/TRAINING PROGRAM

## 2020-12-17 PROCEDURE — 99999 PR PBB SHADOW E&M-EST. PATIENT-LVL III: CPT | Mod: PBBFAC,,,

## 2020-12-17 PROCEDURE — 99999 PR PBB SHADOW E&M-EST. PATIENT-LVL V: ICD-10-PCS | Mod: PBBFAC,,, | Performed by: STUDENT IN AN ORGANIZED HEALTH CARE EDUCATION/TRAINING PROGRAM

## 2020-12-17 PROCEDURE — 90471 IMMUNIZATION ADMIN: CPT | Mod: PBBFAC

## 2020-12-17 PROCEDURE — 99999 PR PBB SHADOW E&M-EST. PATIENT-LVL III: ICD-10-PCS | Mod: PBBFAC,,,

## 2020-12-17 PROCEDURE — 99215 OFFICE O/P EST HI 40 MIN: CPT | Mod: PBBFAC,25,27 | Performed by: STUDENT IN AN ORGANIZED HEALTH CARE EDUCATION/TRAINING PROGRAM

## 2020-12-17 PROCEDURE — 99215 OFFICE O/P EST HI 40 MIN: CPT | Mod: S$PBB,,, | Performed by: STUDENT IN AN ORGANIZED HEALTH CARE EDUCATION/TRAINING PROGRAM

## 2020-12-17 RX ORDER — ALLOPURINOL 100 MG/1
100 TABLET ORAL DAILY
COMMUNITY
Start: 2020-11-29

## 2020-12-17 NOTE — PROGRESS NOTES
PATIENT: Kendy Vital  MRN: [id number]  DATE: 12/17/2020      Diagnosis:   1. Multiple pulmonary nodules    2. Cryoglobulinemic vasculitis    3. B-cell lymphoproliferative disorder    4. Other iron deficiency anemia    5. Type 2 diabetes mellitus without complication, without long-term current use of insulin    6. Chronic viral hepatitis B without delta agent and without coma    7. Chronic diastolic heart failure    8. Essential hypertension        Chief Complaint: No chief complaint on file.      Subjective:   Ms. Vital is a 55 y.o. female with lymphoproliferative disorder, GN, HBV, cryoglobulinemic vasculitis (type 3), who presents to the [facility]ology [facility] for follow-up.    Hematologic History  -Presented with severe thrombocytopenia, renal failure, and bilateral infiltrates on chest CT concerning for PNA.  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   -Patient does not have HCV, but is positive for HBV and has been started on anti-viral therapy with entecavir.  -Due to concern of possible hematologic malignancy, patient had bone marrow biopsy on 7/24 revealing B-cell lymphoproliferative disorders.  Her serum cryoglobulins returned as Type 3 and this was not c/w with a hematologic malignancy etiology for cryoglobulinemia  -Had 3 days of pulse steroids 1gm hydrocortisone, in addition to sessions of Plasma Exchange/Plasmapheresis  -She also received IVIG and Rituxan per Rheumatology  -On entecavir for HBV  -PET done 12/5/19 without significant adenopathy  -Admitted 11/7-11/12/20 presented with acute hypoxic respiratory failure after being treated 1 week ago for PNA at OSH.  Presented with persistent cough and hemoptysis.  CT CAP showed pulm nodules concerning for lymphoproliferative d/o. Pulm performed bronch 11/10 for tissue dx. This was negative for malignancy.  - PET was done 11/20/20 showing innumerable hypermetabolic  pulmonary nodules with coalescence into consolidative masses in the bilateral lower lobes  - IR lung biopsy done 12/1/20 showed Granulomatous inflammation on a background of fibrosis, chronic inflammation, and reactive alveolar lining cell hyperplasia  - Starting prednisone 40mg Qd on 12/3/20    Interval History: Patient seen today alone, feeling much better. Dyspnea and fatigue improved. Night sweats and chills resolved. Arthralgias the same. Denies lymphadenopathy. Denies cough, chest pain, abdominal pain, bruising/bleeding.    Past Medical History:   Past Medical History:   Diagnosis Date    Acute (diffuse) proliferative glomerulonephritis     Acute renal failure 7/19/2019    B-cell lymphoproliferative disorder 7/30/2019    CHF (congestive heart failure)     Chronic obstructive lung disease 7/27/2019    Chronic viral hepatitis B without delta agent and without coma 7/24/2019    Cryoglobulinemic vasculitis     Hypogammaglobulinemia 8/5/2019    Iron deficiency anemia 7/30/2019    Renal vein thrombosis 2015    s/p embolectomy and lovenox for 9 mos    Steroid-induced hyperglycemia 7/28/2019    Uterine leiomyoma     s/p resection       Past Surgical HIstory:   Past Surgical History:   Procedure Laterality Date    LUNG BIOPSY N/A 12/1/2020    Procedure: BIOPSY, LUNG;  Surgeon: Kelli Diagnostic Provider;  Location: [facility] OR;  Service: Radiology;  Laterality: N/A;  8 A.M. START  PHONE PREOP DONE 11/27/2020  PT/INR, CBC, CMP AND COVID ON AM OF PROCEDURE   ARRIVAL AT 7:00 FOR 8:30 APPT       Family History: History reviewed. No pertinent family history.    Social History:  reports that she has quit smoking. She has never used smokeless tobacco. She reports previous alcohol use. She reports that she does not use drugs.    Allergies:  Review of patient's allergies indicates:  No Known Allergies    Medications:  Current Outpatient Medications   Medication Sig Dispense Refill    acetaminophen (TYLENOL) 500 MG  tablet Take 500 mg by mouth 3 (three) times daily as needed for Pain (or fever).      albuterol (PROVENTIL/VENTOLIN HFA) 90 mcg/actuation inhaler Inhale 2 puffs into the lungs every 6 (six) hours as needed for Wheezing or Shortness of Breath. Rescue      allopurinoL (ZYLOPRIM) 100 MG tablet Take 100 mg by mouth once daily. for 90 days      aspirin (ECOTRIN) 81 MG EC tablet Take 81 mg by mouth once daily.      blood sugar diagnostic Strp use to test blood gluocse 2 (two) times daily with meals. (Patient taking differently: 1 strip by Misc.(Non-Drug; Combo Route) route 3 (three) times daily. ) 100 strip 11    blood-glucose meter Misc Use as instructed 1 each 0    cyanocobalamin (VITAMIN B-12) 250 MCG tablet Take 250 mcg by mouth once daily.      diphenhydrAMINE-acetaminophen (TYLENOL PM)  mg Tab Take 1 tablet by mouth nightly as needed (fever or pain).      entecavir (BARACLUDE) 0.5 MG Tab Take 1 tablet (0.5 mg total) by mouth every other day. 15 tablet 6    ferrous sulfate 325 (65 FE) MG EC tablet Take 1 tablet (325 mg total) by mouth once daily.  0    furosemide (LASIX) 40 MG tablet HOLD FOR 7 DAYS WHILE YOUR KIDNEYS RECOVER. RESTART ON 11/19/20  3    insulin (LANTUS SOLOSTAR U-100 INSULIN) glargine 100 units/mL (3mL) SubQ pen Inject 45 Units into the skin every morning.      lancets 30 gauge Misc use to test blood glucose 2 (two) times daily with meals. (Patient taking differently: 1 lancet by Misc.(Non-Drug; Combo Route) route 3 (three) times daily. ) 100 each 11    multivitamin (ONE DAILY MULTIVITAMIN) per tablet Take 1 tablet by mouth once daily.      NIFEdipine (PROCARDIA-XL) 60 MG (OSM) 24 hr tablet Take 60 mg by mouth once daily.      pantoprazole (PROTONIX) 40 MG tablet Take 1 tablet (40 mg total) by mouth before breakfast. 30 tablet 11    polyethylene glycol (GLYCOLAX) 17 gram/dose powder Take 17 g by mouth daily as needed (constipation).      predniSONE (DELTASONE) 20 MG tablet Take  2 tablets (40 mg total) by mouth once daily. 180 tablet 1    sulfamethoxazole-trimethoprim 400-80mg (BACTRIM,SEPTRA) 400-80 mg per tablet Take 2 tablets by mouth every Mon, Wed, Fri. 24 tablet 1    traZODone (DESYREL) 100 MG tablet Take 100-200 mg by mouth nightly as needed for Insomnia.      varicella-zoster gE-AS01B, PF, (SHINGRIX, PF,) 50 mcg/0.5 mL injection Inject 0.5 mLs into the muscle every 3 (three) months. for 2 doses 2 each 0    vitamin D (VITAMIN D3) 1000 units Tab Take 1 tablet (1,000 Units total) by mouth once daily. (Patient taking differently: Take 2,000 Units by mouth once daily. )      calcium carbonate (OS-DONTE) 500 mg calcium (1,250 mg) tablet Take 2 tablets (1,000 mg total) by mouth once daily. (Patient taking differently: Take 1 tablet by mouth once daily. )  0    carvedilol (COREG) 25 MG tablet Take 1 tablet (25 mg total) by mouth 2 (two) times daily. 60 tablet 11    cloNIDine (CATAPRES) 0.1 MG tablet Take 2 tablets (0.2 mg total) by mouth 3 (three) times daily. (Patient taking differently: Take 0.1 mg by mouth once daily. ) 180 tablet 11    insulin aspart U-100 (NOVOLOG) 100 unit/mL (3 mL) InPn pen Inject 1-10 Units into the skin 3 (three) times daily. Use sliding scale provided in instructions 108 mL 0    lancing device Misc 1 Device by Misc.(Non-Drug; Combo Route) route 2 (two) times daily with meals. 1 each 0     No current facility-administered medications for this visit.        Review of Systems   Constitutional: Negative for activity change, appetite change, chills, fatigue, fever and unexpected weight change.   HENT: Negative for mouth sores and nosebleeds.    Respiratory: Negative for cough and shortness of breath.    Cardiovascular: Negative for chest pain and leg swelling.   Gastrointestinal: Positive for constipation. Negative for abdominal pain, diarrhea, nausea and vomiting.   Endocrine: Negative for cold intolerance and heat intolerance.   Genitourinary: Negative for  "dysuria and hematuria.   Musculoskeletal: Positive for arthralgias. Negative for back pain.   Skin: Negative for rash.   Allergic/Immunologic: Negative for environmental allergies.   Neurological: Negative for light-headedness and numbness.   Hematological: Negative for adenopathy. Does not bruise/bleed easily.       ECOG Performance Status: 0   Objective:      Vitals:   Vitals:    12/17/20 1317   BP: (!) 140/81   Pulse: 81   Resp: 17   Temp: 99.3 °F (37.4 °C)   SpO2: 98%   Weight: 96.6 kg (213 lb 1.2 oz)   Height: 5' 5" (1.651 m)     BMI: Body mass index is 35.46 kg/m².    Physical Exam  Constitutional:       General: She is not in acute distress.     Appearance: She is well-developed.   Neck:      Musculoskeletal: Normal range of motion.   Cardiovascular:      Rate and Rhythm: Normal rate and regular rhythm.   Pulmonary:      Effort: Pulmonary effort is normal. No respiratory distress.   Abdominal:      General: There is no distension.      Palpations: Abdomen is soft. There is no mass.      Tenderness: There is no abdominal tenderness. There is no guarding.   Skin:     General: Skin is warm and dry.   Neurological:      Mental Status: She is alert and oriented to person, place, and time.   Psychiatric:         Behavior: Behavior normal.         Laboratory Data:  Lab Visit on 12/17/2020   Component Date Value Ref Range Status    Sodium 12/17/2020 138  136 - 145 mmol/L Final    Potassium 12/17/2020 4.2  3.5 - 5.1 mmol/L Final    Chloride 12/17/2020 106  95 - 110 mmol/L Final    CO2 12/17/2020 22* 23 - 29 mmol/L Final    Glucose 12/17/2020 207* 70 - 110 mg/dL Final    BUN 12/17/2020 31* 6 - 20 mg/dL Final    Creatinine 12/17/2020 1.0  0.5 - 1.4 mg/dL Final    Calcium 12/17/2020 10.3  8.7 - 10.5 mg/dL Final    Total Protein 12/17/2020 7.4  6.0 - 8.4 g/dL Final    Albumin 12/17/2020 3.8  3.5 - 5.2 g/dL Final    Total Bilirubin 12/17/2020 0.5  0.1 - 1.0 mg/dL Final    Comment: For infants and newborns, " interpretation of results should be based  on gestational age, weight and in agreement with clinical  observations.  Premature Infant recommended reference ranges:  Up to 24 hours.............<8.0 mg/dL  Up to 48 hours............<12.0 mg/dL  3-5 days..................<15.0 mg/dL  6-29 days.................<15.0 mg/dL      Alkaline Phosphatase 12/17/2020 90  55 - 135 U/L Final    AST 12/17/2020 14  10 - 40 U/L Final    ALT 12/17/2020 16  10 - 44 U/L Final    Anion Gap 12/17/2020 10  8 - 16 mmol/L Final    eGFR if African American 12/17/2020 >60.0  >60 mL/min/1.73 m^2 Final    eGFR if non African American 12/17/2020 >60.0  >60 mL/min/1.73 m^2 Final    Comment: Calculation used to obtain the estimated glomerular filtration  rate (eGFR) is the CKD-EPI equation.       WBC 12/17/2020 6.27  3.90 - 12.70 K/uL Final    RBC 12/17/2020 5.17  4.00 - 5.40 M/uL Final    Hemoglobin 12/17/2020 13.2  12.0 - 16.0 g/dL Final    Hematocrit 12/17/2020 42.4  37.0 - 48.5 % Final    MCV 12/17/2020 82  82 - 98 fL Final    MCH 12/17/2020 25.5* 27.0 - 31.0 pg Final    MCHC 12/17/2020 31.1* 32.0 - 36.0 g/dL Final    RDW 12/17/2020 20.7* 11.5 - 14.5 % Final    Platelets 12/17/2020 316  150 - 350 K/uL Final    MPV 12/17/2020 9.6  9.2 - 12.9 fL Final    Immature Granulocytes 12/17/2020 0.3  0.0 - 0.5 % Final    Gran # (ANC) 12/17/2020 5.1  1.8 - 7.7 K/uL Final    Immature Grans (Abs) 12/17/2020 0.02  0.00 - 0.04 K/uL Final    Comment: Mild elevation in immature granulocytes is non specific and   can be seen in a variety of conditions including stress response,   acute inflammation, trauma and pregnancy. Correlation with other   laboratory and clinical findings is essential.      Lymph # 12/17/2020 0.9* 1.0 - 4.8 K/uL Final    Mono # 12/17/2020 0.2* 0.3 - 1.0 K/uL Final    Eos # 12/17/2020 0.0  0.0 - 0.5 K/uL Final    Baso # 12/17/2020 0.00  0.00 - 0.20 K/uL Final    nRBC 12/17/2020 0  0 /100 WBC Final    Gran %  "12/17/2020 82.0* 38.0 - 73.0 % Final    Lymph % 12/17/2020 14.8* 18.0 - 48.0 % Final    Mono % 12/17/2020 2.7* 4.0 - 15.0 % Final    Eosinophil % 12/17/2020 0.2  0.0 - 8.0 % Final    Basophil % 12/17/2020 0.0  0.0 - 1.9 % Final    Differential Method 12/17/2020 Automated   Final   Hospital Outpatient Visit on 12/17/2020   Component Date Value Ref Range Status    BSA 12/17/2020 2.09  m2 Final    TDI SEPTAL 12/17/2020 0.09  m/s Final    LV LATERAL E/E' RATIO 12/17/2020 7.17  m/s Final    LV SEPTAL E/E' RATIO 12/17/2020 9.56  m/s Final    LA WIDTH 12/17/2020 4.26  cm Final    TDI LATERAL 12/17/2020 0.12  m/s Final    LVIDd 12/17/2020 4.59  3.5 - 6.0 cm Final    IVS 12/17/2020 0.72  0.6 - 1.1 cm Final    Posterior Wall 12/17/2020 0.82  0.6 - 1.1 cm Final    LVIDs 12/17/2020 3.11  2.1 - 4.0 cm Final    FS 12/17/2020 32  28 - 44 % Final    LA volume 12/17/2020 69.27  cm3 Final    Sinus 12/17/2020 2.66  cm Final    STJ 12/17/2020 2.41  cm Final    Ascending aorta 12/17/2020 2.73  cm Final    LV mass 12/17/2020 111.79  g Final    LA size 12/17/2020 3.45  cm Final    RVDD 12/17/2020 3.26  cm Final    TAPSE 12/17/2020 2.24  cm Final    RV S' 12/17/2020 13.73  cm/s Final    Left Ventricle Relative Wall Thick* 12/17/2020 0.36  cm Final    AV mean gradient 12/17/2020 5  mmHg Final    AV valve area 12/17/2020 2.99  cm2 Final    AV Velocity Ratio 12/17/2020 0.95   Final    AV index (prosthetic) 12/17/2020 0.93   Final    E/A ratio 12/17/2020 1.37   Final    Mean e' 12/17/2020 0.11  m/s Final    E wave decelartion time 12/17/2020 187.31  msec Final    IVRT 12/17/2020 88.49  msec Final    MV "A" wave duration 12/17/2020 8.28  msec Final    Pulm vein S/D ratio 12/17/2020 0.80   Final    LVOT diameter 12/17/2020 2.02  cm Final    LVOT area 12/17/2020 3.2  cm2 Final    LVOT peak ed 12/17/2020 1.62  m/s Final    LVOT peak VTI 12/17/2020 30.44  cm Final    Ao peak ed 12/17/2020 1.71  m/s Final "    Ao VTI 12/17/2020 32.59  cm Final    LVOT stroke volume 12/17/2020 97.50  cm3 Final    AV peak gradient 12/17/2020 12  mmHg Final    E/E' ratio 12/17/2020 8.19  m/s Final    MV Peak E Reynaldo 12/17/2020 0.86  m/s Final    TR Max Reynalod 12/17/2020 2.14  m/s Final    MV Peak A Reynaldo 12/17/2020 0.63  m/s Final    PV Peak S Reynaldo 12/17/2020 0.39  m/s Final    PV Peak D Reynaldo 12/17/2020 0.49  m/s Final    LV Systolic Volume 12/17/2020 38.17  mL Final    LV Systolic Volume Index 12/17/2020 18.9  mL/m2 Final    LV Diastolic Volume 12/17/2020 96.98  mL Final    LV Diastolic Volume Index 12/17/2020 47.92  mL/m2 Final    LA Volume Index 12/17/2020 34.2  mL/m2 Final    LV Mass Index 12/17/2020 55  g/m2 Final    RA Major Axis 12/17/2020 4.61  cm Final    Left Atrium Minor Axis 12/17/2020 5.59  cm Final    Left Atrium Major Axis 12/17/2020 5.50  cm Final    Triscuspid Valve Regurgitation Pea* 12/17/2020 18  mmHg Final    LA Volume Index (Mod) 12/17/2020 29.6  mL/m2 Final    LA volume (mod) 12/17/2020 59.95  cm3 Final    RA Width 12/17/2020 3.27  cm Final    Right Atrial Pressure (from IVC) 12/17/2020 3  mmHg Final    TV rest pulmonary artery pressure 12/17/2020 21  mmHg Final   BM biopsy 7/23/19:  -- MILDLY HYPERCELLULAR MARROW (60%) WITH TRILINEAGE HEMATOPOIESIS.  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  -- ADEQUATE NUMBER OF MEGAKARYOCYTES.  -- STAINABLE IRON IS NOT IDENTIFIED.  -- SEE COMMENT.  COMMENT:  CBC data shows microcytic anemia and thrombocytopenia.  Flow cytometric analysis of bone marrow detects a kappa restricted plasma cell population that expresses , CD19, and bright CD38. CD20 and CD56 are negative. Polytypic B lymphocytes are detected. T lymphocytes are immunophenotypically unremarkable. Blasts gate is not increased. Flow differential: Lymphocytes 5.9%, Monocytes 4.6%, Granulocytes 85.8%, Blast 1.2%.  Immunohistochemical stains are performed on the biopsy core  and clot section for greater sensitivity and further architectural assessment with adequate controls. -positive plasma cells comprise approximately 4-5% of thetotal cellularity. Immunoglobulin kappa and lambda light chains situ hybridization study reveals kappa light chainrestriction is some areas. The lymphoid aggregates are composed of mixed CD20-positive B-cells and CD3-positive T-cells. B-cells are more numerous than T-cells    TECHNIQUE:  12.95 mCi of F18-FDG was administered intravenously in the right hand.  After an approximately 60 min distribution time, PET/CT images were acquired from the skull base to mid thigh.  Transmission images were acquired to correct for attenuation using a whole body low-dose CT scan without contrast with the arms positioned above the head. Glycemia at the time of injection was 126 mg/dL.     COMPARISON:  FDG PET-CT 12/05/2019. CT chest abdomen pelvis without contrast 11/07/2020.     FINDINGS:  Quality of the study: Adequate.     In the head and neck, there are no hypermetabolic lesions worrisome for malignancy. There are no hypermetabolic mucosal lesions, and there are no pathologically enlarged or hypermetabolic lymph nodes.     In the chest, there are innumerable pulmonary nodules throughout both lungs with coalescence into consolidative masses in the lower lobes similar to prior CT 11/07/2020.  The largest conglomerate of nodules in the right lower lobe measures approximately 5.6 x 3.0 cm and demonstrating SUV max of 8.2 (axial fused/series 3, image 69).  There is an adjacent conglomerate of nodules in the right upper lobe measuring approximately 4.1 x 4.2 cm and demonstrating SUV max of 6.3 (axial fused/series 3, image 69).  The largest conglomerate of nodules in the left lower lobe measures approximately 5.8 x 5.0 cm and demonstrates SUV max of 7.6 (axial fused/series 3, image 68).     In the abdomen and pelvis, there is physiologic tracer distribution within the  abdominal organs and excretion into the genitourinary system.  There is a vascular stent in the right renal artery.  Wedge-shaped defect in the lower pole of the right kidney suggestive of remote infarct.     The spleen has normal uptake and is normal in size at 7.2 cm in craniocaudal dimension.     In the bones, there are no hypermetabolic lesions worrisome for malignancy.     Impression:     1.  In this patient with biopsy-proven B-cell lymphoma, there are innumerable hypermetabolic pulmonary nodules with coalescence into consolidative masses in the bilateral lower lobes, similar to prior CT 11/07/2020.  These findings are concerning for pulmonary lymphoproliferative disorder.  Adequate tissue sampling would be requried for definitive diagnosis.     2.  No other findings suspicious for malignancy such as pathologically enlarged or hypermetabolic lymph nodes.     This report was flagged in Epic as abnormal.    Assessment:       1. Multiple pulmonary nodules    2. Cryoglobulinemic vasculitis    3. B-cell lymphoproliferative disorder    4. Other iron deficiency anemia    5. Type 2 diabetes mellitus without complication, without long-term current use of insulin    6. Chronic viral hepatitis B without delta agent and without coma    7. Chronic diastolic heart failure    8. 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  2) IgM kappa MGUS  - Patient with new renal failure, has normal IgG.  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 Have msgd rheumatology regarding steroid dose going forward.    4) Cryoglobulin vasculitis, GN, HBV  -Per rheumatology/nephrology  -On entecavir for HBV    5) DM  -On insulin  -Monitor closely while on steroids    6) CKD  -Monitor Cr  -Patient supposed to see nephrology, no appointment made yet    7) Iron deficiency anemia  -To continue PO ferrous sulfate    8) Hypercalcemia likely d/t underlying autoimmune disease  -Corrected now 10.5, improved with steroids  -Encouraged to hydrate    Follow-up:  -Referral nephrology. RTC in 2 months with labs prior (CBC, CMP)    The following was staffed and discussed with supervising physician Dr. Warren.    Gunjan Montalvo MD  Hematology/Oncology Fellow

## 2020-12-18 ENCOUNTER — OFFICE VISIT (OUTPATIENT)
Dept: PULMONOLOGY | Facility: CLINIC | Age: 55
End: 2020-12-18
Payer: MEDICAID

## 2020-12-18 VITALS
HEIGHT: 65 IN | BODY MASS INDEX: 36 KG/M2 | WEIGHT: 216.06 LBS | SYSTOLIC BLOOD PRESSURE: 110 MMHG | OXYGEN SATURATION: 97 % | HEART RATE: 86 BPM | DIASTOLIC BLOOD PRESSURE: 70 MMHG

## 2020-12-18 DIAGNOSIS — D47.9 B-CELL LYMPHOPROLIFERATIVE DISORDER: Primary | ICD-10-CM

## 2020-12-18 DIAGNOSIS — R06.02 SOB (SHORTNESS OF BREATH): ICD-10-CM

## 2020-12-18 DIAGNOSIS — R91.8 MULTIPLE PULMONARY NODULES: Primary | ICD-10-CM

## 2020-12-18 DIAGNOSIS — D50.8 OTHER IRON DEFICIENCY ANEMIA: ICD-10-CM

## 2020-12-18 DIAGNOSIS — J84.9 INTERSTITIAL PULMONARY DISEASE, UNSPECIFIED: ICD-10-CM

## 2020-12-18 PROCEDURE — 99999 PR PBB SHADOW E&M-EST. PATIENT-LVL IV: ICD-10-PCS | Mod: PBBFAC,,, | Performed by: INTERNAL MEDICINE

## 2020-12-18 PROCEDURE — 99999 PR PBB SHADOW E&M-EST. PATIENT-LVL IV: CPT | Mod: PBBFAC,,, | Performed by: INTERNAL MEDICINE

## 2020-12-18 PROCEDURE — 99214 OFFICE O/P EST MOD 30 MIN: CPT | Mod: PBBFAC | Performed by: INTERNAL MEDICINE

## 2020-12-18 PROCEDURE — 99213 PR OFFICE/OUTPT VISIT, EST, LEVL III, 20-29 MIN: ICD-10-PCS | Mod: S$PBB,,, | Performed by: INTERNAL MEDICINE

## 2020-12-18 PROCEDURE — 99213 OFFICE O/P EST LOW 20 MIN: CPT | Mod: S$PBB,,, | Performed by: INTERNAL MEDICINE

## 2020-12-18 NOTE — PROGRESS NOTES
[facility]  Pulmonary Clinic Note      Reason for Visit:  Shortness of breath and abnormal CT scan     History of Present Illness:  Kendy Vital is a 55 y.o.  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 Patient states that she first noted having SOB a few months ago, but only noticed it with exertion or with laying flat occasionally. Over the past 3 weeks, she endorses progressively worsening SOB and persistent dry cough, although she has not had any sputum production or hemoptysis. During the past 3 weeks she has also had almost daily fevers despite taking tylenol. She also endorses profuse night sweats which soak the bed, and a 10lb weight loss. Of note, she was recently hospitalized in [place] and given a course of steroids for pneumonia, but did not have improvement of her symptoms. 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 During the admission, she underwent bronchoscopy with transbronchial biopsies and BAL. Pathology was negative for malignancy with no growth on cultures (GMS not submitted). The CD4/CD8 ratio was elevated. Since admission, she has not notices any improvement in her symptoms. She requires assistance from her fiance with most of her ADLs. She denies fevers, chills, sick contacts, cough with sputum production, or chest pain.     12/18/2020: Since the patient's last visit on 11/20/2020, patient has undergone PET CT revealing innumerable hypermetabolic pulmonary nodules with coalescence in the bilateral lower lobes. She underwent  "IR-guided lung biopsy revealing granulomatous inflammation. Patient was started on prednisone 40mg daily and bactrim for PJP PPx.. She reports significant improvements of her symptoms. She is able to walk and perform ADLs without assistance or significant shortness of breath. She is no longer wheelchair dependent. She reports insomnia and feeling "amped up" since starting prednisone. She has tried trazodone, diphenhydramine, and melatonin without improvement.     Pertinent History:  Medications: albuterol, prednisone   Tobacco/Vape hx: Quit 2 years ago. 1ppd for 15 years.   EtOH/illicit drug use: social drinking. 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 Inside dog   Travel/TB Hx: No history TB  Allergies/Rhinitis: none  Previous known hx of lung disease: none      Past Medical History:   Diagnosis Date    Acute (diffuse) proliferative glomerulonephritis     Acute renal failure 7/19/2019    B-cell lymphoproliferative disorder 7/30/2019    CHF (congestive heart failure)     Chronic obstructive lung disease 7/27/2019    Chronic viral hepatitis B without delta agent and without coma 7/24/2019    Cryoglobulinemic vasculitis     Hypogammaglobulinemia 8/5/2019    Iron deficiency anemia 7/30/2019    Renal vein thrombosis 2015    s/p embolectomy and lovenox for 9 mos    Steroid-induced hyperglycemia 7/28/2019    Uterine leiomyoma     s/p resection     Past Surgical History:   Procedure Laterality Date    LUNG BIOPSY N/A 12/1/2020    Procedure: BIOPSY, LUNG;  Surgeon: Kelli Diagnostic Provider;  Location: [facility];  Service: Radiology;  Laterality: N/A;  8 A.M. START  PHONE PREOP DONE 11/27/2020  PT/INR, CBC, CMP AND COVID ON AM OF PROCEDURE   ARRIVAL AT 7:00 FOR 8:30 APPT     No family history on file.  Social History     Socioeconomic History    Marital status:      Spouse name: Not on file    Number of children: Not on file    Years of " education: Not on file    Highest education level: Not on file   Occupational History    Not on file   Social Needs    Financial resource strain: Not on file    Food insecurity     Worry: Not on file     Inability: Not on file    Transportation needs     Medical: Not on file     Non-medical: Not on file   Tobacco Use    Smoking status: Former Smoker    Smokeless tobacco: Never Used   Substance and Sexual Activity    Alcohol use: Not Currently    Drug use: Never    Sexual activity: Not on file   Lifestyle    Physical activity     Days per week: Not on file     Minutes per session: Not on file    Stress: Not on file   Relationships    Social connections     Talks on phone: Not on file     Gets together: Not on file     Attends [employer] service: Not on file     Active member of club or organization: Not on file     Attends meetings of clubs or organizations: Not on file     Relationship status: Not on file   Other Topics Concern    Not on file   Social History Narrative    Not on file     Review of patient's allergies indicates:  No Known Allergies    Review of Systems:  Constitutional: Negative for fever, chills. Positive weight gain, improvement of physical activity,   HENT: Negative for postnasal drip, rhinorrhea and congestion.    Respiratory: Improvement of shortness of breath, dyspnea on exertion No cough. Negative for snoring, hemoptysis, sputum production, choking, chest tightness, wheezing,   Cardiovascular: Negative for chest pain, palpitations and leg swelling.   Musculoskeletal: Negative for arthralgias and back pain.   Gastrointestinal: Negative for nausea, vomiting, abdominal pain and abdominal distention.   Neurological: Negative for dizziness, syncope and headaches.   Psychiatric/Behavioral: Negative for confusion.       OBJECTIVE:     Vital Signs  Vitals:    12/18/20 1511   BP: 110/70   BP Location: Left arm   Patient Position: Sitting   Pulse: 86   SpO2: 97%   Weight: 98 kg (216 lb 0.8  "oz)   Height: 5' 5" (1.651 m)     There is no height or weight on file to calculate BMI.    Physical Exam:  General: well developed, well nourished, no distress. Patient able to walk into clinic today  HENT:   Head: Normocephalic.   Eyes:  PERRLA, conjunctivae/corneas clear  Nose: nasal mucosa moist, no discharge, no post nasal drip  Mouth/Throat: Mallampati Score: 2  Neck: Normal range of motion. Neck supple. No JVD present.   Pulmonary/Chest:  normal respiratory effort, no wheezes, no rales,   Cardiovascular: regular rate and rhythm and no murmur  Abdominal: soft, nontender, nondistended  Musculoskeletal: no cyanosis, no edema, no clubbing. Patient walked into clinic today without difficulty  Skin: No rashes or lesions. good skin turgor  Neurological: alert, oriented, thought content appropriate  Psychiatric: normal mood and affect. behavior is normal. Judgment and thought content normal    Laboratory:      Chemistry        Component Value Date/Time     12/17/2020 1154    K 4.2 12/17/2020 1154     12/17/2020 1154    CO2 22 (L) 12/17/2020 1154    BUN 31 (H) 12/17/2020 1154    CREATININE 1.0 12/17/2020 1154     (H) 12/17/2020 1154        Component Value Date/Time    CALCIUM 10.3 12/17/2020 1154    ALKPHOS 90 12/17/2020 1154    AST 14 12/17/2020 1154    ALT 16 12/17/2020 1154    BILITOT 0.5 12/17/2020 1154    ESTGFRAFRICA >60.0 12/17/2020 1154    EGFRNONAA >60.0 12/17/2020 1154             12/17/2020 TTE  · The left ventricle is normal in size and normal systolic function. The   estimated ejection fraction is 65%.  · Normal left ventricular diastolic function.  · Normal right ventricular size with normal right ventricular systolic   function.  · Mild tricuspid regurgitation.  · Mild mitral regurgitation.  · Normal central venous pressure (3 mmHg).  · The estimated PA systolic pressure is 21 mmHg.        11/10/20 Pathology  Histologic sections of the specimen show lymphocytic infiltrates. "   Immunohistochemical stains are performed with adequate controls.  The   lymphocytic infiltrates are composed of predominantly CD3-positive T   lymphocytes.  Rare CD20-positive B lymphocytes and -positive plasma   cells are seen.   Taken together, there is no evidence of lymphoproliferative disorder at this     12/1/2020 IR-guided lung biopsy pathology  Fragments of pulmonary parenchyma (submitted as left lung biopsy):   -Granulomatous inflammation on a background of fibrosis, chronic   inflammation, and reactive alveolar lining cell hyperplasia   -No malignancy is identified   -Special stains for acid-fast bacilli and fungi will be performed with a   supplemental report to follow       ASSESSMENT/PLAN:   Problems:  Multiple Bilateral pulmonary nodules - llikely granulomatous disease.   Diffuse proliferative glomerulonephritis 2/2   Cryoglobulinemic vasculitis  HBV on entecavir  Hypogammaglobulinemia  Biopsy-proven beta cell lymphoma  bone marrow bx in 2019  HTN  DM2    Ms. Vital is a 54 yo female with the above history who presented for evaluation of multiple pulmonary nodules. The nodules are bronchovascularly distributed and seem to coalesce at the bases of her lungs bilaterally (R>L). Sarcoidosis is high on the differential given the elevated CD4/CD8 ratio noted in cytology and granulomatous inflammation on the IR-guided lung biopsy. Sarcoidosis is a diagnosis of exclusion and the patient carries diagnoses that would result in this (or similar) presentation such as lymphoma and vasculitis. After the biopsy results, the patient was started on Prednisone 40mg daily and will f/u with Dr. Solis in Rheumatology.    - Would recommend repeat CT chest in 3 months  - Steroid taper being managed by rheumatology.  - continue PJP ppx  - Patient reports steroid-induced insomnia with OTC treatment. Patient requested Ambien. Patient to f/u with Dr. Solis for management.   - Patient to return to clinic in 3 months  after CT scan for follow up.    Cody Mejias MD  [employer] Pulmonary and Critical Care Fellow  Pager #: [phone]

## 2020-12-21 LAB — CRYOGLOB SER QL: NORMAL

## 2020-12-23 NOTE — PROGRESS NOTES
Please see Addendum to [facility] Note for my attestation.    Wally Carl MD  Pulmonary/Critical Care Medicine

## 2020-12-30 ENCOUNTER — TELEPHONE (OUTPATIENT)
Dept: NEPHROLOGY | Facility: CLINIC | Age: 55
End: 2020-12-30

## 2020-12-30 NOTE — TELEPHONE ENCOUNTER
L/m for pt, calling to schedule Consult/ New Patient appt. Left call back number.       ----- Message from Edy Velez sent at 12/17/2020  2:03 PM CST -----  Regarding: Appt request  Good afternoon,    Pt has a referral in for nephrology. Please call pt to schedule.     Thank you

## 2021-01-04 ENCOUNTER — HOSPITAL ENCOUNTER (OUTPATIENT)
Dept: RADIOLOGY | Facility: CLINIC | Age: 56
Discharge: HOME | End: 2021-01-04
Attending: STUDENT IN AN ORGANIZED HEALTH CARE EDUCATION/TRAINING PROGRAM
Payer: MEDICAID

## 2021-01-04 DIAGNOSIS — D89.1 CRYOGLOBULINEMIC VASCULITIS: ICD-10-CM

## 2021-01-04 DIAGNOSIS — D86.9 SARCOIDOSIS: ICD-10-CM

## 2021-01-04 DIAGNOSIS — R06.02 SHORTNESS OF BREATH: ICD-10-CM

## 2021-01-04 PROCEDURE — 77080 DEXA BONE DENSITY SPINE HIP: ICD-10-PCS | Mod: 26,,, | Performed by: INTERNAL MEDICINE

## 2021-01-04 PROCEDURE — 77080 DXA BONE DENSITY AXIAL: CPT | Mod: 26,,, | Performed by: INTERNAL MEDICINE

## 2021-01-04 PROCEDURE — 77080 DXA BONE DENSITY AXIAL: CPT | Mod: TC

## 2021-01-07 DIAGNOSIS — R91.8 MULTIPLE PULMONARY NODULES: ICD-10-CM

## 2021-01-07 RX ORDER — PREDNISONE 20 MG/1
40 TABLET ORAL DAILY
Qty: 180 TABLET | Refills: 1 | Status: SHIPPED | OUTPATIENT
Start: 2021-01-07 | End: 2021-01-19 | Stop reason: SDUPTHER

## 2021-01-12 LAB
ACID FAST MOD KINY STN SPEC: NORMAL
MYCOBACTERIUM SPEC QL CULT: NORMAL

## 2021-01-19 ENCOUNTER — HOSPITAL ENCOUNTER (OUTPATIENT)
Dept: RADIOLOGY | Facility: HOSPITAL | Age: 56
Discharge: HOME | End: 2021-01-19
Attending: STUDENT IN AN ORGANIZED HEALTH CARE EDUCATION/TRAINING PROGRAM
Payer: MEDICAID

## 2021-01-19 ENCOUNTER — OFFICE VISIT (OUTPATIENT)
Dept: RHEUMATOLOGY | Facility: CLINIC | Age: 56
End: 2021-01-19
Payer: COMMERCIAL

## 2021-01-19 VITALS
SYSTOLIC BLOOD PRESSURE: 144 MMHG | BODY MASS INDEX: 36.96 KG/M2 | TEMPERATURE: 99 F | DIASTOLIC BLOOD PRESSURE: 90 MMHG | HEIGHT: 65 IN | WEIGHT: 221.81 LBS | HEART RATE: 88 BPM

## 2021-01-19 DIAGNOSIS — D86.9 SARCOIDOSIS: Primary | ICD-10-CM

## 2021-01-19 DIAGNOSIS — M25.569 KNEE PAIN, UNSPECIFIED CHRONICITY, UNSPECIFIED LATERALITY: ICD-10-CM

## 2021-01-19 DIAGNOSIS — M25.50 ARTHRALGIA, UNSPECIFIED JOINT: ICD-10-CM

## 2021-01-19 DIAGNOSIS — D86.9 SARCOIDOSIS: ICD-10-CM

## 2021-01-19 DIAGNOSIS — R91.8 MULTIPLE PULMONARY NODULES: ICD-10-CM

## 2021-01-19 DIAGNOSIS — R80.9 PROTEINURIA, UNSPECIFIED TYPE: ICD-10-CM

## 2021-01-19 DIAGNOSIS — D89.1 CRYOGLOBULINEMIA: ICD-10-CM

## 2021-01-19 PROCEDURE — 99215 OFFICE O/P EST HI 40 MIN: CPT | Mod: S$PBB,,, | Performed by: STUDENT IN AN ORGANIZED HEALTH CARE EDUCATION/TRAINING PROGRAM

## 2021-01-19 PROCEDURE — 73130 X-RAY EXAM OF HAND: CPT | Mod: TC,50

## 2021-01-19 PROCEDURE — 99215 PR OFFICE/OUTPT VISIT, EST, LEVL V, 40-54 MIN: ICD-10-PCS | Mod: S$PBB,,, | Performed by: STUDENT IN AN ORGANIZED HEALTH CARE EDUCATION/TRAINING PROGRAM

## 2021-01-19 PROCEDURE — 99999 PR PBB SHADOW E&M-EST. PATIENT-LVL V: CPT | Mod: PBBFAC,,, | Performed by: STUDENT IN AN ORGANIZED HEALTH CARE EDUCATION/TRAINING PROGRAM

## 2021-01-19 PROCEDURE — 73565 X-RAY EXAM OF KNEES: CPT | Mod: TC

## 2021-01-19 PROCEDURE — 99999 PR PBB SHADOW E&M-EST. PATIENT-LVL V: ICD-10-PCS | Mod: PBBFAC,,, | Performed by: STUDENT IN AN ORGANIZED HEALTH CARE EDUCATION/TRAINING PROGRAM

## 2021-01-19 PROCEDURE — 73130 X-RAY EXAM OF HAND: CPT | Mod: 26,50,, | Performed by: RADIOLOGY

## 2021-01-19 PROCEDURE — 99215 OFFICE O/P EST HI 40 MIN: CPT | Mod: PBBFAC,25 | Performed by: STUDENT IN AN ORGANIZED HEALTH CARE EDUCATION/TRAINING PROGRAM

## 2021-01-19 PROCEDURE — 73565 X-RAY EXAM OF KNEES: CPT | Mod: 26,,, | Performed by: RADIOLOGY

## 2021-01-19 PROCEDURE — 73130 XR HAND COMPLETE 3 VIEWS BILATERAL: ICD-10-PCS | Mod: 26,50,, | Performed by: RADIOLOGY

## 2021-01-19 PROCEDURE — 73565 XR KNEE AP STANDING BILATERAL: ICD-10-PCS | Mod: 26,,, | Performed by: RADIOLOGY

## 2021-01-19 RX ORDER — PREDNISONE 20 MG/1
TABLET ORAL
Qty: 180 TABLET | Refills: 1 | Status: SHIPPED | OUTPATIENT
Start: 2021-01-19 | End: 2021-05-17

## 2021-01-19 RX ORDER — INSULIN HUMAN 100 [IU]/ML
INJECTION, SOLUTION PARENTERAL
COMMUNITY
Start: 2020-12-29 | End: 2021-11-12 | Stop reason: SDUPTHER

## 2021-01-19 RX ORDER — HYDROXYCHLOROQUINE SULFATE 200 MG/1
200 TABLET, FILM COATED ORAL 2 TIMES DAILY
Qty: 180 TABLET | Refills: 3 | Status: SHIPPED | OUTPATIENT
Start: 2021-01-19 | End: 2022-03-07 | Stop reason: SDUPTHER

## 2021-01-19 RX ORDER — HYDROXYCHLOROQUINE SULFATE 200 MG/1
200 TABLET, FILM COATED ORAL 2 TIMES DAILY
Qty: 180 TABLET | Refills: 3 | Status: SHIPPED | OUTPATIENT
Start: 2021-01-19 | End: 2021-01-19 | Stop reason: SDUPTHER

## 2021-01-20 ENCOUNTER — TELEPHONE (OUTPATIENT)
Dept: RHEUMATOLOGY | Facility: CLINIC | Age: 56
End: 2021-01-20

## 2021-01-20 ENCOUNTER — OFFICE VISIT (OUTPATIENT)
Dept: HEPATOLOGY | Facility: CLINIC | Age: 56
End: 2021-01-20
Payer: MEDICAID

## 2021-01-20 ENCOUNTER — LAB VISIT (OUTPATIENT)
Dept: LAB | Facility: HOSPITAL | Age: 56
End: 2021-01-20
Payer: MEDICAID

## 2021-01-20 VITALS
WEIGHT: 216.94 LBS | DIASTOLIC BLOOD PRESSURE: 74 MMHG | HEART RATE: 77 BPM | OXYGEN SATURATION: 96 % | RESPIRATION RATE: 18 BRPM | HEIGHT: 66 IN | BODY MASS INDEX: 34.86 KG/M2 | SYSTOLIC BLOOD PRESSURE: 141 MMHG

## 2021-01-20 DIAGNOSIS — B18.1 CHRONIC VIRAL HEPATITIS B WITHOUT DELTA AGENT AND WITHOUT COMA: ICD-10-CM

## 2021-01-20 DIAGNOSIS — B18.1 CHRONIC VIRAL HEPATITIS B WITHOUT DELTA AGENT AND WITHOUT COMA: Primary | ICD-10-CM

## 2021-01-20 LAB
AFP SERPL-MCNC: 3 NG/ML (ref 0–8.4)
ALBUMIN SERPL BCP-MCNC: 3.5 G/DL (ref 3.5–5.2)
ALP SERPL-CCNC: 77 U/L (ref 55–135)
ALT SERPL W/O P-5'-P-CCNC: 21 U/L (ref 10–44)
ANION GAP SERPL CALC-SCNC: 6 MMOL/L (ref 8–16)
AST SERPL-CCNC: 12 U/L (ref 10–40)
BILIRUB SERPL-MCNC: 0.7 MG/DL (ref 0.1–1)
BUN SERPL-MCNC: 28 MG/DL (ref 6–20)
CALCIUM SERPL-MCNC: 9.1 MG/DL (ref 8.7–10.5)
CHLORIDE SERPL-SCNC: 102 MMOL/L (ref 95–110)
CO2 SERPL-SCNC: 26 MMOL/L (ref 23–29)
CREAT SERPL-MCNC: 1.3 MG/DL (ref 0.5–1.4)
ERYTHROCYTE [DISTWIDTH] IN BLOOD BY AUTOMATED COUNT: 21.7 % (ref 11.5–14.5)
EST. GFR  (AFRICAN AMERICAN): 53.7 ML/MIN/1.73 M^2
EST. GFR  (NON AFRICAN AMERICAN): 46.6 ML/MIN/1.73 M^2
GLUCOSE SERPL-MCNC: 448 MG/DL (ref 70–110)
HBV SURFACE AG SERPL QL IA: POSITIVE
HCT VFR BLD AUTO: 44.2 % (ref 37–48.5)
HGB BLD-MCNC: 13.7 G/DL (ref 12–16)
INR PPP: 0.9 (ref 0.8–1.2)
MCH RBC QN AUTO: 27.5 PG (ref 27–31)
MCHC RBC AUTO-ENTMCNC: 31 G/DL (ref 32–36)
MCV RBC AUTO: 89 FL (ref 82–98)
PLATELET # BLD AUTO: 231 K/UL (ref 150–350)
PMV BLD AUTO: 10.3 FL (ref 9.2–12.9)
POTASSIUM SERPL-SCNC: 4 MMOL/L (ref 3.5–5.1)
PROT SERPL-MCNC: 6.9 G/DL (ref 6–8.4)
PROTHROMBIN TIME: 9.9 SEC (ref 9–12.5)
RBC # BLD AUTO: 4.99 M/UL (ref 4–5.4)
SODIUM SERPL-SCNC: 134 MMOL/L (ref 136–145)
WBC # BLD AUTO: 6.19 K/UL (ref 3.9–12.7)

## 2021-01-20 PROCEDURE — 87340 HEPATITIS B SURFACE AG IA: CPT

## 2021-01-20 PROCEDURE — 85610 PROTHROMBIN TIME: CPT

## 2021-01-20 PROCEDURE — 80053 COMPREHEN METABOLIC PANEL: CPT

## 2021-01-20 PROCEDURE — 99214 OFFICE O/P EST MOD 30 MIN: CPT | Mod: S$PBB,,, | Performed by: NURSE PRACTITIONER

## 2021-01-20 PROCEDURE — 99999 PR PBB SHADOW E&M-EST. PATIENT-LVL IV: ICD-10-PCS | Mod: PBBFAC,,, | Performed by: NURSE PRACTITIONER

## 2021-01-20 PROCEDURE — 87517 HEPATITIS B DNA QUANT: CPT

## 2021-01-20 PROCEDURE — 36415 COLL VENOUS BLD VENIPUNCTURE: CPT

## 2021-01-20 PROCEDURE — 85027 COMPLETE CBC AUTOMATED: CPT

## 2021-01-20 PROCEDURE — 99999 PR PBB SHADOW E&M-EST. PATIENT-LVL IV: CPT | Mod: PBBFAC,,, | Performed by: NURSE PRACTITIONER

## 2021-01-20 PROCEDURE — 82105 ALPHA-FETOPROTEIN SERUM: CPT

## 2021-01-20 PROCEDURE — 99214 PR OFFICE/OUTPT VISIT, EST, LEVL IV, 30-39 MIN: ICD-10-PCS | Mod: S$PBB,,, | Performed by: NURSE PRACTITIONER

## 2021-01-20 PROCEDURE — 99214 OFFICE O/P EST MOD 30 MIN: CPT | Mod: PBBFAC | Performed by: NURSE PRACTITIONER

## 2021-01-20 RX ORDER — ENTECAVIR 0.5 MG/1
0.5 TABLET, FILM COATED ORAL EVERY OTHER DAY
Qty: 15 TABLET | Refills: 6 | Status: SHIPPED | OUTPATIENT
Start: 2021-01-20 | End: 2021-07-20 | Stop reason: SDUPTHER

## 2021-01-22 ENCOUNTER — TELEPHONE (OUTPATIENT)
Dept: ENDOCRINOLOGY | Facility: CLINIC | Age: 56
End: 2021-01-22

## 2021-01-23 LAB
HBV DNA SERPL NAA+PROBE-ACNC: <10 IU/ML
HBV DNA SERPL NAA+PROBE-LOG IU: <1 LOG (10) IU/ML
HBV DNA SERPL QL NAA+PROBE: NOT DETECTED

## 2021-01-25 ENCOUNTER — OFFICE VISIT (OUTPATIENT)
Dept: OPTOMETRY | Facility: CLINIC | Age: 56
End: 2021-01-25
Payer: MEDICAID

## 2021-01-25 DIAGNOSIS — H25.042 POSTERIOR SUBCAPSULAR AGE-RELATED CATARACT, LEFT EYE: ICD-10-CM

## 2021-01-25 DIAGNOSIS — R73.9 STEROID-INDUCED HYPERGLYCEMIA: ICD-10-CM

## 2021-01-25 DIAGNOSIS — R06.02 SHORTNESS OF BREATH: ICD-10-CM

## 2021-01-25 DIAGNOSIS — I10 ESSENTIAL HYPERTENSION: ICD-10-CM

## 2021-01-25 DIAGNOSIS — D89.1 CRYOGLOBULINEMIC VASCULITIS: ICD-10-CM

## 2021-01-25 DIAGNOSIS — Z79.899 LONG-TERM USE OF PLAQUENIL: ICD-10-CM

## 2021-01-25 DIAGNOSIS — D86.9 SARCOIDOSIS: ICD-10-CM

## 2021-01-25 DIAGNOSIS — T38.0X5A STEROID-INDUCED HYPERGLYCEMIA: ICD-10-CM

## 2021-01-25 DIAGNOSIS — E11.9 TYPE 2 DIABETES MELLITUS WITHOUT COMPLICATION, WITHOUT LONG-TERM CURRENT USE OF INSULIN: Primary | ICD-10-CM

## 2021-01-25 DIAGNOSIS — H52.13 MYOPIA OF BOTH EYES: ICD-10-CM

## 2021-01-25 DIAGNOSIS — H25.13 NS (NUCLEAR SCLEROSIS), BILATERAL: ICD-10-CM

## 2021-01-25 DIAGNOSIS — E11.9 TYPE 2 DIABETES MELLITUS WITHOUT OPHTHALMIC MANIFESTATIONS: ICD-10-CM

## 2021-01-25 PROCEDURE — 92015 DETERMINE REFRACTIVE STATE: CPT | Mod: ,,, | Performed by: OPTOMETRIST

## 2021-01-25 PROCEDURE — 99999 PR PBB SHADOW E&M-EST. PATIENT-LVL II: ICD-10-PCS | Mod: PBBFAC,,, | Performed by: OPTOMETRIST

## 2021-01-25 PROCEDURE — 92004 PR EYE EXAM, NEW PATIENT,COMPREHESV: ICD-10-PCS | Mod: S$PBB,,, | Performed by: OPTOMETRIST

## 2021-01-25 PROCEDURE — 92004 COMPRE OPH EXAM NEW PT 1/>: CPT | Mod: S$PBB,,, | Performed by: OPTOMETRIST

## 2021-01-25 PROCEDURE — 92015 PR REFRACTION: ICD-10-PCS | Mod: ,,, | Performed by: OPTOMETRIST

## 2021-01-25 PROCEDURE — 99212 OFFICE O/P EST SF 10 MIN: CPT | Mod: PBBFAC | Performed by: OPTOMETRIST

## 2021-01-25 PROCEDURE — 99999 PR PBB SHADOW E&M-EST. PATIENT-LVL II: CPT | Mod: PBBFAC,,, | Performed by: OPTOMETRIST

## 2021-01-26 ENCOUNTER — TELEPHONE (OUTPATIENT)
Dept: ENDOCRINOLOGY | Facility: CLINIC | Age: 56
End: 2021-01-26

## 2021-02-02 ENCOUNTER — TELEPHONE (OUTPATIENT)
Dept: HEMATOLOGY/ONCOLOGY | Facility: CLINIC | Age: 56
End: 2021-02-02

## 2021-02-03 ENCOUNTER — TELEPHONE (OUTPATIENT)
Dept: RADIOLOGY | Facility: HOSPITAL | Age: 56
End: 2021-02-03

## 2021-02-03 ENCOUNTER — OFFICE VISIT (OUTPATIENT)
Dept: NEPHROLOGY | Facility: CLINIC | Age: 56
End: 2021-02-03
Payer: MEDICAID

## 2021-02-03 VITALS
SYSTOLIC BLOOD PRESSURE: 108 MMHG | HEIGHT: 66 IN | HEART RATE: 82 BPM | OXYGEN SATURATION: 96 % | BODY MASS INDEX: 35.72 KG/M2 | DIASTOLIC BLOOD PRESSURE: 70 MMHG | WEIGHT: 222.25 LBS

## 2021-02-03 DIAGNOSIS — E83.52 HYPERCALCEMIA: ICD-10-CM

## 2021-02-03 DIAGNOSIS — D47.9 B-CELL LYMPHOPROLIFERATIVE DISORDER: ICD-10-CM

## 2021-02-03 DIAGNOSIS — E66.9 OBESITY (BMI 30-39.9): ICD-10-CM

## 2021-02-03 DIAGNOSIS — B18.1 CHRONIC VIRAL HEPATITIS B WITHOUT DELTA AGENT AND WITHOUT COMA: ICD-10-CM

## 2021-02-03 DIAGNOSIS — R31.9 HEMATURIA SYNDROME: ICD-10-CM

## 2021-02-03 DIAGNOSIS — N18.31 STAGE 3A CHRONIC KIDNEY DISEASE: Primary | ICD-10-CM

## 2021-02-03 DIAGNOSIS — D89.1 CRYOGLOBULINEMIC VASCULITIS: ICD-10-CM

## 2021-02-03 DIAGNOSIS — N00.8 ACUTE (DIFFUSE) PROLIFERATIVE GLOMERULONEPHRITIS: ICD-10-CM

## 2021-02-03 DIAGNOSIS — D86.9 SARCOIDOSIS: ICD-10-CM

## 2021-02-03 PROCEDURE — 99215 OFFICE O/P EST HI 40 MIN: CPT | Mod: S$PBB,,, | Performed by: INTERNAL MEDICINE

## 2021-02-03 PROCEDURE — 99213 OFFICE O/P EST LOW 20 MIN: CPT | Mod: PBBFAC | Performed by: INTERNAL MEDICINE

## 2021-02-03 PROCEDURE — 99999 PR PBB SHADOW E&M-EST. PATIENT-LVL III: ICD-10-PCS | Mod: PBBFAC,,, | Performed by: INTERNAL MEDICINE

## 2021-02-03 PROCEDURE — 99999 PR PBB SHADOW E&M-EST. PATIENT-LVL III: CPT | Mod: PBBFAC,,, | Performed by: INTERNAL MEDICINE

## 2021-02-03 PROCEDURE — 99215 PR OFFICE/OUTPT VISIT, EST, LEVL V, 40-54 MIN: ICD-10-PCS | Mod: S$PBB,,, | Performed by: INTERNAL MEDICINE

## 2021-02-04 ENCOUNTER — HOSPITAL ENCOUNTER (OUTPATIENT)
Dept: RADIOLOGY | Facility: HOSPITAL | Age: 56
Discharge: HOME | End: 2021-02-04
Attending: NURSE PRACTITIONER
Payer: MEDICAID

## 2021-02-04 DIAGNOSIS — B18.1 CHRONIC VIRAL HEPATITIS B WITHOUT DELTA AGENT AND WITHOUT COMA: ICD-10-CM

## 2021-02-04 PROCEDURE — 76700 US EXAM ABDOM COMPLETE: CPT | Mod: TC,PO

## 2021-02-04 PROCEDURE — 76700 US EXAM ABDOM COMPLETE: CPT | Mod: 26,,, | Performed by: RADIOLOGY

## 2021-02-04 PROCEDURE — 76700 US ABDOMEN COMPLETE: ICD-10-PCS | Mod: 26,,, | Performed by: RADIOLOGY

## 2021-02-15 ENCOUNTER — TELEPHONE (OUTPATIENT)
Dept: HEPATOLOGY | Facility: CLINIC | Age: 56
End: 2021-02-15

## 2021-02-18 ENCOUNTER — TELEPHONE (OUTPATIENT)
Dept: HEMATOLOGY/ONCOLOGY | Facility: CLINIC | Age: 56
End: 2021-02-18

## 2021-02-25 ENCOUNTER — LAB VISIT (OUTPATIENT)
Dept: LAB | Facility: HOSPITAL | Age: 56
End: 2021-02-25
Attending: STUDENT IN AN ORGANIZED HEALTH CARE EDUCATION/TRAINING PROGRAM
Payer: MEDICAID

## 2021-02-25 ENCOUNTER — OFFICE VISIT (OUTPATIENT)
Dept: HEMATOLOGY/ONCOLOGY | Facility: CLINIC | Age: 56
End: 2021-02-25
Payer: MEDICAID

## 2021-02-25 VITALS
TEMPERATURE: 99 F | HEART RATE: 75 BPM | SYSTOLIC BLOOD PRESSURE: 140 MMHG | RESPIRATION RATE: 17 BRPM | WEIGHT: 215.63 LBS | OXYGEN SATURATION: 100 % | HEIGHT: 66 IN | BODY MASS INDEX: 34.65 KG/M2 | DIASTOLIC BLOOD PRESSURE: 90 MMHG

## 2021-02-25 DIAGNOSIS — D50.8 OTHER IRON DEFICIENCY ANEMIA: ICD-10-CM

## 2021-02-25 DIAGNOSIS — D86.9 SARCOIDOSIS: ICD-10-CM

## 2021-02-25 DIAGNOSIS — N00.8 ACUTE (DIFFUSE) PROLIFERATIVE GLOMERULONEPHRITIS: ICD-10-CM

## 2021-02-25 DIAGNOSIS — D47.9 B-CELL LYMPHOPROLIFERATIVE DISORDER: Primary | ICD-10-CM

## 2021-02-25 DIAGNOSIS — R91.8 PULMONARY INFILTRATE: ICD-10-CM

## 2021-02-25 DIAGNOSIS — I50.32 CHRONIC DIASTOLIC HEART FAILURE: ICD-10-CM

## 2021-02-25 DIAGNOSIS — M25.50 ARTHRALGIA, UNSPECIFIED JOINT: ICD-10-CM

## 2021-02-25 DIAGNOSIS — D89.1 CRYOGLOBULINEMIA: ICD-10-CM

## 2021-02-25 DIAGNOSIS — D89.1 CRYOGLOBULINEMIC VASCULITIS: ICD-10-CM

## 2021-02-25 DIAGNOSIS — R76.8 ELEVATED SERUM IMMUNOGLOBULIN FREE LIGHT CHAINS: ICD-10-CM

## 2021-02-25 DIAGNOSIS — E11.9 TYPE 2 DIABETES MELLITUS WITHOUT COMPLICATION, WITHOUT LONG-TERM CURRENT USE OF INSULIN: ICD-10-CM

## 2021-02-25 DIAGNOSIS — M25.569 KNEE PAIN, UNSPECIFIED CHRONICITY, UNSPECIFIED LATERALITY: ICD-10-CM

## 2021-02-25 DIAGNOSIS — D47.9 B-CELL LYMPHOPROLIFERATIVE DISORDER: ICD-10-CM

## 2021-02-25 LAB
ALBUMIN SERPL BCP-MCNC: 3.4 G/DL (ref 3.5–5.2)
ALBUMIN SERPL BCP-MCNC: 3.4 G/DL (ref 3.5–5.2)
ALP SERPL-CCNC: 105 U/L (ref 55–135)
ALP SERPL-CCNC: 105 U/L (ref 55–135)
ALT SERPL W/O P-5'-P-CCNC: 15 U/L (ref 10–44)
ALT SERPL W/O P-5'-P-CCNC: 15 U/L (ref 10–44)
ANION GAP SERPL CALC-SCNC: 8 MMOL/L (ref 8–16)
ANION GAP SERPL CALC-SCNC: 8 MMOL/L (ref 8–16)
AST SERPL-CCNC: 19 U/L (ref 10–40)
AST SERPL-CCNC: 19 U/L (ref 10–40)
BASOPHILS # BLD AUTO: 0.05 K/UL (ref 0–0.2)
BASOPHILS # BLD AUTO: 0.05 K/UL (ref 0–0.2)
BASOPHILS NFR BLD: 1.2 % (ref 0–1.9)
BASOPHILS NFR BLD: 1.2 % (ref 0–1.9)
BILIRUB SERPL-MCNC: 0.4 MG/DL (ref 0.1–1)
BILIRUB SERPL-MCNC: 0.4 MG/DL (ref 0.1–1)
BUN SERPL-MCNC: 15 MG/DL (ref 6–20)
BUN SERPL-MCNC: 15 MG/DL (ref 6–20)
CALCIUM SERPL-MCNC: 10.2 MG/DL (ref 8.7–10.5)
CALCIUM SERPL-MCNC: 10.2 MG/DL (ref 8.7–10.5)
CHLORIDE SERPL-SCNC: 108 MMOL/L (ref 95–110)
CHLORIDE SERPL-SCNC: 108 MMOL/L (ref 95–110)
CO2 SERPL-SCNC: 25 MMOL/L (ref 23–29)
CO2 SERPL-SCNC: 25 MMOL/L (ref 23–29)
CREAT SERPL-MCNC: 1 MG/DL (ref 0.5–1.4)
CREAT SERPL-MCNC: 1 MG/DL (ref 0.5–1.4)
CRP SERPL-MCNC: 2.8 MG/L (ref 0–8.2)
DIFFERENTIAL METHOD: ABNORMAL
DIFFERENTIAL METHOD: ABNORMAL
EOSINOPHIL # BLD AUTO: 0.1 K/UL (ref 0–0.5)
EOSINOPHIL # BLD AUTO: 0.1 K/UL (ref 0–0.5)
EOSINOPHIL NFR BLD: 1.7 % (ref 0–8)
EOSINOPHIL NFR BLD: 1.7 % (ref 0–8)
ERYTHROCYTE [DISTWIDTH] IN BLOOD BY AUTOMATED COUNT: 14.9 % (ref 11.5–14.5)
ERYTHROCYTE [DISTWIDTH] IN BLOOD BY AUTOMATED COUNT: 14.9 % (ref 11.5–14.5)
ERYTHROCYTE [SEDIMENTATION RATE] IN BLOOD BY WESTERGREN METHOD: 41 MM/HR (ref 0–36)
EST. GFR  (AFRICAN AMERICAN): >60 ML/MIN/1.73 M^2
EST. GFR  (AFRICAN AMERICAN): >60 ML/MIN/1.73 M^2
EST. GFR  (NON AFRICAN AMERICAN): >60 ML/MIN/1.73 M^2
EST. GFR  (NON AFRICAN AMERICAN): >60 ML/MIN/1.73 M^2
GLUCOSE SERPL-MCNC: 244 MG/DL (ref 70–110)
GLUCOSE SERPL-MCNC: 244 MG/DL (ref 70–110)
HCT VFR BLD AUTO: 47.1 % (ref 37–48.5)
HCT VFR BLD AUTO: 47.1 % (ref 37–48.5)
HGB BLD-MCNC: 15 G/DL (ref 12–16)
HGB BLD-MCNC: 15 G/DL (ref 12–16)
IMM GRANULOCYTES # BLD AUTO: 0 K/UL (ref 0–0.04)
IMM GRANULOCYTES # BLD AUTO: 0 K/UL (ref 0–0.04)
IMM GRANULOCYTES NFR BLD AUTO: 0 % (ref 0–0.5)
IMM GRANULOCYTES NFR BLD AUTO: 0 % (ref 0–0.5)
LYMPHOCYTES # BLD AUTO: 1.8 K/UL (ref 1–4.8)
LYMPHOCYTES # BLD AUTO: 1.8 K/UL (ref 1–4.8)
LYMPHOCYTES NFR BLD: 43.3 % (ref 18–48)
LYMPHOCYTES NFR BLD: 43.3 % (ref 18–48)
MCH RBC QN AUTO: 28.1 PG (ref 27–31)
MCH RBC QN AUTO: 28.1 PG (ref 27–31)
MCHC RBC AUTO-ENTMCNC: 31.8 G/DL (ref 32–36)
MCHC RBC AUTO-ENTMCNC: 31.8 G/DL (ref 32–36)
MCV RBC AUTO: 88 FL (ref 82–98)
MCV RBC AUTO: 88 FL (ref 82–98)
MONOCYTES # BLD AUTO: 0.4 K/UL (ref 0.3–1)
MONOCYTES # BLD AUTO: 0.4 K/UL (ref 0.3–1)
MONOCYTES NFR BLD: 9.7 % (ref 4–15)
MONOCYTES NFR BLD: 9.7 % (ref 4–15)
NEUTROPHILS # BLD AUTO: 1.8 K/UL (ref 1.8–7.7)
NEUTROPHILS # BLD AUTO: 1.8 K/UL (ref 1.8–7.7)
NEUTROPHILS NFR BLD: 44.1 % (ref 38–73)
NEUTROPHILS NFR BLD: 44.1 % (ref 38–73)
NRBC BLD-RTO: 0 /100 WBC
NRBC BLD-RTO: 0 /100 WBC
PLATELET # BLD AUTO: 268 K/UL (ref 150–350)
PLATELET # BLD AUTO: 268 K/UL (ref 150–350)
PMV BLD AUTO: 11.1 FL (ref 9.2–12.9)
PMV BLD AUTO: 11.1 FL (ref 9.2–12.9)
POTASSIUM SERPL-SCNC: 4 MMOL/L (ref 3.5–5.1)
POTASSIUM SERPL-SCNC: 4 MMOL/L (ref 3.5–5.1)
PROT SERPL-MCNC: 7.1 G/DL (ref 6–8.4)
PROT SERPL-MCNC: 7.1 G/DL (ref 6–8.4)
RBC # BLD AUTO: 5.33 M/UL (ref 4–5.4)
RBC # BLD AUTO: 5.33 M/UL (ref 4–5.4)
SODIUM SERPL-SCNC: 141 MMOL/L (ref 136–145)
SODIUM SERPL-SCNC: 141 MMOL/L (ref 136–145)
WBC # BLD AUTO: 4.04 K/UL (ref 3.9–12.7)
WBC # BLD AUTO: 4.04 K/UL (ref 3.9–12.7)

## 2021-02-25 PROCEDURE — 80053 COMPREHEN METABOLIC PANEL: CPT

## 2021-02-25 PROCEDURE — 86140 C-REACTIVE PROTEIN: CPT

## 2021-02-25 PROCEDURE — 82595 ASSAY OF CRYOGLOBULIN: CPT

## 2021-02-25 PROCEDURE — 36415 COLL VENOUS BLD VENIPUNCTURE: CPT

## 2021-02-25 PROCEDURE — 99999 PR PBB SHADOW E&M-EST. PATIENT-LVL III: CPT | Mod: PBBFAC,,, | Performed by: STUDENT IN AN ORGANIZED HEALTH CARE EDUCATION/TRAINING PROGRAM

## 2021-02-25 PROCEDURE — 99215 PR OFFICE/OUTPT VISIT, EST, LEVL V, 40-54 MIN: ICD-10-PCS | Mod: S$PBB,,, | Performed by: STUDENT IN AN ORGANIZED HEALTH CARE EDUCATION/TRAINING PROGRAM

## 2021-02-25 PROCEDURE — 85025 COMPLETE CBC W/AUTO DIFF WBC: CPT

## 2021-02-25 PROCEDURE — 99999 PR PBB SHADOW E&M-EST. PATIENT-LVL III: ICD-10-PCS | Mod: PBBFAC,,, | Performed by: STUDENT IN AN ORGANIZED HEALTH CARE EDUCATION/TRAINING PROGRAM

## 2021-02-25 PROCEDURE — 99215 OFFICE O/P EST HI 40 MIN: CPT | Mod: S$PBB,,, | Performed by: STUDENT IN AN ORGANIZED HEALTH CARE EDUCATION/TRAINING PROGRAM

## 2021-02-25 PROCEDURE — 99213 OFFICE O/P EST LOW 20 MIN: CPT | Mod: PBBFAC | Performed by: STUDENT IN AN ORGANIZED HEALTH CARE EDUCATION/TRAINING PROGRAM

## 2021-02-25 PROCEDURE — 85652 RBC SED RATE AUTOMATED: CPT

## 2021-02-26 ENCOUNTER — TELEPHONE (OUTPATIENT)
Dept: RHEUMATOLOGY | Facility: CLINIC | Age: 56
End: 2021-02-26

## 2021-03-02 ENCOUNTER — TELEPHONE (OUTPATIENT)
Dept: PULMONOLOGY | Facility: CLINIC | Age: 56
End: 2021-03-02

## 2021-03-03 ENCOUNTER — TELEPHONE (OUTPATIENT)
Dept: RHEUMATOLOGY | Facility: CLINIC | Age: 56
End: 2021-03-03

## 2021-03-08 LAB — CRYOGLOB SER QL: NORMAL

## 2021-03-24 ENCOUNTER — OFFICE VISIT (OUTPATIENT)
Dept: OPHTHALMOLOGY | Facility: CLINIC | Age: 56
End: 2021-03-24
Payer: MEDICAID

## 2021-03-24 ENCOUNTER — OFFICE VISIT (OUTPATIENT)
Dept: RHEUMATOLOGY | Facility: CLINIC | Age: 56
End: 2021-03-24
Payer: MEDICAID

## 2021-03-24 ENCOUNTER — LAB VISIT (OUTPATIENT)
Dept: LAB | Facility: HOSPITAL | Age: 56
End: 2021-03-24
Payer: MEDICAID

## 2021-03-24 VITALS
DIASTOLIC BLOOD PRESSURE: 98 MMHG | WEIGHT: 225.06 LBS | SYSTOLIC BLOOD PRESSURE: 155 MMHG | HEIGHT: 66 IN | BODY MASS INDEX: 36.17 KG/M2 | HEART RATE: 76 BPM

## 2021-03-24 DIAGNOSIS — M25.569 KNEE PAIN, UNSPECIFIED CHRONICITY, UNSPECIFIED LATERALITY: ICD-10-CM

## 2021-03-24 DIAGNOSIS — D86.9 SARCOIDOSIS: Primary | ICD-10-CM

## 2021-03-24 DIAGNOSIS — B19.10 HEPATITIS B INFECTION WITHOUT DELTA AGENT WITHOUT HEPATIC COMA, UNSPECIFIED CHRONICITY: ICD-10-CM

## 2021-03-24 DIAGNOSIS — H25.13 NUCLEAR SCLEROSIS, BILATERAL: Primary | ICD-10-CM

## 2021-03-24 DIAGNOSIS — M25.50 ARTHRALGIA, UNSPECIFIED JOINT: ICD-10-CM

## 2021-03-24 DIAGNOSIS — D86.9 SARCOIDOSIS: ICD-10-CM

## 2021-03-24 DIAGNOSIS — D89.1 CRYOGLOBULINEMIC VASCULITIS: ICD-10-CM

## 2021-03-24 DIAGNOSIS — D47.Z9 LOW GRADE B CELL LYMPHOPROLIFERATIVE DISORDER: ICD-10-CM

## 2021-03-24 DIAGNOSIS — Z01.818 PRE-OP TESTING: ICD-10-CM

## 2021-03-24 DIAGNOSIS — D89.1 CRYOGLOBULINEMIA: ICD-10-CM

## 2021-03-24 LAB
ALBUMIN SERPL BCP-MCNC: 3.5 G/DL (ref 3.5–5.2)
ALP SERPL-CCNC: 112 U/L (ref 55–135)
ALT SERPL W/O P-5'-P-CCNC: 14 U/L (ref 10–44)
ANION GAP SERPL CALC-SCNC: 9 MMOL/L (ref 8–16)
AST SERPL-CCNC: 14 U/L (ref 10–40)
BASOPHILS # BLD AUTO: 0.04 K/UL (ref 0–0.2)
BASOPHILS NFR BLD: 0.7 % (ref 0–1.9)
BILIRUB SERPL-MCNC: 0.5 MG/DL (ref 0.1–1)
BUN SERPL-MCNC: 18 MG/DL (ref 6–20)
CALCIUM SERPL-MCNC: 9.3 MG/DL (ref 8.7–10.5)
CHLORIDE SERPL-SCNC: 107 MMOL/L (ref 95–110)
CO2 SERPL-SCNC: 25 MMOL/L (ref 23–29)
CREAT SERPL-MCNC: 0.8 MG/DL (ref 0.5–1.4)
CRP SERPL-MCNC: 1.5 MG/L (ref 0–8.2)
DIFFERENTIAL METHOD: NORMAL
EOSINOPHIL # BLD AUTO: 0.1 K/UL (ref 0–0.5)
EOSINOPHIL NFR BLD: 1.7 % (ref 0–8)
ERYTHROCYTE [DISTWIDTH] IN BLOOD BY AUTOMATED COUNT: 14.2 % (ref 11.5–14.5)
ERYTHROCYTE [SEDIMENTATION RATE] IN BLOOD BY WESTERGREN METHOD: 43 MM/HR (ref 0–36)
EST. GFR  (AFRICAN AMERICAN): >60 ML/MIN/1.73 M^2
EST. GFR  (NON AFRICAN AMERICAN): >60 ML/MIN/1.73 M^2
GLUCOSE SERPL-MCNC: 186 MG/DL (ref 70–110)
HCT VFR BLD AUTO: 44.8 % (ref 37–48.5)
HGB BLD-MCNC: 14.5 G/DL (ref 12–16)
IMM GRANULOCYTES # BLD AUTO: 0.01 K/UL (ref 0–0.04)
IMM GRANULOCYTES NFR BLD AUTO: 0.2 % (ref 0–0.5)
LYMPHOCYTES # BLD AUTO: 2 K/UL (ref 1–4.8)
LYMPHOCYTES NFR BLD: 36.4 % (ref 18–48)
MCH RBC QN AUTO: 28.8 PG (ref 27–31)
MCHC RBC AUTO-ENTMCNC: 32.4 G/DL (ref 32–36)
MCV RBC AUTO: 89 FL (ref 82–98)
MONOCYTES # BLD AUTO: 0.4 K/UL (ref 0.3–1)
MONOCYTES NFR BLD: 6.5 % (ref 4–15)
NEUTROPHILS # BLD AUTO: 3 K/UL (ref 1.8–7.7)
NEUTROPHILS NFR BLD: 54.5 % (ref 38–73)
NRBC BLD-RTO: 0 /100 WBC
PLATELET # BLD AUTO: 210 K/UL (ref 150–350)
PMV BLD AUTO: 10.5 FL (ref 9.2–12.9)
POTASSIUM SERPL-SCNC: 3.5 MMOL/L (ref 3.5–5.1)
PROT SERPL-MCNC: 7 G/DL (ref 6–8.4)
RBC # BLD AUTO: 5.03 M/UL (ref 4–5.4)
SODIUM SERPL-SCNC: 141 MMOL/L (ref 136–145)
WBC # BLD AUTO: 5.41 K/UL (ref 3.9–12.7)

## 2021-03-24 PROCEDURE — 99204 OFFICE O/P NEW MOD 45 MIN: CPT | Mod: S$PBB,,, | Performed by: OPHTHALMOLOGY

## 2021-03-24 PROCEDURE — 80053 COMPREHEN METABOLIC PANEL: CPT | Performed by: STUDENT IN AN ORGANIZED HEALTH CARE EDUCATION/TRAINING PROGRAM

## 2021-03-24 PROCEDURE — 85025 COMPLETE CBC W/AUTO DIFF WBC: CPT | Performed by: STUDENT IN AN ORGANIZED HEALTH CARE EDUCATION/TRAINING PROGRAM

## 2021-03-24 PROCEDURE — 99999 PR PBB SHADOW E&M-EST. PATIENT-LVL V: CPT | Mod: PBBFAC,,, | Performed by: STUDENT IN AN ORGANIZED HEALTH CARE EDUCATION/TRAINING PROGRAM

## 2021-03-24 PROCEDURE — 99215 OFFICE O/P EST HI 40 MIN: CPT | Mod: S$PBB,,, | Performed by: STUDENT IN AN ORGANIZED HEALTH CARE EDUCATION/TRAINING PROGRAM

## 2021-03-24 PROCEDURE — 36415 COLL VENOUS BLD VENIPUNCTURE: CPT | Performed by: STUDENT IN AN ORGANIZED HEALTH CARE EDUCATION/TRAINING PROGRAM

## 2021-03-24 PROCEDURE — 92136 IOL MASTER - OU - BOTH EYES: ICD-10-PCS | Mod: 26,S$PBB,LT, | Performed by: OPHTHALMOLOGY

## 2021-03-24 PROCEDURE — 99204 PR OFFICE/OUTPT VISIT, NEW, LEVL IV, 45-59 MIN: ICD-10-PCS | Mod: S$PBB,,, | Performed by: OPHTHALMOLOGY

## 2021-03-24 PROCEDURE — 85652 RBC SED RATE AUTOMATED: CPT | Performed by: STUDENT IN AN ORGANIZED HEALTH CARE EDUCATION/TRAINING PROGRAM

## 2021-03-24 PROCEDURE — 92136 OPHTHALMIC BIOMETRY: CPT | Mod: PBBFAC | Performed by: OPHTHALMOLOGY

## 2021-03-24 PROCEDURE — 99214 OFFICE O/P EST MOD 30 MIN: CPT | Mod: PBBFAC,27,25 | Performed by: OPHTHALMOLOGY

## 2021-03-24 PROCEDURE — 99999 PR PBB SHADOW E&M-EST. PATIENT-LVL IV: ICD-10-PCS | Mod: PBBFAC,,, | Performed by: OPHTHALMOLOGY

## 2021-03-24 PROCEDURE — 99215 OFFICE O/P EST HI 40 MIN: CPT | Mod: PBBFAC,25 | Performed by: STUDENT IN AN ORGANIZED HEALTH CARE EDUCATION/TRAINING PROGRAM

## 2021-03-24 PROCEDURE — 86140 C-REACTIVE PROTEIN: CPT | Performed by: STUDENT IN AN ORGANIZED HEALTH CARE EDUCATION/TRAINING PROGRAM

## 2021-03-24 PROCEDURE — 99999 PR PBB SHADOW E&M-EST. PATIENT-LVL V: ICD-10-PCS | Mod: PBBFAC,,, | Performed by: STUDENT IN AN ORGANIZED HEALTH CARE EDUCATION/TRAINING PROGRAM

## 2021-03-24 PROCEDURE — 99999 PR PBB SHADOW E&M-EST. PATIENT-LVL IV: CPT | Mod: PBBFAC,,, | Performed by: OPHTHALMOLOGY

## 2021-03-24 PROCEDURE — 99215 PR OFFICE/OUTPT VISIT, EST, LEVL V, 40-54 MIN: ICD-10-PCS | Mod: S$PBB,,, | Performed by: STUDENT IN AN ORGANIZED HEALTH CARE EDUCATION/TRAINING PROGRAM

## 2021-03-24 RX ORDER — TROPICAMIDE 10 MG/ML
1 SOLUTION/ DROPS OPHTHALMIC
Status: CANCELLED | OUTPATIENT
Start: 2021-03-24

## 2021-03-24 RX ORDER — TETRACAINE HYDROCHLORIDE 5 MG/ML
1 SOLUTION OPHTHALMIC
Status: CANCELLED | OUTPATIENT
Start: 2021-03-24

## 2021-03-24 RX ORDER — PHENYLEPHRINE HYDROCHLORIDE 25 MG/ML
1 SOLUTION/ DROPS OPHTHALMIC
Status: CANCELLED | OUTPATIENT
Start: 2021-03-24

## 2021-03-24 RX ORDER — METHOTREXATE 2.5 MG/1
10 TABLET ORAL
Qty: 16 TABLET | Refills: 2 | Status: SHIPPED | OUTPATIENT
Start: 2021-03-24 | End: 2021-05-17

## 2021-03-24 RX ORDER — PREDNISOLONE ACETATE-GATIFLOXACIN-BROMFENAC .75; 5; 1 MG/ML; MG/ML; MG/ML
1 SUSPENSION/ DROPS OPHTHALMIC 3 TIMES DAILY
Qty: 5 ML | Refills: 3 | Status: SHIPPED | OUTPATIENT
Start: 2021-03-24 | End: 2021-07-08

## 2021-03-24 RX ORDER — SODIUM CHLORIDE 0.9 % (FLUSH) 0.9 %
10 SYRINGE (ML) INJECTION
Status: DISCONTINUED | OUTPATIENT
Start: 2021-03-24 | End: 2021-07-08

## 2021-03-24 RX ORDER — MOXIFLOXACIN 5 MG/ML
1 SOLUTION/ DROPS OPHTHALMIC
Status: CANCELLED | OUTPATIENT
Start: 2021-03-24

## 2021-03-24 RX ORDER — FOLIC ACID 1 MG/1
1 TABLET ORAL DAILY
Qty: 90 TABLET | Refills: 0 | Status: SHIPPED | OUTPATIENT
Start: 2021-03-24 | End: 2021-07-08 | Stop reason: SDUPTHER

## 2021-03-25 ENCOUNTER — TELEPHONE (OUTPATIENT)
Dept: RHEUMATOLOGY | Facility: CLINIC | Age: 56
End: 2021-03-25

## 2021-03-30 DIAGNOSIS — H25.12 NUCLEAR SCLEROTIC CATARACT OF LEFT EYE: Primary | ICD-10-CM

## 2021-03-30 DIAGNOSIS — H25.11 NUCLEAR SCLEROTIC CATARACT OF RIGHT EYE: Primary | ICD-10-CM

## 2021-04-19 ENCOUNTER — TELEPHONE (OUTPATIENT)
Dept: OPHTHALMOLOGY | Facility: CLINIC | Age: 56
End: 2021-04-19

## 2021-04-22 ENCOUNTER — ANESTHESIA EVENT (OUTPATIENT)
Dept: SURGERY | Facility: OTHER | Age: 56
End: 2021-04-22
Payer: MEDICAID

## 2021-04-22 ENCOUNTER — HOSPITAL ENCOUNTER (OUTPATIENT)
Facility: OTHER | Age: 56
Discharge: HOME | End: 2021-04-22
Attending: OPHTHALMOLOGY | Admitting: OPHTHALMOLOGY
Payer: MEDICAID

## 2021-04-22 ENCOUNTER — ANESTHESIA (OUTPATIENT)
Dept: SURGERY | Facility: OTHER | Age: 56
End: 2021-04-22
Payer: COMMERCIAL

## 2021-04-22 VITALS
DIASTOLIC BLOOD PRESSURE: 85 MMHG | SYSTOLIC BLOOD PRESSURE: 144 MMHG | RESPIRATION RATE: 18 BRPM | HEART RATE: 66 BPM | BODY MASS INDEX: 36 KG/M2 | WEIGHT: 224 LBS | TEMPERATURE: 98 F | OXYGEN SATURATION: 98 % | HEIGHT: 66 IN

## 2021-04-22 DIAGNOSIS — H25.13 NUCLEAR SCLEROSIS, BILATERAL: ICD-10-CM

## 2021-04-22 DIAGNOSIS — H25.013 CORTICAL AGE-RELATED CATARACT OF BOTH EYES: Primary | ICD-10-CM

## 2021-04-22 LAB
POCT GLUCOSE: 143 MG/DL (ref 70–110)
SARS-COV-2 RDRP RESP QL NAA+PROBE: NEGATIVE

## 2021-04-22 PROCEDURE — 25000003 PHARM REV CODE 250: Performed by: OPHTHALMOLOGY

## 2021-04-22 PROCEDURE — V2632 POST CHMBR INTRAOCULAR LENS: HCPCS | Performed by: OPHTHALMOLOGY

## 2021-04-22 PROCEDURE — 00142 ANES PX ON EYE LENS SURGERY: CPT | Performed by: OPHTHALMOLOGY

## 2021-04-22 PROCEDURE — 37000009 HC ANESTHESIA EA ADD 15 MINS: Performed by: OPHTHALMOLOGY

## 2021-04-22 PROCEDURE — 71000015 HC POSTOP RECOV 1ST HR: Performed by: OPHTHALMOLOGY

## 2021-04-22 PROCEDURE — 36000707: Performed by: OPHTHALMOLOGY

## 2021-04-22 PROCEDURE — 63600175 PHARM REV CODE 636 W HCPCS: Performed by: NURSE ANESTHETIST, CERTIFIED REGISTERED

## 2021-04-22 PROCEDURE — U0002 COVID-19 LAB TEST NON-CDC: HCPCS | Performed by: OPHTHALMOLOGY

## 2021-04-22 PROCEDURE — 37000008 HC ANESTHESIA 1ST 15 MINUTES: Performed by: OPHTHALMOLOGY

## 2021-04-22 PROCEDURE — 66984 PR REMOVAL, CATARACT, W/INSRT INTRAOC LENS, W/O ENDO CYCLO: ICD-10-PCS | Mod: LT,,, | Performed by: OPHTHALMOLOGY

## 2021-04-22 PROCEDURE — 36000706: Performed by: OPHTHALMOLOGY

## 2021-04-22 PROCEDURE — 66984 XCAPSL CTRC RMVL W/O ECP: CPT | Mod: LT,,, | Performed by: OPHTHALMOLOGY

## 2021-04-22 DEVICE — IMPLANTABLE DEVICE: Type: IMPLANTABLE DEVICE | Site: EYE | Status: FUNCTIONAL

## 2021-04-22 RX ORDER — MIDAZOLAM HYDROCHLORIDE 1 MG/ML
INJECTION INTRAMUSCULAR; INTRAVENOUS
Status: DISCONTINUED | OUTPATIENT
Start: 2021-04-22 | End: 2021-04-22

## 2021-04-22 RX ORDER — TETRACAINE HYDROCHLORIDE 5 MG/ML
1 SOLUTION OPHTHALMIC
Status: COMPLETED | OUTPATIENT
Start: 2021-04-22 | End: 2021-04-22

## 2021-04-22 RX ORDER — TROPICAMIDE 10 MG/ML
1 SOLUTION/ DROPS OPHTHALMIC
Status: COMPLETED | OUTPATIENT
Start: 2021-04-22 | End: 2021-04-22

## 2021-04-22 RX ORDER — TETRACAINE HYDROCHLORIDE 5 MG/ML
SOLUTION OPHTHALMIC
Status: DISCONTINUED | OUTPATIENT
Start: 2021-04-22 | End: 2021-04-22 | Stop reason: HOSPADM

## 2021-04-22 RX ORDER — MOXIFLOXACIN 5 MG/ML
SOLUTION/ DROPS OPHTHALMIC
Status: DISCONTINUED | OUTPATIENT
Start: 2021-04-22 | End: 2021-04-22 | Stop reason: HOSPADM

## 2021-04-22 RX ORDER — PHENYLEPHRINE HYDROCHLORIDE 25 MG/ML
1 SOLUTION/ DROPS OPHTHALMIC
Status: COMPLETED | OUTPATIENT
Start: 2021-04-22 | End: 2021-04-22

## 2021-04-22 RX ORDER — MOXIFLOXACIN 5 MG/ML
1 SOLUTION/ DROPS OPHTHALMIC
Status: COMPLETED | OUTPATIENT
Start: 2021-04-22 | End: 2021-04-22

## 2021-04-22 RX ORDER — LIDOCAINE HYDROCHLORIDE 40 MG/ML
INJECTION, SOLUTION RETROBULBAR
Status: DISCONTINUED | OUTPATIENT
Start: 2021-04-22 | End: 2021-04-22 | Stop reason: HOSPADM

## 2021-04-22 RX ORDER — PROPARACAINE HYDROCHLORIDE 5 MG/ML
1 SOLUTION/ DROPS OPHTHALMIC
Status: DISCONTINUED | OUTPATIENT
Start: 2021-04-22 | End: 2021-04-22 | Stop reason: HOSPADM

## 2021-04-22 RX ORDER — ACETAMINOPHEN 325 MG/1
650 TABLET ORAL EVERY 4 HOURS PRN
Status: DISCONTINUED | OUTPATIENT
Start: 2021-04-22 | End: 2021-04-22 | Stop reason: HOSPADM

## 2021-04-22 RX ADMIN — TROPICAMIDE 1 DROP: 10 SOLUTION/ DROPS OPHTHALMIC at 12:04

## 2021-04-22 RX ADMIN — MOXIFLOXACIN 1 DROP: 5 SOLUTION/ DROPS OPHTHALMIC at 01:04

## 2021-04-22 RX ADMIN — MOXIFLOXACIN 1 DROP: 5 SOLUTION/ DROPS OPHTHALMIC at 12:04

## 2021-04-22 RX ADMIN — PHENYLEPHRINE HYDROCHLORIDE 1 DROP: 25 SOLUTION/ DROPS OPHTHALMIC at 12:04

## 2021-04-22 RX ADMIN — TETRACAINE HYDROCHLORIDE 1 DROP: 5 SOLUTION OPHTHALMIC at 12:04

## 2021-04-22 RX ADMIN — MIDAZOLAM HYDROCHLORIDE 2 MG: 1 INJECTION, SOLUTION INTRAMUSCULAR; INTRAVENOUS at 12:04

## 2021-04-23 ENCOUNTER — OFFICE VISIT (OUTPATIENT)
Dept: OPHTHALMOLOGY | Facility: CLINIC | Age: 56
End: 2021-04-23
Attending: OPHTHALMOLOGY
Payer: MEDICAID

## 2021-04-23 DIAGNOSIS — H25.12 NUCLEAR SCLEROSIS OF LEFT EYE: ICD-10-CM

## 2021-04-23 DIAGNOSIS — Z98.890 POST-OPERATIVE STATE: Primary | ICD-10-CM

## 2021-04-23 PROCEDURE — 99999 PR PBB SHADOW E&M-EST. PATIENT-LVL II: CPT | Mod: PBBFAC,,, | Performed by: OPHTHALMOLOGY

## 2021-04-23 PROCEDURE — 99999 PR PBB SHADOW E&M-EST. PATIENT-LVL II: ICD-10-PCS | Mod: PBBFAC,,, | Performed by: OPHTHALMOLOGY

## 2021-04-23 PROCEDURE — 99024 POSTOP FOLLOW-UP VISIT: CPT | Mod: ,,, | Performed by: OPHTHALMOLOGY

## 2021-04-23 PROCEDURE — 99212 OFFICE O/P EST SF 10 MIN: CPT | Mod: PBBFAC | Performed by: OPHTHALMOLOGY

## 2021-04-23 PROCEDURE — 99024 PR POST-OP FOLLOW-UP VISIT: ICD-10-PCS | Mod: ,,, | Performed by: OPHTHALMOLOGY

## 2021-04-28 ENCOUNTER — LAB VISIT (OUTPATIENT)
Dept: LAB | Facility: HOSPITAL | Age: 56
End: 2021-04-28
Attending: INTERNAL MEDICINE
Payer: MEDICAID

## 2021-04-28 DIAGNOSIS — D47.Z9 LOW GRADE B CELL LYMPHOPROLIFERATIVE DISORDER: ICD-10-CM

## 2021-04-28 DIAGNOSIS — D89.1 CRYOGLOBULINEMIC VASCULITIS: ICD-10-CM

## 2021-04-28 DIAGNOSIS — B19.10 HEPATITIS B INFECTION WITHOUT DELTA AGENT WITHOUT HEPATIC COMA, UNSPECIFIED CHRONICITY: ICD-10-CM

## 2021-04-28 DIAGNOSIS — D89.1 CRYOGLOBULINEMIA: ICD-10-CM

## 2021-04-28 DIAGNOSIS — N18.31 STAGE 3A CHRONIC KIDNEY DISEASE: ICD-10-CM

## 2021-04-28 DIAGNOSIS — D86.9 SARCOIDOSIS: ICD-10-CM

## 2021-04-28 LAB
ALBUMIN SERPL BCP-MCNC: 3.4 G/DL (ref 3.5–5.2)
ALBUMIN SERPL BCP-MCNC: 3.4 G/DL (ref 3.5–5.2)
ALP SERPL-CCNC: 108 U/L (ref 55–135)
ALP SERPL-CCNC: 108 U/L (ref 55–135)
ALT SERPL W/O P-5'-P-CCNC: 20 U/L (ref 10–44)
ALT SERPL W/O P-5'-P-CCNC: 20 U/L (ref 10–44)
ANION GAP SERPL CALC-SCNC: 11 MMOL/L (ref 8–16)
ANION GAP SERPL CALC-SCNC: 11 MMOL/L (ref 8–16)
AST SERPL-CCNC: 16 U/L (ref 10–40)
AST SERPL-CCNC: 16 U/L (ref 10–40)
BASOPHILS # BLD AUTO: 0.02 K/UL (ref 0–0.2)
BASOPHILS # BLD AUTO: 0.02 K/UL (ref 0–0.2)
BASOPHILS NFR BLD: 0.3 % (ref 0–1.9)
BASOPHILS NFR BLD: 0.3 % (ref 0–1.9)
BILIRUB SERPL-MCNC: 0.6 MG/DL (ref 0.1–1)
BILIRUB SERPL-MCNC: 0.6 MG/DL (ref 0.1–1)
BUN SERPL-MCNC: 19 MG/DL (ref 6–20)
BUN SERPL-MCNC: 19 MG/DL (ref 6–20)
C3 SERPL-MCNC: 139 MG/DL (ref 50–180)
C4 SERPL-MCNC: 17 MG/DL (ref 11–44)
CALCIUM SERPL-MCNC: 9.3 MG/DL (ref 8.7–10.5)
CALCIUM SERPL-MCNC: 9.3 MG/DL (ref 8.7–10.5)
CHLORIDE SERPL-SCNC: 106 MMOL/L (ref 95–110)
CHLORIDE SERPL-SCNC: 106 MMOL/L (ref 95–110)
CO2 SERPL-SCNC: 24 MMOL/L (ref 23–29)
CO2 SERPL-SCNC: 24 MMOL/L (ref 23–29)
CREAT SERPL-MCNC: 1 MG/DL (ref 0.5–1.4)
CREAT SERPL-MCNC: 1 MG/DL (ref 0.5–1.4)
CRP SERPL-MCNC: 2.7 MG/L (ref 0–8.2)
DIFFERENTIAL METHOD: ABNORMAL
DIFFERENTIAL METHOD: ABNORMAL
EOSINOPHIL # BLD AUTO: 0 K/UL (ref 0–0.5)
EOSINOPHIL # BLD AUTO: 0 K/UL (ref 0–0.5)
EOSINOPHIL NFR BLD: 0.3 % (ref 0–8)
EOSINOPHIL NFR BLD: 0.3 % (ref 0–8)
ERYTHROCYTE [DISTWIDTH] IN BLOOD BY AUTOMATED COUNT: 16.4 % (ref 11.5–14.5)
ERYTHROCYTE [DISTWIDTH] IN BLOOD BY AUTOMATED COUNT: 16.4 % (ref 11.5–14.5)
ERYTHROCYTE [SEDIMENTATION RATE] IN BLOOD BY WESTERGREN METHOD: 35 MM/HR (ref 0–36)
EST. GFR  (AFRICAN AMERICAN): >60 ML/MIN/1.73 M^2
EST. GFR  (AFRICAN AMERICAN): >60 ML/MIN/1.73 M^2
EST. GFR  (NON AFRICAN AMERICAN): >60 ML/MIN/1.73 M^2
EST. GFR  (NON AFRICAN AMERICAN): >60 ML/MIN/1.73 M^2
GLUCOSE SERPL-MCNC: 206 MG/DL (ref 70–110)
GLUCOSE SERPL-MCNC: 206 MG/DL (ref 70–110)
HCT VFR BLD AUTO: 46.3 % (ref 37–48.5)
HCT VFR BLD AUTO: 46.3 % (ref 37–48.5)
HGB BLD-MCNC: 14.8 G/DL (ref 12–16)
HGB BLD-MCNC: 14.8 G/DL (ref 12–16)
IMM GRANULOCYTES # BLD AUTO: 0.02 K/UL (ref 0–0.04)
IMM GRANULOCYTES # BLD AUTO: 0.02 K/UL (ref 0–0.04)
IMM GRANULOCYTES NFR BLD AUTO: 0.3 % (ref 0–0.5)
IMM GRANULOCYTES NFR BLD AUTO: 0.3 % (ref 0–0.5)
LYMPHOCYTES # BLD AUTO: 1.4 K/UL (ref 1–4.8)
LYMPHOCYTES # BLD AUTO: 1.4 K/UL (ref 1–4.8)
LYMPHOCYTES NFR BLD: 23.7 % (ref 18–48)
LYMPHOCYTES NFR BLD: 23.7 % (ref 18–48)
MCH RBC QN AUTO: 28 PG (ref 27–31)
MCH RBC QN AUTO: 28 PG (ref 27–31)
MCHC RBC AUTO-ENTMCNC: 32 G/DL (ref 32–36)
MCHC RBC AUTO-ENTMCNC: 32 G/DL (ref 32–36)
MCV RBC AUTO: 88 FL (ref 82–98)
MCV RBC AUTO: 88 FL (ref 82–98)
MONOCYTES # BLD AUTO: 0.4 K/UL (ref 0.3–1)
MONOCYTES # BLD AUTO: 0.4 K/UL (ref 0.3–1)
MONOCYTES NFR BLD: 6.2 % (ref 4–15)
MONOCYTES NFR BLD: 6.2 % (ref 4–15)
NEUTROPHILS # BLD AUTO: 4 K/UL (ref 1.8–7.7)
NEUTROPHILS # BLD AUTO: 4 K/UL (ref 1.8–7.7)
NEUTROPHILS NFR BLD: 69.2 % (ref 38–73)
NEUTROPHILS NFR BLD: 69.2 % (ref 38–73)
NRBC BLD-RTO: 0 /100 WBC
NRBC BLD-RTO: 0 /100 WBC
PHOSPHATE SERPL-MCNC: 3.3 MG/DL (ref 2.7–4.5)
PLATELET # BLD AUTO: 204 K/UL (ref 150–450)
PLATELET # BLD AUTO: 204 K/UL (ref 150–450)
PMV BLD AUTO: 10.4 FL (ref 9.2–12.9)
PMV BLD AUTO: 10.4 FL (ref 9.2–12.9)
POTASSIUM SERPL-SCNC: 3.9 MMOL/L (ref 3.5–5.1)
POTASSIUM SERPL-SCNC: 3.9 MMOL/L (ref 3.5–5.1)
PROT SERPL-MCNC: 6.8 G/DL (ref 6–8.4)
PROT SERPL-MCNC: 6.8 G/DL (ref 6–8.4)
PTH-INTACT SERPL-MCNC: 167 PG/ML (ref 9–77)
RBC # BLD AUTO: 5.28 M/UL (ref 4–5.4)
RBC # BLD AUTO: 5.28 M/UL (ref 4–5.4)
SODIUM SERPL-SCNC: 141 MMOL/L (ref 136–145)
SODIUM SERPL-SCNC: 141 MMOL/L (ref 136–145)
WBC # BLD AUTO: 5.83 K/UL (ref 3.9–12.7)
WBC # BLD AUTO: 5.83 K/UL (ref 3.9–12.7)

## 2021-04-28 PROCEDURE — 85652 RBC SED RATE AUTOMATED: CPT | Performed by: STUDENT IN AN ORGANIZED HEALTH CARE EDUCATION/TRAINING PROGRAM

## 2021-04-28 PROCEDURE — 86334 IMMUNOFIX E-PHORESIS SERUM: CPT | Mod: 26,,, | Performed by: PATHOLOGY

## 2021-04-28 PROCEDURE — 83970 ASSAY OF PARATHORMONE: CPT | Performed by: INTERNAL MEDICINE

## 2021-04-28 PROCEDURE — 86334 IMMUNOFIX E-PHORESIS SERUM: CPT | Performed by: INTERNAL MEDICINE

## 2021-04-28 PROCEDURE — 86334 PATHOLOGIST INTERPRETATION IFE: ICD-10-PCS | Mod: 26,,, | Performed by: PATHOLOGY

## 2021-04-28 PROCEDURE — 86160 COMPLEMENT ANTIGEN: CPT | Performed by: STUDENT IN AN ORGANIZED HEALTH CARE EDUCATION/TRAINING PROGRAM

## 2021-04-28 PROCEDURE — 84100 ASSAY OF PHOSPHORUS: CPT | Performed by: INTERNAL MEDICINE

## 2021-04-28 PROCEDURE — 84165 PROTEIN E-PHORESIS SERUM: CPT | Performed by: STUDENT IN AN ORGANIZED HEALTH CARE EDUCATION/TRAINING PROGRAM

## 2021-04-28 PROCEDURE — 84165 PROTEIN E-PHORESIS SERUM: CPT | Mod: 26,,, | Performed by: PATHOLOGY

## 2021-04-28 PROCEDURE — 84165 PATHOLOGIST INTERPRETATION SPE: ICD-10-PCS | Mod: 26,,, | Performed by: PATHOLOGY

## 2021-04-28 PROCEDURE — 86160 COMPLEMENT ANTIGEN: CPT | Mod: 59 | Performed by: STUDENT IN AN ORGANIZED HEALTH CARE EDUCATION/TRAINING PROGRAM

## 2021-04-28 PROCEDURE — 36415 COLL VENOUS BLD VENIPUNCTURE: CPT | Performed by: INTERNAL MEDICINE

## 2021-04-28 PROCEDURE — 86140 C-REACTIVE PROTEIN: CPT | Performed by: STUDENT IN AN ORGANIZED HEALTH CARE EDUCATION/TRAINING PROGRAM

## 2021-04-28 PROCEDURE — 80053 COMPREHEN METABOLIC PANEL: CPT | Performed by: INTERNAL MEDICINE

## 2021-04-28 PROCEDURE — 82595 ASSAY OF CRYOGLOBULIN: CPT | Performed by: STUDENT IN AN ORGANIZED HEALTH CARE EDUCATION/TRAINING PROGRAM

## 2021-04-28 PROCEDURE — 83520 IMMUNOASSAY QUANT NOS NONAB: CPT | Performed by: INTERNAL MEDICINE

## 2021-04-28 PROCEDURE — 85025 COMPLETE CBC W/AUTO DIFF WBC: CPT | Performed by: INTERNAL MEDICINE

## 2021-04-29 LAB
ALBUMIN SERPL ELPH-MCNC: 3.67 G/DL (ref 3.35–5.55)
ALPHA1 GLOB SERPL ELPH-MCNC: 0.24 G/DL (ref 0.17–0.41)
ALPHA2 GLOB SERPL ELPH-MCNC: 1.04 G/DL (ref 0.43–0.99)
B-GLOBULIN SERPL ELPH-MCNC: 0.74 G/DL (ref 0.5–1.1)
GAMMA GLOB SERPL ELPH-MCNC: 0.61 G/DL (ref 0.67–1.58)
KAPPA LC SER QL IA: 1.54 MG/DL (ref 0.33–1.94)
KAPPA LC/LAMBDA SER IA: 2.26 (ref 0.26–1.65)
LAMBDA LC SER QL IA: 0.68 MG/DL (ref 0.57–2.63)
PATHOLOGIST INTERPRETATION SPE: NORMAL
PROT SERPL-MCNC: 6.3 G/DL (ref 6–8.4)

## 2021-04-30 ENCOUNTER — OFFICE VISIT (OUTPATIENT)
Dept: OPHTHALMOLOGY | Facility: CLINIC | Age: 56
End: 2021-04-30
Attending: OPHTHALMOLOGY
Payer: MEDICAID

## 2021-04-30 DIAGNOSIS — H25.12 NUCLEAR SCLEROSIS OF LEFT EYE: ICD-10-CM

## 2021-04-30 DIAGNOSIS — Z98.890 POST-OPERATIVE STATE: Primary | ICD-10-CM

## 2021-04-30 DIAGNOSIS — H25.11 NUCLEAR SCLEROSIS OF RIGHT EYE: ICD-10-CM

## 2021-04-30 LAB — INTERPRETATION SERPL IFE-IMP: NORMAL

## 2021-04-30 PROCEDURE — 92136 OPHTHALMIC BIOMETRY: CPT | Mod: PBBFAC | Performed by: OPHTHALMOLOGY

## 2021-04-30 PROCEDURE — 92136 IOL MASTER - OU - BOTH EYES: ICD-10-PCS | Mod: 26,S$PBB,RT, | Performed by: OPHTHALMOLOGY

## 2021-04-30 PROCEDURE — 99024 POSTOP FOLLOW-UP VISIT: CPT | Mod: ,,, | Performed by: OPHTHALMOLOGY

## 2021-04-30 PROCEDURE — 99212 OFFICE O/P EST SF 10 MIN: CPT | Mod: PBBFAC,25 | Performed by: OPHTHALMOLOGY

## 2021-04-30 PROCEDURE — 99024 PR POST-OP FOLLOW-UP VISIT: ICD-10-PCS | Mod: ,,, | Performed by: OPHTHALMOLOGY

## 2021-04-30 PROCEDURE — 99999 PR PBB SHADOW E&M-EST. PATIENT-LVL II: ICD-10-PCS | Mod: PBBFAC,,, | Performed by: OPHTHALMOLOGY

## 2021-04-30 PROCEDURE — 99999 PR PBB SHADOW E&M-EST. PATIENT-LVL II: CPT | Mod: PBBFAC,,, | Performed by: OPHTHALMOLOGY

## 2021-04-30 RX ORDER — TROPICAMIDE 10 MG/ML
1 SOLUTION/ DROPS OPHTHALMIC
Status: CANCELLED | OUTPATIENT
Start: 2021-04-30

## 2021-04-30 RX ORDER — PHENYLEPHRINE HYDROCHLORIDE 25 MG/ML
1 SOLUTION/ DROPS OPHTHALMIC
Status: CANCELLED | OUTPATIENT
Start: 2021-04-30

## 2021-04-30 RX ORDER — SODIUM CHLORIDE 0.9 % (FLUSH) 0.9 %
10 SYRINGE (ML) INJECTION
Status: DISCONTINUED | OUTPATIENT
Start: 2021-04-30 | End: 2021-07-08

## 2021-04-30 RX ORDER — MOXIFLOXACIN 5 MG/ML
1 SOLUTION/ DROPS OPHTHALMIC
Status: CANCELLED | OUTPATIENT
Start: 2021-04-30

## 2021-04-30 RX ORDER — TETRACAINE HYDROCHLORIDE 5 MG/ML
1 SOLUTION OPHTHALMIC
Status: CANCELLED | OUTPATIENT
Start: 2021-04-30

## 2021-05-03 LAB
MAYO MISCELLANEOUS RESULT (REF): NORMAL
PATHOLOGIST INTERPRETATION IFE: NORMAL

## 2021-05-04 ENCOUNTER — TELEPHONE (OUTPATIENT)
Dept: OPHTHALMOLOGY | Facility: CLINIC | Age: 56
End: 2021-05-04

## 2021-05-06 ENCOUNTER — HOSPITAL ENCOUNTER (OUTPATIENT)
Facility: OTHER | Age: 56
Discharge: HOME | End: 2021-05-06
Attending: OPHTHALMOLOGY | Admitting: OPHTHALMOLOGY
Payer: MEDICAID

## 2021-05-06 ENCOUNTER — ANESTHESIA (OUTPATIENT)
Dept: SURGERY | Facility: OTHER | Age: 56
End: 2021-05-06
Payer: MEDICAID

## 2021-05-06 ENCOUNTER — ANESTHESIA EVENT (OUTPATIENT)
Dept: SURGERY | Facility: OTHER | Age: 56
End: 2021-05-06
Payer: MEDICAID

## 2021-05-06 VITALS
SYSTOLIC BLOOD PRESSURE: 143 MMHG | WEIGHT: 224 LBS | OXYGEN SATURATION: 100 % | TEMPERATURE: 98 F | BODY MASS INDEX: 36 KG/M2 | RESPIRATION RATE: 16 BRPM | HEART RATE: 69 BPM | HEIGHT: 66 IN | DIASTOLIC BLOOD PRESSURE: 85 MMHG

## 2021-05-06 DIAGNOSIS — H25.13 NUCLEAR SCLEROSIS, BILATERAL: Primary | ICD-10-CM

## 2021-05-06 DIAGNOSIS — H25.11 NUCLEAR SCLEROSIS OF RIGHT EYE: ICD-10-CM

## 2021-05-06 DIAGNOSIS — H25.11 NUCLEAR SCLEROTIC CATARACT OF RIGHT EYE: ICD-10-CM

## 2021-05-06 DIAGNOSIS — Z98.890 POST-OPERATIVE STATE: ICD-10-CM

## 2021-05-06 LAB
POCT GLUCOSE: 133 MG/DL (ref 70–110)
SARS-COV-2 RDRP RESP QL NAA+PROBE: NEGATIVE

## 2021-05-06 PROCEDURE — 00142 ANES PX ON EYE LENS SURGERY: CPT | Performed by: OPHTHALMOLOGY

## 2021-05-06 PROCEDURE — 66984 XCAPSL CTRC RMVL W/O ECP: CPT | Mod: 79,RT,, | Performed by: OPHTHALMOLOGY

## 2021-05-06 PROCEDURE — 63600175 PHARM REV CODE 636 W HCPCS: Performed by: NURSE ANESTHETIST, CERTIFIED REGISTERED

## 2021-05-06 PROCEDURE — 36000706: Performed by: OPHTHALMOLOGY

## 2021-05-06 PROCEDURE — 37000008 HC ANESTHESIA 1ST 15 MINUTES: Performed by: OPHTHALMOLOGY

## 2021-05-06 PROCEDURE — 71000015 HC POSTOP RECOV 1ST HR: Performed by: OPHTHALMOLOGY

## 2021-05-06 PROCEDURE — V2632 POST CHMBR INTRAOCULAR LENS: HCPCS | Performed by: OPHTHALMOLOGY

## 2021-05-06 PROCEDURE — 37000009 HC ANESTHESIA EA ADD 15 MINS: Performed by: OPHTHALMOLOGY

## 2021-05-06 PROCEDURE — U0002 COVID-19 LAB TEST NON-CDC: HCPCS | Performed by: ANESTHESIOLOGY

## 2021-05-06 PROCEDURE — 66984 PR REMOVAL, CATARACT, W/INSRT INTRAOC LENS, W/O ENDO CYCLO: ICD-10-PCS | Mod: 79,RT,, | Performed by: OPHTHALMOLOGY

## 2021-05-06 PROCEDURE — 25000003 PHARM REV CODE 250: Performed by: OPHTHALMOLOGY

## 2021-05-06 PROCEDURE — 36000707: Performed by: OPHTHALMOLOGY

## 2021-05-06 DEVICE — IMPLANTABLE DEVICE: Type: IMPLANTABLE DEVICE | Site: EYE | Status: FUNCTIONAL

## 2021-05-06 RX ORDER — PHENYLEPHRINE HYDROCHLORIDE 25 MG/ML
1 SOLUTION/ DROPS OPHTHALMIC
Status: COMPLETED | OUTPATIENT
Start: 2021-05-06 | End: 2021-05-06

## 2021-05-06 RX ORDER — TETRACAINE HYDROCHLORIDE 5 MG/ML
SOLUTION OPHTHALMIC
Status: DISCONTINUED | OUTPATIENT
Start: 2021-05-06 | End: 2021-05-06 | Stop reason: HOSPADM

## 2021-05-06 RX ORDER — MOXIFLOXACIN 5 MG/ML
1 SOLUTION/ DROPS OPHTHALMIC
Status: COMPLETED | OUTPATIENT
Start: 2021-05-06 | End: 2021-05-06

## 2021-05-06 RX ORDER — MOXIFLOXACIN 5 MG/ML
SOLUTION/ DROPS OPHTHALMIC
Status: DISCONTINUED | OUTPATIENT
Start: 2021-05-06 | End: 2021-05-06 | Stop reason: HOSPADM

## 2021-05-06 RX ORDER — ACETAMINOPHEN 325 MG/1
650 TABLET ORAL EVERY 4 HOURS PRN
Status: DISCONTINUED | OUTPATIENT
Start: 2021-05-06 | End: 2021-05-06 | Stop reason: HOSPADM

## 2021-05-06 RX ORDER — PROPARACAINE HYDROCHLORIDE 5 MG/ML
1 SOLUTION/ DROPS OPHTHALMIC
Status: DISCONTINUED | OUTPATIENT
Start: 2021-05-06 | End: 2021-05-06 | Stop reason: HOSPADM

## 2021-05-06 RX ORDER — TETRACAINE HYDROCHLORIDE 5 MG/ML
1 SOLUTION OPHTHALMIC
Status: COMPLETED | OUTPATIENT
Start: 2021-05-06 | End: 2021-05-06

## 2021-05-06 RX ORDER — HYDRALAZINE HYDROCHLORIDE 20 MG/ML
INJECTION INTRAMUSCULAR; INTRAVENOUS
Status: DISCONTINUED | OUTPATIENT
Start: 2021-05-06 | End: 2021-05-06

## 2021-05-06 RX ORDER — LIDOCAINE HYDROCHLORIDE 40 MG/ML
INJECTION, SOLUTION RETROBULBAR
Status: DISCONTINUED | OUTPATIENT
Start: 2021-05-06 | End: 2021-05-06 | Stop reason: HOSPADM

## 2021-05-06 RX ORDER — MIDAZOLAM HYDROCHLORIDE 1 MG/ML
INJECTION INTRAMUSCULAR; INTRAVENOUS
Status: DISCONTINUED | OUTPATIENT
Start: 2021-05-06 | End: 2021-05-06

## 2021-05-06 RX ORDER — TROPICAMIDE 10 MG/ML
1 SOLUTION/ DROPS OPHTHALMIC
Status: COMPLETED | OUTPATIENT
Start: 2021-05-06 | End: 2021-05-06

## 2021-05-06 RX ADMIN — MOXIFLOXACIN 1 DROP: 5 SOLUTION/ DROPS OPHTHALMIC at 09:05

## 2021-05-06 RX ADMIN — TETRACAINE HYDROCHLORIDE 1 DROP: 5 SOLUTION OPHTHALMIC at 08:05

## 2021-05-06 RX ADMIN — PHENYLEPHRINE HYDROCHLORIDE 1 DROP: 25 SOLUTION/ DROPS OPHTHALMIC at 08:05

## 2021-05-06 RX ADMIN — MIDAZOLAM HYDROCHLORIDE 2 MG: 1 INJECTION, SOLUTION INTRAMUSCULAR; INTRAVENOUS at 09:05

## 2021-05-06 RX ADMIN — MOXIFLOXACIN 1 DROP: 5 SOLUTION/ DROPS OPHTHALMIC at 08:05

## 2021-05-06 RX ADMIN — TROPICAMIDE 1 DROP: 10 SOLUTION/ DROPS OPHTHALMIC at 08:05

## 2021-05-06 RX ADMIN — HYDRALAZINE HYDROCHLORIDE 6 MG: 20 INJECTION INTRAMUSCULAR; INTRAVENOUS at 09:05

## 2021-05-07 ENCOUNTER — OFFICE VISIT (OUTPATIENT)
Dept: OPHTHALMOLOGY | Facility: CLINIC | Age: 56
End: 2021-05-07
Attending: OPHTHALMOLOGY
Payer: MEDICAID

## 2021-05-07 DIAGNOSIS — Z98.890 POST-OPERATIVE STATE: ICD-10-CM

## 2021-05-07 DIAGNOSIS — H25.10 NUCLEAR SCLEROSIS, UNSPECIFIED LATERALITY: Primary | ICD-10-CM

## 2021-05-07 PROCEDURE — 99024 POSTOP FOLLOW-UP VISIT: CPT | Mod: ,,, | Performed by: OPHTHALMOLOGY

## 2021-05-07 PROCEDURE — 99999 PR PBB SHADOW E&M-EST. PATIENT-LVL IV: ICD-10-PCS | Mod: PBBFAC,,, | Performed by: OPHTHALMOLOGY

## 2021-05-07 PROCEDURE — 99024 PR POST-OP FOLLOW-UP VISIT: ICD-10-PCS | Mod: ,,, | Performed by: OPHTHALMOLOGY

## 2021-05-07 PROCEDURE — 99999 PR PBB SHADOW E&M-EST. PATIENT-LVL IV: CPT | Mod: PBBFAC,,, | Performed by: OPHTHALMOLOGY

## 2021-05-07 PROCEDURE — 99214 OFFICE O/P EST MOD 30 MIN: CPT | Mod: PBBFAC | Performed by: OPHTHALMOLOGY

## 2021-05-10 LAB — CRYOGLOB SER QL: NORMAL

## 2021-05-17 ENCOUNTER — OFFICE VISIT (OUTPATIENT)
Dept: RHEUMATOLOGY | Facility: CLINIC | Age: 56
End: 2021-05-17
Payer: MEDICAID

## 2021-05-17 VITALS
DIASTOLIC BLOOD PRESSURE: 87 MMHG | HEIGHT: 65 IN | BODY MASS INDEX: 37.32 KG/M2 | HEART RATE: 76 BPM | SYSTOLIC BLOOD PRESSURE: 144 MMHG | WEIGHT: 224 LBS

## 2021-05-17 DIAGNOSIS — R91.8 MULTIPLE PULMONARY NODULES: ICD-10-CM

## 2021-05-17 DIAGNOSIS — B19.10 HEPATITIS B INFECTION WITHOUT DELTA AGENT WITHOUT HEPATIC COMA, UNSPECIFIED CHRONICITY: ICD-10-CM

## 2021-05-17 DIAGNOSIS — D89.1 CRYOGLOBULINEMIA: ICD-10-CM

## 2021-05-17 DIAGNOSIS — D86.9 SARCOIDOSIS: Primary | ICD-10-CM

## 2021-05-17 DIAGNOSIS — E11.22 TYPE 2 DIABETES MELLITUS WITH STAGE 3 CHRONIC KIDNEY DISEASE, WITH LONG-TERM CURRENT USE OF INSULIN, UNSPECIFIED WHETHER STAGE 3A OR 3B CKD: ICD-10-CM

## 2021-05-17 DIAGNOSIS — M25.561 RIGHT KNEE PAIN, UNSPECIFIED CHRONICITY: ICD-10-CM

## 2021-05-17 DIAGNOSIS — R80.9 PROTEINURIA, UNSPECIFIED TYPE: ICD-10-CM

## 2021-05-17 DIAGNOSIS — N18.30 TYPE 2 DIABETES MELLITUS WITH STAGE 3 CHRONIC KIDNEY DISEASE, WITH LONG-TERM CURRENT USE OF INSULIN, UNSPECIFIED WHETHER STAGE 3A OR 3B CKD: ICD-10-CM

## 2021-05-17 DIAGNOSIS — Z79.4 TYPE 2 DIABETES MELLITUS WITH STAGE 3 CHRONIC KIDNEY DISEASE, WITH LONG-TERM CURRENT USE OF INSULIN, UNSPECIFIED WHETHER STAGE 3A OR 3B CKD: ICD-10-CM

## 2021-05-17 DIAGNOSIS — D47.Z9 LOW GRADE B CELL LYMPHOPROLIFERATIVE DISORDER: ICD-10-CM

## 2021-05-17 PROCEDURE — 99999 PR PBB SHADOW E&M-EST. PATIENT-LVL V: CPT | Mod: PBBFAC,,, | Performed by: STUDENT IN AN ORGANIZED HEALTH CARE EDUCATION/TRAINING PROGRAM

## 2021-05-17 PROCEDURE — 99215 PR OFFICE/OUTPT VISIT, EST, LEVL V, 40-54 MIN: ICD-10-PCS | Mod: S$PBB,,, | Performed by: STUDENT IN AN ORGANIZED HEALTH CARE EDUCATION/TRAINING PROGRAM

## 2021-05-17 PROCEDURE — 99999 PR PBB SHADOW E&M-EST. PATIENT-LVL V: ICD-10-PCS | Mod: PBBFAC,,, | Performed by: STUDENT IN AN ORGANIZED HEALTH CARE EDUCATION/TRAINING PROGRAM

## 2021-05-17 PROCEDURE — 99215 OFFICE O/P EST HI 40 MIN: CPT | Mod: S$PBB,,, | Performed by: STUDENT IN AN ORGANIZED HEALTH CARE EDUCATION/TRAINING PROGRAM

## 2021-05-17 PROCEDURE — 99215 OFFICE O/P EST HI 40 MIN: CPT | Mod: PBBFAC | Performed by: STUDENT IN AN ORGANIZED HEALTH CARE EDUCATION/TRAINING PROGRAM

## 2021-05-17 RX ORDER — METHOTREXATE 2.5 MG/1
15 TABLET ORAL
Qty: 16 TABLET | Refills: 2 | Status: SHIPPED | OUTPATIENT
Start: 2021-05-17 | End: 2021-06-18

## 2021-05-17 RX ORDER — PREDNISONE 2.5 MG/1
7.5 TABLET ORAL DAILY
Qty: 90 TABLET | Refills: 1 | Status: SHIPPED | OUTPATIENT
Start: 2021-05-17 | End: 2021-06-18

## 2021-05-17 RX ORDER — DICLOFENAC SODIUM 10 MG/G
4 GEL TOPICAL 4 TIMES DAILY
Qty: 350 G | Refills: 2 | Status: SHIPPED | OUTPATIENT
Start: 2021-05-17

## 2021-05-26 ENCOUNTER — TELEPHONE (OUTPATIENT)
Dept: HEMATOLOGY/ONCOLOGY | Facility: CLINIC | Age: 56
End: 2021-05-26

## 2021-05-27 ENCOUNTER — OFFICE VISIT (OUTPATIENT)
Dept: HEMATOLOGY/ONCOLOGY | Facility: CLINIC | Age: 56
End: 2021-05-27
Payer: MEDICAID

## 2021-05-27 ENCOUNTER — LAB VISIT (OUTPATIENT)
Dept: LAB | Facility: HOSPITAL | Age: 56
End: 2021-05-27
Attending: STUDENT IN AN ORGANIZED HEALTH CARE EDUCATION/TRAINING PROGRAM
Payer: MEDICAID

## 2021-05-27 VITALS
OXYGEN SATURATION: 97 % | SYSTOLIC BLOOD PRESSURE: 141 MMHG | HEIGHT: 66 IN | RESPIRATION RATE: 28 BRPM | BODY MASS INDEX: 35.98 KG/M2 | DIASTOLIC BLOOD PRESSURE: 74 MMHG | HEART RATE: 67 BPM | TEMPERATURE: 98 F | WEIGHT: 223.88 LBS

## 2021-05-27 DIAGNOSIS — D86.9 SARCOIDOSIS: ICD-10-CM

## 2021-05-27 DIAGNOSIS — D50.8 OTHER IRON DEFICIENCY ANEMIA: ICD-10-CM

## 2021-05-27 DIAGNOSIS — D69.6 THROMBOCYTOPENIA: ICD-10-CM

## 2021-05-27 DIAGNOSIS — I50.32 CHRONIC DIASTOLIC HEART FAILURE: ICD-10-CM

## 2021-05-27 DIAGNOSIS — E11.9 TYPE 2 DIABETES MELLITUS WITHOUT COMPLICATION, WITHOUT LONG-TERM CURRENT USE OF INSULIN: ICD-10-CM

## 2021-05-27 DIAGNOSIS — E87.6 HYPOKALEMIA: ICD-10-CM

## 2021-05-27 DIAGNOSIS — N00.8 ACUTE (DIFFUSE) PROLIFERATIVE GLOMERULONEPHRITIS: ICD-10-CM

## 2021-05-27 DIAGNOSIS — D47.9 B-CELL LYMPHOPROLIFERATIVE DISORDER: ICD-10-CM

## 2021-05-27 DIAGNOSIS — D47.9 B-CELL LYMPHOPROLIFERATIVE DISORDER: Primary | ICD-10-CM

## 2021-05-27 DIAGNOSIS — B18.1 CHRONIC VIRAL HEPATITIS B WITHOUT DELTA AGENT AND WITHOUT COMA: ICD-10-CM

## 2021-05-27 DIAGNOSIS — D89.1 CRYOGLOBULINEMIC VASCULITIS: ICD-10-CM

## 2021-05-27 DIAGNOSIS — K59.00 CONSTIPATION, UNSPECIFIED CONSTIPATION TYPE: ICD-10-CM

## 2021-05-27 DIAGNOSIS — I10 ESSENTIAL HYPERTENSION: ICD-10-CM

## 2021-05-27 DIAGNOSIS — R91.8 MULTIPLE PULMONARY NODULES: ICD-10-CM

## 2021-05-27 LAB
ALBUMIN SERPL BCP-MCNC: 3.3 G/DL (ref 3.5–5.2)
ALBUMIN SERPL BCP-MCNC: 3.3 G/DL (ref 3.5–5.2)
ALP SERPL-CCNC: 97 U/L (ref 55–135)
ALP SERPL-CCNC: 97 U/L (ref 55–135)
ALT SERPL W/O P-5'-P-CCNC: 18 U/L (ref 10–44)
ALT SERPL W/O P-5'-P-CCNC: 18 U/L (ref 10–44)
ANION GAP SERPL CALC-SCNC: 12 MMOL/L (ref 8–16)
ANION GAP SERPL CALC-SCNC: 12 MMOL/L (ref 8–16)
AST SERPL-CCNC: 14 U/L (ref 10–40)
AST SERPL-CCNC: 14 U/L (ref 10–40)
BASOPHILS # BLD AUTO: 0.04 K/UL (ref 0–0.2)
BASOPHILS # BLD AUTO: 0.04 K/UL (ref 0–0.2)
BASOPHILS NFR BLD: 0.5 % (ref 0–1.9)
BASOPHILS NFR BLD: 0.5 % (ref 0–1.9)
BILIRUB SERPL-MCNC: 0.6 MG/DL (ref 0.1–1)
BILIRUB SERPL-MCNC: 0.6 MG/DL (ref 0.1–1)
BUN SERPL-MCNC: 17 MG/DL (ref 6–20)
BUN SERPL-MCNC: 17 MG/DL (ref 6–20)
CALCIUM SERPL-MCNC: 9.8 MG/DL (ref 8.7–10.5)
CALCIUM SERPL-MCNC: 9.8 MG/DL (ref 8.7–10.5)
CHLORIDE SERPL-SCNC: 107 MMOL/L (ref 95–110)
CHLORIDE SERPL-SCNC: 107 MMOL/L (ref 95–110)
CO2 SERPL-SCNC: 21 MMOL/L (ref 23–29)
CO2 SERPL-SCNC: 21 MMOL/L (ref 23–29)
CREAT SERPL-MCNC: 1 MG/DL (ref 0.5–1.4)
CREAT SERPL-MCNC: 1 MG/DL (ref 0.5–1.4)
CRP SERPL-MCNC: 7 MG/L (ref 0–8.2)
DIFFERENTIAL METHOD: ABNORMAL
DIFFERENTIAL METHOD: ABNORMAL
EOSINOPHIL # BLD AUTO: 0 K/UL (ref 0–0.5)
EOSINOPHIL # BLD AUTO: 0 K/UL (ref 0–0.5)
EOSINOPHIL NFR BLD: 0.2 % (ref 0–8)
EOSINOPHIL NFR BLD: 0.2 % (ref 0–8)
ERYTHROCYTE [DISTWIDTH] IN BLOOD BY AUTOMATED COUNT: 19.1 % (ref 11.5–14.5)
ERYTHROCYTE [DISTWIDTH] IN BLOOD BY AUTOMATED COUNT: 19.1 % (ref 11.5–14.5)
ERYTHROCYTE [SEDIMENTATION RATE] IN BLOOD BY WESTERGREN METHOD: 27 MM/HR (ref 0–36)
EST. GFR  (AFRICAN AMERICAN): >60 ML/MIN/1.73 M^2
EST. GFR  (AFRICAN AMERICAN): >60 ML/MIN/1.73 M^2
EST. GFR  (NON AFRICAN AMERICAN): >60 ML/MIN/1.73 M^2
EST. GFR  (NON AFRICAN AMERICAN): >60 ML/MIN/1.73 M^2
GLUCOSE SERPL-MCNC: 251 MG/DL (ref 70–110)
GLUCOSE SERPL-MCNC: 251 MG/DL (ref 70–110)
HCT VFR BLD AUTO: 43.9 % (ref 37–48.5)
HCT VFR BLD AUTO: 43.9 % (ref 37–48.5)
HGB BLD-MCNC: 14.6 G/DL (ref 12–16)
HGB BLD-MCNC: 14.6 G/DL (ref 12–16)
IMM GRANULOCYTES # BLD AUTO: 0.01 K/UL (ref 0–0.04)
IMM GRANULOCYTES # BLD AUTO: 0.01 K/UL (ref 0–0.04)
IMM GRANULOCYTES NFR BLD AUTO: 0.1 % (ref 0–0.5)
IMM GRANULOCYTES NFR BLD AUTO: 0.1 % (ref 0–0.5)
LDH SERPL L TO P-CCNC: 230 U/L (ref 110–260)
LYMPHOCYTES # BLD AUTO: 1 K/UL (ref 1–4.8)
LYMPHOCYTES # BLD AUTO: 1 K/UL (ref 1–4.8)
LYMPHOCYTES NFR BLD: 12.5 % (ref 18–48)
LYMPHOCYTES NFR BLD: 12.5 % (ref 18–48)
MCH RBC QN AUTO: 29.2 PG (ref 27–31)
MCH RBC QN AUTO: 29.2 PG (ref 27–31)
MCHC RBC AUTO-ENTMCNC: 33.3 G/DL (ref 32–36)
MCHC RBC AUTO-ENTMCNC: 33.3 G/DL (ref 32–36)
MCV RBC AUTO: 88 FL (ref 82–98)
MCV RBC AUTO: 88 FL (ref 82–98)
MONOCYTES # BLD AUTO: 0.5 K/UL (ref 0.3–1)
MONOCYTES # BLD AUTO: 0.5 K/UL (ref 0.3–1)
MONOCYTES NFR BLD: 6.2 % (ref 4–15)
MONOCYTES NFR BLD: 6.2 % (ref 4–15)
NEUTROPHILS # BLD AUTO: 6.5 K/UL (ref 1.8–7.7)
NEUTROPHILS # BLD AUTO: 6.5 K/UL (ref 1.8–7.7)
NEUTROPHILS NFR BLD: 80.5 % (ref 38–73)
NEUTROPHILS NFR BLD: 80.5 % (ref 38–73)
NRBC BLD-RTO: 0 /100 WBC
NRBC BLD-RTO: 0 /100 WBC
PLATELET # BLD AUTO: 209 K/UL (ref 150–450)
PLATELET # BLD AUTO: 209 K/UL (ref 150–450)
PMV BLD AUTO: 10.5 FL (ref 9.2–12.9)
PMV BLD AUTO: 10.5 FL (ref 9.2–12.9)
POTASSIUM SERPL-SCNC: 3.3 MMOL/L (ref 3.5–5.1)
POTASSIUM SERPL-SCNC: 3.3 MMOL/L (ref 3.5–5.1)
PROT SERPL-MCNC: 6.7 G/DL (ref 6–8.4)
PROT SERPL-MCNC: 6.7 G/DL (ref 6–8.4)
RBC # BLD AUTO: 5 M/UL (ref 4–5.4)
RBC # BLD AUTO: 5 M/UL (ref 4–5.4)
SODIUM SERPL-SCNC: 140 MMOL/L (ref 136–145)
SODIUM SERPL-SCNC: 140 MMOL/L (ref 136–145)
WBC # BLD AUTO: 8.06 K/UL (ref 3.9–12.7)
WBC # BLD AUTO: 8.06 K/UL (ref 3.9–12.7)

## 2021-05-27 PROCEDURE — 83615 LACTATE (LD) (LDH) ENZYME: CPT | Performed by: STUDENT IN AN ORGANIZED HEALTH CARE EDUCATION/TRAINING PROGRAM

## 2021-05-27 PROCEDURE — 99214 PR OFFICE/OUTPT VISIT, EST, LEVL IV, 30-39 MIN: ICD-10-PCS | Mod: S$PBB,,, | Performed by: STUDENT IN AN ORGANIZED HEALTH CARE EDUCATION/TRAINING PROGRAM

## 2021-05-27 PROCEDURE — 99999 PR PBB SHADOW E&M-EST. PATIENT-LVL V: CPT | Mod: PBBFAC,,, | Performed by: STUDENT IN AN ORGANIZED HEALTH CARE EDUCATION/TRAINING PROGRAM

## 2021-05-27 PROCEDURE — 99214 OFFICE O/P EST MOD 30 MIN: CPT | Mod: S$PBB,,, | Performed by: STUDENT IN AN ORGANIZED HEALTH CARE EDUCATION/TRAINING PROGRAM

## 2021-05-27 PROCEDURE — 80053 COMPREHEN METABOLIC PANEL: CPT | Performed by: STUDENT IN AN ORGANIZED HEALTH CARE EDUCATION/TRAINING PROGRAM

## 2021-05-27 PROCEDURE — 36415 COLL VENOUS BLD VENIPUNCTURE: CPT | Performed by: STUDENT IN AN ORGANIZED HEALTH CARE EDUCATION/TRAINING PROGRAM

## 2021-05-27 PROCEDURE — 86140 C-REACTIVE PROTEIN: CPT | Performed by: STUDENT IN AN ORGANIZED HEALTH CARE EDUCATION/TRAINING PROGRAM

## 2021-05-27 PROCEDURE — 99215 OFFICE O/P EST HI 40 MIN: CPT | Mod: PBBFAC | Performed by: STUDENT IN AN ORGANIZED HEALTH CARE EDUCATION/TRAINING PROGRAM

## 2021-05-27 PROCEDURE — 85025 COMPLETE CBC W/AUTO DIFF WBC: CPT | Performed by: STUDENT IN AN ORGANIZED HEALTH CARE EDUCATION/TRAINING PROGRAM

## 2021-05-27 PROCEDURE — 85652 RBC SED RATE AUTOMATED: CPT | Performed by: STUDENT IN AN ORGANIZED HEALTH CARE EDUCATION/TRAINING PROGRAM

## 2021-05-27 PROCEDURE — 99999 PR PBB SHADOW E&M-EST. PATIENT-LVL V: ICD-10-PCS | Mod: PBBFAC,,, | Performed by: STUDENT IN AN ORGANIZED HEALTH CARE EDUCATION/TRAINING PROGRAM

## 2021-05-27 RX ORDER — HYDROCODONE BITARTRATE AND ACETAMINOPHEN 7.5; 325 MG/1; MG/1
1 TABLET ORAL 4 TIMES DAILY PRN
COMMUNITY
Start: 2021-05-24 | End: 2021-10-25 | Stop reason: SDUPTHER

## 2021-05-27 RX ORDER — POTASSIUM CHLORIDE 20 MEQ/1
20 TABLET, EXTENDED RELEASE ORAL DAILY
Qty: 30 TABLET | Refills: 0 | Status: SHIPPED | OUTPATIENT
Start: 2021-05-27 | End: 2021-06-26

## 2021-05-27 RX ORDER — AMOXICILLIN 250 MG
1 CAPSULE ORAL DAILY
Qty: 30 TABLET | Refills: 1 | Status: SHIPPED | OUTPATIENT
Start: 2021-05-27 | End: 2021-07-08

## 2021-05-28 ENCOUNTER — TELEPHONE (OUTPATIENT)
Dept: PRIMARY CARE CLINIC | Facility: CLINIC | Age: 56
End: 2021-05-28

## 2021-06-11 ENCOUNTER — OFFICE VISIT (OUTPATIENT)
Dept: OPTOMETRY | Facility: CLINIC | Age: 56
End: 2021-06-11
Payer: COMMERCIAL

## 2021-06-11 DIAGNOSIS — Z98.41 S/P BILATERAL CATARACT EXTRACTION: Primary | ICD-10-CM

## 2021-06-11 DIAGNOSIS — Z98.42 S/P BILATERAL CATARACT EXTRACTION: Primary | ICD-10-CM

## 2021-06-11 PROCEDURE — 99024 POSTOP FOLLOW-UP VISIT: CPT | Mod: S$GLB,,, | Performed by: OPTOMETRIST

## 2021-06-11 PROCEDURE — 99024 PR POST-OP FOLLOW-UP VISIT: ICD-10-PCS | Mod: S$GLB,,, | Performed by: OPTOMETRIST

## 2021-06-11 PROCEDURE — 99213 OFFICE O/P EST LOW 20 MIN: CPT | Mod: PBBFAC,25 | Performed by: OPTOMETRIST

## 2021-06-11 PROCEDURE — 92134 CPTRZ OPH DX IMG PST SGM RTA: CPT | Mod: PBBFAC | Performed by: OPTOMETRIST

## 2021-06-11 PROCEDURE — 99999 PR PBB SHADOW E&M-EST. PATIENT-LVL III: ICD-10-PCS | Mod: PBBFAC,,, | Performed by: OPTOMETRIST

## 2021-06-11 PROCEDURE — 99999 PR PBB SHADOW E&M-EST. PATIENT-LVL III: CPT | Mod: PBBFAC,,, | Performed by: OPTOMETRIST

## 2021-06-11 RX ORDER — CEFPROZIL 500 MG/1
500 TABLET, FILM COATED ORAL DAILY
COMMUNITY
Start: 2021-06-01 | End: 2021-07-08

## 2021-06-18 ENCOUNTER — HOSPITAL ENCOUNTER (EMERGENCY)
Facility: HOSPITAL | Age: 56
Discharge: HOME | End: 2021-06-18
Attending: EMERGENCY MEDICINE
Payer: MEDICAID

## 2021-06-18 ENCOUNTER — OFFICE VISIT (OUTPATIENT)
Dept: RHEUMATOLOGY | Facility: CLINIC | Age: 56
End: 2021-06-18
Payer: MEDICAID

## 2021-06-18 VITALS
HEART RATE: 86 BPM | WEIGHT: 224 LBS | BODY MASS INDEX: 36 KG/M2 | TEMPERATURE: 98 F | HEIGHT: 66 IN | OXYGEN SATURATION: 100 % | SYSTOLIC BLOOD PRESSURE: 174 MMHG | DIASTOLIC BLOOD PRESSURE: 87 MMHG | RESPIRATION RATE: 18 BRPM

## 2021-06-18 VITALS
SYSTOLIC BLOOD PRESSURE: 167 MMHG | DIASTOLIC BLOOD PRESSURE: 92 MMHG | WEIGHT: 225.5 LBS | HEART RATE: 65 BPM | HEIGHT: 66 IN | BODY MASS INDEX: 36.24 KG/M2

## 2021-06-18 DIAGNOSIS — R06.02 SHORTNESS OF BREATH: ICD-10-CM

## 2021-06-18 DIAGNOSIS — K62.5 BRIGHT RED BLOOD PER RECTUM: ICD-10-CM

## 2021-06-18 DIAGNOSIS — S82.839A CLOSED FRACTURE OF DISTAL END OF FIBULA, UNSPECIFIED FRACTURE MORPHOLOGY, INITIAL ENCOUNTER: Primary | ICD-10-CM

## 2021-06-18 DIAGNOSIS — B19.10 HEPATITIS B INFECTION WITHOUT DELTA AGENT WITHOUT HEPATIC COMA, UNSPECIFIED CHRONICITY: ICD-10-CM

## 2021-06-18 DIAGNOSIS — S99.912D LEFT ANKLE INJURY, SUBSEQUENT ENCOUNTER: ICD-10-CM

## 2021-06-18 DIAGNOSIS — D89.1 CRYOGLOBULINEMIA: ICD-10-CM

## 2021-06-18 DIAGNOSIS — E11.22 TYPE 2 DIABETES MELLITUS WITH STAGE 3 CHRONIC KIDNEY DISEASE, WITH LONG-TERM CURRENT USE OF INSULIN, UNSPECIFIED WHETHER STAGE 3A OR 3B CKD: ICD-10-CM

## 2021-06-18 DIAGNOSIS — N18.30 TYPE 2 DIABETES MELLITUS WITH STAGE 3 CHRONIC KIDNEY DISEASE, WITH LONG-TERM CURRENT USE OF INSULIN, UNSPECIFIED WHETHER STAGE 3A OR 3B CKD: ICD-10-CM

## 2021-06-18 DIAGNOSIS — Z79.4 TYPE 2 DIABETES MELLITUS WITH STAGE 3 CHRONIC KIDNEY DISEASE, WITH LONG-TERM CURRENT USE OF INSULIN, UNSPECIFIED WHETHER STAGE 3A OR 3B CKD: ICD-10-CM

## 2021-06-18 DIAGNOSIS — D47.Z9 LOW GRADE B CELL LYMPHOPROLIFERATIVE DISORDER: ICD-10-CM

## 2021-06-18 DIAGNOSIS — D86.9 SARCOIDOSIS: Primary | ICD-10-CM

## 2021-06-18 DIAGNOSIS — T14.8XXA FRACTURE: ICD-10-CM

## 2021-06-18 PROCEDURE — 99215 OFFICE O/P EST HI 40 MIN: CPT | Mod: S$PBB,,, | Performed by: STUDENT IN AN ORGANIZED HEALTH CARE EDUCATION/TRAINING PROGRAM

## 2021-06-18 PROCEDURE — 99282 PR EMERGENCY DEPT VISIT,LEVEL II: ICD-10-PCS | Mod: ,,, | Performed by: EMERGENCY MEDICINE

## 2021-06-18 PROCEDURE — 99282 EMERGENCY DEPT VISIT SF MDM: CPT | Mod: ,,, | Performed by: EMERGENCY MEDICINE

## 2021-06-18 PROCEDURE — 99283 EMERGENCY DEPT VISIT LOW MDM: CPT | Mod: 25,27

## 2021-06-18 PROCEDURE — 99215 OFFICE O/P EST HI 40 MIN: CPT | Mod: PBBFAC,25 | Performed by: STUDENT IN AN ORGANIZED HEALTH CARE EDUCATION/TRAINING PROGRAM

## 2021-06-18 PROCEDURE — 99215 PR OFFICE/OUTPT VISIT, EST, LEVL V, 40-54 MIN: ICD-10-PCS | Mod: S$PBB,,, | Performed by: STUDENT IN AN ORGANIZED HEALTH CARE EDUCATION/TRAINING PROGRAM

## 2021-06-18 PROCEDURE — 99999 PR PBB SHADOW E&M-EST. PATIENT-LVL V: ICD-10-PCS | Mod: PBBFAC,,, | Performed by: STUDENT IN AN ORGANIZED HEALTH CARE EDUCATION/TRAINING PROGRAM

## 2021-06-18 PROCEDURE — 99999 PR PBB SHADOW E&M-EST. PATIENT-LVL V: CPT | Mod: PBBFAC,,, | Performed by: STUDENT IN AN ORGANIZED HEALTH CARE EDUCATION/TRAINING PROGRAM

## 2021-06-18 PROCEDURE — 25000003 PHARM REV CODE 250: Performed by: STUDENT IN AN ORGANIZED HEALTH CARE EDUCATION/TRAINING PROGRAM

## 2021-06-18 RX ORDER — PREDNISONE 2.5 MG/1
5 TABLET ORAL DAILY
Qty: 90 TABLET | Refills: 1 | Status: SHIPPED | OUTPATIENT
Start: 2021-06-18 | End: 2021-12-13

## 2021-06-18 RX ORDER — ACETAMINOPHEN 325 MG/1
650 TABLET ORAL
Status: COMPLETED | OUTPATIENT
Start: 2021-06-18 | End: 2021-06-18

## 2021-06-18 RX ORDER — METHOTREXATE 2.5 MG/1
20 TABLET ORAL
Qty: 96 TABLET | Refills: 0 | Status: SHIPPED | OUTPATIENT
Start: 2021-06-18 | End: 2021-12-13 | Stop reason: SDUPTHER

## 2021-06-18 RX ORDER — FOLIC ACID 1 MG/1
1 TABLET ORAL DAILY
Qty: 90 TABLET | Refills: 0 | Status: SHIPPED | OUTPATIENT
Start: 2021-06-18 | End: 2021-12-13 | Stop reason: SDUPTHER

## 2021-06-18 RX ADMIN — ACETAMINOPHEN 650 MG: 325 TABLET ORAL at 01:06

## 2021-06-24 ENCOUNTER — LAB VISIT (OUTPATIENT)
Dept: LAB | Facility: HOSPITAL | Age: 56
End: 2021-06-24
Attending: STUDENT IN AN ORGANIZED HEALTH CARE EDUCATION/TRAINING PROGRAM
Payer: MEDICAID

## 2021-06-24 DIAGNOSIS — D86.9 SARCOIDOSIS: ICD-10-CM

## 2021-06-24 LAB
ALBUMIN SERPL BCP-MCNC: 3.6 G/DL (ref 3.5–5.2)
ALP SERPL-CCNC: 116 U/L (ref 55–135)
ALT SERPL W/O P-5'-P-CCNC: 15 U/L (ref 10–44)
ANION GAP SERPL CALC-SCNC: 10 MMOL/L (ref 8–16)
AST SERPL-CCNC: 13 U/L (ref 10–40)
BASOPHILS # BLD AUTO: 0.06 K/UL (ref 0–0.2)
BASOPHILS NFR BLD: 1.2 % (ref 0–1.9)
BILIRUB SERPL-MCNC: 0.6 MG/DL (ref 0.1–1)
BUN SERPL-MCNC: 22 MG/DL (ref 6–20)
CALCIUM SERPL-MCNC: 10.2 MG/DL (ref 8.7–10.5)
CHLORIDE SERPL-SCNC: 109 MMOL/L (ref 95–110)
CO2 SERPL-SCNC: 23 MMOL/L (ref 23–29)
CREAT SERPL-MCNC: 0.9 MG/DL (ref 0.5–1.4)
CRP SERPL-MCNC: 1.3 MG/L (ref 0–8.2)
DIFFERENTIAL METHOD: ABNORMAL
EOSINOPHIL # BLD AUTO: 0.1 K/UL (ref 0–0.5)
EOSINOPHIL NFR BLD: 1.5 % (ref 0–8)
ERYTHROCYTE [DISTWIDTH] IN BLOOD BY AUTOMATED COUNT: 18.9 % (ref 11.5–14.5)
ERYTHROCYTE [SEDIMENTATION RATE] IN BLOOD BY WESTERGREN METHOD: 10 MM/HR (ref 0–20)
EST. GFR  (AFRICAN AMERICAN): >60 ML/MIN/1.73 M^2
EST. GFR  (NON AFRICAN AMERICAN): >60 ML/MIN/1.73 M^2
GLUCOSE SERPL-MCNC: 151 MG/DL (ref 70–110)
HCT VFR BLD AUTO: 48.4 % (ref 37–48.5)
HGB BLD-MCNC: 15.5 G/DL (ref 12–16)
IMM GRANULOCYTES # BLD AUTO: 0.01 K/UL (ref 0–0.04)
IMM GRANULOCYTES NFR BLD AUTO: 0.2 % (ref 0–0.5)
LYMPHOCYTES # BLD AUTO: 2.3 K/UL (ref 1–4.8)
LYMPHOCYTES NFR BLD: 47.5 % (ref 18–48)
MCH RBC QN AUTO: 28.8 PG (ref 27–31)
MCHC RBC AUTO-ENTMCNC: 32 G/DL (ref 32–36)
MCV RBC AUTO: 90 FL (ref 82–98)
MONOCYTES # BLD AUTO: 0.5 K/UL (ref 0.3–1)
MONOCYTES NFR BLD: 10.4 % (ref 4–15)
NEUTROPHILS # BLD AUTO: 1.9 K/UL (ref 1.8–7.7)
NEUTROPHILS NFR BLD: 39.2 % (ref 38–73)
NRBC BLD-RTO: 0 /100 WBC
PLATELET # BLD AUTO: 213 K/UL (ref 150–450)
PMV BLD AUTO: 11.6 FL (ref 9.2–12.9)
POTASSIUM SERPL-SCNC: 3.9 MMOL/L (ref 3.5–5.1)
PROT SERPL-MCNC: 6.9 G/DL (ref 6–8.4)
RBC # BLD AUTO: 5.39 M/UL (ref 4–5.4)
SODIUM SERPL-SCNC: 142 MMOL/L (ref 136–145)
WBC # BLD AUTO: 4.82 K/UL (ref 3.9–12.7)

## 2021-06-24 PROCEDURE — 85025 COMPLETE CBC W/AUTO DIFF WBC: CPT | Performed by: STUDENT IN AN ORGANIZED HEALTH CARE EDUCATION/TRAINING PROGRAM

## 2021-06-24 PROCEDURE — 80053 COMPREHEN METABOLIC PANEL: CPT | Performed by: STUDENT IN AN ORGANIZED HEALTH CARE EDUCATION/TRAINING PROGRAM

## 2021-06-24 PROCEDURE — 36415 COLL VENOUS BLD VENIPUNCTURE: CPT | Mod: PO | Performed by: STUDENT IN AN ORGANIZED HEALTH CARE EDUCATION/TRAINING PROGRAM

## 2021-06-24 PROCEDURE — 86140 C-REACTIVE PROTEIN: CPT | Performed by: STUDENT IN AN ORGANIZED HEALTH CARE EDUCATION/TRAINING PROGRAM

## 2021-06-24 PROCEDURE — 85651 RBC SED RATE NONAUTOMATED: CPT | Mod: PO | Performed by: STUDENT IN AN ORGANIZED HEALTH CARE EDUCATION/TRAINING PROGRAM

## 2021-07-08 ENCOUNTER — OFFICE VISIT (OUTPATIENT)
Dept: INTERNAL MEDICINE | Facility: CLINIC | Age: 56
End: 2021-07-08
Payer: MEDICAID

## 2021-07-08 ENCOUNTER — LAB VISIT (OUTPATIENT)
Dept: LAB | Facility: HOSPITAL | Age: 56
End: 2021-07-08
Payer: MEDICAID

## 2021-07-08 VITALS
BODY MASS INDEX: 35.75 KG/M2 | WEIGHT: 222.44 LBS | SYSTOLIC BLOOD PRESSURE: 125 MMHG | HEIGHT: 66 IN | DIASTOLIC BLOOD PRESSURE: 82 MMHG | HEART RATE: 86 BPM | OXYGEN SATURATION: 99 %

## 2021-07-08 DIAGNOSIS — D86.9 SARCOIDOSIS: ICD-10-CM

## 2021-07-08 DIAGNOSIS — I50.32 CHRONIC DIASTOLIC HEART FAILURE: ICD-10-CM

## 2021-07-08 DIAGNOSIS — I15.1 HYPERTENSION SECONDARY TO OTHER RENAL DISORDERS: ICD-10-CM

## 2021-07-08 DIAGNOSIS — Z91.89 AT RISK FOR STRESS ULCER: ICD-10-CM

## 2021-07-08 DIAGNOSIS — F41.9 ANXIETY: ICD-10-CM

## 2021-07-08 DIAGNOSIS — F32.2 CURRENT SEVERE EPISODE OF MAJOR DEPRESSIVE DISORDER WITHOUT PSYCHOTIC FEATURES WITHOUT PRIOR EPISODE: ICD-10-CM

## 2021-07-08 DIAGNOSIS — E11.8 DM (DIABETES MELLITUS), TYPE 2 WITH COMPLICATIONS: ICD-10-CM

## 2021-07-08 DIAGNOSIS — R91.8 MULTIPLE PULMONARY NODULES: ICD-10-CM

## 2021-07-08 DIAGNOSIS — Z00.00 HEALTHCARE MAINTENANCE: ICD-10-CM

## 2021-07-08 DIAGNOSIS — B18.1 CHRONIC VIRAL HEPATITIS B WITHOUT DELTA AGENT AND WITHOUT COMA: ICD-10-CM

## 2021-07-08 DIAGNOSIS — D89.1 CRYOGLOBULINEMIC VASCULITIS: ICD-10-CM

## 2021-07-08 DIAGNOSIS — E78.5 HYPERLIPIDEMIA, UNSPECIFIED HYPERLIPIDEMIA TYPE: ICD-10-CM

## 2021-07-08 DIAGNOSIS — F17.210 NICOTINE DEPENDENCE, CIGARETTES, UNCOMPLICATED: ICD-10-CM

## 2021-07-08 DIAGNOSIS — Z76.89 ENCOUNTER TO ESTABLISH CARE: Primary | ICD-10-CM

## 2021-07-08 LAB
CHOLEST SERPL-MCNC: 264 MG/DL (ref 120–199)
CHOLEST/HDLC SERPL: 3.7 {RATIO} (ref 2–5)
ESTIMATED AVG GLUCOSE: 166 MG/DL (ref 68–131)
HBA1C MFR BLD: 7.4 % (ref 4–5.6)
HDLC SERPL-MCNC: 72 MG/DL (ref 40–75)
HDLC SERPL: 27.3 % (ref 20–50)
LDLC SERPL CALC-MCNC: 156.6 MG/DL (ref 63–159)
NONHDLC SERPL-MCNC: 192 MG/DL
TRIGL SERPL-MCNC: 177 MG/DL (ref 30–150)

## 2021-07-08 PROCEDURE — 99999 PR PBB SHADOW E&M-EST. PATIENT-LVL V: CPT | Mod: PBBFAC,,, | Performed by: STUDENT IN AN ORGANIZED HEALTH CARE EDUCATION/TRAINING PROGRAM

## 2021-07-08 PROCEDURE — 99203 OFFICE O/P NEW LOW 30 MIN: CPT | Mod: S$PBB,,, | Performed by: STUDENT IN AN ORGANIZED HEALTH CARE EDUCATION/TRAINING PROGRAM

## 2021-07-08 PROCEDURE — 99999 PR PBB SHADOW E&M-EST. PATIENT-LVL V: ICD-10-PCS | Mod: PBBFAC,,, | Performed by: STUDENT IN AN ORGANIZED HEALTH CARE EDUCATION/TRAINING PROGRAM

## 2021-07-08 PROCEDURE — 99203 PR OFFICE/OUTPT VISIT, NEW, LEVL III, 30-44 MIN: ICD-10-PCS | Mod: S$PBB,,, | Performed by: STUDENT IN AN ORGANIZED HEALTH CARE EDUCATION/TRAINING PROGRAM

## 2021-07-08 PROCEDURE — 99215 OFFICE O/P EST HI 40 MIN: CPT | Mod: PBBFAC | Performed by: STUDENT IN AN ORGANIZED HEALTH CARE EDUCATION/TRAINING PROGRAM

## 2021-07-08 PROCEDURE — 83036 HEMOGLOBIN GLYCOSYLATED A1C: CPT | Performed by: STUDENT IN AN ORGANIZED HEALTH CARE EDUCATION/TRAINING PROGRAM

## 2021-07-08 PROCEDURE — 36415 COLL VENOUS BLD VENIPUNCTURE: CPT | Performed by: STUDENT IN AN ORGANIZED HEALTH CARE EDUCATION/TRAINING PROGRAM

## 2021-07-08 PROCEDURE — 80061 LIPID PANEL: CPT | Performed by: STUDENT IN AN ORGANIZED HEALTH CARE EDUCATION/TRAINING PROGRAM

## 2021-07-08 RX ORDER — PANTOPRAZOLE SODIUM 40 MG/1
40 TABLET, DELAYED RELEASE ORAL
Qty: 30 TABLET | Refills: 11 | Status: SHIPPED | OUTPATIENT
Start: 2021-07-08 | End: 2022-07-12 | Stop reason: SDUPTHER

## 2021-07-08 RX ORDER — FUROSEMIDE 40 MG/1
TABLET ORAL
Qty: 90 TABLET | Refills: 3 | Status: SHIPPED | OUTPATIENT
Start: 2021-07-08 | End: 2022-07-12 | Stop reason: SDUPTHER

## 2021-07-08 RX ORDER — CARVEDILOL 25 MG/1
25 TABLET ORAL 2 TIMES DAILY
Qty: 90 TABLET | Refills: 3 | Status: SHIPPED | OUTPATIENT
Start: 2021-07-08 | End: 2022-07-12 | Stop reason: SDUPTHER

## 2021-07-08 RX ORDER — ESCITALOPRAM OXALATE 10 MG/1
10 TABLET ORAL DAILY
Qty: 90 TABLET | Refills: 3 | Status: SHIPPED | OUTPATIENT
Start: 2021-07-08 | End: 2022-07-12 | Stop reason: SDUPTHER

## 2021-07-08 RX ORDER — NITROGLYCERIN 20 MG/1
1 PATCH TRANSDERMAL DAILY
COMMUNITY
End: 2021-08-26

## 2021-07-08 RX ORDER — NIFEDIPINE 60 MG/1
60 TABLET, EXTENDED RELEASE ORAL DAILY
Qty: 90 TABLET | Refills: 3 | Status: SHIPPED | OUTPATIENT
Start: 2021-07-08 | End: 2022-07-12 | Stop reason: SDUPTHER

## 2021-07-12 ENCOUNTER — TELEPHONE (OUTPATIENT)
Dept: RADIOLOGY | Facility: HOSPITAL | Age: 56
End: 2021-07-12

## 2021-07-13 ENCOUNTER — HOSPITAL ENCOUNTER (OUTPATIENT)
Dept: RADIOLOGY | Facility: HOSPITAL | Age: 56
Discharge: HOME | End: 2021-07-13
Attending: NURSE PRACTITIONER
Payer: MEDICAID

## 2021-07-13 DIAGNOSIS — B18.1 CHRONIC VIRAL HEPATITIS B WITHOUT DELTA AGENT AND WITHOUT COMA: ICD-10-CM

## 2021-07-13 PROCEDURE — 76700 US ABDOMEN COMPLETE: ICD-10-PCS | Mod: 26,,, | Performed by: RADIOLOGY

## 2021-07-13 PROCEDURE — 76700 US EXAM ABDOM COMPLETE: CPT | Mod: 26,,, | Performed by: RADIOLOGY

## 2021-07-13 PROCEDURE — 76700 US EXAM ABDOM COMPLETE: CPT | Mod: TC,PO

## 2021-07-14 ENCOUNTER — TELEPHONE (OUTPATIENT)
Dept: INTERNAL MEDICINE | Facility: CLINIC | Age: 56
End: 2021-07-14

## 2021-07-14 RX ORDER — ATORVASTATIN CALCIUM 40 MG/1
40 TABLET, FILM COATED ORAL DAILY
Qty: 90 TABLET | Refills: 3 | Status: SHIPPED | OUTPATIENT
Start: 2021-07-14 | End: 2022-07-12 | Stop reason: SDUPTHER

## 2021-07-20 ENCOUNTER — CLINICAL SUPPORT (OUTPATIENT)
Dept: DIABETES | Facility: CLINIC | Age: 56
End: 2021-07-20
Payer: MEDICAID

## 2021-07-20 ENCOUNTER — OFFICE VISIT (OUTPATIENT)
Dept: HEPATOLOGY | Facility: CLINIC | Age: 56
End: 2021-07-20
Payer: MEDICAID

## 2021-07-20 VITALS
RESPIRATION RATE: 18 BRPM | HEART RATE: 66 BPM | HEIGHT: 66 IN | BODY MASS INDEX: 35.5 KG/M2 | DIASTOLIC BLOOD PRESSURE: 83 MMHG | OXYGEN SATURATION: 98 % | SYSTOLIC BLOOD PRESSURE: 156 MMHG | WEIGHT: 220.88 LBS | TEMPERATURE: 97 F

## 2021-07-20 DIAGNOSIS — E11.8 DM (DIABETES MELLITUS), TYPE 2 WITH COMPLICATIONS: ICD-10-CM

## 2021-07-20 DIAGNOSIS — B18.1 CHRONIC VIRAL HEPATITIS B WITHOUT DELTA AGENT AND WITHOUT COMA: Primary | ICD-10-CM

## 2021-07-20 PROCEDURE — 99214 OFFICE O/P EST MOD 30 MIN: CPT | Mod: S$PBB,,, | Performed by: NURSE PRACTITIONER

## 2021-07-20 PROCEDURE — 99212 OFFICE O/P EST SF 10 MIN: CPT | Mod: PBBFAC,27

## 2021-07-20 PROCEDURE — 99214 PR OFFICE/OUTPT VISIT, EST, LEVL IV, 30-39 MIN: ICD-10-PCS | Mod: S$PBB,,, | Performed by: NURSE PRACTITIONER

## 2021-07-20 PROCEDURE — 99999 PR PBB SHADOW E&M-EST. PATIENT-LVL II: ICD-10-PCS | Mod: PBBFAC,,,

## 2021-07-20 PROCEDURE — 99999 PR PBB SHADOW E&M-EST. PATIENT-LVL V: CPT | Mod: PBBFAC,,, | Performed by: NURSE PRACTITIONER

## 2021-07-20 PROCEDURE — 99999 PR PBB SHADOW E&M-EST. PATIENT-LVL II: CPT | Mod: PBBFAC,,,

## 2021-07-20 PROCEDURE — G0108 DIAB MANAGE TRN  PER INDIV: HCPCS | Mod: PBBFAC

## 2021-07-20 PROCEDURE — 99999 PR PBB SHADOW E&M-EST. PATIENT-LVL V: ICD-10-PCS | Mod: PBBFAC,,, | Performed by: NURSE PRACTITIONER

## 2021-07-20 PROCEDURE — 99215 OFFICE O/P EST HI 40 MIN: CPT | Mod: PBBFAC | Performed by: NURSE PRACTITIONER

## 2021-07-20 RX ORDER — ENTECAVIR 0.5 MG/1
0.5 TABLET, FILM COATED ORAL DAILY
Qty: 30 TABLET | Refills: 11 | Status: SHIPPED | OUTPATIENT
Start: 2021-07-20 | End: 2022-08-01

## 2021-07-20 RX ORDER — PREDNISOLONE SODIUM PHOSPHATE 15 MG/5ML
SOLUTION ORAL
COMMUNITY
Start: 2021-07-13 | End: 2022-07-12

## 2021-07-20 RX ORDER — AZITHROMYCIN 250 MG/1
TABLET, FILM COATED ORAL
COMMUNITY
Start: 2021-07-11 | End: 2021-08-26 | Stop reason: ALTCHOICE

## 2021-08-02 ENCOUNTER — TELEPHONE (OUTPATIENT)
Dept: ADMINISTRATIVE | Facility: HOSPITAL | Age: 56
End: 2021-08-02

## 2021-08-26 ENCOUNTER — OFFICE VISIT (OUTPATIENT)
Dept: INTERNAL MEDICINE | Facility: CLINIC | Age: 56
End: 2021-08-26
Payer: COMMERCIAL

## 2021-08-26 VITALS
BODY MASS INDEX: 35.82 KG/M2 | HEART RATE: 95 BPM | WEIGHT: 222.88 LBS | OXYGEN SATURATION: 100 % | HEIGHT: 66 IN | SYSTOLIC BLOOD PRESSURE: 146 MMHG | DIASTOLIC BLOOD PRESSURE: 80 MMHG

## 2021-08-26 DIAGNOSIS — E66.9 OBESITY (BMI 30-39.9): ICD-10-CM

## 2021-08-26 DIAGNOSIS — F32.2 CURRENT SEVERE EPISODE OF MAJOR DEPRESSIVE DISORDER WITHOUT PSYCHOTIC FEATURES WITHOUT PRIOR EPISODE: ICD-10-CM

## 2021-08-26 DIAGNOSIS — E78.5 HYPERLIPIDEMIA, UNSPECIFIED HYPERLIPIDEMIA TYPE: ICD-10-CM

## 2021-08-26 DIAGNOSIS — D89.1 CRYOGLOBULINEMIC VASCULITIS: ICD-10-CM

## 2021-08-26 DIAGNOSIS — F17.210 NICOTINE DEPENDENCE, CIGARETTES, UNCOMPLICATED: ICD-10-CM

## 2021-08-26 DIAGNOSIS — D86.9 SARCOIDOSIS: ICD-10-CM

## 2021-08-26 DIAGNOSIS — T38.0X5A STEROID-INDUCED HYPERGLYCEMIA: ICD-10-CM

## 2021-08-26 DIAGNOSIS — E11.22 TYPE 2 DIABETES MELLITUS WITH STAGE 2 CHRONIC KIDNEY DISEASE, WITH LONG-TERM CURRENT USE OF INSULIN: ICD-10-CM

## 2021-08-26 DIAGNOSIS — I10 ESSENTIAL HYPERTENSION: Primary | ICD-10-CM

## 2021-08-26 DIAGNOSIS — Z79.4 TYPE 2 DIABETES MELLITUS WITH STAGE 2 CHRONIC KIDNEY DISEASE, WITH LONG-TERM CURRENT USE OF INSULIN: ICD-10-CM

## 2021-08-26 DIAGNOSIS — B18.1 CHRONIC VIRAL HEPATITIS B WITHOUT DELTA AGENT AND WITHOUT COMA: ICD-10-CM

## 2021-08-26 DIAGNOSIS — N18.2 TYPE 2 DIABETES MELLITUS WITH STAGE 2 CHRONIC KIDNEY DISEASE, WITH LONG-TERM CURRENT USE OF INSULIN: ICD-10-CM

## 2021-08-26 DIAGNOSIS — R73.9 STEROID-INDUCED HYPERGLYCEMIA: ICD-10-CM

## 2021-08-26 PROBLEM — N05.5 MPGN (MEMBRANOPROLIFERATIVE GLOMERULONEPHRITIDES): Status: ACTIVE | Noted: 2020-08-25

## 2021-08-26 PROCEDURE — 99215 OFFICE O/P EST HI 40 MIN: CPT | Mod: S$GLB,,, | Performed by: INTERNAL MEDICINE

## 2021-08-26 PROCEDURE — 99215 OFFICE O/P EST HI 40 MIN: CPT | Mod: PBBFAC | Performed by: INTERNAL MEDICINE

## 2021-08-26 PROCEDURE — 99999 PR PBB SHADOW E&M-EST. PATIENT-LVL V: CPT | Mod: PBBFAC,,, | Performed by: INTERNAL MEDICINE

## 2021-08-26 PROCEDURE — 3051F PR MOST RECENT HEMOGLOBIN A1C LEVEL 7.0 - < 8.0%: ICD-10-PCS | Mod: CPTII,S$GLB,, | Performed by: INTERNAL MEDICINE

## 2021-08-26 PROCEDURE — 3051F HG A1C>EQUAL 7.0%<8.0%: CPT | Mod: CPTII,S$GLB,, | Performed by: INTERNAL MEDICINE

## 2021-08-26 PROCEDURE — 99999 PR PBB SHADOW E&M-EST. PATIENT-LVL V: ICD-10-PCS | Mod: PBBFAC,,, | Performed by: INTERNAL MEDICINE

## 2021-08-26 PROCEDURE — 99215 PR OFFICE/OUTPT VISIT, EST, LEVL V, 40-54 MIN: ICD-10-PCS | Mod: S$GLB,,, | Performed by: INTERNAL MEDICINE

## 2021-08-26 RX ORDER — TRAZODONE HYDROCHLORIDE 100 MG/1
100 TABLET ORAL NIGHTLY PRN
Qty: 30 TABLET | Refills: 11 | Status: SHIPPED | OUTPATIENT
Start: 2021-08-26 | End: 2022-07-12 | Stop reason: SDUPTHER

## 2021-08-26 RX ORDER — INSULIN GLARGINE 100 [IU]/ML
50 INJECTION, SOLUTION SUBCUTANEOUS EVERY MORNING
Qty: 3 ML | Refills: 11 | Status: SHIPPED | OUTPATIENT
Start: 2021-08-26 | End: 2021-11-12

## 2021-08-26 RX ORDER — ALBUTEROL SULFATE 90 UG/1
2 AEROSOL, METERED RESPIRATORY (INHALATION) EVERY 6 HOURS PRN
Qty: 18 G | Refills: 6 | Status: SHIPPED | OUTPATIENT
Start: 2021-08-26 | End: 2023-06-23 | Stop reason: SDUPTHER

## 2021-08-26 RX ORDER — LISINOPRIL 10 MG/1
10 TABLET ORAL DAILY
Qty: 30 TABLET | Refills: 11 | Status: SHIPPED | OUTPATIENT
Start: 2021-08-26 | End: 2022-04-28

## 2021-09-09 ENCOUNTER — LAB VISIT (OUTPATIENT)
Dept: LAB | Facility: HOSPITAL | Age: 56
End: 2021-09-09
Payer: MEDICAID

## 2021-09-09 ENCOUNTER — CLINICAL SUPPORT (OUTPATIENT)
Dept: INTERNAL MEDICINE | Facility: CLINIC | Age: 56
End: 2021-09-09
Payer: MEDICAID

## 2021-09-09 ENCOUNTER — TELEPHONE (OUTPATIENT)
Dept: INTERNAL MEDICINE | Facility: CLINIC | Age: 56
End: 2021-09-09

## 2021-09-09 VITALS — SYSTOLIC BLOOD PRESSURE: 130 MMHG | HEART RATE: 68 BPM | DIASTOLIC BLOOD PRESSURE: 82 MMHG

## 2021-09-09 DIAGNOSIS — I10 HYPERTENSION, ESSENTIAL: ICD-10-CM

## 2021-09-09 DIAGNOSIS — I10 HYPERTENSION, ESSENTIAL: Primary | ICD-10-CM

## 2021-09-09 LAB
ANION GAP SERPL CALC-SCNC: 13 MMOL/L (ref 8–16)
BUN SERPL-MCNC: 22 MG/DL (ref 6–20)
CALCIUM SERPL-MCNC: 10.1 MG/DL (ref 8.7–10.5)
CHLORIDE SERPL-SCNC: 109 MMOL/L (ref 95–110)
CO2 SERPL-SCNC: 21 MMOL/L (ref 23–29)
CREAT SERPL-MCNC: 0.9 MG/DL (ref 0.5–1.4)
EST. GFR  (AFRICAN AMERICAN): >60 ML/MIN/1.73 M^2
EST. GFR  (NON AFRICAN AMERICAN): >60 ML/MIN/1.73 M^2
GLUCOSE SERPL-MCNC: 217 MG/DL (ref 70–110)
POTASSIUM SERPL-SCNC: 3.6 MMOL/L (ref 3.5–5.1)
SODIUM SERPL-SCNC: 143 MMOL/L (ref 136–145)

## 2021-09-09 PROCEDURE — 36415 COLL VENOUS BLD VENIPUNCTURE: CPT | Performed by: INTERNAL MEDICINE

## 2021-09-09 PROCEDURE — 80048 BASIC METABOLIC PNL TOTAL CA: CPT | Performed by: INTERNAL MEDICINE

## 2021-09-14 ENCOUNTER — TELEPHONE (OUTPATIENT)
Dept: INTERNAL MEDICINE | Facility: CLINIC | Age: 56
End: 2021-09-14

## 2021-09-15 ENCOUNTER — OFFICE VISIT (OUTPATIENT)
Dept: INTERNAL MEDICINE | Facility: CLINIC | Age: 56
End: 2021-09-15
Payer: COMMERCIAL

## 2021-09-15 DIAGNOSIS — M79.605 CHRONIC PAIN OF BOTH LOWER EXTREMITIES: Primary | ICD-10-CM

## 2021-09-15 DIAGNOSIS — M79.604 CHRONIC PAIN OF BOTH LOWER EXTREMITIES: Primary | ICD-10-CM

## 2021-09-15 DIAGNOSIS — G89.29 CHRONIC PAIN OF BOTH LOWER EXTREMITIES: Primary | ICD-10-CM

## 2021-09-15 PROCEDURE — 99442 PR PHYSICIAN TELEPHONE EVALUATION 11-20 MIN: CPT | Mod: GT,,, | Performed by: INTERNAL MEDICINE

## 2021-09-15 PROCEDURE — 99442 PR PHYSICIAN TELEPHONE EVALUATION 11-20 MIN: ICD-10-PCS | Mod: GT,,, | Performed by: INTERNAL MEDICINE

## 2021-09-16 ENCOUNTER — TELEPHONE (OUTPATIENT)
Dept: INTERNAL MEDICINE | Facility: CLINIC | Age: 56
End: 2021-09-16
Payer: MEDICAID

## 2021-09-17 RX ORDER — GABAPENTIN 100 MG/1
100 CAPSULE ORAL NIGHTLY
Qty: 30 CAPSULE | Refills: 11 | Status: SHIPPED | OUTPATIENT
Start: 2021-09-17 | End: 2021-10-25

## 2021-09-27 ENCOUNTER — TELEPHONE (OUTPATIENT)
Dept: INTERNAL MEDICINE | Facility: CLINIC | Age: 56
End: 2021-09-27

## 2021-09-28 ENCOUNTER — HOSPITAL ENCOUNTER (OUTPATIENT)
Dept: RADIOLOGY | Facility: HOSPITAL | Age: 56
Discharge: HOME | End: 2021-09-28
Attending: STUDENT IN AN ORGANIZED HEALTH CARE EDUCATION/TRAINING PROGRAM
Payer: MEDICAID

## 2021-09-28 DIAGNOSIS — Z00.00 HEALTHCARE MAINTENANCE: ICD-10-CM

## 2021-10-05 ENCOUNTER — HOSPITAL ENCOUNTER (OUTPATIENT)
Dept: RADIOLOGY | Facility: HOSPITAL | Age: 56
Discharge: HOME | End: 2021-10-05
Attending: STUDENT IN AN ORGANIZED HEALTH CARE EDUCATION/TRAINING PROGRAM
Payer: COMMERCIAL

## 2021-10-05 VITALS — HEIGHT: 66 IN | WEIGHT: 222.88 LBS | BODY MASS INDEX: 35.82 KG/M2

## 2021-10-05 PROCEDURE — 77067 SCR MAMMO BI INCL CAD: CPT | Mod: TC,PO

## 2021-10-05 PROCEDURE — 77063 BREAST TOMOSYNTHESIS BI: CPT | Mod: 26,,, | Performed by: RADIOLOGY

## 2021-10-05 PROCEDURE — 77067 SCR MAMMO BI INCL CAD: CPT | Mod: 26,,, | Performed by: RADIOLOGY

## 2021-10-05 PROCEDURE — 77067 MAMMO DIGITAL SCREENING BILAT WITH TOMO: ICD-10-PCS | Mod: 26,,, | Performed by: RADIOLOGY

## 2021-10-05 PROCEDURE — 77063 MAMMO DIGITAL SCREENING BILAT WITH TOMO: ICD-10-PCS | Mod: 26,,, | Performed by: RADIOLOGY

## 2021-10-08 ENCOUNTER — OFFICE VISIT (OUTPATIENT)
Dept: PULMONOLOGY | Facility: CLINIC | Age: 56
End: 2021-10-08
Payer: COMMERCIAL

## 2021-10-08 VITALS
BODY MASS INDEX: 36.7 KG/M2 | SYSTOLIC BLOOD PRESSURE: 130 MMHG | WEIGHT: 228.38 LBS | OXYGEN SATURATION: 98 % | HEIGHT: 66 IN | DIASTOLIC BLOOD PRESSURE: 80 MMHG | HEART RATE: 73 BPM

## 2021-10-08 DIAGNOSIS — R93.89 ABNORMAL CT OF THE CHEST: ICD-10-CM

## 2021-10-08 DIAGNOSIS — D86.9 SARCOIDOSIS: Primary | ICD-10-CM

## 2021-10-08 DIAGNOSIS — R91.8 MULTIPLE PULMONARY NODULES: ICD-10-CM

## 2021-10-08 DIAGNOSIS — R06.02 SOB (SHORTNESS OF BREATH): ICD-10-CM

## 2021-10-13 DIAGNOSIS — E11.9 TYPE 2 DIABETES MELLITUS WITHOUT COMPLICATION: ICD-10-CM

## 2021-10-15 ENCOUNTER — PATIENT OUTREACH (OUTPATIENT)
Dept: ADMINISTRATIVE | Facility: OTHER | Age: 56
End: 2021-10-15

## 2021-10-18 ENCOUNTER — TELEPHONE (OUTPATIENT)
Dept: UROGYNECOLOGY | Facility: CLINIC | Age: 56
End: 2021-10-18

## 2021-10-25 ENCOUNTER — OFFICE VISIT (OUTPATIENT)
Dept: INTERNAL MEDICINE | Facility: CLINIC | Age: 56
End: 2021-10-25
Payer: MEDICAID

## 2021-10-25 VITALS
HEIGHT: 65 IN | HEART RATE: 91 BPM | SYSTOLIC BLOOD PRESSURE: 130 MMHG | DIASTOLIC BLOOD PRESSURE: 70 MMHG | OXYGEN SATURATION: 97 % | WEIGHT: 224.75 LBS | BODY MASS INDEX: 37.44 KG/M2

## 2021-10-25 DIAGNOSIS — B02.9 HERPES ZOSTER WITHOUT COMPLICATION: Primary | ICD-10-CM

## 2021-10-25 DIAGNOSIS — I10 ESSENTIAL HYPERTENSION: ICD-10-CM

## 2021-10-25 PROCEDURE — 99215 OFFICE O/P EST HI 40 MIN: CPT | Mod: S$PBB,,, | Performed by: INTERNAL MEDICINE

## 2021-10-25 PROCEDURE — 99213 OFFICE O/P EST LOW 20 MIN: CPT | Mod: PBBFAC,25 | Performed by: INTERNAL MEDICINE

## 2021-10-25 PROCEDURE — 99215 PR OFFICE/OUTPT VISIT, EST, LEVL V, 40-54 MIN: ICD-10-PCS | Mod: S$PBB,,, | Performed by: INTERNAL MEDICINE

## 2021-10-25 PROCEDURE — 99999 PR PBB SHADOW E&M-EST. PATIENT-LVL III: ICD-10-PCS | Mod: PBBFAC,,, | Performed by: INTERNAL MEDICINE

## 2021-10-25 PROCEDURE — 96372 THER/PROPH/DIAG INJ SC/IM: CPT | Mod: PBBFAC

## 2021-10-25 PROCEDURE — 99999 PR PBB SHADOW E&M-EST. PATIENT-LVL III: CPT | Mod: PBBFAC,,, | Performed by: INTERNAL MEDICINE

## 2021-10-25 RX ORDER — GABAPENTIN 300 MG/1
300 CAPSULE ORAL 2 TIMES DAILY
Qty: 60 CAPSULE | Refills: 11 | Status: SHIPPED | OUTPATIENT
Start: 2021-10-25 | End: 2022-07-12 | Stop reason: SDUPTHER

## 2021-10-25 RX ORDER — HYDROCODONE BITARTRATE AND ACETAMINOPHEN 7.5; 325 MG/1; MG/1
1 TABLET ORAL EVERY 8 HOURS PRN
Qty: 21 TABLET | Refills: 0 | Status: SHIPPED | OUTPATIENT
Start: 2021-10-25 | End: 2021-11-01

## 2021-10-25 RX ORDER — LIDOCAINE HYDROCHLORIDE 20 MG/ML
JELLY TOPICAL DAILY
Qty: 100 ML | Refills: 2 | Status: SHIPPED | OUTPATIENT
Start: 2021-10-25 | End: 2021-12-13 | Stop reason: SDUPTHER

## 2021-10-25 RX ORDER — HYDROCODONE BITARTRATE AND ACETAMINOPHEN 5; 325 MG/1; MG/1
TABLET ORAL
COMMUNITY
Start: 2021-10-22 | End: 2021-10-25 | Stop reason: SDUPTHER

## 2021-10-25 RX ORDER — VALACYCLOVIR HYDROCHLORIDE 1 G/1
1000 TABLET, FILM COATED ORAL 3 TIMES DAILY
COMMUNITY
Start: 2021-10-17

## 2021-10-25 RX ORDER — KETOROLAC TROMETHAMINE 30 MG/ML
15 INJECTION, SOLUTION INTRAMUSCULAR; INTRAVENOUS
Status: COMPLETED | OUTPATIENT
Start: 2021-10-25 | End: 2021-10-25

## 2021-10-25 RX ADMIN — KETOROLAC TROMETHAMINE 15 MG: 30 INJECTION, SOLUTION INTRAMUSCULAR; INTRAVENOUS at 03:10

## 2021-10-27 PROBLEM — J96.01 ACUTE RESPIRATORY FAILURE WITH HYPOXIA: Status: RESOLVED | Noted: 2020-11-08 | Resolved: 2021-10-27

## 2021-10-27 PROBLEM — N17.9 ACUTE KIDNEY INJURY SUPERIMPOSED ON CHRONIC KIDNEY DISEASE: Status: RESOLVED | Noted: 2020-11-07 | Resolved: 2021-10-27

## 2021-10-27 PROBLEM — Z98.890 POST-OPERATIVE STATE: Status: RESOLVED | Noted: 2021-05-06 | Resolved: 2021-10-27

## 2021-10-27 PROBLEM — N18.9 ACUTE KIDNEY INJURY SUPERIMPOSED ON CHRONIC KIDNEY DISEASE: Status: RESOLVED | Noted: 2020-11-07 | Resolved: 2021-10-27

## 2021-11-08 ENCOUNTER — IMMUNIZATION (OUTPATIENT)
Dept: INTERNAL MEDICINE | Facility: CLINIC | Age: 56
End: 2021-11-08
Payer: MEDICAID

## 2021-11-08 ENCOUNTER — OFFICE VISIT (OUTPATIENT)
Dept: INTERNAL MEDICINE | Facility: CLINIC | Age: 56
End: 2021-11-08
Payer: MEDICAID

## 2021-11-08 ENCOUNTER — LAB VISIT (OUTPATIENT)
Dept: LAB | Facility: HOSPITAL | Age: 56
End: 2021-11-08
Payer: MEDICAID

## 2021-11-08 VITALS
SYSTOLIC BLOOD PRESSURE: 132 MMHG | BODY MASS INDEX: 36 KG/M2 | OXYGEN SATURATION: 99 % | WEIGHT: 224 LBS | HEART RATE: 78 BPM | HEIGHT: 66 IN | DIASTOLIC BLOOD PRESSURE: 82 MMHG

## 2021-11-08 DIAGNOSIS — D84.9 IMMUNOSUPPRESSION: ICD-10-CM

## 2021-11-08 DIAGNOSIS — E66.9 OBESITY (BMI 30-39.9): ICD-10-CM

## 2021-11-08 DIAGNOSIS — B02.9 HERPES ZOSTER WITHOUT COMPLICATION: ICD-10-CM

## 2021-11-08 DIAGNOSIS — I10 ESSENTIAL HYPERTENSION: ICD-10-CM

## 2021-11-08 DIAGNOSIS — Z23 NEED FOR VACCINATION: Primary | ICD-10-CM

## 2021-11-08 DIAGNOSIS — E11.9 TYPE 2 DIABETES MELLITUS WITHOUT COMPLICATION, WITHOUT LONG-TERM CURRENT USE OF INSULIN: ICD-10-CM

## 2021-11-08 DIAGNOSIS — D86.9 SARCOIDOSIS: ICD-10-CM

## 2021-11-08 LAB
ALBUMIN/CREAT UR: 314.1 UG/MG (ref 0–30)
CREAT UR-MCNC: 78 MG/DL (ref 15–325)
ESTIMATED AVG GLUCOSE: 237 MG/DL (ref 68–131)
HBA1C MFR BLD: 9.9 % (ref 4–5.6)
MICROALBUMIN UR DL<=1MG/L-MCNC: 245 UG/ML

## 2021-11-08 PROCEDURE — 99213 OFFICE O/P EST LOW 20 MIN: CPT | Mod: S$PBB,,, | Performed by: INTERNAL MEDICINE

## 2021-11-08 PROCEDURE — 82570 ASSAY OF URINE CREATININE: CPT | Performed by: INTERNAL MEDICINE

## 2021-11-08 PROCEDURE — 90471 IMMUNIZATION ADMIN: CPT | Mod: PBBFAC

## 2021-11-08 PROCEDURE — 99999 PR PBB SHADOW E&M-EST. PATIENT-LVL V: CPT | Mod: PBBFAC,,, | Performed by: INTERNAL MEDICINE

## 2021-11-08 PROCEDURE — 83036 HEMOGLOBIN GLYCOSYLATED A1C: CPT | Performed by: INTERNAL MEDICINE

## 2021-11-08 PROCEDURE — 36415 COLL VENOUS BLD VENIPUNCTURE: CPT | Performed by: INTERNAL MEDICINE

## 2021-11-08 PROCEDURE — 99215 OFFICE O/P EST HI 40 MIN: CPT | Mod: PBBFAC | Performed by: INTERNAL MEDICINE

## 2021-11-08 PROCEDURE — 99999 PR PBB SHADOW E&M-EST. PATIENT-LVL V: ICD-10-PCS | Mod: PBBFAC,,, | Performed by: INTERNAL MEDICINE

## 2021-11-08 PROCEDURE — 99213 PR OFFICE/OUTPT VISIT, EST, LEVL III, 20-29 MIN: ICD-10-PCS | Mod: S$PBB,,, | Performed by: INTERNAL MEDICINE

## 2021-11-11 ENCOUNTER — TELEPHONE (OUTPATIENT)
Dept: INTERNAL MEDICINE | Facility: CLINIC | Age: 56
End: 2021-11-11
Payer: MEDICAID

## 2021-11-12 ENCOUNTER — TELEPHONE (OUTPATIENT)
Dept: INTERNAL MEDICINE | Facility: CLINIC | Age: 56
End: 2021-11-12
Payer: MEDICAID

## 2021-11-12 DIAGNOSIS — Z79.4 TYPE 2 DIABETES MELLITUS WITH STAGE 2 CHRONIC KIDNEY DISEASE, WITH LONG-TERM CURRENT USE OF INSULIN: ICD-10-CM

## 2021-11-12 DIAGNOSIS — N18.2 TYPE 2 DIABETES MELLITUS WITH STAGE 2 CHRONIC KIDNEY DISEASE, WITH LONG-TERM CURRENT USE OF INSULIN: ICD-10-CM

## 2021-11-12 DIAGNOSIS — E11.22 TYPE 2 DIABETES MELLITUS WITH STAGE 2 CHRONIC KIDNEY DISEASE, WITH LONG-TERM CURRENT USE OF INSULIN: ICD-10-CM

## 2021-11-12 RX ORDER — INSULIN HUMAN 100 [IU]/ML
12 INJECTION, SOLUTION PARENTERAL
Qty: 15 ML | Refills: 6 | Status: SHIPPED | OUTPATIENT
Start: 2021-11-12 | End: 2023-01-09

## 2021-11-12 RX ORDER — INSULIN GLARGINE 100 [IU]/ML
55 INJECTION, SOLUTION SUBCUTANEOUS EVERY MORNING
Qty: 17 EACH | Refills: 3 | Status: SHIPPED | OUTPATIENT
Start: 2021-11-12 | End: 2023-01-09 | Stop reason: SDUPTHER

## 2021-11-14 PROBLEM — R60.1 ANASARCA: Status: RESOLVED | Noted: 2019-08-23 | Resolved: 2021-11-14

## 2021-11-14 PROBLEM — R06.02 SOB (SHORTNESS OF BREATH): Status: RESOLVED | Noted: 2020-11-07 | Resolved: 2021-11-14

## 2021-11-15 ENCOUNTER — TELEPHONE (OUTPATIENT)
Dept: ENDOSCOPY | Facility: HOSPITAL | Age: 56
End: 2021-11-15
Payer: MEDICAID

## 2021-11-16 ENCOUNTER — TELEPHONE (OUTPATIENT)
Dept: ENDOSCOPY | Facility: HOSPITAL | Age: 56
End: 2021-11-16
Payer: MEDICAID

## 2021-11-29 ENCOUNTER — TELEPHONE (OUTPATIENT)
Dept: PULMONOLOGY | Facility: CLINIC | Age: 56
End: 2021-11-29
Payer: MEDICAID

## 2021-12-07 DIAGNOSIS — N18.31 STAGE 3A CHRONIC KIDNEY DISEASE: Primary | ICD-10-CM

## 2021-12-10 ENCOUNTER — PATIENT OUTREACH (OUTPATIENT)
Dept: ADMINISTRATIVE | Facility: OTHER | Age: 56
End: 2021-12-10
Payer: MEDICAID

## 2021-12-13 ENCOUNTER — OFFICE VISIT (OUTPATIENT)
Dept: OPHTHALMOLOGY | Facility: CLINIC | Age: 56
End: 2021-12-13
Payer: MEDICAID

## 2021-12-13 ENCOUNTER — OFFICE VISIT (OUTPATIENT)
Dept: RHEUMATOLOGY | Facility: CLINIC | Age: 56
End: 2021-12-13
Payer: MEDICAID

## 2021-12-13 ENCOUNTER — LAB VISIT (OUTPATIENT)
Dept: LAB | Facility: HOSPITAL | Age: 56
End: 2021-12-13
Payer: MEDICAID

## 2021-12-13 VITALS
DIASTOLIC BLOOD PRESSURE: 97 MMHG | BODY MASS INDEX: 36.56 KG/M2 | SYSTOLIC BLOOD PRESSURE: 164 MMHG | WEIGHT: 227.5 LBS | HEIGHT: 66 IN | HEART RATE: 64 BPM

## 2021-12-13 DIAGNOSIS — B30.9 ACUTE VIRAL CONJUNCTIVITIS OF BOTH EYES: Primary | ICD-10-CM

## 2021-12-13 DIAGNOSIS — B19.10 HEPATITIS B INFECTION WITHOUT DELTA AGENT WITHOUT HEPATIC COMA, UNSPECIFIED CHRONICITY: ICD-10-CM

## 2021-12-13 DIAGNOSIS — J84.9 INTERSTITIAL PULMONARY DISEASE, UNSPECIFIED: ICD-10-CM

## 2021-12-13 DIAGNOSIS — Z79.4 TYPE 2 DIABETES MELLITUS WITH STAGE 3 CHRONIC KIDNEY DISEASE, WITH LONG-TERM CURRENT USE OF INSULIN, UNSPECIFIED WHETHER STAGE 3A OR 3B CKD: ICD-10-CM

## 2021-12-13 DIAGNOSIS — B30.1 CONJUNCTIVITIS DUE TO ADENOVIRUS, BOTH EYES: ICD-10-CM

## 2021-12-13 DIAGNOSIS — D86.0 PULMONARY SARCOIDOSIS: ICD-10-CM

## 2021-12-13 DIAGNOSIS — D89.1 CRYOGLOBULINEMIC VASCULITIS: ICD-10-CM

## 2021-12-13 DIAGNOSIS — R80.9 PROTEINURIA, UNSPECIFIED TYPE: ICD-10-CM

## 2021-12-13 DIAGNOSIS — E11.22 TYPE 2 DIABETES MELLITUS WITH STAGE 3 CHRONIC KIDNEY DISEASE, WITH LONG-TERM CURRENT USE OF INSULIN, UNSPECIFIED WHETHER STAGE 3A OR 3B CKD: ICD-10-CM

## 2021-12-13 DIAGNOSIS — B02.9 HERPES ZOSTER WITHOUT COMPLICATION: ICD-10-CM

## 2021-12-13 DIAGNOSIS — R91.8 MULTIPLE PULMONARY NODULES: ICD-10-CM

## 2021-12-13 DIAGNOSIS — D86.9 SARCOIDOSIS: ICD-10-CM

## 2021-12-13 DIAGNOSIS — N18.30 TYPE 2 DIABETES MELLITUS WITH STAGE 3 CHRONIC KIDNEY DISEASE, WITH LONG-TERM CURRENT USE OF INSULIN, UNSPECIFIED WHETHER STAGE 3A OR 3B CKD: ICD-10-CM

## 2021-12-13 DIAGNOSIS — D89.1 CRYOGLOBULINEMIA: ICD-10-CM

## 2021-12-13 DIAGNOSIS — D86.9 SARCOIDOSIS: Primary | ICD-10-CM

## 2021-12-13 LAB
ALBUMIN SERPL BCP-MCNC: 3.7 G/DL (ref 3.5–5.2)
ALP SERPL-CCNC: 130 U/L (ref 55–135)
ALT SERPL W/O P-5'-P-CCNC: 21 U/L (ref 10–44)
ANION GAP SERPL CALC-SCNC: 11 MMOL/L (ref 8–16)
AST SERPL-CCNC: 20 U/L (ref 10–40)
BASOPHILS # BLD AUTO: 0.04 K/UL (ref 0–0.2)
BASOPHILS NFR BLD: 0.9 % (ref 0–1.9)
BILIRUB SERPL-MCNC: 0.8 MG/DL (ref 0.1–1)
BUN SERPL-MCNC: 12 MG/DL (ref 6–20)
C4 SERPL-MCNC: 31 MG/DL (ref 11–44)
CALCIUM SERPL-MCNC: 10.8 MG/DL (ref 8.7–10.5)
CHLORIDE SERPL-SCNC: 107 MMOL/L (ref 95–110)
CO2 SERPL-SCNC: 22 MMOL/L (ref 23–29)
CREAT SERPL-MCNC: 0.8 MG/DL (ref 0.5–1.4)
CRP SERPL-MCNC: 0.9 MG/L (ref 0–8.2)
DIFFERENTIAL METHOD: ABNORMAL
EOSINOPHIL # BLD AUTO: 0.1 K/UL (ref 0–0.5)
EOSINOPHIL NFR BLD: 1.4 % (ref 0–8)
ERYTHROCYTE [DISTWIDTH] IN BLOOD BY AUTOMATED COUNT: 14.1 % (ref 11.5–14.5)
ERYTHROCYTE [SEDIMENTATION RATE] IN BLOOD BY WESTERGREN METHOD: 34 MM/HR (ref 0–36)
EST. GFR  (AFRICAN AMERICAN): >60 ML/MIN/1.73 M^2
EST. GFR  (NON AFRICAN AMERICAN): >60 ML/MIN/1.73 M^2
GLUCOSE SERPL-MCNC: 145 MG/DL (ref 70–110)
HCT VFR BLD AUTO: 52.9 % (ref 37–48.5)
HGB BLD-MCNC: 17.6 G/DL (ref 12–16)
IMM GRANULOCYTES # BLD AUTO: 0 K/UL (ref 0–0.04)
IMM GRANULOCYTES NFR BLD AUTO: 0 % (ref 0–0.5)
LYMPHOCYTES # BLD AUTO: 2.3 K/UL (ref 1–4.8)
LYMPHOCYTES NFR BLD: 52.6 % (ref 18–48)
MCH RBC QN AUTO: 31.2 PG (ref 27–31)
MCHC RBC AUTO-ENTMCNC: 33.3 G/DL (ref 32–36)
MCV RBC AUTO: 94 FL (ref 82–98)
MONOCYTES # BLD AUTO: 0.4 K/UL (ref 0.3–1)
MONOCYTES NFR BLD: 8.7 % (ref 4–15)
NEUTROPHILS # BLD AUTO: 1.6 K/UL (ref 1.8–7.7)
NEUTROPHILS NFR BLD: 36.4 % (ref 38–73)
NRBC BLD-RTO: 0 /100 WBC
PLATELET # BLD AUTO: 202 K/UL (ref 150–450)
PMV BLD AUTO: 11.1 FL (ref 9.2–12.9)
POTASSIUM SERPL-SCNC: 3.7 MMOL/L (ref 3.5–5.1)
PROT SERPL-MCNC: 7.3 G/DL (ref 6–8.4)
RBC # BLD AUTO: 5.65 M/UL (ref 4–5.4)
SODIUM SERPL-SCNC: 140 MMOL/L (ref 136–145)
WBC # BLD AUTO: 4.35 K/UL (ref 3.9–12.7)

## 2021-12-13 PROCEDURE — 86140 C-REACTIVE PROTEIN: CPT | Performed by: STUDENT IN AN ORGANIZED HEALTH CARE EDUCATION/TRAINING PROGRAM

## 2021-12-13 PROCEDURE — 99999 PR PBB SHADOW E&M-EST. PATIENT-LVL V: ICD-10-PCS | Mod: PBBFAC,,, | Performed by: STUDENT IN AN ORGANIZED HEALTH CARE EDUCATION/TRAINING PROGRAM

## 2021-12-13 PROCEDURE — 99212 PR OFFICE/OUTPT VISIT, EST, LEVL II, 10-19 MIN: ICD-10-PCS | Mod: S$PBB,,, | Performed by: OPHTHALMOLOGY

## 2021-12-13 PROCEDURE — 99215 OFFICE O/P EST HI 40 MIN: CPT | Mod: PBBFAC | Performed by: STUDENT IN AN ORGANIZED HEALTH CARE EDUCATION/TRAINING PROGRAM

## 2021-12-13 PROCEDURE — 85652 RBC SED RATE AUTOMATED: CPT | Performed by: STUDENT IN AN ORGANIZED HEALTH CARE EDUCATION/TRAINING PROGRAM

## 2021-12-13 PROCEDURE — 3062F PR POS MACROALBUMINURIA RESULT DOCUMENTED/REVIEW: ICD-10-PCS | Mod: CPTII,,, | Performed by: OPHTHALMOLOGY

## 2021-12-13 PROCEDURE — 99214 PR OFFICE/OUTPT VISIT, EST, LEVL IV, 30-39 MIN: ICD-10-PCS | Mod: S$PBB,,, | Performed by: STUDENT IN AN ORGANIZED HEALTH CARE EDUCATION/TRAINING PROGRAM

## 2021-12-13 PROCEDURE — 99214 OFFICE O/P EST MOD 30 MIN: CPT | Mod: PBBFAC,27 | Performed by: OPHTHALMOLOGY

## 2021-12-13 PROCEDURE — 3062F POS MACROALBUMINURIA REV: CPT | Mod: CPTII,,, | Performed by: OPHTHALMOLOGY

## 2021-12-13 PROCEDURE — 85025 COMPLETE CBC W/AUTO DIFF WBC: CPT | Performed by: STUDENT IN AN ORGANIZED HEALTH CARE EDUCATION/TRAINING PROGRAM

## 2021-12-13 PROCEDURE — 3066F PR DOCUMENTATION OF TREATMENT FOR NEPHROPATHY: ICD-10-PCS | Mod: CPTII,,, | Performed by: OPHTHALMOLOGY

## 2021-12-13 PROCEDURE — 82595 ASSAY OF CRYOGLOBULIN: CPT | Performed by: STUDENT IN AN ORGANIZED HEALTH CARE EDUCATION/TRAINING PROGRAM

## 2021-12-13 PROCEDURE — 36415 COLL VENOUS BLD VENIPUNCTURE: CPT | Performed by: STUDENT IN AN ORGANIZED HEALTH CARE EDUCATION/TRAINING PROGRAM

## 2021-12-13 PROCEDURE — 80053 COMPREHEN METABOLIC PANEL: CPT | Performed by: STUDENT IN AN ORGANIZED HEALTH CARE EDUCATION/TRAINING PROGRAM

## 2021-12-13 PROCEDURE — 99999 PR PBB SHADOW E&M-EST. PATIENT-LVL V: CPT | Mod: PBBFAC,,, | Performed by: STUDENT IN AN ORGANIZED HEALTH CARE EDUCATION/TRAINING PROGRAM

## 2021-12-13 PROCEDURE — 3066F NEPHROPATHY DOC TX: CPT | Mod: CPTII,,, | Performed by: OPHTHALMOLOGY

## 2021-12-13 PROCEDURE — 99214 OFFICE O/P EST MOD 30 MIN: CPT | Mod: S$PBB,,, | Performed by: STUDENT IN AN ORGANIZED HEALTH CARE EDUCATION/TRAINING PROGRAM

## 2021-12-13 PROCEDURE — 99212 OFFICE O/P EST SF 10 MIN: CPT | Mod: S$PBB,,, | Performed by: OPHTHALMOLOGY

## 2021-12-13 PROCEDURE — 99999 PR PBB SHADOW E&M-EST. PATIENT-LVL IV: CPT | Mod: PBBFAC,,, | Performed by: OPHTHALMOLOGY

## 2021-12-13 PROCEDURE — 86160 COMPLEMENT ANTIGEN: CPT | Performed by: STUDENT IN AN ORGANIZED HEALTH CARE EDUCATION/TRAINING PROGRAM

## 2021-12-13 PROCEDURE — 4010F ACE/ARB THERAPY RXD/TAKEN: CPT | Mod: CPTII,,, | Performed by: OPHTHALMOLOGY

## 2021-12-13 PROCEDURE — 99999 PR PBB SHADOW E&M-EST. PATIENT-LVL IV: ICD-10-PCS | Mod: PBBFAC,,, | Performed by: OPHTHALMOLOGY

## 2021-12-13 PROCEDURE — 4010F PR ACE/ARB THEARPY RXD/TAKEN: ICD-10-PCS | Mod: CPTII,,, | Performed by: OPHTHALMOLOGY

## 2021-12-13 RX ORDER — LIDOCAINE HYDROCHLORIDE 20 MG/ML
JELLY TOPICAL DAILY
Qty: 100 ML | Refills: 2 | Status: SHIPPED | OUTPATIENT
Start: 2021-12-13 | End: 2022-07-12

## 2021-12-13 RX ORDER — FOLIC ACID 1 MG/1
1 TABLET ORAL DAILY
Qty: 90 TABLET | Refills: 2 | Status: SHIPPED | OUTPATIENT
Start: 2021-12-13 | End: 2023-04-03 | Stop reason: SDUPTHER

## 2021-12-13 RX ORDER — PREDNISONE 2.5 MG/1
2.5 TABLET ORAL DAILY
Qty: 90 TABLET | Refills: 1 | Status: SHIPPED | OUTPATIENT
Start: 2021-12-13 | End: 2022-03-07

## 2021-12-13 RX ORDER — METHOTREXATE 2.5 MG/1
20 TABLET ORAL
Qty: 96 TABLET | Refills: 0 | Status: SHIPPED | OUTPATIENT
Start: 2021-12-13 | End: 2022-03-07 | Stop reason: SDUPTHER

## 2021-12-13 ASSESSMENT — ROUTINE ASSESSMENT OF PATIENT INDEX DATA (RAPID3)
PAIN SCORE: 3.5
FATIGUE SCORE: 3.5
PSYCHOLOGICAL DISTRESS SCORE: 3.3
AM STIFFNESS SCORE: 0, NO
PATIENT GLOBAL ASSESSMENT SCORE: 4
TOTAL RAPID3 SCORE: 3.5
MDHAQ FUNCTION SCORE: 0.9

## 2021-12-15 ENCOUNTER — TELEPHONE (OUTPATIENT)
Dept: RHEUMATOLOGY | Facility: CLINIC | Age: 56
End: 2021-12-15
Payer: MEDICAID

## 2021-12-15 DIAGNOSIS — E83.52 HYPERCALCEMIA: Primary | ICD-10-CM

## 2021-12-15 DIAGNOSIS — D47.9 B-CELL LYMPHOPROLIFERATIVE DISORDER: Primary | ICD-10-CM

## 2021-12-15 DIAGNOSIS — D58.2 ELEVATED HEMOGLOBIN: ICD-10-CM

## 2021-12-16 DIAGNOSIS — D47.9 B-CELL LYMPHOPROLIFERATIVE DISORDER: Primary | ICD-10-CM

## 2021-12-23 LAB — CRYOGLOB SER QL: NORMAL

## 2021-12-29 DIAGNOSIS — D86.9 SARCOIDOSIS: ICD-10-CM

## 2021-12-29 DIAGNOSIS — D89.1 CRYOGLOBULINEMIA: ICD-10-CM

## 2022-01-04 ENCOUNTER — LAB VISIT (OUTPATIENT)
Dept: LAB | Facility: HOSPITAL | Age: 57
End: 2022-01-04
Attending: INTERNAL MEDICINE
Payer: MEDICAID

## 2022-01-04 ENCOUNTER — TELEPHONE (OUTPATIENT)
Dept: RESEARCH | Facility: HOSPITAL | Age: 57
End: 2022-01-04
Payer: MEDICAID

## 2022-01-04 DIAGNOSIS — D58.2 ELEVATED HEMOGLOBIN: ICD-10-CM

## 2022-01-04 DIAGNOSIS — E83.52 HYPERCALCEMIA: ICD-10-CM

## 2022-01-04 DIAGNOSIS — N18.31 STAGE 3A CHRONIC KIDNEY DISEASE: ICD-10-CM

## 2022-01-04 LAB
25(OH)D3+25(OH)D2 SERPL-MCNC: 35 NG/ML (ref 30–96)
ANION GAP SERPL CALC-SCNC: 9 MMOL/L (ref 8–16)
BASOPHILS # BLD AUTO: 0.02 K/UL (ref 0–0.2)
BASOPHILS # BLD AUTO: 0.02 K/UL (ref 0–0.2)
BASOPHILS NFR BLD: 0.6 % (ref 0–1.9)
BASOPHILS NFR BLD: 0.6 % (ref 0–1.9)
BUN SERPL-MCNC: 17 MG/DL (ref 6–20)
CA-I BLDV-SCNC: 1.33 MMOL/L (ref 1.06–1.42)
CALCIUM SERPL-MCNC: 10.1 MG/DL (ref 8.7–10.5)
CHLORIDE SERPL-SCNC: 107 MMOL/L (ref 95–110)
CO2 SERPL-SCNC: 25 MMOL/L (ref 23–29)
CREAT SERPL-MCNC: 1 MG/DL (ref 0.5–1.4)
DIFFERENTIAL METHOD: ABNORMAL
DIFFERENTIAL METHOD: ABNORMAL
EOSINOPHIL # BLD AUTO: 0 K/UL (ref 0–0.5)
EOSINOPHIL # BLD AUTO: 0 K/UL (ref 0–0.5)
EOSINOPHIL NFR BLD: 0.3 % (ref 0–8)
EOSINOPHIL NFR BLD: 0.3 % (ref 0–8)
ERYTHROCYTE [DISTWIDTH] IN BLOOD BY AUTOMATED COUNT: 14.1 % (ref 11.5–14.5)
ERYTHROCYTE [DISTWIDTH] IN BLOOD BY AUTOMATED COUNT: 14.1 % (ref 11.5–14.5)
EST. GFR  (AFRICAN AMERICAN): >60 ML/MIN/1.73 M^2
EST. GFR  (NON AFRICAN AMERICAN): >60 ML/MIN/1.73 M^2
GLUCOSE SERPL-MCNC: 171 MG/DL (ref 70–110)
HCT VFR BLD AUTO: 50.8 % (ref 37–48.5)
HCT VFR BLD AUTO: 50.8 % (ref 37–48.5)
HGB BLD-MCNC: 16.8 G/DL (ref 12–16)
HGB BLD-MCNC: 16.8 G/DL (ref 12–16)
IMM GRANULOCYTES # BLD AUTO: 0 K/UL (ref 0–0.04)
IMM GRANULOCYTES # BLD AUTO: 0 K/UL (ref 0–0.04)
IMM GRANULOCYTES NFR BLD AUTO: 0 % (ref 0–0.5)
IMM GRANULOCYTES NFR BLD AUTO: 0 % (ref 0–0.5)
LYMPHOCYTES # BLD AUTO: 1.4 K/UL (ref 1–4.8)
LYMPHOCYTES # BLD AUTO: 1.4 K/UL (ref 1–4.8)
LYMPHOCYTES NFR BLD: 39.6 % (ref 18–48)
LYMPHOCYTES NFR BLD: 39.6 % (ref 18–48)
MCH RBC QN AUTO: 31.1 PG (ref 27–31)
MCH RBC QN AUTO: 31.1 PG (ref 27–31)
MCHC RBC AUTO-ENTMCNC: 33.1 G/DL (ref 32–36)
MCHC RBC AUTO-ENTMCNC: 33.1 G/DL (ref 32–36)
MCV RBC AUTO: 94 FL (ref 82–98)
MCV RBC AUTO: 94 FL (ref 82–98)
MONOCYTES # BLD AUTO: 0.3 K/UL (ref 0.3–1)
MONOCYTES # BLD AUTO: 0.3 K/UL (ref 0.3–1)
MONOCYTES NFR BLD: 7.7 % (ref 4–15)
MONOCYTES NFR BLD: 7.7 % (ref 4–15)
NEUTROPHILS # BLD AUTO: 1.8 K/UL (ref 1.8–7.7)
NEUTROPHILS # BLD AUTO: 1.8 K/UL (ref 1.8–7.7)
NEUTROPHILS NFR BLD: 51.8 % (ref 38–73)
NEUTROPHILS NFR BLD: 51.8 % (ref 38–73)
NRBC BLD-RTO: 0 /100 WBC
NRBC BLD-RTO: 0 /100 WBC
PLATELET # BLD AUTO: 155 K/UL (ref 150–450)
PLATELET # BLD AUTO: 155 K/UL (ref 150–450)
PMV BLD AUTO: 10.7 FL (ref 9.2–12.9)
PMV BLD AUTO: 10.7 FL (ref 9.2–12.9)
POTASSIUM SERPL-SCNC: 3.8 MMOL/L (ref 3.5–5.1)
PTH-INTACT SERPL-MCNC: 127.3 PG/ML (ref 9–77)
RBC # BLD AUTO: 5.41 M/UL (ref 4–5.4)
RBC # BLD AUTO: 5.41 M/UL (ref 4–5.4)
SODIUM SERPL-SCNC: 141 MMOL/L (ref 136–145)
WBC # BLD AUTO: 3.51 K/UL (ref 3.9–12.7)
WBC # BLD AUTO: 3.51 K/UL (ref 3.9–12.7)

## 2022-01-04 PROCEDURE — 83970 ASSAY OF PARATHORMONE: CPT | Performed by: STUDENT IN AN ORGANIZED HEALTH CARE EDUCATION/TRAINING PROGRAM

## 2022-01-04 PROCEDURE — 84165 PATHOLOGIST INTERPRETATION SPE: ICD-10-PCS | Mod: 26,,, | Performed by: PATHOLOGY

## 2022-01-04 PROCEDURE — 80048 BASIC METABOLIC PNL TOTAL CA: CPT | Performed by: INTERNAL MEDICINE

## 2022-01-04 PROCEDURE — 82397 CHEMILUMINESCENT ASSAY: CPT | Performed by: STUDENT IN AN ORGANIZED HEALTH CARE EDUCATION/TRAINING PROGRAM

## 2022-01-04 PROCEDURE — 84165 PROTEIN E-PHORESIS SERUM: CPT | Mod: 26,,, | Performed by: PATHOLOGY

## 2022-01-04 PROCEDURE — 82668 ASSAY OF ERYTHROPOIETIN: CPT | Performed by: STUDENT IN AN ORGANIZED HEALTH CARE EDUCATION/TRAINING PROGRAM

## 2022-01-04 PROCEDURE — 82652 VIT D 1 25-DIHYDROXY: CPT | Performed by: STUDENT IN AN ORGANIZED HEALTH CARE EDUCATION/TRAINING PROGRAM

## 2022-01-04 PROCEDURE — 82330 ASSAY OF CALCIUM: CPT | Performed by: STUDENT IN AN ORGANIZED HEALTH CARE EDUCATION/TRAINING PROGRAM

## 2022-01-04 PROCEDURE — 85025 COMPLETE CBC W/AUTO DIFF WBC: CPT | Performed by: INTERNAL MEDICINE

## 2022-01-04 PROCEDURE — 82306 VITAMIN D 25 HYDROXY: CPT | Performed by: STUDENT IN AN ORGANIZED HEALTH CARE EDUCATION/TRAINING PROGRAM

## 2022-01-04 PROCEDURE — 84165 PROTEIN E-PHORESIS SERUM: CPT | Performed by: STUDENT IN AN ORGANIZED HEALTH CARE EDUCATION/TRAINING PROGRAM

## 2022-01-04 NOTE — TELEPHONE ENCOUNTER
Study title: Detection of Reduced Left Ventricular Ejection Fraction and Atrial Arrhymias with Single Lead ECG using Artifical Intelligence  IRB #: [id number]  IRB approval date: 6/30/2021  Sponsor: [employer]  Site Number: Oef  Subject ID: N/A  Date of Encounter:  1/4/2022     Called patient to introduce the EKO study. Explained overview of study. Patient expressed interest and is willing to see me on 1/5/2021 @1115. Voiced understanding. Will schedule accordingly.     Patient verified that they have no pacemakers/ICDs. Consent form was emailed to patient.

## 2022-01-04 NOTE — TELEPHONE ENCOUNTER
Study title: Detection of Reduced Left Ventricular Ejection Fraction and Atrial Arrhymias with Single Lead ECG using Artifical Intelligence  IRB #: [id number]  IRB approval date: 6/30/2021  Sponsor: [employer]  Site Number: Oef  Subject ID: N/A  Date of Encounter:  1/4/2022    Called to introduce the patient to the EKO clinical trial. Left voicemail with my return number.

## 2022-01-05 ENCOUNTER — HOSPITAL ENCOUNTER (OUTPATIENT)
Dept: RADIOLOGY | Facility: HOSPITAL | Age: 57
Discharge: HOME | End: 2022-01-05
Attending: STUDENT IN AN ORGANIZED HEALTH CARE EDUCATION/TRAINING PROGRAM
Payer: MEDICAID

## 2022-01-05 ENCOUNTER — OFFICE VISIT (OUTPATIENT)
Dept: NEPHROLOGY | Facility: CLINIC | Age: 57
End: 2022-01-05
Payer: MEDICAID

## 2022-01-05 ENCOUNTER — RESEARCH ENCOUNTER (OUTPATIENT)
Dept: RESEARCH | Facility: HOSPITAL | Age: 57
End: 2022-01-05

## 2022-01-05 ENCOUNTER — HOSPITAL ENCOUNTER (OUTPATIENT)
Dept: CARDIOLOGY | Facility: HOSPITAL | Age: 57
Discharge: HOME | End: 2022-01-05
Attending: STUDENT IN AN ORGANIZED HEALTH CARE EDUCATION/TRAINING PROGRAM
Payer: MEDICAID

## 2022-01-05 VITALS
HEIGHT: 66 IN | HEART RATE: 58 BPM | DIASTOLIC BLOOD PRESSURE: 78 MMHG | WEIGHT: 217.38 LBS | BODY MASS INDEX: 34.93 KG/M2 | OXYGEN SATURATION: 98 % | SYSTOLIC BLOOD PRESSURE: 120 MMHG

## 2022-01-05 VITALS
HEART RATE: 75 BPM | HEIGHT: 66 IN | SYSTOLIC BLOOD PRESSURE: 130 MMHG | BODY MASS INDEX: 36.48 KG/M2 | DIASTOLIC BLOOD PRESSURE: 82 MMHG | WEIGHT: 227 LBS

## 2022-01-05 DIAGNOSIS — I10 ESSENTIAL HYPERTENSION: ICD-10-CM

## 2022-01-05 DIAGNOSIS — N30.01 ACUTE CYSTITIS WITH HEMATURIA: ICD-10-CM

## 2022-01-05 DIAGNOSIS — J84.9 INTERSTITIAL PULMONARY DISEASE, UNSPECIFIED: ICD-10-CM

## 2022-01-05 DIAGNOSIS — D86.9 SARCOIDOSIS: ICD-10-CM

## 2022-01-05 DIAGNOSIS — I50.32 CHRONIC DIASTOLIC HEART FAILURE: ICD-10-CM

## 2022-01-05 DIAGNOSIS — N18.2 CHRONIC KIDNEY DISEASE (CKD), STAGE II (MILD): Primary | ICD-10-CM

## 2022-01-05 DIAGNOSIS — D86.0 PULMONARY SARCOIDOSIS: ICD-10-CM

## 2022-01-05 LAB
ALBUMIN SERPL ELPH-MCNC: 3.57 G/DL (ref 3.35–5.55)
ALPHA1 GLOB SERPL ELPH-MCNC: 0.38 G/DL (ref 0.17–0.41)
ALPHA2 GLOB SERPL ELPH-MCNC: 1.19 G/DL (ref 0.43–0.99)
ASCENDING AORTA: 2.78 CM
AV INDEX (PROSTH): 0.8
AV MEAN GRADIENT: 3 MMHG
AV PEAK GRADIENT: 7 MMHG
AV VALVE AREA: 2.39 CM2
AV VELOCITY RATIO: 0.85
B-GLOBULIN SERPL ELPH-MCNC: 0.75 G/DL (ref 0.5–1.1)
BSA FOR ECHO PROCEDURE: 2.19 M2
CV ECHO LV RWT: 0.38 CM
DOP CALC AO PEAK VEL: 1.29 M/S
DOP CALC AO VTI: 24.96 CM
DOP CALC LVOT AREA: 3 CM2
DOP CALC LVOT DIAMETER: 1.95 CM
DOP CALC LVOT PEAK VEL: 1.1 M/S
DOP CALC LVOT STROKE VOLUME: 59.7 CM3
DOP CALCLVOT PEAK VEL VTI: 20 CM
E WAVE DECELERATION TIME: 137.74 MSEC
E/A RATIO: 1.35
E/E' RATIO: 9 M/S
ECHO LV POSTERIOR WALL: 0.92 CM (ref 0.6–1.1)
EJECTION FRACTION: 65 %
FRACTIONAL SHORTENING: 32 % (ref 28–44)
GAMMA GLOB SERPL ELPH-MCNC: 0.81 G/DL (ref 0.67–1.58)
INTERVENTRICULAR SEPTUM: 0.8 CM (ref 0.6–1.1)
IVRT: 77.07 MSEC
LA MAJOR: 5.72 CM
LA MINOR: 5.56 CM
LA WIDTH: 3.35 CM
LEFT ATRIUM SIZE: 4.19 CM
LEFT ATRIUM VOLUME INDEX MOD: 22.7 ML/M2
LEFT ATRIUM VOLUME INDEX: 31.9 ML/M2
LEFT ATRIUM VOLUME MOD: 48 CM3
LEFT ATRIUM VOLUME: 67.28 CM3
LEFT INTERNAL DIMENSION IN SYSTOLE: 3.34 CM (ref 2.1–4)
LEFT VENTRICLE DIASTOLIC VOLUME INDEX: 49.03 ML/M2
LEFT VENTRICLE DIASTOLIC VOLUME: 103.46 ML
LEFT VENTRICLE MASS INDEX: 68 G/M2
LEFT VENTRICLE SYSTOLIC VOLUME INDEX: 21.6 ML/M2
LEFT VENTRICLE SYSTOLIC VOLUME: 45.58 ML
LEFT VENTRICULAR INTERNAL DIMENSION IN DIASTOLE: 4.9 CM (ref 3.5–6)
LEFT VENTRICULAR MASS: 144.09 G
LV LATERAL E/E' RATIO: 8.1 M/S
LV SEPTAL E/E' RATIO: 10.13 M/S
MV A" WAVE DURATION": 10.85 MSEC
MV PEAK A VEL: 0.6 M/S
MV PEAK E VEL: 0.81 M/S
MV STENOSIS PRESSURE HALF TIME: 39.95 MS
MV VALVE AREA P 1/2 METHOD: 5.51 CM2
PROT SERPL-MCNC: 6.7 G/DL (ref 6–8.4)
PULM VEIN S/D RATIO: 0.66
PV PEAK D VEL: 0.59 M/S
PV PEAK S VEL: 0.39 M/S
RA MAJOR: 4.69 CM
RA PRESSURE: 3 MMHG
RA WIDTH: 3.54 CM
RIGHT VENTRICULAR END-DIASTOLIC DIMENSION: 3.63 CM
RV TISSUE DOPPLER FREE WALL SYSTOLIC VELOCITY 1 (APICAL 4 CHAMBER VIEW): 11.64 CM/S
SINUS: 3.1 CM
STJ: 2.48 CM
TDI LATERAL: 0.1 M/S
TDI SEPTAL: 0.08 M/S
TDI: 0.09 M/S
TRICUSPID ANNULAR PLANE SYSTOLIC EXCURSION: 1.87 CM

## 2022-01-05 PROCEDURE — 99215 OFFICE O/P EST HI 40 MIN: CPT | Mod: PBBFAC,25 | Performed by: INTERNAL MEDICINE

## 2022-01-05 PROCEDURE — 71250 CT CHEST WITHOUT CONTRAST: ICD-10-PCS | Mod: 26,,, | Performed by: RADIOLOGY

## 2022-01-05 PROCEDURE — 71250 CT THORAX DX C-: CPT | Mod: 26,,, | Performed by: RADIOLOGY

## 2022-01-05 PROCEDURE — 3074F PR MOST RECENT SYSTOLIC BLOOD PRESSURE < 130 MM HG: ICD-10-PCS | Mod: CPTII,,, | Performed by: INTERNAL MEDICINE

## 2022-01-05 PROCEDURE — 3072F PR LOW RISK FOR RETINOPATHY: ICD-10-PCS | Mod: CPTII,,, | Performed by: INTERNAL MEDICINE

## 2022-01-05 PROCEDURE — 71250 CT THORAX DX C-: CPT | Mod: TC

## 2022-01-05 PROCEDURE — 99214 PR OFFICE/OUTPT VISIT, EST, LEVL IV, 30-39 MIN: ICD-10-PCS | Mod: S$PBB,,, | Performed by: INTERNAL MEDICINE

## 2022-01-05 PROCEDURE — 3078F DIAST BP <80 MM HG: CPT | Mod: CPTII,,, | Performed by: INTERNAL MEDICINE

## 2022-01-05 PROCEDURE — 1159F MED LIST DOCD IN RCRD: CPT | Mod: CPTII,,, | Performed by: INTERNAL MEDICINE

## 2022-01-05 PROCEDURE — 1160F PR REVIEW ALL MEDS BY PRESCRIBER/CLIN PHARMACIST DOCUMENTED: ICD-10-PCS | Mod: CPTII,,, | Performed by: INTERNAL MEDICINE

## 2022-01-05 PROCEDURE — 93306 TTE W/DOPPLER COMPLETE: CPT

## 2022-01-05 PROCEDURE — 3072F LOW RISK FOR RETINOPATHY: CPT | Mod: CPTII,,, | Performed by: INTERNAL MEDICINE

## 2022-01-05 PROCEDURE — 3074F SYST BP LT 130 MM HG: CPT | Mod: CPTII,,, | Performed by: INTERNAL MEDICINE

## 2022-01-05 PROCEDURE — 3008F PR BODY MASS INDEX (BMI) DOCUMENTED: ICD-10-PCS | Mod: CPTII,,, | Performed by: INTERNAL MEDICINE

## 2022-01-05 PROCEDURE — 3078F PR MOST RECENT DIASTOLIC BLOOD PRESSURE < 80 MM HG: ICD-10-PCS | Mod: CPTII,,, | Performed by: INTERNAL MEDICINE

## 2022-01-05 PROCEDURE — 1160F RVW MEDS BY RX/DR IN RCRD: CPT | Mod: CPTII,,, | Performed by: INTERNAL MEDICINE

## 2022-01-05 PROCEDURE — 99999 PR PBB SHADOW E&M-EST. PATIENT-LVL V: ICD-10-PCS | Mod: PBBFAC,,, | Performed by: INTERNAL MEDICINE

## 2022-01-05 PROCEDURE — 3008F BODY MASS INDEX DOCD: CPT | Mod: CPTII,,, | Performed by: INTERNAL MEDICINE

## 2022-01-05 PROCEDURE — 93306 TTE W/DOPPLER COMPLETE: CPT | Mod: 26,,, | Performed by: INTERNAL MEDICINE

## 2022-01-05 PROCEDURE — 3066F PR DOCUMENTATION OF TREATMENT FOR NEPHROPATHY: ICD-10-PCS | Mod: CPTII,,, | Performed by: INTERNAL MEDICINE

## 2022-01-05 PROCEDURE — 3066F NEPHROPATHY DOC TX: CPT | Mod: CPTII,,, | Performed by: INTERNAL MEDICINE

## 2022-01-05 PROCEDURE — 99214 OFFICE O/P EST MOD 30 MIN: CPT | Mod: S$PBB,,, | Performed by: INTERNAL MEDICINE

## 2022-01-05 PROCEDURE — 1159F PR MEDICATION LIST DOCUMENTED IN MEDICAL RECORD: ICD-10-PCS | Mod: CPTII,,, | Performed by: INTERNAL MEDICINE

## 2022-01-05 PROCEDURE — 99999 PR PBB SHADOW E&M-EST. PATIENT-LVL V: CPT | Mod: PBBFAC,,, | Performed by: INTERNAL MEDICINE

## 2022-01-05 PROCEDURE — 93306 ECHO (CUPID ONLY): ICD-10-PCS | Mod: 26,,, | Performed by: INTERNAL MEDICINE

## 2022-01-05 NOTE — PROGRESS NOTES
Date Consent signed: 1/5/2022    Company/  Name: [employer]  Study Title: Detection of Reduced Left Ventricular Ejection Fraction and Atrial Arrhythmias with Single Lead ECG using  Artificial Intelligence- EKO Study    IRB Number: 2021.079    Principle Investigator: Cassidy Louis MD    Present for Discussion: Patient, Patient's significant other, Myself    Is LAR Consenting for Subject: N/A      Yaron Petty met with patient to review consent form and answer any and all outstanding questions that would present in regard to the EKO clinical trial. After thoroughly discussing the consent form patient did agree to participate in cardiology clinical trial 2021.079.      Prior to the Informed Consent (IC) being signed, or any study protocol required data collection, testing, procedure, or intervention being performed, the following was done and/or discussed:   Patient was given a copy of the IC for review   Purpose of the study and qualifications to participate   Study design, Follow up schedule, and tests or procedures done at each visit   Confidentiality and HIPAA Authorization for Release of Medical Records for the research trial/ subject's rights/research related injury   Risk, Benefits, Alternative Treatments, Compensation and Costs   Participation in the research trial is voluntary and patient may withdraw at anytime   Contact information for study related questions      Patient verbalizes understanding of the above: Yes  Contact information for CRC and PI given to patient: Yes  Patient able to adequately summarize: the purpose of the study, the risks associated with the study, and all procedures, testing, and follow-ups associated with the study: Yes      Kendy Vital signed the informed consent form for the EKO clinical research study with an IRB approval date of 6/30/2021. Each page of the consent form was reviewed with Kendy Vital (and pts family) and all  questions answered satisfactorily. Kendy Vital signed the consent form and received a copy of same. The original consent was scanned into electronic medical records (EPIC) and filed into the subject's research study binder.      Consent was obtained by: Yaron Petty      As a participant in the EKO clinical trial, 5 recordings were done by a DUO monitor device. Recordings were done by Yaron Petty      After all study related procedures were completed, patient was issued a $10.00 stipend for participation via [employer] provided by the sponsor.

## 2022-01-05 NOTE — PROGRESS NOTES
Progress Report  Nephrology      Consult Requested By: PCP  Reason for Consult: CKD, glomerulonephritis    History of Present Illness:  Patient is a 56 y.o. female presents for a new consultation for evaluation of elevated serum creatinine and presumed chronic kidney disease. The patient was found to have an elevated serum creatinine during routine laboratory testing, at 1.3 - 1.6 mg/dL. She was diagnosed with biopsy-proven cryoglobulinemic glomerulonephritis associated with hepatitis B and B-cell lymphoma, 1.5 years ago. She was treated with PLEX and entecavir.     The patient denies SOB, LE edema, hematuria or foamy urine.     The patient denies taking NSAIDs or new antibiotics, recreational drugs, recent episode of dehydration, diarrhea, nausea or vomiting, acute illness, hospitalization or exposure to IV radiocontrast.     Past Medical History:   Diagnosis Date    Acute (diffuse) proliferative glomerulonephritis     Acute renal failure 7/19/2019    B-cell lymphoproliferative disorder 7/30/2019    CHF (congestive heart failure)     Chronic obstructive lung disease 7/27/2019    Chronic viral hepatitis B without delta agent and without coma 7/24/2019    Cryoglobulinemic vasculitis     Diabetes mellitus     Hypogammaglobulinemia 8/5/2019    Iron deficiency anemia 7/30/2019    Renal vein thrombosis 2015    s/p embolectomy and lovenox for 9 mos    Steroid-induced hyperglycemia 7/28/2019    Uterine leiomyoma     s/p resection         Current Outpatient Medications:     acetaminophen (TYLENOL) 500 MG tablet, Take 500 mg by mouth 3 (three) times daily as needed for Pain (or fever)., Disp: , Rfl:     albuterol (PROVENTIL/VENTOLIN HFA) 90 mcg/actuation inhaler, Inhale 2 puffs into the lungs every 6 (six) hours as needed for Wheezing or Shortness of Breath. Rescue, Disp: 18 g, Rfl: 6    allopurinoL (ZYLOPRIM) 100 MG tablet, Take 100 mg by mouth once daily. for 90 days, Disp: , Rfl:     aspirin (ECOTRIN) 81  MG EC tablet, Take 81 mg by mouth once daily., Disp: , Rfl:     atorvastatin (LIPITOR) 40 MG tablet, Take 1 tablet (40 mg total) by mouth once daily., Disp: 90 tablet, Rfl: 3    blood sugar diagnostic Strp, use to test blood gluocse 2 (two) times daily with meals., Disp: 100 strip, Rfl: 11    blood-glucose meter Misc, Use as instructed (Patient taking differently: Use as instructed), Disp: 1 each, Rfl: 0    carvediloL (COREG) 25 MG tablet, Take 1 tablet (25 mg total) by mouth 2 (two) times daily., Disp: 90 tablet, Rfl: 3    cyanocobalamin (VITAMIN B-12) 250 MCG tablet, Take 250 mcg by mouth once daily., Disp: , Rfl:     diclofenac sodium (VOLTAREN) 1 % Gel, Apply 4 g topically 4 (four) times daily. Apply to knee, Disp: 350 g, Rfl: 2    entecavir (BARACLUDE) 0.5 MG Tab, Take 1 tablet (0.5 mg total) by mouth once daily. DOSE CHANGE, CHANGED TO EVERY DAY, Disp: 30 tablet, Rfl: 11    EScitalopram oxalate (LEXAPRO) 10 MG tablet, Take 1 tablet (10 mg total) by mouth once daily., Disp: 90 tablet, Rfl: 3    ferrous sulfate 325 (65 FE) MG EC tablet, Take 1 tablet (325 mg total) by mouth once daily., Disp: , Rfl: 0    folic acid (FOLVITE) 1 MG tablet, Take 1 tablet (1 mg total) by mouth once daily., Disp: 90 tablet, Rfl: 2    furosemide (LASIX) 40 MG tablet, Take 40 mg lasix once daily, Disp: 90 tablet, Rfl: 3    gabapentin (NEURONTIN) 300 MG capsule, Take 1 capsule (300 mg total) by mouth 2 (two) times daily., Disp: 60 capsule, Rfl: 11    HUMULIN R REGULAR U-100 INSULN 100 unit/mL injection, Inject 12 Units into the skin 3 (three) times daily before meals., Disp: 15 mL, Rfl: 6    hydrOXYchloroQUINE (PLAQUENIL) 200 mg tablet, Take 1 tablet (200 mg total) by mouth 2 (two) times daily., Disp: 180 tablet, Rfl: 3    insulin (LANTUS SOLOSTAR U-100 INSULIN) glargine 100 units/mL (3mL) SubQ pen, Inject 55 Units into the skin every morning., Disp: 17 each, Rfl: 3    lancets 30 gauge Misc, use to test blood glucose 2  (two) times daily with meals., Disp: 100 each, Rfl: 11    LIDOcaine HCL 2% (XYLOCAINE) 2 % jelly, Apply topically once daily., Disp: 100 mL, Rfl: 2    lisinopriL 10 MG tablet, Take 1 tablet (10 mg total) by mouth once daily., Disp: 30 tablet, Rfl: 11    methotrexate 2.5 MG Tab, Take 8 tablets (20 mg total) by mouth every 7 days. for 12 doses, Disp: 96 tablet, Rfl: 0    multivitamin (THERAGRAN) per tablet, Take 1 tablet by mouth once daily., Disp: , Rfl:     NIFEdipine (PROCARDIA-XL) 60 MG (OSM) 24 hr tablet, Take 1 tablet (60 mg total) by mouth once daily., Disp: 90 tablet, Rfl: 3    pantoprazole (PROTONIX) 40 MG tablet, Take 1 tablet (40 mg total) by mouth before breakfast., Disp: 30 tablet, Rfl: 11    polyethylene glycol (GLYCOLAX) 17 gram/dose powder, Take 17 g by mouth daily as needed (constipation)., Disp: , Rfl:     prednisoLONE (ORAPRED) 15 mg/5 mL (3 mg/mL) solution, SMARTSI Teaspoon By Mouth 4 Times Daily, Disp: , Rfl:     predniSONE (DELTASONE) 2.5 MG tablet, Take 1 tablet (2.5 mg total) by mouth once daily., Disp: 90 tablet, Rfl: 1    traZODone (DESYREL) 100 MG tablet, Take 1 tablet (100 mg total) by mouth nightly as needed for Insomnia., Disp: 30 tablet, Rfl: 11    valACYclovir (VALTREX) 1000 MG tablet, Take 1,000 mg by mouth 3 (three) times daily., Disp: , Rfl:     vitamin D (VITAMIN D3) 1000 units Tab, Take 1 tablet (1,000 Units total) by mouth once daily. (Patient taking differently: Take 2,000 Units by mouth once daily.), Disp: , Rfl:     lancing device Misc, 1 Device by Misc.(Non-Drug; Combo Route) route 2 (two) times daily with meals., Disp: 1 each, Rfl: 0  Review of patient's allergies indicates:  No Known Allergies     Past Surgical History:   Procedure Laterality Date    AV FISTULA PLACEMENT      CATARACT EXTRACTION W/  INTRAOCULAR LENS IMPLANT Left 2021    Procedure: EXTRACTION, CATARACT, WITH IOL INSERTION;  Surgeon: Allen Alejandro MD;  [facility];  Service:  Ophthalmology;  Laterality: Left;    CATARACT EXTRACTION W/  INTRAOCULAR LENS IMPLANT Right 5/6/2021    Procedure: EXTRACTION, CATARACT, WITH IOL INSERTION;  Surgeon: Allen Alejandro MD;  Location[facility] OR;  Service: Ophthalmology;  Laterality: Right;    LUNG BIOPSY N/A 12/1/2020    Procedure: BIOPSY, LUNG;  Surgeon: Kelli Diagnostic Provider;  Location: [facility] OR;  Service: Radiology;  Laterality: N/A;  8 A.M. START  PHONE PREOP DONE 11/27/2020  PT/INR, CBC, CMP AND COVID ON AM OF PROCEDURE   ARRIVAL AT 7:00 FOR 8:30 APPT     Family history: no ESRD    Social History     Tobacco Use    Smoking status: Light Tobacco Smoker    Smokeless tobacco: Never Used   Substance Use Topics    Alcohol use: Not Currently    Drug use: Never       Review of Systems   Constitutional: Negative.    Eyes: Negative.    Gastrointestinal: Negative.    Genitourinary: Negative.    Skin: Negative.    All other systems reviewed and are negative.      Vitals:    01/05/22 1538   BP: 120/78   Pulse: (!) 58       PHYSICAL EXAMINATION:  General: no distress, well nourished  Skin: color, texture, turgor normal. No rash or lesions  HEENT: Eyes: reactive pupils, normal conjunctiva. Oral mucosa moist, no ulcers. Throat: no erythema.  Neck: supple, symmetrical, trachea midline, no JVD, no carotid bruit  Lungs: clear to auscultation bilaterally and normal respiratory effort  Cardiovascular: Heart: regular rate and rhythm, S1, S2 normal, no murmur, rub or gallop. Pulses: 2+ and symmetric.  Abdomen: bowel sounds present, no abdominal bruit, soft, non-tender non-distented; no masses, organomegaly or ascites.   Musculoskeletal: no pitting edema in lower extremities, no clubbing or cyanosis  Lymph Nodes: No cervical or supraclavicular adenopathy  Neurologic: AAOx3, normal strength and tone. No focal deficit. No asterixis.       LABORATORY DATA:  Lab Results   Component Value Date    CREATININE 1.0 01/04/2022       Prot/Creat Ratio, Urine   Date Value Ref  Range Status   01/04/2022 0.47 (H) 0.00 - 0.20 Final   04/28/2021 0.47 (H) 0.00 - 0.20 Final   03/24/2021 0.74 (H) 0.00 - 0.20 Final       Lab Results   Component Value Date     01/04/2022    K 3.8 01/04/2022    CO2 25 01/04/2022       Lab Results   Component Value Date    .3 (H) 01/04/2022    CALCIUM 10.1 01/04/2022    CAION 1.33 01/04/2022    PHOS 3.3 04/28/2021       Lab Results   Component Value Date    HGB 16.8 (H) 01/04/2022    HGB 16.8 (H) 01/04/2022        Lab Results   Component Value Date    HGBA1C 9.9 (H) 11/08/2021           IMAGING STUDIES  Kidney US: Reviewed  Echocardiogram: Reviewed    IMPRESSION / RECOMMENDATIONS:     1. Stage 3a chronic kidney disease  Secondary to HBV-associated cyroglobulinemic glomerulonephritis and ATN. Stable kidney function. Minimal proteinuria. Inspected Will inspect urine sediment given hematuria on last UA and found no evidence of disease activity. Reminded to avoid NSAIDs   2. Cryoglobulinemic vasculitis  S/p PLEX, steroids. S/p IVIG/RTX as well.  In clinical remission.    3. Sarcoidosis/hypercalcemia Stable, on hydroxychloroquine and very low dose prednisone. No dyspnea   4. Chronic viral hepatitis B  took entecavir, in remission   5. B-cell lymphoproliferative disorder  Followed by Hem Onc, has not required chemotherapy   6. Hematuria syndrome  No current evidence of acanthocyturia   7. Obesity (BMI 30-39.9)  Encouraged weight loss.          RTC in 12 mo

## 2022-01-06 LAB
1,25(OH)2D3 SERPL-MCNC: 83 PG/ML (ref 20–79)
PATHOLOGIST INTERPRETATION SPE: NORMAL

## 2022-01-07 LAB — EPO SERPL-ACNC: 4.8 MIU/ML (ref 2.6–18.5)

## 2022-01-11 LAB — PTH RELATED PROT SERPL-SCNC: <0.4 PMOL/L

## 2022-01-12 ENCOUNTER — TELEPHONE (OUTPATIENT)
Dept: HEMATOLOGY/ONCOLOGY | Facility: CLINIC | Age: 57
End: 2022-01-12
Payer: MEDICAID

## 2022-01-12 NOTE — TELEPHONE ENCOUNTER
I attempted to reach  regarding her appointments scheduled with  on 1/13/22 but neither number on file is working number. There were no additional numbers listed on file.

## 2022-01-13 ENCOUNTER — TELEPHONE (OUTPATIENT)
Dept: HEMATOLOGY/ONCOLOGY | Facility: CLINIC | Age: 57
End: 2022-01-13
Payer: MEDICAID

## 2022-01-13 NOTE — TELEPHONE ENCOUNTER
Attempted to reach Ms.Monyeyo regarding her appointment with . All numbers listed on her chart are non working numbers. Pt is not active on the patient portal.

## 2022-01-19 ENCOUNTER — TELEPHONE (OUTPATIENT)
Dept: RADIOLOGY | Facility: HOSPITAL | Age: 57
End: 2022-01-19
Payer: MEDICAID

## 2022-01-24 ENCOUNTER — TELEPHONE (OUTPATIENT)
Dept: INTERNAL MEDICINE | Facility: CLINIC | Age: 57
End: 2022-01-24
Payer: MEDICAID

## 2022-01-24 NOTE — TELEPHONE ENCOUNTER
----- Message from Dorothy Lackey sent at 1/24/2022 10:37 AM CST -----  Regarding: appt  Contact: pt  Caller is requesting a call back regarding an appt .  Please call back at [phone].  Thanks.

## 2022-01-27 ENCOUNTER — TELEPHONE (OUTPATIENT)
Dept: RADIOLOGY | Facility: HOSPITAL | Age: 57
End: 2022-01-27
Payer: MEDICAID

## 2022-01-28 ENCOUNTER — HOSPITAL ENCOUNTER (OUTPATIENT)
Dept: RADIOLOGY | Facility: HOSPITAL | Age: 57
Discharge: HOME | End: 2022-01-28
Attending: NURSE PRACTITIONER
Payer: MEDICAID

## 2022-01-28 DIAGNOSIS — B18.1 CHRONIC VIRAL HEPATITIS B WITHOUT DELTA AGENT AND WITHOUT COMA: ICD-10-CM

## 2022-01-28 PROCEDURE — 76700 US ABDOMEN COMPLETE: ICD-10-PCS | Mod: 26,,, | Performed by: RADIOLOGY

## 2022-01-28 PROCEDURE — 76700 US EXAM ABDOM COMPLETE: CPT | Mod: TC,PO

## 2022-01-28 PROCEDURE — 76700 US EXAM ABDOM COMPLETE: CPT | Mod: 26,,, | Performed by: RADIOLOGY

## 2022-02-08 ENCOUNTER — TELEPHONE (OUTPATIENT)
Dept: INTERNAL MEDICINE | Facility: CLINIC | Age: 57
End: 2022-02-08
Payer: MEDICAID

## 2022-02-08 NOTE — TELEPHONE ENCOUNTER
----- Message from Casie Gaffney sent at 2/7/2022  3:36 PM CST -----  Contact: Self   Pt is requesting a call regarding questions on her recent test. Please advise

## 2022-02-08 NOTE — TELEPHONE ENCOUNTER
Called pt, lvm for call back. Dr cornelius has not ordered any tests for pt in a while, suspect it is from another physician

## 2022-02-28 ENCOUNTER — TELEPHONE (OUTPATIENT)
Dept: HEPATOLOGY | Facility: CLINIC | Age: 57
End: 2022-02-28
Payer: MEDICAID

## 2022-02-28 NOTE — TELEPHONE ENCOUNTER
----- Message from Juan Carlos Shin sent at 2/28/2022  9:51 AM CST -----  Contact: @[phone]  Pt calling to speak with a nurse regarding test results she does not understand please call to discuss further

## 2022-03-05 ENCOUNTER — PATIENT OUTREACH (OUTPATIENT)
Dept: ADMINISTRATIVE | Facility: OTHER | Age: 57
End: 2022-03-05
Payer: MEDICAID

## 2022-03-05 DIAGNOSIS — Z79.4 TYPE 2 DIABETES MELLITUS WITHOUT COMPLICATION, WITH LONG-TERM CURRENT USE OF INSULIN: Primary | ICD-10-CM

## 2022-03-05 DIAGNOSIS — E11.9 TYPE 2 DIABETES MELLITUS WITHOUT COMPLICATION, WITH LONG-TERM CURRENT USE OF INSULIN: Primary | ICD-10-CM

## 2022-03-06 NOTE — PROGRESS NOTES
Health Maintenance Due   Topic Date Due    Cervical Cancer Screening  Never done    Foot Exam  Never done    Shingles Vaccine (1 of 2) Never done    Colorectal Cancer Screening  07/23/2020    COVID-19 Vaccine (2 - Moderna 3-dose series) 09/02/2021    Hemoglobin A1c  02/08/2022     Updates were requested from care everywhere.  Chart was reviewed for overdue Proactive [facility] Encounters (CEDRIC) topics (CRS, Breast Cancer Screening, Eye exam)  Health Maintenance has been updated.  LINKS immunization registry triggered.  Immunizations were reconciled.  Ambulatory referral/consult to GastroenterologyExpected 6/25/2021  Patient has an order that was previously placed for an A1C.

## 2022-03-07 ENCOUNTER — HOSPITAL ENCOUNTER (OUTPATIENT)
Dept: RADIOLOGY | Facility: HOSPITAL | Age: 57
Discharge: HOME | End: 2022-03-07
Attending: STUDENT IN AN ORGANIZED HEALTH CARE EDUCATION/TRAINING PROGRAM
Payer: MEDICAID

## 2022-03-07 ENCOUNTER — OFFICE VISIT (OUTPATIENT)
Dept: RHEUMATOLOGY | Facility: CLINIC | Age: 57
End: 2022-03-07
Payer: MEDICAID

## 2022-03-07 VITALS
BODY MASS INDEX: 35.03 KG/M2 | SYSTOLIC BLOOD PRESSURE: 147 MMHG | DIASTOLIC BLOOD PRESSURE: 96 MMHG | HEART RATE: 76 BPM | HEIGHT: 66 IN | WEIGHT: 218 LBS

## 2022-03-07 DIAGNOSIS — M79.605 LOWER EXTREMITY PAIN, BILATERAL: Primary | ICD-10-CM

## 2022-03-07 DIAGNOSIS — M79.604 LOWER EXTREMITY PAIN, BILATERAL: Primary | ICD-10-CM

## 2022-03-07 DIAGNOSIS — D89.1 CRYOGLOBULINEMIA: ICD-10-CM

## 2022-03-07 DIAGNOSIS — D86.9 SARCOIDOSIS: ICD-10-CM

## 2022-03-07 DIAGNOSIS — M79.605 LOWER EXTREMITY PAIN, BILATERAL: ICD-10-CM

## 2022-03-07 DIAGNOSIS — M79.604 LOWER EXTREMITY PAIN, BILATERAL: ICD-10-CM

## 2022-03-07 PROCEDURE — 99214 OFFICE O/P EST MOD 30 MIN: CPT | Mod: S$PBB,,, | Performed by: STUDENT IN AN ORGANIZED HEALTH CARE EDUCATION/TRAINING PROGRAM

## 2022-03-07 PROCEDURE — 99999 PR PBB SHADOW E&M-EST. PATIENT-LVL V: ICD-10-PCS | Mod: PBBFAC,,, | Performed by: STUDENT IN AN ORGANIZED HEALTH CARE EDUCATION/TRAINING PROGRAM

## 2022-03-07 PROCEDURE — 72100 X-RAY EXAM L-S SPINE 2/3 VWS: CPT | Mod: 26,,, | Performed by: RADIOLOGY

## 2022-03-07 PROCEDURE — 72100 X-RAY EXAM L-S SPINE 2/3 VWS: CPT | Mod: TC

## 2022-03-07 PROCEDURE — 99215 OFFICE O/P EST HI 40 MIN: CPT | Mod: PBBFAC | Performed by: STUDENT IN AN ORGANIZED HEALTH CARE EDUCATION/TRAINING PROGRAM

## 2022-03-07 PROCEDURE — 99999 PR PBB SHADOW E&M-EST. PATIENT-LVL V: CPT | Mod: PBBFAC,,, | Performed by: STUDENT IN AN ORGANIZED HEALTH CARE EDUCATION/TRAINING PROGRAM

## 2022-03-07 PROCEDURE — 99214 PR OFFICE/OUTPT VISIT, EST, LEVL IV, 30-39 MIN: ICD-10-PCS | Mod: S$PBB,,, | Performed by: STUDENT IN AN ORGANIZED HEALTH CARE EDUCATION/TRAINING PROGRAM

## 2022-03-07 PROCEDURE — 72100 XR LUMBAR SPINE AP AND LATERAL: ICD-10-PCS | Mod: 26,,, | Performed by: RADIOLOGY

## 2022-03-07 RX ORDER — METHOTREXATE 2.5 MG/1
20 TABLET ORAL
Qty: 96 TABLET | Refills: 0 | Status: SHIPPED | OUTPATIENT
Start: 2022-03-07 | End: 2022-07-12 | Stop reason: SDUPTHER

## 2022-03-07 RX ORDER — HYDROXYCHLOROQUINE SULFATE 200 MG/1
200 TABLET, FILM COATED ORAL 2 TIMES DAILY
Qty: 180 TABLET | Refills: 3 | Status: SHIPPED | OUTPATIENT
Start: 2022-03-07 | End: 2022-07-12 | Stop reason: SDUPTHER

## 2022-03-07 NOTE — PROGRESS NOTES
"Subjective:       Patient ID: Kendy Vital is a 56 y.o. female.    Chief Complaint: Sarcoidosis  HPI     Ms. 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 She is on methotrexate 20mg weekly plus folic acid and prednisone 2.5mg.      Interval History:  3/7/22: She was unable to make last appointment in January so rescheduled for today. She has been having pain in her lower legs for the last 2 months that makes it difficult for her to walk and also reports associated lower back pain. She states that this keeps her up at night and keeps her from sleeping. She states that lack of sleep is a serious problem as well unrested and brain "fog" is also a serious problem. She denies SOB but has not yet followed up with pulmonology. She denies rash, oral ulcers, joint pain, joint swelling, eye pain, red eye, vision change or chest pain. She is not currently taking HCQ and states that she ran out of this for the past few months. She reports that she is taking MTX but is currently taking half (4 tablets) on Monday and then half (4 tablets) on following Friday.    12/13/21: She was last seen in 6/2021 where her MTX was increased to 20mg and prednisone decreased to 5mg. She was suppose to follow up for 4-6 weeks but was unable to follow up. However, she reports that she ran out of MTX in September. She currently takes prednisone 2.5mg daily.  Additionally, she reports that she had " "blood-shot, painful eyes with swelling in the top and bottom of the eye that occured 2-3 days ago. Her eye pain has resolved and her eye redness has improved. However, she reports that her vision still appears blurry. She has not had previous episodes of this. She also has had diffuse bilateral leg pain for the past few months.  She had episodes of shingles 2 months ago. She was seen by Pulm on 10/2021 and repeat CT chest was ordered, but not yet scheduled. She reports episodes of heart palpitations but denies chest pain. She has episodes of mild SOB which are improved by her inhaler.    6/18/21: Two weeks ago, she tripped over a tree root and twisted her left ankle. It is still painful and swollen. She went to the ER and they did an Xray which did not show evidence of fracture. She otherwise denies joint pain/swelling, or eye issues. She saw the Optometrist on 6/11 and appeared to be doing well. She still has occasional shortness of breath. She still has a little bright red blood per rectum.     Previous Medication regimen:   prednisone 2.5mg daily   methotrexate 20mg weekly with folic acid supplementation      Review of Systems   Constitutional: Negative for fatigue, fever and unexpected weight change.   HENT: Negative for mouth sores and trouble swallowing.    Eyes: Negative for pain and redness.   Respiratory: Negative for cough and shortness of breath.    Cardiovascular: Negative for chest pain and palpitations.   Gastrointestinal: Positive for constipation. Negative for diarrhea, nausea and vomiting.   Genitourinary: Negative for dysuria, flank pain and genital sores.   Musculoskeletal: Negative for joint swelling.   Skin: Negative for rash.   Neurological: Negative for light-headedness and headaches.   Hematological: Does not bruise/bleed easily.         Objective:   BP (!) 147/96   Pulse 76   Ht 5' 6" (1.676 m)   Wt 98.9 kg (218 lb)   BMI 35.19 kg/m²      Physical Exam   Constitutional: No distress. "   HENT:   Head: Normocephalic and atraumatic.   Eyes: Conjunctivae are normal.   Cardiovascular: Normal rate, regular rhythm and normal heart sounds. Exam reveals no friction rub.   No murmur heard.  Pulmonary/Chest: Effort normal and breath sounds normal. She has no wheezes. She has no rales.   Abdominal: Soft. Bowel sounds are normal. There is no abdominal tenderness. There is no rebound.   Musculoskeletal:      Comments: Pain with palpation of bilateral diffuse legs.  No visible synovitis in upper or lower extremities. Strength 4/5 in lower extremities. DP pulse 2+ on left but difficult to palpation on right.   Neurological: She is alert.   Skin: Skin is warm and dry. She is not diaphoretic.        Final score: WPI 2, SS 9      No data to display     Assessment:       1. Lower extremity pain, bilateral    2. Sarcoidosis    3. Cryoglobulinemia        Ms. 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 She is currently on methotrexate 20mg weekly plus folic acid and prednisone 2.5mg. Discussed importance of taking MTX on same day, but divide dose where she would take 4 tablets in AM and 4 tablets in PM. Recent CT chest shows innumberable micronodules consistent with sarcoid. Will need follow up with pulmonology.  Will send in referral to cardiology to rule out cardiac sarcoid. To evaluate lower extremity pain, will order lumbar xray AP and lateral, ABIs, EMG, and CK/aldolase as  etiology with broad differential including diabetic neuropathy, radicular pain from DJD, or possible peripheral vasculopathy.    Plan:       Problem List Items Addressed This Visit        Immunology/Multi System    Sarcoidosis    Relevant Medications    hydrOXYchloroQUINE (PLAQUENIL) 200 mg tablet    methotrexate 2.5 MG Tab    Other Relevant Orders    Ambulatory referral/consult to Cardiology      Other Visit Diagnoses     Lower extremity pain, bilateral    -  Primary    Relevant Orders    CK (Completed)    Aldolase (Completed)    X-Ray Lumbar Spine AP And Lateral (Completed)    EMG W/ ULTRASOUND AND NERVE CONDUCTION TEST 1 Extremity    Ankle Brachial Indices (ROB)    Cryoglobulinemia        Relevant Medications    methotrexate 2.5 MG Tab        - Continue current regimen: Methotrexate 20mg weekly (split dose 10mg AM, 10mg PM) plus folic acid today  - discontinue prednisone 2.5mg today, restart hydroxychloroquine 200mg BID  - Standing labs today with CBC, CMP, ESR, CRP, cryoglobulins, C3, C4   - to evaluate lower extremity pain, will order lumbar xray AP and lateral, ABIs, EMG, and CK/aldolase  - CT chest from 1/2022 showed interval decrease in large nodules but innumerable micronodules with evidence of pulmonary fibrosis in a Lindsey lymphatic distribution and pulmonary fibrosis; will try to arrange pulmonary follow up today   - will refer to cardiology for evaluation of cardiac sarcoidosis  - will need follow up with hematology for B cell lymphoproliferative disorder surviellance and elevated hemoglobin on recent labs  - Continue Nephrology follow up  - Continue hepatology follow up, currently on entecavir for which she reports compliance  - Vaccine: needs shingles and COVID booster vaccines, encouraged today  - DEXA in 1/2021 with normal BMD, history of tibula fx with fall on slippery floor; Plan to repeat DEXA at next visit    Follow up in 2 months.     Patient is seen and evaluated with Dr. Solis

## 2022-03-08 DIAGNOSIS — D86.9 SARCOIDOSIS: Primary | ICD-10-CM

## 2022-03-08 DIAGNOSIS — E83.52 HYPERCALCEMIA: ICD-10-CM

## 2022-04-26 ENCOUNTER — PATIENT OUTREACH (OUTPATIENT)
Dept: ADMINISTRATIVE | Facility: OTHER | Age: 57
End: 2022-04-26
Payer: MEDICAID

## 2022-04-27 NOTE — PROGRESS NOTES
Requested updates within Care Everywhere.  Patient's chart was reviewed for overdue CEDRIC topics.  Health maintenance:updated  Immunizations:reconciled   Legacy:   Media:reviewed for outside colonoscopy  Orders placed:  Tasked appts:  Labs Linked:  Upcoming appt:Optometry 4/28/22

## 2022-04-28 ENCOUNTER — HOSPITAL ENCOUNTER (OUTPATIENT)
Dept: CARDIOLOGY | Facility: HOSPITAL | Age: 57
Discharge: HOME | End: 2022-04-28
Attending: STUDENT IN AN ORGANIZED HEALTH CARE EDUCATION/TRAINING PROGRAM
Payer: MEDICAID

## 2022-04-28 ENCOUNTER — OFFICE VISIT (OUTPATIENT)
Dept: CARDIOLOGY | Facility: CLINIC | Age: 57
End: 2022-04-28
Payer: MEDICAID

## 2022-04-28 VITALS
WEIGHT: 212.94 LBS | DIASTOLIC BLOOD PRESSURE: 92 MMHG | SYSTOLIC BLOOD PRESSURE: 165 MMHG | HEIGHT: 66 IN | OXYGEN SATURATION: 100 % | HEART RATE: 71 BPM | BODY MASS INDEX: 34.22 KG/M2

## 2022-04-28 DIAGNOSIS — Z86.718 HISTORY OF RENAL VEIN THROMBOSIS: ICD-10-CM

## 2022-04-28 DIAGNOSIS — N18.2 TYPE 2 DIABETES MELLITUS WITH STAGE 2 CHRONIC KIDNEY DISEASE, WITH LONG-TERM CURRENT USE OF INSULIN: ICD-10-CM

## 2022-04-28 DIAGNOSIS — I50.32 CHRONIC DIASTOLIC HEART FAILURE: ICD-10-CM

## 2022-04-28 DIAGNOSIS — I10 ESSENTIAL HYPERTENSION: ICD-10-CM

## 2022-04-28 DIAGNOSIS — E11.9 TYPE 2 DIABETES MELLITUS WITHOUT COMPLICATION, WITHOUT LONG-TERM CURRENT USE OF INSULIN: ICD-10-CM

## 2022-04-28 DIAGNOSIS — M79.606 PAIN OF ANTERIOR LOWER EXTREMITY, UNSPECIFIED LATERALITY: ICD-10-CM

## 2022-04-28 DIAGNOSIS — M79.605 LOWER EXTREMITY PAIN, BILATERAL: ICD-10-CM

## 2022-04-28 DIAGNOSIS — Z79.4 TYPE 2 DIABETES MELLITUS WITH STAGE 2 CHRONIC KIDNEY DISEASE, WITH LONG-TERM CURRENT USE OF INSULIN: ICD-10-CM

## 2022-04-28 DIAGNOSIS — E66.9 OBESITY (BMI 30-39.9): ICD-10-CM

## 2022-04-28 DIAGNOSIS — E11.22 TYPE 2 DIABETES MELLITUS WITH STAGE 2 CHRONIC KIDNEY DISEASE, WITH LONG-TERM CURRENT USE OF INSULIN: ICD-10-CM

## 2022-04-28 DIAGNOSIS — M79.604 LOWER EXTREMITY PAIN, BILATERAL: ICD-10-CM

## 2022-04-28 DIAGNOSIS — D86.9 SARCOIDOSIS: Primary | ICD-10-CM

## 2022-04-28 DIAGNOSIS — D80.1 HYPOGAMMAGLOBULINEMIA: ICD-10-CM

## 2022-04-28 DIAGNOSIS — D47.9 B-CELL LYMPHOPROLIFERATIVE DISORDER: ICD-10-CM

## 2022-04-28 LAB
LEFT ABI: 1.09
LEFT ARM BP: 172 MMHG
LEFT DORSALIS PEDIS: 188 MMHG
LEFT POSTERIOR TIBIAL: 178 MMHG
RIGHT ABI: 1.06
RIGHT ARM BP: 166 MMHG
RIGHT DORSALIS PEDIS: 183 MMHG
RIGHT POSTERIOR TIBIAL: 178 MMHG

## 2022-04-28 PROCEDURE — 3072F PR LOW RISK FOR RETINOPATHY: ICD-10-PCS | Mod: CPTII,,, | Performed by: INTERNAL MEDICINE

## 2022-04-28 PROCEDURE — 3008F BODY MASS INDEX DOCD: CPT | Mod: CPTII,,, | Performed by: INTERNAL MEDICINE

## 2022-04-28 PROCEDURE — 1159F PR MEDICATION LIST DOCUMENTED IN MEDICAL RECORD: ICD-10-PCS | Mod: CPTII,,, | Performed by: INTERNAL MEDICINE

## 2022-04-28 PROCEDURE — 3072F LOW RISK FOR RETINOPATHY: CPT | Mod: CPTII,,, | Performed by: INTERNAL MEDICINE

## 2022-04-28 PROCEDURE — 93922 UPR/L XTREMITY ART 2 LEVELS: CPT | Mod: 26,,, | Performed by: INTERNAL MEDICINE

## 2022-04-28 PROCEDURE — 99999 PR PBB SHADOW E&M-EST. PATIENT-LVL V: CPT | Mod: PBBFAC,,, | Performed by: INTERNAL MEDICINE

## 2022-04-28 PROCEDURE — 3077F PR MOST RECENT SYSTOLIC BLOOD PRESSURE >= 140 MM HG: ICD-10-PCS | Mod: CPTII,,, | Performed by: INTERNAL MEDICINE

## 2022-04-28 PROCEDURE — 3066F PR DOCUMENTATION OF TREATMENT FOR NEPHROPATHY: ICD-10-PCS | Mod: CPTII,,, | Performed by: INTERNAL MEDICINE

## 2022-04-28 PROCEDURE — 3080F PR MOST RECENT DIASTOLIC BLOOD PRESSURE >= 90 MM HG: ICD-10-PCS | Mod: CPTII,,, | Performed by: INTERNAL MEDICINE

## 2022-04-28 PROCEDURE — 93922 ANKLE BRACHIAL INDICES (ABI): ICD-10-PCS | Mod: 26,,, | Performed by: INTERNAL MEDICINE

## 2022-04-28 PROCEDURE — 4010F PR ACE/ARB THEARPY RXD/TAKEN: ICD-10-PCS | Mod: CPTII,,, | Performed by: INTERNAL MEDICINE

## 2022-04-28 PROCEDURE — 3077F SYST BP >= 140 MM HG: CPT | Mod: CPTII,,, | Performed by: INTERNAL MEDICINE

## 2022-04-28 PROCEDURE — 3080F DIAST BP >= 90 MM HG: CPT | Mod: CPTII,,, | Performed by: INTERNAL MEDICINE

## 2022-04-28 PROCEDURE — 1159F MED LIST DOCD IN RCRD: CPT | Mod: CPTII,,, | Performed by: INTERNAL MEDICINE

## 2022-04-28 PROCEDURE — 99204 OFFICE O/P NEW MOD 45 MIN: CPT | Mod: S$PBB,,, | Performed by: INTERNAL MEDICINE

## 2022-04-28 PROCEDURE — 99999 PR PBB SHADOW E&M-EST. PATIENT-LVL V: ICD-10-PCS | Mod: PBBFAC,,, | Performed by: INTERNAL MEDICINE

## 2022-04-28 PROCEDURE — 99204 PR OFFICE/OUTPT VISIT, NEW, LEVL IV, 45-59 MIN: ICD-10-PCS | Mod: S$PBB,,, | Performed by: INTERNAL MEDICINE

## 2022-04-28 PROCEDURE — 3066F NEPHROPATHY DOC TX: CPT | Mod: CPTII,,, | Performed by: INTERNAL MEDICINE

## 2022-04-28 PROCEDURE — 93922 UPR/L XTREMITY ART 2 LEVELS: CPT

## 2022-04-28 PROCEDURE — 4010F ACE/ARB THERAPY RXD/TAKEN: CPT | Mod: CPTII,,, | Performed by: INTERNAL MEDICINE

## 2022-04-28 PROCEDURE — 99215 OFFICE O/P EST HI 40 MIN: CPT | Mod: PBBFAC | Performed by: INTERNAL MEDICINE

## 2022-04-28 PROCEDURE — 3008F PR BODY MASS INDEX (BMI) DOCUMENTED: ICD-10-PCS | Mod: CPTII,,, | Performed by: INTERNAL MEDICINE

## 2022-04-28 RX ORDER — LISINOPRIL 20 MG/1
20 TABLET ORAL DAILY
Qty: 30 TABLET | Refills: 11 | Status: SHIPPED | OUTPATIENT
Start: 2022-04-28 | End: 2022-07-12 | Stop reason: SDUPTHER

## 2022-04-28 RX ORDER — PREDNISONE 2.5 MG/1
2.5 TABLET ORAL DAILY
COMMUNITY
End: 2022-07-12

## 2022-04-28 NOTE — PROGRESS NOTES
Subjective:    Patient ID:  Kendy Vital is a 56 y.o. female who presents for evaluation of Peripheral Arterial Disease      Ms. 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 She was referred by her rheumatologist for leg pain and for evaluation for cardiac sarcoid.    She first noticed her leg pain several month ago without any inciting event. The pain is primarily on the front of her legs and involves both the upper and lower legs. The pain is constant. It hurts at rest and with movement. Nothing seems to make it better. She has no associated back pain, leg swelling, join pain/swelling, skin rashes, falls, etc. She also had not had any chest discomfort, palpitations, orthopnea, or weight gain. She has chronic SMYTH and says she's not able to climb a flight of stairs without getting very short of breath. Her SMYTH hasn't changed in years per the patient..       Review of Systems   Constitutional: Negative.   HENT: Negative.    Eyes: Negative.    Cardiovascular: Positive for dyspnea on exertion. Negative for chest pain.   Respiratory: Negative.    Endocrine: Negative.    Hematologic/Lymphatic: Negative.    Skin: Negative.    Musculoskeletal: Negative for falls, joint pain, joint swelling and muscle weakness.        Leg pain   Gastrointestinal: Negative.    Genitourinary: Negative.    Neurological: Negative.    Psychiatric/Behavioral: Negative.    Allergic/Immunologic:  Negative.         Objective:    Physical Exam  Constitutional:       General: She is not in acute distress.     Appearance: She is obese. She is not ill-appearing.      Comments: Pleasant middle age female   HENT:      Head: Normocephalic.      Mouth/Throat:      Mouth: Mucous membranes are moist.   Eyes:      Extraocular Movements: Extraocular movements intact.   Cardiovascular:      Rate and Rhythm: Normal rate and regular rhythm.      Pulses: Normal pulses.           Radial pulses are 2+ on the right side and 2+ on the left side.        Femoral pulses are 2+ on the right side and 2+ on the left side.     Heart sounds: Murmur heard.    Harsh midsystolic murmur is present with a grade of 2/6 at the upper right sternal border radiating to the neck.  Pulmonary:      Effort: Pulmonary effort is normal. No respiratory distress.      Breath sounds: Normal breath sounds.   Abdominal:      General: Bowel sounds are normal. There is no distension.      Palpations: Abdomen is soft.      Tenderness: There is no abdominal tenderness.   Musculoskeletal:      Cervical back: Neck supple.      Right lower leg: No edema.      Left lower leg: No edema.   Skin:     General: Skin is warm.   Neurological:      General: No focal deficit present.      Mental Status: She is alert and oriented to person, place, and time.   Psychiatric:         Mood and Affect: Mood normal.         Behavior: Behavior normal.           Assessment:       1. Sarcoidosis    2. Essential hypertension    3. Chronic diastolic heart failure    4. B-cell lymphoproliferative disorder    5. Hypogammaglobulinemia    6. Obesity (BMI 30-39.9)    7. Type 2 diabetes mellitus without complication, without long-term current use of insulin    8. History of renal vein thrombosis    9. Type 2 diabetes mellitus with stage 2 chronic kidney disease, with long-term current use of insulin    10. Pain of anterior lower extremity, unspecified laterality         Plan:       Bilateral  Anterior Leg Pain  ABIs performed prior to clinic visit and normal (Left 1.03-1.09; Right 1.03-1.06). Anterior distribution of upper and lower leg doesn't follow any specific neurologic dermatome. Complex rheumatologic Hx may be contributing.  - Check TSH to rule-out other metabolic etiologies     Sarcoidosis  Noted on lung Bx from 2020. No other evidence of additional organ involvement. TTE 1/2022 with EF 65% and EKG without any conduction delay noted. No signs/symptoms of exacerbation.  - Cardiac MRI ordered to assess for potential cardiac involvement of sarcoid  - Follow-up with heart transplant service for further work-up of cardiac sarcoid  - Continue MTX 20 mg weekly, Prednisone 2.5 mg daily; managed by rheumatology    Essential Hypertension  /90 today in clinic.  - Increase Lisinopril to 20 mg daily; most recent Cr 0.8  - Continue Nifedipine 60 mg daily and Coreg 25 mg BID    Hyperlipidemia  - Continue Atorvastatin 40 mg daily    RTC PRN.    Case discussed with Dr Linwood Grider MD.     Luiz Pinzon MD PGY4  Cardiovascular Medicine Fellow  [facility]  Pager: [phone]        I have personally taken the history and examined this patient and agree with the resident's note as stated above.  The patient reports bilateral anterior thigh and calf pain that is exacerbated with walking as well as sitting down.  The description of her lower extremity symptoms are not consistent with claudication. I reviewed her ROB's from today.  Her ROB's and arterial waveforms are not consistent with lwoer extremity arterial disease.  Additionally her TSH from today is normal.  Will defer work-up to the patient's rheumatologist.  Given the patient's reported pulmonary sarcoid will order a CMR to evaluate for cardiac involvement.  Plan as detailed above.  All of the patient's questions were answered.

## 2022-06-27 ENCOUNTER — TELEPHONE (OUTPATIENT)
Dept: CARDIOLOGY | Facility: HOSPITAL | Age: 57
End: 2022-06-27
Payer: MEDICAID

## 2022-06-28 ENCOUNTER — HOSPITAL ENCOUNTER (OUTPATIENT)
Dept: RADIOLOGY | Facility: HOSPITAL | Age: 57
Discharge: HOME | End: 2022-06-28
Attending: INTERNAL MEDICINE
Payer: MEDICAID

## 2022-06-28 ENCOUNTER — TELEPHONE (OUTPATIENT)
Dept: INTERNAL MEDICINE | Facility: CLINIC | Age: 57
End: 2022-06-28
Payer: MEDICAID

## 2022-06-28 DIAGNOSIS — D86.9 SARCOIDOSIS: ICD-10-CM

## 2022-06-28 DIAGNOSIS — D86.9 SARCOID: ICD-10-CM

## 2022-06-28 DIAGNOSIS — D86.9 SARCOIDOSIS: Primary | ICD-10-CM

## 2022-06-28 PROCEDURE — 75561 MRI CARDIAC MORPHOLOGY FUNCTION W WO: ICD-10-PCS | Mod: 26,,, | Performed by: RADIOLOGY

## 2022-06-28 PROCEDURE — 75561 CARDIAC MRI FOR MORPH W/DYE: CPT | Mod: TC

## 2022-06-28 PROCEDURE — 75561 CARDIAC MRI FOR MORPH W/DYE: CPT | Mod: 26,,, | Performed by: RADIOLOGY

## 2022-06-28 PROCEDURE — 25500020 PHARM REV CODE 255: Performed by: INTERNAL MEDICINE

## 2022-06-28 PROCEDURE — A9577 INJ MULTIHANCE: HCPCS | Performed by: INTERNAL MEDICINE

## 2022-06-28 RX ADMIN — GADOBENATE DIMEGLUMINE 20 ML: 529 INJECTION, SOLUTION INTRAVENOUS at 11:06

## 2022-06-28 NOTE — TELEPHONE ENCOUNTER
----- Message from Laura Simental sent at 6/28/2022 11:26 AM CDT -----  Regarding: Rx  Pt has an appointment on 7/12/22 with Dr Michelle Gibson. Pt is out of all medications that Dr Timmons prescribed for her. She would like a refill on all medications sent to [facility] in [facility].    Thanks

## 2022-07-12 ENCOUNTER — TELEPHONE (OUTPATIENT)
Dept: INTERNAL MEDICINE | Facility: CLINIC | Age: 57
End: 2022-07-12
Payer: MEDICAID

## 2022-07-12 ENCOUNTER — OFFICE VISIT (OUTPATIENT)
Dept: INTERNAL MEDICINE | Facility: CLINIC | Age: 57
End: 2022-07-12
Payer: MEDICAID

## 2022-07-12 VITALS
HEIGHT: 66 IN | BODY MASS INDEX: 35.01 KG/M2 | DIASTOLIC BLOOD PRESSURE: 101 MMHG | SYSTOLIC BLOOD PRESSURE: 176 MMHG | WEIGHT: 217.81 LBS | OXYGEN SATURATION: 98 % | HEART RATE: 78 BPM

## 2022-07-12 DIAGNOSIS — D89.1 CRYOGLOBULINEMIA: ICD-10-CM

## 2022-07-12 DIAGNOSIS — G89.29 CHRONIC PAIN OF BOTH LOWER EXTREMITIES: ICD-10-CM

## 2022-07-12 DIAGNOSIS — K59.00 CONSTIPATION, UNSPECIFIED CONSTIPATION TYPE: Primary | ICD-10-CM

## 2022-07-12 DIAGNOSIS — B02.9 HERPES ZOSTER WITHOUT COMPLICATION: ICD-10-CM

## 2022-07-12 DIAGNOSIS — E61.1 IRON DEFICIENCY: ICD-10-CM

## 2022-07-12 DIAGNOSIS — M79.604 CHRONIC PAIN OF BOTH LOWER EXTREMITIES: ICD-10-CM

## 2022-07-12 DIAGNOSIS — E78.5 HYPERLIPIDEMIA, UNSPECIFIED HYPERLIPIDEMIA TYPE: ICD-10-CM

## 2022-07-12 DIAGNOSIS — B18.1 CHRONIC VIRAL HEPATITIS B WITHOUT DELTA AGENT AND WITHOUT COMA: ICD-10-CM

## 2022-07-12 DIAGNOSIS — Z79.4 TYPE 2 DIABETES MELLITUS WITH STAGE 2 CHRONIC KIDNEY DISEASE, WITH LONG-TERM CURRENT USE OF INSULIN: ICD-10-CM

## 2022-07-12 DIAGNOSIS — I10 ESSENTIAL HYPERTENSION: ICD-10-CM

## 2022-07-12 DIAGNOSIS — M79.605 CHRONIC PAIN OF BOTH LOWER EXTREMITIES: ICD-10-CM

## 2022-07-12 DIAGNOSIS — F32.2 CURRENT SEVERE EPISODE OF MAJOR DEPRESSIVE DISORDER WITHOUT PSYCHOTIC FEATURES WITHOUT PRIOR EPISODE: ICD-10-CM

## 2022-07-12 DIAGNOSIS — Z91.89 AT RISK FOR STRESS ULCER: ICD-10-CM

## 2022-07-12 DIAGNOSIS — D86.9 SARCOIDOSIS: ICD-10-CM

## 2022-07-12 DIAGNOSIS — N18.2 TYPE 2 DIABETES MELLITUS WITH STAGE 2 CHRONIC KIDNEY DISEASE, WITH LONG-TERM CURRENT USE OF INSULIN: ICD-10-CM

## 2022-07-12 DIAGNOSIS — E11.22 TYPE 2 DIABETES MELLITUS WITH STAGE 2 CHRONIC KIDNEY DISEASE, WITH LONG-TERM CURRENT USE OF INSULIN: ICD-10-CM

## 2022-07-12 PROCEDURE — 99214 PR OFFICE/OUTPT VISIT, EST, LEVL IV, 30-39 MIN: ICD-10-PCS | Mod: S$PBB,,,

## 2022-07-12 PROCEDURE — 3077F PR MOST RECENT SYSTOLIC BLOOD PRESSURE >= 140 MM HG: ICD-10-PCS | Mod: CPTII,,,

## 2022-07-12 PROCEDURE — 1159F MED LIST DOCD IN RCRD: CPT | Mod: CPTII,,,

## 2022-07-12 PROCEDURE — 3077F SYST BP >= 140 MM HG: CPT | Mod: CPTII,,,

## 2022-07-12 PROCEDURE — 3080F DIAST BP >= 90 MM HG: CPT | Mod: CPTII,,,

## 2022-07-12 PROCEDURE — 1160F RVW MEDS BY RX/DR IN RCRD: CPT | Mod: CPTII,,,

## 2022-07-12 PROCEDURE — 3008F BODY MASS INDEX DOCD: CPT | Mod: CPTII,,,

## 2022-07-12 PROCEDURE — 99999 PR PBB SHADOW E&M-EST. PATIENT-LVL V: ICD-10-PCS | Mod: PBBFAC,,,

## 2022-07-12 PROCEDURE — 3008F PR BODY MASS INDEX (BMI) DOCUMENTED: ICD-10-PCS | Mod: CPTII,,,

## 2022-07-12 PROCEDURE — 99999 PR PBB SHADOW E&M-EST. PATIENT-LVL V: CPT | Mod: PBBFAC,,,

## 2022-07-12 PROCEDURE — 4010F ACE/ARB THERAPY RXD/TAKEN: CPT | Mod: CPTII,,,

## 2022-07-12 PROCEDURE — 4010F PR ACE/ARB THEARPY RXD/TAKEN: ICD-10-PCS | Mod: CPTII,,,

## 2022-07-12 PROCEDURE — 99215 OFFICE O/P EST HI 40 MIN: CPT | Mod: PBBFAC

## 2022-07-12 PROCEDURE — 3066F NEPHROPATHY DOC TX: CPT | Mod: CPTII,,,

## 2022-07-12 PROCEDURE — 3072F PR LOW RISK FOR RETINOPATHY: ICD-10-PCS | Mod: CPTII,,,

## 2022-07-12 PROCEDURE — 3072F LOW RISK FOR RETINOPATHY: CPT | Mod: CPTII,,,

## 2022-07-12 PROCEDURE — 1159F PR MEDICATION LIST DOCUMENTED IN MEDICAL RECORD: ICD-10-PCS | Mod: CPTII,,,

## 2022-07-12 PROCEDURE — 1160F PR REVIEW ALL MEDS BY PRESCRIBER/CLIN PHARMACIST DOCUMENTED: ICD-10-PCS | Mod: CPTII,,,

## 2022-07-12 PROCEDURE — 3066F PR DOCUMENTATION OF TREATMENT FOR NEPHROPATHY: ICD-10-PCS | Mod: CPTII,,,

## 2022-07-12 PROCEDURE — 99214 OFFICE O/P EST MOD 30 MIN: CPT | Mod: S$PBB,,,

## 2022-07-12 PROCEDURE — 3080F PR MOST RECENT DIASTOLIC BLOOD PRESSURE >= 90 MM HG: ICD-10-PCS | Mod: CPTII,,,

## 2022-07-12 RX ORDER — HYDROXYCHLOROQUINE SULFATE 200 MG/1
200 TABLET, FILM COATED ORAL 2 TIMES DAILY
Qty: 180 TABLET | Refills: 3 | Status: SHIPPED | OUTPATIENT
Start: 2022-07-12

## 2022-07-12 RX ORDER — POLYETHYLENE GLYCOL 3350 17 G/17G
17 POWDER, FOR SOLUTION ORAL DAILY PRN
Qty: 289 G | Refills: 5 | Status: SHIPPED | OUTPATIENT
Start: 2022-07-12

## 2022-07-12 RX ORDER — GABAPENTIN 300 MG/1
300 CAPSULE ORAL 2 TIMES DAILY
Qty: 60 CAPSULE | Refills: 11 | Status: SHIPPED | OUTPATIENT
Start: 2022-07-12 | End: 2022-09-08

## 2022-07-12 RX ORDER — ATORVASTATIN CALCIUM 40 MG/1
40 TABLET, FILM COATED ORAL DAILY
Qty: 90 TABLET | Refills: 3 | Status: SHIPPED | OUTPATIENT
Start: 2022-07-12 | End: 2023-07-21

## 2022-07-12 RX ORDER — NIFEDIPINE 60 MG/1
60 TABLET, EXTENDED RELEASE ORAL DAILY
Qty: 90 TABLET | Refills: 3 | Status: SHIPPED | OUTPATIENT
Start: 2022-07-12 | End: 2023-03-24 | Stop reason: SDUPTHER

## 2022-07-12 RX ORDER — TRAZODONE HYDROCHLORIDE 100 MG/1
100 TABLET ORAL NIGHTLY PRN
Qty: 30 TABLET | Refills: 11 | Status: SHIPPED | OUTPATIENT
Start: 2022-07-12 | End: 2022-11-08 | Stop reason: SDUPTHER

## 2022-07-12 RX ORDER — ESCITALOPRAM OXALATE 10 MG/1
10 TABLET ORAL DAILY
Qty: 90 TABLET | Refills: 3 | Status: SHIPPED | OUTPATIENT
Start: 2022-07-12 | End: 2023-05-23 | Stop reason: SDUPTHER

## 2022-07-12 RX ORDER — CARVEDILOL 25 MG/1
25 TABLET ORAL 2 TIMES DAILY
Qty: 90 TABLET | Refills: 3 | Status: SHIPPED | OUTPATIENT
Start: 2022-07-12 | End: 2023-03-24 | Stop reason: SDUPTHER

## 2022-07-12 RX ORDER — BACLOFEN 10 MG/1
5 TABLET ORAL 3 TIMES DAILY PRN
Qty: 30 TABLET | Refills: 0 | Status: SHIPPED | OUTPATIENT
Start: 2022-07-12 | End: 2022-09-08

## 2022-07-12 RX ORDER — LISINOPRIL 20 MG/1
20 TABLET ORAL DAILY
Qty: 30 TABLET | Refills: 11 | Status: SHIPPED | OUTPATIENT
Start: 2022-07-12 | End: 2023-03-24 | Stop reason: SDUPTHER

## 2022-07-12 RX ORDER — METHOTREXATE 2.5 MG/1
20 TABLET ORAL
Qty: 96 TABLET | Refills: 0 | Status: SHIPPED | OUTPATIENT
Start: 2022-07-12 | End: 2022-11-08 | Stop reason: SDUPTHER

## 2022-07-12 RX ORDER — FUROSEMIDE 40 MG/1
TABLET ORAL
Qty: 90 TABLET | Refills: 3 | Status: SHIPPED | OUTPATIENT
Start: 2022-07-12 | End: 2023-03-24 | Stop reason: SDUPTHER

## 2022-07-12 RX ORDER — FERROUS SULFATE 325(65) MG
325 TABLET, DELAYED RELEASE (ENTERIC COATED) ORAL DAILY
Refills: 0 | COMMUNITY
Start: 2022-07-12

## 2022-07-12 RX ORDER — PANTOPRAZOLE SODIUM 40 MG/1
40 TABLET, DELAYED RELEASE ORAL
Qty: 30 TABLET | Refills: 11 | Status: SHIPPED | OUTPATIENT
Start: 2022-07-12 | End: 2022-11-08 | Stop reason: SDUPTHER

## 2022-07-12 NOTE — TELEPHONE ENCOUNTER
----- Message from Magali Owusu sent at 2022 11:55 AM CDT -----  Regardin month fu  Please schedule patients one month follow up.    Thank you

## 2022-07-12 NOTE — PROGRESS NOTES
"Internal Medicine Clinic Note    Subjective     Chief Complaint:  Follow up for sarcoidosis, HTN, hepatitis B, diabetes, and medication refills request    History of Present Illness:  Ms. Kendy Vital is a 56 y.o. female with a history of hepatitis B, diabetes, sarcoidosis, cryoglobulinemia, HTN, B-cell lymphoproliferative disorder, COPD who presents to clinic today for follow up of chronic issues and requests for medication refills. She has complaints of bilateral leg pain for the past month, described as aching pain down her legs that is worse at night. She is unable to lie on her side as the pressure of one leg on top of the other is too uncomfortable. She has been taking tylenol 250-500 mg tablets for the pain. She had similar pain when she saw her rheumatologist in March and was referred for ROB testing which was normal. She denies any joint pain, swelling, erythema, fevers, weight loss. CK labs in March were within normal limits. Patient has been taking multiple medications for her chronic conditions and leg pain may be secondary to medication interaction.     Sarcoidosis - Chest CT with multiple nodules concerning for sarcoidosis. Patient referred to pulmonology in 2021, but appears that referral fell through and patient unable to schedule appointment. Recent cardiac MRI with "A very small focal area of LGE measuring 1.8mm X 5mm is appreciated in mid myocardium of the basal anterior wall. This may represent sarcoid involvement of the myocardium". Patient will need follow up with cardiology regarding cardiac MRI results.    Cryglobulinemia - currently stable. Patient completed course of PLEX. Renal failure resolved at this time per recent labs.  Hepatitis B - patient taking entecavir for chronic hepatitis B infection. Last seen by hepatology in July 2021. Per last hepatology note patient due for abdominal U/S and AFP labs.    HTN - Blood pressure elevated today to 176/101 and repeat 160/90. She has " been out of some of her medications for a few weeks, will refill medications as requested. Patient due for routine labs (lipid panel, CMP)    DM - last A1c 9.9 in November 2021. Blood sugars 135 on average at home. Patient due for repeat HbA1c, optometry exam. Foot exam normal today,   Needs follow up with pulmonology, optometry, hepatology, nephrology, cardiology.     Review of Systems   Constitutional: Negative for chills, fever and malaise/fatigue.   HENT: Negative for congestion and sore throat.    Eyes: Negative for blurred vision and pain.   Respiratory: Negative for cough and shortness of breath.    Cardiovascular: Negative for chest pain, palpitations and leg swelling.   Gastrointestinal: Negative for abdominal pain, nausea and vomiting.   Genitourinary: Negative for dysuria and hematuria.   Musculoskeletal: Positive for myalgias (bilateral lower extremities). Negative for falls and joint pain.   Skin: Negative for rash.   Neurological: Negative for dizziness, tingling, weakness and headaches.       PAST HISTORY:     Past Medical History:   Diagnosis Date    Acute (diffuse) proliferative glomerulonephritis     Acute renal failure 7/19/2019    B-cell lymphoproliferative disorder 7/30/2019    CHF (congestive heart failure)     Chronic obstructive lung disease 7/27/2019    Chronic viral hepatitis B without delta agent and without coma 7/24/2019    Cryoglobulinemic vasculitis     Diabetes mellitus     Hypogammaglobulinemia 8/5/2019    Iron deficiency anemia 7/30/2019    Renal vein thrombosis 2015    s/p embolectomy and lovenox for 9 mos    Steroid-induced hyperglycemia 7/28/2019    Uterine leiomyoma     s/p resection       Past Surgical History:   Procedure Laterality Date    AV FISTULA PLACEMENT      CATARACT EXTRACTION W/  INTRAOCULAR LENS IMPLANT Left 4/22/2021    Procedure: EXTRACTION, CATARACT, WITH IOL INSERTION;  Surgeon: Allen Alejandro MD;  Location: [facility];  Service: Ophthalmology;   Laterality: Left;    CATARACT EXTRACTION W/  INTRAOCULAR LENS IMPLANT Right 5/6/2021    Procedure: EXTRACTION, CATARACT, WITH IOL INSERTION;  Surgeon: Allen Alejandro MD;  Location: [facility] OR;  Service: Ophthalmology;  Laterality: Right;    LUNG BIOPSY N/A 12/1/2020    Procedure: BIOPSY, LUNG;  Surgeon: Kelli Diagnostic Provider;  Location: [facility] OR;  Service: Radiology;  Laterality: N/A;  8 A.M. START  PHONE PREOP DONE 11/27/2020  PT/INR, CBC, CMP AND COVID ON AM OF PROCEDURE   ARRIVAL AT 7:00 FOR 8:30 APPT       Family History   Problem Relation Age of Onset    Diabetes Mother     Heart disease Mother        Social History     Socioeconomic History    Marital status:    Tobacco Use    Smoking status: Light Tobacco Smoker    Smokeless tobacco: Never Used   Substance and Sexual Activity    Alcohol use: Not Currently    Drug use: Never   Social History Narrative    Lives with granddaughter.         Walks around house.        MEDICATIONS & ALLERGIES:     Current Outpatient Medications on File Prior to Visit   Medication Sig    acetaminophen (TYLENOL) 500 MG tablet Take 500 mg by mouth 3 (three) times daily as needed for Pain (or fever).    albuterol (PROVENTIL/VENTOLIN HFA) 90 mcg/actuation inhaler Inhale 2 puffs into the lungs every 6 (six) hours as needed for Wheezing or Shortness of Breath. Rescue    allopurinoL (ZYLOPRIM) 100 MG tablet Take 100 mg by mouth once daily. for 90 days    aspirin (ECOTRIN) 81 MG EC tablet Take 81 mg by mouth once daily.    blood sugar diagnostic Strp use to test blood gluocse 2 (two) times daily with meals.    blood-glucose meter Misc Use as instructed (Patient taking differently: Use as instructed)    cyanocobalamin (VITAMIN B-12) 250 MCG tablet Take 250 mcg by mouth once daily.    entecavir (BARACLUDE) 0.5 MG Tab Take 1 tablet (0.5 mg total) by mouth once daily. DOSE CHANGE, CHANGED TO EVERY DAY    folic acid (FOLVITE) 1 MG tablet Take 1 tablet (1 mg total) by  mouth once daily.    HUMULIN R REGULAR U-100 INSULN 100 unit/mL injection Inject 12 Units into the skin 3 (three) times daily before meals.    insulin (LANTUS SOLOSTAR U-100 INSULIN) glargine 100 units/mL (3mL) SubQ pen Inject 55 Units into the skin every morning.    lancets 30 gauge Misc use to test blood glucose 2 (two) times daily with meals.    multivitamin (THERAGRAN) per tablet Take 1 tablet by mouth once daily.    vitamin D (VITAMIN D3) 1000 units Tab Take 1 tablet (1,000 Units total) by mouth once daily. (Patient taking differently: Take 2,000 Units by mouth once daily.)    [DISCONTINUED] atorvastatin (LIPITOR) 40 MG tablet Take 1 tablet (40 mg total) by mouth once daily.    [DISCONTINUED] carvediloL (COREG) 25 MG tablet Take 1 tablet (25 mg total) by mouth 2 (two) times daily.    [DISCONTINUED] EScitalopram oxalate (LEXAPRO) 10 MG tablet Take 1 tablet (10 mg total) by mouth once daily.    [DISCONTINUED] ferrous sulfate 325 (65 FE) MG EC tablet Take 1 tablet (325 mg total) by mouth once daily.    [DISCONTINUED] furosemide (LASIX) 40 MG tablet Take 40 mg lasix once daily    [DISCONTINUED] gabapentin (NEURONTIN) 300 MG capsule Take 1 capsule (300 mg total) by mouth 2 (two) times daily.    [DISCONTINUED] hydrOXYchloroQUINE (PLAQUENIL) 200 mg tablet Take 1 tablet (200 mg total) by mouth 2 (two) times daily.    [DISCONTINUED] lisinopriL (PRINIVIL,ZESTRIL) 20 MG tablet Take 1 tablet (20 mg total) by mouth once daily.    [DISCONTINUED] methotrexate 2.5 MG Tab Take 8 tablets (20 mg total) by mouth every 7 days.    [DISCONTINUED] NIFEdipine (PROCARDIA-XL) 60 MG (OSM) 24 hr tablet Take 1 tablet (60 mg total) by mouth once daily.    [DISCONTINUED] pantoprazole (PROTONIX) 40 MG tablet Take 1 tablet (40 mg total) by mouth before breakfast.    [DISCONTINUED] polyethylene glycol (GLYCOLAX) 17 gram/dose powder Take 17 g by mouth daily as needed (constipation).    [DISCONTINUED] predniSONE (DELTASONE) 2.5  "MG tablet Take 2.5 mg by mouth once daily.    [DISCONTINUED] traZODone (DESYREL) 100 MG tablet Take 1 tablet (100 mg total) by mouth nightly as needed for Insomnia.    diclofenac sodium (VOLTAREN) 1 % Gel Apply 4 g topically 4 (four) times daily. Apply to knee    lancing device Misc 1 Device by Misc.(Non-Drug; Combo Route) route 2 (two) times daily with meals.    valACYclovir (VALTREX) 1000 MG tablet Take 1,000 mg by mouth 3 (three) times daily.    [DISCONTINUED] LIDOcaine HCL 2% (XYLOCAINE) 2 % jelly Apply topically once daily. (Patient not taking: Reported on 2022)    [DISCONTINUED] prednisoLONE (ORAPRED) 15 mg/5 mL (3 mg/mL) solution SMARTSI Teaspoon By Mouth 4 Times Daily     No current facility-administered medications on file prior to visit.       Review of patient's allergies indicates:  No Known Allergies    OBJECTIVE:     Vital Signs:  Vitals:    22 0938   BP: (!) 176/101   BP Location: Right arm   Patient Position: Sitting   BP Method: Large (Automatic)   Pulse: 78   SpO2: 98%   Weight: 98.8 kg (217 lb 13 oz)   Height: 5' 6" (1.676 m)       Body mass index is 35.16 kg/m².     Physical Exam  Vitals reviewed.   Constitutional:       General: She is not in acute distress.     Appearance: She is obese. She is not ill-appearing.   HENT:      Head: Normocephalic and atraumatic.      Nose: Nose normal.      Mouth/Throat:      Mouth: Mucous membranes are moist.   Cardiovascular:      Rate and Rhythm: Normal rate and regular rhythm.      Pulses: Normal pulses.      Heart sounds: Normal heart sounds.   Pulmonary:      Effort: Pulmonary effort is normal. No respiratory distress.      Breath sounds: Normal breath sounds. No wheezing.   Abdominal:      General: Bowel sounds are normal.      Palpations: Abdomen is soft.      Tenderness: There is no abdominal tenderness.   Musculoskeletal:         General: Tenderness (bilateral lower extremities) present. No swelling. Normal range of motion.      " Right lower leg: No edema.      Left lower leg: No edema.   Skin:     General: Skin is warm and dry.   Neurological:      General: No focal deficit present.      Mental Status: She is alert and oriented to person, place, and time.      Gait: Gait abnormal (antalgic).   Psychiatric:         Mood and Affect: Mood normal.         Behavior: Behavior normal.       Protective Sensation (w/ 10 gram monofilament):  Right: Intact  Left: Intact    Visual Inspection:  Normal -  Bilateral    Pedal Pulses:   Right: Present  Left: Present    Posterior tibialis:   Right:Present  Left: Present      Laboratory  Lab Results   Component Value Date    WBC 3.67 (L) 03/07/2022    HGB 16.2 (H) 03/07/2022    HCT 48.1 03/07/2022    MCV 94 03/07/2022     03/07/2022     BMP  Lab Results   Component Value Date     03/07/2022    K 4.0 03/07/2022     03/07/2022    CO2 21 (L) 03/07/2022    BUN 19 03/07/2022    CREATININE 0.8 03/07/2022    CALCIUM 10.8 (H) 03/07/2022    ANIONGAP 14 03/07/2022    ESTGFRAFRICA >60.0 03/07/2022    EGFRNONAA >60.0 03/07/2022     Lab Results   Component Value Date    INR 1.0 01/28/2022    INR 1.0 01/28/2022    INR 1.0 07/13/2021     Lab Results   Component Value Date    HGBA1C 9.9 (H) 11/08/2021       Diagnostic Results:  Labs: Reviewed    Health Maintenance Due   Topic Date Due    Cervical Cancer Screening  Never done    Foot Exam  Never done    Shingles Vaccine (1 of 2) Never done    Colorectal Cancer Screening  Never done    COVID-19 Vaccine (2 - Moderna series) 09/02/2021    Eye Exam  01/25/2022    Hemoglobin A1c  02/08/2022    Lipid Panel  07/08/2022         ASSESSMENT & PLAN:   Ms. Kendy Vital is a 56 y.o. female with a history of chronic viral hepatitis B, diabetes, sarcoidosis, HLD, HTN, cyroglobulinemia, B-cell lymphoproliferative disorder who presents to clinic today for follow up. She has complaints of bilateral lower extremity pain today.          -     Patient is out  of multiple medications; refilled prescriptions as requested and according to most recent clinic notes stopped prednisone        -     BP elevated today, however patient has been out of medications in the past few weeks        -     Has been lost to follow up with referrals for sarcoidosis and some missed appointments from transportation issues from [place]. Placed referrals for pulmonology, cardiology, optometry, hepatology        -     Patient requires pulmonology and cardiology follow up for sarcoidosis based on CT chest and cardiac MRI results        -     Patient has not seen hepatology for chronic hepatitis B in some time; continues to take entecavir. Hepatology referral placed        -     Blood sugars well controlled with current insulin regimen. Foot exam normal. Referral placed for optometry follow up. Labs ordered to assess HbA1c, renal function        -    Patient to continue following with rheumatology for sarcoidosis, cryoglobulinemia          -    Patient currently taking 500 mg tylenol tablets 7 times per day, but continues to have myalgias in both lower extremities. CK normal on recent labs. Recommending low dose muscle relaxant (5 mg baclofen) on short term basis to determine if any improvement in leg pain. May be medication interaction from current medications causing muscle pain. Other causes include vitamin deficiency, statin intolerance, hypothyroidism, vitamin D deficiency, PMR.     Constipation, unspecified constipation type  -     polyethylene glycol (GLYCOLAX) 17 gram/dose powder; Take 17 g by mouth daily as needed (constipation).  Dispense: 289 g; Refill: 5    Hyperlipidemia, unspecified hyperlipidemia type  -     atorvastatin (LIPITOR) 40 MG tablet; Take 1 tablet (40 mg total) by mouth once daily.  Dispense: 90 tablet; Refill: 3  -     Lipid Panel; Future; Expected date: 07/12/2022    Herpes zoster without complication  -     gabapentin (NEURONTIN) 300 MG capsule; Take 1 capsule (300  mg total) by mouth 2 (two) times daily.  Dispense: 60 capsule; Refill: 11    Sarcoidosis  -     hydrOXYchloroQUINE (PLAQUENIL) 200 mg tablet; Take 1 tablet (200 mg total) by mouth 2 (two) times daily.  Dispense: 180 tablet; Refill: 3  -     methotrexate 2.5 MG Tab; Take 8 tablets (20 mg total) by mouth every 7 days.  Dispense: 96 tablet; Refill: 0  -     Ambulatory referral/consult to Pulmonology; Future; Expected date: 07/19/2022  -     Ambulatory referral/consult to Cardiology; Future; Expected date: 07/19/2022    Essential hypertension  -     carvediloL (COREG) 25 MG tablet; Take 1 tablet (25 mg total) by mouth 2 (two) times daily.  Dispense: 90 tablet; Refill: 3  -     furosemide (LASIX) 40 MG tablet; Take 40 mg lasix once daily  Dispense: 90 tablet; Refill: 3  -     lisinopriL (PRINIVIL,ZESTRIL) 20 MG tablet; Take 1 tablet (20 mg total) by mouth once daily.  Dispense: 30 tablet; Refill: 11  -     NIFEdipine (PROCARDIA-XL) 60 MG (OSM) 24 hr tablet; Take 1 tablet (60 mg total) by mouth once daily.  Dispense: 90 tablet; Refill: 3    Type 2 diabetes mellitus with stage 2 chronic kidney disease, with long-term current use of insulin  -     lisinopriL (PRINIVIL,ZESTRIL) 20 MG tablet; Take 1 tablet (20 mg total) by mouth once daily.  Dispense: 30 tablet; Refill: 11  -     Hemoglobin A1C; Future; Expected date: 07/12/2022  -     MICROALBUMIN / CREATININE RATIO URINE  -     Ambulatory referral/consult to Optometry; Future; Expected date: 07/19/2022    Cryoglobulinemia  -     methotrexate 2.5 MG Tab; Take 8 tablets (20 mg total) by mouth every 7 days.  Dispense: 96 tablet; Refill: 0    At risk for stress ulcer  -     pantoprazole (PROTONIX) 40 MG tablet; Take 1 tablet (40 mg total) by mouth before breakfast.  Dispense: 30 tablet; Refill: 11    Current severe episode of major depressive disorder without psychotic features without prior episode  -     EScitalopram oxalate (LEXAPRO) 10 MG tablet; Take 1 tablet (10 mg total)  by mouth once daily.  Dispense: 90 tablet; Refill: 3  -     traZODone (DESYREL) 100 MG tablet; Take 1 tablet (100 mg total) by mouth nightly as needed for Insomnia.  Dispense: 30 tablet; Refill: 11    Iron deficiency  -     ferrous sulfate 325 (65 FE) MG EC tablet; Take 1 tablet (325 mg total) by mouth once daily.; Refill: 0    Chronic viral hepatitis B without delta agent and without coma  -     Ambulatory referral/consult to Hepatology; Future; Expected date: 07/19/2022    Chronic pain of both lower extremities  -     baclofen (LIORESAL) 10 MG tablet; Take 0.5 tablets (5 mg total) by mouth 3 (three) times daily as needed (leg pain).  Dispense: 30 tablet; Refill: 0        RTC in 1 month to follow up with PCP.    Discussed with Dr. Novak  - staff attestation to follow        Michelle Gibson MD, MPH  Internal Medicine, PGY-2  [facility]  [address] [place] [id number]  [phone]

## 2022-07-20 ENCOUNTER — TELEPHONE (OUTPATIENT)
Dept: HEPATOLOGY | Facility: CLINIC | Age: 57
End: 2022-07-20
Payer: MEDICAID

## 2022-07-20 ENCOUNTER — LAB VISIT (OUTPATIENT)
Dept: LAB | Facility: HOSPITAL | Age: 57
End: 2022-07-20
Payer: MEDICAID

## 2022-07-20 ENCOUNTER — OFFICE VISIT (OUTPATIENT)
Dept: HEPATOLOGY | Facility: CLINIC | Age: 57
End: 2022-07-20
Payer: MEDICAID

## 2022-07-20 VITALS
HEART RATE: 77 BPM | TEMPERATURE: 98 F | SYSTOLIC BLOOD PRESSURE: 100 MMHG | BODY MASS INDEX: 33.55 KG/M2 | RESPIRATION RATE: 18 BRPM | DIASTOLIC BLOOD PRESSURE: 62 MMHG | HEIGHT: 66 IN | OXYGEN SATURATION: 96 % | WEIGHT: 208.75 LBS

## 2022-07-20 DIAGNOSIS — E11.22 TYPE 2 DIABETES MELLITUS WITH STAGE 2 CHRONIC KIDNEY DISEASE, WITH LONG-TERM CURRENT USE OF INSULIN: ICD-10-CM

## 2022-07-20 DIAGNOSIS — N18.2 TYPE 2 DIABETES MELLITUS WITH STAGE 2 CHRONIC KIDNEY DISEASE, WITH LONG-TERM CURRENT USE OF INSULIN: ICD-10-CM

## 2022-07-20 DIAGNOSIS — Z79.4 TYPE 2 DIABETES MELLITUS WITH STAGE 2 CHRONIC KIDNEY DISEASE, WITH LONG-TERM CURRENT USE OF INSULIN: ICD-10-CM

## 2022-07-20 DIAGNOSIS — B18.1 CHRONIC VIRAL HEPATITIS B WITHOUT DELTA AGENT AND WITHOUT COMA: ICD-10-CM

## 2022-07-20 DIAGNOSIS — E66.9 OBESITY (BMI 30-39.9): ICD-10-CM

## 2022-07-20 DIAGNOSIS — N28.9 ABNORMAL KIDNEY FUNCTION: ICD-10-CM

## 2022-07-20 DIAGNOSIS — B18.1 CHRONIC VIRAL HEPATITIS B WITHOUT DELTA AGENT AND WITHOUT COMA: Primary | ICD-10-CM

## 2022-07-20 DIAGNOSIS — I10 ESSENTIAL HYPERTENSION: ICD-10-CM

## 2022-07-20 PROBLEM — T38.0X5A STEROID-INDUCED HYPERGLYCEMIA: Status: RESOLVED | Noted: 2019-07-28 | Resolved: 2022-07-20

## 2022-07-20 PROBLEM — R73.9 STEROID-INDUCED HYPERGLYCEMIA: Status: RESOLVED | Noted: 2019-07-28 | Resolved: 2022-07-20

## 2022-07-20 PROBLEM — D69.6 THROMBOCYTOPENIA: Status: RESOLVED | Noted: 2019-08-24 | Resolved: 2022-07-20

## 2022-07-20 LAB
AFP SERPL-MCNC: 2.2 NG/ML (ref 0–8.4)
ALBUMIN SERPL BCP-MCNC: 3.4 G/DL (ref 3.5–5.2)
ALP SERPL-CCNC: 128 U/L (ref 55–135)
ALT SERPL W/O P-5'-P-CCNC: 16 U/L (ref 10–44)
ANION GAP SERPL CALC-SCNC: 11 MMOL/L (ref 8–16)
AST SERPL-CCNC: 18 U/L (ref 10–40)
BILIRUB SERPL-MCNC: 0.5 MG/DL (ref 0.1–1)
BUN SERPL-MCNC: 22 MG/DL (ref 6–20)
CALCIUM SERPL-MCNC: 10.3 MG/DL (ref 8.7–10.5)
CHLORIDE SERPL-SCNC: 109 MMOL/L (ref 95–110)
CO2 SERPL-SCNC: 24 MMOL/L (ref 23–29)
CREAT SERPL-MCNC: 1.6 MG/DL (ref 0.5–1.4)
EST. GFR  (AFRICAN AMERICAN): 41.5 ML/MIN/1.73 M^2
EST. GFR  (NON AFRICAN AMERICAN): 36 ML/MIN/1.73 M^2
GLUCOSE SERPL-MCNC: 142 MG/DL (ref 70–110)
HBV SURFACE AG SERPL QL IA: POSITIVE
INR PPP: 1 (ref 0.8–1.2)
POTASSIUM SERPL-SCNC: 3.5 MMOL/L (ref 3.5–5.1)
PROT SERPL-MCNC: 7 G/DL (ref 6–8.4)
PROTHROMBIN TIME: 10.6 SEC (ref 9–12.5)
SODIUM SERPL-SCNC: 144 MMOL/L (ref 136–145)

## 2022-07-20 PROCEDURE — 3066F PR DOCUMENTATION OF TREATMENT FOR NEPHROPATHY: ICD-10-PCS | Mod: CPTII,,, | Performed by: NURSE PRACTITIONER

## 2022-07-20 PROCEDURE — 1159F PR MEDICATION LIST DOCUMENTED IN MEDICAL RECORD: ICD-10-PCS | Mod: CPTII,,, | Performed by: NURSE PRACTITIONER

## 2022-07-20 PROCEDURE — 3008F PR BODY MASS INDEX (BMI) DOCUMENTED: ICD-10-PCS | Mod: CPTII,,, | Performed by: NURSE PRACTITIONER

## 2022-07-20 PROCEDURE — 3074F PR MOST RECENT SYSTOLIC BLOOD PRESSURE < 130 MM HG: ICD-10-PCS | Mod: CPTII,,, | Performed by: NURSE PRACTITIONER

## 2022-07-20 PROCEDURE — 87340 HEPATITIS B SURFACE AG IA: CPT | Performed by: NURSE PRACTITIONER

## 2022-07-20 PROCEDURE — 87517 HEPATITIS B DNA QUANT: CPT | Performed by: NURSE PRACTITIONER

## 2022-07-20 PROCEDURE — 99215 OFFICE O/P EST HI 40 MIN: CPT | Mod: PBBFAC | Performed by: NURSE PRACTITIONER

## 2022-07-20 PROCEDURE — 4010F PR ACE/ARB THEARPY RXD/TAKEN: ICD-10-PCS | Mod: CPTII,,, | Performed by: NURSE PRACTITIONER

## 2022-07-20 PROCEDURE — 3074F SYST BP LT 130 MM HG: CPT | Mod: CPTII,,, | Performed by: NURSE PRACTITIONER

## 2022-07-20 PROCEDURE — 3072F LOW RISK FOR RETINOPATHY: CPT | Mod: CPTII,,, | Performed by: NURSE PRACTITIONER

## 2022-07-20 PROCEDURE — 3066F NEPHROPATHY DOC TX: CPT | Mod: CPTII,,, | Performed by: NURSE PRACTITIONER

## 2022-07-20 PROCEDURE — 3072F PR LOW RISK FOR RETINOPATHY: ICD-10-PCS | Mod: CPTII,,, | Performed by: NURSE PRACTITIONER

## 2022-07-20 PROCEDURE — 80053 COMPREHEN METABOLIC PANEL: CPT | Performed by: NURSE PRACTITIONER

## 2022-07-20 PROCEDURE — 99214 OFFICE O/P EST MOD 30 MIN: CPT | Mod: S$PBB,,, | Performed by: NURSE PRACTITIONER

## 2022-07-20 PROCEDURE — 1159F MED LIST DOCD IN RCRD: CPT | Mod: CPTII,,, | Performed by: NURSE PRACTITIONER

## 2022-07-20 PROCEDURE — 99214 PR OFFICE/OUTPT VISIT, EST, LEVL IV, 30-39 MIN: ICD-10-PCS | Mod: S$PBB,,, | Performed by: NURSE PRACTITIONER

## 2022-07-20 PROCEDURE — 4010F ACE/ARB THERAPY RXD/TAKEN: CPT | Mod: CPTII,,, | Performed by: NURSE PRACTITIONER

## 2022-07-20 PROCEDURE — 82105 ALPHA-FETOPROTEIN SERUM: CPT | Performed by: NURSE PRACTITIONER

## 2022-07-20 PROCEDURE — 85610 PROTHROMBIN TIME: CPT | Performed by: NURSE PRACTITIONER

## 2022-07-20 PROCEDURE — 36415 COLL VENOUS BLD VENIPUNCTURE: CPT | Performed by: NURSE PRACTITIONER

## 2022-07-20 PROCEDURE — 1160F RVW MEDS BY RX/DR IN RCRD: CPT | Mod: CPTII,,, | Performed by: NURSE PRACTITIONER

## 2022-07-20 PROCEDURE — 99999 PR PBB SHADOW E&M-EST. PATIENT-LVL V: ICD-10-PCS | Mod: PBBFAC,,, | Performed by: NURSE PRACTITIONER

## 2022-07-20 PROCEDURE — 3008F BODY MASS INDEX DOCD: CPT | Mod: CPTII,,, | Performed by: NURSE PRACTITIONER

## 2022-07-20 PROCEDURE — 1160F PR REVIEW ALL MEDS BY PRESCRIBER/CLIN PHARMACIST DOCUMENTED: ICD-10-PCS | Mod: CPTII,,, | Performed by: NURSE PRACTITIONER

## 2022-07-20 PROCEDURE — 3078F DIAST BP <80 MM HG: CPT | Mod: CPTII,,, | Performed by: NURSE PRACTITIONER

## 2022-07-20 PROCEDURE — 3078F PR MOST RECENT DIASTOLIC BLOOD PRESSURE < 80 MM HG: ICD-10-PCS | Mod: CPTII,,, | Performed by: NURSE PRACTITIONER

## 2022-07-20 PROCEDURE — 99999 PR PBB SHADOW E&M-EST. PATIENT-LVL V: CPT | Mod: PBBFAC,,, | Performed by: NURSE PRACTITIONER

## 2022-07-20 NOTE — Clinical Note
Her BP was low in clinic today. The repeat was reasonable (100/60s) but seems different than her baseline. No dizziness, weakness, fever, abd pain, urinary symptoms, cough, etc. Just FYI in case you recommend any f/u or medication adjustments.  Kidney function decreased on labs today but she did have a stomach bug recently and hasn't remains hydrated so she is going to increase her oral intake and repeat BMP Monday.  Thanks !

## 2022-07-20 NOTE — PROGRESS NOTES
"[facility] Established Patient Visit    Reason for Visit:  Hep B    PCP: Anibal Timmons    HPI:  This is a 56 y.o. female with PMH noted below, here for follow up of chronic Hepatitis B      Was diagnosed during hospitalization 7/2019 (hospitalized for eval of thrombocytopenia, Rheumatology and Nephrology continued to follow for management of her Cryolobinemic Vasculitis with Glomerulonephritis) , no history or knowledge of Hep B before then. Was starting on Entecavir during hospitalization 7/2019     Risk factors:  Denies mother or sexual partners with Hep B, no travel outside the country, no h/o HIV, no h/o IVDU or intranasal drug use, no  no blood transfusions before 1992  Only notable risk factor is homeade tattoo "years ago"  (at home from )     Previous serologic w/u negative for hemochromatosis     Liver fibrosis staging:   -- Fibroscan 1/2020 noted no fibrosis (kPA 4.1), S1 steatosis ()  -- fibroscan with RTC     Interval HPI: Presents today alone. BP low initially at visit but pt feeling well and repeat in a reasonable range. No dizziness, weakness, fever, abd pain, urinary symptoms, cough, etc.  Previously had diarrhea and fever ~1 week ago and had limited oral intake but symptoms improved but not hydrating well.   Taking Entecavir 0.5 mg daily, no issues  Grandaughter lives with pt, reports she was vaccinated against Hep B     Labs done 3/2022 show normal transaminase levels, synthetic liver function  Normal    Lab Results   Component Value Date    ALT 12 03/07/2022    AST 15 03/07/2022    ALKPHOS 122 03/07/2022    BILITOT 0.8 03/07/2022    ALBUMIN 3.8 03/07/2022    INR 1.0 01/28/2022    INR 1.0 01/28/2022     03/07/2022     Hep C and HIV testing negative     Hep B labs   -- + eAb, - eAg   1/2022  -- DNA undetected,  <10    1/2022  -- + sAg   1/2022  Negative Delta     HCC screening:  Abd U/S no lesions, next due 1/2023  AFP WNL,  next due 1/2023  Lab Results "   Component Value Date    AFP 2.2 07/20/2022     Previous EGD -not indicated currently      Risk factors for NAFLD include obesity, HTN, DM     Denies family history of liver disease . Denies alcohol consumption       Immunity to Hep A  Needs to restart Hep A vaccine series    PMHX:  has a past medical history of Acute (diffuse) proliferative glomerulonephritis, Acute renal failure (7/19/2019), B-cell lymphoproliferative disorder (7/30/2019), CHF (congestive heart failure), Chronic obstructive lung disease (7/27/2019), Chronic viral hepatitis B without delta agent and without coma (7/24/2019), Cryoglobulinemic vasculitis, Diabetes mellitus, Hypogammaglobulinemia (8/5/2019), Iron deficiency anemia (7/30/2019), Renal vein thrombosis (2015), Steroid-induced hyperglycemia (7/28/2019), and Uterine leiomyoma.    PSHX:  has a past surgical history that includes Lung biopsy (N/A, 12/1/2020); AV fistula placement; Cataract extraction w/  intraocular lens implant (Left, 4/22/2021); and Cataract extraction w/  intraocular lens implant (Right, 5/6/2021).    The patient's social and family histories were reviewed by me and updated in the appropriate section of the electronic medical record.    Review of patient's allergies indicates:  No Known Allergies    Current Outpatient Medications on File Prior to Visit   Medication Sig Dispense Refill    acetaminophen (TYLENOL) 500 MG tablet Take 500 mg by mouth 3 (three) times daily as needed for Pain (or fever).      albuterol (PROVENTIL/VENTOLIN HFA) 90 mcg/actuation inhaler Inhale 2 puffs into the lungs every 6 (six) hours as needed for Wheezing or Shortness of Breath. Rescue 18 g 6    allopurinoL (ZYLOPRIM) 100 MG tablet Take 100 mg by mouth once daily. for 90 days      aspirin (ECOTRIN) 81 MG EC tablet Take 81 mg by mouth once daily.      atorvastatin (LIPITOR) 40 MG tablet Take 1 tablet (40 mg total) by mouth once daily. 90 tablet 3    baclofen (LIORESAL) 10 MG tablet Take  0.5 tablets (5 mg total) by mouth 3 (three) times daily as needed (leg pain). 30 tablet 0    blood sugar diagnostic Strp use to test blood gluocse 2 (two) times daily with meals. 100 strip 11    blood-glucose meter Misc Use as instructed (Patient taking differently: Use as instructed) 1 each 0    carvediloL (COREG) 25 MG tablet Take 1 tablet (25 mg total) by mouth 2 (two) times daily. 90 tablet 3    cyanocobalamin (VITAMIN B-12) 250 MCG tablet Take 250 mcg by mouth once daily.      diclofenac sodium (VOLTAREN) 1 % Gel Apply 4 g topically 4 (four) times daily. Apply to knee 350 g 2    entecavir (BARACLUDE) 0.5 MG Tab Take 1 tablet (0.5 mg total) by mouth once daily. DOSE CHANGE, CHANGED TO EVERY DAY 30 tablet 11    EScitalopram oxalate (LEXAPRO) 10 MG tablet Take 1 tablet (10 mg total) by mouth once daily. 90 tablet 3    ferrous sulfate 325 (65 FE) MG EC tablet Take 1 tablet (325 mg total) by mouth once daily.  0    folic acid (FOLVITE) 1 MG tablet Take 1 tablet (1 mg total) by mouth once daily. 90 tablet 2    furosemide (LASIX) 40 MG tablet Take 40 mg lasix once daily 90 tablet 3    gabapentin (NEURONTIN) 300 MG capsule Take 1 capsule (300 mg total) by mouth 2 (two) times daily. 60 capsule 11    HUMULIN R REGULAR U-100 INSULN 100 unit/mL injection Inject 12 Units into the skin 3 (three) times daily before meals. 15 mL 6    hydrOXYchloroQUINE (PLAQUENIL) 200 mg tablet Take 1 tablet (200 mg total) by mouth 2 (two) times daily. 180 tablet 3    insulin (LANTUS SOLOSTAR U-100 INSULIN) glargine 100 units/mL (3mL) SubQ pen Inject 55 Units into the skin every morning. 17 each 3    lancets 30 gauge Misc use to test blood glucose 2 (two) times daily with meals. 100 each 11    lisinopriL (PRINIVIL,ZESTRIL) 20 MG tablet Take 1 tablet (20 mg total) by mouth once daily. 30 tablet 11    methotrexate 2.5 MG Tab Take 8 tablets (20 mg total) by mouth every 7 days. 96 tablet 0    multivitamin (THERAGRAN) per tablet  "Take 1 tablet by mouth once daily.      NIFEdipine (PROCARDIA-XL) 60 MG (OSM) 24 hr tablet Take 1 tablet (60 mg total) by mouth once daily. 90 tablet 3    pantoprazole (PROTONIX) 40 MG tablet Take 1 tablet (40 mg total) by mouth before breakfast. 30 tablet 11    polyethylene glycol (GLYCOLAX) 17 gram/dose powder Take 17 g by mouth daily as needed (constipation). 289 g 5    traZODone (DESYREL) 100 MG tablet Take 1 tablet (100 mg total) by mouth nightly as needed for Insomnia. 30 tablet 11    valACYclovir (VALTREX) 1000 MG tablet Take 1,000 mg by mouth 3 (three) times daily.      vitamin D (VITAMIN D3) 1000 units Tab Take 1 tablet (1,000 Units total) by mouth once daily. (Patient taking differently: Take 2,000 Units by mouth once daily.)      lancing device Misc 1 Device by Misc.(Non-Drug; Combo Route) route 2 (two) times daily with meals. 1 each 0     No current facility-administered medications on file prior to visit.       SOCIAL HISTORY:   Social History     Substance and Sexual Activity   Alcohol Use Not Currently       Social History     Substance and Sexual Activity   Drug Use Never       ROS:   GENERAL: Denies fatigue  CARDIOVASCULAR: Denies edema  GI: Denies abdominal pain  SKIN: Denies rash, itching   NEURO: Denies confusion, memory loss, or mood changes  HEME/LYMPH: Denies easy bruising or bleeding    Objective Findings:    PHYSICAL EXAM:   Friendly Black or  female, in no acute distress; alert and oriented to person, place and time  VITALS: BP (!) 88/53 (BP Location: Right arm, Patient Position: Sitting, BP Method: Large (Automatic))   Pulse 77   Temp 98 °F (36.7 °C) (Oral)   Resp 18   Ht 5' 6" (1.676 m)   Wt 94.7 kg (208 lb 12.4 oz)   SpO2 96%   BMI 33.70 kg/m²   EYES: Sclerae anicteric  GI: Soft, non-tender, non-distended. No ascites.  EXTREMITIES:  No edema.  SKIN: Warm and dry. No jaundice. No telangectasias noted. No palmar erythema.  NEURO:  Normal gait. No " "asterixis.  PSYCH:  Memory intact. Thought and speech pattern appropriate. Behavior normal      EDUCATION:  See instructions discussed with patient in Instructions section of the After Visit Summary       ASSESSMENT & PLAN:  56 y.o. Black or  female with:  1. Chronic hepatitis B, E Ag neg, E Ab pos, diagnosed 7/2019  -- On Enctevair 0.5 mg daily, will repeat labs today then refill based on renal function   -- transaminases WNL  -- HBV DNA <10 on last labs, will repeat today   -- synthetic liver function WNL  -- immunity to Hep A per labs : needs to repeat HEp A vaccine series  -- Only notable risk factor is homeade tattoo "years ago"  (at home from )  -- Fibroscan 1/2020 noted  No fibrosis, repeat with RTC  -- HCC screening Q 6 months with AFP and abd. U/S - US no liver lesion, AFP WNL, next due 1/2023  -- Sexual partners and family members in household need to be tested and vaccinated if negative. Must use protection for sex (Hep B)     2. Hypotension  -- message sent to PCP in case recommends any med adjustments or w/u     Labs today, US soon then   Follow up in about 6 months (around 1/20/2023). with US and labs 1 week before  Orders Placed This Encounter   Procedures    FibroScan (Vibration Controlled Transient Elastography)    US Abdomen Complete    US Abdomen Complete    AFP Tumor Marker    Comprehensive Metabolic Panel    HEPATITIS B VIRAL DNA, QUANTITATIVE    Hepatitis B Surface Antigen    Protime-INR    AFP Tumor Marker    Comprehensive Metabolic Panel    Hepatitis B e antibody    Hepatitis B e Antigen    Hepatitis B Surface Ab, Qualitative    Hepatitis B Surface Antigen    HEPATITIS B VIRAL DNA, QUANTITATIVE    Protime-INR       Thank you for allowing me to participate in the care of VIKAS De La Cruz    I spent a total of 30 minutes on the day of the visit.This includes face to face time and non-face to face time preparing to see the " patient (eg, review of tests), obtaining and/or reviewing separately obtained history, documenting clinical information in the electronic or other health record, independently interpreting results and communicating results to the patient/family/caregiver, and coordinating care.       CC'ed note to:   Anibal Timmons MD

## 2022-07-20 NOTE — TELEPHONE ENCOUNTER
Pt called because kidney function decreased on labs, BUN elevated. Pt had reported in clinic visit that she has n/v/d for a few days earlier this week/past weekend. Feeling ok now and able to tolerate fluids/food but not drinking enough fluids since her illness.   Reiterated importance of drinking ~100 ounces of sugar free liquid (ideally water) daily and will repeat labs on Monday     Instructed that is she starts to feel unwell or has return of significant GI complaints or fever then she should seek urgent care     Also forwarded to nephrology in case they recommend anything further

## 2022-07-20 NOTE — PATIENT INSTRUCTIONS
-- Avoid alcohol. Avoid raw seafood.   -- Hepatitis B is spread through blood and bodily fluids. Do not share  razors/toothbrushes, etc, with others   -- it is possible that Hepatitis B can cause scar tissue in the liver, which can progress to cirrhosis.   -- Hepatitis B, in some cases, has a higher risk of liver cancer (hepatocellular carcinoma), especially with active hepatitis B infection. We will perform liver cancer screening every six months with ultrasound and AFP along with a clinic visit every 6 months  -- If you develop cirrhosis, it is important that we monitor you closely, as cirrhosis can cause liver cancer, liver failure, liver transplant, death.   -- Limit acetaminophen to 2000mg daily.  -- Recommended that all 1st degree relatives, household members and sexual contacts are screened for Hepatitis B. If they are negative for Hepatitis B, they should be vaccinated against Hepatitis B to protect themselves from venancio the virus  -- It is important for all current and previous sexual partners to be tested for Hepatitis B as well as complete Hep B vaccination. For sexual partners who have not completed the Hep B vaccine series yet, barrier sexual protection is recommended to prevent spread of the virus.  -- It is important that you take your Hepatitis B medication as prescribed without any missed doses, as missing doses or stopping the medication can cause the Hepatitis B virus to change and make it difficult to treat

## 2022-07-25 ENCOUNTER — TELEPHONE (OUTPATIENT)
Dept: HEPATOLOGY | Facility: CLINIC | Age: 57
End: 2022-07-25
Payer: MEDICAID

## 2022-07-25 ENCOUNTER — HOSPITAL ENCOUNTER (OUTPATIENT)
Dept: RADIOLOGY | Facility: HOSPITAL | Age: 57
Discharge: HOME | End: 2022-07-25
Attending: NURSE PRACTITIONER
Payer: MEDICAID

## 2022-07-25 DIAGNOSIS — B18.1 CHRONIC VIRAL HEPATITIS B WITHOUT DELTA AGENT AND WITHOUT COMA: ICD-10-CM

## 2022-07-25 PROCEDURE — 76700 US EXAM ABDOM COMPLETE: CPT | Mod: 26,,, | Performed by: RADIOLOGY

## 2022-07-25 PROCEDURE — 76700 US ABDOMEN COMPLETE: ICD-10-PCS | Mod: 26,,, | Performed by: RADIOLOGY

## 2022-07-25 PROCEDURE — 76700 US EXAM ABDOM COMPLETE: CPT | Mod: TC,PO

## 2022-07-25 NOTE — TELEPHONE ENCOUNTER
Spoke with patient. Relayed NP messages to patient. Patient verbalized understanding and thankful.      Please call pt and notify her that her labs and US were stable, no signs of liver cancer. Repeat in January as scheduled    Thanks

## 2022-07-25 NOTE — TELEPHONE ENCOUNTER
Please call pt and notify her that her labs and US were stable, no signs of liver cancer. Repeat in January as scheduled    Thanks

## 2022-07-26 ENCOUNTER — TELEPHONE (OUTPATIENT)
Dept: HEPATOLOGY | Facility: CLINIC | Age: 57
End: 2022-07-26
Payer: MEDICAID

## 2022-07-26 NOTE — TELEPHONE ENCOUNTER
Please call pt and notify her that her repeat kidney function lab was normal, much improved from her last labs    Thanks!

## 2022-07-26 NOTE — TELEPHONE ENCOUNTER
Sp[hamida with patient and relayed provider message to her. Patient verbalized understanding and thankful.

## 2022-08-08 ENCOUNTER — TELEPHONE (OUTPATIENT)
Dept: INTERNAL MEDICINE | Facility: CLINIC | Age: 57
End: 2022-08-08
Payer: MEDICAID

## 2022-08-08 NOTE — TELEPHONE ENCOUNTER
----- Message from Belem Perez sent at 8/8/2022 11:50 AM CDT -----  Contact: Kendy   Patient is returning a phone call.  Who left a message for the patient: not sure who called   Does patient know what this is regarding:  larisa ?  Would you like a call back, or a response through your [facility] portal?:   call b ack   Comments:       Quality 111:Pneumonia Vaccination Status For Older Adults: Pneumococcal Vaccination Previously Received Quality 130: Documentation Of Current Medications In The Medical Record: Current Medications Documented Quality 431: Preventive Care And Screening: Unhealthy Alcohol Use - Screening: Patient screened for unhealthy alcohol use using a single question and scores less than 2 times per year Quality 226: Preventive Care And Screening: Tobacco Use: Screening And Cessation Intervention: Patient screened for tobacco use and is an ex/non-smoker Quality 131: Pain Assessment And Follow-Up: Pain assessment using a standardized tool is documented as negative, no follow-up plan required Quality 110: Preventive Care And Screening: Influenza Immunization: Influenza Immunization Administered during Influenza season Detail Level: Generalized

## 2022-08-15 ENCOUNTER — OFFICE VISIT (OUTPATIENT)
Dept: TRANSPLANT | Facility: CLINIC | Age: 57
End: 2022-08-15
Payer: MEDICAID

## 2022-08-15 VITALS
HEIGHT: 66 IN | DIASTOLIC BLOOD PRESSURE: 82 MMHG | WEIGHT: 211 LBS | HEART RATE: 60 BPM | SYSTOLIC BLOOD PRESSURE: 144 MMHG | BODY MASS INDEX: 33.91 KG/M2

## 2022-08-15 DIAGNOSIS — E11.22 TYPE 2 DIABETES MELLITUS WITH STAGE 2 CHRONIC KIDNEY DISEASE, WITH LONG-TERM CURRENT USE OF INSULIN: ICD-10-CM

## 2022-08-15 DIAGNOSIS — Z79.4 TYPE 2 DIABETES MELLITUS WITH STAGE 2 CHRONIC KIDNEY DISEASE, WITH LONG-TERM CURRENT USE OF INSULIN: ICD-10-CM

## 2022-08-15 DIAGNOSIS — E66.9 OBESITY (BMI 30-39.9): ICD-10-CM

## 2022-08-15 DIAGNOSIS — N18.2 TYPE 2 DIABETES MELLITUS WITH STAGE 2 CHRONIC KIDNEY DISEASE, WITH LONG-TERM CURRENT USE OF INSULIN: ICD-10-CM

## 2022-08-15 DIAGNOSIS — I50.32 CHRONIC HEART FAILURE WITH PRESERVED EJECTION FRACTION (HFPEF): ICD-10-CM

## 2022-08-15 DIAGNOSIS — J44.9 CHRONIC OBSTRUCTIVE PULMONARY DISEASE, UNSPECIFIED COPD TYPE: ICD-10-CM

## 2022-08-15 DIAGNOSIS — D84.9 IMMUNOSUPPRESSION: ICD-10-CM

## 2022-08-15 DIAGNOSIS — I10 ESSENTIAL HYPERTENSION: ICD-10-CM

## 2022-08-15 DIAGNOSIS — D86.9 SARCOIDOSIS: Primary | ICD-10-CM

## 2022-08-15 PROCEDURE — 3072F LOW RISK FOR RETINOPATHY: CPT | Mod: CPTII,,, | Performed by: INTERNAL MEDICINE

## 2022-08-15 PROCEDURE — 1159F PR MEDICATION LIST DOCUMENTED IN MEDICAL RECORD: ICD-10-PCS | Mod: CPTII,,, | Performed by: INTERNAL MEDICINE

## 2022-08-15 PROCEDURE — 3077F PR MOST RECENT SYSTOLIC BLOOD PRESSURE >= 140 MM HG: ICD-10-PCS | Mod: CPTII,,, | Performed by: INTERNAL MEDICINE

## 2022-08-15 PROCEDURE — 3079F PR MOST RECENT DIASTOLIC BLOOD PRESSURE 80-89 MM HG: ICD-10-PCS | Mod: CPTII,,, | Performed by: INTERNAL MEDICINE

## 2022-08-15 PROCEDURE — 99999 PR PBB SHADOW E&M-EST. PATIENT-LVL V: ICD-10-PCS | Mod: PBBFAC,,, | Performed by: INTERNAL MEDICINE

## 2022-08-15 PROCEDURE — 3066F PR DOCUMENTATION OF TREATMENT FOR NEPHROPATHY: ICD-10-PCS | Mod: CPTII,,, | Performed by: INTERNAL MEDICINE

## 2022-08-15 PROCEDURE — 3077F SYST BP >= 140 MM HG: CPT | Mod: CPTII,,, | Performed by: INTERNAL MEDICINE

## 2022-08-15 PROCEDURE — 1160F PR REVIEW ALL MEDS BY PRESCRIBER/CLIN PHARMACIST DOCUMENTED: ICD-10-PCS | Mod: CPTII,,, | Performed by: INTERNAL MEDICINE

## 2022-08-15 PROCEDURE — 3008F PR BODY MASS INDEX (BMI) DOCUMENTED: ICD-10-PCS | Mod: CPTII,,, | Performed by: INTERNAL MEDICINE

## 2022-08-15 PROCEDURE — 99205 PR OFFICE/OUTPT VISIT, NEW, LEVL V, 60-74 MIN: ICD-10-PCS | Mod: S$PBB,,, | Performed by: INTERNAL MEDICINE

## 2022-08-15 PROCEDURE — 4010F PR ACE/ARB THEARPY RXD/TAKEN: ICD-10-PCS | Mod: CPTII,,, | Performed by: INTERNAL MEDICINE

## 2022-08-15 PROCEDURE — 3079F DIAST BP 80-89 MM HG: CPT | Mod: CPTII,,, | Performed by: INTERNAL MEDICINE

## 2022-08-15 PROCEDURE — 3072F PR LOW RISK FOR RETINOPATHY: ICD-10-PCS | Mod: CPTII,,, | Performed by: INTERNAL MEDICINE

## 2022-08-15 PROCEDURE — 3008F BODY MASS INDEX DOCD: CPT | Mod: CPTII,,, | Performed by: INTERNAL MEDICINE

## 2022-08-15 PROCEDURE — 99215 OFFICE O/P EST HI 40 MIN: CPT | Mod: PBBFAC | Performed by: INTERNAL MEDICINE

## 2022-08-15 PROCEDURE — 99999 PR PBB SHADOW E&M-EST. PATIENT-LVL V: CPT | Mod: PBBFAC,,, | Performed by: INTERNAL MEDICINE

## 2022-08-15 PROCEDURE — 1159F MED LIST DOCD IN RCRD: CPT | Mod: CPTII,,, | Performed by: INTERNAL MEDICINE

## 2022-08-15 PROCEDURE — 99205 OFFICE O/P NEW HI 60 MIN: CPT | Mod: S$PBB,,, | Performed by: INTERNAL MEDICINE

## 2022-08-15 PROCEDURE — 3066F NEPHROPATHY DOC TX: CPT | Mod: CPTII,,, | Performed by: INTERNAL MEDICINE

## 2022-08-15 PROCEDURE — 4010F ACE/ARB THERAPY RXD/TAKEN: CPT | Mod: CPTII,,, | Performed by: INTERNAL MEDICINE

## 2022-08-15 PROCEDURE — 1160F RVW MEDS BY RX/DR IN RCRD: CPT | Mod: CPTII,,, | Performed by: INTERNAL MEDICINE

## 2022-08-15 NOTE — PROGRESS NOTES
Subjective:     HPI:  Ms. 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 She is referred for evaluation for possible cardiac sarcoid. From a cardiac standpoint, she reports NYHA class II symptoms. Occasional PND and orthopnea. Denies any palpitations, chest pain or  syncopal episodes. Does report weight loss at night sweats. Recent cardiac work-up includes a 2D echo with CFD that showed preserved and normal biventricular function. Cardiac MRI showed a very small area of LGE at the base of anterior wall. Current cardiac regimen includes; carvedilol 25 mg twice daily, lisinopril 20 mg daily, nifedipine 60 mg daily. Current regimen for sarcoidosis includes; methotrexate 20mg weekly plus folic acid and hydroxycholoroquine 200 mg twice daily. .     Cardiac MRI performed on 6/28/2022  LV volumes are normal. LV mass is normal. LVEF = 57 %.   RV volumes are normal. RVEF = 49 %.  Left atrial enlargement  A very small focal area of LGE measuring 1.8mm X 5mm is appreciated in mid myocardium of the basal anterior wall. This may represent sarcoid involvement of the myocardium.    2D Echo with CFD done on 12/13/2021  The estimated ejection fraction is 65%.  The left ventricle is normal in size with normal systolic function.  Normal left ventricular diastolic function.  Normal right ventricular size with normal right ventricular systolic function.  Normal central venous pressure (3 mmHg).     Past Medical History:   Diagnosis Date    Acute (diffuse) proliferative  glomerulonephritis     Acute renal failure 2019    B-cell lymphoproliferative disorder 2019    CHF (congestive heart failure)     Chronic obstructive lung disease 2019    Chronic viral hepatitis B without delta agent and without coma 2019    Cryoglobulinemic vasculitis     Diabetes mellitus     Hypogammaglobulinemia 2019    Iron deficiency anemia 2019    Renal vein thrombosis 2015    s/p embolectomy and lovenox for 9 mos    Steroid-induced hyperglycemia 2019    Uterine leiomyoma     s/p resection     Past Surgical History:   Procedure Laterality Date    AV FISTULA PLACEMENT      CATARACT EXTRACTION W/  INTRAOCULAR LENS IMPLANT Left 2021    Procedure: EXTRACTION, CATARACT, WITH IOL INSERTION;  Surgeon: Allen Alejandro MD;  Location: [facility] OR;  Service: Ophthalmology;  Laterality: Left;    CATARACT EXTRACTION W/  INTRAOCULAR LENS IMPLANT Right 2021    Procedure: EXTRACTION, CATARACT, WITH IOL INSERTION;  Surgeon: Allen Alejandro MD;  Location: [facility] OR;  Service: Ophthalmology;  Laterality: Right;    LUNG BIOPSY N/A 2020    Procedure: BIOPSY, LUNG;  Surgeon: [facility];  Location: [facility] OR;  Service: Radiology;  Laterality: N/A;  8 A.M. START  PHONE PREOP DONE 2020  PT/INR, CBC, CMP AND COVID ON AM OF PROCEDURE   ARRIVAL AT 7:00 FOR 8:30 APPT     OB History          1    Para   1    Term   1            AB        Living             SAB        IAB        Ectopic        Multiple        Live Births                   Review of Systems   Constitutional: Positive for malaise/fatigue and night sweats. Negative for chills, decreased appetite, diaphoresis, fever, weight gain and weight loss.   Eyes: Negative.    Cardiovascular: Positive for dyspnea on exertion. Negative for chest pain, claudication, cyanosis, irregular heartbeat, leg swelling, near-syncope, orthopnea, palpitations, paroxysmal nocturnal dyspnea and syncope.   Respiratory: Negative for  "cough, hemoptysis and shortness of breath.    Endocrine: Negative.    Hematologic/Lymphatic: Negative.    Skin: Negative for color change, dry skin and nail changes.   Musculoskeletal: Negative.    Gastrointestinal: Negative.    Genitourinary: Negative.    Neurological: Negative for weakness.       Objective:   Blood pressure (!) 144/82, pulse 60, height 5' 6" (1.676 m), weight 95.7 kg (210 lb 15.7 oz).body mass index is 34.05 kg/m².  Physical Exam  Vitals and nursing note reviewed.   Constitutional:       Appearance: She is well-developed.   HENT:      Head: Normocephalic.   Eyes:      Pupils: Pupils are equal, round, and reactive to light.   Neck:      Vascular: No JVD.   Cardiovascular:      Rate and Rhythm: Normal rate and regular rhythm.      Chest Wall: PMI is not displaced.      Pulses: Intact distal pulses.      Heart sounds: Normal heart sounds. No murmur heard.    No friction rub. No gallop.   Pulmonary:      Effort: Pulmonary effort is normal.      Breath sounds: Normal breath sounds. No wheezing or rales.   Abdominal:      General: Bowel sounds are normal.      Palpations: Abdomen is soft.   Musculoskeletal:      Cervical back: Neck supple.   Neurological:      Mental Status: She is alert and oriented to person, place, and time.         Labs:    Chemistry        Component Value Date/Time     07/25/2022 1022    K 4.0 07/25/2022 1022     07/25/2022 1022    CO2 23 07/25/2022 1022    BUN 12 07/25/2022 1022    CREATININE 0.8 07/25/2022 1022     (H) 07/25/2022 1022        Component Value Date/Time    CALCIUM 9.8 07/25/2022 1022    ALKPHOS 128 07/20/2022 1115    AST 18 07/20/2022 1115    ALT 16 07/20/2022 1115    BILITOT 0.5 07/20/2022 1115    ESTGFRAFRICA >60.0 07/25/2022 1022    EGFRNONAA >60.0 07/25/2022 1022          Magnesium   Date Value Ref Range Status   11/12/2020 1.7 1.6 - 2.6 mg/dL Final     Lab Results   Component Value Date    WBC 3.67 (L) 03/07/2022    HGB 16.2 (H) 03/07/2022    " HCT 48.1 03/07/2022     03/07/2022     Lab Results   Component Value Date    INR 1.0 07/20/2022    INR 1.0 01/28/2022    INR 1.0 01/28/2022     BNP   Date Value Ref Range Status   11/07/2020 28 0 - 99 pg/mL Final     Comment:     Values of less than 100 pg/ml are consistent with non-CHF populations.   08/03/2019 282 (H) 0 - 99 pg/mL Final     Comment:     Values of less than 100 pg/ml are consistent with non-CHF populations.   07/19/2019 999 (H) 0 - 99 pg/mL Final     Comment:     Values of less than 100 pg/ml are consistent with non-CHF populations.     LD   Date Value Ref Range Status   05/27/2021 230 110 - 260 U/L Final     Comment:     Results are increased in hemolyzed samples.   12/03/2020 167 110 - 260 U/L Final     Comment:     Results are increased in hemolyzed samples.   11/19/2020 171 110 - 260 U/L Final     Comment:     Results are increased in hemolyzed samples.         Assessment:      1. Sarcoidosis    2. Chronic heart failure with preserved ejection fraction (HFpEF)    3. Type 2 diabetes mellitus with stage 2 chronic kidney disease, with long-term current use of insulin    4. Obesity (BMI 30-39.9)    5. Chronic obstructive pulmonary disease, unspecified COPD type    6. Essential hypertension    7. Immunosuppression        Plan:   In summary, Mrs. Vital is a very pleasant with sarcodosis on MTXand plaquenil with multiple comorbidities including type DM, Chronic Hep B and cryoglobulinemia who is referred for evaluation for possible cardiac sarcoidsosis.   Recommend the following;   Continue current cardiac regimen   I am not convinced that her cardiac MRI findings are necessarily suggestive of cardiac sarcoid. Recommend a cardiac FDG-PET to further evaluate.  Also ECG  Continue MTX and plaquenil  Recommend 2 gram sodium restriction and 1500cc fluid restriction.  Encourage physical activity with graded exercise program.  Requested patient to weigh themselves daily, and to notify us if their  weight increases by more than 3 lbs in 1 day or 5 lbs in 1 week.   Thank you for allowing me to participate in the care of this very pleasant patient. Do not hesitate to contact me should you have any questions.  RTC in 3 months.      Brina Carranza MD

## 2022-09-08 ENCOUNTER — OFFICE VISIT (OUTPATIENT)
Dept: INTERNAL MEDICINE | Facility: CLINIC | Age: 57
End: 2022-09-08
Payer: MEDICAID

## 2022-09-08 ENCOUNTER — LAB VISIT (OUTPATIENT)
Dept: LAB | Facility: HOSPITAL | Age: 57
End: 2022-09-08
Payer: MEDICAID

## 2022-09-08 VITALS
HEIGHT: 66 IN | OXYGEN SATURATION: 99 % | BODY MASS INDEX: 33.94 KG/M2 | WEIGHT: 211.19 LBS | DIASTOLIC BLOOD PRESSURE: 85 MMHG | HEART RATE: 61 BPM | SYSTOLIC BLOOD PRESSURE: 175 MMHG

## 2022-09-08 DIAGNOSIS — N18.2 TYPE 2 DIABETES MELLITUS WITH STAGE 2 CHRONIC KIDNEY DISEASE, WITH LONG-TERM CURRENT USE OF INSULIN: Primary | ICD-10-CM

## 2022-09-08 DIAGNOSIS — G89.29 CHRONIC PAIN OF BOTH LOWER EXTREMITIES: ICD-10-CM

## 2022-09-08 DIAGNOSIS — M79.604 CHRONIC PAIN OF BOTH LOWER EXTREMITIES: ICD-10-CM

## 2022-09-08 DIAGNOSIS — D89.1 CRYOGLOBULINEMIA: ICD-10-CM

## 2022-09-08 DIAGNOSIS — Z79.4 TYPE 2 DIABETES MELLITUS WITH STAGE 2 CHRONIC KIDNEY DISEASE, WITH LONG-TERM CURRENT USE OF INSULIN: Primary | ICD-10-CM

## 2022-09-08 DIAGNOSIS — N18.2 TYPE 2 DIABETES MELLITUS WITH STAGE 2 CHRONIC KIDNEY DISEASE, WITH LONG-TERM CURRENT USE OF INSULIN: ICD-10-CM

## 2022-09-08 DIAGNOSIS — E11.22 TYPE 2 DIABETES MELLITUS WITH STAGE 2 CHRONIC KIDNEY DISEASE, WITH LONG-TERM CURRENT USE OF INSULIN: Primary | ICD-10-CM

## 2022-09-08 DIAGNOSIS — D86.9 SARCOIDOSIS: ICD-10-CM

## 2022-09-08 DIAGNOSIS — Z79.4 TYPE 2 DIABETES MELLITUS WITH STAGE 2 CHRONIC KIDNEY DISEASE, WITH LONG-TERM CURRENT USE OF INSULIN: ICD-10-CM

## 2022-09-08 DIAGNOSIS — B02.9 HERPES ZOSTER WITHOUT COMPLICATION: ICD-10-CM

## 2022-09-08 DIAGNOSIS — I10 ESSENTIAL HYPERTENSION: ICD-10-CM

## 2022-09-08 DIAGNOSIS — M79.605 CHRONIC PAIN OF BOTH LOWER EXTREMITIES: ICD-10-CM

## 2022-09-08 DIAGNOSIS — E78.5 HYPERLIPIDEMIA, UNSPECIFIED HYPERLIPIDEMIA TYPE: ICD-10-CM

## 2022-09-08 DIAGNOSIS — E11.22 TYPE 2 DIABETES MELLITUS WITH STAGE 2 CHRONIC KIDNEY DISEASE, WITH LONG-TERM CURRENT USE OF INSULIN: ICD-10-CM

## 2022-09-08 DIAGNOSIS — B18.1 CHRONIC VIRAL HEPATITIS B WITHOUT DELTA AGENT AND WITHOUT COMA: ICD-10-CM

## 2022-09-08 LAB
ALBUMIN/CREAT UR: 329.5 UG/MG (ref 0–30)
BASOPHILS # BLD AUTO: 0.03 K/UL (ref 0–0.2)
BASOPHILS NFR BLD: 0.7 % (ref 0–1.9)
CHOLEST SERPL-MCNC: 136 MG/DL (ref 120–199)
CHOLEST/HDLC SERPL: 2.8 {RATIO} (ref 2–5)
CREAT UR-MCNC: 132 MG/DL (ref 15–325)
DIFFERENTIAL METHOD: ABNORMAL
EOSINOPHIL # BLD AUTO: 0.1 K/UL (ref 0–0.5)
EOSINOPHIL NFR BLD: 1.6 % (ref 0–8)
ERYTHROCYTE [DISTWIDTH] IN BLOOD BY AUTOMATED COUNT: 15.2 % (ref 11.5–14.5)
ESTIMATED AVG GLUCOSE: 114 MG/DL (ref 68–131)
HBA1C MFR BLD: 5.6 % (ref 4–5.6)
HCT VFR BLD AUTO: 47.2 % (ref 37–48.5)
HDLC SERPL-MCNC: 49 MG/DL (ref 40–75)
HDLC SERPL: 36 % (ref 20–50)
HGB BLD-MCNC: 15.6 G/DL (ref 12–16)
IMM GRANULOCYTES # BLD AUTO: 0 K/UL (ref 0–0.04)
IMM GRANULOCYTES NFR BLD AUTO: 0 % (ref 0–0.5)
LDLC SERPL CALC-MCNC: 70.6 MG/DL (ref 63–159)
LYMPHOCYTES # BLD AUTO: 1.9 K/UL (ref 1–4.8)
LYMPHOCYTES NFR BLD: 43.2 % (ref 18–48)
MCH RBC QN AUTO: 31.5 PG (ref 27–31)
MCHC RBC AUTO-ENTMCNC: 33.1 G/DL (ref 32–36)
MCV RBC AUTO: 95 FL (ref 82–98)
MICROALBUMIN UR DL<=1MG/L-MCNC: 435 UG/ML
MONOCYTES # BLD AUTO: 0.3 K/UL (ref 0.3–1)
MONOCYTES NFR BLD: 5.9 % (ref 4–15)
NEUTROPHILS # BLD AUTO: 2.2 K/UL (ref 1.8–7.7)
NEUTROPHILS NFR BLD: 48.6 % (ref 38–73)
NONHDLC SERPL-MCNC: 87 MG/DL
NRBC BLD-RTO: 0 /100 WBC
PLATELET # BLD AUTO: 169 K/UL (ref 150–450)
PMV BLD AUTO: 11 FL (ref 9.2–12.9)
RBC # BLD AUTO: 4.95 M/UL (ref 4–5.4)
TRIGL SERPL-MCNC: 82 MG/DL (ref 30–150)
WBC # BLD AUTO: 4.42 K/UL (ref 3.9–12.7)

## 2022-09-08 PROCEDURE — 3008F PR BODY MASS INDEX (BMI) DOCUMENTED: ICD-10-PCS | Mod: CPTII,,,

## 2022-09-08 PROCEDURE — 3062F PR POS MACROALBUMINURIA RESULT DOCUMENTED/REVIEW: ICD-10-PCS | Mod: CPTII,,,

## 2022-09-08 PROCEDURE — 3008F BODY MASS INDEX DOCD: CPT | Mod: CPTII,,,

## 2022-09-08 PROCEDURE — 3062F POS MACROALBUMINURIA REV: CPT | Mod: CPTII,,,

## 2022-09-08 PROCEDURE — 80061 LIPID PANEL: CPT

## 2022-09-08 PROCEDURE — 3077F PR MOST RECENT SYSTOLIC BLOOD PRESSURE >= 140 MM HG: ICD-10-PCS | Mod: CPTII,,,

## 2022-09-08 PROCEDURE — 3044F HG A1C LEVEL LT 7.0%: CPT | Mod: CPTII,,,

## 2022-09-08 PROCEDURE — 4010F ACE/ARB THERAPY RXD/TAKEN: CPT | Mod: CPTII,,,

## 2022-09-08 PROCEDURE — 83036 HEMOGLOBIN GLYCOSYLATED A1C: CPT

## 2022-09-08 PROCEDURE — 99999 PR PBB SHADOW E&M-EST. PATIENT-LVL IV: CPT | Mod: PBBFAC,,,

## 2022-09-08 PROCEDURE — 3072F LOW RISK FOR RETINOPATHY: CPT | Mod: CPTII,,,

## 2022-09-08 PROCEDURE — 99214 OFFICE O/P EST MOD 30 MIN: CPT | Mod: S$PBB,,,

## 2022-09-08 PROCEDURE — 3066F PR DOCUMENTATION OF TREATMENT FOR NEPHROPATHY: ICD-10-PCS | Mod: CPTII,,,

## 2022-09-08 PROCEDURE — 99999 PR PBB SHADOW E&M-EST. PATIENT-LVL IV: ICD-10-PCS | Mod: PBBFAC,,,

## 2022-09-08 PROCEDURE — 3077F SYST BP >= 140 MM HG: CPT | Mod: CPTII,,,

## 2022-09-08 PROCEDURE — 99214 OFFICE O/P EST MOD 30 MIN: CPT | Mod: PBBFAC

## 2022-09-08 PROCEDURE — 3072F PR LOW RISK FOR RETINOPATHY: ICD-10-PCS | Mod: CPTII,,,

## 2022-09-08 PROCEDURE — 4010F PR ACE/ARB THEARPY RXD/TAKEN: ICD-10-PCS | Mod: CPTII,,,

## 2022-09-08 PROCEDURE — 3079F DIAST BP 80-89 MM HG: CPT | Mod: CPTII,,,

## 2022-09-08 PROCEDURE — 3079F PR MOST RECENT DIASTOLIC BLOOD PRESSURE 80-89 MM HG: ICD-10-PCS | Mod: CPTII,,,

## 2022-09-08 PROCEDURE — 82570 ASSAY OF URINE CREATININE: CPT

## 2022-09-08 PROCEDURE — 3066F NEPHROPATHY DOC TX: CPT | Mod: CPTII,,,

## 2022-09-08 PROCEDURE — 3044F PR MOST RECENT HEMOGLOBIN A1C LEVEL <7.0%: ICD-10-PCS | Mod: CPTII,,,

## 2022-09-08 PROCEDURE — 99214 PR OFFICE/OUTPT VISIT, EST, LEVL IV, 30-39 MIN: ICD-10-PCS | Mod: S$PBB,,,

## 2022-09-08 PROCEDURE — 85025 COMPLETE CBC W/AUTO DIFF WBC: CPT

## 2022-09-08 RX ORDER — GABAPENTIN 300 MG/1
600 CAPSULE ORAL 2 TIMES DAILY
Qty: 120 CAPSULE | Refills: 11 | Status: SHIPPED | OUTPATIENT
Start: 2022-09-08 | End: 2023-09-08

## 2022-09-08 NOTE — PATIENT INSTRUCTIONS
Thank you for being evaluated in the clinic today. As we discussed you have a lot going on with your medical conditions. Today we would like to address a few conditions:    Blood pressure - We will recheck in office to make sure that it came down. Please continue taking your blood pressure medications as prescribed currently. We will schedule you for a repeat blood pressure in 1 week to see if we need to increase your blood pressure medications.  2. Diabetes - Please take both your short acting (Humalin) 10 units three times per day (with meals). We will recheck your blood sugars and   3. Skin bumps - Please follow up with Rheumatology (Dr. Solis), we will message him to see if he can see you soon.  4. Pain - Your pain is likely due to your diabetes not being under control, we will check your blood sugar for this. In the meantime please double up on your Gabapentin (2 pills in the morning and 2 pills at night) to see if this helps.

## 2022-09-08 NOTE — PROGRESS NOTES
[facility]  CLINIC NOTE    Patient Name: Kendy Vital  YOB: 1965    PRESENTING HISTORY       History of Present Illness:  Ms. Kendy Vital is a 57 y.o. female with a very complex medical history including HTN, insulin dependent DM, chronic hepatitis B, cryoglobulinemia, sarcoidosis, B-cell lymphoproliferative disorder, hypogammaglobulinemia, presenting as a follow-up for her chronic medical conditions. The patient was last seen in the clinic on 7/12/22. At that time her medications were re-initiated and she was referred to cardiology (for evaluation of possible cardiac sarcoidosis), pulmonology (due to CT findings), optometry, and hepatology. Additionally labs were ordered but she did not ever have them drawn because she had not fasted. Since that visit she has been evaluated by cardiology, she had a cardiac MRI without clear cardiac sarcoid but plans for PET scan to further evaluate in October. She also saw hepatology for follow-up of her chronic hepatitis B.    Today patient's main concerns are chronic bilateral hip and leg pain. The pain is a constant tingling in the bilateral lower extremities. It does not radiate. She was trialed on baclofen at her last clinic appointment without success. She has tried ibuprofen and tylenol in the past without success. Additionally, the patient is concerned with numerous skin lesions which come and go. The lesions are red and painful and appear on her face, arms, legs, bilaterally.    In terms of her hypertension: She takes carvedilol 25 mg BID, furosemide 40 mg daily, lisinopril 20 mg daily, nifedipine 60 mg daily. She reports 100% compliance with her medications. Hypertensive in office today.    In terms of her diabetes: She reports that she takes insulin three times a day. Upon further evaluation she is taking only the short acting insulin 10 units TID with meals. She is not taking her glargine as prescribed in the  AM.    The patient reports compliance with her sarcoidosis treatment with hydroxychloroquine 200 mg BID. She reports compliance with her cryoglobulin treatment of methotrexate 20mg every 7 days.    Review of Systems   Constitutional:  Negative for chills, fever and weight loss.   HENT:  Negative for congestion, hearing loss, sinus pain and sore throat.    Eyes:  Negative for blurred vision and redness.        Patient reports poor vision   Respiratory:  Negative for cough, sputum production, shortness of breath and wheezing.    Cardiovascular:  Negative for chest pain, palpitations and leg swelling.   Gastrointestinal:  Negative for abdominal pain, constipation, diarrhea, nausea and vomiting.   Genitourinary:  Negative for dysuria and urgency.   Musculoskeletal:  Negative for joint pain and myalgias.        Bilateral lower extremity pain   Skin:  Negative for itching and rash.        Scattered red lesions   Neurological:  Positive for tingling (lower extremities). Negative for sensory change, weakness and headaches.       PAST HISTORY:     Past Medical History:   Diagnosis Date    Acute (diffuse) proliferative glomerulonephritis     Acute renal failure 7/19/2019    B-cell lymphoproliferative disorder 7/30/2019    CHF (congestive heart failure)     Chronic obstructive lung disease 7/27/2019    Chronic viral hepatitis B without delta agent and without coma 7/24/2019    Cryoglobulinemic vasculitis     Diabetes mellitus     Hypogammaglobulinemia 8/5/2019    Iron deficiency anemia 7/30/2019    Renal vein thrombosis 2015    s/p embolectomy and lovenox for 9 mos    Steroid-induced hyperglycemia 7/28/2019    Uterine leiomyoma     s/p resection       Past Surgical History:   Procedure Laterality Date    AV FISTULA PLACEMENT      CATARACT EXTRACTION W/  INTRAOCULAR LENS IMPLANT Left 4/22/2021    Procedure: EXTRACTION, CATARACT, WITH IOL INSERTION;  Surgeon: Allen Alejandro MD;  Location: [facility];  Service: Ophthalmology;   Laterality: Left;    CATARACT EXTRACTION W/  INTRAOCULAR LENS IMPLANT Right 5/6/2021    Procedure: EXTRACTION, CATARACT, WITH IOL INSERTION;  Surgeon: Allen Alejandro MD;  Location: [facility] OR;  Service: Ophthalmology;  Laterality: Right;    LUNG BIOPSY N/A 12/1/2020    Procedure: BIOPSY, LUNG;  Surgeon: Kelli Diagnostic Provider;  Location: [facility] OR;  Service: Radiology;  Laterality: N/A;  8 A.M. START  PHONE PREOP DONE 11/27/2020  PT/INR, CBC, CMP AND COVID ON AM OF PROCEDURE   ARRIVAL AT 7:00 FOR 8:30 APPT       Family History   Problem Relation Age of Onset    Diabetes Mother     Heart disease Mother        Social History     Socioeconomic History    Marital status:    Tobacco Use    Smoking status: Former    Smokeless tobacco: Never   Substance and Sexual Activity    Alcohol use: Not Currently    Drug use: Never   Social History Narrative    Lives with granddaughter.         Walks around house.        MEDICATIONS & ALLERGIES:     Current Outpatient Medications on File Prior to Visit   Medication Sig    acetaminophen (TYLENOL) 500 MG tablet Take 500 mg by mouth 3 (three) times daily as needed for Pain (or fever).    albuterol (PROVENTIL/VENTOLIN HFA) 90 mcg/actuation inhaler Inhale 2 puffs into the lungs every 6 (six) hours as needed for Wheezing or Shortness of Breath. Rescue    allopurinoL (ZYLOPRIM) 100 MG tablet Take 100 mg by mouth once daily. for 90 days    aspirin (ECOTRIN) 81 MG EC tablet Take 81 mg by mouth once daily.    atorvastatin (LIPITOR) 40 MG tablet Take 1 tablet (40 mg total) by mouth once daily.    blood-glucose meter Misc Use as instructed (Patient taking differently: Use as instructed)    carvediloL (COREG) 25 MG tablet Take 1 tablet (25 mg total) by mouth 2 (two) times daily.    cyanocobalamin (VITAMIN B-12) 250 MCG tablet Take 250 mcg by mouth once daily.    diclofenac sodium (VOLTAREN) 1 % Gel Apply 4 g topically 4 (four) times daily. Apply to knee    entecavir (BARACLUDE) 0.5 MG Tab  Take 1 tablet by mouth once daily    EScitalopram oxalate (LEXAPRO) 10 MG tablet Take 1 tablet (10 mg total) by mouth once daily.    ferrous sulfate 325 (65 FE) MG EC tablet Take 1 tablet (325 mg total) by mouth once daily.    folic acid (FOLVITE) 1 MG tablet Take 1 tablet (1 mg total) by mouth once daily.    furosemide (LASIX) 40 MG tablet Take 40 mg lasix once daily    HUMULIN R REGULAR U-100 INSULN 100 unit/mL injection Inject 12 Units into the skin 3 (three) times daily before meals.    hydrOXYchloroQUINE (PLAQUENIL) 200 mg tablet Take 1 tablet (200 mg total) by mouth 2 (two) times daily.    insulin (LANTUS SOLOSTAR U-100 INSULIN) glargine 100 units/mL (3mL) SubQ pen Inject 55 Units into the skin every morning.    lancets 30 gauge Misc use to test blood glucose 2 (two) times daily with meals.    lancing device Misc 1 Device by Misc.(Non-Drug; Combo Route) route 2 (two) times daily with meals.    lisinopriL (PRINIVIL,ZESTRIL) 20 MG tablet Take 1 tablet (20 mg total) by mouth once daily.    methotrexate 2.5 MG Tab Take 8 tablets (20 mg total) by mouth every 7 days.    multivitamin (THERAGRAN) per tablet Take 1 tablet by mouth once daily.    NIFEdipine (PROCARDIA-XL) 60 MG (OSM) 24 hr tablet Take 1 tablet (60 mg total) by mouth once daily.    pantoprazole (PROTONIX) 40 MG tablet Take 1 tablet (40 mg total) by mouth before breakfast.    polyethylene glycol (GLYCOLAX) 17 gram/dose powder Take 17 g by mouth daily as needed (constipation).    traZODone (DESYREL) 100 MG tablet Take 1 tablet (100 mg total) by mouth nightly as needed for Insomnia.    valACYclovir (VALTREX) 1000 MG tablet Take 1,000 mg by mouth 3 (three) times daily.    vitamin D (VITAMIN D3) 1000 units Tab Take 1 tablet (1,000 Units total) by mouth once daily. (Patient taking differently: Take 2,000 Units by mouth once daily.)    [DISCONTINUED] baclofen (LIORESAL) 10 MG tablet Take 0.5 tablets (5 mg total) by mouth 3 (three) times daily as needed (leg  "pain).    [DISCONTINUED] blood sugar diagnostic Strp use to test blood gluocse 2 (two) times daily with meals.    [DISCONTINUED] gabapentin (NEURONTIN) 300 MG capsule Take 1 capsule (300 mg total) by mouth 2 (two) times daily.     No current facility-administered medications on file prior to visit.       Review of patient's allergies indicates:  No Known Allergies    OBJECTIVE:   Vital Signs:  Vitals:    09/08/22 0929 09/08/22 1057   BP: (!) 183/93 (!) 175/85   Pulse: 61    SpO2: 99%    Weight: 95.8 kg (211 lb 3.2 oz)    Height: 5' 6" (1.676 m)           Physical Exam  Vitals and nursing note reviewed.   Constitutional:       General: She is not in acute distress.     Appearance: Normal appearance.   HENT:      Head: Normocephalic and atraumatic.      Right Ear: External ear normal.      Left Ear: External ear normal.      Nose: No congestion or rhinorrhea.      Mouth/Throat:      Mouth: Mucous membranes are moist.      Pharynx: Oropharynx is clear. No oropharyngeal exudate or posterior oropharyngeal erythema.   Eyes:      Extraocular Movements: Extraocular movements intact.      Conjunctiva/sclera: Conjunctivae normal.   Cardiovascular:      Rate and Rhythm: Normal rate and regular rhythm.      Pulses: Normal pulses.      Heart sounds: Normal heart sounds. No murmur heard.  Pulmonary:      Effort: Pulmonary effort is normal.      Breath sounds: Normal breath sounds. No stridor. No wheezing or rales.   Abdominal:      General: Abdomen is flat. Bowel sounds are normal. There is no distension.      Palpations: Abdomen is soft.      Tenderness: There is no abdominal tenderness.   Musculoskeletal:         General: No swelling.      Cervical back: Normal range of motion and neck supple. No tenderness.      Right lower leg: No edema.      Left lower leg: No edema.   Lymphadenopathy:      Cervical: No cervical adenopathy.   Skin:     General: Skin is warm and dry.      Capillary Refill: Capillary refill takes less than 2 " seconds.      Findings: Lesion (scattered erythematous, nodular, lesions on right face, left arm, left upper back, right leg) present.   Neurological:      Mental Status: She is alert and oriented to person, place, and time. Mental status is at baseline.      Motor: No weakness.   Psychiatric:         Mood and Affect: Mood normal.         Behavior: Behavior normal.       ASSESSMENT & PLAN:     Kendy was seen today for follow-up.  Diagnoses and all orders for this visit:    57 year old very medically complex patient presenting for follow-up. Plan for her to see her PCP, Dr. Timmons, but scheduled with resident clinic. Instructed patient that due to her complex medical conditions she will need to be seen by her PCP in the future who can be coordinate her subspecialty care. Will plan to schedule her to follow-up with Dr. Timmons to establish clinic regularity for her.    Essential hypertension  Patient significantly hypertensive in office today. Reports 100% compliance with home medication regimen. Will plan for a recheck in 1 week due to significant elevation. Will check labs for evidence of end organ dysfunction. If her blood pressure remains elevated on recheck in 1 week then can consider increasing nifedipine. Patient at max dose of lisinopril and carvedilol.          -     1 week recheck of BP; consider increasing nifedipine if remains elevated        -     Continue carvedilol 25 mg BID, furosemide 40 mg daily, lisinopril 20 mg daily, nifedipine 60 mg daily    Type 2 diabetes mellitus with stage 2 chronic kidney disease, with long-term current use of insulin  Hyperlipidemia, unspecified hyperlipidemia type  No recent Hgb A1c, previously poorly controlled. Patient reports taking only short-acting insulin 10 units TID with meals. Will hold on restarting long acting insulin for now due to patient reporting blood sugars in low 110's. Will recheck Hgb A1c and consider restarting long acting insulin if needed. Patient is due  for eye exam, will refer to optometry.    -     POCT HEMOGLOBIN A1C  -     Ambulatory referral/consult to Optometry; Future  -     Microalbumin/Creatinine Ratio, Urine  -     blood sugar diagnostic Strp; use to test blood gluocse 2 (two) times daily with meals.  -     CBC W/ AUTO DIFFERENTIAL; Future  -     HEMOGLOBIN A1C; Future  -     LIPID PANEL; Future    Sarcoidosis  Concern for cutaneous sarcoidosis on skin exam in office. Will refer back to rheumatology for further evaluation and management.        -     Continue hydroxychloroquine 200 mg BID per rheumatology  -     Ambulatory referral/consult to Rheumatology; Future    Cryoglobulinemia        -      Continue methotrexate 20 mg every 7 days per rheumatology    Chronic viral hepatitis B without delta agent and without coma        -     Continue follow-up with hepatology as scheduled in January 2023    Chronic pain of both lower extremities  Concern for diabetic neuropathy given poorly controlled diabetes. Trial of baclofen without success, will discontinue. Will try increasing gabapentin from 300mg BID to 600 mg BID.    -     Increase gabapentin to 600 mg BID      Unfortunately patient unable to schedule 1-week BP recheck with her PCP due to lack of availabilities. Will be seen by a resident physician. Priorities for this medically complex patient at her next visit in 1 week are: BP management, ensure appointment/referral to rheumatology for her skin lesions, ensure ophthalmology follow-up for her diabetes, follow-up on BP (consider increasing nifedipine if needed). Please again attempt long-term follow up with her PCP (Dr. Timmons) to establish clinical regularity for this patient.      Discussed with Dr. Gage    [facility] in 1 week for blood pressure recheck    Khoi Hui MD  Internal Medicine PGY-1  [facility]  [address] [place] [address]

## 2022-09-09 ENCOUNTER — PATIENT MESSAGE (OUTPATIENT)
Dept: INTERNAL MEDICINE | Facility: CLINIC | Age: 57
End: 2022-09-09
Payer: MEDICAID

## 2022-09-09 NOTE — TELEPHONE ENCOUNTER
Hi Ms. Vital,    Your lab results came back and they look really good! Your cholesterol has improved significantly since since it was last checked. Similarly, your blood sugar levels are looking really great too! Your Hemoglobin A1c this time was 5.6 which is amazing. Please keep up with the hard work with eating well and continue taking the insulin as you have been.    We would still like to see you back soon to keep an eye on that blood pressure!    - Dr. Hui

## 2022-09-10 ENCOUNTER — TELEPHONE (OUTPATIENT)
Dept: INTERNAL MEDICINE | Facility: CLINIC | Age: 57
End: 2022-09-10
Payer: MEDICAID

## 2022-09-10 NOTE — TELEPHONE ENCOUNTER
Attempted to reach patient x2 via telephone to update on her normal lab results and to ensure that she is scheduled to follow-up for her blood pressure. No answer and no voice mailbox. Will re-attempt on Monday.

## 2022-09-15 ENCOUNTER — TELEPHONE (OUTPATIENT)
Dept: INTERNAL MEDICINE | Facility: CLINIC | Age: 57
End: 2022-09-15
Payer: MEDICAID

## 2022-09-15 NOTE — TELEPHONE ENCOUNTER
----- Message from Yuliana Husain sent at 9/15/2022  4:34 PM CDT -----  Contact: [phone]  Patient is returning a phone call.  Who left a message for the patient: Wilder  Does patient know what this is regarding:  missed call  Would you like a call back, or a response through your [facility] portal?:   please call  Comments:

## 2022-09-15 NOTE — TELEPHONE ENCOUNTER
Called pt     Someone answered. No response, though ambient room noise was heard. Unable to leave message.

## 2022-09-15 NOTE — TELEPHONE ENCOUNTER
----- Message from Laura Simental sent at 9/15/2022  1:20 PM CDT -----  Regarding: Scheduling  Pt had an appointment with Dr Khoi Hui on 9/8/22. He wanted the Pt to f/u with her primary doctor. Pt's insurance maybe hindering her from being added to Dr Timmons's schedule. Can you check to see if Dr Timmons has available slots.      Contact#[phone]    Thanks

## 2022-09-15 NOTE — ASSESSMENT & PLAN NOTE
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 Complains of intermittent fevers, diaphoresis, drenching night sweats, pleuritic chest pain, achy bones, cough productive of white sputum, poor appetite, and 10lb weight loss. Was found to have low grade B-cell lymphoma via bone marrow biopsy during hospitalization in August of 2019. It was decided not to treat the low-grade B cell lymphoma at that time given the indolent nature of the disease and to monitor.  Pt had decided to follow up at [facility] and did follow up for one appointment but was lost to follow up thereafter. CT CAP this admission shows innumerable pulmonary nodules concerning for lymphoproliferative disorder. LDH not elevated. Heme/Onc consulted for recs.     Recs:  -- Agree with pulm consult for potential bronch if they believe they can obtain specimen for pathology to review  -- Attempt to obtain tissue sample as appropriate, a biopsy would be ideal but if unable to obtain biopsy cytology would be appropriate if a sample is able to be obtained  -- Will continue to follow  
56 yo F admitted with subacute worsening of chronic SOB associated with fevers/chills, night sweats, productive cough. Smoking Hx: 1ppd x 13 years, quit 1.5-2 years ago). 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 Denies recent travel. CT showed extensive pulmonary nodules + infiltrates. Distribution appears to be bronchovascular. Given h/o lymphoma + B symptoms, concern for lymphoproliferative process vs chronic infection.     Plan:  - Discussed purpose, risks, and benefits of bronchoscopy with patient  - Bronchoscopy planned for tomorrow morning  - NPO at midnight  
Biopsy proven b-cell lymphoma. Per last note with heme/onc from May, does not appear patient was receiving treatment as she was asymptomatic and she preferred to follow up with heme/onc in [place].     Plan:  - F/u lung biopsy results from 11/10/20  - F/u w/ Heme/Onc on 11/19/20  
Biopsy proven b-cell lymphoma. Per last note with heme/onc from May, does not appear patient was receiving treatment as she was asymptomatic and she preferred to follow up with heme/onc in [place].     Plan:  - Heme/onc consult  - F/u lung biopsy on 11/10  
Biopsy proven b-cell lymphoma. Per last note with heme/onc from May, does not appear patient was receiving treatment as she was asymptomatic and she preferred to follow up with heme/onc in [place].     Plan:  - Heme/onc consult; appreciate recs  - F/u lung biopsy results on 11/10  
Biopsy proven b-cell lymphoma. Per last note with heme/onc from May, does not appear patient was receiving treatment as she was asymptomatic and she preferred to follow up with heme/onc in [place].     Plan:  - consider heme/onc consult  
Hx COPD, asymptomatic b-cell lymphoma, cryoglobinemic vasculitis, hepatitis B on Entecavir, recent glucocorticoid use presenting for intermittent fevers, myalgia, pleuritic chest pain, weight loss, night sweats, productive sputum x 3 weeks in setting of recent hospitalization in [place] for pneumonia, discharged 1 week prior without improvement in her symptoms. At [facility] ED, VSS and not septic picture. WBC wnl.   - covid negative, no hx of covid per patient  - BNP, troponin WNL  - CXR with bilateral pulmonary infiltrates  - d-dimer 1.2  - DDX: malignancy (?lymphoma), fungal pneumonia, follicular bronchiolitis, sarcoidosis, lymphoid interstitial pneumonia    Plan:  - D/c Zosyn  - CT CAP -- suggestive of pulmonary lymphoproliferative disorder  - Bronchoscopy w/ BAL and biopsy on 11/10  - Consult Heme/onc  - Consider HIV (last negative July 2019) or EBV testing  - pneumonia workup:   - procalcitonin: wnl  - gram stain, sputum cultures  - legionella urine antigen  - fungitell  - s pneumoniae urine antigen   - acetaminophen if febrile, limit to 2000 mg/day    
Hx COPD, asymptomatic b-cell lymphoma, cryoglobinemic vasculitis, hepatitis B on Entecavir, recent glucocorticoid use presenting for intermittent fevers, myalgia, pleuritic chest pain, weight loss, night sweats, productive sputum x 3 weeks in setting of recent hospitalization in [place] for pneumonia, discharged 1 week prior without improvement in her symptoms. At [facility] ED, VSS and not septic picture. WBC wnl.   - covid negative, no hx of covid per patient  - BNP, troponin WNL  - CXR with bilateral pulmonary infiltrates  - d-dimer 1.2  - CT CAP -- suggestive of pulmonary lymphoproliferative disorder  - DDX: malignancy (?lymphoma), fungal pneumonia, follicular bronchiolitis, sarcoidosis, lymphoid interstitial pneumonia    Plan:  - D/c Cefepime; switch to Levaquin q2days for 6 total doses    - Bronchoscopy w/ BAL and biopsy on 11/10   - awaiting results  - Consult Heme/onc, appreciate recs after bronch results  - pneumonia workup:   - procalcitonin: wnl  - gram stain, sputum cultures  - legionella urine antigen  - fungitell  - s pneumoniae urine antigen   - acetaminophen if febrile, limit to 2000 mg/day    
Hx COPD, asymptomatic b-cell lymphoma, cryoglobinemic vasculitis, hepatitis B on Entecavir, recent glucocorticoid use presenting for intermittent fevers, myalgia, pleuritic chest pain, weight loss, night sweats, productive sputum x 3 weeks in setting of recent hospitalization in [place] for pneumonia, discharged 1 week prior without improvement in her symptoms. At [facility] ED, VSS and not septic picture. WBC wnl.     - covid negative, no hx of covid per patient  - BNP, troponin WNL  - CXR with bilateral pulmonary infiltrates  - d-dimer 1.2  - CT CAP -- suggestive of pulmonary lymphoproliferative disorder  - DDX: malignancy (?lymphoma), fungal pneumonia, follicular bronchiolitis, sarcoidosis, lymphoid interstitial pneumonia    Plan:  - Levaquin q2days for 6 total doses; start 11/11 and end 11/23  - Bronchoscopy w/ BAL and biopsy on 11/10  - F/u w/ Heme/Onc as an outpatient; 11/19/20 scheduled  
Hx COPD, asymptomatic b-cell lymphoma, cryoglobinemic vasculitis, hepatitis B on Entecavir, recent glucocorticoid use presenting for intermittent fevers, myalgia, pleuritic chest pain, weight loss, night sweats, productive sputum x 3 weeks in setting of recent hospitalization in [place] for pneumonia, discharged 1 week prior without improvement in her symptoms. At [facility] ED, VSS and not septic picture. WBC wnl.   - covid negative, no hx of covid per patient  - BNP, troponin WNL  - CXR with bilateral pulmonary infiltrates  - d-dimer 1.2  - DDX: malignancy (?lymphoma), fungal pneumonia, follicular bronchiolitis, sarcoidosis, lymphoid interstitial pneumonia    Plan:  - D/c Zosyn, switched to cefepime  - CT CAP -- suggestive of pulmonary lymphoproliferative disorder  - Bronchoscopy w/ BAL and biopsy on 11/10   - awaiting results  - Consult Heme/onc, appreciate recs after bronch results  - pneumonia workup:   - procalcitonin: wnl  - gram stain, sputum cultures  - legionella urine antigen  - fungitell  - s pneumoniae urine antigen   - acetaminophen if febrile, limit to 2000 mg/day    
Hx COPD, asymptomatic b-cell lymphoma, cryoglobinemic vasculitis, hepatitis B on entecavir, recent glucocorticoid use presenting for intermittent fevers, myalgia, pleuritic chest pain, weight loss, night sweats, productive sputum x 3 weeks in setting of recent hospitalization in [place] for pneumonia, discharged 1 week prior without improvement in her symptoms. At [facility] ED, VSS and not septic picture. WBC wnl.   - covid negative, no hx of covid per patient  - BNP, troponin WNL  - CXR with bilateral pulmonary infiltrates  - d-dimer 1.2  - DDX: malignancy (?lymphoma), fungal pneumonia, follicular bronchiolitis, sarcoidosis, lymphoid interstitial pneumonia    Plan:  - zosyn -- can add MRSA coverage if decompensates given recent hospitalization   - CT CAP -- suggestive of pulmonary lymphoproliferative disorder  - pulmonary consult in AM for evaluation for bronchoscopy vs biopsy  - consider heme/onc consult  - ACE level  - pneumonia workup:   - procalcitonin  - gram stain, sputum cultures  - legionella urine antigen  - fungitel  - s pneumoniae urine antigen   - acetaminophen if febrile, limit to 2000 mg/day    
Hx COPD, uses albuterol at home    Plan:  - Duo-nebs PRN for dyspnea    
Hx COPD, uses albuterol at home. She is not on home O2.    Plan:  - Duo-nebs PRN for dyspnea    
Hx DM2, current regimen of 45 units long acting insulin and 10 units mealtime. Last A1C on file from 2019 was 5.7    Plan:  - A1C   - LDSSI for now while NPO  - poct glucose q 4 hours  - consider adding insulin regimen once patient eating    
Hx DM2, current regimen of 45 units long acting insulin and 10 units mealtime. Last A1C on file from 2019 was 5.7. A1c 9.9% now.    Plan:  - LDSSI  - Diabetic diet; 2000cal  - Glucose q 4 hours  - Consider adding insulin regimen once patient eating    
Hx DM2, current regimen of 45 units long acting insulin and 10 units mealtime. Last A1C on file from 2019 was 5.7. A1c 9.9% now.    Plan:  - LDSSI for now while NPO  - Glucose q 4 hours  - Consider adding insulin regimen once patient eating    
Hx DM2, current regimen of 45 units long acting insulin and 10 units mealtime. Last A1C on file from 2019 was 5.7. A1c 9.9% now.  Well controlled b/w 140-180 while inpatient.    Plan:  - LDSSI while inpatient    
Hx MPGN, cryoglobinemic vasculitis, and HBV. Baseline creatinine around 1-1.1. On presentation creatinine 2.6. Patient admits to decreased oral intake 2/2 pneumonia. S/p 1 liter IVF in ED. UA in ED with 1+ protein. Suspect pre-renal etiology.     Plan:  - urine lytes  - urine protein/creatinine  - gentle IVF x 10 hours   - CMP daily  - renally dose medications, avoid nephrotoxins  - consider nephrology consultation given co-morbidities   
Hx MPGN, cryoglobinemic vasculitis, and HBV. Baseline creatinine around 1-1.1. On presentation creatinine 2.6. Patient admits to decreased oral intake 2/2 pneumonia. S/p 1 liter IVF in ED. UA in ED with 1+ protein. Suspect pre-renal etiology. Has been improving since admission; 1.6 on 11/12    Improving  
Hx MPGN, cryoglobinemic vasculitis, and HBV. Baseline creatinine around 1-1.1. On presentation creatinine 2.6. Patient admits to decreased oral intake 2/2 pneumonia. S/p 1 liter IVF in ED. UA in ED with 1+ protein. Suspect pre-renal etiology. Has been improving since admission; 1.8 on 11/11    Improving    Plan:  - CMP daily  - Renally dose medications, avoid nephrotoxins  - Consider nephrology consultation if worsening  
Hx MPGN, cryoglobinemic vasculitis, and HBV. Baseline creatinine around 1-1.1. On presentation creatinine 2.6. Patient admits to decreased oral intake 2/2 pneumonia. S/p 1 liter IVF in ED. UA in ED with 1+ protein. Suspect pre-renal etiology. Has been improving since admission; 2.2 on 11/9.    Improving    Plan:  - CMP daily  - Renally dose medications, avoid nephrotoxins  - Consider nephrology consultation if worsening  
Hx MPGN, cryoglobinemic vasculitis, and HBV. Baseline creatinine around 1-1.1. On presentation creatinine 2.6. Patient admits to decreased oral intake 2/2 pneumonia. S/p 1 liter IVF in ED. UA in ED with 1+ protein. Suspect pre-renal etiology. Has been improving since admission; 2.2 on 11/9.    Plan:  - CMP daily  - Renally dose medications, avoid nephrotoxins  - Consider nephrology consultation if worsening  
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 Confirmed on renal bx in July 2019. Has previously been on IVIG and rituxan and also treated with high dose pulse glucocorticoids. It appears last steroid course was in August 2020.    Plan:  - Continue Entecavir    
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 Confirmed on renal bx in July 2019. Has previously been on IVIG and rituxan and also treated with high dose pulse glucocorticoids. It appears last steroid course was in August 2020.    Plan:  - Continue Entecavir  - Consider nephrology if LACI worsens    
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 Confirmed on renal bx in July 2019. Has previously been on IVIG and rituxan and also treated with high dose pulse glucocorticoids. It appears last steroid course was in August 2020.    Plan:  - Continue Entecavir  - Consider nephrology if LACI worsens    
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 Confirmed on renal bx in July 2019. Has previously been on IVIG and rituxin and also treated with glucocorticoids.    Plan:  - continue entecavir  - consider nephrology consult given LACI    
Hx hepatitis B diagnosed in 2019, only risk factor is homemade tattoo. Has been getting regular follow up and screening for HCC q 6 months.   - LFTs wnl  - last AFP July 2020 wnl    Plan:  - Continue Entecavir every other day  - Patient may be due for repeat abdominal US (last on record from January 2020), can consider repeat while inpatient  - Limit acetaminophen to 2000 mg/day    
Hx hepatitis B diagnosed in 2019, only risk factor is homemade tattoo. Has been getting regular follow up and screening for HCC q 6 months.   - LFTs wnl  - last AFP July 2020 wnl  - last abdominal US January 2020    Plan:  - Continue Entecavir every other day      
Hx hypertension, on losartan, nifedipine, clonidine, carvedilol. Hypertensive in ED. Has prescription for hydralazine but has not filled this recently.    Plan:  - Continue Nifedipine 60 mg  - Continue Clonidine 0.2 mg TID  - Continue Carvedilol 25 BID  - Holding ARB in setting of LACI  
Hx of hypogammaglobulinemia. 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   IgM wnl and IgG low at 623.    
TTE from July 2019 with EF 60%, indeterminate LV function, CVP 15. Taking lasix at home. Does not appear fluid overloaded on exam. Received 1 unit IVF in ED, BNP wnl.    
TTE from July 2019 with EF 60%, indeterminate LV function, CVP 15. Taking lasix at home. Does not appear fluid overloaded on exam. Received 1 unit IVF in ED, BNP wnl.    Plan:  - Consider repeat TTE    
TTE from July 2019 with EF 60%, indeterminate LV function, CVP 15. Taking lasix at home. Does not appear fluid overloaded on exam. Received 1 unit IVF in ED, BNP wnl.    Plan:  - Consider repeat TTE    
TTE from July 2019 with EF 60%, indeterminate LV function, CVP 15. Taking lasix at home. Does not appear fluid overloaded on exam. Received 1 unit IVF in ED, BNP wnl.    Plan:  - consider repeat TTE  - furosemide 40 mg daily  - cautious 10 hours gentle IVF for LACI    
Urine culture now growing Pseudomonas and pt complaining of urgency. L sided CVA tenderness on exam that has been improving.    Plan:  - Levaquin q2days for 6 total doses starting 11/11 and ending on 11/23    
Urine culture now growing Pseudomonas and pt complaining of urgency. L sided CVA tenderness on exam.    Plan:  - Cefepime 2g BID for 14 total days (started 11/9)    
Urine culture now growing Pseudomonas and pt complaining of urgency. L sided CVA tenderness on exam.    Plan:  - D/c Cefepime (started 11/9); started Levaquin q2days for 6 total dosease    
[facility]oster SUN

## 2022-10-03 ENCOUNTER — LAB VISIT (OUTPATIENT)
Dept: LAB | Facility: HOSPITAL | Age: 57
End: 2022-10-03
Attending: STUDENT IN AN ORGANIZED HEALTH CARE EDUCATION/TRAINING PROGRAM
Payer: MEDICAID

## 2022-10-03 ENCOUNTER — TELEPHONE (OUTPATIENT)
Dept: RHEUMATOLOGY | Facility: CLINIC | Age: 57
End: 2022-10-03
Payer: MEDICAID

## 2022-10-03 DIAGNOSIS — D86.9 SARCOIDOSIS: ICD-10-CM

## 2022-10-03 DIAGNOSIS — D89.1 CRYOGLOBULINEMIC VASCULITIS: ICD-10-CM

## 2022-10-03 LAB
BASOPHILS # BLD AUTO: 0.04 K/UL (ref 0–0.2)
BASOPHILS NFR BLD: 0.9 % (ref 0–1.9)
DIFFERENTIAL METHOD: ABNORMAL
EOSINOPHIL # BLD AUTO: 0.1 K/UL (ref 0–0.5)
EOSINOPHIL NFR BLD: 1.1 % (ref 0–8)
ERYTHROCYTE [DISTWIDTH] IN BLOOD BY AUTOMATED COUNT: 16.7 % (ref 11.5–14.5)
ERYTHROCYTE [SEDIMENTATION RATE] IN BLOOD BY WESTERGREN METHOD: 4 MM/HR (ref 0–20)
HCT VFR BLD AUTO: 47.6 % (ref 37–48.5)
HGB BLD-MCNC: 16.1 G/DL (ref 12–16)
IMM GRANULOCYTES # BLD AUTO: 0.01 K/UL (ref 0–0.04)
IMM GRANULOCYTES NFR BLD AUTO: 0.2 % (ref 0–0.5)
LYMPHOCYTES # BLD AUTO: 2 K/UL (ref 1–4.8)
LYMPHOCYTES NFR BLD: 43.9 % (ref 18–48)
MCH RBC QN AUTO: 32.9 PG (ref 27–31)
MCHC RBC AUTO-ENTMCNC: 33.8 G/DL (ref 32–36)
MCV RBC AUTO: 97 FL (ref 82–98)
MONOCYTES # BLD AUTO: 0.3 K/UL (ref 0.3–1)
MONOCYTES NFR BLD: 7 % (ref 4–15)
NEUTROPHILS # BLD AUTO: 2.2 K/UL (ref 1.8–7.7)
NEUTROPHILS NFR BLD: 46.9 % (ref 38–73)
NRBC BLD-RTO: 0 /100 WBC
PLATELET # BLD AUTO: 206 K/UL (ref 150–450)
PMV BLD AUTO: 10.4 FL (ref 9.2–12.9)
RBC # BLD AUTO: 4.9 M/UL (ref 4–5.4)
WBC # BLD AUTO: 4.58 K/UL (ref 3.9–12.7)

## 2022-10-03 PROCEDURE — 85025 COMPLETE CBC W/AUTO DIFF WBC: CPT | Performed by: STUDENT IN AN ORGANIZED HEALTH CARE EDUCATION/TRAINING PROGRAM

## 2022-10-03 PROCEDURE — 82595 ASSAY OF CRYOGLOBULIN: CPT | Performed by: STUDENT IN AN ORGANIZED HEALTH CARE EDUCATION/TRAINING PROGRAM

## 2022-10-03 PROCEDURE — 86160 COMPLEMENT ANTIGEN: CPT | Performed by: STUDENT IN AN ORGANIZED HEALTH CARE EDUCATION/TRAINING PROGRAM

## 2022-10-03 PROCEDURE — 86160 COMPLEMENT ANTIGEN: CPT | Mod: 59 | Performed by: STUDENT IN AN ORGANIZED HEALTH CARE EDUCATION/TRAINING PROGRAM

## 2022-10-03 PROCEDURE — 85651 RBC SED RATE NONAUTOMATED: CPT | Mod: PO | Performed by: STUDENT IN AN ORGANIZED HEALTH CARE EDUCATION/TRAINING PROGRAM

## 2022-10-03 PROCEDURE — 86140 C-REACTIVE PROTEIN: CPT | Performed by: STUDENT IN AN ORGANIZED HEALTH CARE EDUCATION/TRAINING PROGRAM

## 2022-10-03 PROCEDURE — 36415 COLL VENOUS BLD VENIPUNCTURE: CPT | Mod: PO | Performed by: STUDENT IN AN ORGANIZED HEALTH CARE EDUCATION/TRAINING PROGRAM

## 2022-10-03 PROCEDURE — 80053 COMPREHEN METABOLIC PANEL: CPT | Performed by: STUDENT IN AN ORGANIZED HEALTH CARE EDUCATION/TRAINING PROGRAM

## 2022-10-03 NOTE — TELEPHONE ENCOUNTER
Margaret this patient is way overdue for f/u appt with us. Please have Annika or Stephanie schedule Thank you LAN

## 2022-10-04 LAB
ALBUMIN SERPL BCP-MCNC: 4 G/DL (ref 3.5–5.2)
ALP SERPL-CCNC: 173 U/L (ref 55–135)
ALT SERPL W/O P-5'-P-CCNC: 30 U/L (ref 10–44)
ANION GAP SERPL CALC-SCNC: 12 MMOL/L (ref 8–16)
AST SERPL-CCNC: 25 U/L (ref 10–40)
BILIRUB SERPL-MCNC: 0.9 MG/DL (ref 0.1–1)
BUN SERPL-MCNC: 21 MG/DL (ref 6–20)
C3 SERPL-MCNC: 142 MG/DL (ref 50–180)
C4 SERPL-MCNC: 27 MG/DL (ref 11–44)
CALCIUM SERPL-MCNC: 9.8 MG/DL (ref 8.7–10.5)
CHLORIDE SERPL-SCNC: 110 MMOL/L (ref 95–110)
CO2 SERPL-SCNC: 20 MMOL/L (ref 23–29)
CREAT SERPL-MCNC: 1 MG/DL (ref 0.5–1.4)
CRP SERPL-MCNC: 1.2 MG/L (ref 0–8.2)
EST. GFR  (NO RACE VARIABLE): >60 ML/MIN/1.73 M^2
GLUCOSE SERPL-MCNC: 101 MG/DL (ref 70–110)
POTASSIUM SERPL-SCNC: 3.6 MMOL/L (ref 3.5–5.1)
PROT SERPL-MCNC: 6.7 G/DL (ref 6–8.4)
SODIUM SERPL-SCNC: 142 MMOL/L (ref 136–145)

## 2022-10-11 ENCOUNTER — TELEPHONE (OUTPATIENT)
Dept: CARDIOLOGY | Facility: HOSPITAL | Age: 57
End: 2022-10-11
Payer: MEDICAID

## 2022-10-13 ENCOUNTER — HOSPITAL ENCOUNTER (OUTPATIENT)
Dept: CARDIOLOGY | Facility: HOSPITAL | Age: 57
Discharge: HOME | End: 2022-10-13
Attending: INTERNAL MEDICINE
Payer: MEDICAID

## 2022-10-13 ENCOUNTER — TELEPHONE (OUTPATIENT)
Dept: RHEUMATOLOGY | Facility: CLINIC | Age: 57
End: 2022-10-13
Payer: MEDICAID

## 2022-10-13 VITALS — HEART RATE: 65 BPM | HEIGHT: 66 IN | WEIGHT: 211 LBS | BODY MASS INDEX: 33.91 KG/M2

## 2022-10-13 DIAGNOSIS — D86.9 SARCOIDOSIS: ICD-10-CM

## 2022-10-13 LAB
CRYOGLOB SER QL: PRESENT
EJECTION FRACTION- HIGH: 65 %
END DIASTOLIC INDEX-HIGH: 153 ML/M2
END DIASTOLIC INDEX-LOW: 93 ML/M2
END SYSTOLIC INDEX-HIGH: 71 ML/M2
END SYSTOLIC INDEX-LOW: 31 ML/M2
NUC REST DIASTOLIC VOLUME INDEX: 114
NUC REST EJECTION FRACTION: 58
NUC REST SYSTOLIC VOLUME INDEX: 48
PERFUSION DEFECT 1 SIZE IN %: 9 %
RETIRED EF AND QEF - SEE NOTES: 53 %

## 2022-10-13 PROCEDURE — 78433 CV CARDIAC PET SCAN VIABILITY SARCOID: ICD-10-PCS | Mod: 26,,, | Performed by: INTERNAL MEDICINE

## 2022-10-13 PROCEDURE — A9552 F18 FDG: HCPCS

## 2022-10-13 PROCEDURE — 78433 MYOCRD IMG PET 2RTRACER CT: CPT | Mod: 26,,, | Performed by: INTERNAL MEDICINE

## 2022-10-13 RX ORDER — AMINOPHYLLINE 25 MG/ML
75 INJECTION, SOLUTION INTRAVENOUS ONCE
Status: DISCONTINUED | OUTPATIENT
Start: 2022-10-13 | End: 2022-10-14 | Stop reason: HOSPADM

## 2022-10-14 ENCOUNTER — TELEPHONE (OUTPATIENT)
Dept: RHEUMATOLOGY | Facility: CLINIC | Age: 57
End: 2022-10-14
Payer: MEDICAID

## 2022-10-14 DIAGNOSIS — Z12.31 OTHER SCREENING MAMMOGRAM: ICD-10-CM

## 2022-10-14 NOTE — TELEPHONE ENCOUNTER
Called to schedule patient an appointment for Monday, October 24th. Unable to reach patient. Voicemail not set up .     Thank you,   Annika

## 2022-10-17 ENCOUNTER — TELEPHONE (OUTPATIENT)
Dept: INTERNAL MEDICINE | Facility: CLINIC | Age: 57
End: 2022-10-17
Payer: MEDICAID

## 2022-10-17 NOTE — TELEPHONE ENCOUNTER
----- Message from Jey Wilkins sent at 10/15/2022  8:26 AM CDT -----  Contact: Patient  The pt called and would like to schedule a follow up appt    Please call her back at [phone]

## 2022-10-18 NOTE — TELEPHONE ENCOUNTER
Do to her insurance which is [employer] she needs to be overbooked on your schd  When would you like for her to come in?

## 2022-10-19 NOTE — TELEPHONE ENCOUNTER
Ok perfect appt made per Dr Timmons request and left a messge on pt's phone letting her know of the appt

## 2022-10-24 ENCOUNTER — LAB VISIT (OUTPATIENT)
Dept: LAB | Facility: HOSPITAL | Age: 57
End: 2022-10-24
Attending: STUDENT IN AN ORGANIZED HEALTH CARE EDUCATION/TRAINING PROGRAM
Payer: MEDICAID

## 2022-10-24 ENCOUNTER — OFFICE VISIT (OUTPATIENT)
Dept: RHEUMATOLOGY | Facility: CLINIC | Age: 57
End: 2022-10-24
Payer: MEDICAID

## 2022-10-24 ENCOUNTER — TELEPHONE (OUTPATIENT)
Dept: PULMONOLOGY | Facility: CLINIC | Age: 57
End: 2022-10-24
Payer: MEDICAID

## 2022-10-24 VITALS
SYSTOLIC BLOOD PRESSURE: 151 MMHG | WEIGHT: 212.31 LBS | BODY MASS INDEX: 34.12 KG/M2 | DIASTOLIC BLOOD PRESSURE: 96 MMHG | HEIGHT: 66 IN | HEART RATE: 62 BPM

## 2022-10-24 DIAGNOSIS — B19.10 HEPATITIS B INFECTION WITHOUT DELTA AGENT WITHOUT HEPATIC COMA, UNSPECIFIED CHRONICITY: ICD-10-CM

## 2022-10-24 DIAGNOSIS — D86.9 SARCOIDOSIS: Primary | ICD-10-CM

## 2022-10-24 DIAGNOSIS — D86.85 CARDIAC SARCOIDOSIS: ICD-10-CM

## 2022-10-24 DIAGNOSIS — D86.9 SARCOIDOSIS: ICD-10-CM

## 2022-10-24 DIAGNOSIS — N18.30 TYPE 2 DIABETES MELLITUS WITH STAGE 3 CHRONIC KIDNEY DISEASE, WITH LONG-TERM CURRENT USE OF INSULIN, UNSPECIFIED WHETHER STAGE 3A OR 3B CKD: ICD-10-CM

## 2022-10-24 DIAGNOSIS — Z79.4 TYPE 2 DIABETES MELLITUS WITH STAGE 3 CHRONIC KIDNEY DISEASE, WITH LONG-TERM CURRENT USE OF INSULIN, UNSPECIFIED WHETHER STAGE 3A OR 3B CKD: ICD-10-CM

## 2022-10-24 DIAGNOSIS — D86.0 PULMONARY SARCOIDOSIS: ICD-10-CM

## 2022-10-24 DIAGNOSIS — R91.8 MULTIPLE PULMONARY NODULES: ICD-10-CM

## 2022-10-24 DIAGNOSIS — M25.561 RIGHT KNEE PAIN, UNSPECIFIED CHRONICITY: ICD-10-CM

## 2022-10-24 DIAGNOSIS — E11.22 TYPE 2 DIABETES MELLITUS WITH STAGE 3 CHRONIC KIDNEY DISEASE, WITH LONG-TERM CURRENT USE OF INSULIN, UNSPECIFIED WHETHER STAGE 3A OR 3B CKD: ICD-10-CM

## 2022-10-24 DIAGNOSIS — D89.1 CRYOGLOBULINEMIC VASCULITIS: ICD-10-CM

## 2022-10-24 LAB
HAV IGG SER QL IA: NORMAL
HBSAG CONFIRMATION: ABNORMAL
HBV CORE AB SERPL QL IA: REACTIVE
HBV SURFACE AB SER-ACNC: <3 MIU/ML
HBV SURFACE AB SER-ACNC: NORMAL M[IU]/ML
HBV SURFACE AG SERPL QL IA: REACTIVE
HCV AB SERPL QL IA: NORMAL
HIV 1+2 AB+HIV1 P24 AG SERPL QL IA: NORMAL

## 2022-10-24 PROCEDURE — 3066F NEPHROPATHY DOC TX: CPT | Mod: CPTII,,, | Performed by: STUDENT IN AN ORGANIZED HEALTH CARE EDUCATION/TRAINING PROGRAM

## 2022-10-24 PROCEDURE — 1159F MED LIST DOCD IN RCRD: CPT | Mod: CPTII,,, | Performed by: STUDENT IN AN ORGANIZED HEALTH CARE EDUCATION/TRAINING PROGRAM

## 2022-10-24 PROCEDURE — 86682 HELMINTH ANTIBODY: CPT | Performed by: STUDENT IN AN ORGANIZED HEALTH CARE EDUCATION/TRAINING PROGRAM

## 2022-10-24 PROCEDURE — 86382 NEUTRALIZATION TEST VIRAL: CPT | Performed by: STUDENT IN AN ORGANIZED HEALTH CARE EDUCATION/TRAINING PROGRAM

## 2022-10-24 PROCEDURE — 99999 PR PBB SHADOW E&M-EST. PATIENT-LVL IV: ICD-10-PCS | Mod: PBBFAC,,, | Performed by: STUDENT IN AN ORGANIZED HEALTH CARE EDUCATION/TRAINING PROGRAM

## 2022-10-24 PROCEDURE — 99214 OFFICE O/P EST MOD 30 MIN: CPT | Mod: PBBFAC | Performed by: STUDENT IN AN ORGANIZED HEALTH CARE EDUCATION/TRAINING PROGRAM

## 2022-10-24 PROCEDURE — 3044F PR MOST RECENT HEMOGLOBIN A1C LEVEL <7.0%: ICD-10-PCS | Mod: CPTII,,, | Performed by: STUDENT IN AN ORGANIZED HEALTH CARE EDUCATION/TRAINING PROGRAM

## 2022-10-24 PROCEDURE — 3072F LOW RISK FOR RETINOPATHY: CPT | Mod: CPTII,,, | Performed by: STUDENT IN AN ORGANIZED HEALTH CARE EDUCATION/TRAINING PROGRAM

## 2022-10-24 PROCEDURE — 36415 COLL VENOUS BLD VENIPUNCTURE: CPT | Performed by: STUDENT IN AN ORGANIZED HEALTH CARE EDUCATION/TRAINING PROGRAM

## 2022-10-24 PROCEDURE — 86592 SYPHILIS TEST NON-TREP QUAL: CPT | Performed by: STUDENT IN AN ORGANIZED HEALTH CARE EDUCATION/TRAINING PROGRAM

## 2022-10-24 PROCEDURE — 3062F POS MACROALBUMINURIA REV: CPT | Mod: CPTII,,, | Performed by: STUDENT IN AN ORGANIZED HEALTH CARE EDUCATION/TRAINING PROGRAM

## 2022-10-24 PROCEDURE — 3080F DIAST BP >= 90 MM HG: CPT | Mod: CPTII,,, | Performed by: STUDENT IN AN ORGANIZED HEALTH CARE EDUCATION/TRAINING PROGRAM

## 2022-10-24 PROCEDURE — 3066F PR DOCUMENTATION OF TREATMENT FOR NEPHROPATHY: ICD-10-PCS | Mod: CPTII,,, | Performed by: STUDENT IN AN ORGANIZED HEALTH CARE EDUCATION/TRAINING PROGRAM

## 2022-10-24 PROCEDURE — 86803 HEPATITIS C AB TEST: CPT | Performed by: STUDENT IN AN ORGANIZED HEALTH CARE EDUCATION/TRAINING PROGRAM

## 2022-10-24 PROCEDURE — 99215 PR OFFICE/OUTPT VISIT, EST, LEVL V, 40-54 MIN: ICD-10-PCS | Mod: S$PBB,,, | Performed by: STUDENT IN AN ORGANIZED HEALTH CARE EDUCATION/TRAINING PROGRAM

## 2022-10-24 PROCEDURE — 99999 PR PBB SHADOW E&M-EST. PATIENT-LVL IV: CPT | Mod: PBBFAC,,, | Performed by: STUDENT IN AN ORGANIZED HEALTH CARE EDUCATION/TRAINING PROGRAM

## 2022-10-24 PROCEDURE — 1160F RVW MEDS BY RX/DR IN RCRD: CPT | Mod: CPTII,,, | Performed by: STUDENT IN AN ORGANIZED HEALTH CARE EDUCATION/TRAINING PROGRAM

## 2022-10-24 PROCEDURE — 3044F HG A1C LEVEL LT 7.0%: CPT | Mod: CPTII,,, | Performed by: STUDENT IN AN ORGANIZED HEALTH CARE EDUCATION/TRAINING PROGRAM

## 2022-10-24 PROCEDURE — 99215 OFFICE O/P EST HI 40 MIN: CPT | Mod: S$PBB,,, | Performed by: STUDENT IN AN ORGANIZED HEALTH CARE EDUCATION/TRAINING PROGRAM

## 2022-10-24 PROCEDURE — 3062F PR POS MACROALBUMINURIA RESULT DOCUMENTED/REVIEW: ICD-10-PCS | Mod: CPTII,,, | Performed by: STUDENT IN AN ORGANIZED HEALTH CARE EDUCATION/TRAINING PROGRAM

## 2022-10-24 PROCEDURE — 86787 VARICELLA-ZOSTER ANTIBODY: CPT | Performed by: STUDENT IN AN ORGANIZED HEALTH CARE EDUCATION/TRAINING PROGRAM

## 2022-10-24 PROCEDURE — 86704 HEP B CORE ANTIBODY TOTAL: CPT | Performed by: STUDENT IN AN ORGANIZED HEALTH CARE EDUCATION/TRAINING PROGRAM

## 2022-10-24 PROCEDURE — 86790 VIRUS ANTIBODY NOS: CPT | Performed by: STUDENT IN AN ORGANIZED HEALTH CARE EDUCATION/TRAINING PROGRAM

## 2022-10-24 PROCEDURE — 4010F ACE/ARB THERAPY RXD/TAKEN: CPT | Mod: CPTII,,, | Performed by: STUDENT IN AN ORGANIZED HEALTH CARE EDUCATION/TRAINING PROGRAM

## 2022-10-24 PROCEDURE — 86706 HEP B SURFACE ANTIBODY: CPT | Mod: 91 | Performed by: STUDENT IN AN ORGANIZED HEALTH CARE EDUCATION/TRAINING PROGRAM

## 2022-10-24 PROCEDURE — 3072F PR LOW RISK FOR RETINOPATHY: ICD-10-PCS | Mod: CPTII,,, | Performed by: STUDENT IN AN ORGANIZED HEALTH CARE EDUCATION/TRAINING PROGRAM

## 2022-10-24 PROCEDURE — 87389 HIV-1 AG W/HIV-1&-2 AB AG IA: CPT | Performed by: STUDENT IN AN ORGANIZED HEALTH CARE EDUCATION/TRAINING PROGRAM

## 2022-10-24 PROCEDURE — 1160F PR REVIEW ALL MEDS BY PRESCRIBER/CLIN PHARMACIST DOCUMENTED: ICD-10-PCS | Mod: CPTII,,, | Performed by: STUDENT IN AN ORGANIZED HEALTH CARE EDUCATION/TRAINING PROGRAM

## 2022-10-24 PROCEDURE — 4010F PR ACE/ARB THEARPY RXD/TAKEN: ICD-10-PCS | Mod: CPTII,,, | Performed by: STUDENT IN AN ORGANIZED HEALTH CARE EDUCATION/TRAINING PROGRAM

## 2022-10-24 PROCEDURE — 87340 HEPATITIS B SURFACE AG IA: CPT | Performed by: STUDENT IN AN ORGANIZED HEALTH CARE EDUCATION/TRAINING PROGRAM

## 2022-10-24 PROCEDURE — 3080F PR MOST RECENT DIASTOLIC BLOOD PRESSURE >= 90 MM HG: ICD-10-PCS | Mod: CPTII,,, | Performed by: STUDENT IN AN ORGANIZED HEALTH CARE EDUCATION/TRAINING PROGRAM

## 2022-10-24 PROCEDURE — 1159F PR MEDICATION LIST DOCUMENTED IN MEDICAL RECORD: ICD-10-PCS | Mod: CPTII,,, | Performed by: STUDENT IN AN ORGANIZED HEALTH CARE EDUCATION/TRAINING PROGRAM

## 2022-10-24 PROCEDURE — 3077F SYST BP >= 140 MM HG: CPT | Mod: CPTII,,, | Performed by: STUDENT IN AN ORGANIZED HEALTH CARE EDUCATION/TRAINING PROGRAM

## 2022-10-24 PROCEDURE — 3008F PR BODY MASS INDEX (BMI) DOCUMENTED: ICD-10-PCS | Mod: CPTII,,, | Performed by: STUDENT IN AN ORGANIZED HEALTH CARE EDUCATION/TRAINING PROGRAM

## 2022-10-24 PROCEDURE — 3077F PR MOST RECENT SYSTOLIC BLOOD PRESSURE >= 140 MM HG: ICD-10-PCS | Mod: CPTII,,, | Performed by: STUDENT IN AN ORGANIZED HEALTH CARE EDUCATION/TRAINING PROGRAM

## 2022-10-24 PROCEDURE — 3008F BODY MASS INDEX DOCD: CPT | Mod: CPTII,,, | Performed by: STUDENT IN AN ORGANIZED HEALTH CARE EDUCATION/TRAINING PROGRAM

## 2022-10-24 RX ORDER — PREDNISONE 10 MG/1
40 TABLET ORAL DAILY
Qty: 120 TABLET | Refills: 2 | Status: SHIPPED | OUTPATIENT
Start: 2022-10-24 | End: 2022-11-11

## 2022-10-24 ASSESSMENT — ROUTINE ASSESSMENT OF PATIENT INDEX DATA (RAPID3)
PAIN SCORE: 7
TOTAL RAPID3 SCORE: 5
AM STIFFNESS SCORE: 0, NO
PATIENT GLOBAL ASSESSMENT SCORE: 7
FATIGUE SCORE: 5
MDHAQ FUNCTION SCORE: 0.3
PSYCHOLOGICAL DISTRESS SCORE: 2.2

## 2022-10-24 NOTE — TELEPHONE ENCOUNTER
Tried contacting patient to schedule follow up appointment with pulmonary but no answer. I was unable to leave a voicemail because mailbox is not set up.

## 2022-10-24 NOTE — PROGRESS NOTES
I  Have personally take the history and examined the patient and agree with fellow's note as stated above.She needs flu shot and bivalent Covid vaccine but will wait until she is on < 20mg prednisone. Infliximab ordered. Need to check with Hematology-Oncology Dr. Overton given h/o of mantle cell lymphoma. Also overdue for PFTs and Pulmonary f/u. Has appt with Heart Transplant next month. 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 Dr. Timmons will be notified about the increase in prednisone to 40mg daily(minimally effective dose for cardiac sarcoid).

## 2022-10-24 NOTE — PROGRESS NOTES
"Subjective:       Patient ID: Kendy Vital is a 57 y.o. female.    Chief Complaint: Sarcoidosis  HPI     Ms. 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 She is on methotrexate 20mg weekly plus folic acid and prednisone 2.5mg.      Interval History:  10/24/22: Two months ago, she started having pain in right knee with swelling. She denies fevers.  She has been following with Cardiology recent cardiac PET show signs of possible cardiac sarcoid.  She reports intermittent shortness of breath and chest pain. she denies oral ulcers.  She notices burning pain in her legs bilaterally. She had a previous rash last month which consisted of which she describes as red spots on her arms, shoulders, neck, and back bilaterally which has since completely resolved.  She has not followed up with pulmonology and Hematology since last year.  She reports compliance with her methotrexate and prednisone.    3/7/22: She was unable to make last appointment in January so rescheduled for today. She has been having pain in her lower legs for the last 2 months that makes it difficult for her to walk and also reports associated lower back pain. She states that this keeps her up at night and keeps her from sleeping. She states that lack of sleep is a serious problem as well unrested and brain "fog" is also a serious problem. She denies SOB but has not yet followed up with pulmonology. She denies " rash, oral ulcers, joint pain, joint swelling, eye pain, red eye, vision change or chest pain. She is not currently taking HCQ and states that she ran out of this for the past few months. She reports that she is taking MTX but is currently taking half (4 tablets) on Monday and then half (4 tablets) on following Friday.    12/13/21: She was last seen in 6/2021 where her MTX was increased to 20mg and prednisone decreased to 5mg. She was suppose to follow up for 4-6 weeks but was unable to follow up. However, she reports that she ran out of MTX in September. She currently takes prednisone 2.5mg daily.  Additionally, she reports that she had blood-shot, painful eyes with swelling in the top and bottom of the eye that occured 2-3 days ago. Her eye pain has resolved and her eye redness has improved. However, she reports that her vision still appears blurry. She has not had previous episodes of this. She also has had diffuse bilateral leg pain for the past few months.  She had episodes of shingles 2 months ago. She was seen by Pulm on 10/2021 and repeat CT chest was ordered, but not yet scheduled. She reports episodes of heart palpitations but denies chest pain. She has episodes of mild SOB which are improved by her inhaler.    6/18/21: Two weeks ago, she tripped over a tree root and twisted her left ankle. It is still painful and swollen. She went to the ER and they did an Xray which did not show evidence of fracture. She otherwise denies joint pain/swelling, or eye issues. She saw the Optometrist on 6/11 and appeared to be doing well. She still has occasional shortness of breath. She still has a little bright red blood per rectum.     Previous Medication regimen:   prednisone 2.5mg daily   methotrexate 20mg weekly with folic acid supplementation      Review of Systems   Constitutional:  Negative for fatigue, fever and unexpected weight change.   HENT:  Negative for mouth sores and trouble swallowing.    Eyes:  Negative  "for pain and redness.   Respiratory:  Negative for cough and shortness of breath.    Cardiovascular:  Negative for chest pain and palpitations.   Gastrointestinal:  Positive for constipation. Negative for diarrhea, nausea and vomiting.   Genitourinary:  Negative for dysuria, flank pain and genital sores.   Musculoskeletal:  Negative for joint swelling.   Skin:  Negative for rash.   Neurological:  Negative for light-headedness and headaches.   Hematological:  Does not bruise/bleed easily.       Objective:   BP (!) 151/96   Pulse 62   Ht 5' 6" (1.676 m)   Wt 96.3 kg (212 lb 4.9 oz)   BMI 34.27 kg/m²      Physical Exam   Constitutional: No distress.   HENT:   Head: Normocephalic and atraumatic.   Eyes: Conjunctivae are normal.   Cardiovascular: Normal rate, regular rhythm and normal heart sounds. Exam reveals no friction rub.   No murmur heard.  Pulmonary/Chest: Effort normal. She has wheezes. She has rhonchi. She has no rales.   Abdominal: Soft. Bowel sounds are normal. There is no abdominal tenderness. There is no rebound.   Musculoskeletal:      Comments: Pain with palpation of medial joint line right knee.  No overlying erythema or increased warmth.  Limited flexion due to pain. (No effusion on bedside ultrasound.   Neurological: She is alert.   Skin: Skin is warm and dry. She is not diaphoretic.      Chart Review:  C3/C4: norm  ESR: normal  CRP : normal  Cryoglobulin: present    No data to display     Assessment:       1. Sarcoidosis    2. Cardiac sarcoidosis          Ms. 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 She is currently on methotrexate 20mg weekly plus folic acid and prednisone 2.5mg.     She follows up today.  She was recently seen by Cardiology and cardiac- PET was performed which showed active sarcoid.  Therefore, she will likely require intensification of her immunosuppressive treatment.  Will increase prednisone from 2.5 mg to 40 mg daily. Will plan to add TNF inhibitor such as Remicade as well but will need to touch base with hepatology and Hematology prior to this.  Continue methotrexate 20 mg weekly with folic acid supplementation.  Additionally, she will need follow-up with pulmonology and Hematology as well.  Will also the PFTs today.  Will touch base with her PCP as she is diabetic and will need help with management of her sugars when she starts the prednisone 40 mg daily.    Systems:  Cardiac: most recent Cardiac PET scan showing myocardial FDG uptake in the basal lateral and basal anterior wall consistent with active cardiac sarcoidosis. following with cardiology, next appointment on 11/16/22.  Pulm: pulmonary sarcoidosis, needs to see pulmonology   Hepatology: chronic Hep B, on Enctevair ; will need to check with hep if ok to start TNFi  Nephrology: HBV-associated cyroglobulinemic glomerulonephritis; needs to see nephrology in 1/2022, stable  Hematology: Low-grade B-cell lymphoma with plasmacytic differentiation; last seen in 5/2021, was suppose to be seen for 6 month follow up    Plan:       Problem List Items Addressed This Visit          Immunology/Multi System    Sarcoidosis - Primary    Relevant Orders    HIV 1/2 Ag/Ab (4th Gen)    Hepatitis B surface antigen    HBcAB    Hepatitis B surface antibody    Hepatitis C antibody    Hepatitis A antibody, IgG    Strongyloides IgG Antibodies    Quantiferon Gold TB    RPR    Varicella zoster antibody,  IgG    Spriometry w/Tracings    DLCO    Lung Volume    Six Minute Walk     Other Visit Diagnoses       Cardiac sarcoidosis        Relevant Medications    predniSONE (DELTASONE) 10 MG tablet          - Continue current regimen: Methotrexate 20mg weekly (split dose 10mg AM, 10mg PM) plus folic acid today  - start prednisone 40mg daily (10mg tablets); need to touch base with PCP first given that she is a diabetic and will require assistance with her sugars (message sent today).  - PFTs today, need pulmonology follow up, will send message today  - will need hematology follow up to determine if okay to start TNF inhibitor from their standpoint, sent message this today  - will get preDMARDs labs today  - continue hydroxychloroquine 200mg BID  - CT chest from 1/2022 showed interval decrease in large nodules but innumerable micronodules with evidence of pulmonary fibrosis in a Lindsey lymphatic distribution and pulmonary fibrosis; will try to arrange pulmonary follow up today   - Continue Nephrology follow up  - Continue hepatology follow up, currently on entecavir for which she reports compliance, will verify with hepatology okay to start TNF inhibitor from their standpoint  - Vaccine: needs shingles and COVID booster vaccines, encouraged today  - DEXA in 1/2021 with normal BMD, history of tibula fx with fall on slippery floor; Plan to repeat DEXA at next visit  - Vaccines: Moderna vaccines received on 8/5/21 & 9/10/21    Follow up in 6 weeks.     Patient is seen and evaluated with Dr. Solis

## 2022-10-25 ENCOUNTER — TELEPHONE (OUTPATIENT)
Dept: HEMATOLOGY/ONCOLOGY | Facility: CLINIC | Age: 57
End: 2022-10-25
Payer: MEDICAID

## 2022-10-25 DIAGNOSIS — D89.1 CRYOGLOBULINEMIA: ICD-10-CM

## 2022-10-25 DIAGNOSIS — D86.9 SARCOIDOSIS: ICD-10-CM

## 2022-10-25 NOTE — TELEPHONE ENCOUNTER
----- Message from Asiya Webb sent at 10/25/2022 12:42 PM CDT -----  Regarding: Refill  Contact: MUNA VITAL [[id number]]  Type:  RX Refill Request    Who Called: Muna Vital    Refill or New Rx:Refill  RX Name and Strength:methotrexate 2.5 MG Tab  How is the patient currently taking it? (ex. 1XDay):1 every 7days   Is this a 30 day or 90 day RX:96 Tab   Preferred Pharmacy with phone number:[id number] - [place], [place] - [address]  Local or Mail Order:Local   Ordering Provider:Dr Ronak Gibson  Would the patient rather a call back or a response via [facility]? Call back   Best Call Back Number:[phone]  Additional Information:

## 2022-10-25 NOTE — TELEPHONE ENCOUNTER
----- Message from Asiya Webb sent at 10/25/2022 12:42 PM CDT -----  Regarding: Refill  Contact: MUNA VITAL [[id number]]  Type:  RX Refill Request    Who Called: Muna Vital    Refill or New Rx:Refill  RX Name and Strength:methotrexate 2.5 MG Tab  How is the patient currently taking it? (ex. 1XDay):1 every 7days   Is this a 30 day or 90 day RX:96 Tab   Preferred Pharmacy with phone number:[id number] - [place], [place] - [address]  Local or Mail Order:Local   Ordering Provider:Dr Ronak Gibson  Would the patient rather a call back or a response via [facility]? Call back   Best Call Back Number:[phone]  Additional Information:

## 2022-10-25 NOTE — TELEPHONE ENCOUNTER
Refill Routing Note   Medication(s) are not appropriate for processing by [facility] for the following reason(s):      - Outside of protocol    [facility] action(s):  Route          Medication reconciliation completed: No     Appointments  past 12m or future 3m with PCP    Date Provider   Last Visit   11/8/2021 Anibal Timmons MD   Next Visit   11/8/2022 Anibal Timmons MD   ED visits in past 90 days: 0        Note composed:3:31 PM 10/25/2022

## 2022-10-26 LAB
STRONGYLOIDES ANTIBODY IGG: NEGATIVE
VARICELLA INTERPRETATION: POSITIVE
VARICELLA ZOSTER IGG: 2.87 ISR (ref 0–0.9)

## 2022-10-27 ENCOUNTER — TELEPHONE (OUTPATIENT)
Dept: INTERNAL MEDICINE | Facility: CLINIC | Age: 57
End: 2022-10-27
Payer: MEDICAID

## 2022-10-27 NOTE — TELEPHONE ENCOUNTER
ANDREI     Sent a reminder letter to pt     Called pt ; pt did not answer  Unable to leabe message; voicemailbox not set up yet

## 2022-10-27 NOTE — TELEPHONE ENCOUNTER
----- Message from Anibal Timmons MD sent at 10/25/2022  1:06 PM CDT -----  Regarding: RE: Follow-up on mutual patient, starting high-dose prednisone for cardiac sarcoidosis  Hi India - thank you for the update. I have not seen Ms. Vital since 11/2021; her last appt with me was in 9/2022, and she was a no-show. I am happy to titrate her insulin as needed. However, I would need to discuss her current regimen and overall BG management in-person. She has an appointment in early November. I will ask my staff to call and send a reminder letter to her.     Deborah Timmons  ----- Message -----  From: India Arce MD  Sent: 10/24/2022   4:14 PM CDT  To: Anibal Timmons MD  Subject: Follow-up on mutual patient, starting high-d#    Dr. Timmons,  I wanted to touch base from Rheumatology on this mutual patient.  We are currently following her for her sarcoid.  She had a recent the cardiac PET scan which showed cardiac sarcoidosis.  We have increased her prednisone to 40 mg.  However, I asked her to not start this until we were able to touch base with you for assistance with controlling her blood sugars given her history of diabetes type 2.  We would appreciate your assistance helping titrating her insulin continue with blood sugar control given this new need for increased prednisone given her new cardiac sarcoidosis diagnosis.  Please let us know how we can help.    Thanks,  India Arce PGY5

## 2022-10-28 LAB — RPR SER QL: NORMAL

## 2022-10-31 ENCOUNTER — LAB VISIT (OUTPATIENT)
Dept: LAB | Facility: HOSPITAL | Age: 57
End: 2022-10-31
Payer: MEDICAID

## 2022-10-31 ENCOUNTER — OFFICE VISIT (OUTPATIENT)
Dept: HEMATOLOGY/ONCOLOGY | Facility: CLINIC | Age: 57
End: 2022-10-31
Payer: MEDICAID

## 2022-10-31 VITALS
HEIGHT: 66 IN | WEIGHT: 211.06 LBS | TEMPERATURE: 98 F | OXYGEN SATURATION: 98 % | BODY MASS INDEX: 33.92 KG/M2 | RESPIRATION RATE: 24 BRPM | DIASTOLIC BLOOD PRESSURE: 62 MMHG | SYSTOLIC BLOOD PRESSURE: 123 MMHG | HEART RATE: 69 BPM

## 2022-10-31 DIAGNOSIS — D47.9 B-CELL LYMPHOPROLIFERATIVE DISORDER: Primary | ICD-10-CM

## 2022-10-31 DIAGNOSIS — D86.9 SARCOIDOSIS: ICD-10-CM

## 2022-10-31 DIAGNOSIS — D47.9 B-CELL LYMPHOPROLIFERATIVE DISORDER: ICD-10-CM

## 2022-10-31 LAB
ALBUMIN SERPL BCP-MCNC: 3.5 G/DL (ref 3.5–5.2)
ALP SERPL-CCNC: 151 U/L (ref 55–135)
ALT SERPL W/O P-5'-P-CCNC: 38 U/L (ref 10–44)
ANION GAP SERPL CALC-SCNC: 13 MMOL/L (ref 8–16)
AST SERPL-CCNC: 25 U/L (ref 10–40)
BASOPHILS # BLD AUTO: 0.01 K/UL (ref 0–0.2)
BASOPHILS NFR BLD: 0.1 % (ref 0–1.9)
BILIRUB SERPL-MCNC: 0.5 MG/DL (ref 0.1–1)
BUN SERPL-MCNC: 29 MG/DL (ref 6–20)
CALCIUM SERPL-MCNC: 9.7 MG/DL (ref 8.7–10.5)
CHLORIDE SERPL-SCNC: 106 MMOL/L (ref 95–110)
CO2 SERPL-SCNC: 20 MMOL/L (ref 23–29)
CREAT SERPL-MCNC: 1 MG/DL (ref 0.5–1.4)
DIFFERENTIAL METHOD: ABNORMAL
EOSINOPHIL # BLD AUTO: 0 K/UL (ref 0–0.5)
EOSINOPHIL NFR BLD: 0 % (ref 0–8)
ERYTHROCYTE [DISTWIDTH] IN BLOOD BY AUTOMATED COUNT: 15.3 % (ref 11.5–14.5)
EST. GFR  (NO RACE VARIABLE): >60 ML/MIN/1.73 M^2
GLUCOSE SERPL-MCNC: 281 MG/DL (ref 70–110)
HCT VFR BLD AUTO: 43.6 % (ref 37–48.5)
HGB BLD-MCNC: 14.8 G/DL (ref 12–16)
IMM GRANULOCYTES # BLD AUTO: 0.02 K/UL (ref 0–0.04)
IMM GRANULOCYTES NFR BLD AUTO: 0.2 % (ref 0–0.5)
LDH SERPL L TO P-CCNC: 188 U/L (ref 110–260)
LYMPHOCYTES # BLD AUTO: 0.9 K/UL (ref 1–4.8)
LYMPHOCYTES NFR BLD: 11 % (ref 18–48)
MCH RBC QN AUTO: 32 PG (ref 27–31)
MCHC RBC AUTO-ENTMCNC: 33.9 G/DL (ref 32–36)
MCV RBC AUTO: 94 FL (ref 82–98)
MONOCYTES # BLD AUTO: 0.2 K/UL (ref 0.3–1)
MONOCYTES NFR BLD: 2.7 % (ref 4–15)
NEUTROPHILS # BLD AUTO: 7.1 K/UL (ref 1.8–7.7)
NEUTROPHILS NFR BLD: 86 % (ref 38–73)
NRBC BLD-RTO: 0 /100 WBC
PLATELET # BLD AUTO: 264 K/UL (ref 150–450)
PMV BLD AUTO: 9.9 FL (ref 9.2–12.9)
POTASSIUM SERPL-SCNC: 4.4 MMOL/L (ref 3.5–5.1)
PROT SERPL-MCNC: 6.7 G/DL (ref 6–8.4)
RBC # BLD AUTO: 4.62 M/UL (ref 4–5.4)
SODIUM SERPL-SCNC: 139 MMOL/L (ref 136–145)
WBC # BLD AUTO: 8.28 K/UL (ref 3.9–12.7)

## 2022-10-31 PROCEDURE — 3072F PR LOW RISK FOR RETINOPATHY: ICD-10-PCS | Mod: CPTII,,, | Performed by: INTERNAL MEDICINE

## 2022-10-31 PROCEDURE — 36415 COLL VENOUS BLD VENIPUNCTURE: CPT | Performed by: INTERNAL MEDICINE

## 2022-10-31 PROCEDURE — 99214 OFFICE O/P EST MOD 30 MIN: CPT | Mod: S$PBB,,, | Performed by: INTERNAL MEDICINE

## 2022-10-31 PROCEDURE — 3078F PR MOST RECENT DIASTOLIC BLOOD PRESSURE < 80 MM HG: ICD-10-PCS | Mod: CPTII,,, | Performed by: INTERNAL MEDICINE

## 2022-10-31 PROCEDURE — 3072F LOW RISK FOR RETINOPATHY: CPT | Mod: CPTII,,, | Performed by: INTERNAL MEDICINE

## 2022-10-31 PROCEDURE — 3078F DIAST BP <80 MM HG: CPT | Mod: CPTII,,, | Performed by: INTERNAL MEDICINE

## 2022-10-31 PROCEDURE — 4010F ACE/ARB THERAPY RXD/TAKEN: CPT | Mod: CPTII,,, | Performed by: INTERNAL MEDICINE

## 2022-10-31 PROCEDURE — 1159F MED LIST DOCD IN RCRD: CPT | Mod: CPTII,,, | Performed by: INTERNAL MEDICINE

## 2022-10-31 PROCEDURE — 1160F PR REVIEW ALL MEDS BY PRESCRIBER/CLIN PHARMACIST DOCUMENTED: ICD-10-PCS | Mod: CPTII,,, | Performed by: INTERNAL MEDICINE

## 2022-10-31 PROCEDURE — 3074F SYST BP LT 130 MM HG: CPT | Mod: CPTII,,, | Performed by: INTERNAL MEDICINE

## 2022-10-31 PROCEDURE — 3008F PR BODY MASS INDEX (BMI) DOCUMENTED: ICD-10-PCS | Mod: CPTII,,, | Performed by: INTERNAL MEDICINE

## 2022-10-31 PROCEDURE — 1159F PR MEDICATION LIST DOCUMENTED IN MEDICAL RECORD: ICD-10-PCS | Mod: CPTII,,, | Performed by: INTERNAL MEDICINE

## 2022-10-31 PROCEDURE — 1160F RVW MEDS BY RX/DR IN RCRD: CPT | Mod: CPTII,,, | Performed by: INTERNAL MEDICINE

## 2022-10-31 PROCEDURE — 3062F PR POS MACROALBUMINURIA RESULT DOCUMENTED/REVIEW: ICD-10-PCS | Mod: CPTII,,, | Performed by: INTERNAL MEDICINE

## 2022-10-31 PROCEDURE — 99999 PR PBB SHADOW E&M-EST. PATIENT-LVL V: ICD-10-PCS | Mod: PBBFAC,,, | Performed by: INTERNAL MEDICINE

## 2022-10-31 PROCEDURE — 3044F HG A1C LEVEL LT 7.0%: CPT | Mod: CPTII,,, | Performed by: INTERNAL MEDICINE

## 2022-10-31 PROCEDURE — 83615 LACTATE (LD) (LDH) ENZYME: CPT | Performed by: INTERNAL MEDICINE

## 2022-10-31 PROCEDURE — 85025 COMPLETE CBC W/AUTO DIFF WBC: CPT | Performed by: INTERNAL MEDICINE

## 2022-10-31 PROCEDURE — 99215 OFFICE O/P EST HI 40 MIN: CPT | Mod: PBBFAC | Performed by: INTERNAL MEDICINE

## 2022-10-31 PROCEDURE — 80053 COMPREHEN METABOLIC PANEL: CPT | Performed by: INTERNAL MEDICINE

## 2022-10-31 PROCEDURE — 3008F BODY MASS INDEX DOCD: CPT | Mod: CPTII,,, | Performed by: INTERNAL MEDICINE

## 2022-10-31 PROCEDURE — 3074F PR MOST RECENT SYSTOLIC BLOOD PRESSURE < 130 MM HG: ICD-10-PCS | Mod: CPTII,,, | Performed by: INTERNAL MEDICINE

## 2022-10-31 PROCEDURE — 4010F PR ACE/ARB THEARPY RXD/TAKEN: ICD-10-PCS | Mod: CPTII,,, | Performed by: INTERNAL MEDICINE

## 2022-10-31 PROCEDURE — 3062F POS MACROALBUMINURIA REV: CPT | Mod: CPTII,,, | Performed by: INTERNAL MEDICINE

## 2022-10-31 PROCEDURE — 3044F PR MOST RECENT HEMOGLOBIN A1C LEVEL <7.0%: ICD-10-PCS | Mod: CPTII,,, | Performed by: INTERNAL MEDICINE

## 2022-10-31 PROCEDURE — 99999 PR PBB SHADOW E&M-EST. PATIENT-LVL V: CPT | Mod: PBBFAC,,, | Performed by: INTERNAL MEDICINE

## 2022-10-31 PROCEDURE — 99214 PR OFFICE/OUTPT VISIT, EST, LEVL IV, 30-39 MIN: ICD-10-PCS | Mod: S$PBB,,, | Performed by: INTERNAL MEDICINE

## 2022-10-31 PROCEDURE — 3066F PR DOCUMENTATION OF TREATMENT FOR NEPHROPATHY: ICD-10-PCS | Mod: CPTII,,, | Performed by: INTERNAL MEDICINE

## 2022-10-31 PROCEDURE — 3066F NEPHROPATHY DOC TX: CPT | Mod: CPTII,,, | Performed by: INTERNAL MEDICINE

## 2022-10-31 NOTE — PROGRESS NOTES
Route Chart for Scheduling    BMT Chart Routing      Follow up with physician 6 months.   Follow up with GL[facility]    Provider visit type Malignant hem   Infusion scheduling note    Injection scheduling note    Labs CBC, CMP and LDH   Lab interval:  at time of return visit   Imaging    Pharmacy appointment    Other referrals

## 2022-11-07 PROBLEM — N39.0 UTI (URINARY TRACT INFECTION): Status: RESOLVED | Noted: 2020-11-09 | Resolved: 2022-11-07

## 2022-11-07 PROBLEM — D64.89 OTHER SPECIFIED ANEMIAS: Status: RESOLVED | Noted: 2019-07-23 | Resolved: 2022-11-07

## 2022-11-07 PROBLEM — D50.9 IRON DEFICIENCY ANEMIA: Status: RESOLVED | Noted: 2019-07-30 | Resolved: 2022-11-07

## 2022-11-07 NOTE — PROGRESS NOTES
HEMATOLOGIC MALIGNANCIES PROGRESS NOTE    IDENTIFYING STATEMENT   Kendy Hansen) is a 57 y.o. female with a  of 1965 from [place], [place] with the diagnosis of marginal zone lymphoma.      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  Diabetes mellitus, type 2, insulin-dependent  Chronic hepatitis B infection  Sarcoidosis    INTERVAL HISTORY:      Ms. Vital returns to clinic in follow-up of her B-cell lymphoproliferative disorder. She has not been seen in over a year as it was believed she was following at [facility].     She has been seeing rheumatology and is in need of additional disease modifying therapy for sarcoidosis. TNF alpha inhibitors were being considered, and oncologic follow-up was requested.     She feels well. She is anxious about coming back to the oncology clinic.     Past Medical History, Past Social History and Past Family History have been reviewed and are unchanged except as noted in the interval history.    MEDICATIONS:     Current Outpatient Medications on File Prior to Visit   Medication Sig Dispense Refill    albuterol (PROVENTIL/VENTOLIN HFA) 90 mcg/actuation inhaler Inhale 2 puffs into the lungs every 6 (six) hours as needed for Wheezing or Shortness of Breath.  Rescue 18 g 6    allopurinoL (ZYLOPRIM) 100 MG tablet Take 100 mg by mouth once daily. for 90 days      aspirin (ECOTRIN) 81 MG EC tablet Take 81 mg by mouth once daily.      atorvastatin (LIPITOR) 40 MG tablet Take 1 tablet (40 mg total) by mouth once daily. 90 tablet 3    blood sugar diagnostic Strp use to test blood gluocse 2 (two) times daily with meals. 100 strip 11    blood-glucose meter Misc Use as instructed (Patient taking differently: Use as instructed) 1 each 0    carvediloL (COREG) 25 MG tablet Take 1 tablet (25 mg total) by mouth 2 (two) times daily. 90 tablet 3    cyanocobalamin (VITAMIN B-12) 250 MCG tablet Take 250 mcg by mouth once daily.      diclofenac sodium (VOLTAREN) 1 % Gel Apply 4 g topically 4 (four) times daily. Apply to knee 350 g 2    entecavir (BARACLUDE) 0.5 MG Tab Take 1 tablet by mouth once daily 30 tablet 4    EScitalopram oxalate (LEXAPRO) 10 MG tablet Take 1 tablet (10 mg total) by mouth once daily. 90 tablet 3    ferrous sulfate 325 (65 FE) MG EC tablet Take 1 tablet (325 mg total) by mouth once daily.  0    folic acid (FOLVITE) 1 MG tablet Take 1 tablet (1 mg total) by mouth once daily. 90 tablet 2    furosemide (LASIX) 40 MG tablet Take 40 mg lasix once daily 90 tablet 3    gabapentin (NEURONTIN) 300 MG capsule Take 2 capsules (600 mg total) by mouth 2 (two) times daily. 120 capsule 11    HUMULIN R REGULAR U-100 INSULN 100 unit/mL injection Inject 12 Units into the skin 3 (three) times daily before meals. 15 mL 6    hydrOXYchloroQUINE (PLAQUENIL) 200 mg tablet Take 1 tablet (200 mg total) by mouth 2 (two) times daily. 180 tablet 3    insulin (LANTUS SOLOSTAR U-100 INSULIN) glargine 100 units/mL (3mL) SubQ pen Inject 55 Units into the skin every morning. 17 each 3    lancets 30 gauge Misc use to test blood glucose 2 (two) times daily with meals. 100 each 11    lancing device Misc 1 Device by Misc.(Non-Drug; Combo Route) route 2 (two) times daily with meals. 1 each 0    lisinopriL  (PRINIVIL,ZESTRIL) 20 MG tablet Take 1 tablet (20 mg total) by mouth once daily. 30 tablet 11    methotrexate 2.5 MG Tab Take 8 tablets (20 mg total) by mouth every 7 days. 96 tablet 0    multivitamin (THERAGRAN) per tablet Take 1 tablet by mouth once daily.      NIFEdipine (PROCARDIA-XL) 60 MG (OSM) 24 hr tablet Take 1 tablet (60 mg total) by mouth once daily. 90 tablet 3    pantoprazole (PROTONIX) 40 MG tablet Take 1 tablet (40 mg total) by mouth before breakfast. 30 tablet 11    polyethylene glycol (GLYCOLAX) 17 gram/dose powder Take 17 g by mouth daily as needed (constipation). 289 g 5    predniSONE (DELTASONE) 10 MG tablet Take 4 tablets (40 mg total) by mouth once daily. 120 tablet 2    traZODone (DESYREL) 100 MG tablet Take 1 tablet (100 mg total) by mouth nightly as needed for Insomnia. 30 tablet 11    valACYclovir (VALTREX) 1000 MG tablet Take 1,000 mg by mouth 3 (three) times daily.      vitamin D (VITAMIN D3) 1000 units Tab Take 1 tablet (1,000 Units total) by mouth once daily. (Patient taking differently: Take 2,000 Units by mouth once daily.)      acetaminophen (TYLENOL) 500 MG tablet Take 500 mg by mouth 3 (three) times daily as needed for Pain (or fever).       No current facility-administered medications on file prior to visit.       ALLERGIES: Review of patient's allergies indicates:  No Known Allergies     ROS:       Review of Systems   Constitutional:  Negative for diaphoresis, fatigue, fever and unexpected weight change.   HENT:   Negative for lump/mass and sore throat.    Eyes:  Negative for icterus.   Respiratory:  Negative for cough and shortness of breath.    Cardiovascular:  Negative for chest pain and palpitations.   Gastrointestinal:  Negative for abdominal distention, constipation, diarrhea, nausea and vomiting.   Genitourinary:  Negative for dysuria and frequency.    Musculoskeletal:  Negative for arthralgias, gait problem and myalgias.   Skin:  Negative for rash.   Neurological:   "Negative for dizziness, gait problem and headaches.   Hematological:  Negative for adenopathy. Does not bruise/bleed easily.   Psychiatric/Behavioral:  The patient is not nervous/anxious.      PHYSICAL EXAM:  Vitals:    10/31/22 1254   BP: 123/62   Pulse: 69   Resp: (!) 24   Temp: 98.4 °F (36.9 °C)   TempSrc: Oral   SpO2: 98%   Weight: 95.8 kg (211 lb 1.5 oz)   Height: 5' 6" (1.676 m)   PainSc: 0-No pain       KARNOFSKY PERFORMANCE STATUS 80%  ECOG 1    Physical Exam  Constitutional:       General: She is not in acute distress.     Appearance: She is well-developed.   HENT:      Head: Normocephalic and atraumatic.      Mouth/Throat:      Mouth: No oral lesions.   Eyes:      Conjunctiva/sclera: Conjunctivae normal.   Neck:      Thyroid: No thyromegaly.   Cardiovascular:      Rate and Rhythm: Normal rate and regular rhythm.      Heart sounds: Normal heart sounds. No murmur heard.  Pulmonary:      Breath sounds: Normal breath sounds. No wheezing or rales.   Abdominal:      General: There is no distension.      Palpations: Abdomen is soft. There is no hepatomegaly, splenomegaly or mass.      Tenderness: There is no abdominal tenderness.   Lymphadenopathy:      Cervical: No cervical adenopathy.      Right cervical: No deep cervical adenopathy.     Left cervical: No deep cervical adenopathy.   Skin:     Findings: No rash.   Neurological:      Mental Status: She is alert and oriented to person, place, and time.      Cranial Nerves: No cranial nerve deficit.      Coordination: Coordination normal.      Deep Tendon Reflexes: Reflexes are normal and symmetric.       LAB:   Results for orders placed or performed in visit on 10/31/22   CBC auto differential   Result Value Ref Range    WBC 8.28 3.90 - 12.70 K/uL    RBC 4.62 4.00 - 5.40 M/uL    Hemoglobin 14.8 12.0 - 16.0 g/dL    Hematocrit 43.6 37.0 - 48.5 %    MCV 94 82 - 98 fL    MCH 32.0 (H) 27.0 - 31.0 pg    MCHC 33.9 32.0 - 36.0 g/dL    RDW 15.3 (H) 11.5 - 14.5 %    " Platelets 264 150 - 450 K/uL    MPV 9.9 9.2 - 12.9 fL    Immature Granulocytes 0.2 0.0 - 0.5 %    Gran # (ANC) 7.1 1.8 - 7.7 K/uL    Immature Grans (Abs) 0.02 0.00 - 0.04 K/uL    Lymph # 0.9 (L) 1.0 - 4.8 K/uL    Mono # 0.2 (L) 0.3 - 1.0 K/uL    Eos # 0.0 0.0 - 0.5 K/uL    Baso # 0.01 0.00 - 0.20 K/uL    nRBC 0 0 /100 WBC    Gran % 86.0 (H) 38.0 - 73.0 %    Lymph % 11.0 (L) 18.0 - 48.0 %    Mono % 2.7 (L) 4.0 - 15.0 %    Eosinophil % 0.0 0.0 - 8.0 %    Basophil % 0.1 0.0 - 1.9 %    Differential Method Automated    CMP   Result Value Ref Range    Sodium 139 136 - 145 mmol/L    Potassium 4.4 3.5 - 5.1 mmol/L    Chloride 106 95 - 110 mmol/L    CO2 20 (L) 23 - 29 mmol/L    Glucose 281 (H) 70 - 110 mg/dL    BUN 29 (H) 6 - 20 mg/dL    Creatinine 1.0 0.5 - 1.4 mg/dL    Calcium 9.7 8.7 - 10.5 mg/dL    Total Protein 6.7 6.0 - 8.4 g/dL    Albumin 3.5 3.5 - 5.2 g/dL    Total Bilirubin 0.5 0.1 - 1.0 mg/dL    Alkaline Phosphatase 151 (H) 55 - 135 U/L    AST 25 10 - 40 U/L    ALT 38 10 - 44 U/L    Anion Gap 13 8 - 16 mmol/L    eGFR >60.0 >60 mL/min/1.73 m^2   LDH   Result Value Ref Range     110 - 260 U/L       PROBLEMS ASSESSED THIS VISIT:    1. B-cell lymphoproliferative disorder    2. Sarcoidosis        PLAN:       B-cell lymphoproliferative disorder  No clinical findings of progressive disease at this time. Therapy is not currently indicated (she notably was also treated with single agent rituximab for cryoglobulinemic vasculitis in 2019, which would have been active against her lymphoproliferative disorder).    I do not recommend the use of TNF-alpha inhibitors in this patient given their known association with B-cell lymphomas. If possible, would prefer alternative biologic or disease modifying therapy for sarcoidosis.    Follow-up  6 months    Álvaro Overton MD  Hematology and Stem Cell Transplant

## 2022-11-08 ENCOUNTER — IMMUNIZATION (OUTPATIENT)
Dept: INTERNAL MEDICINE | Facility: CLINIC | Age: 57
End: 2022-11-08
Payer: MEDICAID

## 2022-11-08 ENCOUNTER — OFFICE VISIT (OUTPATIENT)
Dept: INTERNAL MEDICINE | Facility: CLINIC | Age: 57
End: 2022-11-08
Payer: MEDICAID

## 2022-11-08 VITALS
HEART RATE: 70 BPM | SYSTOLIC BLOOD PRESSURE: 122 MMHG | DIASTOLIC BLOOD PRESSURE: 74 MMHG | WEIGHT: 208.69 LBS | HEIGHT: 66 IN | OXYGEN SATURATION: 99 % | BODY MASS INDEX: 33.54 KG/M2

## 2022-11-08 DIAGNOSIS — G89.29 CHRONIC PAIN OF BOTH LOWER EXTREMITIES: ICD-10-CM

## 2022-11-08 DIAGNOSIS — M79.604 CHRONIC PAIN OF BOTH LOWER EXTREMITIES: ICD-10-CM

## 2022-11-08 DIAGNOSIS — M79.605 CHRONIC PAIN OF BOTH LOWER EXTREMITIES: ICD-10-CM

## 2022-11-08 DIAGNOSIS — I10 HYPERTENSION, ESSENTIAL: ICD-10-CM

## 2022-11-08 DIAGNOSIS — F32.2 CURRENT SEVERE EPISODE OF MAJOR DEPRESSIVE DISORDER WITHOUT PSYCHOTIC FEATURES WITHOUT PRIOR EPISODE: ICD-10-CM

## 2022-11-08 DIAGNOSIS — N18.2 TYPE 2 DIABETES MELLITUS WITH STAGE 2 CHRONIC KIDNEY DISEASE, WITH LONG-TERM CURRENT USE OF INSULIN: Primary | ICD-10-CM

## 2022-11-08 DIAGNOSIS — Z79.4 TYPE 2 DIABETES MELLITUS WITH STAGE 2 CHRONIC KIDNEY DISEASE, WITH LONG-TERM CURRENT USE OF INSULIN: Primary | ICD-10-CM

## 2022-11-08 DIAGNOSIS — D47.9 B-CELL LYMPHOPROLIFERATIVE DISORDER: ICD-10-CM

## 2022-11-08 DIAGNOSIS — Z91.89 AT RISK FOR STRESS ULCER: ICD-10-CM

## 2022-11-08 DIAGNOSIS — D89.1 CRYOGLOBULINEMIA: ICD-10-CM

## 2022-11-08 DIAGNOSIS — E11.22 TYPE 2 DIABETES MELLITUS WITH STAGE 2 CHRONIC KIDNEY DISEASE, WITH LONG-TERM CURRENT USE OF INSULIN: Primary | ICD-10-CM

## 2022-11-08 DIAGNOSIS — D86.9 SARCOIDOSIS: ICD-10-CM

## 2022-11-08 PROCEDURE — 3078F DIAST BP <80 MM HG: CPT | Mod: CPTII,,, | Performed by: INTERNAL MEDICINE

## 2022-11-08 PROCEDURE — 90686 IIV4 VACC NO PRSV 0.5 ML IM: CPT | Mod: PBBFAC

## 2022-11-08 PROCEDURE — 3044F HG A1C LEVEL LT 7.0%: CPT | Mod: CPTII,,, | Performed by: INTERNAL MEDICINE

## 2022-11-08 PROCEDURE — 3008F PR BODY MASS INDEX (BMI) DOCUMENTED: ICD-10-PCS | Mod: CPTII,,, | Performed by: INTERNAL MEDICINE

## 2022-11-08 PROCEDURE — 99214 OFFICE O/P EST MOD 30 MIN: CPT | Mod: S$PBB,,, | Performed by: INTERNAL MEDICINE

## 2022-11-08 PROCEDURE — 4010F ACE/ARB THERAPY RXD/TAKEN: CPT | Mod: CPTII,,, | Performed by: INTERNAL MEDICINE

## 2022-11-08 PROCEDURE — 3078F PR MOST RECENT DIASTOLIC BLOOD PRESSURE < 80 MM HG: ICD-10-PCS | Mod: CPTII,,, | Performed by: INTERNAL MEDICINE

## 2022-11-08 PROCEDURE — 4010F PR ACE/ARB THEARPY RXD/TAKEN: ICD-10-PCS | Mod: CPTII,,, | Performed by: INTERNAL MEDICINE

## 2022-11-08 PROCEDURE — 3072F PR LOW RISK FOR RETINOPATHY: ICD-10-PCS | Mod: CPTII,,, | Performed by: INTERNAL MEDICINE

## 2022-11-08 PROCEDURE — 3074F PR MOST RECENT SYSTOLIC BLOOD PRESSURE < 130 MM HG: ICD-10-PCS | Mod: CPTII,,, | Performed by: INTERNAL MEDICINE

## 2022-11-08 PROCEDURE — 99213 OFFICE O/P EST LOW 20 MIN: CPT | Mod: PBBFAC | Performed by: INTERNAL MEDICINE

## 2022-11-08 PROCEDURE — 3066F PR DOCUMENTATION OF TREATMENT FOR NEPHROPATHY: ICD-10-PCS | Mod: CPTII,,, | Performed by: INTERNAL MEDICINE

## 2022-11-08 PROCEDURE — 3062F PR POS MACROALBUMINURIA RESULT DOCUMENTED/REVIEW: ICD-10-PCS | Mod: CPTII,,, | Performed by: INTERNAL MEDICINE

## 2022-11-08 PROCEDURE — 3062F POS MACROALBUMINURIA REV: CPT | Mod: CPTII,,, | Performed by: INTERNAL MEDICINE

## 2022-11-08 PROCEDURE — 99999 PR PBB SHADOW E&M-EST. PATIENT-LVL III: CPT | Mod: PBBFAC,,, | Performed by: INTERNAL MEDICINE

## 2022-11-08 PROCEDURE — 3044F PR MOST RECENT HEMOGLOBIN A1C LEVEL <7.0%: ICD-10-PCS | Mod: CPTII,,, | Performed by: INTERNAL MEDICINE

## 2022-11-08 PROCEDURE — 1159F PR MEDICATION LIST DOCUMENTED IN MEDICAL RECORD: ICD-10-PCS | Mod: CPTII,,, | Performed by: INTERNAL MEDICINE

## 2022-11-08 PROCEDURE — 99999 PR PBB SHADOW E&M-EST. PATIENT-LVL III: ICD-10-PCS | Mod: PBBFAC,,, | Performed by: INTERNAL MEDICINE

## 2022-11-08 PROCEDURE — 1159F MED LIST DOCD IN RCRD: CPT | Mod: CPTII,,, | Performed by: INTERNAL MEDICINE

## 2022-11-08 PROCEDURE — 1160F PR REVIEW ALL MEDS BY PRESCRIBER/CLIN PHARMACIST DOCUMENTED: ICD-10-PCS | Mod: CPTII,,, | Performed by: INTERNAL MEDICINE

## 2022-11-08 PROCEDURE — 3066F NEPHROPATHY DOC TX: CPT | Mod: CPTII,,, | Performed by: INTERNAL MEDICINE

## 2022-11-08 PROCEDURE — 1160F RVW MEDS BY RX/DR IN RCRD: CPT | Mod: CPTII,,, | Performed by: INTERNAL MEDICINE

## 2022-11-08 PROCEDURE — 99214 PR OFFICE/OUTPT VISIT, EST, LEVL IV, 30-39 MIN: ICD-10-PCS | Mod: S$PBB,,, | Performed by: INTERNAL MEDICINE

## 2022-11-08 PROCEDURE — 3074F SYST BP LT 130 MM HG: CPT | Mod: CPTII,,, | Performed by: INTERNAL MEDICINE

## 2022-11-08 PROCEDURE — 3072F LOW RISK FOR RETINOPATHY: CPT | Mod: CPTII,,, | Performed by: INTERNAL MEDICINE

## 2022-11-08 PROCEDURE — 3008F BODY MASS INDEX DOCD: CPT | Mod: CPTII,,, | Performed by: INTERNAL MEDICINE

## 2022-11-08 RX ORDER — TRAZODONE HYDROCHLORIDE 100 MG/1
100 TABLET ORAL NIGHTLY PRN
Qty: 30 TABLET | Refills: 11 | Status: SHIPPED | OUTPATIENT
Start: 2022-11-08 | End: 2023-03-24 | Stop reason: SDUPTHER

## 2022-11-08 RX ORDER — PANTOPRAZOLE SODIUM 40 MG/1
40 TABLET, DELAYED RELEASE ORAL
Qty: 30 TABLET | Refills: 11 | Status: SHIPPED | OUTPATIENT
Start: 2022-11-08 | End: 2022-12-16 | Stop reason: SDUPTHER

## 2022-11-08 RX ORDER — METHOTREXATE 2.5 MG/1
20 TABLET ORAL
Qty: 96 TABLET | Refills: 0 | Status: SHIPPED | OUTPATIENT
Start: 2022-11-08 | End: 2023-04-03 | Stop reason: SDUPTHER

## 2022-11-08 NOTE — PROGRESS NOTES
INTERNAL MEDICINE ESTABLISHED PATIENT VISIT NOTE    Subjective:     Chief Complaint: Follow-up       Patient ID: Kendy Vital is a 57 y.o. 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    10/2022 PET Sarcoid test consistent with active cardiac sarcoidosis.     10/2022 Rheum - Currently on methotrexate 20mg weekly plus folic acid and prednisone 2.5mg. increase prednisone from 2.5 mg to 40 mg daily. Plan to add TNF inhibitor such as Remicade as well but will need to touch base with hepatology and Hematology prior to this.  Continue methotrexate 20 mg weekly with folic acid supplementation.  Additionally, she will need follow-up with pulmonology and Hematology as well. PFTs today.  Will touch base with her PCP as she is diabetic and will need help with management of her sugars when she starts the prednisone 40 mg daily.    10/2022 Heme-Onc - No clinical findings of progressive disease at this time. Therapy is not currently indicated (she notably was also treated with single agent rituximab for cryoglobulinemic vasculitis in 2019, which would have been active against her lymphoproliferative disorder). I do not recommend the use of TNF-alpha inhibitors in this patient given their known association with B-cell lymphomas. If possible, would prefer alternative biologic or disease modifying therapy for sarcoidosis.    Started taking Prednisone 40mg daily prescribed by Rheumatology a few weeks ago. Taking Lantus 55u QAM, AM BG between 130-170. Taking Humulin 12u TID with meals, BG before meals 120, after meals 130-160. No significant elevations.     Continuing to have pain in both legs, worse at night. Increased dose of Gabapentin minimally helpful.  "    Past Medical History:  Past Medical History:   Diagnosis Date    Acute (diffuse) proliferative glomerulonephritis     Acute renal failure 7/19/2019    B-cell lymphoproliferative disorder 7/30/2019    CHF (congestive heart failure)     Chronic obstructive lung disease 7/27/2019    Chronic viral hepatitis B without delta agent and without coma 7/24/2019    Cryoglobulinemic vasculitis     Diabetes mellitus     Hypogammaglobulinemia 8/5/2019    Iron deficiency anemia 7/30/2019    Renal vein thrombosis 2015    s/p embolectomy and lovenox for 9 mos    Steroid-induced hyperglycemia 7/28/2019    Uterine leiomyoma     s/p resection       Review of Systems:  Review of Systems   Constitutional:  Positive for diaphoresis. Negative for chills and fever.   HENT:  Negative for congestion.    Respiratory:  Negative for cough and shortness of breath.    Cardiovascular:  Negative for chest pain.   Gastrointestinal:  Negative for constipation, nausea and vomiting.   Genitourinary:  Negative for hematuria and urgency.   Musculoskeletal:  Positive for joint pain. Negative for falls.   Skin:  Negative for rash.   Neurological:  Negative for dizziness and loss of consciousness.     Health Maintenance:   Immunizations:   Influenza - today  Tdap - 7/2019  Covid 19 - 2nd dose completed, to bring documentation  HPV  Prevnar rec at 66 - 7/2019, Pneumovax 11/2021  Shingrix rec at 51 - on MTX, prednisone     Cancer Screening:  PAP: pending  Mammogram:  ordered  Colonoscopy:  pending  DEXA:  n/a    Objective:   /74 (BP Location: Left arm, Patient Position: Sitting, BP Method: Large (Manual))   Pulse 70   Ht 5' 6" (1.676 m)   Wt 94.7 kg (208 lb 10.7 oz)   SpO2 99%   BMI 33.68 kg/m²        General: AAO x3, no apparent distress  HEENT: PERRL  CV: RRR, no m/r/g  Pulm: Lungs CTAB, no crackles, no wheezes  Abd: s/NT/ND +BS  Extremities: no c/c/e    Labs:       Assessment/Plan     Type 2 diabetes mellitus with stage 2 chronic kidney " disease, with long-term current use of insulin  9/2022 HgbA1C 5.6  Started on high dose steroids for new diagnosis of cardiac sarcoidosis  Home BG readings discussed, acceptable; continue current regimen of Lantus 55u AM, Humulin 12u TID with meals  Advised steroids would likely increase postprandial BG readings; advised to increase Humulin to 15u TID WM if BG consistently greater than 170    Hypertension, essential  Controlled, continue current regimen    Chronic pain of both lower extremities  Neuropathic vs other; EMG previously ordered, pending  On Gabapentin; discussed switching to Lyrica vs starting Duloxetine for pain management/mood symptom, however, would need to taper Lexapro first  Given recent start of high dose steroids, defer additional changes to medication regimen   Follow up in 1mo via virtual appt, re-visit pain management at that time    Current severe episode of major depressive disorder without psychotic features without prior episode  Acceptable; on Lexapro, Trazadone presently  Consider change in regimen as described above  -     traZODone (DESYREL) 100 MG tablet; Take 1 tablet (100 mg total) by mouth nightly as needed for Insomnia.  Dispense: 30 tablet; Refill: 11    At risk for stress ulcer  -     pantoprazole (PROTONIX) 40 MG tablet; Take 1 tablet (40 mg total) by mouth before breakfast.  Dispense: 30 tablet; Refill: 11    B-cell lymphoproliferative disorder  Follows with Heme-Onc; s/p rituximab  No additional treatment recommended    Sarcoidosis  Follows with Rheumatology; on weekly MTX with daily folic acid, Plaquenil  10/2022 PET Sarcoid consistent with cardiac involvement; Prednisone increased from 2.5 to 40mg daily   1/2022 TTE EF 65%, nl diastolic function  Upcoming Cardiology appt to further discuss results/treatment plan  -     methotrexate 2.5 MG Tab; Take 8 tablets (20 mg total) by mouth every 7 days.  Dispense: 96 tablet; Refill: 0    Cryoglobulinemia  -     methotrexate 2.5 MG  Tab; Take 8 tablets (20 mg total) by mouth every 7 days.  Dispense: 96 tablet; Refill: 0     HM as above    RTC in 1 mo, sooner if needed.    Deborah Timmons MD  Department of Internal Medicine - [facility]  11/08/2022

## 2022-11-08 NOTE — PATIENT INSTRUCTIONS
If your mealtime blood sugar begins to increase to 170 or higher, increase mealtime insulin to 15units three times daily. Before making this change, please call clinic.     Schedule mammogram.

## 2022-11-11 ENCOUNTER — TELEPHONE (OUTPATIENT)
Dept: RHEUMATOLOGY | Facility: CLINIC | Age: 57
End: 2022-11-11
Payer: MEDICAID

## 2022-11-11 DIAGNOSIS — Z79.4 TYPE 2 DIABETES MELLITUS WITH STAGE 2 CHRONIC KIDNEY DISEASE, WITH LONG-TERM CURRENT USE OF INSULIN: ICD-10-CM

## 2022-11-11 DIAGNOSIS — E11.22 TYPE 2 DIABETES MELLITUS WITH STAGE 2 CHRONIC KIDNEY DISEASE, WITH LONG-TERM CURRENT USE OF INSULIN: ICD-10-CM

## 2022-11-11 DIAGNOSIS — N18.2 TYPE 2 DIABETES MELLITUS WITH STAGE 2 CHRONIC KIDNEY DISEASE, WITH LONG-TERM CURRENT USE OF INSULIN: ICD-10-CM

## 2022-11-11 DIAGNOSIS — D86.85 CARDIAC SARCOIDOSIS: ICD-10-CM

## 2022-11-11 RX ORDER — SULFAMETHOXAZOLE AND TRIMETHOPRIM 800; 160 MG/1; MG/1
1 TABLET ORAL
Qty: 12 TABLET | Refills: 3 | Status: SHIPPED | OUTPATIENT
Start: 2022-11-11 | End: 2022-12-16 | Stop reason: SDUPTHER

## 2022-11-11 RX ORDER — PREDNISONE 10 MG/1
60 TABLET ORAL DAILY
Qty: 120 TABLET | Refills: 2 | Status: SHIPPED | OUTPATIENT
Start: 2022-11-11 | End: 2022-12-16 | Stop reason: SDUPTHER

## 2022-11-11 NOTE — TELEPHONE ENCOUNTER
Called patient to follow up given recent confirmed cardiac sarcoid. She saw her PCP on 11/7 who assisted with insulin titration and started her prednisone 40mg. Discussed with cardiology who recommended prednisone taper (30mg BID x 1wk, then 25mg BID x 1wk, then 20mg BID x 1wk, then 15mg BID x 1wk, then stay on 10mg BID for 6 months). Will plan prednisone as monotherapy, will not pursue TNFi given cardiac risk and history of B cell lymphoma. Sent prednisone (ordered 10mg tablets for ease of dosing so ordered as 60mg x wk, then 50mg x 1 wk, etc) and bactrim today. She is already on PPI. Remains compliant with her Hep B entecavir. Has follow up scheduled with Heart transplant next week.    CHARLYR

## 2022-11-14 RX ORDER — METHOTREXATE 2.5 MG/1
20 TABLET ORAL
Qty: 96 TABLET | Refills: 0 | OUTPATIENT
Start: 2022-11-14

## 2022-11-15 ENCOUNTER — TELEPHONE (OUTPATIENT)
Dept: INTERNAL MEDICINE | Facility: CLINIC | Age: 57
End: 2022-11-15
Payer: MEDICAID

## 2022-11-15 NOTE — TELEPHONE ENCOUNTER
----- Message from Helga Hightower sent at 11/15/2022  2:18 PM CST -----  Contact: [phone]  [email or web address]     Patient is calling for Medical Advice regarding:vomiting / diarrhea / weakness    How long has patient had these symptoms:x 4 days    Pharmacy name and phone#:  [id number] - [place], [place] - [address]  [address] [place] [address]  Phone: [phone] Fax: [phone]        Would like response via [employer]: call    Comments:

## 2022-11-15 NOTE — TELEPHONE ENCOUNTER
Pt advised, she states that she is still vomiting after taking the steroid. Pt states that she also has very little urine coming out when she urinates, along with fatigue. Please advise.

## 2022-11-15 NOTE — TELEPHONE ENCOUNTER
----- Message from Madhuri Saul sent at 11/15/2022  2:07 PM CST -----  Contact: [phone]  Pt said she needs a call back whenever trying to take her meds she throwing up. Please call pt back she has some questions as well.

## 2022-11-15 NOTE — TELEPHONE ENCOUNTER
Patient states she believes the prednisone is making her sick that's she's not keeping anything down after taking. Please advise

## 2022-11-16 ENCOUNTER — OFFICE VISIT (OUTPATIENT)
Dept: TRANSPLANT | Facility: CLINIC | Age: 57
End: 2022-11-16
Payer: MEDICAID

## 2022-11-16 VITALS
BODY MASS INDEX: 34.26 KG/M2 | HEIGHT: 66 IN | SYSTOLIC BLOOD PRESSURE: 110 MMHG | DIASTOLIC BLOOD PRESSURE: 59 MMHG | WEIGHT: 213.19 LBS | HEART RATE: 73 BPM

## 2022-11-16 DIAGNOSIS — E66.9 OBESITY (BMI 30-39.9): ICD-10-CM

## 2022-11-16 DIAGNOSIS — D84.9 IMMUNOSUPPRESSION: ICD-10-CM

## 2022-11-16 DIAGNOSIS — Z79.4 TYPE 2 DIABETES MELLITUS WITH STAGE 2 CHRONIC KIDNEY DISEASE, WITH LONG-TERM CURRENT USE OF INSULIN: ICD-10-CM

## 2022-11-16 DIAGNOSIS — N18.2 CHRONIC KIDNEY DISEASE (CKD), STAGE II (MILD): ICD-10-CM

## 2022-11-16 DIAGNOSIS — D86.9 SARCOIDOSIS: ICD-10-CM

## 2022-11-16 DIAGNOSIS — I10 ESSENTIAL HYPERTENSION: ICD-10-CM

## 2022-11-16 DIAGNOSIS — D86.85 CARDIAC SARCOIDOSIS: Primary | ICD-10-CM

## 2022-11-16 DIAGNOSIS — N18.2 TYPE 2 DIABETES MELLITUS WITH STAGE 2 CHRONIC KIDNEY DISEASE, WITH LONG-TERM CURRENT USE OF INSULIN: ICD-10-CM

## 2022-11-16 DIAGNOSIS — I50.32 CHRONIC DIASTOLIC HEART FAILURE: ICD-10-CM

## 2022-11-16 DIAGNOSIS — R91.8 MULTIPLE PULMONARY NODULES: ICD-10-CM

## 2022-11-16 DIAGNOSIS — E11.22 TYPE 2 DIABETES MELLITUS WITH STAGE 2 CHRONIC KIDNEY DISEASE, WITH LONG-TERM CURRENT USE OF INSULIN: ICD-10-CM

## 2022-11-16 PROCEDURE — 3074F PR MOST RECENT SYSTOLIC BLOOD PRESSURE < 130 MM HG: ICD-10-PCS | Mod: CPTII,,, | Performed by: INTERNAL MEDICINE

## 2022-11-16 PROCEDURE — 3072F LOW RISK FOR RETINOPATHY: CPT | Mod: CPTII,,, | Performed by: INTERNAL MEDICINE

## 2022-11-16 PROCEDURE — 3066F NEPHROPATHY DOC TX: CPT | Mod: CPTII,,, | Performed by: INTERNAL MEDICINE

## 2022-11-16 PROCEDURE — 3008F PR BODY MASS INDEX (BMI) DOCUMENTED: ICD-10-PCS | Mod: CPTII,,, | Performed by: INTERNAL MEDICINE

## 2022-11-16 PROCEDURE — 1159F MED LIST DOCD IN RCRD: CPT | Mod: CPTII,,, | Performed by: INTERNAL MEDICINE

## 2022-11-16 PROCEDURE — 99215 OFFICE O/P EST HI 40 MIN: CPT | Mod: S$PBB,,, | Performed by: INTERNAL MEDICINE

## 2022-11-16 PROCEDURE — 99215 PR OFFICE/OUTPT VISIT, EST, LEVL V, 40-54 MIN: ICD-10-PCS | Mod: S$PBB,,, | Performed by: INTERNAL MEDICINE

## 2022-11-16 PROCEDURE — 4010F ACE/ARB THERAPY RXD/TAKEN: CPT | Mod: CPTII,,, | Performed by: INTERNAL MEDICINE

## 2022-11-16 PROCEDURE — 3008F BODY MASS INDEX DOCD: CPT | Mod: CPTII,,, | Performed by: INTERNAL MEDICINE

## 2022-11-16 PROCEDURE — 3074F SYST BP LT 130 MM HG: CPT | Mod: CPTII,,, | Performed by: INTERNAL MEDICINE

## 2022-11-16 PROCEDURE — 3066F PR DOCUMENTATION OF TREATMENT FOR NEPHROPATHY: ICD-10-PCS | Mod: CPTII,,, | Performed by: INTERNAL MEDICINE

## 2022-11-16 PROCEDURE — 99999 PR PBB SHADOW E&M-EST. PATIENT-LVL IV: CPT | Mod: PBBFAC,,, | Performed by: INTERNAL MEDICINE

## 2022-11-16 PROCEDURE — 99214 OFFICE O/P EST MOD 30 MIN: CPT | Mod: PBBFAC | Performed by: INTERNAL MEDICINE

## 2022-11-16 PROCEDURE — 3044F PR MOST RECENT HEMOGLOBIN A1C LEVEL <7.0%: ICD-10-PCS | Mod: CPTII,,, | Performed by: INTERNAL MEDICINE

## 2022-11-16 PROCEDURE — 3072F PR LOW RISK FOR RETINOPATHY: ICD-10-PCS | Mod: CPTII,,, | Performed by: INTERNAL MEDICINE

## 2022-11-16 PROCEDURE — 1159F PR MEDICATION LIST DOCUMENTED IN MEDICAL RECORD: ICD-10-PCS | Mod: CPTII,,, | Performed by: INTERNAL MEDICINE

## 2022-11-16 PROCEDURE — 3078F PR MOST RECENT DIASTOLIC BLOOD PRESSURE < 80 MM HG: ICD-10-PCS | Mod: CPTII,,, | Performed by: INTERNAL MEDICINE

## 2022-11-16 PROCEDURE — 3044F HG A1C LEVEL LT 7.0%: CPT | Mod: CPTII,,, | Performed by: INTERNAL MEDICINE

## 2022-11-16 PROCEDURE — 99999 PR PBB SHADOW E&M-EST. PATIENT-LVL IV: ICD-10-PCS | Mod: PBBFAC,,, | Performed by: INTERNAL MEDICINE

## 2022-11-16 PROCEDURE — 3062F PR POS MACROALBUMINURIA RESULT DOCUMENTED/REVIEW: ICD-10-PCS | Mod: CPTII,,, | Performed by: INTERNAL MEDICINE

## 2022-11-16 PROCEDURE — 3078F DIAST BP <80 MM HG: CPT | Mod: CPTII,,, | Performed by: INTERNAL MEDICINE

## 2022-11-16 PROCEDURE — 4010F PR ACE/ARB THEARPY RXD/TAKEN: ICD-10-PCS | Mod: CPTII,,, | Performed by: INTERNAL MEDICINE

## 2022-11-16 PROCEDURE — 3062F POS MACROALBUMINURIA REV: CPT | Mod: CPTII,,, | Performed by: INTERNAL MEDICINE

## 2022-11-16 RX ORDER — CEFUROXIME AXETIL 500 MG/1
TABLET ORAL
COMMUNITY
Start: 2022-09-26 | End: 2023-05-24

## 2022-11-16 RX ORDER — CLINDAMYCIN HYDROCHLORIDE 300 MG/1
300 CAPSULE ORAL 3 TIMES DAILY
COMMUNITY
Start: 2022-08-25 | End: 2023-05-24

## 2022-11-16 NOTE — PROGRESS NOTES
Subjective:       HPI:  Ms. 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 She was referred for evaluation for possible cardiac sarcoid. This is her 2nd visit with me. I had ordered a cardiac PET FDG that confirm cardiac sarcoidosis (she laso had a previosu cardiac MRI that was also suggestive of sarcoid). From a cardiac standpoint, she reports NYHA class II symptoms. Occasional PND and orthopnea. Denies any palpitations, chest pain or  syncopal episodes. Does report weight loss at night sweats.  Current cardiac regimen includes; carvedilol 25 mg twice daily, lisinopril 20 mg daily, nifedipine 60 mg daily. Current regimen for sarcoidosis includes; methotrexate 20mg weekly plus folic acid and hydroxycholoroquine 200 mg twice daily. I had discussed with Rheumatology and shared with them our current steroid protocol for cardiac sarcoid. We agreed that we would avoid TNFi given cardiac risk and history of B cell lymphoma.     Cardiac MRI performed on 6/28/2022  LV volumes are normal. LV mass is normal. LVEF = 57 %.   RV volumes are normal. RVEF = 49 %.  Left atrial enlargement  A very small focal area of LGE measuring 1.8mm X 5mm is appreciated in mid myocardium of the basal anterior wall. This may represent sarcoid involvement of the myocardium.     2D Echo with CFD done on 12/13/2021  The estimated ejection fraction is 65%.  The left ventricle is normal in size with normal systolic function.  Normal left ventricular diastolic function.  Normal right  ventricular size with normal right ventricular systolic function.  Normal central venous pressure (3 mmHg).       Past Medical History:   Diagnosis Date    Acute (diffuse) proliferative glomerulonephritis     Acute renal failure 2019    B-cell lymphoproliferative disorder 2019    CHF (congestive heart failure)     Chronic obstructive lung disease 2019    Chronic viral hepatitis B without delta agent and without coma 2019    Cryoglobulinemic vasculitis     Diabetes mellitus     Hypogammaglobulinemia 2019    Iron deficiency anemia 2019    Renal vein thrombosis 2015    s/p embolectomy and lovenox for 9 mos    Steroid-induced hyperglycemia 2019    Uterine leiomyoma     s/p resection     Past Surgical History:   Procedure Laterality Date    AV FISTULA PLACEMENT      CATARACT EXTRACTION W/  INTRAOCULAR LENS IMPLANT Left 2021    Procedure: EXTRACTION, CATARACT, WITH IOL INSERTION;  Surgeon: Allen Alejandro MD;  Location: [facility] OR;  Service: Ophthalmology;  Laterality: Left;    CATARACT EXTRACTION W/  INTRAOCULAR LENS IMPLANT Right 2021    Procedure: EXTRACTION, CATARACT, WITH IOL INSERTION;  Surgeon: Allen Alejandro MD;  Location: [facility] OR;  Service: Ophthalmology;  Laterality: Right;    LUNG BIOPSY N/A 2020    Procedure: BIOPSY, LUNG;  Surgeon: [facility];  Location: [facility] OR;  Service: Radiology;  Laterality: N/A;  8 A.M. START  PHONE PREOP DONE 2020  PT/INR, CBC, CMP AND COVID ON AM OF PROCEDURE   ARRIVAL AT 7:00 FOR 8:30 APPT     OB History          1    Para   1    Term   1            AB        Living             SAB        IAB        Ectopic        Multiple        Live Births                   Review of Systems   Constitutional: Positive for malaise/fatigue, night sweats and weight loss. Negative for chills, decreased appetite, diaphoresis, fever and weight gain.   Eyes: Negative.    Cardiovascular:  Positive for dyspnea on exertion.  "Negative for chest pain, claudication, cyanosis, irregular heartbeat, leg swelling, near-syncope, orthopnea, palpitations, paroxysmal nocturnal dyspnea and syncope.   Respiratory:  Negative for cough, hemoptysis and shortness of breath.    Endocrine: Negative.    Hematologic/Lymphatic: Negative.    Skin:  Negative for color change, dry skin and nail changes.   Musculoskeletal: Negative.    Gastrointestinal: Negative.    Genitourinary: Negative.    Neurological:  Negative for weakness.     Objective:   Blood pressure (!) 110/59, pulse 73, height 5' 6" (1.676 m), weight 96.7 kg (213 lb 3 oz).body mass index is 34.41 kg/m².  Physical Exam  Vitals and nursing note reviewed.   Constitutional:       Appearance: She is well-developed.   HENT:      Head: Normocephalic.   Eyes:      Pupils: Pupils are equal, round, and reactive to light.   Neck:      Vascular: No JVD.   Cardiovascular:      Rate and Rhythm: Normal rate and regular rhythm.      Chest Wall: PMI is not displaced.      Pulses: Intact distal pulses.      Heart sounds: Normal heart sounds. No murmur heard.    No friction rub. No gallop.   Pulmonary:      Effort: Pulmonary effort is normal.      Breath sounds: Normal breath sounds. No wheezing or rales.   Abdominal:      General: Bowel sounds are normal.      Palpations: Abdomen is soft.   Musculoskeletal:      Cervical back: Neck supple.   Neurological:      Mental Status: She is alert and oriented to person, place, and time.       Labs:    Chemistry        Component Value Date/Time     10/31/2022 1209    K 4.4 10/31/2022 1209     10/31/2022 1209    CO2 20 (L) 10/31/2022 1209    BUN 29 (H) 10/31/2022 1209    CREATININE 1.0 10/31/2022 1209     (H) 10/31/2022 1209        Component Value Date/Time    CALCIUM 9.7 10/31/2022 1209    ALKPHOS 151 (H) 10/31/2022 1209    AST 25 10/31/2022 1209    ALT 38 10/31/2022 1209    BILITOT 0.5 10/31/2022 1209    ESTGFRAFRICA >60.0 07/25/2022 1022    EGFRNONAA " >60.0 07/25/2022 1022          Magnesium   Date Value Ref Range Status   11/12/2020 1.7 1.6 - 2.6 mg/dL Final     Lab Results   Component Value Date    WBC 8.28 10/31/2022    HGB 14.8 10/31/2022    HCT 43.6 10/31/2022     10/31/2022     Lab Results   Component Value Date    INR 1.0 07/20/2022    INR 1.0 01/28/2022    INR 1.0 01/28/2022     BNP   Date Value Ref Range Status   11/07/2020 28 0 - 99 pg/mL Final     Comment:     Values of less than 100 pg/ml are consistent with non-CHF populations.   08/03/2019 282 (H) 0 - 99 pg/mL Final     Comment:     Values of less than 100 pg/ml are consistent with non-CHF populations.   07/19/2019 999 (H) 0 - 99 pg/mL Final     Comment:     Values of less than 100 pg/ml are consistent with non-CHF populations.     LD   Date Value Ref Range Status   10/31/2022 188 110 - 260 U/L Final     Comment:     Results are increased in hemolyzed samples.   05/27/2021 230 110 - 260 U/L Final     Comment:     Results are increased in hemolyzed samples.   12/03/2020 167 110 - 260 U/L Final     Comment:     Results are increased in hemolyzed samples.       Assessment:      1. Cardiac sarcoidosis    2. Chronic diastolic heart failure    3. Essential hypertension    4. Immunosuppression    5. Obesity (BMI 30-39.9)    6. Multiple pulmonary nodules    7. Sarcoidosis    8. Type 2 diabetes mellitus with stage 2 chronic kidney disease, with long-term current use of insulin    9. Chronic kidney disease (CKD), stage II (mild)        Plan:   Mrs. Vital is a very pleasant 57 black female with known sarcoidosis on MTX and plaquenil with multiple comorbidities including type DM, Chronic Hep B and cryoglobulinemia and now we newly diagbosed cardiac sarcoidsosis  Started on prednisone 40mg with plan to taper weekly over a 6 month period (30mg BID x 1wk, then 25mg BID x 1wk, then 20mg BID x 1wk, then 15mg BID x 1wk, then stay on 10mg BID for 6 months). Plan to repeat cardiac FDG PET at 6 months.    Continue current cardiac regimen   Continue MTX and plaquenil  Recommend 2 gram sodium restriction and 1500cc fluid restriction.  Encourage physical activity with graded exercise program.  Requested patient to weigh themselves daily, and to notify us if their weight increases by more than 3 lbs in 1 day or 5 lbs in 1 week.   Thank you for allowing me to participate in the care of this very pleasant patient. Do not hesitate to contact me should you have any questions.  RTC in 6 months with labs and repeat cardiac FDG PET       Brina Carranza MD

## 2022-11-22 DIAGNOSIS — B18.1 CHRONIC VIRAL HEPATITIS B WITHOUT DELTA AGENT AND WITHOUT COMA: Primary | ICD-10-CM

## 2022-12-09 ENCOUNTER — TELEPHONE (OUTPATIENT)
Dept: INTERNAL MEDICINE | Facility: CLINIC | Age: 57
End: 2022-12-09

## 2022-12-09 ENCOUNTER — OFFICE VISIT (OUTPATIENT)
Dept: INTERNAL MEDICINE | Facility: CLINIC | Age: 57
End: 2022-12-09
Payer: MEDICAID

## 2022-12-09 DIAGNOSIS — E11.22 TYPE 2 DIABETES MELLITUS WITH STAGE 2 CHRONIC KIDNEY DISEASE, WITH LONG-TERM CURRENT USE OF INSULIN: Primary | ICD-10-CM

## 2022-12-09 DIAGNOSIS — N18.2 TYPE 2 DIABETES MELLITUS WITH STAGE 2 CHRONIC KIDNEY DISEASE, WITH LONG-TERM CURRENT USE OF INSULIN: Primary | ICD-10-CM

## 2022-12-09 DIAGNOSIS — D86.9 SARCOIDOSIS: ICD-10-CM

## 2022-12-09 DIAGNOSIS — Z79.4 TYPE 2 DIABETES MELLITUS WITH STAGE 2 CHRONIC KIDNEY DISEASE, WITH LONG-TERM CURRENT USE OF INSULIN: Primary | ICD-10-CM

## 2022-12-09 PROCEDURE — 99441 PR PHYSICIAN TELEPHONE EVALUATION 5-10 MIN: ICD-10-PCS | Mod: GT,,, | Performed by: INTERNAL MEDICINE

## 2022-12-09 PROCEDURE — 3066F NEPHROPATHY DOC TX: CPT | Mod: CPTII,GT,, | Performed by: INTERNAL MEDICINE

## 2022-12-09 PROCEDURE — 3066F PR DOCUMENTATION OF TREATMENT FOR NEPHROPATHY: ICD-10-PCS | Mod: CPTII,GT,, | Performed by: INTERNAL MEDICINE

## 2022-12-09 PROCEDURE — 1159F PR MEDICATION LIST DOCUMENTED IN MEDICAL RECORD: ICD-10-PCS | Mod: CPTII,GT,, | Performed by: INTERNAL MEDICINE

## 2022-12-09 PROCEDURE — 1160F PR REVIEW ALL MEDS BY PRESCRIBER/CLIN PHARMACIST DOCUMENTED: ICD-10-PCS | Mod: CPTII,GT,, | Performed by: INTERNAL MEDICINE

## 2022-12-09 PROCEDURE — 3044F PR MOST RECENT HEMOGLOBIN A1C LEVEL <7.0%: ICD-10-PCS | Mod: CPTII,GT,, | Performed by: INTERNAL MEDICINE

## 2022-12-09 PROCEDURE — 3062F PR POS MACROALBUMINURIA RESULT DOCUMENTED/REVIEW: ICD-10-PCS | Mod: CPTII,GT,, | Performed by: INTERNAL MEDICINE

## 2022-12-09 PROCEDURE — 3062F POS MACROALBUMINURIA REV: CPT | Mod: CPTII,GT,, | Performed by: INTERNAL MEDICINE

## 2022-12-09 PROCEDURE — 4010F ACE/ARB THERAPY RXD/TAKEN: CPT | Mod: CPTII,GT,, | Performed by: INTERNAL MEDICINE

## 2022-12-09 PROCEDURE — 3044F HG A1C LEVEL LT 7.0%: CPT | Mod: CPTII,GT,, | Performed by: INTERNAL MEDICINE

## 2022-12-09 PROCEDURE — 3072F LOW RISK FOR RETINOPATHY: CPT | Mod: CPTII,GT,, | Performed by: INTERNAL MEDICINE

## 2022-12-09 PROCEDURE — 3072F PR LOW RISK FOR RETINOPATHY: ICD-10-PCS | Mod: CPTII,GT,, | Performed by: INTERNAL MEDICINE

## 2022-12-09 PROCEDURE — 1159F MED LIST DOCD IN RCRD: CPT | Mod: CPTII,GT,, | Performed by: INTERNAL MEDICINE

## 2022-12-09 PROCEDURE — 1160F RVW MEDS BY RX/DR IN RCRD: CPT | Mod: CPTII,GT,, | Performed by: INTERNAL MEDICINE

## 2022-12-09 PROCEDURE — 99441 PR PHYSICIAN TELEPHONE EVALUATION 5-10 MIN: CPT | Mod: GT,,, | Performed by: INTERNAL MEDICINE

## 2022-12-09 PROCEDURE — 4010F PR ACE/ARB THEARPY RXD/TAKEN: ICD-10-PCS | Mod: CPTII,GT,, | Performed by: INTERNAL MEDICINE

## 2022-12-09 NOTE — TELEPHONE ENCOUNTER
Called pt; pt answered    Confirmed with pt that she will be in [place] at time of virtual visit     Pt verbalized understanding

## 2022-12-09 NOTE — PROGRESS NOTES
Established Patient - Audio Only Telehealth Visit     The patient location is: [place]  The chief complaint leading to consultation is: Diabetes  Visit type: Virtual visit with audio only (telephone)  Total time spent with patient: 9    The reason for the audio only service rather than synchronous audio and video virtual visit was related to technical difficulties or patient preference/necessity.     Each patient to whom I provide medical services by telemedicine is:  (1) informed of the relationship between the physician and patient and the respective role of any other health care provider with respect to management of the patient; and (2) notified that they may decline to receive medical services by telemedicine and may withdraw from such care at any time. Patient verbally consented to receive this service via voice-only telephone call.    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    Started on high dose steroids by Rheumatology for management of newly diagnosed cardiac sarcoidosis. Ongoing slow taper, presently on 40mg daily.   Recent home BG readings elevated to 200-300s. BG this . Continuing to take Lantus 55u AM, Humulin 15u TID with meals.    Assessment and plan:    Type 2 diabetes mellitus with stage 2 chronic kidney disease, with long-term current use of insulin - Uncontrolled in setting of PO steroids. Increase Lantus from 55 to 58u and Humulin from 15 to 18u TID. Offered updated prescriptions, deferred. Repeated changes in insulin dosing, pt voiced understanding.    2. Sarcoidosis - Follows with Cardiology, Rheumatology. Continue current treatments per specialties.     This service was not originating from a related E/M service provided within the  previous 7 days nor will  to an E/M service or procedure within the next 24 hours or my soonest available appointment.  Prevailing standard of care was able to be met in this audio-only visit.

## 2022-12-16 ENCOUNTER — TELEPHONE (OUTPATIENT)
Dept: RHEUMATOLOGY | Facility: CLINIC | Age: 57
End: 2022-12-16
Payer: MEDICAID

## 2022-12-16 DIAGNOSIS — Z91.89 AT RISK FOR STRESS ULCER: ICD-10-CM

## 2022-12-16 DIAGNOSIS — D86.85 CARDIAC SARCOIDOSIS: ICD-10-CM

## 2022-12-16 RX ORDER — SULFAMETHOXAZOLE AND TRIMETHOPRIM 800; 160 MG/1; MG/1
1 TABLET ORAL
Qty: 12 TABLET | Refills: 3 | Status: SHIPPED | OUTPATIENT
Start: 2022-12-16 | End: 2023-04-03 | Stop reason: SDUPTHER

## 2022-12-16 RX ORDER — PREDNISONE 10 MG/1
20 TABLET ORAL DAILY
Qty: 60 TABLET | Refills: 2 | Status: SHIPPED | OUTPATIENT
Start: 2022-12-16 | End: 2023-05-01 | Stop reason: SDUPTHER

## 2022-12-16 RX ORDER — PANTOPRAZOLE SODIUM 40 MG/1
40 TABLET, DELAYED RELEASE ORAL
Qty: 30 TABLET | Refills: 3 | Status: SHIPPED | OUTPATIENT
Start: 2022-12-16 | End: 2023-05-01 | Stop reason: SDUPTHER

## 2022-12-16 NOTE — TELEPHONE ENCOUNTER
Called patient to follow up. She is currently on prednisone 30mg with plan to decrease to prednisone 20mg for the next 6 months with plan per cardiology note. Prednisone and bactrim refilled at her request.    LBR

## 2023-01-06 ENCOUNTER — TELEPHONE (OUTPATIENT)
Dept: INTERNAL MEDICINE | Facility: CLINIC | Age: 58
End: 2023-01-06

## 2023-01-06 ENCOUNTER — OFFICE VISIT (OUTPATIENT)
Dept: INTERNAL MEDICINE | Facility: CLINIC | Age: 58
End: 2023-01-06
Payer: MEDICAID

## 2023-01-06 DIAGNOSIS — N18.2 TYPE 2 DIABETES MELLITUS WITH STAGE 2 CHRONIC KIDNEY DISEASE, WITH LONG-TERM CURRENT USE OF INSULIN: Primary | ICD-10-CM
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
## 2023-01-06 DIAGNOSIS — E11.22 TYPE 2 DIABETES MELLITUS WITH STAGE 2 CHRONIC KIDNEY DISEASE, WITH LONG-TERM CURRENT USE OF INSULIN: Primary | ICD-10-CM

## 2023-01-06 DIAGNOSIS — Z79.4 TYPE 2 DIABETES MELLITUS WITH STAGE 2 CHRONIC KIDNEY DISEASE, WITH LONG-TERM CURRENT USE OF INSULIN: Primary | ICD-10-CM

## 2023-01-06 PROCEDURE — 1160F PR REVIEW ALL MEDS BY PRESCRIBER/CLIN PHARMACIST DOCUMENTED: ICD-10-PCS | Mod: CPTII,GT,, | Performed by: INTERNAL MEDICINE

## 2023-01-06 PROCEDURE — 1159F MED LIST DOCD IN RCRD: CPT | Mod: CPTII,GT,, | Performed by: INTERNAL MEDICINE

## 2023-01-06 PROCEDURE — 99441 PR PHYSICIAN TELEPHONE EVALUATION 5-10 MIN: ICD-10-PCS | Mod: GT,,, | Performed by: INTERNAL MEDICINE

## 2023-01-06 PROCEDURE — 1160F RVW MEDS BY RX/DR IN RCRD: CPT | Mod: CPTII,GT,, | Performed by: INTERNAL MEDICINE

## 2023-01-06 PROCEDURE — 99441 PR PHYSICIAN TELEPHONE EVALUATION 5-10 MIN: CPT | Mod: GT,,, | Performed by: INTERNAL MEDICINE

## 2023-01-06 PROCEDURE — 1159F PR MEDICATION LIST DOCUMENTED IN MEDICAL RECORD: ICD-10-PCS | Mod: CPTII,GT,, | Performed by: INTERNAL MEDICINE

## 2023-01-06 NOTE — TELEPHONE ENCOUNTER
----- Message from Wendi Carver sent at 1/6/2023  4:43 PM CST -----  Contact: [phone]  Patient is returning a phone call.  Who left a message for the patient: Anibal Timmons MD     Does patient know what this is regarding:  yes   Would you like a call back, or a response through your [facility] portal?:   call back   Comments:

## 2023-01-06 NOTE — PROGRESS NOTES
Established Patient - Audio Only Telehealth Visit     The patient location is: [place]  The chief complaint leading to consultation is: Blood sugar  Visit type: Virtual visit with audio only (telephone)  Total time spent with patient: 3 min     The reason for the audio only service rather than synchronous audio and video virtual visit was related to technical difficulties or patient preference/necessity.     Each patient to whom I provide medical services by telemedicine is:  (1) informed of the relationship between the physician and patient and the respective role of any other health care provider with respect to management of the patient; and (2) notified that they may decline to receive medical services by telemedicine and may withdraw from such care at any time. Patient verbally consented to receive this service via voice-only telephone call.    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    On high dose steroids by Rheumatology for management of newly diagnosed cardiac sarcoidosis.    At last visit, Lantus increased from 55 to 58u and Humulin from 15 to 18u TID. Today, reports continued elevations of BG. Typically in 200s in AM and meals.     While discussing proposed changes to insulin regimen, call disconnected. Attempted to call back multiple times over 30 minutes, pt did not .     Assessment and plan:      Type 2 diabetes mellitus with stage 2 chronic kidney disease, with long-term current use of insulin  BG uncontrolled per home readings while on steroids; unable to determine current steroid dosage prior to call ending  Attempted to reach pt over 5 times after call dropped, unable to connect  Recommend increasing Lantus to 64u, Humulin 22u TID WM;  will try reaching pt again within 24 hours     This service was not originating from a related E/M service provided within the previous 7 days nor will  to an E/M service or procedure within the next 24 hours or my soonest available appointment.  Prevailing standard of care was able to be met in this audio-only visit.

## 2023-01-06 NOTE — TELEPHONE ENCOUNTER
Attempted to call pt back approximately 5 times between 4 and 4:30pm to complete visit. Pt did not answer and unable to leave voicemail. Will try to reach pt again within 24 hours.

## 2023-01-06 NOTE — TELEPHONE ENCOUNTER
----- Message from Wilder Sawyer MA sent at 1/6/2023  3:36 PM CST -----  Contact: [phone]  Pt calling back after virtual call dropped    Please advise   ----- Message -----  From: Wendi Carver  Sent: 1/6/2023   3:35 PM CST  To: Shanelle Barnett Staff    Pt states she had a call from the  she states the phone dropped the call please give her a return call

## 2023-01-09 ENCOUNTER — TELEPHONE (OUTPATIENT)
Dept: INTERNAL MEDICINE | Facility: CLINIC | Age: 58
End: 2023-01-09
Payer: MEDICAID

## 2023-01-09 RX ORDER — INSULIN GLARGINE 100 [IU]/ML
64 INJECTION, SOLUTION SUBCUTANEOUS EVERY MORNING
Qty: 60 ML | Refills: 3 | Status: SHIPPED | OUTPATIENT
Start: 2023-01-09

## 2023-01-09 RX ORDER — TRAMADOL HYDROCHLORIDE 50 MG/1
50 TABLET ORAL EVERY 12 HOURS PRN
Qty: 14 TABLET | Refills: 0 | Status: SHIPPED | OUTPATIENT
Start: 2023-01-09 | End: 2023-01-16

## 2023-01-09 RX ORDER — INSULIN HUMAN 100 [IU]/ML
22 INJECTION, SOLUTION PARENTERAL
Qty: 60 ML | Refills: 3 | Status: SHIPPED | OUTPATIENT
Start: 2023-01-09 | End: 2023-06-15

## 2023-01-09 NOTE — TELEPHONE ENCOUNTER
----- Message from Judy Menchaca sent at 1/9/2023 10:46 AM CST -----  Contact: self [phone]  Per pt returning a call.    Please call and advise
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
## 2023-01-11 ENCOUNTER — HOSPITAL ENCOUNTER (OUTPATIENT)
Dept: RADIOLOGY | Facility: HOSPITAL | Age: 58
Discharge: HOME | End: 2023-01-11
Attending: NURSE PRACTITIONER
Payer: MEDICAID

## 2023-01-11 DIAGNOSIS — B18.1 CHRONIC VIRAL HEPATITIS B WITHOUT DELTA AGENT AND WITHOUT COMA: ICD-10-CM

## 2023-01-11 PROCEDURE — 76700 US EXAM ABDOM COMPLETE: CPT | Mod: TC,PO

## 2023-01-11 PROCEDURE — 76700 US ABDOMEN COMPLETE: ICD-10-PCS | Mod: 26,,, | Performed by: STUDENT IN AN ORGANIZED HEALTH CARE EDUCATION/TRAINING PROGRAM

## 2023-01-11 PROCEDURE — 76700 US EXAM ABDOM COMPLETE: CPT | Mod: 26,,, | Performed by: STUDENT IN AN ORGANIZED HEALTH CARE EDUCATION/TRAINING PROGRAM

## 2023-01-13 ENCOUNTER — TELEPHONE (OUTPATIENT)
Dept: HEPATOLOGY | Facility: CLINIC | Age: 58
End: 2023-01-13
Payer: MEDICAID

## 2023-01-13 NOTE — TELEPHONE ENCOUNTER
Message received from pre service that her insurance does not cover the fibroscan    Please call pt and notify her of hte message, cancel fibroscan. Please instruct her to call her insurance plan before our visit to check to see if our visit will be covered by her insurance    Thanks !

## 2023-01-13 NOTE — TELEPHONE ENCOUNTER
Spoke with patient, she called her insurance and she said that they said that they will cover her fibro scan and her appointment.

## 2023-01-13 NOTE — TELEPHONE ENCOUNTER
----- Message from Paola Bahena sent at 1/12/2023  1:27 PM CST -----  Regarding: RE: FIBROSCAN  Contact: preservice  Voicemail left for the patient.  ----- Message -----  From: Yessica Rosales NP  Sent: 1/12/2023  12:52 PM CST  To: Paola Bahena  Subject: FW: FIBROSCAN                                    Hi    Was the patient notified that the fibroscan is out of network ?    ----- Message -----  From: Ling Suarez MA  Sent: 1/12/2023  12:39 PM CST  To: Yessica Rosales NP  Subject: FW: FIBROSCAN                                      ----- Message -----  From: Paola Bahena  Sent: 1/12/2023  11:55 AM CST  To: Bobby Beard  Subject: FIBROSCAN                                        The FIBROSCAN authorization request is denied because [facility] is out of network [place] the [place] [facility] [place] policy and the patient does not have out of network benefits. If the patient still decides to be seen he/she may receive a bill for all services rendered. Please contact the patient with treatment options.

## 2023-01-18 ENCOUNTER — PROCEDURE VISIT (OUTPATIENT)
Dept: HEPATOLOGY | Facility: CLINIC | Age: 58
End: 2023-01-18
Payer: MEDICAID

## 2023-01-18 ENCOUNTER — OFFICE VISIT (OUTPATIENT)
Dept: HEPATOLOGY | Facility: CLINIC | Age: 58
End: 2023-01-18
Payer: MEDICAID

## 2023-01-18 VITALS
HEIGHT: 65 IN | DIASTOLIC BLOOD PRESSURE: 85 MMHG | SYSTOLIC BLOOD PRESSURE: 137 MMHG | BODY MASS INDEX: 34.82 KG/M2 | HEART RATE: 74 BPM | WEIGHT: 209 LBS | OXYGEN SATURATION: 98 % | TEMPERATURE: 98 F

## 2023-01-18 DIAGNOSIS — K83.8 DILATED BILE DUCT: ICD-10-CM

## 2023-01-18 DIAGNOSIS — Z23 NEED FOR HEPATITIS A VACCINATION: ICD-10-CM

## 2023-01-18 DIAGNOSIS — R74.8 ELEVATED ALKALINE PHOSPHATASE LEVEL: ICD-10-CM

## 2023-01-18 DIAGNOSIS — E66.9 OBESITY (BMI 30-39.9): ICD-10-CM

## 2023-01-18 DIAGNOSIS — B18.1 CHRONIC VIRAL HEPATITIS B WITHOUT DELTA AGENT AND WITHOUT COMA: Primary | ICD-10-CM

## 2023-01-18 DIAGNOSIS — B18.1 CHRONIC VIRAL HEPATITIS B WITHOUT DELTA AGENT AND WITHOUT COMA: ICD-10-CM

## 2023-01-18 PROCEDURE — 99999 PR PBB SHADOW E&M-EST. PATIENT-LVL V: CPT | Mod: PBBFAC,,, | Performed by: NURSE PRACTITIONER

## 2023-01-18 PROCEDURE — 3079F PR MOST RECENT DIASTOLIC BLOOD PRESSURE 80-89 MM HG: ICD-10-PCS | Mod: CPTII,,, | Performed by: NURSE PRACTITIONER

## 2023-01-18 PROCEDURE — 99215 OFFICE O/P EST HI 40 MIN: CPT | Mod: PBBFAC | Performed by: NURSE PRACTITIONER

## 2023-01-18 PROCEDURE — 76981 USE PARENCHYMA: CPT | Mod: 26,S$PBB,, | Performed by: NURSE PRACTITIONER

## 2023-01-18 PROCEDURE — 99214 OFFICE O/P EST MOD 30 MIN: CPT | Mod: S$PBB,,, | Performed by: NURSE PRACTITIONER

## 2023-01-18 PROCEDURE — 99999 PR PBB SHADOW E&M-EST. PATIENT-LVL V: ICD-10-PCS | Mod: PBBFAC,,, | Performed by: NURSE PRACTITIONER

## 2023-01-18 PROCEDURE — 1160F PR REVIEW ALL MEDS BY PRESCRIBER/CLIN PHARMACIST DOCUMENTED: ICD-10-PCS | Mod: CPTII,,, | Performed by: NURSE PRACTITIONER

## 2023-01-18 PROCEDURE — 3008F BODY MASS INDEX DOCD: CPT | Mod: CPTII,,, | Performed by: NURSE PRACTITIONER

## 2023-01-18 PROCEDURE — 1160F RVW MEDS BY RX/DR IN RCRD: CPT | Mod: CPTII,,, | Performed by: NURSE PRACTITIONER

## 2023-01-18 PROCEDURE — 3075F PR MOST RECENT SYSTOLIC BLOOD PRESS GE 130-139MM HG: ICD-10-PCS | Mod: CPTII,,, | Performed by: NURSE PRACTITIONER

## 2023-01-18 PROCEDURE — 3075F SYST BP GE 130 - 139MM HG: CPT | Mod: CPTII,,, | Performed by: NURSE PRACTITIONER

## 2023-01-18 PROCEDURE — 91200 LIVER ELASTOGRAPHY: CPT | Mod: PBBFAC | Performed by: NURSE PRACTITIONER

## 2023-01-18 PROCEDURE — 1159F PR MEDICATION LIST DOCUMENTED IN MEDICAL RECORD: ICD-10-PCS | Mod: CPTII,,, | Performed by: NURSE PRACTITIONER

## 2023-01-18 PROCEDURE — 3008F PR BODY MASS INDEX (BMI) DOCUMENTED: ICD-10-PCS | Mod: CPTII,,, | Performed by: NURSE PRACTITIONER

## 2023-01-18 PROCEDURE — 99214 PR OFFICE/OUTPT VISIT, EST, LEVL IV, 30-39 MIN: ICD-10-PCS | Mod: S$PBB,,, | Performed by: NURSE PRACTITIONER

## 2023-01-18 PROCEDURE — 3079F DIAST BP 80-89 MM HG: CPT | Mod: CPTII,,, | Performed by: NURSE PRACTITIONER

## 2023-01-18 PROCEDURE — 1159F MED LIST DOCD IN RCRD: CPT | Mod: CPTII,,, | Performed by: NURSE PRACTITIONER

## 2023-01-18 PROCEDURE — 76981 FIBROSCAN (VIBRATION CONTROLLED TRANSIENT ELASTOGRAPHY): ICD-10-PCS | Mod: 26,S$PBB,, | Performed by: NURSE PRACTITIONER

## 2023-01-18 RX ORDER — HYDROCODONE BITARTRATE AND ACETAMINOPHEN 5; 325 MG/1; MG/1
1-2 TABLET ORAL EVERY 6 HOURS PRN
COMMUNITY
Start: 2022-12-04 | End: 2023-05-24 | Stop reason: SDUPTHER

## 2023-01-18 RX ORDER — ENTECAVIR 0.5 MG/1
0.5 TABLET, FILM COATED ORAL DAILY
Qty: 30 TABLET | Refills: 6 | Status: SHIPPED | OUTPATIENT
Start: 2023-01-18 | End: 2023-07-18 | Stop reason: SDUPTHER

## 2023-01-18 NOTE — PROGRESS NOTES
"[facility] Established Patient Visit    Reason for Visit:  chronic Hep B, elevated alk phos, dilated bile ducts    PCP: Anibal Timmons    HPI:  This is a 57 y.o. female with PMH noted below, here for follow up of above     Was diagnosed during hospitalization 7/2019 (hospitalized for eval of thrombocytopenia, Rheumatology and Nephrology continued to follow for management of her Cryolobinemic Vasculitis with Glomerulonephritis) , no history or knowledge of Hep B before then. Was starting on Entecavir during hospitalization 7/2019     Risk factors:  Denies mother or sexual partners with Hep B, no travel outside the country, no h/o HIV, no h/o IVDU or intranasal drug use, no  no blood transfusions before 1992  Only notable risk factor is homeade tattoo "years ago"  (at home from )     Previous serologic w/u negative for hemochromatosis     Liver fibrosis staging:   -- Fibroscan 1/2020 noted no fibrosis (kPA 4.1), S1 steatosis ()  -- fibroscan 1/2023 noted F0, S0      Interval HPI: Presents today alone. Fibroscan normal   Taking Entecavir 0.5 mg daily, no issues  Sinan lives with pt, reports she was vaccinated against Hep B  Elevated alk phos on labs, dilated bile duct on imaging - needs MRCP  eAb positive on labs, +sAg, DNA <10, not detected   Negative delta 11/2019  Intermittent RUQ pain      Labs done 1/2023 show normal transaminase levels, synthetic liver function  Normal  Elevated alk phos     Lab Results   Component Value Date    ALT 25 01/11/2023    AST 19 01/11/2023    ALKPHOS 147 (H) 01/11/2023    BILITOT 0.7 01/11/2023    ALBUMIN 3.6 01/11/2023    INR 1.0 01/11/2023     10/31/2022     Hep C and HIV testing negative        HCC screening:  Abd U/S no lesions, next due 7/2023  AFP WNL,  next due 7/2023  Lab Results   Component Value Date    AFP 2.8 01/11/2023     Previous EGD -not indicated currently      Risk factors for NAFLD include obesity, HTN, DM     Denies " family history of liver disease . 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    PSHX:  has a past surgical history that includes Lung biopsy (N/A, 12/1/2020); AV fistula placement; Cataract extraction w/  intraocular lens implant (Left, 4/22/2021); and Cataract extraction w/  intraocular lens implant (Right, 5/6/2021).    The patient's social and family histories were reviewed by me and updated in the appropriate section of the electronic medical record.    Review of patient's allergies indicates:  No Known Allergies    Current Outpatient Medications on File Prior to Visit   Medication Sig Dispense Refill    acetaminophen (TYLENOL) 500 MG tablet Take 500 mg by mouth 3 (three) times daily as needed for Pain (or fever).      albuterol (PROVENTIL/VENTOLIN HFA) 90 mcg/actuation inhaler Inhale 2 puffs into the lungs every 6 (six) hours as needed for Wheezing or Shortness of Breath. Rescue 18 g 6    allopurinoL (ZYLOPRIM) 100 MG tablet Take 100 mg by mouth once daily. for 90 days      aspirin (ECOTRIN) 81 MG EC tablet Take 81 mg by mouth once daily.      atorvastatin (LIPITOR) 40 MG tablet Take 1 tablet (40 mg total) by mouth once daily. 90 tablet 3    blood sugar diagnostic Strp use to test blood gluocse 2 (two) times daily with meals. 100 strip 11    blood-glucose meter Misc Use as instructed (Patient  taking differently: Use as instructed) 1 each 0    cefUROXime (CEFTIN) 500 MG tablet SMARTSI Tablet(s) By Mouth Every 12 Hours      clindamycin (CLEOCIN) 300 MG capsule Take 300 mg by mouth 3 (three) times daily.      cyanocobalamin (VITAMIN B-12) 250 MCG tablet Take 250 mcg by mouth once daily.      diclofenac sodium (VOLTAREN) 1 % Gel Apply 4 g topically 4 (four) times daily. Apply to knee 350 g 2    entecavir (BARACLUDE) 0.5 MG Tab Take 1 tablet by mouth once daily 30 tablet 4    EScitalopram oxalate (LEXAPRO) 10 MG tablet Take 1 tablet (10 mg total) by mouth once daily. 90 tablet 3    ferrous sulfate 325 (65 FE) MG EC tablet Take 1 tablet (325 mg total) by mouth once daily.  0    folic acid (FOLVITE) 1 MG tablet Take 1 tablet (1 mg total) by mouth once daily. 90 tablet 2    furosemide (LASIX) 40 MG tablet Take 40 mg lasix once daily 90 tablet 3    gabapentin (NEURONTIN) 300 MG capsule Take 2 capsules (600 mg total) by mouth 2 (two) times daily. 120 capsule 11    HUMULIN R REGULAR U-100 INSULN 100 unit/mL injection Inject 22 Units into the skin 3 (three) times daily before meals. 60 mL 3    HYDROcodone-acetaminophen (NORCO) 5-325 mg per tablet Take 1-2 tablets by mouth every 6 (six) hours as needed.      hydrOXYchloroQUINE (PLAQUENIL) 200 mg tablet Take 1 tablet (200 mg total) by mouth 2 (two) times daily. 180 tablet 3    insulin (LANTUS SOLOSTAR U-100 INSULIN) glargine 100 units/mL SubQ pen Inject 64 Units into the skin every morning. 60 mL 3    lancets 30 gauge Misc use to test blood glucose 2 (two) times daily with meals. 100 each 11    lisinopriL (PRINIVIL,ZESTRIL) 20 MG tablet Take 1 tablet (20 mg total) by mouth once daily. 30 tablet 11    methotrexate 2.5 MG Tab Take 8 tablets (20 mg total) by mouth every 7 days. 96 tablet 0    multivitamin (THERAGRAN) per tablet Take 1 tablet by mouth once daily.      NIFEdipine (PROCARDIA-XL) 60 MG (OSM) 24 hr tablet Take 1 tablet (60 mg total) by mouth once daily. 90  "tablet 3    pantoprazole (PROTONIX) 40 MG tablet Take 1 tablet (40 mg total) by mouth before breakfast. 30 tablet 3    polyethylene glycol (GLYCOLAX) 17 gram/dose powder Take 17 g by mouth daily as needed (constipation). 289 g 5    predniSONE (DELTASONE) 10 MG tablet Take 2 tablets (20 mg total) by mouth once daily. 60 tablet 2    sulfamethoxazole-trimethoprim 800-160mg (BACTRIM DS) 800-160 mg Tab Take 1 tablet by mouth every Mon, Wed, Fri. 12 tablet 3    traZODone (DESYREL) 100 MG tablet Take 1 tablet (100 mg total) by mouth nightly as needed for Insomnia. 30 tablet 11    valACYclovir (VALTREX) 1000 MG tablet Take 1,000 mg by mouth 3 (three) times daily.      vitamin D (VITAMIN D3) 1000 units Tab Take 1 tablet (1,000 Units total) by mouth once daily. (Patient taking differently: Take 2,000 Units by mouth once daily.)      carvediloL (COREG) 25 MG tablet Take 1 tablet (25 mg total) by mouth 2 (two) times daily. 90 tablet 3    lancing device Misc 1 Device by Misc.(Non-Drug; Combo Route) route 2 (two) times daily with meals. 1 each 0     No current facility-administered medications on file prior to visit.       SOCIAL HISTORY:   Social History     Substance and Sexual Activity   Alcohol Use Not Currently       Social History     Substance and Sexual Activity   Drug Use Never       ROS:   GENERAL: Denies fatigue  CARDIOVASCULAR: Denies edema  GI: Denies abdominal pain  SKIN: Denies rash, itching   NEURO: Denies confusion, memory loss, or mood changes  HEME/LYMPH: Denies easy bruising or bleeding    Objective Findings:    PHYSICAL EXAM:   Friendly Black or  female, in no acute distress; alert and oriented to person, place and time  VITALS: /85 (BP Location: Right arm, Patient Position: Sitting, BP Method: Large (Automatic))   Pulse 74   Temp 98.2 °F (36.8 °C) (Oral)   Ht 5' 5" (1.651 m)   Wt 94.8 kg (208 lb 15.9 oz)   SpO2 98% Comment: RA  BMI 34.78 kg/m²   EYES: Sclerae anicteric  GI: Soft, " "non-tender, non-distended. No ascites.  EXTREMITIES:  No edema.  SKIN: Warm and dry. No jaundice. No telangectasias noted. No palmar erythema.  NEURO:  Normal gait. No asterixis.  PSYCH:  Memory intact. Thought and speech pattern appropriate. Behavior normal      EDUCATION:  See instructions discussed with patient in Instructions section of the After Visit Summary       ASSESSMENT & PLAN:  57 y.o. Black or  female with:  1. Chronic hepatitis B, E Ag neg, E Ab pos, diagnosed 7/2019  -- On Enctevair 0.5 mg daily, refilled   -- transaminases WNL  -- HBV DNA <10, undetected   -- synthetic liver function WNL  -- immunity to Hep A per labs : Needs to restart Hep A vaccine series, reminded pt, sent to local pharmacy   -- Only notable risk factor is homeade tattoo "years ago"  (at home from )  -- Fibroscan 1/2023 noted  No fibrosis, repeat in 2026  -- HCC screening Q 6 months with AFP and abd. U/S - US no liver lesion, AFP WNL, next due 7/2023  -- Sexual partners and family members in household need to be tested and vaccinated if negative. Must use protection for sex (Hep B)     2. RUQ pain, dilated bile duct, elevated alk phos  -- MRPC soon       Follow up in about 6 months (around 7/18/2023). with US and labs 1 week before  Orders Placed This Encounter   Procedures    MRI MRCP Without Contrast    US Abdomen Limited    AFP Tumor Marker    CBC Without Differential    Comprehensive Metabolic Panel    Protime-INR       Thank you for allowing me to participate in the care of Kendyhannah CoxVIKAS Schneider    I spent a total of 30 minutes on the day of the visit.This includes face to face time and non-face to face time preparing to see the patient (eg, review of tests), obtaining and/or reviewing separately obtained history, documenting clinical information in the electronic or other health record, independently interpreting results and communicating results to the " patient/family/caregiver, and coordinating care.       CC'ed note to:

## 2023-01-20 NOTE — PROCEDURES
FibroScan (Vibration Controlled Transient Elastography)    Date/Time: 1/18/2023 8:45 AM  Performed by: Yessica Rosales NP  Authorized by: Yessica Rosales NP     Diagnosis:  Other    Probe:  XL    Universal Protocol: Patient's identity, procedure and site were verified, confirmatory pause was performed.  Discussed procedure including risks and potential complications.  Questions answered.  Patient verbalizes understanding and wishes to proceed with VCTE.     Procedure: After providing explanations of the procedure, patient was placed in the supine position with right arm in maximum abduction to allow optimal exposure of right lateral abdomen.  Patient was briefly assessed, Testing was performed in the mid-axillary location, 50Hz Shear Wave pulses were applied and the resulting Shear Wave and Propagation Speed detected with a 3.5 MHz ultrasonic signal, using the FibroScan probe, Skin to liver capsule distance and liver parenchyma were accessed during the entire examination with the FibroScan probe, Patient was instructed to breathe normally and to abstain from sudden movements during the procedure, allowing for random measurements of liver stiffness. At least 10 Shear Waves were produced, Individual measurements of each Shear Wave were calculated.  Patient tolerated the procedure well with no complications.  Meets discharge criteria as was dismissed.  Rates pain 0 out of 10.  Patient will follow up with ordering provider to review results.    Findings  Median liver stiffness score:  4.3  CAP Reading: dB/m:  200    IQR/med %:  9  Interpretation  Fibrosis interpretation is based on medial liver stiffness - Kilopascal (kPa).    Fibrosis Stage:  F 0-1  Steatosis interpretation is based on controlled attenuation parameter - (dB/m).    Steatosis Grade:  <S1

## 2023-02-16 DIAGNOSIS — D89.1 CRYOGLOBULINEMIC VASCULITIS: Primary | ICD-10-CM

## 2023-02-16 DIAGNOSIS — D86.9 SARCOIDOSIS: ICD-10-CM

## 2023-02-17 ENCOUNTER — TELEPHONE (OUTPATIENT)
Dept: RHEUMATOLOGY | Facility: CLINIC | Age: 58
End: 2023-02-17
Payer: MEDICAID

## 2023-02-17 NOTE — TELEPHONE ENCOUNTER
Appointment offered by office this week for today at 10AM in [facility] clinic for sooner appointment but patient declined. Labs scheduled for 2/23/23. Next appointment on 3/13/23.    LBR

## 2023-02-24 NOTE — PROGRESS NOTES
Left message for patient to contact the office.   [facility] Medicine  Progress Note    Patient Name: Kendy Vital  MRN: [id number]  Patient Class: IP- Inpatient   Admission Date: 7/19/2019  Length of Stay: 9 days  Attending Physician: Luiz Rea MD  Primary Care Provider: To Obtain [facility] Medicine Team: [facility] [place] Luiz Rea MD    Subjective:     Principal Problem:Acute renal failure      HPI:  Ms. Vital is a 53 year old female with hypertension, anemia, and history of leiomyoma of the uterus who presents to ICU as a transfer from [facility] for evaluation of thrombocytopenia. Patient reports that prior to going to the hospital 3 days ago that she was experiencing symptoms of chills, feeling febrile, dry cough, shortness of breath and occasional nose bleeds for roughly 2 weeks. She also reports easy fatigueability and difficulty breathing when laying flat; currently sleeps with 2 pillows. Patient presented to hospital in [place] when her symptoms did not resolve. Initial work up showed a , worsening kidney function with creatinine of 2.9, and thrombocytopenia with platelets of 33k. In addition, chest x-ray showed interstitial opacities and follow up CT showed perihilar ground glass opacity. Patient was treated with rocephin and doxycycline as well as Bumex q12 due to her heart failure type symptoms. Lab work at the time showed WBC of 7.9 and a lactate of .8. In addition, she tested positive for strep A. Patient was transferred for further evaluation of her thrombocytopenia with concern for TTP and possible need for plasmapheresis.     At time of exam, patient is properly oriented to person time and places. She endorses headache, generalized myalgias, nausea and orthopnea. She denies SOB while sitting up, chest pain, abdominal pain, vomiting, and diarrhea. She has not felt febrile since 1 week prior to hospital stay.        Overview/Hospital Course:  Ms. Vital  presented as transfer to ICU for suspected TTP. At that time, she had severe thrombocytopenia, renal failure, and bilateral infiltrates on chest CT concerning for PNA. She was stepped down when repeat CBC revealed normalizing platelets and hemolysis labs were unremarkable. Initially placed on vanc/zosyn/azithromycin for PNA, which has been de-escalated to rocephin/azithromycin for CAP. Her course has been complicated by ARF of unclear cause. 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   7/23 Nephrology biopsed  and have initiated on prednisone empirically.  7/24 Heme did BMBx      Interval History:   Symptoms:  Improved strength.    Diet:  Adequate intake.    Activity level:  Impaired due to weakness.   Pain:  No pain.       Review of Systems   Constitutional: Negative for fatigue and fever.   Respiratory: Negative for shortness of breath.    Cardiovascular: Negative for chest pain.   Gastrointestinal: Negative for abdominal pain.     Objective:     Vital Signs (Most Recent):  Temp: 98 °F (36.7 °C) (07/28/19 0430)  Pulse: 76 (07/28/19 0430)  Resp: 18 (07/28/19 0430)  BP: (!) 156/72 (07/28/19 0430)  SpO2: 95 % (07/28/19 0430) Vital Signs (24h Range):  Temp:  [96.6 °F (35.9 °C)-98 °F (36.7 °C)] 98 °F (36.7 °C)  Pulse:  [67-90] 76  Resp:  [17-18] 18  SpO2:  [95 %-99 %] 95 %  BP: (143-196)/(72-90) 156/72     Weight: 103 kg (227 lb)  Body mass index is 36.64 kg/m².    Intake/Output Summary (Last 24 hours) at 7/28/2019 1122  Last data filed at 7/28/2019 0624  Gross per 24 hour   Intake 250 ml   Output 400 ml   Net -150 ml      Physical Exam   Constitutional: She is oriented to person, place, and time. She appears well-nourished. No distress.   Neck: No JVD present.   Cardiovascular: Normal rate, regular rhythm and normal heart sounds.   Pulmonary/Chest: Effort normal and breath sounds normal. No respiratory distress.   Abdominal: Soft. Bowel sounds are normal. She exhibits no distension.   Musculoskeletal: She exhibits  no edema.   Neurological: She is alert and oriented to person, place, and time.   Psychiatric: She has a normal mood and affect. Her behavior is normal.       Significant Labs: All pertinent labs within the past 24 hours have been reviewed.    Significant Imaging: I have reviewed and interpreted all pertinent imaging results/findings within the past 24 hours.      Assessment/Plan:      * Acute renal failure  [place] due to heme malignancy.  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   baseline creatinine of 1.0 roughly 1 month ago. BUN/Cr peaked at 154/6.0 and improved the day after PLEX started.  RJ with bilateral medicorenal disease  UA with proteinuria, blood, no evidence of infection; pro:cr 5.97; C3 41, C4<3  positive for strep A as seen on outside hospital records  given 1x dose lasix 160mg IV with poor response  - 7/24 HD line placed for PLEX which was started q48h x3 treatements  - 7/19 initiated on empiric prednisone 60mg daily, then 7/26 to solumederol 1 gram qd x3 days, then back to 60 qd  - Nephrology following      Elevated serum immunoglobulin free light chains  BMBX: MONOCLONAL PLASMA CELL POPULATION WITH ATYPICAL LYMPHOID AGGREGATES.  R/o malignancy.  Differential diagnosis includes lymphoplasmacytic lymphoma/Waldenstrom's macroglobulinemia, multiple myeloma, and marginal zone lymphoma.   Heme/consulted  7/26 CT ordered to look for lymph nodes and bone survey found nothing big  -- allopurinol for elevated urate  - 7/26 rheum deferred rituximab choice  to heme.  Per rheum:IgG decreased at 185. They request allergy/immunology consult regarding ability to start rituximab with decreased IgG.  -- CTS consulted to look for a paricarinal lymph node.  Her ARF is improving quickly to allow for invasive procedures.      Essential hypertension  hypertensive at admission and per patient, has been undergoing multiple recent medication changes due to HTN as  "outpatient.  Suspect initially may have been exacerbated by NSAID use, now concern for multifactorial cause including renal failure with volume overload, steroid use, and inadequate dosing of home regimen.  Home regimen includes: lisinopril-HCTZ 20-12.5mg, clonidine 0.2mg bid, and hydralazine 25mg tid  - amlodipine changed for nifedpine  - titrate BP meds    Thrombocytopenia, unspecified  Plt on admission 41. Improved after treatments.  Seen by Heme/onc.   HIT Ab negative  - monitor      Other specified anemias  Hgb slowly going down.  FERRITIN 161, RETIC 4.7 (H), Bili 1, FOLATE 11.1, VITAMIN B12 843  Iron 40, sat 33%  - monitor  - fobt            Immunosuppression  Recs per ID 7/26    1. Serologies in process:          - Quantiferon Gold is pending.  If positive, please consult ID. If  Indeterminate, please draw T spot.  If T spot positive, please consult ID.            - Strongyloides IgG is pending. If positive, please consult ID to initiate treatment with Ivermectin.         2. If the patient is anticipated to remain on a prolonged course of high dose systemic steroids (> 20 mg prednisone), recommend PCP prophylaxis with Atovaquone 1500 mg PO once daily. Avoid Bactrim in setting of ARF.     3. Continue Entecavir per Hepatology      4. Immunizations recommended:              - Influenza annually [NOT IN STOCK INPATIENT]              - Tetanus booster today (given 7/26) and again every 10 years              - Prevnar 13 today (given 7/26) followed by Pneumovax 23 in 8 weeks with booster every 5 years.              - Hepatitis A vaccine today (given 7/26) and in 6 months              - Shingrix (two doses at 0 and 2-6 months) [NOT AVAILABLE INPATIENT]      Chronic hepatitis B  Hepatology consulted to see if her HBV can be causing the cryoglubins and if she needs treatment.  - entecavir 0.5 mg every 72 hour (renal dose)  - f/u with liver as outpt    Per hepatology:  "Cryoglobulinemia usually associated with HCV, but " "rare association with Hep B. Presence of glomerulonephritis on preliminary renal biopsy. Overall, less likely that Hep B is related to this as ALT normal and viral load very low.  - Continue chronic Hep B treatment as patient is on immunosuppression, especially while on rituximab.  Continue entecavir 0.5mg q72hrs (renally dosed). Monitor renal function and dose-adjust accordingly.  - Patient will need HCC screening going forward given her ethnicity (Liver U/S and AFP every 6 months)"      Steroid-induced hyperglycemia  On solumederol 1g qd she got very hyperglycemic (>300) and hypertensive.  A1c 5.7  -- titrated meds      History of renal vein thrombosis  Per heme research  "Pt also has a history of unprovoked thrombosis treated at [facility] approximately 4 years ago, none of which is visible in the "care everywhere" section of the chart. She states she had one episode of thrombosis in her left upper extremity that contained "many little clots" and required surgical thrombectomy. She also had a left renal vein thrombosis for which she received a stent. She states that for both episodes she was placed on a heparin drip, did outpatient lovenox and was continued on ASA 81 until now. She states that no one informed her of any findings suggesting a hypercoaguable state or if the clotting was provoked."  -Given her significant thromboembolic history, rheum  would like to work her up for antiphopholipid antibody syndrome. They ordered levels.   -She also had symptoms of sicca syndromes (dry eyes, dry mouth). Rheum will check for sjogrens syndrome (SSA/SSB)      Grief reaction  Her brother recently .   service came by to talk to her.      Cryoglobulinemic vasculitis  Seen on renal biopsy.  HBV is uncommon to cause this.  LPL/WM or even myeloma could potentially be the source of her cryoglobulins.      Pneumonia  x-ray in [place] revealed interstitial infiltrates. Follow up chest CT showed perihilar " ground glass opacity. Treated initially with rocephin and doxycycline  repeat chest x-ray with patchy airspace consolidations bilaterally R>L, edema vs PNA  blood cultures NGTD  - currently on broad spectrum antibiotics vanc, zosyn, azithro; held vanc and de-escalated zosyn to ceftriaxone on 7/20 and all abx stopped 7/24      VTE Risk Mitigation (From admission, onward)        Ordered     Place sequential compression device  Until discontinued      07/19/19 0533     IP VTE LOW RISK PATIENT  Once      07/19/19 0533                Luiz Rea MD  Department of Hospital Medicine   [facility]

## 2023-03-24 ENCOUNTER — OFFICE VISIT (OUTPATIENT)
Dept: INTERNAL MEDICINE | Facility: CLINIC | Age: 58
End: 2023-03-24
Payer: MEDICAID

## 2023-03-24 ENCOUNTER — TELEPHONE (OUTPATIENT)
Dept: FAMILY MEDICINE | Facility: CLINIC | Age: 58
End: 2023-03-24
Payer: MEDICAID

## 2023-03-24 ENCOUNTER — HOSPITAL ENCOUNTER (OUTPATIENT)
Dept: RADIOLOGY | Facility: HOSPITAL | Age: 58
Discharge: HOME | End: 2023-03-24
Attending: INTERNAL MEDICINE
Payer: MEDICAID

## 2023-03-24 DIAGNOSIS — F32.2 CURRENT SEVERE EPISODE OF MAJOR DEPRESSIVE DISORDER WITHOUT PSYCHOTIC FEATURES WITHOUT PRIOR EPISODE: ICD-10-CM

## 2023-03-24 DIAGNOSIS — E11.22 TYPE 2 DIABETES MELLITUS WITH STAGE 2 CHRONIC KIDNEY DISEASE, WITH LONG-TERM CURRENT USE OF INSULIN: ICD-10-CM

## 2023-03-24 DIAGNOSIS — M54.41 CHRONIC RIGHT-SIDED LOW BACK PAIN WITH RIGHT-SIDED SCIATICA: ICD-10-CM

## 2023-03-24 DIAGNOSIS — I10 ESSENTIAL HYPERTENSION: ICD-10-CM

## 2023-03-24 DIAGNOSIS — B37.31 VAGINAL CANDIDIASIS: ICD-10-CM

## 2023-03-24 DIAGNOSIS — E11.9 TYPE 2 DIABETES MELLITUS WITHOUT COMPLICATION, WITHOUT LONG-TERM CURRENT USE OF INSULIN: ICD-10-CM

## 2023-03-24 DIAGNOSIS — N30.00 ACUTE CYSTITIS WITHOUT HEMATURIA: Primary | ICD-10-CM

## 2023-03-24 DIAGNOSIS — Z79.4 TYPE 2 DIABETES MELLITUS WITH STAGE 2 CHRONIC KIDNEY DISEASE, WITH LONG-TERM CURRENT USE OF INSULIN: ICD-10-CM

## 2023-03-24 DIAGNOSIS — G89.29 CHRONIC RIGHT-SIDED LOW BACK PAIN WITH RIGHT-SIDED SCIATICA: ICD-10-CM

## 2023-03-24 DIAGNOSIS — N18.2 TYPE 2 DIABETES MELLITUS WITH STAGE 2 CHRONIC KIDNEY DISEASE, WITH LONG-TERM CURRENT USE OF INSULIN: ICD-10-CM

## 2023-03-24 LAB
ALBUMIN/CREAT UR: 270 UG/MG (ref 0–30)
BACTERIA #/AREA URNS AUTO: ABNORMAL /HPF
BILIRUB UR QL STRIP: NEGATIVE
CLARITY UR REFRACT.AUTO: CLEAR
COLOR UR AUTO: YELLOW
CREAT UR-MCNC: 70 MG/DL (ref 15–325)
GLUCOSE UR QL STRIP: ABNORMAL
HGB UR QL STRIP: NEGATIVE
HYALINE CASTS UR QL AUTO: 0 /LPF
KETONES UR QL STRIP: NEGATIVE
LEUKOCYTE ESTERASE UR QL STRIP: ABNORMAL
MICROALBUMIN UR DL<=1MG/L-MCNC: 189 UG/ML
MICROSCOPIC COMMENT: ABNORMAL
NITRITE UR QL STRIP: NEGATIVE
PH UR STRIP: 6 [PH] (ref 5–8)
PROT UR QL STRIP: ABNORMAL
RBC #/AREA URNS AUTO: 12 /HPF (ref 0–4)
SP GR UR STRIP: >1.03 (ref 1–1.03)
SQUAMOUS #/AREA URNS AUTO: 41 /HPF
URN SPEC COLLECT METH UR: ABNORMAL
WBC #/AREA URNS AUTO: 58 /HPF (ref 0–5)
YEAST UR QL AUTO: ABNORMAL

## 2023-03-24 PROCEDURE — 99214 PR OFFICE/OUTPT VISIT, EST, LEVL IV, 30-39 MIN: ICD-10-PCS | Mod: S$PBB,,, | Performed by: INTERNAL MEDICINE

## 2023-03-24 PROCEDURE — 4010F PR ACE/ARB THEARPY RXD/TAKEN: ICD-10-PCS | Mod: CPTII,,, | Performed by: INTERNAL MEDICINE

## 2023-03-24 PROCEDURE — 72100 X-RAY EXAM L-S SPINE 2/3 VWS: CPT | Mod: 26,,, | Performed by: RADIOLOGY

## 2023-03-24 PROCEDURE — 72100 XR LUMBAR SPINE AP AND LATERAL: ICD-10-PCS | Mod: 26,,, | Performed by: RADIOLOGY

## 2023-03-24 PROCEDURE — 72100 X-RAY EXAM L-S SPINE 2/3 VWS: CPT | Mod: TC

## 2023-03-24 PROCEDURE — 3077F PR MOST RECENT SYSTOLIC BLOOD PRESSURE >= 140 MM HG: ICD-10-PCS | Mod: CPTII,,, | Performed by: INTERNAL MEDICINE

## 2023-03-24 PROCEDURE — 82570 ASSAY OF URINE CREATININE: CPT | Performed by: INTERNAL MEDICINE

## 2023-03-24 PROCEDURE — 3079F PR MOST RECENT DIASTOLIC BLOOD PRESSURE 80-89 MM HG: ICD-10-PCS | Mod: CPTII,,, | Performed by: INTERNAL MEDICINE

## 2023-03-24 PROCEDURE — 3066F NEPHROPATHY DOC TX: CPT | Mod: CPTII,,, | Performed by: INTERNAL MEDICINE

## 2023-03-24 PROCEDURE — 3008F PR BODY MASS INDEX (BMI) DOCUMENTED: ICD-10-PCS | Mod: CPTII,,, | Performed by: INTERNAL MEDICINE

## 2023-03-24 PROCEDURE — 3060F PR POS MICROALBUMINURIA RESULT DOCUMENTED/REVIEW: ICD-10-PCS | Mod: CPTII,,, | Performed by: INTERNAL MEDICINE

## 2023-03-24 PROCEDURE — 3060F POS MICROALBUMINURIA REV: CPT | Mod: CPTII,,, | Performed by: INTERNAL MEDICINE

## 2023-03-24 PROCEDURE — 87086 URINE CULTURE/COLONY COUNT: CPT | Performed by: INTERNAL MEDICINE

## 2023-03-24 PROCEDURE — 3077F SYST BP >= 140 MM HG: CPT | Mod: CPTII,,, | Performed by: INTERNAL MEDICINE

## 2023-03-24 PROCEDURE — 99213 OFFICE O/P EST LOW 20 MIN: CPT | Mod: PBBFAC | Performed by: INTERNAL MEDICINE

## 2023-03-24 PROCEDURE — 99214 OFFICE O/P EST MOD 30 MIN: CPT | Mod: S$PBB,,, | Performed by: INTERNAL MEDICINE

## 2023-03-24 PROCEDURE — 3046F HEMOGLOBIN A1C LEVEL >9.0%: CPT | Mod: CPTII,,, | Performed by: INTERNAL MEDICINE

## 2023-03-24 PROCEDURE — 99999 PR PBB SHADOW E&M-EST. PATIENT-LVL III: ICD-10-PCS | Mod: PBBFAC,,, | Performed by: INTERNAL MEDICINE

## 2023-03-24 PROCEDURE — 81001 URINALYSIS AUTO W/SCOPE: CPT | Performed by: INTERNAL MEDICINE

## 2023-03-24 PROCEDURE — 3046F PR MOST RECENT HEMOGLOBIN A1C LEVEL > 9.0%: ICD-10-PCS | Mod: CPTII,,, | Performed by: INTERNAL MEDICINE

## 2023-03-24 PROCEDURE — 3008F BODY MASS INDEX DOCD: CPT | Mod: CPTII,,, | Performed by: INTERNAL MEDICINE

## 2023-03-24 PROCEDURE — 99999 PR PBB SHADOW E&M-EST. PATIENT-LVL III: CPT | Mod: PBBFAC,,, | Performed by: INTERNAL MEDICINE

## 2023-03-24 PROCEDURE — 4010F ACE/ARB THERAPY RXD/TAKEN: CPT | Mod: CPTII,,, | Performed by: INTERNAL MEDICINE

## 2023-03-24 PROCEDURE — 3079F DIAST BP 80-89 MM HG: CPT | Mod: CPTII,,, | Performed by: INTERNAL MEDICINE

## 2023-03-24 PROCEDURE — 3066F PR DOCUMENTATION OF TREATMENT FOR NEPHROPATHY: ICD-10-PCS | Mod: CPTII,,, | Performed by: INTERNAL MEDICINE

## 2023-03-24 RX ORDER — NIFEDIPINE 60 MG/1
60 TABLET, EXTENDED RELEASE ORAL DAILY
Qty: 90 TABLET | Refills: 3 | Status: SHIPPED | OUTPATIENT
Start: 2023-03-24

## 2023-03-24 RX ORDER — NITROFURANTOIN 25; 75 MG/1; MG/1
100 CAPSULE ORAL 2 TIMES DAILY
Qty: 10 CAPSULE | Refills: 0 | Status: CANCELLED | OUTPATIENT
Start: 2023-03-24 | End: 2023-03-29

## 2023-03-24 RX ORDER — TRAZODONE HYDROCHLORIDE 100 MG/1
100 TABLET ORAL NIGHTLY PRN
Qty: 30 TABLET | Refills: 11 | Status: SHIPPED | OUTPATIENT
Start: 2023-03-24

## 2023-03-24 RX ORDER — FUROSEMIDE 40 MG/1
TABLET ORAL
Qty: 90 TABLET | Refills: 3 | Status: SHIPPED | OUTPATIENT
Start: 2023-03-24 | End: 2023-05-23 | Stop reason: SDUPTHER

## 2023-03-24 RX ORDER — FLUCONAZOLE 150 MG/1
150 TABLET ORAL DAILY
Qty: 1 TABLET | Refills: 0 | Status: SHIPPED | OUTPATIENT
Start: 2023-03-24 | End: 2023-03-25

## 2023-03-24 RX ORDER — CARVEDILOL 25 MG/1
25 TABLET ORAL 2 TIMES DAILY
Qty: 90 TABLET | Refills: 3 | Status: SHIPPED | OUTPATIENT
Start: 2023-03-24 | End: 2024-02-19

## 2023-03-24 RX ORDER — LISINOPRIL 20 MG/1
20 TABLET ORAL DAILY
Qty: 30 TABLET | Refills: 11 | Status: SHIPPED | OUTPATIENT
Start: 2023-03-24 | End: 2023-05-24 | Stop reason: SDUPTHER

## 2023-03-24 RX ORDER — TIZANIDINE 2 MG/1
2 TABLET ORAL EVERY 6 HOURS PRN
Qty: 20 TABLET | Refills: 0 | Status: SHIPPED | OUTPATIENT
Start: 2023-03-24 | End: 2023-04-03
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
## 2023-03-24 NOTE — TELEPHONE ENCOUNTER
Attempted to call patient on all available numbers to discuss high blood sugar result but unable to reach patient.

## 2023-03-26 LAB
BACTERIA UR CULT: NORMAL
BACTERIA UR CULT: NORMAL

## 2023-03-28 ENCOUNTER — PATIENT MESSAGE (OUTPATIENT)
Dept: RHEUMATOLOGY | Facility: CLINIC | Age: 58
End: 2023-03-28
Payer: MEDICAID

## 2023-03-28 ENCOUNTER — TELEPHONE (OUTPATIENT)
Dept: RHEUMATOLOGY | Facility: CLINIC | Age: 58
End: 2023-03-28
Payer: MEDICAID

## 2023-03-28 NOTE — TELEPHONE ENCOUNTER
Tried to call patient to confirm appt. For 3/29 All numbers on her chart are not in service. Sent message through the portal. To remind her.

## 2023-03-30 ENCOUNTER — PATIENT MESSAGE (OUTPATIENT)
Dept: HEMATOLOGY/ONCOLOGY | Facility: CLINIC | Age: 58
End: 2023-03-30
Payer: MEDICAID

## 2023-03-30 VITALS
BODY MASS INDEX: 31.82 KG/M2 | HEART RATE: 82 BPM | OXYGEN SATURATION: 98 % | WEIGHT: 198 LBS | DIASTOLIC BLOOD PRESSURE: 80 MMHG | SYSTOLIC BLOOD PRESSURE: 160 MMHG | HEIGHT: 66 IN

## 2023-03-31 ENCOUNTER — TELEPHONE (OUTPATIENT)
Dept: RHEUMATOLOGY | Facility: CLINIC | Age: 58
End: 2023-03-31

## 2023-03-31 NOTE — TELEPHONE ENCOUNTER
----- Message from Shawna Mojica sent at 3/31/2023  1:10 PM CDT -----  Type: General Call Back     Name of Caller:MUNA HERNANDEZ PATI [[id number]]  Symptoms:reschedule   Would the patient rather a call back or a response via [facility]? Call back   Best Call Back Number:[phone]  Additional Information: Patient indicates she has missed a couple appointments and is trying to reschedule.Patient indicates she was hospitalized and that's why she missed her appointment. Patient believes she missed a call from the providers office but is not sure because there was no message left. Patient indicates she has a couple of concerns and questions for the nurse on  staff as well. Patient would like a call back with further assistance and more information if possible.

## 2023-04-03 ENCOUNTER — PATIENT MESSAGE (OUTPATIENT)
Dept: ADMINISTRATIVE | Facility: HOSPITAL | Age: 58
End: 2023-04-03
Payer: MEDICAID

## 2023-04-03 ENCOUNTER — OFFICE VISIT (OUTPATIENT)
Dept: RHEUMATOLOGY | Facility: CLINIC | Age: 58
End: 2023-04-03
Payer: MEDICAID

## 2023-04-03 VITALS
DIASTOLIC BLOOD PRESSURE: 95 MMHG | HEIGHT: 66 IN | BODY MASS INDEX: 31.82 KG/M2 | HEART RATE: 77 BPM | SYSTOLIC BLOOD PRESSURE: 143 MMHG | WEIGHT: 198 LBS

## 2023-04-03 DIAGNOSIS — Z91.89 AT RISK FOR STRESS ULCER: ICD-10-CM

## 2023-04-03 DIAGNOSIS — D86.85 CARDIAC SARCOIDOSIS: Primary | ICD-10-CM

## 2023-04-03 DIAGNOSIS — D47.Z9 LOW GRADE B CELL LYMPHOPROLIFERATIVE DISORDER: ICD-10-CM

## 2023-04-03 DIAGNOSIS — N18.2 TYPE 2 DIABETES MELLITUS WITH STAGE 2 CHRONIC KIDNEY DISEASE, WITH LONG-TERM CURRENT USE OF INSULIN: ICD-10-CM

## 2023-04-03 DIAGNOSIS — Z79.4 TYPE 2 DIABETES MELLITUS WITH STAGE 2 CHRONIC KIDNEY DISEASE, WITH LONG-TERM CURRENT USE OF INSULIN: ICD-10-CM

## 2023-04-03 DIAGNOSIS — D89.1 CRYOGLOBULINEMIA: ICD-10-CM

## 2023-04-03 DIAGNOSIS — B19.10 HEPATITIS B INFECTION WITHOUT DELTA AGENT WITHOUT HEPATIC COMA, UNSPECIFIED CHRONICITY: ICD-10-CM

## 2023-04-03 DIAGNOSIS — D86.9 SARCOIDOSIS: ICD-10-CM

## 2023-04-03 DIAGNOSIS — D86.0 PULMONARY SARCOIDOSIS: ICD-10-CM

## 2023-04-03 DIAGNOSIS — E11.22 TYPE 2 DIABETES MELLITUS WITH STAGE 2 CHRONIC KIDNEY DISEASE, WITH LONG-TERM CURRENT USE OF INSULIN: ICD-10-CM

## 2023-04-03 DIAGNOSIS — D89.1 CRYOGLOBULINEMIC VASCULITIS: ICD-10-CM

## 2023-04-03 PROCEDURE — 3060F POS MICROALBUMINURIA REV: CPT | Mod: CPTII,,, | Performed by: STUDENT IN AN ORGANIZED HEALTH CARE EDUCATION/TRAINING PROGRAM

## 2023-04-03 PROCEDURE — 99999 PR PBB SHADOW E&M-EST. PATIENT-LVL V: CPT | Mod: PBBFAC,,, | Performed by: STUDENT IN AN ORGANIZED HEALTH CARE EDUCATION/TRAINING PROGRAM

## 2023-04-03 PROCEDURE — 99215 OFFICE O/P EST HI 40 MIN: CPT | Mod: S$PBB,,, | Performed by: STUDENT IN AN ORGANIZED HEALTH CARE EDUCATION/TRAINING PROGRAM

## 2023-04-03 PROCEDURE — 3060F PR POS MICROALBUMINURIA RESULT DOCUMENTED/REVIEW: ICD-10-PCS | Mod: CPTII,,, | Performed by: STUDENT IN AN ORGANIZED HEALTH CARE EDUCATION/TRAINING PROGRAM

## 2023-04-03 PROCEDURE — 3077F PR MOST RECENT SYSTOLIC BLOOD PRESSURE >= 140 MM HG: ICD-10-PCS | Mod: CPTII,,, | Performed by: STUDENT IN AN ORGANIZED HEALTH CARE EDUCATION/TRAINING PROGRAM

## 2023-04-03 PROCEDURE — 99215 PR OFFICE/OUTPT VISIT, EST, LEVL V, 40-54 MIN: ICD-10-PCS | Mod: S$PBB,,, | Performed by: STUDENT IN AN ORGANIZED HEALTH CARE EDUCATION/TRAINING PROGRAM

## 2023-04-03 PROCEDURE — 3046F HEMOGLOBIN A1C LEVEL >9.0%: CPT | Mod: CPTII,,, | Performed by: STUDENT IN AN ORGANIZED HEALTH CARE EDUCATION/TRAINING PROGRAM

## 2023-04-03 PROCEDURE — 1159F MED LIST DOCD IN RCRD: CPT | Mod: CPTII,,, | Performed by: STUDENT IN AN ORGANIZED HEALTH CARE EDUCATION/TRAINING PROGRAM

## 2023-04-03 PROCEDURE — 1160F RVW MEDS BY RX/DR IN RCRD: CPT | Mod: CPTII,,, | Performed by: STUDENT IN AN ORGANIZED HEALTH CARE EDUCATION/TRAINING PROGRAM

## 2023-04-03 PROCEDURE — 1159F PR MEDICATION LIST DOCUMENTED IN MEDICAL RECORD: ICD-10-PCS | Mod: CPTII,,, | Performed by: STUDENT IN AN ORGANIZED HEALTH CARE EDUCATION/TRAINING PROGRAM

## 2023-04-03 PROCEDURE — 99215 OFFICE O/P EST HI 40 MIN: CPT | Mod: PBBFAC | Performed by: STUDENT IN AN ORGANIZED HEALTH CARE EDUCATION/TRAINING PROGRAM

## 2023-04-03 PROCEDURE — 3066F NEPHROPATHY DOC TX: CPT | Mod: CPTII,,, | Performed by: STUDENT IN AN ORGANIZED HEALTH CARE EDUCATION/TRAINING PROGRAM

## 2023-04-03 PROCEDURE — 99999 PR PBB SHADOW E&M-EST. PATIENT-LVL V: ICD-10-PCS | Mod: PBBFAC,,, | Performed by: STUDENT IN AN ORGANIZED HEALTH CARE EDUCATION/TRAINING PROGRAM

## 2023-04-03 PROCEDURE — 1160F PR REVIEW ALL MEDS BY PRESCRIBER/CLIN PHARMACIST DOCUMENTED: ICD-10-PCS | Mod: CPTII,,, | Performed by: STUDENT IN AN ORGANIZED HEALTH CARE EDUCATION/TRAINING PROGRAM

## 2023-04-03 PROCEDURE — 3046F PR MOST RECENT HEMOGLOBIN A1C LEVEL > 9.0%: ICD-10-PCS | Mod: CPTII,,, | Performed by: STUDENT IN AN ORGANIZED HEALTH CARE EDUCATION/TRAINING PROGRAM

## 2023-04-03 PROCEDURE — 3066F PR DOCUMENTATION OF TREATMENT FOR NEPHROPATHY: ICD-10-PCS | Mod: CPTII,,, | Performed by: STUDENT IN AN ORGANIZED HEALTH CARE EDUCATION/TRAINING PROGRAM

## 2023-04-03 PROCEDURE — 4010F ACE/ARB THERAPY RXD/TAKEN: CPT | Mod: CPTII,,, | Performed by: STUDENT IN AN ORGANIZED HEALTH CARE EDUCATION/TRAINING PROGRAM

## 2023-04-03 PROCEDURE — 3008F PR BODY MASS INDEX (BMI) DOCUMENTED: ICD-10-PCS | Mod: CPTII,,, | Performed by: STUDENT IN AN ORGANIZED HEALTH CARE EDUCATION/TRAINING PROGRAM

## 2023-04-03 PROCEDURE — 4010F PR ACE/ARB THEARPY RXD/TAKEN: ICD-10-PCS | Mod: CPTII,,, | Performed by: STUDENT IN AN ORGANIZED HEALTH CARE EDUCATION/TRAINING PROGRAM

## 2023-04-03 PROCEDURE — 3077F SYST BP >= 140 MM HG: CPT | Mod: CPTII,,, | Performed by: STUDENT IN AN ORGANIZED HEALTH CARE EDUCATION/TRAINING PROGRAM

## 2023-04-03 PROCEDURE — 3080F DIAST BP >= 90 MM HG: CPT | Mod: CPTII,,, | Performed by: STUDENT IN AN ORGANIZED HEALTH CARE EDUCATION/TRAINING PROGRAM

## 2023-04-03 PROCEDURE — 3080F PR MOST RECENT DIASTOLIC BLOOD PRESSURE >= 90 MM HG: ICD-10-PCS | Mod: CPTII,,, | Performed by: STUDENT IN AN ORGANIZED HEALTH CARE EDUCATION/TRAINING PROGRAM

## 2023-04-03 PROCEDURE — 3008F BODY MASS INDEX DOCD: CPT | Mod: CPTII,,, | Performed by: STUDENT IN AN ORGANIZED HEALTH CARE EDUCATION/TRAINING PROGRAM

## 2023-04-03 RX ORDER — METHOTREXATE 2.5 MG/1
20 TABLET ORAL
Qty: 96 TABLET | Refills: 0 | Status: SHIPPED | OUTPATIENT
Start: 2023-04-03

## 2023-04-03 RX ORDER — FOLIC ACID 1 MG/1
1 TABLET ORAL DAILY
Qty: 90 TABLET | Refills: 2 | Status: SHIPPED | OUTPATIENT
Start: 2023-04-03

## 2023-04-03 RX ORDER — SULFAMETHOXAZOLE AND TRIMETHOPRIM 800; 160 MG/1; MG/1
1 TABLET ORAL
Qty: 12 TABLET | Refills: 3 | Status: SHIPPED | OUTPATIENT
Start: 2023-04-03 | End: 2023-05-01

## 2023-04-03 ASSESSMENT — ROUTINE ASSESSMENT OF PATIENT INDEX DATA (RAPID3)
PATIENT GLOBAL ASSESSMENT SCORE: 6
TOTAL RAPID3 SCORE: 3.5
MDHAQ FUNCTION SCORE: 0.9
FATIGUE SCORE: 6.5
PSYCHOLOGICAL DISTRESS SCORE: 2.2
AM STIFFNESS SCORE: 1, YES
PAIN SCORE: 1.5

## 2023-04-03 NOTE — PROGRESS NOTES
I have personally reviewed the history, confirmed exam findings, and discussed assessment and plan with fellow.      Cardiac sarcoidosis on prednisone 10mg twice daily until cardiac  FDG PET 6/6/23 and then f/u with Dr. Carranza  Pulmonary sarcoidosis overdue for PFTs, HRCT chest   H/o  Type II cryoglobulinemic glomerulonephritis pending today  H/o lymphoma( B-cell lymphoproliferative disorder   H/o MGUS IgMk   SPEP overdue  Severe T 2 DM with polydipsia and polyuria  secure chat Dr. Kim for management as will be on prednisone 20mg daily for next several months until repeat cardiac FDG PET    UA UPCR  Await today's labs: glucose 517  Sent to ED  Prednisone 10mg twice daily   Resume methotrexate 20mg po once a week with folic acid 1mg daily  PFTs, HRCT chest  [facility]  F/u Nephrology for f/u cryoglobulinemic GN  Message Dr. Kim regarding symptomatic hyperglycemia with polyuria  RTC 4 wks

## 2023-04-03 NOTE — PROGRESS NOTES
Subjective:       Patient ID: Kendy Vital is a 57 y.o. female.    Chief Complaint: Sarcoidosis  HPI     Ms. 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 Later found to have cardiac sarcoidosis in 10/2023.     Interval History:  4/3/23: She is losing weight 35 lbs. She reports extreme fatigue, polyuria, lightheadedness and polydipsia. She remains on prednisone 10mg BID (has been on this since November 2022 for planned 6 months for cardiac sarcoidosis per transplant cardiology. She has follow up appointment in 6/2023 with repeat cardiac PET. Per chart review, her last hemoglobin a1c 14% and Glucose on CMP from 3/24/23 shows glucose in 500's. Note from chart states PCP tried but was unable to contact patient.  She denies fever, rash, no nasal/oral ulcers, SOB, or chest pain. She does report decreased vision within the last month.    10/24/22: Two months ago, she started having pain in right knee with swelling. She denies fevers.  She has been following with Cardiology recent cardiac PET show signs of possible cardiac sarcoid.  She reports intermittent shortness of breath and chest pain. she denies oral ulcers.  She notices burning pain in her legs bilaterally. She had a previous rash last month which consisted of which she describes as red spots on her arms, shoulders, neck, and back bilaterally which has since completely resolved.  She has not followed up with  "pulmonology and Hematology since last year.  She reports compliance with her methotrexate and prednisone.    3/7/22: She was unable to make last appointment in January so rescheduled for today. She has been having pain in her lower legs for the last 2 months that makes it difficult for her to walk and also reports associated lower back pain. She states that this keeps her up at night and keeps her from sleeping. She states that lack of sleep is a serious problem as well unrested and brain "fog" is also a serious problem. She denies SOB but has not yet followed up with pulmonology. She denies rash, oral ulcers, joint pain, joint swelling, eye pain, red eye, vision change or chest pain. She is not currently taking HCQ and states that she ran out of this for the past few months. She reports that she is taking MTX but is currently taking half (4 tablets) on Monday and then half (4 tablets) on following Friday.    Previous Medication regimen:   prednisone 20 mg daily   methotrexate 20mg weekly with folic acid supplementation        Objective:   BP (!) 143/95   Pulse 77   Ht 5' 6" (1.676 m)   Wt 89.8 kg (198 lb)   BMI 31.96 kg/m²      Physical Exam   Constitutional: No distress.   HENT:   Head: Normocephalic and atraumatic.   Eyes: Conjunctivae are normal.   Cardiovascular: Normal rate, regular rhythm and normal heart sounds. Exam reveals no friction rub.   No murmur heard.  Pulmonary/Chest: Effort normal. She has wheezes. She has rhonchi. She has no rales.   Abdominal: Soft. Bowel sounds are normal. There is no abdominal tenderness. There is no rebound.   Musculoskeletal:      Comments: No synovitis in the upper or lower extremities.   Neurological: She is alert.   Skin: Skin is warm and dry. She is not diaphoretic.      Chart Review:  Labs from 4/3/23 currently pending    No data to display     Assessment:       1. Cardiac sarcoidosis    2. Hepatitis B infection without delta agent without hepatic coma, unspecified " chronicity    3. Cryoglobulinemic vasculitis    4. At risk for stress ulcer    5. Type 2 diabetes mellitus with stage 2 chronic kidney disease, with long-term current use of insulin    6. Pulmonary sarcoidosis    7. Low grade B cell lymphoproliferative disorder    8. Sarcoidosis    9. Cryoglobulinemia            Ms. 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 She is currently on methotrexate 20mg weekly plus folic acid and prednisone 2.5mg.     She follows up today.  She was recently seen by Cardiology and cardiac- PET was performed which showed active sarcoid.  She was seen by cardiology and started on high-dose prednisone taper with plan to stay on Prednisone 20mg for 6 months. Unfortunately, her course has been complicated by uncontrolled diabetes, worsened by prednisone. Continue methotrexate 20 mg weekly with folic acid supplementation.  Additionally, she will need follow-up with pulmonology, nephrology and ophthamology as well.  Will also the PFTs and HRCT with pulmonology referral.  Will touch base with her PCP as she is diabetic and will need help with management of her sugars.    Systems:  Cardiac Sarcoidosis: most recent Cardiac PET scan showing myocardial FDG uptake in the basal lateral and basal anterior wall consistent with active cardiac sarcoidosis. following with cardiology, last seen on next appointment on 6/2023.  Pulm: pulmonary sarcoidosis, needs to see  pulmonology   Hepatology: chronic Hep B, on Enctevair ; will need to check with hep if ok to start TNFi  Nephrology: HBV-associated cyroglobulinemic glomerulonephritis; needs to see nephrology in 1/2022, stable  Hematology: Low-grade B-cell lymphoma with plasmacytic differentiation; last seen in 10/2022, was suppose to be seen for 6 month follow up    Plan:       Problem List Items Addressed This Visit          Immunology/Multi System    Sarcoidosis    Cryoglobulinemic vasculitis       Endocrine    Type 2 diabetes mellitus with stage 2 chronic kidney disease, with long-term current use of insulin       GI    Hepatitis B     Other Visit Diagnoses       Cardiac sarcoidosis    -  Primary    At risk for stress ulcer        Pulmonary sarcoidosis        Low grade B cell lymphoproliferative disorder        Cryoglobulinemia              - Addendum: NOTIFIED BY LAB OF GLUCOSE 517; Patient instructed to go to the ER and transported in wheelcar to ER by office staff, called triage ER nurse to inform that patient is coming for hyperglycemia    - Continue current regimen: Methotrexate 20mg weekly (split dose 10mg AM, 10mg PM) plus folic acid today  - continue hydroxychloroquine 200mg BID  - continue prednisone 10mg BID for cardiac sarcoidosis   - will need close follow up with PCP, will sent message to Dr. Kim for assistance with hyperglycemia as she will be on prednisone 20mg daily for the next few months per cardiology  - will need pulmonology referral to [facility] with HRCT and PFTs today  - will need nephrology f/u, last seen in 1/2022  - will place opthalmology referral for blurry vision, concern likely due to uncontrolled DM  - CT chest from 1/2022 showed interval decrease in large nodules but innumerable micronodules with evidence of pulmonary fibrosis in a Lindsey lymphatic distribution and pulmonary fibrosis  - Continue hepatology follow up, currently on entecavir for which she reports compliance  -  DEXA in 1/2021 with normal BMD, history of tibula fx with fall on slippery floor; Plan to repeat DEXA at next visit  - Vaccines: will need up to date COVID vaccine  - continue Bactrim for PJP ppx and start alendronate 70mg for osteoporosis ppx while on high dose steroids    Follow up in 4 weeks.     Patient is seen and evaluated with Dr. Solis

## 2023-04-09 NOTE — SUBJECTIVE & OBJECTIVE
Interval History: Pt was tearful and reported feeling anxious. Her brother passed away recently. Otherwise at her baseline. Sitting up with 1 pillow and head of bed elevated due to dyspnea when lying flat.    Current Facility-Administered Medications   Medication Frequency    acetaminophen tablet 650 mg Q6H PRN    albuterol-ipratropium 2.5 mg-0.5 mg/3 mL nebulizer solution 3 mL Q6H PRN    allopurinol tablet 100 mg Daily    artificial tears 0.5 % ophthalmic solution 1 drop TID    atovaquone suspension 1,500 mg Daily    bisacodyl suppository 10 mg Daily PRN    carvedilol tablet 25 mg BID    cloNIDine tablet 0.1 mg TID    [START ON 8/2/2019] entecavir tablet 0.5 mg Q48H    glucagon (human recombinant) injection 1 mg PRN    glucose chewable tablet 16 g PRN    glucose chewable tablet 24 g PRN    hydrALAZINE tablet 25 mg Q6H PRN    hydrALAZINE tablet 75 mg Q8H    insulin aspart U-100 pen 1-10 Units QID (AC + HS) PRN    insulin aspart U-100 pen 8 Units TIDWM    insulin detemir U-100 pen 5 Units QHS    miconazole nitrate 2% ointment BID    NIFEdipine 24 hr tablet 90 mg Daily    nystatin 100,000 unit/mL suspension 500,000 Units QID    ondansetron disintegrating tablet 8 mg Q8H PRN    oxyCODONE-acetaminophen 5-325 mg per tablet 1 tablet Q8H PRN    pantoprazole EC tablet 40 mg Before breakfast    predniSONE tablet 60 mg Daily    promethazine (PHENERGAN) 6.25 mg in dextrose 5 % 50 mL IVPB Q6H PRN    ramelteon tablet 8 mg Nightly PRN    sodium bicarbonate tablet 650 mg TID    sodium chloride 0.9% flush 10 mL PRN    Tdap vaccine injection 0.5 mL vaccine x 1 dose     Objective:     Vital Signs (Most Recent):  Temp: 97.2 °F (36.2 °C) (08/01/19 1156)  Pulse: 67 (08/01/19 1156)  Resp: 18 (08/01/19 1156)  BP: 137/75 (08/01/19 1156)  SpO2: 98 % (08/01/19 1156)  O2 Device (Oxygen Therapy): room air (08/01/19 0900) Vital Signs (24h Range):  Temp:  [97.2 °F (36.2 °C)-98.4 °F (36.9 °C)] 97.2 °F (36.2  °C)  Pulse:  [65-74] 67  Resp:  [18-20] 18  SpO2:  [96 %-98 %] 98 %  BP: (137-174)/(68-82) 137/75     Weight: 103 kg (227 lb) (07/29/19 1250)  Body mass index is 36.64 kg/m².  Body surface area is 2.19 meters squared.      Intake/Output Summary (Last 24 hours) at 8/1/2019 1618  Last data filed at 8/1/2019 1500  Gross per 24 hour   Intake 750 ml   Output 600 ml   Net 150 ml       Physical Exam   Vitals reviewed.  Constitutional: She is oriented to person, place, and time and well-developed, well-nourished, and in no distress. No distress.   HENT:   Head: Normocephalic and atraumatic.   Mouth/Throat: Oropharyngeal exudate (consistent with oral candidiasis; improving) present.   Eyes: Pupils are equal, round, and reactive to light.   Cardiovascular: Normal rate and regular rhythm.    No murmur heard.  Pulmonary/Chest: Effort normal and breath sounds normal. No respiratory distress. She has no rales.   Abdominal: Soft. Bowel sounds are normal. There is no tenderness (epigastric tenderness has resolved). There is no rebound and no guarding.   Neurological: She is alert and oriented to person, place, and time.   Skin: Skin is warm and dry. She is not diaphoretic.     Musculoskeletal: She exhibits no tenderness.         Significant Labs:  All pertinent lab results from the last 24 hours have been reviewed.    Significant Imaging:  Imaging results within the past 24 hours have been reviewed.   DISPLAY PLAN FREE TEXT

## 2023-04-10 ENCOUNTER — TELEPHONE (OUTPATIENT)
Dept: INTERNAL MEDICINE | Facility: CLINIC | Age: 58
End: 2023-04-10
Payer: MEDICAID

## 2023-04-10 DIAGNOSIS — E11.9 TYPE 2 DIABETES MELLITUS WITHOUT COMPLICATION, WITHOUT LONG-TERM CURRENT USE OF INSULIN: Primary | ICD-10-CM

## 2023-04-10 NOTE — TELEPHONE ENCOUNTER
----- Message from Geena Bean sent at 4/10/2023  9:48 AM CDT -----  Contact: [phone] Patient  Calling to get test results.   Name of test (lab, x-ray): xray  Date of test: 03/24/2023  Where was the test performed: [facility]  Would you like a call back, or a response through your [facility] portal?:   call back   Comments:    Calling to get test results.   Name of test (lab, x-ray): labs  Date of test: 03/24/2023  Where was the test performed: [facility] lab  Would you like a call back, or a response through your [facility] portal?:   call back   Comments:

## 2023-04-12 NOTE — TELEPHONE ENCOUNTER
Tried calling back. NO answer. Unable to leave voicemail as mailbox is not set up.    [FreeTextEntry1] : The patient has had no clinical atrial arrhythmias. She is now off Amiodarone . The patient has had a nucler stress test which was negative for ischemia. She has no CP or SOB . She does have MR .

## 2023-04-13 ENCOUNTER — TELEPHONE (OUTPATIENT)
Dept: INTERNAL MEDICINE | Facility: CLINIC | Age: 58
End: 2023-04-13
Payer: MEDICAID

## 2023-04-13 NOTE — TELEPHONE ENCOUNTER
----- Message from Maria Fernanda Love sent at 4/13/2023 12:45 PM CDT -----  Contact: [phone]  Patient is returning a phone call.  Who left a message for the patient: Dr Kim's office  Does patient know what this is regarding:  no  Would you like a call back, or a response through your [facility] portal?:   phone  Comments:

## 2023-04-14 ENCOUNTER — TELEPHONE (OUTPATIENT)
Dept: INTERNAL MEDICINE | Facility: CLINIC | Age: 58
End: 2023-04-14
Payer: MEDICAID

## 2023-04-14 NOTE — PROGRESS NOTES
Patient called on both of her listed contact numbers to discuss elevated blood sugar value and to recommend ED evaluation. Patient did not answer either number. I messaged patient in the portal recommending ED evaluation.

## 2023-04-14 NOTE — TELEPHONE ENCOUNTER
----- Message from Maria Fernanda Love sent at 4/14/2023  9:32 AM CDT -----  Contact: [phone]  Contact: [phone]  Patient is returning a phone call.  Who left a message for the patient: Dr Kim's office  Does patient know what this is regarding:  no  Would you like a call back, or a response through your [facility] portal?:   phone  Comments:

## 2023-04-17 ENCOUNTER — TELEPHONE (OUTPATIENT)
Dept: RHEUMATOLOGY | Facility: CLINIC | Age: 58
End: 2023-04-17
Payer: MEDICAID

## 2023-04-17 NOTE — TELEPHONE ENCOUNTER
----- Message from Wilder Mello MD sent at 4/15/2023  3:25 PM CDT -----  Contact: [phone] Patient  I placed multiple calls to the patient in response to this message and her elevated blood sugar results. I did not receive an answer.   ----- Message -----  From: Stephanie Bishop MA  Sent: 4/10/2023  10:23 AM CDT  To: India Arce MD      ----- Message -----  From: Geena Bean  Sent: 4/10/2023   9:54 AM CDT  To: Claude Osborne Staff    Pt is requesting a call back from the rheumatology provider.

## 2023-04-17 NOTE — TELEPHONE ENCOUNTER
----- Message from Sly Solis MD sent at 4/17/2023 11:11 AM CDT -----  Regarding: RE: Scheduling follow up appointment for patient  Margaret, that's fine RJELENA  ----- Message -----  From: India Arce MD  Sent: 4/17/2023   9:57 AM CDT  To: Sly Solis MD  Subject: Scheduling follow up appointment for patient     Dr. Solis,  This patient needs a follow up appointment scheduled. I would like to schedule her on Monday May 1st at 1PM. Verify if this time works with your schedule. As she is from [place], an afternoon appointment is preferred. Once you acknowledge that this date and time is fine, I will sent it to Annika to have scheduled.    Thanks,  India Arce MD

## 2023-04-21 ENCOUNTER — PATIENT MESSAGE (OUTPATIENT)
Dept: ADMINISTRATIVE | Facility: HOSPITAL | Age: 58
End: 2023-04-21
Payer: MEDICAID

## 2023-04-28 ENCOUNTER — TELEPHONE (OUTPATIENT)
Dept: RHEUMATOLOGY | Facility: CLINIC | Age: 58
End: 2023-04-28
Payer: MEDICAID

## 2023-04-28 DIAGNOSIS — D89.1 CRYOGLOBULINEMIA: ICD-10-CM

## 2023-04-28 DIAGNOSIS — D86.9 SARCOIDOSIS: Primary | ICD-10-CM

## 2023-04-28 NOTE — TELEPHONE ENCOUNTER
Tried calling the patient per Dr Arce request to get labs done today but both phones are not working

## 2023-05-01 ENCOUNTER — LAB VISIT (OUTPATIENT)
Dept: LAB | Facility: HOSPITAL | Age: 58
End: 2023-05-01
Attending: STUDENT IN AN ORGANIZED HEALTH CARE EDUCATION/TRAINING PROGRAM
Payer: MEDICAID

## 2023-05-01 ENCOUNTER — TELEPHONE (OUTPATIENT)
Dept: RHEUMATOLOGY | Facility: CLINIC | Age: 58
End: 2023-05-01

## 2023-05-01 ENCOUNTER — OFFICE VISIT (OUTPATIENT)
Dept: RHEUMATOLOGY | Facility: CLINIC | Age: 58
End: 2023-05-01
Payer: MEDICAID

## 2023-05-01 VITALS
HEART RATE: 84 BPM | HEIGHT: 66 IN | BODY MASS INDEX: 32.95 KG/M2 | SYSTOLIC BLOOD PRESSURE: 131 MMHG | DIASTOLIC BLOOD PRESSURE: 94 MMHG | WEIGHT: 205 LBS

## 2023-05-01 DIAGNOSIS — N18.2 TYPE 2 DIABETES MELLITUS WITH STAGE 2 CHRONIC KIDNEY DISEASE, WITH LONG-TERM CURRENT USE OF INSULIN: ICD-10-CM

## 2023-05-01 DIAGNOSIS — D86.85 CARDIAC SARCOIDOSIS: Primary | ICD-10-CM

## 2023-05-01 DIAGNOSIS — D89.1 CRYOGLOBULINEMIC VASCULITIS: ICD-10-CM

## 2023-05-01 DIAGNOSIS — D47.2 MGUS (MONOCLONAL GAMMOPATHY OF UNKNOWN SIGNIFICANCE): ICD-10-CM

## 2023-05-01 DIAGNOSIS — D89.1 CRYOGLOBULINEMIA: ICD-10-CM

## 2023-05-01 DIAGNOSIS — B19.10 HEPATITIS B INFECTION WITHOUT DELTA AGENT WITHOUT HEPATIC COMA, UNSPECIFIED CHRONICITY: ICD-10-CM

## 2023-05-01 DIAGNOSIS — D86.85 CARDIAC SARCOIDOSIS: ICD-10-CM

## 2023-05-01 DIAGNOSIS — D86.0 PULMONARY SARCOIDOSIS: ICD-10-CM

## 2023-05-01 DIAGNOSIS — E11.22 TYPE 2 DIABETES MELLITUS WITH STAGE 2 CHRONIC KIDNEY DISEASE, WITH LONG-TERM CURRENT USE OF INSULIN: ICD-10-CM

## 2023-05-01 DIAGNOSIS — Z79.52 LONG TERM (CURRENT) USE OF SYSTEMIC STEROIDS: ICD-10-CM

## 2023-05-01 DIAGNOSIS — Z79.4 TYPE 2 DIABETES MELLITUS WITH STAGE 2 CHRONIC KIDNEY DISEASE, WITH LONG-TERM CURRENT USE OF INSULIN: ICD-10-CM

## 2023-05-01 DIAGNOSIS — Z91.89 AT RISK FOR STRESS ULCER: ICD-10-CM

## 2023-05-01 DIAGNOSIS — D47.Z9 LOW GRADE B CELL LYMPHOPROLIFERATIVE DISORDER: ICD-10-CM

## 2023-05-01 LAB
ALBUMIN SERPL BCP-MCNC: 3.7 G/DL (ref 3.5–5.2)
ALP SERPL-CCNC: 108 U/L (ref 55–135)
ALT SERPL W/O P-5'-P-CCNC: 21 U/L (ref 10–44)
ANION GAP SERPL CALC-SCNC: 10 MMOL/L (ref 8–16)
AST SERPL-CCNC: 17 U/L (ref 10–40)
BILIRUB SERPL-MCNC: 0.5 MG/DL (ref 0.1–1)
BUN SERPL-MCNC: 16 MG/DL (ref 6–20)
CALCIUM SERPL-MCNC: 10 MG/DL (ref 8.7–10.5)
CHLORIDE SERPL-SCNC: 111 MMOL/L (ref 95–110)
CO2 SERPL-SCNC: 23 MMOL/L (ref 23–29)
CREAT SERPL-MCNC: 1.2 MG/DL (ref 0.5–1.4)
EST. GFR  (NO RACE VARIABLE): 53.1 ML/MIN/1.73 M^2
GLUCOSE SERPL-MCNC: 164 MG/DL (ref 70–110)
POTASSIUM SERPL-SCNC: 3.5 MMOL/L (ref 3.5–5.1)
PROT SERPL-MCNC: 6.7 G/DL (ref 6–8.4)
SODIUM SERPL-SCNC: 144 MMOL/L (ref 136–145)

## 2023-05-01 PROCEDURE — 83520 IMMUNOASSAY QUANT NOS NONAB: CPT | Performed by: STUDENT IN AN ORGANIZED HEALTH CARE EDUCATION/TRAINING PROGRAM

## 2023-05-01 PROCEDURE — 84165 PROTEIN E-PHORESIS SERUM: CPT | Performed by: STUDENT IN AN ORGANIZED HEALTH CARE EDUCATION/TRAINING PROGRAM

## 2023-05-01 PROCEDURE — 87517 HEPATITIS B DNA QUANT: CPT | Performed by: STUDENT IN AN ORGANIZED HEALTH CARE EDUCATION/TRAINING PROGRAM

## 2023-05-01 PROCEDURE — 1160F PR REVIEW ALL MEDS BY PRESCRIBER/CLIN PHARMACIST DOCUMENTED: ICD-10-PCS | Mod: CPTII,,, | Performed by: STUDENT IN AN ORGANIZED HEALTH CARE EDUCATION/TRAINING PROGRAM

## 2023-05-01 PROCEDURE — 4010F PR ACE/ARB THEARPY RXD/TAKEN: ICD-10-PCS | Mod: CPTII,,, | Performed by: STUDENT IN AN ORGANIZED HEALTH CARE EDUCATION/TRAINING PROGRAM

## 2023-05-01 PROCEDURE — 3008F PR BODY MASS INDEX (BMI) DOCUMENTED: ICD-10-PCS | Mod: CPTII,,, | Performed by: STUDENT IN AN ORGANIZED HEALTH CARE EDUCATION/TRAINING PROGRAM

## 2023-05-01 PROCEDURE — 99999 PR PBB SHADOW E&M-EST. PATIENT-LVL IV: CPT | Mod: PBBFAC,,, | Performed by: STUDENT IN AN ORGANIZED HEALTH CARE EDUCATION/TRAINING PROGRAM

## 2023-05-01 PROCEDURE — 1159F PR MEDICATION LIST DOCUMENTED IN MEDICAL RECORD: ICD-10-PCS | Mod: CPTII,,, | Performed by: STUDENT IN AN ORGANIZED HEALTH CARE EDUCATION/TRAINING PROGRAM

## 2023-05-01 PROCEDURE — 86334 IMMUNOFIX E-PHORESIS SERUM: CPT | Mod: 26,,, | Performed by: PATHOLOGY

## 2023-05-01 PROCEDURE — 3066F NEPHROPATHY DOC TX: CPT | Mod: CPTII,,, | Performed by: STUDENT IN AN ORGANIZED HEALTH CARE EDUCATION/TRAINING PROGRAM

## 2023-05-01 PROCEDURE — 1160F RVW MEDS BY RX/DR IN RCRD: CPT | Mod: CPTII,,, | Performed by: STUDENT IN AN ORGANIZED HEALTH CARE EDUCATION/TRAINING PROGRAM

## 2023-05-01 PROCEDURE — 3075F PR MOST RECENT SYSTOLIC BLOOD PRESS GE 130-139MM HG: ICD-10-PCS | Mod: CPTII,,, | Performed by: STUDENT IN AN ORGANIZED HEALTH CARE EDUCATION/TRAINING PROGRAM

## 2023-05-01 PROCEDURE — 3008F BODY MASS INDEX DOCD: CPT | Mod: CPTII,,, | Performed by: STUDENT IN AN ORGANIZED HEALTH CARE EDUCATION/TRAINING PROGRAM

## 2023-05-01 PROCEDURE — 3080F PR MOST RECENT DIASTOLIC BLOOD PRESSURE >= 90 MM HG: ICD-10-PCS | Mod: CPTII,,, | Performed by: STUDENT IN AN ORGANIZED HEALTH CARE EDUCATION/TRAINING PROGRAM

## 2023-05-01 PROCEDURE — 3060F PR POS MICROALBUMINURIA RESULT DOCUMENTED/REVIEW: ICD-10-PCS | Mod: CPTII,,, | Performed by: STUDENT IN AN ORGANIZED HEALTH CARE EDUCATION/TRAINING PROGRAM

## 2023-05-01 PROCEDURE — 99999 PR PBB SHADOW E&M-EST. PATIENT-LVL IV: ICD-10-PCS | Mod: PBBFAC,,, | Performed by: STUDENT IN AN ORGANIZED HEALTH CARE EDUCATION/TRAINING PROGRAM

## 2023-05-01 PROCEDURE — 80053 COMPREHEN METABOLIC PANEL: CPT | Performed by: STUDENT IN AN ORGANIZED HEALTH CARE EDUCATION/TRAINING PROGRAM

## 2023-05-01 PROCEDURE — 84165 PATHOLOGIST INTERPRETATION SPE: ICD-10-PCS | Mod: 26,,, | Performed by: PATHOLOGY

## 2023-05-01 PROCEDURE — 99214 OFFICE O/P EST MOD 30 MIN: CPT | Mod: S$PBB,,, | Performed by: STUDENT IN AN ORGANIZED HEALTH CARE EDUCATION/TRAINING PROGRAM

## 2023-05-01 PROCEDURE — 1159F MED LIST DOCD IN RCRD: CPT | Mod: CPTII,,, | Performed by: STUDENT IN AN ORGANIZED HEALTH CARE EDUCATION/TRAINING PROGRAM

## 2023-05-01 PROCEDURE — 86334 PATHOLOGIST INTERPRETATION IFE: ICD-10-PCS | Mod: 26,,, | Performed by: PATHOLOGY

## 2023-05-01 PROCEDURE — 99214 OFFICE O/P EST MOD 30 MIN: CPT | Mod: PBBFAC | Performed by: STUDENT IN AN ORGANIZED HEALTH CARE EDUCATION/TRAINING PROGRAM

## 2023-05-01 PROCEDURE — 3080F DIAST BP >= 90 MM HG: CPT | Mod: CPTII,,, | Performed by: STUDENT IN AN ORGANIZED HEALTH CARE EDUCATION/TRAINING PROGRAM

## 2023-05-01 PROCEDURE — 86334 IMMUNOFIX E-PHORESIS SERUM: CPT | Performed by: STUDENT IN AN ORGANIZED HEALTH CARE EDUCATION/TRAINING PROGRAM

## 2023-05-01 PROCEDURE — 3046F HEMOGLOBIN A1C LEVEL >9.0%: CPT | Mod: CPTII,,, | Performed by: STUDENT IN AN ORGANIZED HEALTH CARE EDUCATION/TRAINING PROGRAM

## 2023-05-01 PROCEDURE — 3060F POS MICROALBUMINURIA REV: CPT | Mod: CPTII,,, | Performed by: STUDENT IN AN ORGANIZED HEALTH CARE EDUCATION/TRAINING PROGRAM

## 2023-05-01 PROCEDURE — 99214 PR OFFICE/OUTPT VISIT, EST, LEVL IV, 30-39 MIN: ICD-10-PCS | Mod: S$PBB,,, | Performed by: STUDENT IN AN ORGANIZED HEALTH CARE EDUCATION/TRAINING PROGRAM

## 2023-05-01 PROCEDURE — 3075F SYST BP GE 130 - 139MM HG: CPT | Mod: CPTII,,, | Performed by: STUDENT IN AN ORGANIZED HEALTH CARE EDUCATION/TRAINING PROGRAM

## 2023-05-01 PROCEDURE — 3066F PR DOCUMENTATION OF TREATMENT FOR NEPHROPATHY: ICD-10-PCS | Mod: CPTII,,, | Performed by: STUDENT IN AN ORGANIZED HEALTH CARE EDUCATION/TRAINING PROGRAM

## 2023-05-01 PROCEDURE — 4010F ACE/ARB THERAPY RXD/TAKEN: CPT | Mod: CPTII,,, | Performed by: STUDENT IN AN ORGANIZED HEALTH CARE EDUCATION/TRAINING PROGRAM

## 2023-05-01 PROCEDURE — 3046F PR MOST RECENT HEMOGLOBIN A1C LEVEL > 9.0%: ICD-10-PCS | Mod: CPTII,,, | Performed by: STUDENT IN AN ORGANIZED HEALTH CARE EDUCATION/TRAINING PROGRAM

## 2023-05-01 PROCEDURE — 84165 PROTEIN E-PHORESIS SERUM: CPT | Mod: 26,,, | Performed by: PATHOLOGY

## 2023-05-01 RX ORDER — PREDNISONE 10 MG/1
20 TABLET ORAL DAILY
Qty: 60 TABLET | Refills: 2 | Status: SHIPPED | OUTPATIENT
Start: 2023-05-01 | End: 2023-07-21

## 2023-05-01 RX ORDER — ALENDRONATE SODIUM 70 MG/1
70 TABLET ORAL
Qty: 4 TABLET | Refills: 11 | Status: SHIPPED | OUTPATIENT
Start: 2023-05-01 | End: 2024-04-30

## 2023-05-01 RX ORDER — PANTOPRAZOLE SODIUM 40 MG/1
40 TABLET, DELAYED RELEASE ORAL
Qty: 30 TABLET | Refills: 3 | Status: SHIPPED | OUTPATIENT
Start: 2023-05-01 | End: 2024-04-30

## 2023-05-01 ASSESSMENT — ROUTINE ASSESSMENT OF PATIENT INDEX DATA (RAPID3)
TOTAL RAPID3 SCORE: 5.5
PATIENT GLOBAL ASSESSMENT SCORE: 7
FATIGUE SCORE: 5.5
PSYCHOLOGICAL DISTRESS SCORE: 5.5
MDHAQ FUNCTION SCORE: 0.9
PAIN SCORE: 6.5
AM STIFFNESS SCORE: 0, NO

## 2023-05-01 NOTE — TELEPHONE ENCOUNTER
----- Message from Sly Solis MD sent at 5/1/2023  4:40 PM CDT -----  Margaret, would recheck renal function in 2 wks given the decrease in eGFR. LAN

## 2023-05-01 NOTE — PROGRESS NOTES
I have personally taken the history and examined the patient and agree with the resident,s note as stated above          Cardiac sarcoidosis on prednisone 10mg twice daily until cardiac  FDG PET 6/6/23 and then f/u with Dr. 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 Kim for management as will be on prednisone 20mg daily for next several months until repeat cardiac FDG PET   +HBcAB +HbsAg      HBV DNA, SPEP, SIFE, sIL2, CMP today  UA UPCR today  Prednisone 10mg twice daily   methotrexate 20mg po once a week with folic acid 1mg daily  Entecavir 0.5mg daily  PFTs including 6 MW , HRCT chest  F/u  in Pulmonary  F/u Dr. Garcia in Nephrology for f/u cryoglobulinemic GN  F/u with Dr. Kim for T 2 DM  F/u with Tian in Heme-Onc for B-cell lymphoproliferative disorder, erythrocytosis  F/u Dr. Santiago in Ophthalmology for sarcoid and hydroxychloroquine check  DXA  Can stop Bactrim PJP prophylaxis  RTC 3 months with standing labs

## 2023-05-01 NOTE — PROGRESS NOTES
Subjective:       Patient ID: Kendy Vital is a 57 y.o. female.    Chief Complaint: Sarcoidosis  HPI     Ms. 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 Later found to have cardiac sarcoidosis in 10/2023.     Interval History:  5/1/23: She did go to the ED at [facility] in [place], [place] after her last appointment and states that they were able to get her blood sugar down without she did have her blood sugar down. Today, her glucose was 150 this morning. She states resolution of polyuria and polydipsia. She does have near-sight but no vision loss. She denies joint pain/joint stiffness, fever, rash, nasal/oral ulcers, SOB, or chest pain.    4/3/23: She is losing weight 35 lbs. She reports extreme fatigue, polyuria, lightheadedness and polydipsia. She remains on prednisone 10mg BID (has been on this since November 2022 for planned 6 months for cardiac sarcoidosis per transplant cardiology. She has follow up appointment in 6/2023 with repeat cardiac PET. Per chart review, her last hemoglobin a1c 14% and Glucose on CMP from 3/24/23 shows glucose in 500's. Note from chart states PCP tried but was unable to contact patient.  She denies fever, rash, no nasal/oral ulcers, SOB, or chest pain. She does report decreased vision within the last month.    10/24/22: Two months ago, she started having pain in right knee with swelling. She denies fevers.  She has been  "following with Cardiology recent cardiac PET show signs of possible cardiac sarcoid.  She reports intermittent shortness of breath and chest pain. she denies oral ulcers.  She notices burning pain in her legs bilaterally. She had a previous rash last month which consisted of which she describes as red spots on her arms, shoulders, neck, and back bilaterally which has since completely resolved.  She has not followed up with pulmonology and Hematology since last year.  She reports compliance with her methotrexate and prednisone.    Previous Medication regimen:   prednisone 10 mg BID   methotrexate 20mg weekly with folic acid supplementation  hydroxychloroquine 200mg BID    Objective:   BP (!) 131/94   Pulse 84   Ht 5' 6" (1.676 m)   Wt 93 kg (205 lb 0.4 oz)   BMI 33.09 kg/m²      Physical Exam   Constitutional: No distress.   HENT:   Head: Normocephalic and atraumatic.   Eyes: Conjunctivae are normal.   Cardiovascular: Normal rate, regular rhythm and normal heart sounds. Exam reveals no friction rub.   No murmur heard.  Pulmonary/Chest: Effort normal. She has wheezes. She has rhonchi. She has no rales.   Abdominal: Soft. Bowel sounds are normal. There is no abdominal tenderness. There is no rebound.   Musculoskeletal:      Comments: No synovitis in the upper or lower extremities.   Neurological: She is alert.   Skin: Skin is warm and dry. She is not diaphoretic.      Chart Review:  Labs from 4/3/23 currently pending    No data to display     Assessment:       1. Cardiac sarcoidosis    2. Sarcoidosis    3. Cryoglobulinemia    4. Hepatitis B infection without delta agent without hepatic coma, unspecified chronicity    5. Type 2 diabetes mellitus with stage 2 chronic kidney disease, with long-term current use of insulin    6. Pulmonary sarcoidosis    7. Low grade B cell lymphoproliferative disorder    8. Long term (current) use of systemic steroids    9. At risk for stress ulcer           Plan:         Cardiac " Sarcoidosis: Cardiac PET from 10/2022 showed myocardial FDG uptake in the basal lateral and basal anterior wall consistent with active cardiac sarcoidosis. following with cardiology, last seen on next appointment on 6/2023.  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  Defer management to hematology, next appt in 5/2023     IgM kappa MGUS:  Repeat SPEP and MARCO today  Defer management to hematology     HBV-associated cryoglobulinemic glomerulonephritis: s/p pulse steroids x 3, plasmapheresis and rituximab 1g x 2 (in 2019)  Recent cryoglobulins negative from 4/2023   Last seen by nephrology in 1/2022,  Will send message to nephrology to coordinate follow up      Chronic Hep B: Hep B core+, HBsAg+, Hep B E+  On entecavir and Followed by hepatology, last seen in 1/2023  Repeat Hep B DNA today    Pulmonary Sarcoidosis:  CT Chest from 1/2022 showing interval decrease in large nodules but innumerable micronodules with evidence of pulmonary fibrosis in a Lindsey lymphatic distribution and pulmonary fibrosis  Last seen in Pulmonology clinic in 10/2021 but then lost to follow-up  Continue MTX 20mg weekly  Repeat CT chest and PFTs  Will send message to pulmonology to coordinate follow up    Diabetes type 2 with hyperglycemia:  Will likely need close follow up for management of hyperglycemia as will be on prednisone for the next few months per cardiology   Defer management to PCP    Long-term Steroid Use:  DEXA in 1/2021 with normal BMD, history of tibula fx with fall on slippery  floor  Continue alendronate for osteoporosis ppx while on high dose steroids  Discussed importance of getting COVID booster      Follow up in 8 weeks (already scheduled).     Patient is seen and evaluated with Dr. Solis

## 2023-05-02 ENCOUNTER — TELEPHONE (OUTPATIENT)
Dept: PULMONOLOGY | Facility: CLINIC | Age: 58
End: 2023-05-02
Payer: MEDICAID

## 2023-05-02 LAB
ALBUMIN SERPL ELPH-MCNC: 4 G/DL (ref 3.35–5.55)
ALPHA1 GLOB SERPL ELPH-MCNC: 0.24 G/DL (ref 0.17–0.41)
ALPHA2 GLOB SERPL ELPH-MCNC: 0.85 G/DL (ref 0.43–0.99)
B-GLOBULIN SERPL ELPH-MCNC: 0.66 G/DL (ref 0.5–1.1)
GAMMA GLOB SERPL ELPH-MCNC: 0.65 G/DL (ref 0.67–1.58)
INTERPRETATION SERPL IFE-IMP: NORMAL
PROT SERPL-MCNC: 6.4 G/DL (ref 6–8.4)

## 2023-05-02 NOTE — TELEPHONE ENCOUNTER
Reached out to patient to get her scheduled for a pulmonary f/u. Patient declined the 1st availiable appointment on 5/5. Patient scheduled for a appointment 3 weeks out per her request.

## 2023-05-02 NOTE — TELEPHONE ENCOUNTER
----- Message from Colton Lock MD sent at 5/2/2023  8:25 AM CDT -----  Regarding: RE: Follow up on mutual patient  Of course.  I have included my MA, Kathleen, in this conversation.  Can we please schedule Kendy Vital at the next available appointment?  She was previously following in fellow's clinic, but can be scheduled either with myself or there.  Whatever is earlier.    Thanks,  Colton Lock MD  [facility]  ----- Message -----  From: India Arce MD  Sent: 5/1/2023   9:02 PM CDT  To: Colton Lock MD  Subject: Follow up on mutual patient                      Dr. Lock,  I wanted to follow up on a mutual patient with pulmonary sarcoidosis from rheumatology. She was recently diagnosed with Cardiac Sarcoid and has been on prednisone 10mg BID for the past few months along with her MTX. It looks like she was last seen in pulmonology clinic on 10/2021 but lost to follow up. We have ordered a CT chest to help monitor her pulmonary sarcoidosis. Would it be possible to have her follow up in your clinic?    Thanks,  Margaret Arce MD

## 2023-05-03 LAB
PATHOLOGIST INTERPRETATION IFE: NORMAL
PATHOLOGIST INTERPRETATION SPE: NORMAL

## 2023-05-04 DIAGNOSIS — E11.22 TYPE 2 DIABETES MELLITUS WITH STAGE 2 CHRONIC KIDNEY DISEASE, WITH LONG-TERM CURRENT USE OF INSULIN: Primary | ICD-10-CM

## 2023-05-04 DIAGNOSIS — Z79.4 TYPE 2 DIABETES MELLITUS WITH STAGE 2 CHRONIC KIDNEY DISEASE, WITH LONG-TERM CURRENT USE OF INSULIN: Primary | ICD-10-CM

## 2023-05-04 DIAGNOSIS — N18.2 TYPE 2 DIABETES MELLITUS WITH STAGE 2 CHRONIC KIDNEY DISEASE, WITH LONG-TERM CURRENT USE OF INSULIN: Primary | ICD-10-CM

## 2023-05-04 LAB
HBV DNA SERPL NAA+PROBE-ACNC: <10 IU/ML
HBV DNA SERPL NAA+PROBE-LOG IU: <1 LOG (10) IU/ML
HBV DNA SERPL QL NAA+PROBE: NOT DETECTED
SOL IL2 RECEP SERPL-MCNC: 517.6 PG/ML (ref 175.3–858.2)

## 2023-05-10 ENCOUNTER — PATIENT OUTREACH (OUTPATIENT)
Dept: ADMINISTRATIVE | Facility: HOSPITAL | Age: 58
End: 2023-05-10
Payer: MEDICAID

## 2023-05-10 NOTE — PROGRESS NOTES
Health Maintenance Due   Topic Date Due    Cervical Cancer Screening  Never done    Shingles Vaccine (1 of 2) Never done    Colorectal Cancer Screening  Never done    COVID-19 Vaccine (2 - Moderna series) 09/02/2021    Mammogram  10/05/2022      Chart reviewed. Triggered LINKS. Updated Care Everywhere. Unable to leave voice message patient phone just rings called patient to schedule overdue health maintenance.     Lalo Martienz CMA  Population Health Care Coordinator  Primary Care Team

## 2023-05-18 DIAGNOSIS — N18.2 CHRONIC KIDNEY DISEASE (CKD), STAGE II (MILD): Primary | ICD-10-CM

## 2023-05-23 ENCOUNTER — OFFICE VISIT (OUTPATIENT)
Dept: NEPHROLOGY | Facility: CLINIC | Age: 58
End: 2023-05-23
Payer: MEDICAID

## 2023-05-23 ENCOUNTER — HOSPITAL ENCOUNTER (OUTPATIENT)
Dept: RADIOLOGY | Facility: HOSPITAL | Age: 58
Discharge: HOME | End: 2023-05-23
Attending: STUDENT IN AN ORGANIZED HEALTH CARE EDUCATION/TRAINING PROGRAM
Payer: MEDICAID

## 2023-05-23 ENCOUNTER — OFFICE VISIT (OUTPATIENT)
Dept: INTERNAL MEDICINE | Facility: CLINIC | Age: 58
End: 2023-05-23
Payer: MEDICAID

## 2023-05-23 VITALS
BODY MASS INDEX: 32.73 KG/M2 | WEIGHT: 198.44 LBS | OXYGEN SATURATION: 98 % | HEART RATE: 74 BPM | SYSTOLIC BLOOD PRESSURE: 139 MMHG | WEIGHT: 203.69 LBS | DIASTOLIC BLOOD PRESSURE: 80 MMHG | HEART RATE: 80 BPM | OXYGEN SATURATION: 99 % | DIASTOLIC BLOOD PRESSURE: 77 MMHG | HEIGHT: 66 IN | BODY MASS INDEX: 32.02 KG/M2 | SYSTOLIC BLOOD PRESSURE: 142 MMHG

## 2023-05-23 DIAGNOSIS — M54.41 CHRONIC BILATERAL LOW BACK PAIN WITH BILATERAL SCIATICA: ICD-10-CM

## 2023-05-23 DIAGNOSIS — G89.29 CHRONIC BILATERAL LOW BACK PAIN WITH BILATERAL SCIATICA: ICD-10-CM

## 2023-05-23 DIAGNOSIS — M54.42 CHRONIC BILATERAL LOW BACK PAIN WITH BILATERAL SCIATICA: ICD-10-CM

## 2023-05-23 DIAGNOSIS — I10 ESSENTIAL HYPERTENSION: ICD-10-CM

## 2023-05-23 DIAGNOSIS — E11.9 TYPE 2 DIABETES MELLITUS WITHOUT COMPLICATION, WITHOUT LONG-TERM CURRENT USE OF INSULIN: Primary | ICD-10-CM

## 2023-05-23 DIAGNOSIS — D80.1 HYPOGAMMAGLOBULINEMIA: ICD-10-CM

## 2023-05-23 DIAGNOSIS — D84.9 IMMUNOSUPPRESSION: ICD-10-CM

## 2023-05-23 DIAGNOSIS — N05.5 MPGN (MEMBRANOPROLIFERATIVE GLOMERULONEPHRITIDES): Primary | ICD-10-CM

## 2023-05-23 DIAGNOSIS — D86.0 PULMONARY SARCOIDOSIS: ICD-10-CM

## 2023-05-23 DIAGNOSIS — F32.2 CURRENT SEVERE EPISODE OF MAJOR DEPRESSIVE DISORDER WITHOUT PSYCHOTIC FEATURES WITHOUT PRIOR EPISODE: ICD-10-CM

## 2023-05-23 DIAGNOSIS — N18.31 STAGE 3A CHRONIC KIDNEY DISEASE: ICD-10-CM

## 2023-05-23 DIAGNOSIS — D47.9 B-CELL LYMPHOPROLIFERATIVE DISORDER: ICD-10-CM

## 2023-05-23 DIAGNOSIS — B18.1 CHRONIC VIRAL HEPATITIS B WITHOUT DELTA AGENT AND WITHOUT COMA: ICD-10-CM

## 2023-05-23 PROCEDURE — 3077F SYST BP >= 140 MM HG: CPT | Mod: CPTII,,, | Performed by: INTERNAL MEDICINE

## 2023-05-23 PROCEDURE — 3046F HEMOGLOBIN A1C LEVEL >9.0%: CPT | Mod: CPTII,,, | Performed by: INTERNAL MEDICINE

## 2023-05-23 PROCEDURE — 4010F ACE/ARB THERAPY RXD/TAKEN: CPT | Mod: CPTII,,, | Performed by: INTERNAL MEDICINE

## 2023-05-23 PROCEDURE — 3066F NEPHROPATHY DOC TX: CPT | Mod: CPTII,,, | Performed by: INTERNAL MEDICINE

## 2023-05-23 PROCEDURE — 99999 PR PBB SHADOW E&M-EST. PATIENT-LVL III: CPT | Mod: PBBFAC,,, | Performed by: INTERNAL MEDICINE

## 2023-05-23 PROCEDURE — 3060F POS MICROALBUMINURIA REV: CPT | Mod: CPTII,,, | Performed by: INTERNAL MEDICINE

## 2023-05-23 PROCEDURE — 4010F PR ACE/ARB THEARPY RXD/TAKEN: ICD-10-PCS | Mod: CPTII,,, | Performed by: INTERNAL MEDICINE

## 2023-05-23 PROCEDURE — 99214 OFFICE O/P EST MOD 30 MIN: CPT | Mod: S$PBB,,, | Performed by: INTERNAL MEDICINE

## 2023-05-23 PROCEDURE — 3008F BODY MASS INDEX DOCD: CPT | Mod: CPTII,,, | Performed by: INTERNAL MEDICINE

## 2023-05-23 PROCEDURE — 99999 PR PBB SHADOW E&M-EST. PATIENT-LVL IV: CPT | Mod: PBBFAC,,, | Performed by: INTERNAL MEDICINE

## 2023-05-23 PROCEDURE — 1159F PR MEDICATION LIST DOCUMENTED IN MEDICAL RECORD: ICD-10-PCS | Mod: CPTII,,, | Performed by: INTERNAL MEDICINE

## 2023-05-23 PROCEDURE — 3060F PR POS MICROALBUMINURIA RESULT DOCUMENTED/REVIEW: ICD-10-PCS | Mod: CPTII,,, | Performed by: INTERNAL MEDICINE

## 2023-05-23 PROCEDURE — 3078F DIAST BP <80 MM HG: CPT | Mod: CPTII,,, | Performed by: INTERNAL MEDICINE

## 2023-05-23 PROCEDURE — 99214 OFFICE O/P EST MOD 30 MIN: CPT | Mod: PBBFAC,25 | Performed by: INTERNAL MEDICINE

## 2023-05-23 PROCEDURE — 3008F PR BODY MASS INDEX (BMI) DOCUMENTED: ICD-10-PCS | Mod: CPTII,,, | Performed by: INTERNAL MEDICINE

## 2023-05-23 PROCEDURE — 71250 CT CHEST WITHOUT CONTRAST: ICD-10-PCS | Mod: 26,,, | Performed by: RADIOLOGY

## 2023-05-23 PROCEDURE — 3075F PR MOST RECENT SYSTOLIC BLOOD PRESS GE 130-139MM HG: ICD-10-PCS | Mod: CPTII,,, | Performed by: INTERNAL MEDICINE

## 2023-05-23 PROCEDURE — 1160F RVW MEDS BY RX/DR IN RCRD: CPT | Mod: CPTII,,, | Performed by: INTERNAL MEDICINE

## 2023-05-23 PROCEDURE — 3075F SYST BP GE 130 - 139MM HG: CPT | Mod: CPTII,,, | Performed by: INTERNAL MEDICINE

## 2023-05-23 PROCEDURE — 3046F PR MOST RECENT HEMOGLOBIN A1C LEVEL > 9.0%: ICD-10-PCS | Mod: CPTII,,, | Performed by: INTERNAL MEDICINE

## 2023-05-23 PROCEDURE — 99214 PR OFFICE/OUTPT VISIT, EST, LEVL IV, 30-39 MIN: ICD-10-PCS | Mod: S$PBB,,, | Performed by: INTERNAL MEDICINE

## 2023-05-23 PROCEDURE — 1159F MED LIST DOCD IN RCRD: CPT | Mod: CPTII,,, | Performed by: INTERNAL MEDICINE

## 2023-05-23 PROCEDURE — 71250 CT THORAX DX C-: CPT | Mod: 26,,, | Performed by: RADIOLOGY

## 2023-05-23 PROCEDURE — 3079F PR MOST RECENT DIASTOLIC BLOOD PRESSURE 80-89 MM HG: ICD-10-PCS | Mod: CPTII,,, | Performed by: INTERNAL MEDICINE

## 2023-05-23 PROCEDURE — 71250 CT THORAX DX C-: CPT | Mod: TC

## 2023-05-23 PROCEDURE — 3066F PR DOCUMENTATION OF TREATMENT FOR NEPHROPATHY: ICD-10-PCS | Mod: CPTII,,, | Performed by: INTERNAL MEDICINE

## 2023-05-23 PROCEDURE — 99999 PR PBB SHADOW E&M-EST. PATIENT-LVL IV: ICD-10-PCS | Mod: PBBFAC,,, | Performed by: INTERNAL MEDICINE

## 2023-05-23 PROCEDURE — 99999 PR PBB SHADOW E&M-EST. PATIENT-LVL III: ICD-10-PCS | Mod: PBBFAC,,, | Performed by: INTERNAL MEDICINE

## 2023-05-23 PROCEDURE — 3077F PR MOST RECENT SYSTOLIC BLOOD PRESSURE >= 140 MM HG: ICD-10-PCS | Mod: CPTII,,, | Performed by: INTERNAL MEDICINE

## 2023-05-23 PROCEDURE — 3078F PR MOST RECENT DIASTOLIC BLOOD PRESSURE < 80 MM HG: ICD-10-PCS | Mod: CPTII,,, | Performed by: INTERNAL MEDICINE

## 2023-05-23 PROCEDURE — 1160F PR REVIEW ALL MEDS BY PRESCRIBER/CLIN PHARMACIST DOCUMENTED: ICD-10-PCS | Mod: CPTII,,, | Performed by: INTERNAL MEDICINE

## 2023-05-23 PROCEDURE — 99213 OFFICE O/P EST LOW 20 MIN: CPT | Mod: PBBFAC,25,27 | Performed by: INTERNAL MEDICINE

## 2023-05-23 PROCEDURE — 3079F DIAST BP 80-89 MM HG: CPT | Mod: CPTII,,, | Performed by: INTERNAL MEDICINE

## 2023-05-23 RX ORDER — ESCITALOPRAM OXALATE 10 MG/1
10 TABLET ORAL DAILY
Qty: 90 TABLET | Refills: 3 | Status: SHIPPED | OUTPATIENT
Start: 2023-05-23 | End: 2023-05-24 | Stop reason: SDUPTHER

## 2023-05-23 RX ORDER — FUROSEMIDE 40 MG/1
TABLET ORAL
Qty: 90 TABLET | Refills: 3 | Status: SHIPPED | OUTPATIENT
Start: 2023-05-23

## 2023-05-23 NOTE — PROGRESS NOTES
Subjective:       Patient ID: Kendy Vital is a 57 y.o. female.    Chief Complaint: Leg Pain    HPI    Very complicated patient with multiple medical issues but her biggest complaint today is pain in her lumbar spine radiating into her legs.         HTN: well controlled on lisinopril, carvedilol, and nifedipine      HFpEF 1/2022 TTE EF 65%, nl diastolic function. On lasix 40 mg daily.      IDDM :  on lantus 65 unites and humulin 12-18 units TID with meals. A1C now down to 11 from 14. BG remains elevated at home.      MGUS IgM kappa/ B celllymphoproliferative  disorder stable and being monitored by hematology.      Chronic Hep B: dx in 2019 on entecavir      Cardiac and pulmonary sarcoidosis newly diagnosed  On high dose steroids by Rheumatology has ct chest today. Also following with cardiology and pulmonology      Cryoglobulinemic vasculitis with diffuse proliferative glomerulonephritis treated with rituximab and IVIG  in 2019 currently on methotrexate and prednisone    hypogammaglobulinemia s/p IVIG (7/2019 prior to rituximab infusion)     Hx of Hep B on entecavir       Review of Systems   Constitutional:  Negative for activity change, appetite change and chills.   HENT:  Negative for ear pain, sinus pressure/congestion and sneezing.    Respiratory:  Negative for cough and shortness of breath.    Cardiovascular:  Negative for chest pain, palpitations and leg swelling.   Gastrointestinal:  Negative for abdominal distention, abdominal pain, constipation, diarrhea, nausea and vomiting.   Genitourinary:  Negative for dysuria and hematuria.   Musculoskeletal:  Negative for arthralgias, back pain and myalgias.   Neurological:  Negative for dizziness and headaches.   Psychiatric/Behavioral:  Negative for agitation. The patient is not nervous/anxious.          Past Medical History:   Diagnosis Date    Acute (diffuse) proliferative glomerulonephritis     Acute renal failure 7/19/2019    B-cell lymphoproliferative  disorder 7/30/2019    CHF (congestive heart failure)     Chronic obstructive lung disease 7/27/2019    Chronic viral hepatitis B without delta agent and without coma 7/24/2019    Cryoglobulinemic vasculitis     Diabetes mellitus     Hypogammaglobulinemia 8/5/2019    Iron deficiency anemia 7/30/2019    Renal vein thrombosis 2015    s/p embolectomy and lovenox for 9 mos    Steroid-induced hyperglycemia 7/28/2019    Uterine leiomyoma     s/p resection     Past Surgical History:   Procedure Laterality Date    AV FISTULA PLACEMENT      CATARACT EXTRACTION W/  INTRAOCULAR LENS IMPLANT Left 4/22/2021    Procedure: EXTRACTION, CATARACT, WITH IOL INSERTION;  Surgeon: Allen Alejandro MD;  Location: [facility] OR;  Service: Ophthalmology;  Laterality: Left;    CATARACT EXTRACTION W/  INTRAOCULAR LENS IMPLANT Right 5/6/2021    Procedure: EXTRACTION, CATARACT, WITH IOL INSERTION;  Surgeon: Allen Alejandro MD;  Location: [facility] OR;  Service: Ophthalmology;  Laterality: Right;    LUNG BIOPSY N/A 12/1/2020    Procedure: BIOPSY, LUNG;  Surgeon: [facility];  Location: [facility] OR;  Service: Radiology;  Laterality: N/A;  8 A.M. START  PHONE PREOP DONE 11/27/2020  PT/INR, CBC, CMP AND COVID ON AM OF PROCEDURE   ARRIVAL AT 7:00 FOR 8:30 APPT      Patient Active Problem List   Diagnosis    Essential hypertension    Acute renal failure    Cryoglobulinemic vasculitis    Hepatitis B    Immunosuppression    History of renal vein thrombosis    Chronic obstructive lung disease    B-cell lymphoproliferative disorder    Elevated uric acid in blood    Hypogammaglobulinemia    Obesity (BMI 30-39.9)    Pulmonary infiltrate    Chronic diastolic heart failure    Type 2 diabetes mellitus with stage 2 chronic kidney disease, with long-term current use of insulin    Multiple pulmonary nodules    Hypercalcemia    Sarcoidosis    Nuclear sclerosis, bilateral    Nuclear sclerotic cataract of right eye    MPGN (membranoproliferative glomerulonephritides)     Chronic kidney disease (CKD), stage II (mild)    Pain of anterior lower extremity    Elevated alkaline phosphatase level    Dilated bile duct    Stage 3a chronic kidney disease        Objective:      Physical Exam  Constitutional:       Appearance: Normal appearance.   HENT:      Head: Normocephalic.      Right Ear: Tympanic membrane normal.      Left Ear: Tympanic membrane normal.      Nose: Nose normal.   Cardiovascular:      Rate and Rhythm: Normal rate and regular rhythm.      Pulses: Normal pulses.      Heart sounds: Normal heart sounds.   Pulmonary:      Effort: Pulmonary effort is normal.      Breath sounds: Normal breath sounds.   Abdominal:      General: Abdomen is flat. Bowel sounds are normal.      Palpations: Abdomen is soft.   Musculoskeletal:         General: Normal range of motion.      Cervical back: Normal range of motion and neck supple.   Skin:     General: Skin is warm and dry.   Neurological:      General: No focal deficit present.      Mental Status: She is alert and oriented to person, place, and time.   Psychiatric:         Mood and Affect: Mood normal.       Assessment:       Problem List Items Addressed This Visit          Cardiac/Vascular    Essential hypertension    Relevant Medications    furosemide (LASIX) 40 MG tablet     Other Visit Diagnoses       Type 2 diabetes mellitus without complication, without long-term current use of insulin    -  Primary    Relevant Orders    HEMOGLOBIN A1C (Completed)    Current severe episode of major depressive disorder without psychotic features without prior episode        Relevant Medications    EScitalopram oxalate (LEXAPRO) 10 MG tablet    Chronic bilateral low back pain with bilateral sciatica                Plan:         Kendy was seen today for leg pain.    Diagnoses and all orders for this visit:    Type 2 diabetes mellitus without complication, without long-term current use of insulin  on lantus 65 unites and humulin 12-18 units TID with  meals. A1C now down to 11 from 14. BG remains elevated at home.   Has appt with Angeline santiago next week. Possibly may benefit from insulin pump as she will continue to need high doses of prednisone for the foreseeable  future.     Current severe episode of major depressive disorder without psychotic features without prior episode  -     EScitalopram oxalate (LEXAPRO) 10 MG tablet; Take 1 tablet (10 mg total) by mouth once daily.    Essential hypertension  Well controlled today     Chronic bilateral low back pain with bilateral sciatica  -     HYDROcodone-acetaminophen (NORCO) 5-325 mg per tablet; Take 1 tablet by mouth daily as needed for Pain.  Patient already on steroids and gabapentin.   NSAIDS not appropriate given complicated clinic picture.   Will star norco for now but will refer to pain management for injections once stable from cardiac and pulm standpoint.                Yuki Kim MD   Internal Medicine   Primary Care

## 2023-05-23 NOTE — PROGRESS NOTES
Progress Report  Nephrology      Consult Requested By: PCP  Reason for Consult: CKD, glomerulonephritis    History of Present Illness:  Patient is a 57 y.o. female presents for a new consultation for evaluation of elevated serum creatinine and presumed chronic kidney disease. The patient was found to have an elevated serum creatinine during routine laboratory testing, at 1.3 - 1.6 mg/dL. 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 She was treated with PLEX and entecavir.     The patient denies SOB, LE edema, hematuria or foamy urine. Doing well, no major complaints.     The patient denies taking NSAIDs or new antibiotics, recreational drugs, recent episode of dehydration, diarrhea, nausea or vomiting, acute illness, hospitalization or exposure to IV radiocontrast.     Past Medical History:   Diagnosis Date    Acute (diffuse) proliferative glomerulonephritis     Acute renal failure 7/19/2019    B-cell lymphoproliferative disorder 7/30/2019    CHF (congestive heart failure)     Chronic obstructive lung disease 7/27/2019    Chronic viral hepatitis B without delta agent and without coma 7/24/2019    Cryoglobulinemic vasculitis     Diabetes mellitus     Hypogammaglobulinemia 8/5/2019    Iron deficiency anemia 7/30/2019    Renal vein thrombosis 2015    s/p embolectomy and lovenox for 9 mos    Steroid-induced hyperglycemia 7/28/2019    Uterine leiomyoma     s/p resection         Current Outpatient Medications:     acetaminophen (TYLENOL) 500 MG tablet, Take 500 mg by mouth 3 (three) times daily as needed for Pain (or fever)., Disp: , Rfl:     albuterol (PROVENTIL/VENTOLIN HFA) 90 mcg/actuation inhaler, Inhale 2 puffs into the lungs every 6 (six) hours as needed for Wheezing or Shortness of Breath. Rescue, Disp: 18 g, Rfl: 6    alendronate (FOSAMAX) 70 MG tablet, Take 1 tablet (70 mg total) by mouth every 7 days., Disp: 4 tablet,  Rfl: 11    allopurinoL (ZYLOPRIM) 100 MG tablet, Take 100 mg by mouth once daily. for 90 days, Disp: , Rfl:     aspirin (ECOTRIN) 81 MG EC tablet, Take 81 mg by mouth once daily., Disp: , Rfl:     atorvastatin (LIPITOR) 40 MG tablet, Take 1 tablet (40 mg total) by mouth once daily., Disp: 90 tablet, Rfl: 3    blood sugar diagnostic Strp, use to test blood gluocse 2 (two) times daily with meals., Disp: 100 strip, Rfl: 11    carvediloL (COREG) 25 MG tablet, Take 1 tablet (25 mg total) by mouth 2 (two) times daily., Disp: 90 tablet, Rfl: 3    cyanocobalamin (VITAMIN B-12) 250 MCG tablet, Take 250 mcg by mouth once daily., Disp: , Rfl:     diclofenac sodium (VOLTAREN) 1 % Gel, Apply 4 g topically 4 (four) times daily. Apply to knee, Disp: 350 g, Rfl: 2    entecavir (BARACLUDE) 0.5 MG Tab, Take 1 tablet (0.5 mg total) by mouth once daily., Disp: 30 tablet, Rfl: 6    EScitalopram oxalate (LEXAPRO) 10 MG tablet, Take 1 tablet (10 mg total) by mouth once daily., Disp: 90 tablet, Rfl: 3    ferrous sulfate 325 (65 FE) MG EC tablet, Take 1 tablet (325 mg total) by mouth once daily., Disp: , Rfl: 0    folic acid (FOLVITE) 1 MG tablet, Take 1 tablet (1 mg total) by mouth once daily., Disp: 90 tablet, Rfl: 2    furosemide (LASIX) 40 MG tablet, Take 40 mg lasix once daily, Disp: 90 tablet, Rfl: 3    gabapentin (NEURONTIN) 300 MG capsule, Take 2 capsules (600 mg total) by mouth 2 (two) times daily., Disp: 120 capsule, Rfl: 11    HUMULIN R REGULAR U-100 INSULN 100 unit/mL injection, Inject 22 Units into the skin 3 (three) times daily before meals., Disp: 60 mL, Rfl: 3    HYDROcodone-acetaminophen (NORCO) 5-325 mg per tablet, Take 1-2 tablets by mouth every 6 (six) hours as needed., Disp: , Rfl:     hydrOXYchloroQUINE (PLAQUENIL) 200 mg tablet, Take 1 tablet (200 mg total) by mouth 2 (two) times daily., Disp: 180 tablet, Rfl: 3    insulin (LANTUS SOLOSTAR U-100 INSULIN) glargine 100 units/mL SubQ pen, Inject 64 Units into the skin  every morning., Disp: 60 mL, Rfl: 3    lancets 30 gauge Misc, use to test blood glucose 2 (two) times daily with meals., Disp: 100 each, Rfl: 11    lisinopriL (PRINIVIL,ZESTRIL) 20 MG tablet, Take 1 tablet (20 mg total) by mouth once daily., Disp: 30 tablet, Rfl: 11    methotrexate 2.5 MG Tab, Take 8 tablets (20 mg total) by mouth every 7 days., Disp: 96 tablet, Rfl: 0    multivitamin (THERAGRAN) per tablet, Take 1 tablet by mouth once daily., Disp: , Rfl:     NIFEdipine (PROCARDIA-XL) 60 MG (OSM) 24 hr tablet, Take 1 tablet (60 mg total) by mouth once daily., Disp: 90 tablet, Rfl: 3    pantoprazole (PROTONIX) 40 MG tablet, Take 1 tablet (40 mg total) by mouth before breakfast., Disp: 30 tablet, Rfl: 3    polyethylene glycol (GLYCOLAX) 17 gram/dose powder, Take 17 g by mouth daily as needed (constipation)., Disp: 289 g, Rfl: 5    predniSONE (DELTASONE) 10 MG tablet, Take 2 tablets (20 mg total) by mouth once daily., Disp: 60 tablet, Rfl: 2    traZODone (DESYREL) 100 MG tablet, Take 1 tablet (100 mg total) by mouth nightly as needed for Insomnia., Disp: 30 tablet, Rfl: 11    valACYclovir (VALTREX) 1000 MG tablet, Take 1,000 mg by mouth 3 (three) times daily., Disp: , Rfl:     cefUROXime (CEFTIN) 500 MG tablet, SMARTSI Tablet(s) By Mouth Every 12 Hours, Disp: , Rfl:     clindamycin (CLEOCIN) 300 MG capsule, Take 300 mg by mouth 3 (three) times daily., Disp: , Rfl:     lancing device Misc, 1 Device by Misc.(Non-Drug; Combo Route) route 2 (two) times daily with meals., Disp: 1 each, Rfl: 0  Review of patient's allergies indicates:  No Known Allergies     Past Surgical History:   Procedure Laterality Date    AV FISTULA PLACEMENT      CATARACT EXTRACTION W/  INTRAOCULAR LENS IMPLANT Left 2021    Procedure: EXTRACTION, CATARACT, WITH IOL INSERTION;  Surgeon: Allen Alejandro MD;  Location: [facility] OR;  Service: Ophthalmology;  Laterality: Left;    CATARACT EXTRACTION W/  INTRAOCULAR LENS IMPLANT Right 2021     Procedure: EXTRACTION, CATARACT, WITH IOL INSERTION;  Surgeon: Allen Alejandro MD;  Location: [facility] OR;  Service: Ophthalmology;  Laterality: Right;    LUNG BIOPSY N/A 12/1/2020    Procedure: BIOPSY, LUNG;  [facility];  Location: [facility] OR;  Service: Radiology;  Laterality: N/A;  8 A.M. START  PHONE PREOP DONE 11/27/2020  PT/INR, CBC, CMP AND COVID ON AM OF PROCEDURE   ARRIVAL AT 7:00 FOR 8:30 APPT     Family history: no ESRD    Social History     Tobacco Use    Smoking status: Former    Smokeless tobacco: Never   Substance Use Topics    Alcohol use: Not Currently    Drug use: Never       Review of Systems   Constitutional: Negative.    Eyes: Negative.    Gastrointestinal: Negative.    Genitourinary: Negative.    Skin: Negative.    All other systems reviewed and are negative.    Vitals:    05/23/23 1302   BP: (!) 142/77   Pulse: 80       PHYSICAL EXAMINATION:  General: no distress, well nourished  Skin: color, texture, turgor normal. No rash or lesions  HEENT: Eyes: reactive pupils, normal conjunctiva. Oral mucosa moist, no ulcers. Throat: no erythema.  Neck: supple, symmetrical, trachea midline, no JVD, no carotid bruit  Lungs: clear to auscultation bilaterally and normal respiratory effort  Cardiovascular: Heart: regular rate and rhythm, S1, S2 normal, no murmur, rub or gallop. Pulses: 2+ and symmetric.  Abdomen: bowel sounds present, no abdominal bruit, soft, non-tender non-distented; no masses, organomegaly or ascites.   Musculoskeletal: no pitting edema in lower extremities, no clubbing or cyanosis  Lymph Nodes: No cervical or supraclavicular adenopathy  Neurologic: AAOx3, normal strength and tone. No focal deficit. No asterixis.       LABORATORY DATA:  Lab Results   Component Value Date    CREATININE 1.2 05/01/2023       Prot/Creat Ratio, Urine   Date Value Ref Range Status   05/01/2023 0.16 0.00 - 0.20 Final   01/04/2022 0.47 (H) 0.00 - 0.20 Final   04/28/2021 0.47 (H) 0.00 - 0.20 Final        Lab Results   Component Value Date     05/01/2023    K 3.5 05/01/2023    CO2 23 05/01/2023       Lab Results   Component Value Date    .3 (H) 01/04/2022    CALCIUM 10.0 05/01/2023    CAION 1.33 01/04/2022    PHOS 3.3 04/28/2021       Lab Results   Component Value Date    HGB 16.2 (H) 05/23/2023        Lab Results   Component Value Date    HGBA1C 14.0 (H) 03/24/2023           IMAGING STUDIES  Kidney US: Reviewed  Echocardiogram: Reviewed    IMPRESSION / RECOMMENDATIONS:     1. Stage 3a chronic kidney disease  Secondary to HBV-associated cyroglobulinemic glomerulonephritis and ATN. In complete remission. Stable kidney function. Minimal proteinuria, no hematuria. Inspected Will inspect urine sediment given hematuria on last UA and found no evidence of disease activity. Reminded to avoid NSAIDs. No changes needed in IST.    2. Cryoglobulinemic vasculitis  S/p PLEX, steroids. S/p IVIG/RTX as well.  In complete clinical remission.    3. Sarcoidosis/hypercalcemia Stable, on hydroxychloroquine and very low dose prednisone. No dyspnea   4. Chronic viral hepatitis B  on entecavir, in remission   5. B-cell lymphoproliferative disorder  Followed by Hem Onc, has not required chemotherapy   6. Hematuria syndrome  No current evidence of acanthocyturia   7. Obesity (BMI 30-39.9)  Encouraged weight loss.        RTC in 12 mo

## 2023-05-24 ENCOUNTER — TELEPHONE (OUTPATIENT)
Dept: INTERNAL MEDICINE | Facility: CLINIC | Age: 58
End: 2023-05-24
Payer: MEDICAID

## 2023-05-24 ENCOUNTER — PATIENT MESSAGE (OUTPATIENT)
Dept: ADMINISTRATIVE | Facility: HOSPITAL | Age: 58
End: 2023-05-24
Payer: MEDICAID

## 2023-05-24 ENCOUNTER — PATIENT MESSAGE (OUTPATIENT)
Dept: INTERNAL MEDICINE | Facility: CLINIC | Age: 58
End: 2023-05-24
Payer: MEDICAID

## 2023-05-24 DIAGNOSIS — I10 ESSENTIAL HYPERTENSION: ICD-10-CM

## 2023-05-24 DIAGNOSIS — Z79.4 TYPE 2 DIABETES MELLITUS WITH STAGE 2 CHRONIC KIDNEY DISEASE, WITH LONG-TERM CURRENT USE OF INSULIN: ICD-10-CM

## 2023-05-24 DIAGNOSIS — N18.2 TYPE 2 DIABETES MELLITUS WITH STAGE 2 CHRONIC KIDNEY DISEASE, WITH LONG-TERM CURRENT USE OF INSULIN: ICD-10-CM

## 2023-05-24 DIAGNOSIS — E11.22 TYPE 2 DIABETES MELLITUS WITH STAGE 2 CHRONIC KIDNEY DISEASE, WITH LONG-TERM CURRENT USE OF INSULIN: ICD-10-CM

## 2023-05-24 DIAGNOSIS — F32.2 CURRENT SEVERE EPISODE OF MAJOR DEPRESSIVE DISORDER WITHOUT PSYCHOTIC FEATURES WITHOUT PRIOR EPISODE: ICD-10-CM

## 2023-05-24 RX ORDER — HYDROCODONE BITARTRATE AND ACETAMINOPHEN 5; 325 MG/1; MG/1
1 TABLET ORAL DAILY PRN
Qty: 30 TABLET | Refills: 0 | Status: SHIPPED | OUTPATIENT
Start: 2023-05-24 | End: 2023-06-23

## 2023-05-24 RX ORDER — ESCITALOPRAM OXALATE 10 MG/1
10 TABLET ORAL DAILY
Qty: 90 TABLET | Refills: 3 | Status: SHIPPED | OUTPATIENT
Start: 2023-05-24 | End: 2024-05-23

## 2023-05-24 RX ORDER — LISINOPRIL 20 MG/1
20 TABLET ORAL DAILY
Qty: 30 TABLET | Refills: 11 | Status: SHIPPED | OUTPATIENT
Start: 2023-05-24 | End: 2024-05-23

## 2023-05-24 NOTE — TELEPHONE ENCOUNTER
Call pt pt stated that she came in for a office visit yesterday PT Stated that  was suppose to put in Losartan and 2 other pain Rx that she do not no the name off . [facility] [address], [address]

## 2023-05-24 NOTE — TELEPHONE ENCOUNTER
----- Message from Raghu Menchaca sent at 5/24/2023  3:10 PM CDT -----  Contact: [phone]  Pt was seen yesterday and was told some meds will be sent to pharmacy pt is following up bc pharmacy hasn't received anything.               [facility] - [place], [place] - [address]  [address] [place] [address]  Phone: [phone] Fax: [phone]

## 2023-05-24 NOTE — TELEPHONE ENCOUNTER
----- Message from Raghu Menchaca sent at 5/24/2023  3:10 PM CDT -----  Contact: [phone]  Pt was seen yesterday and was told some meds will be sent to pharmacy pt is following up bc pharmacy hasn't received anything.               [facility] - [place], [place] - [address]  [address] [place] [address]  Phone: [phone] Fax: [phone]

## 2023-05-25 NOTE — TELEPHONE ENCOUNTER
Call pt to let pt no that her rx has been sent and if their anything else she need please let the office no.

## 2023-05-26 DIAGNOSIS — D86.9 SARCOIDOSIS: Primary | ICD-10-CM

## 2023-05-30 ENCOUNTER — TELEPHONE (OUTPATIENT)
Dept: RHEUMATOLOGY | Facility: HOSPITAL | Age: 58
End: 2023-05-30
Payer: MEDICAID

## 2023-05-30 NOTE — TELEPHONE ENCOUNTER
----- Message from Trent Garcia MD sent at 5/16/2023  9:14 AM CDT -----  Regarding: RE: Follow up on mutual patient  Ron, please schedule her back in my clinic next week, maybe Tuesday 1:20 pm    ----- Message -----  From: India Arce MD  Sent: 5/1/2023   9:39 PM CDT  To: Trent Garcia MD  Subject: Follow up on mutual patient                      Dr. Gracia,  I wanted to follow-up on this mutual patient from Rheumatology.  She has a history of HBV-associated cryoglobulinemic glomerulonephritis.  Her most recent cryoglobulins was negative from 4/2023.  It looks like she was last seen in Nephrology Clinic in 1/2022 with 1 year follow-up.  Would it be possible to have her scheduled in your clinic for a follow-up appointment?    Thanks,  India Arce MD

## 2023-05-31 ENCOUNTER — TELEPHONE (OUTPATIENT)
Dept: INTERNAL MEDICINE | Facility: CLINIC | Age: 58
End: 2023-05-31
Payer: MEDICAID

## 2023-05-31 DIAGNOSIS — D86.9 SARCOIDOSIS: Primary | ICD-10-CM

## 2023-05-31 NOTE — TELEPHONE ENCOUNTER
----- Message from Talya Mejias MA sent at 5/30/2023  4:44 PM CDT -----  Contact: cortney [phone]    ----- Message -----  From: Judy Menchaca  Sent: 5/30/2023   3:31 PM CDT  To: Judy WEBER Staff    Donna Robbins  at [facility] requesting a Rolator.    Please call and advise

## 2023-05-31 NOTE — TELEPHONE ENCOUNTER
Called and spoke with the  Donna, states pt is requesting a rolator. Case management states she is not familiar with this pt's mobility.

## 2023-06-01 ENCOUNTER — TELEPHONE (OUTPATIENT)
Dept: INTERNAL MEDICINE | Facility: CLINIC | Age: 58
End: 2023-06-01
Payer: MEDICAID

## 2023-06-01 NOTE — TELEPHONE ENCOUNTER
Called both home and mobile on file, unable to reach pt, will try again. Unable to leave a voice message

## 2023-06-02 ENCOUNTER — TELEPHONE (OUTPATIENT)
Dept: INTERNAL MEDICINE | Facility: CLINIC | Age: 58
End: 2023-06-02
Payer: MEDICAID

## 2023-06-02 NOTE — TELEPHONE ENCOUNTER
Pt will need to be seen by PCP, pt states she has several upcoming appts and will let us know when she can come in

## 2023-06-02 NOTE — TELEPHONE ENCOUNTER
Called to process rolator order with [facility], states she will need a visit with chart notes stating why the rolator is medically needed. Her insurance will call me back with a number of places who can process the order since they states it is covered

## 2023-06-06 ENCOUNTER — HOSPITAL ENCOUNTER (OUTPATIENT)
Dept: RADIOLOGY | Facility: CLINIC | Age: 58
Discharge: HOME | End: 2023-06-06
Attending: STUDENT IN AN ORGANIZED HEALTH CARE EDUCATION/TRAINING PROGRAM
Payer: MEDICAID

## 2023-06-06 ENCOUNTER — HOSPITAL ENCOUNTER (OUTPATIENT)
Dept: CARDIOLOGY | Facility: HOSPITAL | Age: 58
Discharge: HOME | End: 2023-06-06
Attending: INTERNAL MEDICINE
Payer: MEDICAID

## 2023-06-06 VITALS — BODY MASS INDEX: 32.62 KG/M2 | HEIGHT: 66 IN | WEIGHT: 203 LBS | HEART RATE: 56 BPM

## 2023-06-06 DIAGNOSIS — E11.22 TYPE 2 DIABETES MELLITUS WITH STAGE 2 CHRONIC KIDNEY DISEASE, WITH LONG-TERM CURRENT USE OF INSULIN: ICD-10-CM

## 2023-06-06 DIAGNOSIS — D86.85 CARDIAC SARCOIDOSIS: ICD-10-CM

## 2023-06-06 DIAGNOSIS — N18.2 TYPE 2 DIABETES MELLITUS WITH STAGE 2 CHRONIC KIDNEY DISEASE, WITH LONG-TERM CURRENT USE OF INSULIN: ICD-10-CM

## 2023-06-06 DIAGNOSIS — Z79.4 TYPE 2 DIABETES MELLITUS WITH STAGE 2 CHRONIC KIDNEY DISEASE, WITH LONG-TERM CURRENT USE OF INSULIN: ICD-10-CM

## 2023-06-06 LAB
EJECTION FRACTION- HIGH: 65 %
END DIASTOLIC INDEX-HIGH: 153 ML/M2
END DIASTOLIC INDEX-LOW: 93 ML/M2
END SYSTOLIC INDEX-HIGH: 71 ML/M2
END SYSTOLIC INDEX-LOW: 31 ML/M2
NUC REST DIASTOLIC VOLUME INDEX: 111
NUC REST EJECTION FRACTION: 68
NUC REST SYSTOLIC VOLUME INDEX: 35
PERFUSION DEFECT 1 SIZE IN %: 10 %
RETIRED EF AND QEF - SEE NOTES: 53 %

## 2023-06-06 PROCEDURE — A9552 F18 FDG: HCPCS

## 2023-06-06 PROCEDURE — 77080 DXA BONE DENSITY AXIAL: CPT | Mod: 26,,, | Performed by: INTERNAL MEDICINE

## 2023-06-06 PROCEDURE — 78433 MYOCRD IMG PET 2RTRACER CT: CPT | Mod: 26,,, | Performed by: INTERNAL MEDICINE

## 2023-06-06 PROCEDURE — 77080 DXA BONE DENSITY AXIAL: CPT | Mod: TC

## 2023-06-06 PROCEDURE — 77080 DXA BONE DENSITY AXIAL SKELETON 1 OR MORE SITES: ICD-10-PCS | Mod: 26,,, | Performed by: INTERNAL MEDICINE

## 2023-06-06 PROCEDURE — 78433 CV CARDIAC PET SCAN VIABILITY SARCOID: ICD-10-PCS | Mod: 26,,, | Performed by: INTERNAL MEDICINE

## 2023-06-08 ENCOUNTER — PATIENT MESSAGE (OUTPATIENT)
Dept: INTERNAL MEDICINE | Facility: CLINIC | Age: 58
End: 2023-06-08
Payer: MEDICAID

## 2023-06-09 NOTE — ASSESSMENT & PLAN NOTE
- Hb 8.3, stable  - initiate supplements     Problem: BIRTH - VAGINAL/ SECTION  Goal: Fetal and maternal status remain reassuring during the birth process  Description: INTERVENTIONS:  - Monitor vital signs  - Monitor fetal heart rate  - Monitor uterine activity  - Monitor labor progression (vaginal delivery)  - DVT prophylaxis (C/S delivery)  - Surgical antibiotic prophylaxis (C/S delivery)  Outcome: Progressing     Problem: PAIN - ADULT  Goal: Verbalizes/displays adequate comfort level or patient's stated pain goal  Description: INTERVENTIONS:  - Encourage pt to monitor pain and request assistance  - Assess pain using appropriate pain scale  - Administer analgesics based on type and severity of pain and evaluate response  - Implement non-pharmacological measures as appropriate and evaluate response  - Consider cultural and social influences on pain and pain management  - Manage/alleviate anxiety  - Utilize distraction and/or relaxation techniques  - Monitor for opioid side effects  - Notify MD/LIP if interventions unsuccessful or patient reports new pain  - Anticipate increased pain with activity and pre-medicate as appropriate  Outcome: Progressing     Problem: ANXIETY  Goal: Will report anxiety at manageable levels  Description: INTERVENTIONS:  - Administer medication as ordered  - Teach and rehearse alternative coping skills  - Provide emotional support with 1:1 interaction with staff  Outcome: Progressing     Problem: Patient/Family Goals  Goal: Patient/Family Long Term Goal  Description: Patient's Long Term Goal: Uncomplicated Delivery Process    Interventions:  -   - See additional Care Plan goals for specific interventions  Outcome: Progressing  Goal: Patient/Family Short Term Goal  Description: Patient's Short Term Goal: Adequate Pain Control  Interventions:   -   - See additional Care Plan goals for specific interventions  Outcome: Progressing

## 2023-06-15 ENCOUNTER — OFFICE VISIT (OUTPATIENT)
Dept: INTERNAL MEDICINE | Facility: CLINIC | Age: 58
End: 2023-06-15
Payer: MEDICAID

## 2023-06-15 ENCOUNTER — TELEPHONE (OUTPATIENT)
Dept: INTERNAL MEDICINE | Facility: CLINIC | Age: 58
End: 2023-06-15

## 2023-06-15 VITALS
HEIGHT: 66 IN | BODY MASS INDEX: 32.71 KG/M2 | DIASTOLIC BLOOD PRESSURE: 90 MMHG | WEIGHT: 203.5 LBS | SYSTOLIC BLOOD PRESSURE: 132 MMHG | HEART RATE: 55 BPM | OXYGEN SATURATION: 100 %

## 2023-06-15 DIAGNOSIS — D86.9 SARCOIDOSIS: ICD-10-CM

## 2023-06-15 DIAGNOSIS — N05.5 MPGN (MEMBRANOPROLIFERATIVE GLOMERULONEPHRITIDES): ICD-10-CM

## 2023-06-15 DIAGNOSIS — E11.22 TYPE 2 DIABETES MELLITUS WITH STAGE 2 CHRONIC KIDNEY DISEASE, WITH LONG-TERM CURRENT USE OF INSULIN: Primary | ICD-10-CM

## 2023-06-15 DIAGNOSIS — R91.8 MULTIPLE PULMONARY NODULES: ICD-10-CM

## 2023-06-15 DIAGNOSIS — D89.1 CRYOGLOBULINEMIC VASCULITIS: ICD-10-CM

## 2023-06-15 DIAGNOSIS — I10 ESSENTIAL HYPERTENSION: ICD-10-CM

## 2023-06-15 DIAGNOSIS — Z12.31 ENCOUNTER FOR SCREENING MAMMOGRAM FOR MALIGNANT NEOPLASM OF BREAST: ICD-10-CM

## 2023-06-15 DIAGNOSIS — N18.2 TYPE 2 DIABETES MELLITUS WITH STAGE 2 CHRONIC KIDNEY DISEASE, WITH LONG-TERM CURRENT USE OF INSULIN: Primary | ICD-10-CM

## 2023-06-15 DIAGNOSIS — N18.2 CHRONIC KIDNEY DISEASE (CKD), STAGE II (MILD): ICD-10-CM

## 2023-06-15 DIAGNOSIS — H25.13 NUCLEAR SCLEROSIS, BILATERAL: ICD-10-CM

## 2023-06-15 DIAGNOSIS — Z79.4 TYPE 2 DIABETES MELLITUS WITH STAGE 2 CHRONIC KIDNEY DISEASE, WITH LONG-TERM CURRENT USE OF INSULIN: Primary | ICD-10-CM

## 2023-06-15 DIAGNOSIS — N18.31 STAGE 3A CHRONIC KIDNEY DISEASE: ICD-10-CM

## 2023-06-15 DIAGNOSIS — I50.32 CHRONIC DIASTOLIC HEART FAILURE: ICD-10-CM

## 2023-06-15 DIAGNOSIS — E66.9 OBESITY (BMI 30-39.9): ICD-10-CM

## 2023-06-15 PROCEDURE — 3066F PR DOCUMENTATION OF TREATMENT FOR NEPHROPATHY: ICD-10-PCS | Mod: CPTII,,, | Performed by: NURSE PRACTITIONER

## 2023-06-15 PROCEDURE — 1159F MED LIST DOCD IN RCRD: CPT | Mod: CPTII,,, | Performed by: NURSE PRACTITIONER

## 2023-06-15 PROCEDURE — 3060F PR POS MICROALBUMINURIA RESULT DOCUMENTED/REVIEW: ICD-10-PCS | Mod: CPTII,,, | Performed by: NURSE PRACTITIONER

## 2023-06-15 PROCEDURE — 3080F PR MOST RECENT DIASTOLIC BLOOD PRESSURE >= 90 MM HG: ICD-10-PCS | Mod: CPTII,,, | Performed by: NURSE PRACTITIONER

## 2023-06-15 PROCEDURE — 3075F SYST BP GE 130 - 139MM HG: CPT | Mod: CPTII,,, | Performed by: NURSE PRACTITIONER

## 2023-06-15 PROCEDURE — 99214 OFFICE O/P EST MOD 30 MIN: CPT | Mod: PBBFAC | Performed by: NURSE PRACTITIONER

## 2023-06-15 PROCEDURE — 3075F PR MOST RECENT SYSTOLIC BLOOD PRESS GE 130-139MM HG: ICD-10-PCS | Mod: CPTII,,, | Performed by: NURSE PRACTITIONER

## 2023-06-15 PROCEDURE — 99214 PR OFFICE/OUTPT VISIT, EST, LEVL IV, 30-39 MIN: ICD-10-PCS | Mod: S$PBB,,, | Performed by: NURSE PRACTITIONER

## 2023-06-15 PROCEDURE — 99999 PR PBB SHADOW E&M-EST. PATIENT-LVL IV: CPT | Mod: PBBFAC,,, | Performed by: NURSE PRACTITIONER

## 2023-06-15 PROCEDURE — 99999 PR PBB SHADOW E&M-EST. PATIENT-LVL IV: ICD-10-PCS | Mod: PBBFAC,,, | Performed by: NURSE PRACTITIONER

## 2023-06-15 PROCEDURE — 1160F PR REVIEW ALL MEDS BY PRESCRIBER/CLIN PHARMACIST DOCUMENTED: ICD-10-PCS | Mod: CPTII,,, | Performed by: NURSE PRACTITIONER

## 2023-06-15 PROCEDURE — 4010F PR ACE/ARB THEARPY RXD/TAKEN: ICD-10-PCS | Mod: CPTII,,, | Performed by: NURSE PRACTITIONER

## 2023-06-15 PROCEDURE — 99214 OFFICE O/P EST MOD 30 MIN: CPT | Mod: S$PBB,,, | Performed by: NURSE PRACTITIONER

## 2023-06-15 PROCEDURE — 1160F RVW MEDS BY RX/DR IN RCRD: CPT | Mod: CPTII,,, | Performed by: NURSE PRACTITIONER

## 2023-06-15 PROCEDURE — 3046F HEMOGLOBIN A1C LEVEL >9.0%: CPT | Mod: CPTII,,, | Performed by: NURSE PRACTITIONER

## 2023-06-15 PROCEDURE — 1159F PR MEDICATION LIST DOCUMENTED IN MEDICAL RECORD: ICD-10-PCS | Mod: CPTII,,, | Performed by: NURSE PRACTITIONER

## 2023-06-15 PROCEDURE — 3008F PR BODY MASS INDEX (BMI) DOCUMENTED: ICD-10-PCS | Mod: CPTII,,, | Performed by: NURSE PRACTITIONER

## 2023-06-15 PROCEDURE — 4010F ACE/ARB THERAPY RXD/TAKEN: CPT | Mod: CPTII,,, | Performed by: NURSE PRACTITIONER

## 2023-06-15 PROCEDURE — 3008F BODY MASS INDEX DOCD: CPT | Mod: CPTII,,, | Performed by: NURSE PRACTITIONER

## 2023-06-15 PROCEDURE — 3066F NEPHROPATHY DOC TX: CPT | Mod: CPTII,,, | Performed by: NURSE PRACTITIONER

## 2023-06-15 PROCEDURE — 3046F PR MOST RECENT HEMOGLOBIN A1C LEVEL > 9.0%: ICD-10-PCS | Mod: CPTII,,, | Performed by: NURSE PRACTITIONER

## 2023-06-15 PROCEDURE — 3080F DIAST BP >= 90 MM HG: CPT | Mod: CPTII,,, | Performed by: NURSE PRACTITIONER

## 2023-06-15 PROCEDURE — 3060F POS MICROALBUMINURIA REV: CPT | Mod: CPTII,,, | Performed by: NURSE PRACTITIONER

## 2023-06-15 RX ORDER — INSULIN PUMP SYRINGE, 3 ML
EACH MISCELLANEOUS
Qty: 1 EACH | Refills: 0 | Status: SHIPPED | OUTPATIENT
Start: 2023-06-15 | End: 2024-06-14

## 2023-06-15 RX ORDER — BLOOD-GLUCOSE,RECEIVER,CONT
EACH MISCELLANEOUS
Qty: 1 EACH | Refills: 1 | Status: SHIPPED | OUTPATIENT
Start: 2023-06-15

## 2023-06-15 RX ORDER — TIRZEPATIDE 2.5 MG/.5ML
2.5 INJECTION, SOLUTION SUBCUTANEOUS
Qty: 4 PEN | Refills: 4 | Status: SHIPPED | OUTPATIENT
Start: 2023-06-15

## 2023-06-15 RX ORDER — LANCETS
EACH MISCELLANEOUS
Qty: 100 EACH | Refills: 11 | Status: SHIPPED | OUTPATIENT
Start: 2023-06-15

## 2023-06-15 RX ORDER — BLOOD-GLUCOSE SENSOR
EACH MISCELLANEOUS
Qty: 3 EACH | Refills: 11 | Status: SHIPPED | OUTPATIENT
Start: 2023-06-15

## 2023-06-15 RX ORDER — INSULIN LISPRO 100 [IU]/ML
INJECTION, SOLUTION INTRAVENOUS; SUBCUTANEOUS
Qty: 30 ML | Refills: 6 | Status: SHIPPED | OUTPATIENT
Start: 2023-06-15

## 2023-06-15 NOTE — PROGRESS NOTES
CHIEF COMPLAINT: Type 2 Diabetes     HPI: Mrs. Kendy Vital is a 57 y.o. female who was diagnosed with Type 2 DM in 2020.   Has extensive history : sarcoidosis, CDHF, multiple pulmonary nodultes, ckd 2, mpgn, osteoporosis  On steroids, PLEX, entecavir.   Sitter, disabled, lives in [place].  Being seen by me for the first time.  Lab Results   Component Value Date    HGBA1C 10.3 (H) 06/06/2023     Having hyperglycemia r/t steroids.   Not using correction scale, regular insulin.   Stressors: health, cousin passed away    PREVIOUS DIABETES MEDICATIONS TRIED  Regular   Lantus     CURRENT DIABETIC MEDS: lantus 63 units in am, regular insulin 22-22-22 units ac     On MDI (injections 4 x a day)   Makes frequent changes to his/her insulin regimen on the basis of blood glucose data  Testing 4 x a day  Patient is willing and able to use the device  Demonstrated an understanding of the technology and is motivated to use CGM  Patient expected to adhere to a comprehensive diabetes treatment plan and patient has adequate medical supervision  Has multiple impaired awareness of hypoglycemia (hypoglycemia unawareness)    Diabetes Management Status    Statin: Taking  ACE/ARB: Taking    Screening or Prevention Patient's value Goal Complete/Controlled?   HgA1C Testing and Control   Lab Results   Component Value Date    HGBA1C 10.3 (H) 06/06/2023      Annually/Less than 8% No   Lipid profile : 03/24/2023 Annually Yes   LDL control Lab Results   Component Value Date    LDLCALC 84.0 03/24/2023    Annually/Less than 100 mg/dl  Yes   Nephropathy screening Lab Results   Component Value Date    LABMICR 189.0 03/24/2023     Lab Results   Component Value Date    PROTEINUA Negative 05/23/2023    Annually Yes   Blood pressure BP Readings from Last 1 Encounters:   05/23/23 139/80    Less than 140/90 Yes   Dilated retinal exam : 03/24/2021 Annually Yes   Foot exam   : 07/12/2022 Annually Yes     REVIEW OF SYSTEMS  General: no weakness,  "+fatigue, +weight changes.   Eyes: no double or blurred vision, eye pain, or redness  Cardiovascular: no chest pain, palpitations, edema, or murmurs.   Respiratory: no cough or dyspnea.   GI: no heartburn, nausea, or changes in bowel patterns; good appetite.   Skin: no rashes, dryness, itching, or reactions at insulin injection sites.  Neuro: no numbness, tingling, tremors, or vertigo.   Endocrine: no polyuria, polydipsia, polyphagia, heat or cold intolerance.     Vital Signs  BP (!) 132/90 (BP Location: Left arm, Patient Position: Sitting, BP Method: Medium (Manual))   Pulse (!) 55   Ht 5' 6" (1.676 m)   Wt 92.3 kg (203 lb 7.8 oz)   SpO2 100%   BMI 32.84 kg/m²     Hemoglobin A1C   Date Value Ref Range Status   06/06/2023 10.3 (H) 4.0 - 5.6 % Final     Comment:     ADA Screening Guidelines:  5.7-6.4%  Consistent with prediabetes  >or=6.5%  Consistent with diabetes    High levels of fetal hemoglobin interfere with the HbA1C  assay. Heterozygous hemoglobin variants (HbS, HgC, etc)do  not significantly interfere with this assay.   However, presence of multiple variants may affect accuracy.     05/23/2023 11.0 (H) 4.0 - 5.6 % Final     Comment:     ADA Screening Guidelines:  5.7-6.4%  Consistent with prediabetes  >or=6.5%  Consistent with diabetes    High levels of fetal hemoglobin interfere with the HbA1C  assay. Heterozygous hemoglobin variants (HbS, HgC, etc)do  not significantly interfere with this assay.   However, presence of multiple variants may affect accuracy.     03/24/2023 14.0 (H) 4.0 - 5.6 % Final     Comment:     ADA Screening Guidelines:  5.7-6.4%  Consistent with prediabetes  >or=6.5%  Consistent with diabetes    High levels of fetal hemoglobin interfere with the HbA1C  assay. Heterozygous hemoglobin variants (HbS, HgC, etc)do  not significantly interfere with this assay.   However, presence of multiple variants may affect accuracy.          Chemistry        Component Value Date/Time     " 06/06/2023 1125    K 4.5 06/06/2023 1125     06/06/2023 1125    CO2 24 06/06/2023 1125    BUN 21 (H) 06/06/2023 1125    CREATININE 0.9 06/06/2023 1125     (H) 06/06/2023 1125        Component Value Date/Time    CALCIUM 9.9 06/06/2023 1125    ALKPHOS 108 06/06/2023 1125    AST 12 06/06/2023 1125    ALT 16 06/06/2023 1125    BILITOT 0.5 06/06/2023 1125    ESTGFRAFRICA >60.0 07/25/2022 1022    EGFRNONAA >60.0 07/25/2022 1022           Lab Results   Component Value Date    TSH 0.364 (L) 03/24/2023      Lab Results   Component Value Date    CHOL 172 03/24/2023    CHOL 136 09/08/2022    CHOL 264 (H) 07/08/2021     Lab Results   Component Value Date    HDL 51 03/24/2023    HDL 49 09/08/2022    HDL 72 07/08/2021     Lab Results   Component Value Date    LDLCALC 84.0 03/24/2023    LDLCALC 70.6 09/08/2022    LDLCALC 156.6 07/08/2021     Lab Results   Component Value Date    TRIG 185 (H) 03/24/2023    TRIG 82 09/08/2022    TRIG 177 (H) 07/08/2021     Lab Results   Component Value Date    CHOLHDL 29.7 03/24/2023    CHOLHDL 36.0 09/08/2022    CHOLHDL 27.3 07/08/2021         PHYSICAL EXAMINATION  Constitutional: Appears well, no distress. Reviewed vitals above.  Eyes: conjunctivae & lids intact; PERRLA, EOMs intact; optic discs   Neck: Supple, trachea midline.   Respiratory: CTA without wheezes, even and unlabored  Cardiovascular: RRR;  no edema or varicosities  Lymph: deferred  Skin: warm and dry; no injection site reactions, no acanthosis nigracans observed.  Neuro:patient alert and cooperative, normal affect; steady gait.  Psychiatric: judgement & insight intact, orientation of time, place & person intact, memory; mood & affect wnl     Diabetes Foot Exam:      Visual Inspection:  Normal -  Bilateral and Dry Skin -  Bilateral        Assessment/Plan  1. Type 2 diabetes mellitus with stage 2 chronic kidney disease, with long-term current use of insulin  Hemoglobin A1C    COMPREHENSIVE METABOLIC PANEL    Ambulatory  referral/consult to Optometry    Ambulatory referral/consult to Diabetes Education      2. Stage 3a chronic kidney disease        3. Sarcoidosis        4. Obesity (BMI 30-39.9)        5. Nuclear sclerosis, bilateral        6. Multiple pulmonary nodules        7. MPGN (membranoproliferative glomerulonephritides)        8. Essential hypertension        9. Chronic kidney disease (CKD), stage II (mild)        10. Chronic diastolic heart failure        11. Cryoglobulinemic vasculitis        12. Encounter for screening mammogram for malignant neoplasm of breast  Mammo Digital Screening Bilat        FOLLOW UP  Follow up in about 3 months (around 9/15/2023).

## 2023-06-15 NOTE — TELEPHONE ENCOUNTER
[id number] NAIK Case ID: [id number] FOR DEXCOM G7     Additional Information Required  We received a prior authorization request for the member and product listed above. The Community and State Medicaid Prior Authorization Team is not able to review this request because requested product is not provided through this member's pharmacy benefit. This product may be provided through the member's medical benefit and supplied by a DME provider. Please contact the provider service phone number listed below to determine if there are any prior authorization requirements for this product. Provider Services Phone Number: [phone].    Called  and they gave us 3 dme companies in that area that may serve her.

## 2023-06-15 NOTE — TELEPHONE ENCOUNTER
Prior Authorization Portal   Request Reference Number: [id number]. MOUNJARO INJ 2.5/0.5 is denied for not meeting the prior authorization requirement(s). For further questions, call [facility] at [phone] for more information. Appeals are not supported through ePA. Please refer to the fax case notice for appeals information and instructions. Case ID: [id number]      Payer:  [employer] [place]     I was informed the Dexcom products will have to go thru a DME provider.    Request Reference Number: [id number]. FIFTY50 GLUC KIT METR 2.0 is denied for not meeting the prior authorization requirement(s). For further questions, call [facility] at [phone] for more information. Appeals are not supported through ePA. Please refer to the fax case notice for appeals information and instructions. Case ID: [id number]      Payer:  [employer] - [place]     Called [facility] pharmacy re:Meter need- One Touch Verio is covered by the insurer.     Called [facility] to ask if they would review her case for Dexcom G7 supplies-  faxed pkt of ov notes, facesheet w/ insurance info, +Rx for Dexcom g7  and sensors.

## 2023-06-15 NOTE — PATIENT INSTRUCTIONS
Follow up in 3-4 months w/ Angeline   DE education - dexcom g7  - training needed, correction scale   A1c cmp in 3 months   A1c in 6 weeks   Mammogram 2023   Lab Results   Component Value Date    HGBA1C 10.3 (H) 06/06/2023     Goal less than 7.5%    [email or web address]  Eat fit lainey  [facility] lainey  [email or web address]      New medication  Mounjaro 2.5 mg weekly-in am   Stop regular (R) insulin-meal insulin    Humalog 20 units 5 mins before meals  Scale 150-200+2, etc, follow sheet    Lantus -night time insulin (long acting) 60 units at night

## 2023-06-21 ENCOUNTER — TELEPHONE (OUTPATIENT)
Dept: DIABETES | Facility: CLINIC | Age: 58
End: 2023-06-21
Payer: MEDICAID

## 2023-06-23 ENCOUNTER — OFFICE VISIT (OUTPATIENT)
Dept: PULMONOLOGY | Facility: CLINIC | Age: 58
End: 2023-06-23
Payer: MEDICAID

## 2023-06-23 ENCOUNTER — HOSPITAL ENCOUNTER (OUTPATIENT)
Dept: PULMONOLOGY | Facility: CLINIC | Age: 58
Discharge: HOME | End: 2023-06-23
Payer: MEDICAID

## 2023-06-23 VITALS
WEIGHT: 206.81 LBS | HEART RATE: 59 BPM | SYSTOLIC BLOOD PRESSURE: 130 MMHG | DIASTOLIC BLOOD PRESSURE: 82 MMHG | OXYGEN SATURATION: 99 % | HEIGHT: 66 IN | BODY MASS INDEX: 33.24 KG/M2

## 2023-06-23 DIAGNOSIS — D86.9 SARCOIDOSIS: ICD-10-CM

## 2023-06-23 DIAGNOSIS — R91.8 MULTIPLE PULMONARY NODULES: ICD-10-CM

## 2023-06-23 DIAGNOSIS — D86.0 PULMONARY SARCOIDOSIS: ICD-10-CM

## 2023-06-23 DIAGNOSIS — E66.9 OBESITY (BMI 30-39.9): ICD-10-CM

## 2023-06-23 PROCEDURE — 94060 EVALUATION OF WHEEZING: CPT | Mod: PBBFAC | Performed by: INTERNAL MEDICINE

## 2023-06-23 PROCEDURE — 99215 OFFICE O/P EST HI 40 MIN: CPT | Mod: PBBFAC,25 | Performed by: STUDENT IN AN ORGANIZED HEALTH CARE EDUCATION/TRAINING PROGRAM

## 2023-06-23 PROCEDURE — 94729 DIFFUSING CAPACITY: CPT | Mod: PBBFAC | Performed by: INTERNAL MEDICINE

## 2023-06-23 PROCEDURE — 94727 GAS DIL/WSHOT DETER LNG VOL: CPT | Mod: PBBFAC | Performed by: INTERNAL MEDICINE

## 2023-06-23 RX ORDER — ALBUTEROL SULFATE 90 UG/1
2 AEROSOL, METERED RESPIRATORY (INHALATION) EVERY 6 HOURS PRN
Qty: 18 G | Refills: 6 | Status: SHIPPED | OUTPATIENT
Start: 2023-06-23

## 2023-06-23 NOTE — PROGRESS NOTES
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  Last follow up in 2021.   Recent cardiac MRI confirmed cardiac sarcoid.  Prednisone was increased to 20mg, with MTX and hydroxychloroquine and follow up imaging confirmed improvement.    PFTs today are stable- show no obstruction, mild restriction, and moderately reduced DLCO.  Plan for routine follow up in 6 months.

## 2023-06-23 NOTE — PROGRESS NOTES
[facility]    SUBJECTIVE:     Chief Complaint: Pulmonary Sarcoidosis f/u    History of Present Illness:  Kendy Vital is a 57 y.o. 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 Last seen in pulmonary clinic in . At that time her pulmonary sarcoidosis was stable and being managed by rheumatology. Since that time, she has been diagnosed with cardiac sarcoidosis as well. She is currently on prednisone 10mg BID, MTX 20mg weekly (split dose 10mg AM, 10mg PM) with folic acid, and HCQ 200mg BID. Last cardiac PET did not show active cardiac sarcoidosis. Last CT chest 2023 revealed stable pulmonary nodules.     She states that she is doing well. Walks about 1 mile daily and it takes her btw 25-30min. Denies any SOB at baseline. Has been having an issue with her blood glucose management while on prednisone. Says her BG has been running in the 300s. Last A1c was 10.3. Recently seen by diabetes specialist and was started on tirzepatide with follow up in 3 months. Overall, she states she is doing well.       Smokin cigarettes daily for 5 years.   Other substances: Social drinks.   Inhalers: PRN albuterol  Travel: Lives in [facility]   Incarceration/homelessness: Denies  Occupational/environmental exposures: Denies  Pets/hobbies: Dog  Recent illness: Denies  B-Symptoms: Night sweats  Autoimmune symptoms/features: Dry mouth/eyes  Intubation Hx: Denies  Family Hx: Mother- DM, CVA, heart issues       Review of patient's allergies indicates:  No Known Allergies    Past Medical History:   Diagnosis Date    Acute (diffuse) proliferative glomerulonephritis     Acute renal failure 2019    B-cell lymphoproliferative disorder 2019    CHF (congestive heart failure)      Chronic obstructive lung disease 7/27/2019    Chronic viral hepatitis B without delta agent and without coma 7/24/2019    Cryoglobulinemic vasculitis     Diabetes mellitus     Hypogammaglobulinemia 8/5/2019    Iron deficiency anemia 7/30/2019    Renal vein thrombosis 2015    s/p embolectomy and lovenox for 9 mos    Steroid-induced hyperglycemia 7/28/2019    Uterine leiomyoma     s/p resection     Past Surgical History:   Procedure Laterality Date    AV FISTULA PLACEMENT      CATARACT EXTRACTION W/  INTRAOCULAR LENS IMPLANT Left 4/22/2021    Procedure: EXTRACTION, CATARACT, WITH IOL INSERTION;  Surgeon: Allen Alejandro MD;  Location: [facility] OR;  Service: Ophthalmology;  Laterality: Left;    CATARACT EXTRACTION W/  INTRAOCULAR LENS IMPLANT Right 5/6/2021    Procedure: EXTRACTION, CATARACT, WITH IOL INSERTION;  Surgeon: Allen Alejandro MD;  Location: [facility] OR;  Service: Ophthalmology;  Laterality: Right;    LUNG BIOPSY N/A 12/1/2020    Procedure: BIOPSY, LUNG;  Surgeon: Kelli Diagnostic Provider;  Location: [facility] OR;  Service: Radiology;  Laterality: N/A;  8 A.M. START  PHONE PREOP DONE 11/27/2020  PT/INR, CBC, CMP AND COVID ON AM OF PROCEDURE   ARRIVAL AT 7:00 FOR 8:30 APPT     Family History   Problem Relation Age of Onset    Diabetes Mother     Heart disease Mother      Social History     Socioeconomic History    Marital status:    Tobacco Use    Smoking status: Former    Smokeless tobacco: Never   Substance and Sexual Activity    Alcohol use: Not Currently    Drug use: Never   Social History Narrative    Lives with granddaughter.         Walks around house.        Review of Systems:  Review of Systems   Constitutional:  Negative for chills, fever, malaise/fatigue and weight loss.   HENT:  Negative for congestion, sinus pain and sore throat.    Respiratory:  Negative for cough, shortness of breath and wheezing.    Cardiovascular:  Positive for orthopnea and PND. Negative for chest pain, palpitations and leg  "swelling.   Gastrointestinal:  Positive for heartburn. Negative for abdominal pain, constipation, diarrhea, nausea and vomiting.   Musculoskeletal:  Positive for falls. Negative for joint pain.   Skin:  Negative for rash.   Neurological:  Positive for weakness. Negative for dizziness, tremors and headaches.   All other systems reviewed and are negative.      OBJECTIVE:     Vital Signs  Vitals:    06/23/23 1359   BP: 130/82   BP Location: Left arm   Patient Position: Sitting   BP Method: Medium (Manual)   Pulse: (!) 59   SpO2: 99%   Weight: 93.8 kg (206 lb 12.7 oz)   Height: 5' 6" (1.676 m)     Body mass index is 33.38 kg/m².    Physical Exam:  Physical Exam  Vitals and nursing note reviewed.   Constitutional:       General: She is not in acute distress.     Appearance: Normal appearance. She is obese. She is not ill-appearing, toxic-appearing or diaphoretic.   HENT:      Head: Normocephalic and atraumatic.      Right Ear: External ear normal.      Left Ear: External ear normal.      Nose: Nose normal.      Mouth/Throat:      Mouth: Mucous membranes are moist.      Pharynx: Oropharynx is clear.   Eyes:      General: No scleral icterus.     Extraocular Movements: Extraocular movements intact.      Conjunctiva/sclera: Conjunctivae normal.   Cardiovascular:      Rate and Rhythm: Normal rate and regular rhythm.      Pulses: Normal pulses.      Heart sounds: Normal heart sounds.   Pulmonary:      Effort: Pulmonary effort is normal.      Breath sounds: Normal breath sounds.   Abdominal:      General: Bowel sounds are normal.      Palpations: Abdomen is soft.   Musculoskeletal:         General: Normal range of motion.      Cervical back: Normal range of motion.      Right lower leg: No edema.      Left lower leg: No edema.   Skin:     General: Skin is warm and dry.   Neurological:      General: No focal deficit present.      Mental Status: She is alert and oriented to person, place, and time.   Psychiatric:         Mood and " Affect: Mood normal.         Behavior: Behavior normal.         Thought Content: Thought content normal.         Judgment: Judgment normal.       Laboratory:  CBC  Lab Results   Component Value Date    WBC 5.45 05/23/2023    HGB 16.2 (H) 05/23/2023    HCT 48.2 05/23/2023     05/23/2023    MCV 93 05/23/2023    RDW 14.2 05/23/2023     BMP  Lab Results   Component Value Date     06/06/2023    K 4.5 06/06/2023     06/06/2023    CO2 24 06/06/2023    BUN 21 (H) 06/06/2023    CREATININE 0.9 06/06/2023     (H) 06/06/2023    CALCIUM 9.9 06/06/2023    MG 1.7 11/12/2020    PHOS 3.3 04/28/2021     LFTs  Lab Results   Component Value Date    PROT 6.6 06/06/2023    ALBUMIN 3.6 06/06/2023    BILITOT 0.5 06/06/2023    AST 12 06/06/2023    ALKPHOS 108 06/06/2023    ALT 16 06/06/2023       PFTs 6/23/2023  Normal airflow.    Airflow is not improved after bronchodilator. The failure to demonstrate improvement in airflow does not preclude a clinical response to a trial of bronchodilators.  Moderate restriction.   PUL PFT DIFFUSION CAPACITY: Diffusion capacity is moderately decreased.      Chest Imaging, My Impression:   CT Chest- multiple pulmonary nodules. Stable in appears from previous Cts.      Diagnostic Results:  CT: Reviewed  ECG: Reviewed  X-Ray: Reviewed  MRI: Reviewed  Echo: Reviewed    ASSESSMENT/PLAN:     Problem Noted   Pulmonary Sarcoidosis 6/28/2023    -Repeat CT chest show stability of pulmonary nodules. Will need repeat CT chest in 1 year.  -Patient reports no changes in respiratory sxs.   -Rheum managing medications. Currently on prednisone 10mg BID, MTX 20mg weekly (split dose 10mg AM, 10mg PM) with folic acid, and HCQ 200mg BID.  -Has rheum follow up scheduled.      Sarcoidosis 2/3/2021    -Since last pulm visit, she developed cardiac sarcoidosis. At that time, medication regimen was increased. Repeat cardiac PET since medication changes shows further cardiac abnormality.   -Rheum managing  medications. Currently on prednisone 10mg BID, MTX 20mg weekly (split dose 10mg AM, 10mg PM) with folic acid, and HCQ 200mg BID.  -Has rheum follow up scheduled.      Multiple Pulmonary Nodules 11/9/2020    -See pulmonary sarcoidosis     Obesity (Bmi 30-39.9) 1/14/2020    -Discussed weight lose strategies and how it can help improve her lifestyle.  -Encouraged her continued walking for exercise and making healthy lifestyle choices.        Problem List Items Addressed This Visit          Pulmonary    Multiple pulmonary nodules    Overview     -See pulmonary sarcoidosis         Pulmonary sarcoidosis    Overview     -Repeat CT chest show stability of pulmonary nodules. Will need repeat CT chest in 1 year.  -Patient reports no changes in respiratory sxs.   -Rheum managing medications. Currently on prednisone 10mg BID, MTX 20mg weekly (split dose 10mg AM, 10mg PM) with folic acid, and HCQ 200mg BID.  -Has rheum follow up scheduled.             Immunology/Multi System    Sarcoidosis    Overview     -Since last pulm visit, she developed cardiac sarcoidosis. At that time, medication regimen was increased. Repeat cardiac PET since medication changes shows further cardiac abnormality.   -Rheum managing medications. Currently on prednisone 10mg BID, MTX 20mg weekly (split dose 10mg AM, 10mg PM) with folic acid, and HCQ 200mg BID.  -Has rheum follow up scheduled.          Relevant Medications    albuterol (PROVENTIL/VENTOLIN HFA) 90 mcg/actuation inhaler       Endocrine    Obesity (BMI 30-39.9)    Overview     -Discussed weight lose strategies and how it can help improve her lifestyle.  -Encouraged her continued walking for exercise and making healthy lifestyle choices.             Tiffanie Lundberg MD  [facility]

## 2023-06-27 PROCEDURE — 94727 PR PULM FUNCTION TEST BY GAS: ICD-10-PCS | Mod: 26,S$PBB,, | Performed by: INTERNAL MEDICINE

## 2023-06-27 PROCEDURE — 94729 DIFFUSING CAPACITY: CPT | Mod: 26,S$PBB,, | Performed by: INTERNAL MEDICINE

## 2023-06-27 PROCEDURE — 94729 PR C02/MEMBANE DIFFUSE CAPACITY: ICD-10-PCS | Mod: 26,S$PBB,, | Performed by: INTERNAL MEDICINE

## 2023-06-27 PROCEDURE — 94060 PR EVAL OF BRONCHOSPASM: ICD-10-PCS | Mod: 26,S$PBB,, | Performed by: INTERNAL MEDICINE

## 2023-06-27 PROCEDURE — 94727 GAS DIL/WSHOT DETER LNG VOL: CPT | Mod: 26,S$PBB,, | Performed by: INTERNAL MEDICINE

## 2023-06-27 PROCEDURE — 94060 EVALUATION OF WHEEZING: CPT | Mod: 26,S$PBB,, | Performed by: INTERNAL MEDICINE

## 2023-06-28 PROBLEM — D86.0 PULMONARY SARCOIDOSIS: Status: ACTIVE | Noted: 2023-06-28

## 2023-07-11 ENCOUNTER — HOSPITAL ENCOUNTER (OUTPATIENT)
Dept: RADIOLOGY | Facility: HOSPITAL | Age: 58
Discharge: HOME | End: 2023-07-11
Attending: NURSE PRACTITIONER
Payer: MEDICAID

## 2023-07-11 DIAGNOSIS — B18.1 CHRONIC VIRAL HEPATITIS B WITHOUT DELTA AGENT AND WITHOUT COMA: ICD-10-CM

## 2023-07-11 DIAGNOSIS — Z12.31 ENCOUNTER FOR SCREENING MAMMOGRAM FOR MALIGNANT NEOPLASM OF BREAST: ICD-10-CM

## 2023-07-11 PROCEDURE — 77063 MAMMO DIGITAL SCREENING BILAT WITH TOMO: ICD-10-PCS | Mod: 26,,, | Performed by: RADIOLOGY

## 2023-07-11 PROCEDURE — 76705 ECHO EXAM OF ABDOMEN: CPT | Mod: TC,PO

## 2023-07-11 PROCEDURE — 77067 SCR MAMMO BI INCL CAD: CPT | Mod: 26,,, | Performed by: RADIOLOGY

## 2023-07-11 PROCEDURE — 77067 MAMMO DIGITAL SCREENING BILAT WITH TOMO: ICD-10-PCS | Mod: 26,,, | Performed by: RADIOLOGY

## 2023-07-11 PROCEDURE — 76705 ECHO EXAM OF ABDOMEN: CPT | Mod: 26,,, | Performed by: RADIOLOGY

## 2023-07-11 PROCEDURE — 76705 US ABDOMEN LIMITED: ICD-10-PCS | Mod: 26,,, | Performed by: RADIOLOGY

## 2023-07-11 PROCEDURE — 77067 SCR MAMMO BI INCL CAD: CPT | Mod: TC,PO

## 2023-07-11 PROCEDURE — 77063 BREAST TOMOSYNTHESIS BI: CPT | Mod: 26,,, | Performed by: RADIOLOGY

## 2023-07-18 ENCOUNTER — OFFICE VISIT (OUTPATIENT)
Dept: HEPATOLOGY | Facility: CLINIC | Age: 58
End: 2023-07-18
Payer: MEDICAID

## 2023-07-18 VITALS — BODY MASS INDEX: 33.84 KG/M2 | WEIGHT: 210.56 LBS | HEIGHT: 66 IN

## 2023-07-18 DIAGNOSIS — E66.9 OBESITY (BMI 30-39.9): ICD-10-CM

## 2023-07-18 DIAGNOSIS — B18.1 CHRONIC VIRAL HEPATITIS B WITHOUT DELTA AGENT AND WITHOUT COMA: Primary | ICD-10-CM

## 2023-07-18 PROBLEM — R74.8 ELEVATED ALKALINE PHOSPHATASE LEVEL: Status: RESOLVED | Noted: 2023-01-18 | Resolved: 2023-07-18

## 2023-07-18 PROBLEM — K83.8 DILATED BILE DUCT: Status: RESOLVED | Noted: 2023-01-18 | Resolved: 2023-07-18

## 2023-07-18 PROCEDURE — 4010F PR ACE/ARB THEARPY RXD/TAKEN: ICD-10-PCS | Mod: CPTII,,, | Performed by: NURSE PRACTITIONER

## 2023-07-18 PROCEDURE — 1159F PR MEDICATION LIST DOCUMENTED IN MEDICAL RECORD: ICD-10-PCS | Mod: CPTII,,, | Performed by: NURSE PRACTITIONER

## 2023-07-18 PROCEDURE — 3066F NEPHROPATHY DOC TX: CPT | Mod: CPTII,,, | Performed by: NURSE PRACTITIONER

## 2023-07-18 PROCEDURE — 3008F BODY MASS INDEX DOCD: CPT | Mod: CPTII,,, | Performed by: NURSE PRACTITIONER

## 2023-07-18 PROCEDURE — 99999 PR PBB SHADOW E&M-EST. PATIENT-LVL V: ICD-10-PCS | Mod: PBBFAC,,, | Performed by: NURSE PRACTITIONER

## 2023-07-18 PROCEDURE — 3060F PR POS MICROALBUMINURIA RESULT DOCUMENTED/REVIEW: ICD-10-PCS | Mod: CPTII,,, | Performed by: NURSE PRACTITIONER

## 2023-07-18 PROCEDURE — 3052F HG A1C>EQUAL 8.0%<EQUAL 9.0%: CPT | Mod: CPTII,,, | Performed by: NURSE PRACTITIONER

## 2023-07-18 PROCEDURE — 3060F POS MICROALBUMINURIA REV: CPT | Mod: CPTII,,, | Performed by: NURSE PRACTITIONER

## 2023-07-18 PROCEDURE — 99214 PR OFFICE/OUTPT VISIT, EST, LEVL IV, 30-39 MIN: ICD-10-PCS | Mod: S$PBB,,, | Performed by: NURSE PRACTITIONER

## 2023-07-18 PROCEDURE — 4010F ACE/ARB THERAPY RXD/TAKEN: CPT | Mod: CPTII,,, | Performed by: NURSE PRACTITIONER

## 2023-07-18 PROCEDURE — 1159F MED LIST DOCD IN RCRD: CPT | Mod: CPTII,,, | Performed by: NURSE PRACTITIONER

## 2023-07-18 PROCEDURE — 99999 PR PBB SHADOW E&M-EST. PATIENT-LVL V: CPT | Mod: PBBFAC,,, | Performed by: NURSE PRACTITIONER

## 2023-07-18 PROCEDURE — 99214 OFFICE O/P EST MOD 30 MIN: CPT | Mod: S$PBB,,, | Performed by: NURSE PRACTITIONER

## 2023-07-18 PROCEDURE — 99215 OFFICE O/P EST HI 40 MIN: CPT | Mod: PBBFAC | Performed by: NURSE PRACTITIONER

## 2023-07-18 PROCEDURE — 3008F PR BODY MASS INDEX (BMI) DOCUMENTED: ICD-10-PCS | Mod: CPTII,,, | Performed by: NURSE PRACTITIONER

## 2023-07-18 PROCEDURE — 3066F PR DOCUMENTATION OF TREATMENT FOR NEPHROPATHY: ICD-10-PCS | Mod: CPTII,,, | Performed by: NURSE PRACTITIONER

## 2023-07-18 PROCEDURE — 1160F PR REVIEW ALL MEDS BY PRESCRIBER/CLIN PHARMACIST DOCUMENTED: ICD-10-PCS | Mod: CPTII,,, | Performed by: NURSE PRACTITIONER

## 2023-07-18 PROCEDURE — 3052F PR MOST RECENT HEMOGLOBIN A1C LEVEL 8.0 - < 9.0%: ICD-10-PCS | Mod: CPTII,,, | Performed by: NURSE PRACTITIONER

## 2023-07-18 PROCEDURE — 1160F RVW MEDS BY RX/DR IN RCRD: CPT | Mod: CPTII,,, | Performed by: NURSE PRACTITIONER

## 2023-07-18 RX ORDER — ENTECAVIR 0.5 MG/1
0.5 TABLET, FILM COATED ORAL DAILY
Qty: 30 TABLET | Refills: 6 | Status: SHIPPED | OUTPATIENT
Start: 2023-07-18

## 2023-07-18 NOTE — PROGRESS NOTES
"[facility] Established Patient Visit    Reason for Visit:  chronic Hep B    PCP: Yuki Kim    HPI:  This is a 57 y.o. female with PMH noted below, here for follow up of above     Was diagnosed during hospitalization 7/2019 (hospitalized for eval of thrombocytopenia, Rheumatology and Nephrology continued to follow for management of her Cryolobinemic Vasculitis with Glomerulonephritis) , no history or knowledge of Hep B before then. Was starting on Entecavir during hospitalization 7/2019     Risk factors:  Denies mother or sexual partners with Hep B, no travel outside the country, no h/o HIV, no h/o IVDU or intranasal drug use, no  no blood transfusions before 1992  Only notable risk factor is homeade tattoo "years ago"  (at home from )     Previous serologic w/u negative for hemochromatosis     Liver fibrosis staging:   -- Fibroscan 1/2020 noted no fibrosis (kPA 4.1), S1 steatosis ()  -- fibroscan 1/2023 noted F0, S0      Interval HPI: Presents today alone. Taking Entecavir 0.5 mg daily, no issues  [facility] lives with pt, reports she was vaccinated against Hep B  Previously sAg positive in January  Negative DNA in May  Negative delta 11/2019     Labs done 7/2023 show normal transaminase levels, synthetic liver function  Normal    Lab Results   Component Value Date    ALT 15 07/11/2023    AST 13 07/11/2023    ALKPHOS 99 07/11/2023    BILITOT 0.7 07/11/2023    ALBUMIN 3.9 07/11/2023    INR 1.0 07/11/2023     07/11/2023     Hep C and HIV testing negative        HCC screening:  Abd U/S no lesions, next due 1/2024  AFP WNL,  next due 1/2024  Lab Results   Component Value Date    AFP 2.5 07/11/2023     Previous EGD -not indicated currently      Risk factors for NAFLD include obesity, HTN, DM     Denies family history of liver disease . Denies alcohol consumption       Immunity to Hep A  Needs to restart Hep A vaccine series, reminded pt, previously sent to local pharmacy "     PMHX:  has a past medical history of Acute (diffuse) proliferative glomerulonephritis, Acute renal failure (7/19/2019), B-cell lymphoproliferative disorder (7/30/2019), CHF (congestive heart failure), Chronic obstructive lung disease (7/27/2019), Chronic viral hepatitis B without delta agent and without coma (7/24/2019), Cryoglobulinemic vasculitis, Diabetes mellitus, Hypogammaglobulinemia (8/5/2019), Iron deficiency anemia (7/30/2019), Renal vein thrombosis (2015), Steroid-induced hyperglycemia (7/28/2019), and Uterine leiomyoma.    PSHX:  has a past surgical history that includes Lung biopsy (N/A, 12/1/2020); AV fistula placement; Cataract extraction w/  intraocular lens implant (Left, 4/22/2021); and Cataract extraction w/  intraocular lens implant (Right, 5/6/2021).    The patient's social and family histories were reviewed by me and updated in the appropriate section of the electronic medical record.    Review of patient's allergies indicates:  No Known Allergies    Current Outpatient Medications on File Prior to Visit   Medication Sig Dispense Refill    albuterol (PROVENTIL/VENTOLIN HFA) 90 mcg/actuation inhaler Inhale 2 puffs into the lungs every 6 (six) hours as needed for Wheezing or Shortness of Breath. Rescue 18 g 6    alendronate (FOSAMAX) 70 MG tablet Take 1 tablet (70 mg total) by mouth every 7 days. 4 tablet 11    allopurinoL (ZYLOPRIM) 100 MG tablet Take 100 mg by mouth once daily. for 90 days      aspirin (ECOTRIN) 81 MG EC tablet Take 81 mg by mouth once daily.      atorvastatin (LIPITOR) 40 MG tablet Take 1 tablet (40 mg total) by mouth once daily. 90 tablet 3    blood sugar diagnostic Strp To check BG 3 times daily, to use with insurance preferred meter, e 11.65 100 each 11    blood-glucose meter kit To check BG 3 times daily, to use with insurance preferred meter 1 each 0    carvediloL (COREG) 25 MG tablet Take 1 tablet (25 mg total) by mouth 2 (two) times daily. 90 tablet 3     cyanocobalamin (VITAMIN B-12) 250 MCG tablet Take 250 mcg by mouth once daily.      entecavir (BARACLUDE) 0.5 MG Tab Take 1 tablet (0.5 mg total) by mouth once daily. 30 tablet 6    EScitalopram oxalate (LEXAPRO) 10 MG tablet Take 1 tablet (10 mg total) by mouth once daily. 90 tablet 3    ferrous sulfate 325 (65 FE) MG EC tablet Take 1 tablet (325 mg total) by mouth once daily.  0    folic acid (FOLVITE) 1 MG tablet Take 1 tablet (1 mg total) by mouth once daily. 90 tablet 2    furosemide (LASIX) 40 MG tablet Take 40 mg lasix once daily 90 tablet 3    gabapentin (NEURONTIN) 300 MG capsule Take 2 capsules (600 mg total) by mouth 2 (two) times daily. 120 capsule 11    hydrOXYchloroQUINE (PLAQUENIL) 200 mg tablet Take 1 tablet (200 mg total) by mouth 2 (two) times daily. 180 tablet 3    insulin (LANTUS SOLOSTAR U-100 INSULIN) glargine 100 units/mL SubQ pen Inject 64 Units into the skin every morning. 60 mL 3    lancets Misc To check BG 3 times daily, to use with insurance preferred meter 100 each 11    lancing device Misc 1 Device by Misc.(Non-Drug; Combo Route) route 2 (two) times daily with meals. 1 each 0    lisinopriL (PRINIVIL,ZESTRIL) 20 MG tablet Take 1 tablet (20 mg total) by mouth once daily. 30 tablet 11    methotrexate 2.5 MG Tab Take 8 tablets (20 mg total) by mouth every 7 days. 96 tablet 0    multivitamin (THERAGRAN) per tablet Take 1 tablet by mouth once daily.      NIFEdipine (PROCARDIA-XL) 60 MG (OSM) 24 hr tablet Take 1 tablet (60 mg total) by mouth once daily. 90 tablet 3    pantoprazole (PROTONIX) 40 MG tablet Take 1 tablet (40 mg total) by mouth before breakfast. 30 tablet 3    polyethylene glycol (GLYCOLAX) 17 gram/dose powder Take 17 g by mouth daily as needed (constipation). 289 g 5    predniSONE (DELTASONE) 10 MG tablet Take 2 tablets (20 mg total) by mouth once daily. 60 tablet 2    tirzepatide (MOUNJARO) 2.5 mg/0.5 mL PnIj Inject 2.5 mg into the skin every 7 days. 4 pen 4    traZODone  "(DESYREL) 100 MG tablet Take 1 tablet (100 mg total) by mouth nightly as needed for Insomnia. 30 tablet 11    valACYclovir (VALTREX) 1000 MG tablet Take 1,000 mg by mouth 3 (three) times daily.      acetaminophen (TYLENOL) 500 MG tablet Take 500 mg by mouth 3 (three) times daily as needed for Pain (or fever).      blood-glucose meter,continuous (DEXCOM G7 ) Misc Use as directed , e 11.65 (Patient not taking: Reported on 7/18/2023) 1 each 1    blood-glucose sensor (DEXCOM G7 SENSOR) Nicole Change every 10 days , e 11.65 (Patient not taking: Reported on 7/18/2023) 3 each 11    diclofenac sodium (VOLTAREN) 1 % Gel Apply 4 g topically 4 (four) times daily. Apply to knee (Patient not taking: Reported on 7/18/2023) 350 g 2    insulin lispro (HUMALOG KWIKPEN INSULIN) 100 unit/mL pen Inject 20 units before meals plus scale 150-200+2, 201-250+4, 251-300+6, 301-350+8, >350+10 max daily 90 units (Patient not taking: Reported on 7/18/2023) 30 mL 6     No current facility-administered medications on file prior to visit.       SOCIAL HISTORY:   Social History     Substance and Sexual Activity   Alcohol Use Not Currently       Social History     Substance and Sexual Activity   Drug Use Never       ROS:   GENERAL: Denies fatigue  CARDIOVASCULAR: Denies edema  GI: Denies abdominal pain  SKIN: Denies rash, itching   NEURO: Denies confusion, memory loss, or mood changes  HEME/LYMPH: Denies easy bruising or bleeding    Objective Findings:    PHYSICAL EXAM:   Friendly Black or  female, in no acute distress; alert and oriented to person, place and time  VITALS: Ht 5' 6" (1.676 m)   Wt 95.5 kg (210 lb 8.6 oz)   BMI 33.98 kg/m²   EYES: Sclerae anicteric  GI: Soft, non-tender, non-distended. No ascites.  EXTREMITIES:  No edema.  SKIN: Warm and dry. No jaundice. No telangectasias noted. No palmar erythema.  NEURO:  Normal gait. No asterixis.  PSYCH:  Memory intact. Thought and speech pattern appropriate. Behavior " "normal      EDUCATION:  See instructions discussed with patient in Instructions section of the After Visit Summary       ASSESSMENT & PLAN:  57 y.o. Black or  female with:  1. Chronic hepatitis B, diagnosed 7/2019  -- On Enctevair 0.5 mg daily, refilled   -- transaminases WNL  -- HBV DNA <10, undetected   -- synthetic liver function WNL  -- immunity to Hep A per labs : see HPI  -- Only notable risk factor is homeade tattoo "years ago"  (at home from )  -- Fibroscan 1/2023 noted  No fibrosis, repeat in 2026  -- HCC screening Q 6 months with AFP and abd. U/S - US no liver lesion, AFP WNL, next due 1/2024  -- Sexual partners and family members in household need to be tested and vaccinated if negative. Must use protection for sex (Hep B)           Follow up in about 6 months (around 1/18/2024). with US and labs 1 week before  Orders Placed This Encounter   Procedures    US Abdomen Complete    AFP Tumor Marker    Comprehensive Metabolic Panel    Hepatitis B Surface Antigen    HEPATITIS B VIRAL DNA, QUANTITATIVE       Thank you for allowing me to participate in the care of VIKAS De La Cruz    I spent a total of 30 minutes on the day of the visit.This includes face to face time and non-face to face time preparing to see the patient (eg, review of tests), obtaining and/or reviewing separately obtained history, documenting clinical information in the electronic or other health record, independently interpreting results and communicating results to the patient/family/caregiver, and coordinating care.       CC'ed note to:       "

## 2023-07-19 ENCOUNTER — CLINICAL SUPPORT (OUTPATIENT)
Dept: OPHTHALMOLOGY | Facility: CLINIC | Age: 58
End: 2023-07-19
Payer: MEDICAID

## 2023-07-19 ENCOUNTER — OFFICE VISIT (OUTPATIENT)
Dept: OPHTHALMOLOGY | Facility: CLINIC | Age: 58
End: 2023-07-19
Payer: MEDICAID

## 2023-07-19 DIAGNOSIS — N18.2 TYPE 2 DIABETES MELLITUS WITH STAGE 2 CHRONIC KIDNEY DISEASE, WITH LONG-TERM CURRENT USE OF INSULIN: ICD-10-CM

## 2023-07-19 DIAGNOSIS — Z79.899 LONG-TERM USE OF HYDROXYCHLOROQUINE: ICD-10-CM

## 2023-07-19 DIAGNOSIS — E11.22 TYPE 2 DIABETES MELLITUS WITH STAGE 2 CHRONIC KIDNEY DISEASE, WITH LONG-TERM CURRENT USE OF INSULIN: ICD-10-CM

## 2023-07-19 DIAGNOSIS — Z79.4 TYPE 2 DIABETES MELLITUS WITH STAGE 2 CHRONIC KIDNEY DISEASE, WITH LONG-TERM CURRENT USE OF INSULIN: ICD-10-CM

## 2023-07-19 DIAGNOSIS — N18.2 TYPE 2 DIABETES MELLITUS WITH STAGE 2 CHRONIC KIDNEY DISEASE, WITH LONG-TERM CURRENT USE OF INSULIN: Primary | ICD-10-CM

## 2023-07-19 DIAGNOSIS — D86.9 SARCOIDOSIS: ICD-10-CM

## 2023-07-19 DIAGNOSIS — E11.22 TYPE 2 DIABETES MELLITUS WITH STAGE 2 CHRONIC KIDNEY DISEASE, WITH LONG-TERM CURRENT USE OF INSULIN: Primary | ICD-10-CM

## 2023-07-19 DIAGNOSIS — Z79.4 TYPE 2 DIABETES MELLITUS WITH STAGE 2 CHRONIC KIDNEY DISEASE, WITH LONG-TERM CURRENT USE OF INSULIN: Primary | ICD-10-CM

## 2023-07-19 PROCEDURE — 99999 PR PBB SHADOW E&M-EST. PATIENT-LVL III: CPT | Mod: PBBFAC,,, | Performed by: OPHTHALMOLOGY

## 2023-07-19 PROCEDURE — 92083 EXTENDED VISUAL FIELD XM: CPT | Mod: PBBFAC | Performed by: OPHTHALMOLOGY

## 2023-07-19 PROCEDURE — 3060F POS MICROALBUMINURIA REV: CPT | Mod: CPTII,,, | Performed by: OPHTHALMOLOGY

## 2023-07-19 PROCEDURE — 3060F PR POS MICROALBUMINURIA RESULT DOCUMENTED/REVIEW: ICD-10-PCS | Mod: CPTII,,, | Performed by: OPHTHALMOLOGY

## 2023-07-19 PROCEDURE — 3066F NEPHROPATHY DOC TX: CPT | Mod: CPTII,,, | Performed by: OPHTHALMOLOGY

## 2023-07-19 PROCEDURE — 3052F PR MOST RECENT HEMOGLOBIN A1C LEVEL 8.0 - < 9.0%: ICD-10-PCS | Mod: CPTII,,, | Performed by: OPHTHALMOLOGY

## 2023-07-19 PROCEDURE — 3052F HG A1C>EQUAL 8.0%<EQUAL 9.0%: CPT | Mod: CPTII,,, | Performed by: OPHTHALMOLOGY

## 2023-07-19 PROCEDURE — 4010F PR ACE/ARB THEARPY RXD/TAKEN: ICD-10-PCS | Mod: CPTII,,, | Performed by: OPHTHALMOLOGY

## 2023-07-19 PROCEDURE — 4010F ACE/ARB THERAPY RXD/TAKEN: CPT | Mod: CPTII,,, | Performed by: OPHTHALMOLOGY

## 2023-07-19 PROCEDURE — 92134 CPTRZ OPH DX IMG PST SGM RTA: CPT | Mod: PBBFAC | Performed by: OPHTHALMOLOGY

## 2023-07-19 PROCEDURE — 3066F PR DOCUMENTATION OF TREATMENT FOR NEPHROPATHY: ICD-10-PCS | Mod: CPTII,,, | Performed by: OPHTHALMOLOGY

## 2023-07-19 PROCEDURE — 92134 POSTERIOR SEGMENT OCT RETINA (OCULAR COHERENCE TOMOGRAPHY)-BOTH EYES: ICD-10-PCS | Mod: 26,S$PBB,, | Performed by: OPHTHALMOLOGY

## 2023-07-19 PROCEDURE — 92083 HUMPHREY VISUAL FIELD - OU - BOTH EYES: ICD-10-PCS | Mod: 26,S$PBB,, | Performed by: OPHTHALMOLOGY

## 2023-07-19 PROCEDURE — 99212 PR OFFICE/OUTPT VISIT, EST, LEVL II, 10-19 MIN: ICD-10-PCS | Mod: S$PBB,,, | Performed by: OPHTHALMOLOGY

## 2023-07-19 PROCEDURE — 99212 OFFICE O/P EST SF 10 MIN: CPT | Mod: S$PBB,,, | Performed by: OPHTHALMOLOGY

## 2023-07-19 PROCEDURE — 99213 OFFICE O/P EST LOW 20 MIN: CPT | Mod: PBBFAC | Performed by: OPHTHALMOLOGY

## 2023-07-19 PROCEDURE — 99999 PR PBB SHADOW E&M-EST. PATIENT-LVL III: ICD-10-PCS | Mod: PBBFAC,,, | Performed by: OPHTHALMOLOGY

## 2023-07-19 NOTE — PROGRESS NOTES
OCT/HVF done ou. Rel/fix/coop. Good ou./chart checked for latex allergy./ plano + .25 x 120/od /-.75 + .25 x 68/os-[facility]

## 2023-07-21 ENCOUNTER — TELEPHONE (OUTPATIENT)
Dept: RHEUMATOLOGY | Facility: CLINIC | Age: 58
End: 2023-07-21
Payer: MEDICAID

## 2023-07-21 ENCOUNTER — PATIENT MESSAGE (OUTPATIENT)
Dept: RHEUMATOLOGY | Facility: CLINIC | Age: 58
End: 2023-07-21
Payer: MEDICAID

## 2023-07-21 ENCOUNTER — OFFICE VISIT (OUTPATIENT)
Dept: TRANSPLANT | Facility: CLINIC | Age: 58
End: 2023-07-21
Payer: MEDICAID

## 2023-07-21 VITALS
HEIGHT: 66 IN | WEIGHT: 213.88 LBS | SYSTOLIC BLOOD PRESSURE: 180 MMHG | DIASTOLIC BLOOD PRESSURE: 88 MMHG | BODY MASS INDEX: 34.37 KG/M2 | HEART RATE: 75 BPM

## 2023-07-21 DIAGNOSIS — N18.2 TYPE 2 DIABETES MELLITUS WITH STAGE 2 CHRONIC KIDNEY DISEASE, WITH LONG-TERM CURRENT USE OF INSULIN: ICD-10-CM

## 2023-07-21 DIAGNOSIS — D86.85 CARDIAC SARCOIDOSIS: ICD-10-CM

## 2023-07-21 DIAGNOSIS — D86.9 SARCOIDOSIS: Primary | ICD-10-CM

## 2023-07-21 DIAGNOSIS — N18.31 STAGE 3A CHRONIC KIDNEY DISEASE: ICD-10-CM

## 2023-07-21 DIAGNOSIS — I10 ESSENTIAL HYPERTENSION: ICD-10-CM

## 2023-07-21 DIAGNOSIS — Z79.4 TYPE 2 DIABETES MELLITUS WITH STAGE 2 CHRONIC KIDNEY DISEASE, WITH LONG-TERM CURRENT USE OF INSULIN: ICD-10-CM

## 2023-07-21 DIAGNOSIS — E11.22 TYPE 2 DIABETES MELLITUS WITH STAGE 2 CHRONIC KIDNEY DISEASE, WITH LONG-TERM CURRENT USE OF INSULIN: ICD-10-CM

## 2023-07-21 DIAGNOSIS — I50.32 CHRONIC DIASTOLIC HEART FAILURE: ICD-10-CM

## 2023-07-21 DIAGNOSIS — E66.9 OBESITY (BMI 30-39.9): ICD-10-CM

## 2023-07-21 PROCEDURE — 99215 OFFICE O/P EST HI 40 MIN: CPT | Mod: S$PBB,,, | Performed by: INTERNAL MEDICINE

## 2023-07-21 PROCEDURE — 1160F RVW MEDS BY RX/DR IN RCRD: CPT | Mod: CPTII,,, | Performed by: INTERNAL MEDICINE

## 2023-07-21 PROCEDURE — 3008F BODY MASS INDEX DOCD: CPT | Mod: CPTII,,, | Performed by: INTERNAL MEDICINE

## 2023-07-21 PROCEDURE — 1159F PR MEDICATION LIST DOCUMENTED IN MEDICAL RECORD: ICD-10-PCS | Mod: CPTII,,, | Performed by: INTERNAL MEDICINE

## 2023-07-21 PROCEDURE — 3079F DIAST BP 80-89 MM HG: CPT | Mod: CPTII,,, | Performed by: INTERNAL MEDICINE

## 2023-07-21 PROCEDURE — 3066F PR DOCUMENTATION OF TREATMENT FOR NEPHROPATHY: ICD-10-PCS | Mod: CPTII,,, | Performed by: INTERNAL MEDICINE

## 2023-07-21 PROCEDURE — 99999 PR PBB SHADOW E&M-EST. PATIENT-LVL V: ICD-10-PCS | Mod: PBBFAC,,, | Performed by: INTERNAL MEDICINE

## 2023-07-21 PROCEDURE — 99215 OFFICE O/P EST HI 40 MIN: CPT | Mod: PBBFAC | Performed by: INTERNAL MEDICINE

## 2023-07-21 PROCEDURE — 4010F PR ACE/ARB THEARPY RXD/TAKEN: ICD-10-PCS | Mod: CPTII,,, | Performed by: INTERNAL MEDICINE

## 2023-07-21 PROCEDURE — 3066F NEPHROPATHY DOC TX: CPT | Mod: CPTII,,, | Performed by: INTERNAL MEDICINE

## 2023-07-21 PROCEDURE — 3060F POS MICROALBUMINURIA REV: CPT | Mod: CPTII,,, | Performed by: INTERNAL MEDICINE

## 2023-07-21 PROCEDURE — 3079F PR MOST RECENT DIASTOLIC BLOOD PRESSURE 80-89 MM HG: ICD-10-PCS | Mod: CPTII,,, | Performed by: INTERNAL MEDICINE

## 2023-07-21 PROCEDURE — 3052F HG A1C>EQUAL 8.0%<EQUAL 9.0%: CPT | Mod: CPTII,,, | Performed by: INTERNAL MEDICINE

## 2023-07-21 PROCEDURE — 3052F PR MOST RECENT HEMOGLOBIN A1C LEVEL 8.0 - < 9.0%: ICD-10-PCS | Mod: CPTII,,, | Performed by: INTERNAL MEDICINE

## 2023-07-21 PROCEDURE — 3060F PR POS MICROALBUMINURIA RESULT DOCUMENTED/REVIEW: ICD-10-PCS | Mod: CPTII,,, | Performed by: INTERNAL MEDICINE

## 2023-07-21 PROCEDURE — 4010F ACE/ARB THERAPY RXD/TAKEN: CPT | Mod: CPTII,,, | Performed by: INTERNAL MEDICINE

## 2023-07-21 PROCEDURE — 1160F PR REVIEW ALL MEDS BY PRESCRIBER/CLIN PHARMACIST DOCUMENTED: ICD-10-PCS | Mod: CPTII,,, | Performed by: INTERNAL MEDICINE

## 2023-07-21 PROCEDURE — 99999 PR PBB SHADOW E&M-EST. PATIENT-LVL V: CPT | Mod: PBBFAC,,, | Performed by: INTERNAL MEDICINE

## 2023-07-21 PROCEDURE — 1159F MED LIST DOCD IN RCRD: CPT | Mod: CPTII,,, | Performed by: INTERNAL MEDICINE

## 2023-07-21 PROCEDURE — 3008F PR BODY MASS INDEX (BMI) DOCUMENTED: ICD-10-PCS | Mod: CPTII,,, | Performed by: INTERNAL MEDICINE

## 2023-07-21 PROCEDURE — 3077F PR MOST RECENT SYSTOLIC BLOOD PRESSURE >= 140 MM HG: ICD-10-PCS | Mod: CPTII,,, | Performed by: INTERNAL MEDICINE

## 2023-07-21 PROCEDURE — 99215 PR OFFICE/OUTPT VISIT, EST, LEVL V, 40-54 MIN: ICD-10-PCS | Mod: S$PBB,,, | Performed by: INTERNAL MEDICINE

## 2023-07-21 PROCEDURE — 3077F SYST BP >= 140 MM HG: CPT | Mod: CPTII,,, | Performed by: INTERNAL MEDICINE

## 2023-07-21 RX ORDER — PREDNISONE 5 MG/1
TABLET ORAL
Qty: 180 TABLET | Refills: 1 | Status: SHIPPED | OUTPATIENT
Start: 2023-07-21

## 2023-07-21 NOTE — PROGRESS NOTES
Subjective:       HPI:  Ms. 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 She was referred for evaluation for possible cardiac sarcoid. This is her 3rd visit with me. I had ordered a cardiac PET FDG that confirmed cardiac sarcoidosis (she also had a previosu cardiac MRI that was also suggestive of sarcoid) and had started her on prednisone following our protocol. From a cardiac standpoint, she reports NYHA class II symptoms. Occasional PND and orthopnea. Denies any palpitations, chest pain or  syncopal episodes. Current cardiac regimen includes; carvedilol 25 mg twice daily, lisinopril 20 mg daily, nifedipine 60 mg daily. Current regimen for sarcoidosis includes; methotrexate 20mg weekly plus folic acid and hydroxycholoroquine 200 mg twice daily and prednisone 10 mg BID. as mentioned prior, I had discussed with Rheumatology and shared with them our current steroid protocol for cardiac sarcoid. We agreed that we would avoid TNFi given cardiac risk and history of B cell lymphoma.     Cardiac PET FDG done on 6/6/2023  There is no FDG uptake in the myocardium. This study is negative for active cardiac sarcoidosis.    There is a resting perfusion abnormality present in the mid to apical anteroseptal and septal walls. As previously mentioned, this scar could be due to prior cardiac sarcoid vs coronary artery disease (LAD distribution).    LVEF was 68%    Cardiac PET FDG done on 10/13/2022  There is myocardial FDG uptake in the basal lateral and basal  anterior wall consistent with active cardiac sarcoidosis.    There is a resting perfusion abnormality in the distal to apical anteroseptal wall.  This scar could be from cardiac sarcoid but it is within the LAD distribution which raises the possibility of coronary artery disease.  Consider PET stress testing if clinically indicated.  At that time of this report, prior stress testing and/or coronary angiogram are unknown.    LVEF was 58%    Cardiac MRI performed on 6/28/2022  LV volumes are normal. LV mass is normal. LVEF = 57 %.   RV volumes are normal. RVEF = 49 %.  Left atrial enlargement  A very small focal area of LGE measuring 1.8mm X 5mm is appreciated in mid myocardium of the basal anterior wall. This may represent sarcoid involvement of the myocardium.     2D Echo with CFD done on 12/13/2021  The estimated ejection fraction is 65%.  The left ventricle is normal in size with normal systolic function.  Normal left ventricular diastolic function.  Normal right ventricular size with normal right ventricular systolic function.  Normal central venous pressure (3 mmHg).       Past Medical History:   Diagnosis Date    Acute (diffuse) proliferative glomerulonephritis     Acute renal failure 7/19/2019    B-cell lymphoproliferative disorder 7/30/2019    CHF (congestive heart failure)     Chronic obstructive lung disease 7/27/2019    Chronic viral hepatitis B without delta agent and without coma 7/24/2019    Cryoglobulinemic vasculitis     Diabetes mellitus     Hypogammaglobulinemia 8/5/2019    Iron deficiency anemia 7/30/2019    Renal vein thrombosis 2015    s/p embolectomy and lovenox for 9 mos    Steroid-induced hyperglycemia 7/28/2019    Uterine leiomyoma     s/p resection     Past Surgical History:   Procedure Laterality Date    AV FISTULA PLACEMENT      CATARACT EXTRACTION W/  INTRAOCULAR LENS IMPLANT Left 4/22/2021    Procedure: EXTRACTION, CATARACT, WITH IOL INSERTION;  Surgeon: Allen Alejandro MD;  Location:  "[facility] OR;  Service: Ophthalmology;  Laterality: Left;    CATARACT EXTRACTION W/  INTRAOCULAR LENS IMPLANT Right 2021    Procedure: EXTRACTION, CATARACT, WITH IOL INSERTION;  Surgeon: Allen Alejandro MD;  Location: [facility] OR;  Service: Ophthalmology;  Laterality: Right;    LUNG BIOPSY N/A 2020    Procedure: BIOPSY, LUNG;  Surgeon: Kelli Diagnostic Provider;  Location: [facility] OR;  Service: Radiology;  Laterality: N/A;  8 A.M. START  PHONE PREOP DONE 2020  PT/INR, CBC, CMP AND COVID ON AM OF PROCEDURE   ARRIVAL AT 7:00 FOR 8:30 APPT     OB History          1    Para   1    Term   1            AB        Living             SAB        IAB        Ectopic        Multiple        Live Births                   Review of Systems   Constitutional: Negative. Negative for chills, decreased appetite, diaphoresis, fever, malaise/fatigue, night sweats, weight gain and weight loss.   Eyes: Negative.    Cardiovascular:  Negative for chest pain, claudication, cyanosis, dyspnea on exertion, irregular heartbeat, leg swelling, near-syncope, orthopnea, palpitations, paroxysmal nocturnal dyspnea and syncope.   Respiratory:  Negative for cough, hemoptysis and shortness of breath.    Endocrine: Negative.    Hematologic/Lymphatic: Negative.    Skin:  Negative for color change, dry skin and nail changes.   Musculoskeletal: Negative.    Gastrointestinal: Negative.    Genitourinary: Negative.    Neurological:  Negative for weakness.     Objective:   Blood pressure (!) 180/88, pulse 75, height 5' 6" (1.676 m), weight 97 kg (213 lb 13.5 oz).body mass index is 34.52 kg/m².  Physical Exam  Vitals and nursing note reviewed.   Constitutional:       Appearance: She is well-developed.   HENT:      Head: Normocephalic.   Eyes:      Pupils: Pupils are equal, round, and reactive to light.   Neck:      Vascular: No JVD.   Cardiovascular:      Rate and Rhythm: Normal rate and regular rhythm.      Chest Wall: PMI is not displaced.      " Pulses: Intact distal pulses.      Heart sounds: Normal heart sounds. No murmur heard.    No friction rub. No gallop.   Pulmonary:      Effort: Pulmonary effort is normal.      Breath sounds: Normal breath sounds. No wheezing or rales.   Abdominal:      General: Bowel sounds are normal.      Palpations: Abdomen is soft.   Musculoskeletal:      Cervical back: Neck supple.   Neurological:      Mental Status: She is alert and oriented to person, place, and time.       Labs:    Chemistry        Component Value Date/Time     07/11/2023 0923    K 4.3 07/11/2023 0923     07/11/2023 0923    CO2 24 07/11/2023 0923    BUN 20 07/11/2023 0923    CREATININE 0.8 07/11/2023 0923     (H) 07/11/2023 0923        Component Value Date/Time    CALCIUM 10.2 07/11/2023 0923    ALKPHOS 99 07/11/2023 0923    AST 13 07/11/2023 0923    ALT 15 07/11/2023 0923    BILITOT 0.7 07/11/2023 0923    ESTGFRAFRICA >60.0 07/25/2022 1022    EGFRNONAA >60.0 07/25/2022 1022          Magnesium   Date Value Ref Range Status   11/12/2020 1.7 1.6 - 2.6 mg/dL Final     Lab Results   Component Value Date    WBC 5.16 07/11/2023    HGB 16.5 (H) 07/11/2023    HCT 49.4 (H) 07/11/2023     07/11/2023     Lab Results   Component Value Date    INR 1.0 07/11/2023    INR 1.0 01/11/2023    INR 1.0 07/20/2022     BNP   Date Value Ref Range Status   11/07/2020 28 0 - 99 pg/mL Final     Comment:     Values of less than 100 pg/ml are consistent with non-CHF populations.   08/03/2019 282 (H) 0 - 99 pg/mL Final     Comment:     Values of less than 100 pg/ml are consistent with non-CHF populations.   07/19/2019 999 (H) 0 - 99 pg/mL Final     Comment:     Values of less than 100 pg/ml are consistent with non-CHF populations.     LD   Date Value Ref Range Status   10/31/2022 188 110 - 260 U/L Final     Comment:     Results are increased in hemolyzed samples.   05/27/2021 230 110 - 260 U/L Final     Comment:     Results are increased in hemolyzed samples.    12/03/2020 167 110 - 260 U/L Final     Comment:     Results are increased in hemolyzed samples.       Assessment:      1. Sarcoidosis    2. Chronic diastolic heart failure    3. Type 2 diabetes mellitus with stage 2 chronic kidney disease, with long-term current use of insulin    4. Stage 3a chronic kidney disease    5. Obesity (BMI 30-39.9)    6. Essential hypertension        Plan:   Mrs. Vital is a very pleasant 57 black female with known sarcoidosis on MTX and plaquenil with multiple comorbidities including type DM, Chronic Hep B and cryoglobulinemia and now with confirmed cardiac sarcoidosis (by cardiac PET)  She was started on prednisone 40mg with the following protocol to taper weekly over a 6 month period (30mg BID x 1wk, then 25mg BID x 1wk, then 20mg BID x 1wk, then 15mg BID x 1wk, then stay on 10mg BID for 6 months). Repeated cardiac FDG PET last month showed no active cardiac sarcoid. She is currently on prednisone 10 mg BID. From a cardiac standpoint, in view of her recent cardiac PET findings , recommend to reduce prednisone to 10 every morning and 5 mg every evening for 1 month, then 5 mg BID for 1 month then reduce to prednisone 5 mg daily.    Continue current cardiac regimen   Continue MTX and plaquenil  Recommend 2 gram sodium restriction and 1500cc fluid restriction.  Encourage physical activity with graded exercise program.  Requested patient to weigh themselves daily, and to notify us if their weight increases by more than 3 lbs in 1 day or 5 lbs in 1 week.   Thank you for allowing me to participate in the care of this very pleasant patient. Do not hesitate to contact me should you have any questions.  RTC in 6 months with labs and Echo     Brina Carranza MD

## 2023-07-21 NOTE — TELEPHONE ENCOUNTER
Please call pt and tell her Dr. Carranza recommends decreasing prednisone to 10mg in am and 5mg in pm for one month, then decreasing to 10mg in am only x 1 month, then decreasing to 5mg daily and continue.    Please schedule standing labs and visit with fellow and myself in 4 wks. Thank you LAN

## 2023-07-21 NOTE — PROGRESS NOTES
HPI    Referred by Dr.Laura Charito Arce MD  Patient here for diabetic/plaquenil check.  Started taking Plaquenil x month ago.  Patient states OU decreasing past 4 months.  No eye pain or headaches.    Review HVF--10-2  BS--205 today--running okay since change of insulin.    I have personally interviewed the patient, reviewed the history and   examined the patient and agree with the technician's exam.   Last edited by Riaz Santiago MD on 7/19/2023  3:34 PM.            Assessment /Plan     For exam results, see Encounter Report.    Type 2 diabetes mellitus with stage 2 chronic kidney disease, with long-term current use of insulin  -     [facility] - OU - Extended - Both Eyes    Long-term use of hydroxychloroquine  -     [facility] - OU - Extended - Both Eyes    Sarcoidosis  -     [facility] - OU - Extended - Both Eyes      To retina service to rule out plaquenil maculopathy.

## 2023-08-08 ENCOUNTER — TELEPHONE (OUTPATIENT)
Dept: INTERNAL MEDICINE | Facility: CLINIC | Age: 58
End: 2023-08-08
Payer: MEDICAID

## 2023-08-08 DIAGNOSIS — S96.911D STRAIN OF RIGHT ANKLE, SUBSEQUENT ENCOUNTER: Primary | ICD-10-CM

## 2023-08-08 NOTE — TELEPHONE ENCOUNTER
----- Message from Adrianne Springer sent at 8/8/2023 12:33 PM CDT -----  Contact: MUNA HERNANDEZ [[id number]]@ [phone]  Patient broke her right ankle on foot. Went to [facility]. She has a wallace on and she need a pain Rx until her appointment on 8/24. Jamia De León Ms @ Phone: [phone] Fax: [phone]        Requesting an RX refill or new RX.  Is this a refill or new RX: New Rx  RX name and strength (copy/paste from chart): Hydrocod / Acetam  mg   Is this a 30 day or 90 day RX: 30  Pharmacy name and phone # (copy/paste from chart): [facility] @ Phone: [phone] Fax: [phone]     The doctors have asked that we provide their patients with the following 2 reminders -- prescription refills can take up to 72 hours, and a friendly reminder that in the future you can use your [facility] account to request refills:      She's out and is in pain.

## 2023-08-09 RX ORDER — HYDROCODONE BITARTRATE AND ACETAMINOPHEN 5; 325 MG/1; MG/1
1 TABLET ORAL EVERY 12 HOURS PRN
Qty: 14 TABLET | Refills: 0 | Status: SHIPPED | OUTPATIENT
Start: 2023-08-09 | End: 2023-08-16

## 2023-09-22 ENCOUNTER — PATIENT OUTREACH (OUTPATIENT)
Dept: ADMINISTRATIVE | Facility: HOSPITAL | Age: 58
End: 2023-09-22
Payer: MEDICAID

## 2023-09-22 NOTE — PROGRESS NOTES
Health Maintenance Due   Topic Date Due    Cervical Cancer Screening  Never done    Shingles Vaccine (1 of 2) Never done    Low Dose Statin  Never done    Colorectal Cancer Screening  Never done    COVID-19 Vaccine (2 - Moderna series) 09/30/2021    Eye Exam  03/24/2022    Influenza Vaccine (1) 09/01/2023    Hemoglobin A1c  10/11/2023      Chart reviewed. Triggered LINKS. Updated Care Everywhere. Unable to leave a voice message for patient overdue HM topics.     Lalo Martinez CMA  Population Health Care Coordinator  Primary Care Team

## 2023-09-27 PROBLEM — D86.9 SARCOIDOSIS: Status: RESOLVED | Noted: 2021-02-03 | Resolved: 2023-09-27

## 2023-09-27 NOTE — ASSESSMENT & PLAN NOTE
BM biopsy results noted and current suspicion for lymphoplasmacytic lymphoma vs Waldenstrom's, vs multiple myeloma. Given results, will let Heme/Onc decide which chemotherapeutic agent they feel is best.     - 3rd PLEX 07/29  - Last dose of pulse steroids received, starting 60mg prednisone 07/29  - LN bx on 07/29 or 07/30  - Recommend Allergy/Immunology consult for decreased IgG levels, will await their recommendations  - Follow up remaining cryoglobulin and thrombophilia labs, SSA, SSB, MPO, PR3  - PCP prophylaxis (for steroids) with atovaquone 1500mg suspension, started 7/25/19. Avoiding bactrim in setting of acute renal failure.  - Ulcer prophylaxis, currently on famotidine  - Chemotherapeutic therapy deferred to Heme/Onc       Voicemail left for [facility] Basket with verbal orders.

## 2023-11-08 ENCOUNTER — TELEPHONE (OUTPATIENT)
Dept: INTERNAL MEDICINE | Facility: CLINIC | Age: 58
End: 2023-11-08
Payer: MEDICAID

## 2023-11-08 NOTE — TELEPHONE ENCOUNTER
----- Message from Patrice Khoury sent at 11/8/2023 10:39 AM CST -----  Type:  Patient Returning Call    Who Called:pt  Who Left Message for Patient:  Does the patient know what this is regarding?:appt  Would the patient rather a call back or a response via [facility]? Call  Best Call Back Number:[phone]  Additional Information: pt states she would like a call from office in regards to scheduling an appt

## 2023-11-29 ENCOUNTER — PATIENT OUTREACH (OUTPATIENT)
Dept: ADMINISTRATIVE | Facility: HOSPITAL | Age: 58
End: 2023-11-29
Payer: MEDICAID

## 2023-11-29 NOTE — PROGRESS NOTES
Health Maintenance Due   Topic Date Due    Cervical Cancer Screening  Never done    Shingles Vaccine (1 of 2) Never done    Low Dose Statin  Never done    Colorectal Cancer Screening  Never done    Eye Exam  03/24/2022    Influenza Vaccine (1) 09/01/2023    COVID-19 Vaccine (2 - 2023-24 season) 09/01/2023      Chart reviewed. Triggered LINKS. Updated Care Everywhere.     Lalo Martinez CMA  Population Health Care Coordinator  Primary Care Team

## 2023-12-01 ENCOUNTER — LAB VISIT (OUTPATIENT)
Dept: LAB | Facility: HOSPITAL | Age: 58
End: 2023-12-01
Attending: INTERNAL MEDICINE
Payer: MEDICAID

## 2023-12-01 DIAGNOSIS — N18.2 TYPE 2 DIABETES MELLITUS WITH STAGE 2 CHRONIC KIDNEY DISEASE, WITH LONG-TERM CURRENT USE OF INSULIN: ICD-10-CM

## 2023-12-01 DIAGNOSIS — Z79.4 TYPE 2 DIABETES MELLITUS WITH STAGE 2 CHRONIC KIDNEY DISEASE, WITH LONG-TERM CURRENT USE OF INSULIN: ICD-10-CM

## 2023-12-01 DIAGNOSIS — E11.22 TYPE 2 DIABETES MELLITUS WITH STAGE 2 CHRONIC KIDNEY DISEASE, WITH LONG-TERM CURRENT USE OF INSULIN: ICD-10-CM

## 2023-12-01 PROBLEM — N17.9 ACUTE RENAL FAILURE: Status: RESOLVED | Noted: 2019-07-19 | Resolved: 2023-12-01

## 2023-12-01 LAB
ESTIMATED AVG GLUCOSE: 183 MG/DL (ref 68–131)
HBA1C MFR BLD: 8 % (ref 4–5.6)

## 2023-12-01 PROCEDURE — 83036 HEMOGLOBIN GLYCOSYLATED A1C: CPT | Performed by: NURSE PRACTITIONER

## 2023-12-01 PROCEDURE — 36415 COLL VENOUS BLD VENIPUNCTURE: CPT | Mod: PO | Performed by: NURSE PRACTITIONER

## 2023-12-04 ENCOUNTER — PATIENT MESSAGE (OUTPATIENT)
Dept: INTERNAL MEDICINE | Facility: CLINIC | Age: 58
End: 2023-12-04

## 2024-02-06 DIAGNOSIS — Z12.11 COLON CANCER SCREENING: ICD-10-CM

## 2024-02-07 NOTE — CONSULTS
[facility]  Hepatology  Consult Note    Patient Name: Kendy Vital  MRN: [id number]  Admission Date: 7/19/2019  Hospital Length of Stay: 5 days  Attending Provider: Luiz Rea MD   Primary Care Physician: To Obtain Unable  Principal Problem:Acute renal failure    Inpatient consult to Hepatology  Consult performed by: Alfa Simth MD  Consult ordered by: Luiz Rea MD        Subjective:     Transplant status: No    HPI:  Ms. Vital is a 54yo F w/PMH of HTN, uterine fibroids and anemia who is admitted with thrombocytopenia and acute renal failure. Hepatology consulted for management of new diagnosis of chronic Hep B.  Patient initially presented to [facility] with few weeks of subjective fevers, chills, arthralgias, fatigue and nose bleed. She was found to be thrombocytopenic with LACI. She was transferred here to ICU for possible TTP, but repeat labs here with normal platelets so downgraded to floor. She underwent renal biopsy 7/22 (prelim report with cryoglobulinemic DPGN) and bone marrow biopsy 7/23. Labs revealed Hep B core total Ab +, viral load 19, Hep B sAg + and normal AST / ALT. Patient did not know about Hep B infection prior to this. 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 Has received blood transfusions about 5 years ago for anemia, but no transfusions >15 years ago. An aunt and uncle had HCC and sister may have had hepatitis. Does not know if mother had hepatitis.    Review of Systems   Constitutional: Positive for chills, fatigue and fever.   Respiratory: Positive for shortness of breath.    Cardiovascular: Negative for chest pain.   Gastrointestinal: Negative for abdominal pain, blood in stool, constipation, diarrhea, nausea and vomiting.   Genitourinary: Negative for dysuria.   Musculoskeletal: Positive for arthralgias.   Skin: Negative for rash.   Neurological: Negative for light-headedness and  headaches.   Psychiatric/Behavioral: Negative for behavioral problems and confusion.       No past medical history on file.    No past surgical history on file.    Family history of liver disease: Yes. See HPI    Review of patient's allergies indicates:  No Known Allergies    Tobacco Use    Smoking status: Not on file   Substance and Sexual Activity    Alcohol use: Not on file    Drug use: Not on file    Sexual activity: Not on file       Medications Prior to Admission   Medication Sig Dispense Refill Last Dose    amLODIPine (NORVASC) 10 MG tablet Take 10 mg by mouth once daily.       aspirin (ECOTRIN) 81 MG EC tablet Take 81 mg by mouth once daily.       chlorthalidone (HYGROTEN) 50 MG Tab Take 50 mg by mouth once daily.          Objective:     Vital Signs (Most Recent):  Temp: 96.6 °F (35.9 °C) (07/24/19 1600)  Pulse: 67 (07/24/19 1600)  Resp: 20 (07/24/19 1111)  BP: (!) 158/76 (07/24/19 1600)  SpO2: (!) 93 % (07/24/19 1600) Vital Signs (24h Range):  Temp:  [96.2 °F (35.7 °C)-97.7 °F (36.5 °C)] 96.6 °F (35.9 °C)  Pulse:  [65-73] 67  Resp:  [16-20] 20  SpO2:  [93 %-100 %] 93 %  BP: (128-158)/(64-85) 158/76     Weight: 103 kg (227 lb) (07/19/19 1400)  Body mass index is 36.64 kg/m².    Physical Exam   Constitutional: She appears well-developed and well-nourished. No distress.   Obese   HENT:   Mouth/Throat: Oropharynx is clear and moist.   Neck: Neck supple.   Cardiovascular: Normal rate and regular rhythm.   Pulmonary/Chest: Effort normal and breath sounds normal. No respiratory distress.   Abdominal: Soft. Bowel sounds are normal. She exhibits no distension. There is no tenderness.   Neurological: She is alert.   Skin: Skin is warm and dry.   Psychiatric: She has a normal mood and affect. Her behavior is normal.   Nursing note and vitals reviewed.      MELD-Na score: 20 at 7/24/2019  3:27 AM  MELD score: 20 at 7/24/2019  3:27 AM  Calculated from:  Serum Creatinine: 6.0 mg/dL (Rounded to 4 mg/dL) at  7/24/2019  3:27 AM  Serum Sodium: 137 mmol/L at 7/24/2019  3:27 AM  Total Bilirubin: 0.4 mg/dL (Rounded to 1 mg/dL) at 7/22/2019  9:54 AM  INR(ratio): 1.0 at 7/22/2019  9:54 AM  Age: 53 years    Significant Labs:  Labs within the past month have been reviewed.    Significant Imaging:  Reviewed    Assessment/Plan:     Chronic hepatitis B  Cryoglobulinemia usually associated with HCV, but rare association with Hep B. Presence of glomerulonephritis on preliminary renal biopsy. Overall, less likely that Hep B is related to this as ALT normal and viral load very low.  - Will need to be on prophylaxis due to immunosuppression with prednisone. Will avoid tenofovir given acute renal failure. Please start entecavir 0.5mg q72hrs (renally dosed)  - Check liver U/S with Doppler  - Patient will need HCC screening going forward given her ethnicity (Liver U/S and AFP every 6 months)  - will f/u final renal and bone marrow biopsies      Thank you for your consult. I will follow-up with patient. Please contact us if you have any additional questions.    Alfa Smith MD  Hepatology  [facility]   done

## 2024-02-19 DIAGNOSIS — I10 ESSENTIAL HYPERTENSION: ICD-10-CM

## 2024-02-19 RX ORDER — CARVEDILOL 25 MG/1
25 TABLET ORAL 2 TIMES DAILY
Qty: 180 TABLET | Refills: 1 | Status: SHIPPED | OUTPATIENT
Start: 2024-02-19

## 2024-02-19 NOTE — TELEPHONE ENCOUNTER
Refill Routing Note   Medication(s) are not appropriate for processing by [facility] for the following reason(s):        Required vitals abnormal    [facility] action(s):  Defer               Appointments  past 12m or future 3m with PCP    Date Provider   Last Visit   5/23/2023 Yuki Kim MD   Next Visit   Visit date not found Yuki Kim MD   ED visits in past 90 days: 0        Note composed:4:03 PM 02/19/2024

## 2024-02-19 NOTE — TELEPHONE ENCOUNTER
No care due was identified.  Health [facility] Embedded Care Due Messages. Reference number: [id number].   2/19/2024 11:08:44 AM CST

## 2024-03-13 NOTE — SUBJECTIVE & OBJECTIVE
Interval History: Pt was tearful and reported feeling anxious. Her brother passed away recently. Otherwise at her baseline. Sitting up with 1 pillow and head of bed elevated due to dyspnea when lying flat.    Current Facility-Administered Medications   Medication Frequency    acetaminophen tablet 650 mg Q6H PRN    albuterol-ipratropium 2.5 mg-0.5 mg/3 mL nebulizer solution 3 mL Q6H PRN    allopurinol tablet 100 mg Daily    atovaquone suspension 1,500 mg Daily    bisacodyl suppository 10 mg Daily PRN    carvedilol tablet 12.5 mg BID    cloNIDine tablet 0.1 mg BID    entecavir tablet 0.5 mg Q72H    famotidine tablet 20 mg Daily    glucagon (human recombinant) injection 1 mg PRN    glucose chewable tablet 16 g PRN    glucose chewable tablet 24 g PRN    hydrALAZINE tablet 25 mg Q6H PRN    hydrALAZINE tablet 50 mg Q8H    insulin aspart U-100 pen 1-10 Units QID (AC + HS) PRN    insulin aspart U-100 pen 8 Units TIDWM    insulin detemir U-100 pen 5 Units QHS    NIFEdipine 24 hr tablet 60 mg Daily    nystatin 100,000 unit/mL suspension 500,000 Units QID    ondansetron disintegrating tablet 8 mg Q8H PRN    oxyCODONE-acetaminophen 5-325 mg per tablet 1 tablet Q8H PRN    predniSONE tablet 60 mg Daily    promethazine (PHENERGAN) 6.25 mg in dextrose 5 % 50 mL IVPB Q6H PRN    ramelteon tablet 8 mg Nightly PRN    sodium bicarbonate tablet 650 mg TID    sodium chloride 0.9% flush 10 mL PRN    Tdap vaccine injection 0.5 mL vaccine x 1 dose     Objective:     Vital Signs (Most Recent):  Temp: 97.7 °F (36.5 °C) (07/30/19 0728)  Pulse: 74 (07/30/19 0728)  Resp: 18 (07/30/19 0728)  BP: (!) 190/83 (07/30/19 0728)  SpO2: 97 % (07/30/19 0728)  O2 Device (Oxygen Therapy): room air (07/29/19 7735) Vital Signs (24h Range):  Temp:  [97.3 °F (36.3 °C)-99 °F (37.2 °C)] 97.7 °F (36.5 °C)  Pulse:  [58-79] 74  Resp:  [18] 18  SpO2:  [97 %-100 %] 97 %  BP: (142-198)/(67-93) 190/83     Weight: 103 kg (227 lb) (07/29/19  1250)  Body mass index is 36.64 kg/m².  Body surface area is 2.19 meters squared.    No intake or output data in the 24 hours ending 07/30/19 1118    Physical Exam   Vitals reviewed.  Constitutional: She is oriented to person, place, and time and well-developed, well-nourished, and in no distress. No distress.   HENT:   Head: Normocephalic and atraumatic.   Mouth/Throat: Oropharyngeal exudate (consistent with oral candidiasis) present.   Eyes: Pupils are equal, round, and reactive to light.   Cardiovascular: Normal rate and regular rhythm.    No murmur heard.  Pulmonary/Chest: Effort normal and breath sounds normal. No respiratory distress. She has no rales.   Abdominal: Soft. Bowel sounds are normal. There is tenderness (tenderness on palpation of epigastric area). There is no rebound and no guarding.   Neurological: She is alert and oriented to person, place, and time.   Skin: Skin is warm and dry. She is not diaphoretic.     Musculoskeletal: She exhibits edema (pitting edema up to 2/3 of tibia). She exhibits no tenderness.         Significant Labs:  All pertinent lab results from the last 24 hours have been reviewed.    Significant Imaging:  Imaging results within the past 24 hours have been reviewed.   within functional limits

## 2024-03-26 ENCOUNTER — LAB VISIT (OUTPATIENT)
Dept: LAB | Facility: HOSPITAL | Age: 59
End: 2024-03-26
Payer: MEDICAID

## 2024-03-26 DIAGNOSIS — Z12.11 COLON CANCER SCREENING: ICD-10-CM

## 2024-03-26 PROCEDURE — 82274 ASSAY TEST FOR BLOOD FECAL: CPT | Performed by: INTERNAL MEDICINE

## 2024-03-27 DIAGNOSIS — E11.9 TYPE 2 DIABETES MELLITUS WITHOUT COMPLICATION: ICD-10-CM

## 2024-04-03 ENCOUNTER — PATIENT MESSAGE (OUTPATIENT)
Dept: INTERNAL MEDICINE | Facility: CLINIC | Age: 59
End: 2024-04-03
Payer: MEDICAID

## 2024-04-03 LAB — HEMOCCULT STL QL IA: NEGATIVE

## 2024-04-11 ENCOUNTER — TELEPHONE (OUTPATIENT)
Dept: INTERNAL MEDICINE | Facility: CLINIC | Age: 59
End: 2024-04-11
Payer: MEDICAID

## 2024-04-11 NOTE — TELEPHONE ENCOUNTER
----- Message from Quynh Weller sent at 4/11/2024 10:22 AM CDT -----  Type: Appointment Request     Name of Caller: MUNA HERNANDEZ [[id number]]    When is the first available appointment? Do not have access    Reason for Visit:  medication refill     Best Call Back Number: [phone]    Additional Information:

## 2024-05-13 ENCOUNTER — TELEPHONE (OUTPATIENT)
Dept: HEPATOLOGY | Facility: CLINIC | Age: 59
End: 2024-05-13
Payer: MEDICAID

## 2024-05-13 NOTE — TELEPHONE ENCOUNTER
Returned pts call. Pt did not answer, could not leave vm for pt to give the office a call back   ----- Message from Jadyn Salvador RN sent at 5/13/2024  2:08 PM CDT -----    ----- Message -----  From: Shelia Suarez  Sent: 5/13/2024   1:29 PM CDT  To: [facility] Hepat Clinical Staff    Type:  Cancellation/Reschedule Request     Name of Caller:MUNA HERNANDEZ [[id number]]  What is the date of the patients appointment?: 5/14  Reason for cancellation?:Need appointment on 5/21 instead  Would the patient rather a call back or a response via [facility]? both  Best Call Back Number:[phone]  Additional Information: Patient states she would like to have HEPAT appointment on 5/21 at the same time, 1:30pm.  Patient has appointment with provider Dillon on 5/21 and would like to do HEPAT visit on same day.  Please give patient a call back as soon as possible to advise and further assist.

## 2024-05-28 DIAGNOSIS — I10 ESSENTIAL HYPERTENSION: ICD-10-CM

## 2024-05-28 DIAGNOSIS — Z79.4 TYPE 2 DIABETES MELLITUS WITH STAGE 2 CHRONIC KIDNEY DISEASE, WITH LONG-TERM CURRENT USE OF INSULIN: ICD-10-CM

## 2024-05-28 DIAGNOSIS — E11.22 TYPE 2 DIABETES MELLITUS WITH STAGE 2 CHRONIC KIDNEY DISEASE, WITH LONG-TERM CURRENT USE OF INSULIN: ICD-10-CM

## 2024-05-28 DIAGNOSIS — N18.2 TYPE 2 DIABETES MELLITUS WITH STAGE 2 CHRONIC KIDNEY DISEASE, WITH LONG-TERM CURRENT USE OF INSULIN: ICD-10-CM

## 2024-05-28 RX ORDER — LISINOPRIL 20 MG/1
20 TABLET ORAL
Qty: 90 TABLET | Refills: 0 | Status: SHIPPED | OUTPATIENT
Start: 2024-05-28

## 2024-05-28 NOTE — TELEPHONE ENCOUNTER
Refill Routing Note   Medication(s) are not appropriate for processing by [facility] for the following reason(s):        Required vitals abnormal    [facility] action(s):  Defer   Requires appointment : Yes     Requires labs : Yes             Appointments  past 12m or future 3m with PCP    Date Provider   Last Visit   5/23/2023 Yuki Kim MD   Next Visit   Visit date not found Yuki Kim MD   ED visits in past 90 days: 0        Note composed:11:15 AM 05/28/2024

## 2024-05-28 NOTE — TELEPHONE ENCOUNTER
Care Due:                  Date            Visit Type   Department     Provider  --------------------------------------------------------------------------------                                EP -                              PRIMARY      [facility]  Last Visit: 05-      CARE ([facility])   MEDICINE       Yuki Kim  Next Visit: None Scheduled  None         None Found                                                            Last  Test          Frequency    Reason                     Performed    Due Date  --------------------------------------------------------------------------------    Office Visit  15 months..  EScitalopram, NIFEdipine,   05-   08-                             carvediloL, lisinopriL,                             traZODone................    CMP.........  12 months..  lisinopriL...............  07- 07-    [facility] Embedded Care Due Messages. Reference number: [id number].   5/28/2024 9:15:24 AM CDT

## 2024-06-10 ENCOUNTER — PATIENT MESSAGE (OUTPATIENT)
Dept: INTERNAL MEDICINE | Facility: CLINIC | Age: 59
End: 2024-06-10
Payer: MEDICAID

## 2024-06-17 ENCOUNTER — TELEPHONE (OUTPATIENT)
Dept: INTERNAL MEDICINE | Facility: CLINIC | Age: 59
End: 2024-06-17
Payer: MEDICAID

## 2024-06-17 NOTE — TELEPHONE ENCOUNTER
----- Message from Linda Aly sent at 6/17/2024  3:47 PM CDT -----  Contact: Kendy  [phone]  Returning a phone call.    Who left a message for the patient:  Talya Mejias MA    Do they know what this is regarding:  yes    Would they like a phone call back or a response via [facility]:  call back

## 2024-06-17 NOTE — TELEPHONE ENCOUNTER
----- Message from Rahul Kahn sent at 6/17/2024  3:18 PM CDT -----  Regarding: Appt  Contact: [phone]  Patient is calling to schedule annual appointment no dates in epic. Please contact pt

## 2024-06-26 ENCOUNTER — PATIENT OUTREACH (OUTPATIENT)
Dept: ADMINISTRATIVE | Facility: HOSPITAL | Age: 59
End: 2024-06-26
Payer: MEDICAID

## 2024-06-26 NOTE — PROGRESS NOTES
Health Maintenance Due   Topic Date Due    Cervical Cancer Screening  Never done    Shingles Vaccine (1 of 2) Never done    COVID-19 Vaccine (2 - Moderna risk series) 09/02/2021    Eye Exam  01/25/2022    Hemoglobin A1c  03/01/2024    Diabetes Urine Screening  03/24/2024    Lipid Panel  03/24/2024    Foot Exam  06/15/2024    Mammogram  07/11/2024      Chart reviewed. Triggered LINKS. Updated Care Everywhere. Lvm to schedule overdue health maintenance screenings.     Lalo Martinez CMA  Population Health Care Coordinator  Primary Care Team

## 2024-07-09 ENCOUNTER — LAB VISIT (OUTPATIENT)
Dept: LAB | Facility: HOSPITAL | Age: 59
End: 2024-07-09
Payer: MEDICAID

## 2024-07-09 ENCOUNTER — OFFICE VISIT (OUTPATIENT)
Dept: INTERNAL MEDICINE | Facility: CLINIC | Age: 59
End: 2024-07-09
Payer: MEDICAID

## 2024-07-09 DIAGNOSIS — D86.0 PULMONARY SARCOIDOSIS: ICD-10-CM

## 2024-07-09 DIAGNOSIS — Z12.31 SCREENING MAMMOGRAM FOR BREAST CANCER: ICD-10-CM

## 2024-07-09 DIAGNOSIS — N18.31 STAGE 3A CHRONIC KIDNEY DISEASE: ICD-10-CM

## 2024-07-09 DIAGNOSIS — N18.2 TYPE 2 DIABETES MELLITUS WITH STAGE 2 CHRONIC KIDNEY DISEASE, WITH LONG-TERM CURRENT USE OF INSULIN: Primary | ICD-10-CM

## 2024-07-09 DIAGNOSIS — I10 ESSENTIAL HYPERTENSION: ICD-10-CM

## 2024-07-09 DIAGNOSIS — D89.1 CRYOGLOBULINEMIC VASCULITIS: ICD-10-CM

## 2024-07-09 DIAGNOSIS — E11.22 TYPE 2 DIABETES MELLITUS WITH STAGE 2 CHRONIC KIDNEY DISEASE, WITH LONG-TERM CURRENT USE OF INSULIN: Primary | ICD-10-CM

## 2024-07-09 DIAGNOSIS — D86.85 CARDIAC SARCOIDOSIS: ICD-10-CM

## 2024-07-09 DIAGNOSIS — Z79.4 TYPE 2 DIABETES MELLITUS WITH STAGE 2 CHRONIC KIDNEY DISEASE, WITH LONG-TERM CURRENT USE OF INSULIN: ICD-10-CM

## 2024-07-09 DIAGNOSIS — D80.1 HYPOGAMMAGLOBULINEMIA: ICD-10-CM

## 2024-07-09 DIAGNOSIS — Z91.89 AT RISK FOR STRESS ULCER: ICD-10-CM

## 2024-07-09 DIAGNOSIS — B18.1 CHRONIC VIRAL HEPATITIS B WITHOUT DELTA AGENT AND WITHOUT COMA: ICD-10-CM

## 2024-07-09 DIAGNOSIS — E78.5 HYPERLIPIDEMIA, UNSPECIFIED HYPERLIPIDEMIA TYPE: ICD-10-CM

## 2024-07-09 DIAGNOSIS — N18.2 TYPE 2 DIABETES MELLITUS WITH STAGE 2 CHRONIC KIDNEY DISEASE, WITH LONG-TERM CURRENT USE OF INSULIN: ICD-10-CM

## 2024-07-09 DIAGNOSIS — E11.22 TYPE 2 DIABETES MELLITUS WITH STAGE 2 CHRONIC KIDNEY DISEASE, WITH LONG-TERM CURRENT USE OF INSULIN: ICD-10-CM

## 2024-07-09 DIAGNOSIS — I50.32 CHRONIC DIASTOLIC HEART FAILURE: ICD-10-CM

## 2024-07-09 DIAGNOSIS — F32.2 CURRENT SEVERE EPISODE OF MAJOR DEPRESSIVE DISORDER WITHOUT PSYCHOTIC FEATURES WITHOUT PRIOR EPISODE: ICD-10-CM

## 2024-07-09 DIAGNOSIS — Z79.4 TYPE 2 DIABETES MELLITUS WITH STAGE 2 CHRONIC KIDNEY DISEASE, WITH LONG-TERM CURRENT USE OF INSULIN: Primary | ICD-10-CM

## 2024-07-09 LAB
AFP SERPL-MCNC: 2.7 NG/ML (ref 0–8.4)
ALBUMIN SERPL BCP-MCNC: 3.6 G/DL (ref 3.5–5.2)
ALBUMIN/CREAT UR: 704.9 UG/MG (ref 0–30)
ALP SERPL-CCNC: 157 U/L (ref 55–135)
ALT SERPL W/O P-5'-P-CCNC: 16 U/L (ref 10–44)
ANION GAP SERPL CALC-SCNC: 14 MMOL/L (ref 8–16)
AST SERPL-CCNC: 20 U/L (ref 10–40)
BASOPHILS # BLD AUTO: 0.04 K/UL (ref 0–0.2)
BASOPHILS NFR BLD: 0.9 % (ref 0–1.9)
BILIRUB SERPL-MCNC: 0.7 MG/DL (ref 0.1–1)
BUN SERPL-MCNC: 17 MG/DL (ref 6–20)
CALCIUM SERPL-MCNC: 10 MG/DL (ref 8.7–10.5)
CHLORIDE SERPL-SCNC: 104 MMOL/L (ref 95–110)
CHOLEST SERPL-MCNC: 232 MG/DL (ref 120–199)
CHOLEST/HDLC SERPL: 4.7 {RATIO} (ref 2–5)
CO2 SERPL-SCNC: 20 MMOL/L (ref 23–29)
CREAT SERPL-MCNC: 1 MG/DL (ref 0.5–1.4)
CREAT UR-MCNC: 82 MG/DL (ref 15–325)
CRP SERPL-MCNC: 4.8 MG/L (ref 0–8.2)
DIFFERENTIAL METHOD BLD: ABNORMAL
EOSINOPHIL # BLD AUTO: 0 K/UL (ref 0–0.5)
EOSINOPHIL NFR BLD: 0.9 % (ref 0–8)
ERYTHROCYTE [DISTWIDTH] IN BLOOD BY AUTOMATED COUNT: 13.2 % (ref 11.5–14.5)
ERYTHROCYTE [SEDIMENTATION RATE] IN BLOOD BY PHOTOMETRIC METHOD: 25 MM/HR (ref 0–36)
EST. GFR  (NO RACE VARIABLE): >60 ML/MIN/1.73 M^2
ESTIMATED AVG GLUCOSE: 260 MG/DL (ref 68–131)
GLUCOSE SERPL-MCNC: 323 MG/DL (ref 70–110)
HBA1C MFR BLD: 10.7 % (ref 4–5.6)
HBV SURFACE AG SERPL QL IA: REACTIVE
HBV SURFACE AG SERPL QL NT: ABNORMAL
HCT VFR BLD AUTO: 53.9 % (ref 37–48.5)
HDLC SERPL-MCNC: 49 MG/DL (ref 40–75)
HDLC SERPL: 21.1 % (ref 20–50)
HGB BLD-MCNC: 18.2 G/DL (ref 12–16)
IMM GRANULOCYTES # BLD AUTO: 0 K/UL (ref 0–0.04)
IMM GRANULOCYTES NFR BLD AUTO: 0 % (ref 0–0.5)
LDLC SERPL CALC-MCNC: 108.8 MG/DL (ref 63–159)
LYMPHOCYTES # BLD AUTO: 2.3 K/UL (ref 1–4.8)
LYMPHOCYTES NFR BLD: 50.3 % (ref 18–48)
MCH RBC QN AUTO: 31.3 PG (ref 27–31)
MCHC RBC AUTO-ENTMCNC: 33.8 G/DL (ref 32–36)
MCV RBC AUTO: 93 FL (ref 82–98)
MICROALBUMIN UR DL<=1MG/L-MCNC: 578 UG/ML
MONOCYTES # BLD AUTO: 0.3 K/UL (ref 0.3–1)
MONOCYTES NFR BLD: 7 % (ref 4–15)
NEUTROPHILS # BLD AUTO: 1.9 K/UL (ref 1.8–7.7)
NEUTROPHILS NFR BLD: 40.9 % (ref 38–73)
NONHDLC SERPL-MCNC: 183 MG/DL
NRBC BLD-RTO: 0 /100 WBC
PLATELET # BLD AUTO: 193 K/UL (ref 150–450)
PMV BLD AUTO: 11.9 FL (ref 9.2–12.9)
POTASSIUM SERPL-SCNC: 3.8 MMOL/L (ref 3.5–5.1)
PROT SERPL-MCNC: 7.3 G/DL (ref 6–8.4)
RBC # BLD AUTO: 5.81 M/UL (ref 4–5.4)
SODIUM SERPL-SCNC: 138 MMOL/L (ref 136–145)
TRIGL SERPL-MCNC: 371 MG/DL (ref 30–150)
TSH SERPL DL<=0.005 MIU/L-ACNC: 0.61 UIU/ML (ref 0.4–4)
WBC # BLD AUTO: 4.59 K/UL (ref 3.9–12.7)

## 2024-07-09 PROCEDURE — 87341 HEP B SURFACE AG NEUTRLZJ IA: CPT | Performed by: INTERNAL MEDICINE

## 2024-07-09 PROCEDURE — 82105 ALPHA-FETOPROTEIN SERUM: CPT | Performed by: INTERNAL MEDICINE

## 2024-07-09 PROCEDURE — 3075F SYST BP GE 130 - 139MM HG: CPT | Mod: CPTII,,, | Performed by: INTERNAL MEDICINE

## 2024-07-09 PROCEDURE — 36415 COLL VENOUS BLD VENIPUNCTURE: CPT | Performed by: INTERNAL MEDICINE

## 2024-07-09 PROCEDURE — 87340 HEPATITIS B SURFACE AG IA: CPT | Performed by: INTERNAL MEDICINE

## 2024-07-09 PROCEDURE — 84443 ASSAY THYROID STIM HORMONE: CPT | Performed by: INTERNAL MEDICINE

## 2024-07-09 PROCEDURE — 85025 COMPLETE CBC W/AUTO DIFF WBC: CPT | Performed by: INTERNAL MEDICINE

## 2024-07-09 PROCEDURE — 3079F DIAST BP 80-89 MM HG: CPT | Mod: CPTII,,, | Performed by: INTERNAL MEDICINE

## 2024-07-09 PROCEDURE — 85652 RBC SED RATE AUTOMATED: CPT | Performed by: INTERNAL MEDICINE

## 2024-07-09 PROCEDURE — 3046F HEMOGLOBIN A1C LEVEL >9.0%: CPT | Mod: CPTII,,, | Performed by: INTERNAL MEDICINE

## 2024-07-09 PROCEDURE — 99214 OFFICE O/P EST MOD 30 MIN: CPT | Mod: PBBFAC | Performed by: INTERNAL MEDICINE

## 2024-07-09 PROCEDURE — 82570 ASSAY OF URINE CREATININE: CPT | Performed by: INTERNAL MEDICINE

## 2024-07-09 PROCEDURE — 87517 HEPATITIS B DNA QUANT: CPT | Performed by: INTERNAL MEDICINE

## 2024-07-09 PROCEDURE — 3008F BODY MASS INDEX DOCD: CPT | Mod: CPTII,,, | Performed by: INTERNAL MEDICINE

## 2024-07-09 PROCEDURE — 82652 VIT D 1 25-DIHYDROXY: CPT | Performed by: INTERNAL MEDICINE

## 2024-07-09 PROCEDURE — 80061 LIPID PANEL: CPT | Performed by: INTERNAL MEDICINE

## 2024-07-09 PROCEDURE — 82043 UR ALBUMIN QUANTITATIVE: CPT | Performed by: INTERNAL MEDICINE

## 2024-07-09 PROCEDURE — 83036 HEMOGLOBIN GLYCOSYLATED A1C: CPT | Performed by: INTERNAL MEDICINE

## 2024-07-09 PROCEDURE — 80053 COMPREHEN METABOLIC PANEL: CPT | Performed by: INTERNAL MEDICINE

## 2024-07-09 PROCEDURE — 86140 C-REACTIVE PROTEIN: CPT | Performed by: INTERNAL MEDICINE

## 2024-07-09 PROCEDURE — 99999 PR PBB SHADOW E&M-EST. PATIENT-LVL IV: CPT | Mod: PBBFAC,,, | Performed by: INTERNAL MEDICINE

## 2024-07-09 PROCEDURE — 3066F NEPHROPATHY DOC TX: CPT | Mod: CPTII,,, | Performed by: INTERNAL MEDICINE

## 2024-07-09 PROCEDURE — 99214 OFFICE O/P EST MOD 30 MIN: CPT | Mod: S$PBB,,, | Performed by: INTERNAL MEDICINE

## 2024-07-09 PROCEDURE — 83520 IMMUNOASSAY QUANT NOS NONAB: CPT | Performed by: INTERNAL MEDICINE

## 2024-07-09 PROCEDURE — 3062F POS MACROALBUMINURIA REV: CPT | Mod: CPTII,,, | Performed by: INTERNAL MEDICINE

## 2024-07-09 PROCEDURE — 4010F ACE/ARB THERAPY RXD/TAKEN: CPT | Mod: CPTII,,, | Performed by: INTERNAL MEDICINE

## 2024-07-09 RX ORDER — PREDNISONE 5 MG/1
5 TABLET ORAL DAILY
Qty: 30 TABLET | Refills: 11 | Status: SHIPPED | OUTPATIENT
Start: 2024-07-09 | End: 2025-07-04

## 2024-07-09 RX ORDER — ATORVASTATIN CALCIUM 40 MG/1
40 TABLET, FILM COATED ORAL DAILY
Qty: 90 TABLET | Refills: 3 | Status: SHIPPED | OUTPATIENT
Start: 2024-07-09 | End: 2025-07-09

## 2024-07-09 RX ORDER — CARVEDILOL 25 MG/1
25 TABLET ORAL 2 TIMES DAILY
Qty: 180 TABLET | Refills: 1 | Status: SHIPPED | OUTPATIENT
Start: 2024-07-09

## 2024-07-09 RX ORDER — INSULIN GLARGINE 100 [IU]/ML
64 INJECTION, SOLUTION SUBCUTANEOUS EVERY MORNING
Qty: 60 ML | Refills: 3 | Status: SHIPPED | OUTPATIENT
Start: 2024-07-09

## 2024-07-09 RX ORDER — PANTOPRAZOLE SODIUM 40 MG/1
40 TABLET, DELAYED RELEASE ORAL
Qty: 30 TABLET | Refills: 3 | Status: SHIPPED | OUTPATIENT
Start: 2024-07-09 | End: 2025-07-09

## 2024-07-09 RX ORDER — INSULIN LISPRO 100 [IU]/ML
INJECTION, SOLUTION INTRAVENOUS; SUBCUTANEOUS
Qty: 30 ML | Refills: 6 | Status: SHIPPED | OUTPATIENT
Start: 2024-07-09

## 2024-07-09 RX ORDER — LISINOPRIL 20 MG/1
20 TABLET ORAL DAILY
Qty: 90 TABLET | Refills: 3 | Status: SHIPPED | OUTPATIENT
Start: 2024-07-09

## 2024-07-09 RX ORDER — TRAZODONE HYDROCHLORIDE 100 MG/1
100 TABLET ORAL NIGHTLY PRN
Qty: 30 TABLET | Refills: 11 | Status: SHIPPED | OUTPATIENT
Start: 2024-07-09

## 2024-07-09 NOTE — PROGRESS NOTES
Subjective:       Patient ID: Kendy Vital is a 58 y.o. female.    Chief Complaint: Annual Exam    HPI    8-year-old female with very complicated medical history who presents for annual exam.  I have not seen her in over a year and she has not followed up since he was specialist in over a year as well.  She has had multiple ER visits this last year her hyperglycemia.    She states she is feeling overall unwell lately and has been feeling increasingly fatigued.  Denies any change in her shortness of breath.    HTN: well controlled on lisinopril, carvedilol, and nifedipine      HFpEF 1/2022 TTE EF 65%, nl diastolic function. On lasix 40 mg daily.      IDDM :  on lantus 65 unites and humulin 12-18 units TID with meals.  Most recent A1c in December 2023 was a. BG remains elevated at home.      MGUS IgM kappa/ B celllymphoproliferative  disorder stable and being monitored by hematology.      Chronic Hep B: dx in 2019 on entecavir      Cardiac and pulmonary sarcoidosis newly diagnosed  On high dose steroids by Rheumatology has ct chest today. 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   HENT:  Negative for ear pain, sinus pressure/congestion and sneezing.    Respiratory:  Negative for cough and shortness of breath.    Cardiovascular:  Negative for chest pain, palpitations and leg swelling.   Gastrointestinal:  Negative for abdominal distention, abdominal pain, constipation, diarrhea, nausea and vomiting.   Genitourinary:  Negative for dysuria and hematuria.   Musculoskeletal:  Negative for arthralgias, back pain and myalgias.   Neurological:   Negative for dizziness and headaches.   Psychiatric/Behavioral:  Negative for agitation. The patient is not nervous/anxious.            Past Medical History:   Diagnosis Date    Acute (diffuse) proliferative glomerulonephritis     Acute renal failure 7/19/2019    B-cell lymphoproliferative disorder 7/30/2019    CHF (congestive heart failure)     Chronic obstructive lung disease 7/27/2019    Chronic viral hepatitis B without delta agent and without coma 7/24/2019    Cryoglobulinemic vasculitis     Diabetes mellitus     Hypogammaglobulinemia 8/5/2019    Iron deficiency anemia 7/30/2019    Renal vein thrombosis 2015    s/p embolectomy and lovenox for 9 mos    Steroid-induced hyperglycemia 7/28/2019    Uterine leiomyoma     s/p resection     Past Surgical History:   Procedure Laterality Date    AV FISTULA PLACEMENT      CATARACT EXTRACTION W/  INTRAOCULAR LENS IMPLANT Left 4/22/2021    Procedure: EXTRACTION, CATARACT, WITH IOL INSERTION;  Surgeon: Allen Alejandro MD;  Location: [facility] OR;  Service: Ophthalmology;  Laterality: Left;    CATARACT EXTRACTION W/  INTRAOCULAR LENS IMPLANT Right 5/6/2021    Procedure: EXTRACTION, CATARACT, WITH IOL INSERTION;  Surgeon: Allen Alejandro MD;  Location: [facility] OR;  Service: Ophthalmology;  Laterality: Right;    LUNG BIOPSY N/A 12/1/2020    Procedure: BIOPSY, LUNG;  Surgeon: [employer] Diagnostic Provider;  Location: [facility] OR;  Service: Radiology;  Laterality: N/A;  8 A.M. START  PHONE PREOP DONE 11/27/2020  PT/INR, CBC, CMP AND COVID ON AM OF PROCEDURE   ARRIVAL AT 7:00 FOR 8:30 APPT      Patient Active Problem List   Diagnosis    Essential hypertension    Cryoglobulinemic vasculitis    Hepatitis B    Immunosuppression    History of renal vein thrombosis    Chronic obstructive lung disease    B-cell lymphoproliferative disorder    Elevated uric acid in blood    Hypogammaglobulinemia    Obesity (BMI 30-39.9)    Pulmonary infiltrate    Chronic diastolic heart failure    Type 2 diabetes mellitus  with stage 2 chronic kidney disease, with long-term current use of insulin    Multiple pulmonary nodules    Hypercalcemia    Nuclear sclerosis, bilateral    Nuclear sclerotic cataract of right eye    MPGN (membranoproliferative glomerulonephritides)    Pain of anterior lower extremity    Stage 3a chronic kidney disease    Pulmonary sarcoidosis        Objective:      Physical Exam  Constitutional:       Appearance: Normal appearance.   HENT:      Head: Normocephalic.      Right Ear: Tympanic membrane normal.   Cardiovascular:      Rate and Rhythm: Normal rate and regular rhythm.      Pulses: Normal pulses.      Heart sounds: Normal heart sounds.   Pulmonary:      Effort: Pulmonary effort is normal.      Breath sounds: Normal breath sounds.   Abdominal:      General: Abdomen is flat. Bowel sounds are normal.      Palpations: Abdomen is soft.   Musculoskeletal:         General: Normal range of motion.      Cervical back: Normal range of motion and neck supple.   Skin:     General: Skin is warm and dry.   Neurological:      General: No focal deficit present.      Mental Status: She is alert and oriented to person, place, and time.   Psychiatric:         Mood and Affect: Mood normal.         Assessment:       Problem List Items Addressed This Visit          Pulmonary    Pulmonary sarcoidosis    Relevant Orders    Ambulatory referral/consult to Cardiology    Ambulatory referral/consult to Pulmonology    Ambulatory referral/consult to Rheumatology    Calcitriol (Completed)    INTERLEUKIN-2 RECEPTOR (Completed)       Cardiac/Vascular    Essential hypertension    Relevant Medications    carvediloL (COREG) 25 MG tablet    lisinopriL (PRINIVIL,ZESTRIL) 20 MG tablet    Other Relevant Orders    TSH (Completed)    Chronic diastolic heart failure    Relevant Orders    Ambulatory referral/consult to Cardiology       Renal/    Stage 3a chronic kidney disease    Relevant Orders    Ambulatory referral/consult to Nephrology    [facility]  Auto Differential (Completed)       Immunology/Multi System    Hypogammaglobulinemia    Cryoglobulinemic vasculitis    Relevant Orders    Ambulatory referral/consult to Rheumatology    C-REACTIVE PROTEIN (Completed)    Sedimentation rate (Completed)       Endocrine    Type 2 diabetes mellitus with stage 2 chronic kidney disease, with long-term current use of insulin - Primary    Relevant Medications    lisinopriL (PRINIVIL,ZESTRIL) 20 MG tablet    insulin lispro (HUMALOG KWIKPEN INSULIN) 100 unit/mL pen    insulin glargine U-100, Lantus, (LANTUS SOLOSTAR U-100 INSULIN) 100 unit/mL (3 mL) InPn pen    Other Relevant Orders    Ambulatory Referral/Consult to Primary Care Diabetic Management    Lipid Panel (Completed)    Hemoglobin A1C (Completed)    Microalbumin/creatinine urine ratio (Completed)       GI    Hepatitis B    Relevant Orders    Ambulatory referral/consult to Hepatology    Comprehensive Metabolic Panel (Completed)    AFP TUMOR MARKER (Completed)    HEPATITIS B SURFACE ANTIGEN (Completed)    HEPATITIS B VIRAL DNA, QUANTITATIVE (Completed)     Other Visit Diagnoses       Cardiac sarcoidosis        Relevant Medications    pantoprazole (PROTONIX) 40 MG tablet    predniSONE (DELTASONE) 5 MG tablet    At risk for stress ulcer        Relevant Medications    pantoprazole (PROTONIX) 40 MG tablet    Current severe episode of major depressive disorder without psychotic features without prior episode        Relevant Medications    traZODone (DESYREL) 100 MG tablet    Hyperlipidemia, unspecified hyperlipidemia type        Relevant Medications    atorvastatin (LIPITOR) 40 MG tablet    Screening mammogram for breast cancer        Relevant Orders    Mammo Digital Screening Bilat            Plan:         Kendy was seen today for annual exam.    Diagnoses and all orders for this visit:    Type 2 diabetes mellitus with stage 2 chronic kidney disease, with long-term current use of insulin  -     Ambulatory Referral/Consult to  Primary Care Diabetic Management; Future  -     Lipid Panel; Future  -     Hemoglobin A1C; Future  -     Microalbumin/creatinine urine ratio  -     lisinopriL (PRINIVIL,ZESTRIL) 20 MG tablet; Take 1 tablet (20 mg total) by mouth once daily.  -     insulin glargine U-100, Lantus, (LANTUS SOLOSTAR U-100 INSULIN) 100 unit/mL (3 mL) InPn pen; Inject 64 Units into the skin every morning.  Check A1c today, refill insulin, we will get her reestablished with Angeline Serrano     Stage 3a chronic kidney disease  -     Ambulatory referral/consult to Nephrology; Future  -     CBC Auto Differential; Future  Check labs and we will get her reestablished with Nephrology    Essential hypertension  -     carvediloL (COREG) 25 MG tablet; Take 1 tablet (25 mg total) by mouth 2 (two) times daily.  -     lisinopriL (PRINIVIL,ZESTRIL) 20 MG tablet; Take 1 tablet (20 mg total) by mouth once daily.  Refill medications well-controlled on current regimen    Chronic diastolic heart failure  -     Ambulatory referral/consult to Cardiology; Future  Needs to follow up with cardiologist.      Pulmonary sarcoidosis  -     Ambulatory referral/consult to Cardiology; Future  -     Ambulatory referral/consult to Pulmonology; Future  -     Ambulatory referral/consult to Rheumatology; Future  -     Calcitriol; Future  -     INTERLEUKIN-2 RECEPTOR; Future    Hypogammaglobulinemia  Cryoglobulinemic vasculitis  -     Ambulatory referral/consult to Rheumatology; Future  -     C-REACTIVE PROTEIN; Future  -     Sedimentation rate; Future    Chronic viral hepatitis B without delta agent and without coma  -     Ambulatory referral/consult to Hepatology; Future  -     Comprehensive Metabolic Panel; Future  -     AFP TUMOR MARKER; Future  -     HEPATITIS B SURFACE ANTIGEN; Future  -     HEPATITIS B VIRAL DNA, QUANTITATIVE; Future    Cardiac sarcoidosis  -     pantoprazole (PROTONIX) 40 MG tablet; Take 1 tablet (40 mg total) by mouth before breakfast.  -      predniSONE (DELTASONE) 5 MG tablet; Take 1 tablet (5 mg total) by mouth once daily.    At risk for stress ulcer  -     pantoprazole (PROTONIX) 40 MG tablet; Take 1 tablet (40 mg total) by mouth before breakfast.    Current severe episode of major depressive disorder without psychotic features without prior episode  -     traZODone (DESYREL) 100 MG tablet; Take 1 tablet (100 mg total) by mouth nightly as needed for Insomnia.    Hyperlipidemia, unspecified hyperlipidemia type  -     atorvastatin (LIPITOR) 40 MG tablet; Take 1 tablet (40 mg total) by mouth once daily.    Screening mammogram for breast cancer  -     Mammo Digital Screening Bilat; Future    Other orders  -     insulin lispro (HUMALOG KWIKPEN INSULIN) 100 unit/mL pen; Inject 20 units before meals plus scale 150-200+2, 201-250+4, 251-300+6, 301-350+8, >350+10 max daily 90 units       Labs ordered today.  All medications refilled today.  We will coordinate scheduling with all her specialists as it has been over a year since she has been seen by anyone.          Yuki Kim MD   Internal Medicine   Primary Care

## 2024-07-10 LAB
HEPATITIS B VIRUS DNA: NORMAL
HEPATITIS B VIRUS PCR, QUANT: NOT DETECTED IU/ML

## 2024-07-11 DIAGNOSIS — N18.31 STAGE 3A CHRONIC KIDNEY DISEASE: Primary | ICD-10-CM

## 2024-07-12 LAB
1,25(OH)2D3 SERPL-MCNC: 75 PG/ML (ref 20–79)
SOL IL2 RECEP SERPL-MCNC: 324.4 PG/ML (ref 175.3–858.2)

## 2024-07-24 ENCOUNTER — TELEPHONE (OUTPATIENT)
Dept: INTERNAL MEDICINE | Facility: CLINIC | Age: 59
End: 2024-07-24
Payer: MEDICAID

## 2024-07-24 NOTE — TELEPHONE ENCOUNTER
Good morning patient would like a call back to discuss getting back on her BP that the doc took her off. Patient says her BP has been 200/115 on Monday and 200/125 today. Patient also says that she went to the ER on Monday and they gave her 1 BP pill. Please call patient to advise

## 2024-07-24 NOTE — TELEPHONE ENCOUNTER
----- Message from Guerrero Masterson sent at 7/24/2024  8:03 AM CDT -----  Contact: [phone]@[email or web address]  Good morning patient would like a call back to discuss getting back on her BP that the doc took her off. Patient says her BP has been 200/115 on Monday and 200/125 today. Patient also says that she went to the ER on Monday and they gave her 1 BP pill. Please call patient to advise  [phone]

## 2024-07-24 NOTE — TELEPHONE ENCOUNTER
Call pt stated for pt to come in to see [facility] pt stated that she do wont to come in I also stated to pt if she don't come in and her Bp get higher she feel weak  to please go to the ER .

## 2024-08-07 ENCOUNTER — PATIENT OUTREACH (OUTPATIENT)
Dept: ADMINISTRATIVE | Facility: HOSPITAL | Age: 59
End: 2024-08-07
Payer: MEDICAID

## 2024-08-11 VITALS
HEIGHT: 67 IN | DIASTOLIC BLOOD PRESSURE: 87 MMHG | SYSTOLIC BLOOD PRESSURE: 138 MMHG | BODY MASS INDEX: 33.46 KG/M2 | WEIGHT: 213.19 LBS | HEART RATE: 97 BPM | OXYGEN SATURATION: 99 %

## 2024-08-20 ENCOUNTER — TELEPHONE (OUTPATIENT)
Dept: INTERNAL MEDICINE | Facility: CLINIC | Age: 59
End: 2024-08-20

## 2024-08-20 ENCOUNTER — OFFICE VISIT (OUTPATIENT)
Dept: TRANSPLANT | Facility: CLINIC | Age: 59
End: 2024-08-20
Payer: MEDICAID

## 2024-08-20 ENCOUNTER — OFFICE VISIT (OUTPATIENT)
Dept: INTERNAL MEDICINE | Facility: CLINIC | Age: 59
End: 2024-08-20
Payer: MEDICAID

## 2024-08-20 ENCOUNTER — HOSPITAL ENCOUNTER (OUTPATIENT)
Dept: RADIOLOGY | Facility: HOSPITAL | Age: 59
Discharge: HOME | End: 2024-08-20
Attending: INTERNAL MEDICINE
Payer: MEDICAID

## 2024-08-20 VITALS
HEIGHT: 67 IN | OXYGEN SATURATION: 99 % | SYSTOLIC BLOOD PRESSURE: 132 MMHG | WEIGHT: 209 LBS | BODY MASS INDEX: 32.8 KG/M2 | HEART RATE: 88 BPM | DIASTOLIC BLOOD PRESSURE: 84 MMHG

## 2024-08-20 VITALS
HEART RATE: 62 BPM | SYSTOLIC BLOOD PRESSURE: 171 MMHG | BODY MASS INDEX: 33.73 KG/M2 | HEIGHT: 66 IN | WEIGHT: 209.88 LBS | DIASTOLIC BLOOD PRESSURE: 84 MMHG | OXYGEN SATURATION: 98 %

## 2024-08-20 DIAGNOSIS — I50.32 CHRONIC DIASTOLIC HEART FAILURE: ICD-10-CM

## 2024-08-20 DIAGNOSIS — N18.2 TYPE 2 DIABETES MELLITUS WITH STAGE 2 CHRONIC KIDNEY DISEASE, WITH LONG-TERM CURRENT USE OF INSULIN: ICD-10-CM

## 2024-08-20 DIAGNOSIS — D86.0 PULMONARY SARCOIDOSIS: ICD-10-CM

## 2024-08-20 DIAGNOSIS — N18.31 STAGE 3A CHRONIC KIDNEY DISEASE: ICD-10-CM

## 2024-08-20 DIAGNOSIS — N18.2 TYPE 2 DIABETES MELLITUS WITH STAGE 2 CHRONIC KIDNEY DISEASE, WITH LONG-TERM CURRENT USE OF INSULIN: Primary | ICD-10-CM

## 2024-08-20 DIAGNOSIS — E66.9 OBESITY (BMI 30-39.9): ICD-10-CM

## 2024-08-20 DIAGNOSIS — D47.9 B-CELL LYMPHOPROLIFERATIVE DISORDER: ICD-10-CM

## 2024-08-20 DIAGNOSIS — D86.9 SARCOIDOSIS: Primary | ICD-10-CM

## 2024-08-20 DIAGNOSIS — E11.22 TYPE 2 DIABETES MELLITUS WITH STAGE 2 CHRONIC KIDNEY DISEASE, WITH LONG-TERM CURRENT USE OF INSULIN: Primary | ICD-10-CM

## 2024-08-20 DIAGNOSIS — E11.22 TYPE 2 DIABETES MELLITUS WITH STAGE 2 CHRONIC KIDNEY DISEASE, WITH LONG-TERM CURRENT USE OF INSULIN: ICD-10-CM

## 2024-08-20 DIAGNOSIS — I10 ESSENTIAL HYPERTENSION: ICD-10-CM

## 2024-08-20 DIAGNOSIS — Z79.4 TYPE 2 DIABETES MELLITUS WITH STAGE 2 CHRONIC KIDNEY DISEASE, WITH LONG-TERM CURRENT USE OF INSULIN: Primary | ICD-10-CM

## 2024-08-20 DIAGNOSIS — Z79.4 TYPE 2 DIABETES MELLITUS WITH STAGE 2 CHRONIC KIDNEY DISEASE, WITH LONG-TERM CURRENT USE OF INSULIN: ICD-10-CM

## 2024-08-20 DIAGNOSIS — H25.13 NUCLEAR SCLEROSIS, BILATERAL: ICD-10-CM

## 2024-08-20 DIAGNOSIS — N05.5 MPGN (MEMBRANOPROLIFERATIVE GLOMERULONEPHRITIDES): ICD-10-CM

## 2024-08-20 DIAGNOSIS — Z12.31 SCREENING MAMMOGRAM FOR BREAST CANCER: ICD-10-CM

## 2024-08-20 PROCEDURE — 99215 OFFICE O/P EST HI 40 MIN: CPT | Mod: PBBFAC | Performed by: NURSE PRACTITIONER

## 2024-08-20 PROCEDURE — 77067 SCR MAMMO BI INCL CAD: CPT | Mod: TC

## 2024-08-20 PROCEDURE — 3079F DIAST BP 80-89 MM HG: CPT | Mod: CPTII,,, | Performed by: INTERNAL MEDICINE

## 2024-08-20 PROCEDURE — 1160F RVW MEDS BY RX/DR IN RCRD: CPT | Mod: CPTII,,, | Performed by: NURSE PRACTITIONER

## 2024-08-20 PROCEDURE — 3008F BODY MASS INDEX DOCD: CPT | Mod: CPTII,,, | Performed by: NURSE PRACTITIONER

## 2024-08-20 PROCEDURE — 3046F HEMOGLOBIN A1C LEVEL >9.0%: CPT | Mod: CPTII,,, | Performed by: NURSE PRACTITIONER

## 2024-08-20 PROCEDURE — 3062F POS MACROALBUMINURIA REV: CPT | Mod: CPTII,,, | Performed by: INTERNAL MEDICINE

## 2024-08-20 PROCEDURE — 3046F HEMOGLOBIN A1C LEVEL >9.0%: CPT | Mod: CPTII,,, | Performed by: INTERNAL MEDICINE

## 2024-08-20 PROCEDURE — 3062F POS MACROALBUMINURIA REV: CPT | Mod: CPTII,,, | Performed by: NURSE PRACTITIONER

## 2024-08-20 PROCEDURE — 4010F ACE/ARB THERAPY RXD/TAKEN: CPT | Mod: CPTII,,, | Performed by: INTERNAL MEDICINE

## 2024-08-20 PROCEDURE — 4010F ACE/ARB THERAPY RXD/TAKEN: CPT | Mod: CPTII,,, | Performed by: NURSE PRACTITIONER

## 2024-08-20 PROCEDURE — 1159F MED LIST DOCD IN RCRD: CPT | Mod: CPTII,,, | Performed by: NURSE PRACTITIONER

## 2024-08-20 PROCEDURE — 99215 OFFICE O/P EST HI 40 MIN: CPT | Mod: PBBFAC,27 | Performed by: INTERNAL MEDICINE

## 2024-08-20 PROCEDURE — 3079F DIAST BP 80-89 MM HG: CPT | Mod: CPTII,,, | Performed by: NURSE PRACTITIONER

## 2024-08-20 PROCEDURE — 99214 OFFICE O/P EST MOD 30 MIN: CPT | Mod: S$PBB,,, | Performed by: NURSE PRACTITIONER

## 2024-08-20 PROCEDURE — 99999 PR PBB SHADOW E&M-EST. PATIENT-LVL V: CPT | Mod: PBBFAC,,, | Performed by: INTERNAL MEDICINE

## 2024-08-20 PROCEDURE — 1159F MED LIST DOCD IN RCRD: CPT | Mod: CPTII,,, | Performed by: INTERNAL MEDICINE

## 2024-08-20 PROCEDURE — 99215 OFFICE O/P EST HI 40 MIN: CPT | Mod: S$PBB,,, | Performed by: INTERNAL MEDICINE

## 2024-08-20 PROCEDURE — 99999 PR PBB SHADOW E&M-EST. PATIENT-LVL V: CPT | Mod: PBBFAC,,, | Performed by: NURSE PRACTITIONER

## 2024-08-20 PROCEDURE — 3008F BODY MASS INDEX DOCD: CPT | Mod: CPTII,,, | Performed by: INTERNAL MEDICINE

## 2024-08-20 PROCEDURE — 3066F NEPHROPATHY DOC TX: CPT | Mod: CPTII,,, | Performed by: NURSE PRACTITIONER

## 2024-08-20 PROCEDURE — 3066F NEPHROPATHY DOC TX: CPT | Mod: CPTII,,, | Performed by: INTERNAL MEDICINE

## 2024-08-20 PROCEDURE — 3075F SYST BP GE 130 - 139MM HG: CPT | Mod: CPTII,,, | Performed by: NURSE PRACTITIONER

## 2024-08-20 PROCEDURE — 3077F SYST BP >= 140 MM HG: CPT | Mod: CPTII,,, | Performed by: INTERNAL MEDICINE

## 2024-08-20 RX ORDER — TIRZEPATIDE 2.5 MG/.5ML
2.5 INJECTION, SOLUTION SUBCUTANEOUS
Qty: 4 PEN | Refills: 6 | Status: SHIPPED | OUTPATIENT
Start: 2024-08-20

## 2024-08-20 RX ORDER — BLOOD-GLUCOSE,RECEIVER,CONT
EACH MISCELLANEOUS
Qty: 1 EACH | Refills: 1 | Status: SHIPPED | OUTPATIENT
Start: 2024-08-20

## 2024-08-20 RX ORDER — INSULIN GLARGINE 100 [IU]/ML
60 INJECTION, SOLUTION SUBCUTANEOUS EVERY MORNING
Start: 2024-08-20

## 2024-08-20 RX ORDER — INSULIN PUMP SYRINGE, 3 ML
EACH MISCELLANEOUS
Qty: 1 EACH | Refills: 0 | Status: SHIPPED | OUTPATIENT
Start: 2024-08-20 | End: 2025-08-20

## 2024-08-20 RX ORDER — BLOOD-GLUCOSE SENSOR
EACH MISCELLANEOUS
Qty: 3 EACH | Refills: 11 | Status: SHIPPED | OUTPATIENT
Start: 2024-08-20

## 2024-08-20 RX ORDER — LANCETS
EACH MISCELLANEOUS
Qty: 100 EACH | Refills: 11 | Status: SHIPPED | OUTPATIENT
Start: 2024-08-20

## 2024-08-20 RX ORDER — INSULIN PUMP CONTROLLER
1 EACH MISCELLANEOUS CONTINUOUS
Qty: 1 EACH | Refills: 1 | Status: SHIPPED | OUTPATIENT
Start: 2024-08-20

## 2024-08-20 RX ORDER — INSULIN PUMP CONTROLLER
1 EACH MISCELLANEOUS
Qty: 45 EACH | Refills: 3 | Status: SHIPPED | OUTPATIENT
Start: 2024-08-20

## 2024-08-20 RX ORDER — INSULIN LISPRO 100 [IU]/ML
INJECTION, SOLUTION INTRAVENOUS; SUBCUTANEOUS
Qty: 30 ML | Refills: 8 | Status: SHIPPED | OUTPATIENT
Start: 2024-08-20

## 2024-08-20 NOTE — Clinical Note
Will like to start omnipod dash, humalog vials, mounjaro 2.5 mg weekly Pa for omnipod dash kit, pods, mounjaro, humalog vials  This should help

## 2024-08-20 NOTE — PROGRESS NOTES
Subjective:       HPI:  Ms. 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 She was referred for evaluation for possible cardiac sarcoid. This is her 4th visit with me. I had ordered a cardiac PET FDG that confirmed cardiac sarcoidosis (she also had a previous cardiac MRI that was also suggestive of sarcoid) and had started her on prednisone following our protocol. her follow-up cardiac PET FDG was negative for cardiac sarcoid. From a cardiac standpoint, she continues to do really well. Reports NYHA class II symptoms. Occasional PND and orthopnea. Denies any palpitations, chest pain or  syncopal episodes. Current cardiac regimen includes; carvedilol 25 mg twice daily, lisinopril 20 mg daily, nifedipine 60 mg daily. Current regimen for sarcoidosis includes; methotrexate 20mg weekly plus folic acid and hydroxycholoroquine 200 mg twice daily and prednisone 10 mg daily (though her med lists states 5 mg daily). Main issue lately ha sbeen her uncontrolled type 2 DM.      Cardiac PET FDG done on 6/6/2023  There is no FDG uptake in the myocardium. This study is negative for active cardiac sarcoidosis.    There is a resting perfusion abnormality present in the mid to apical anteroseptal and septal walls. As previously mentioned, this scar could be due to prior cardiac sarcoid vs coronary artery disease (LAD distribution).    LVEF was 68%     Cardiac PET FDG done on 10/13/2022  There is myocardial FDG uptake in the basal lateral and basal anterior wall  consistent with active cardiac sarcoidosis.    There is a resting perfusion abnormality in the distal to apical anteroseptal wall.  This scar could be from cardiac sarcoid but it is within the LAD distribution which raises the possibility of coronary artery disease.  Consider PET stress testing if clinically indicated.  At that time of this report, prior stress testing and/or coronary angiogram are unknown.    LVEF was 58%     Cardiac MRI performed on 6/28/2022  LV volumes are normal. LV mass is normal. LVEF = 57 %.   RV volumes are normal. RVEF = 49 %.  Left atrial enlargement  A very small focal area of LGE measuring 1.8mm X 5mm is appreciated in mid myocardium of the basal anterior wall. This may represent sarcoid involvement of the myocardium.     2D Echo with CFD done on 12/13/2021  The estimated ejection fraction is 65%.  The left ventricle is normal in size with normal systolic function.  Normal left ventricular diastolic function.  Normal right ventricular size with normal right ventricular systolic function.  Normal central venous pressure (3 mmHg).       Past Medical History:   Diagnosis Date    Acute (diffuse) proliferative glomerulonephritis     Acute renal failure 7/19/2019    B-cell lymphoproliferative disorder 7/30/2019    CHF (congestive heart failure)     Chronic obstructive lung disease 7/27/2019    Chronic viral hepatitis B without delta agent and without coma 7/24/2019    Cryoglobulinemic vasculitis     Diabetes mellitus     Hypogammaglobulinemia 8/5/2019    Iron deficiency anemia 7/30/2019    Renal vein thrombosis 2015    s/p embolectomy and lovenox for 9 mos    Steroid-induced hyperglycemia 7/28/2019    Uterine leiomyoma     s/p resection     Past Surgical History:   Procedure Laterality Date    AV FISTULA PLACEMENT      CATARACT EXTRACTION W/  INTRAOCULAR LENS IMPLANT Left 4/22/2021    Procedure: EXTRACTION, CATARACT, WITH IOL INSERTION;  Surgeon: Allen Alejandro MD;  Location: [facility] OR;   "Service: Ophthalmology;  Laterality: Left;    CATARACT EXTRACTION W/  INTRAOCULAR LENS IMPLANT Right 2021    Procedure: EXTRACTION, CATARACT, WITH IOL INSERTION;  Surgeon: Allen Alejandro MD;  Location: [facility] OR;  Service: Ophthalmology;  Laterality: Right;    LUNG BIOPSY N/A 2020    Procedure: BIOPSY, LUNG;  Surgeon: Kelli Diagnostic Provider;  Location: [facility] OR;  Service: Radiology;  Laterality: N/A;  8 A.M. START  PHONE PREOP DONE 2020  PT/INR, CBC, CMP AND COVID ON AM OF PROCEDURE   ARRIVAL AT 7:00 FOR 8:30 APPT     OB History          1    Para   1    Term   1            AB        Living             SAB        IAB        Ectopic        Multiple        Live Births                   Review of Systems   Constitutional: Negative. Negative for chills, decreased appetite, diaphoresis, fever, malaise/fatigue, night sweats, weight gain and weight loss.   Eyes: Negative.    Cardiovascular:  Negative for chest pain, claudication, cyanosis, dyspnea on exertion, irregular heartbeat, leg swelling, near-syncope, orthopnea, palpitations, paroxysmal nocturnal dyspnea and syncope.   Respiratory:  Negative for cough, hemoptysis and shortness of breath.    Endocrine: Negative.    Hematologic/Lymphatic: Negative.    Skin:  Negative for color change, dry skin and nail changes.   Musculoskeletal: Negative.    Gastrointestinal: Negative.    Genitourinary: Negative.    Neurological:  Negative for weakness.       Objective:   Blood pressure (!) 171/84, pulse 62, height 5' 6" (1.676 m), weight 95.2 kg (209 lb 14.1 oz), SpO2 98%.body mass index is 33.88 kg/m².  Physical Exam  Vitals and nursing note reviewed.   Constitutional:       Appearance: She is well-developed.   HENT:      Head: Normocephalic.   Eyes:      Pupils: Pupils are equal, round, and reactive to light.   Neck:      Vascular: No JVD.   Cardiovascular:      Rate and Rhythm: Normal rate and regular rhythm.      Chest Wall: PMI is not displaced.      " Pulses: Intact distal pulses.      Heart sounds: Normal heart sounds. No murmur heard.     No friction rub. No gallop.   Pulmonary:      Effort: Pulmonary effort is normal.      Breath sounds: Normal breath sounds. No wheezing or rales.   Abdominal:      General: Bowel sounds are normal.      Palpations: Abdomen is soft.   Musculoskeletal:      Cervical back: Neck supple.   Neurological:      Mental Status: She is alert and oriented to person, place, and time.         Labs:    Chemistry        Component Value Date/Time     07/09/2024 1132    K 3.8 07/09/2024 1132     07/09/2024 1132    CO2 20 (L) 07/09/2024 1132    BUN 17 07/09/2024 1132    CREATININE 1.0 07/09/2024 1132     (H) 07/09/2024 1132        Component Value Date/Time    CALCIUM 10.0 07/09/2024 1132    ALKPHOS 157 (H) 07/09/2024 1132    AST 20 07/09/2024 1132    ALT 16 07/09/2024 1132    BILITOT 0.7 07/09/2024 1132    ESTGFRAFRICA >60.0 07/25/2022 1022    EGFRNONAA >60.0 07/25/2022 1022          Magnesium   Date Value Ref Range Status   11/12/2020 1.7 1.6 - 2.6 mg/dL Final     Lab Results   Component Value Date    WBC 4.59 07/09/2024    HGB 18.2 (H) 07/09/2024    HCT 53.9 (H) 07/09/2024     07/09/2024     Lab Results   Component Value Date    INR 1.0 07/11/2023    INR 1.0 01/11/2023    INR 1.0 07/20/2022     BNP   Date Value Ref Range Status   11/07/2020 28 0 - 99 pg/mL Final     Comment:     Values of less than 100 pg/ml are consistent with non-CHF populations.   08/03/2019 282 (H) 0 - 99 pg/mL Final     Comment:     Values of less than 100 pg/ml are consistent with non-CHF populations.   07/19/2019 999 (H) 0 - 99 pg/mL Final     Comment:     Values of less than 100 pg/ml are consistent with non-CHF populations.     LD   Date Value Ref Range Status   10/31/2022 188 110 - 260 U/L Final     Comment:     Results are increased in hemolyzed samples.   05/27/2021 230 110 - 260 U/L Final     Comment:     Results are increased in  hemolyzed samples.   12/03/2020 167 110 - 260 U/L Final     Comment:     Results are increased in hemolyzed samples.         Assessment:      1. Sarcoidosis    2. Pulmonary sarcoidosis    3. Type 2 diabetes mellitus with stage 2 chronic kidney disease, with long-term current use of insulin    4. Chronic diastolic heart failure    5. Obesity (BMI 30-39.9)        Plan:   Mrs. Vital is a very pleasant 57 black female with known sarcoidosis on MTX and plaquenil with multiple comorbidities including type DM, Chronic Hep B and cryoglobulinemia and now with confirmed cardiac sarcoidosis (by cardiac PET)  She was started on prednisone 40mg with the following protocol to taper weekly over a 6 month period (30mg BID x 1wk, then 25mg BID x 1wk, then 20mg BID x 1wk, then 15mg BID x 1wk, then stay on 10mg BID for 6 months). Repeated cardiac FDG PET last month showed no active cardiac sarcoid. She is currently on prednisone 10 mg daily (though she was prescribed 5 mg daily). I do nto see any documentation about there need to =be on 10 mg daily and in view of her recent issues with hyperglycemia, recommend to decrease her dose to 5 mg daily (as prescribed).  From a cardiac standpoint, she continues to do well. I will order another cardiac PET FDG to r/o any active disease.   Continue current cardiac regimen   Continue MTX and plaquenil  Recommend 2 gram sodium restriction and 1500cc fluid restriction.  Encourage physical activity with graded exercise program.  Requested patient to weigh themselves daily, and to notify us if their weight increases by more than 3 lbs in 1 day or 5 lbs in 1 week.   Thank you for allowing me to participate in the care of this very pleasant patient. Do not hesitate to contact me should you have any questions.  RTC in 6 months with labs   ACP docs completed and in file      Brina Carranza MD

## 2024-08-20 NOTE — PROGRESS NOTES
CHIEF COMPLAINT: Type 2 Diabetes     HPI: Mrs. Kendy Vital is a 58 y.o. female who was diagnosed with Type 2 DM in 2020.   Has extensive history : sarcoidosis, CDHF, multiple pulmonary nodultes, ckd 2, mpgn, osteoporosis  On steroids, PLEX, entecavir.   Sitter, disabled, lives in [place].  Being seen by me for the first time.  Lab Results   Component Value Date    HGBA1C 10.7 (H) 07/09/2024     Having hyperglycemia r/t steroids.   Not using correction scale, regular insulin.   Stressors: health, cousin passed away    PREVIOUS DIABETES MEDICATIONS TRIED  Regular   Lantus     CURRENT DIABETIC MEDS: lantus 63 units in am, regular insulin 22-22-22 units ac     On MDI (injections 4 x a day)   Makes frequent changes to his/her insulin regimen on the basis of blood glucose data  Testing 4 x a day  Patient is willing and able to use the device  Demonstrated an understanding of the technology and is motivated to use CGM  Patient expected to adhere to a comprehensive diabetes treatment plan and patient has adequate medical supervision  Has multiple impaired awareness of hypoglycemia (hypoglycemia unawareness)    Diabetes Management Status    Statin: Taking  ACE/ARB: Taking    Screening or Prevention Patient's value Goal Complete/Controlled?   HgA1C Testing and Control   Lab Results   Component Value Date    HGBA1C 10.7 (H) 07/09/2024      Annually/Less than 8% No   Lipid profile : 07/09/2024 Annually Yes   LDL control Lab Results   Component Value Date    LDLCALC 108.8 07/09/2024    Annually/Less than 100 mg/dl  Yes   Nephropathy screening Lab Results   Component Value Date    LABMICR 578.0 07/09/2024     Lab Results   Component Value Date    PROTEINUA Negative 05/23/2023    Annually Yes   Blood pressure BP Readings from Last 1 Encounters:   08/20/24 132/84    Less than 140/90 Yes   Dilated retinal exam : 01/25/2021 Annually Yes   Foot exam   : 06/15/2023 Annually Yes     REVIEW OF SYSTEMS  General: no weakness,  "+fatigue, +weight changes.   Eyes: no double or blurred vision, eye pain, or redness  Cardiovascular: no chest pain, palpitations, edema, or murmurs.   Respiratory: no cough or dyspnea.   GI: no heartburn, nausea, or changes in bowel patterns; good appetite.   Skin: no rashes, dryness, itching, or reactions at insulin injection sites.  Neuro: no numbness, tingling, tremors, or vertigo.   Endocrine: no polyuria, polydipsia, polyphagia, heat or cold intolerance.     Vital Signs  /84 (BP Location: Left arm, Patient Position: Sitting, BP Method: Medium (Manual))   Pulse 88   Ht 5' 7" (1.702 m)   Wt 94.8 kg (208 lb 15.9 oz)   SpO2 99%   BMI 32.73 kg/m²     Hemoglobin A1C   Date Value Ref Range Status   07/09/2024 10.7 (H) 4.0 - 5.6 % Final     Comment:     ADA Screening Guidelines:  5.7-6.4%  Consistent with prediabetes  >or=6.5%  Consistent with diabetes    High levels of fetal hemoglobin interfere with the HbA1C  assay. Heterozygous hemoglobin variants (HbS, HgC, etc)do  not significantly interfere with this assay.   However, presence of multiple variants may affect accuracy.     12/01/2023 8.0 (H) 4.0 - 5.6 % Final     Comment:     ADA Screening Guidelines:  5.7-6.4%  Consistent with prediabetes  >or=6.5%  Consistent with diabetes    High levels of fetal hemoglobin interfere with the HbA1C  assay. Heterozygous hemoglobin variants (HbS, HgC, etc)do  not significantly interfere with this assay.   However, presence of multiple variants may affect accuracy.     07/11/2023 8.4 (H) 4.0 - 5.6 % Final     Comment:     ADA Screening Guidelines:  5.7-6.4%  Consistent with prediabetes  >or=6.5%  Consistent with diabetes    High levels of fetal hemoglobin interfere with the HbA1C  assay. Heterozygous hemoglobin variants (HbS, HgC, etc)do  not significantly interfere with this assay.   However, presence of multiple variants may affect accuracy.          Chemistry        Component Value Date/Time     07/09/2024 " 1132    K 3.8 07/09/2024 1132     07/09/2024 1132    CO2 20 (L) 07/09/2024 1132    BUN 17 07/09/2024 1132    CREATININE 1.0 07/09/2024 1132     (H) 07/09/2024 1132        Component Value Date/Time    CALCIUM 10.0 07/09/2024 1132    ALKPHOS 157 (H) 07/09/2024 1132    AST 20 07/09/2024 1132    ALT 16 07/09/2024 1132    BILITOT 0.7 07/09/2024 1132    ESTGFRAFRICA >60.0 07/25/2022 1022    EGFRNONAA >60.0 07/25/2022 1022           Lab Results   Component Value Date    TSH 0.614 07/09/2024      Lab Results   Component Value Date    CHOL 232 (H) 07/09/2024    CHOL 172 03/24/2023    CHOL 136 09/08/2022     Lab Results   Component Value Date    HDL 49 07/09/2024    HDL 51 03/24/2023    HDL 49 09/08/2022     Lab Results   Component Value Date    LDLCALC 108.8 07/09/2024    LDLCALC 84.0 03/24/2023    LDLCALC 70.6 09/08/2022     Lab Results   Component Value Date    TRIG 371 (H) 07/09/2024    TRIG 185 (H) 03/24/2023    TRIG 82 09/08/2022     Lab Results   Component Value Date    CHOLHDL 21.1 07/09/2024    CHOLHDL 29.7 03/24/2023    CHOLHDL 36.0 09/08/2022         PHYSICAL EXAMINATION  Constitutional: Appears well, no distress. Reviewed vitals above.  Eyes: conjunctivae & lids intact; PERRLA, EOMs intact; optic discs   Neck: Supple, trachea midline.   Respiratory: CTA without wheezes, even and unlabored  Cardiovascular: RRR;  no edema or varicosities  Lymph: deferred  Skin: warm and dry; no injection site reactions, no acanthosis nigracans observed.  Neuro:patient alert and cooperative, normal affect; steady gait.  Psychiatric: judgement & insight intact, orientation of time, place & person intact, memory; mood & affect wnl     Diabetes Foot Exam:      Visual Inspection:  Normal -  Bilateral and Dry Skin -  Bilateral        Assessment/Plan  1. Type 2 diabetes mellitus with stage 2 chronic kidney disease, with long-term current use of insulin  Ambulatory Referral/Consult to Primary Care Diabetic Management     Hemoglobin A1C    A1c goal less than 7.5%   Add humalog vials, omnipdo dash, pods   Add mounjaro 2.5 mg weekly  DE -- pump start   Pre sets :   18 units large  12 units medium   8 units small   4 units snack  Extra 2 units if sugar is > 180   Dexcom g7 ,           2. Stage 3a chronic kidney disease  Avoid hypoglycemia   Stable   Work on A1c reduction       3. Pulmonary sarcoidosis  F/u with pulm   Stable       4. Obesity (BMI 30-39.9)  Body mass index is 32.73 kg/m². May increase insulin resistance  Mounjaro will help       5. Nuclear sclerosis, bilateral  F/u with ophthalmology   Stable       6. MPGN (membranoproliferative glomerulonephritides)  F/u with nephrology   Stable b      7. Essential hypertension  Bp stable   On acei       8. Chronic diastolic heart failure  F/u with cards , stable       9. B-cell lymphoproliferative disorder  f/u with hem/onc, stable        FOLLOW UP  In 3-4 months

## 2024-08-20 NOTE — PATIENT INSTRUCTIONS
Humalog 20 units before meals plus scale 180-230+ 2 ,231-280+4, 281-330+6, etc.   Lantus 60 units in the morning     Needs mounjaro 2.5 mg weekly     Omnipod dash kit and pods  Dexcom g7  and sensors     [email or web address]  Eat fit lainey  Myfitnesspal lainey  [email or web address]    mySugr lainey     Goal  no higher than 200     Follow up in 3-4 months w/ Irielle   A1c prior -3 months -before 11/30/24    Education w/ Ar[employer] --- omnipod dash , dexcom g7  start

## 2024-08-20 NOTE — TELEPHONE ENCOUNTER
[facility]  staff explained [facility] handles DME/ HME orders so the orders for Omnipod and Dexcom products were faxed along w/ office visit notes and facesheet w/ pt insurance info.

## 2024-08-23 DIAGNOSIS — D47.9 B-CELL LYMPHOPROLIFERATIVE DISORDER: Primary | ICD-10-CM

## 2024-09-18 DIAGNOSIS — D47.9 B-CELL LYMPHOPROLIFERATIVE DISORDER: Primary | ICD-10-CM

## 2024-09-27 ENCOUNTER — LAB VISIT (OUTPATIENT)
Dept: LAB | Facility: HOSPITAL | Age: 59
End: 2024-09-27
Attending: INTERNAL MEDICINE
Payer: MEDICAID

## 2024-09-27 DIAGNOSIS — D47.9 B-CELL LYMPHOPROLIFERATIVE DISORDER: ICD-10-CM

## 2024-09-27 DIAGNOSIS — N18.31 STAGE 3A CHRONIC KIDNEY DISEASE: ICD-10-CM

## 2024-09-27 LAB
ANION GAP SERPL CALC-SCNC: 11 MMOL/L (ref 8–16)
BASOPHILS # BLD AUTO: 0.03 K/UL (ref 0–0.2)
BASOPHILS NFR BLD: 0.7 % (ref 0–1.9)
BUN SERPL-MCNC: 23 MG/DL (ref 6–20)
CALCIUM SERPL-MCNC: 10.1 MG/DL (ref 8.7–10.5)
CHLORIDE SERPL-SCNC: 109 MMOL/L (ref 95–110)
CO2 SERPL-SCNC: 23 MMOL/L (ref 23–29)
CREAT SERPL-MCNC: 1 MG/DL (ref 0.5–1.4)
DIFFERENTIAL METHOD BLD: ABNORMAL
EOSINOPHIL # BLD AUTO: 0 K/UL (ref 0–0.5)
EOSINOPHIL NFR BLD: 0.9 % (ref 0–8)
ERYTHROCYTE [DISTWIDTH] IN BLOOD BY AUTOMATED COUNT: 13.8 % (ref 11.5–14.5)
EST. GFR  (NO RACE VARIABLE): >60 ML/MIN/1.73 M^2
GLUCOSE SERPL-MCNC: 93 MG/DL (ref 70–110)
HCT VFR BLD AUTO: 50.9 % (ref 37–48.5)
HGB BLD-MCNC: 17.2 G/DL (ref 12–16)
IMM GRANULOCYTES # BLD AUTO: 0 K/UL (ref 0–0.04)
IMM GRANULOCYTES NFR BLD AUTO: 0 % (ref 0–0.5)
LDH SERPL L TO P-CCNC: 225 U/L (ref 110–260)
LYMPHOCYTES # BLD AUTO: 2.4 K/UL (ref 1–4.8)
LYMPHOCYTES NFR BLD: 52.2 % (ref 18–48)
MCH RBC QN AUTO: 30.6 PG (ref 27–31)
MCHC RBC AUTO-ENTMCNC: 33.8 G/DL (ref 32–36)
MCV RBC AUTO: 90 FL (ref 82–98)
MONOCYTES # BLD AUTO: 0.4 K/UL (ref 0.3–1)
MONOCYTES NFR BLD: 7.7 % (ref 4–15)
NEUTROPHILS # BLD AUTO: 1.8 K/UL (ref 1.8–7.7)
NEUTROPHILS NFR BLD: 38.5 % (ref 38–73)
NRBC BLD-RTO: 0 /100 WBC
PLATELET # BLD AUTO: 177 K/UL (ref 150–450)
PMV BLD AUTO: 10.2 FL (ref 9.2–12.9)
POTASSIUM SERPL-SCNC: 4 MMOL/L (ref 3.5–5.1)
RBC # BLD AUTO: 5.63 M/UL (ref 4–5.4)
SODIUM SERPL-SCNC: 143 MMOL/L (ref 136–145)
WBC # BLD AUTO: 4.54 K/UL (ref 3.9–12.7)

## 2024-09-27 PROCEDURE — 85025 COMPLETE CBC W/AUTO DIFF WBC: CPT | Mod: PO | Performed by: INTERNAL MEDICINE

## 2024-09-27 PROCEDURE — 83615 LACTATE (LD) (LDH) ENZYME: CPT | Performed by: INTERNAL MEDICINE

## 2024-09-27 PROCEDURE — 80048 BASIC METABOLIC PNL TOTAL CA: CPT | Performed by: INTERNAL MEDICINE

## 2024-09-27 PROCEDURE — 36415 COLL VENOUS BLD VENIPUNCTURE: CPT | Mod: PO | Performed by: INTERNAL MEDICINE

## 2024-10-01 ENCOUNTER — TELEPHONE (OUTPATIENT)
Dept: INTERNAL MEDICINE | Facility: CLINIC | Age: 59
End: 2024-10-01
Payer: MEDICAID

## 2024-10-01 NOTE — TELEPHONE ENCOUNTER
----- Message from Melissa sent at 10/1/2024  2:17 PM CDT -----  Regarding: pump and cgm  I called patient to check and see if she received the cgm and pump supplies. She stated that they are still waiting on a prior authorization. Will you please check status of this and call and let her know? Thank you for your help.

## 2024-10-02 ENCOUNTER — PATIENT MESSAGE (OUTPATIENT)
Dept: INTERNAL MEDICINE | Facility: CLINIC | Age: 59
End: 2024-10-02
Payer: MEDICAID

## 2024-10-03 ENCOUNTER — OFFICE VISIT (OUTPATIENT)
Dept: HEMATOLOGY/ONCOLOGY | Facility: CLINIC | Age: 59
End: 2024-10-03
Payer: MEDICAID

## 2024-10-03 VITALS
BODY MASS INDEX: 33.47 KG/M2 | HEIGHT: 66 IN | HEART RATE: 64 BPM | WEIGHT: 208.25 LBS | OXYGEN SATURATION: 99 % | DIASTOLIC BLOOD PRESSURE: 110 MMHG | RESPIRATION RATE: 18 BRPM | SYSTOLIC BLOOD PRESSURE: 220 MMHG

## 2024-10-03 DIAGNOSIS — D47.9 B-CELL LYMPHOPROLIFERATIVE DISORDER: Primary | ICD-10-CM

## 2024-10-03 DIAGNOSIS — I10 ESSENTIAL HYPERTENSION: ICD-10-CM

## 2024-10-03 PROCEDURE — 3066F NEPHROPATHY DOC TX: CPT | Mod: CPTII,,, | Performed by: INTERNAL MEDICINE

## 2024-10-03 PROCEDURE — 3046F HEMOGLOBIN A1C LEVEL >9.0%: CPT | Mod: CPTII,,, | Performed by: INTERNAL MEDICINE

## 2024-10-03 PROCEDURE — 3062F POS MACROALBUMINURIA REV: CPT | Mod: CPTII,,, | Performed by: INTERNAL MEDICINE

## 2024-10-03 PROCEDURE — 3008F BODY MASS INDEX DOCD: CPT | Mod: CPTII,,, | Performed by: INTERNAL MEDICINE

## 2024-10-03 PROCEDURE — 3077F SYST BP >= 140 MM HG: CPT | Mod: CPTII,,, | Performed by: INTERNAL MEDICINE

## 2024-10-03 PROCEDURE — 1160F RVW MEDS BY RX/DR IN RCRD: CPT | Mod: CPTII,,, | Performed by: INTERNAL MEDICINE

## 2024-10-03 PROCEDURE — 1159F MED LIST DOCD IN RCRD: CPT | Mod: CPTII,,, | Performed by: INTERNAL MEDICINE

## 2024-10-03 PROCEDURE — 4010F ACE/ARB THERAPY RXD/TAKEN: CPT | Mod: CPTII,,, | Performed by: INTERNAL MEDICINE

## 2024-10-03 PROCEDURE — 99999 PR PBB SHADOW E&M-EST. PATIENT-LVL V: CPT | Mod: PBBFAC,,, | Performed by: INTERNAL MEDICINE

## 2024-10-03 PROCEDURE — 3080F DIAST BP >= 90 MM HG: CPT | Mod: CPTII,,, | Performed by: INTERNAL MEDICINE

## 2024-10-03 PROCEDURE — 99215 OFFICE O/P EST HI 40 MIN: CPT | Mod: PBBFAC | Performed by: INTERNAL MEDICINE

## 2024-10-03 PROCEDURE — 99214 OFFICE O/P EST MOD 30 MIN: CPT | Mod: S$PBB,,, | Performed by: INTERNAL MEDICINE

## 2024-10-03 NOTE — PROGRESS NOTES
HEMATOLOGIC MALIGNANCIES PROGRESS NOTE    IDENTIFYING STATEMENT   Kendy Vital (Kendy) is a 59 y.o. female with a  of 1965 from [place], [place] with the diagnosis of marginal zone lymphoma.      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  Diabetes mellitus, type 2, insulin-dependent  Chronic hepatitis B infection  Sarcoidosis    INTERVAL HISTORY:      Ms. Vital returns to clinic in follow-up of her B-cell lymphoproliferative disorder. She has not been seen in two years.     - 2024: Cardiology visit for cardiac sarcoidosis - disease controlled on MTX + hydroxychloroquine  - 2024: Primary care - diabetes management    She is feeling well at this time. No lymphadenopathy. No weight loss.     Past Medical History, Past Social History and Past Family History have been reviewed and are unchanged except as noted in the interval history.    MEDICATIONS:     Current Outpatient Medications on File Prior to Visit   Medication Sig Dispense Refill    acetaminophen (TYLENOL) 500 MG tablet Take 500 mg by mouth 3 (three) times daily as needed for Pain (or fever).      albuterol (PROVENTIL/VENTOLIN HFA) 90 mcg/actuation inhaler Inhale 2 puffs into the lungs every 6 (six) hours as needed for Wheezing or Shortness of  Breath. Rescue 18 g 6    alendronate (FOSAMAX) 70 MG tablet Take 1 tablet (70 mg total) by mouth every 7 days. 4 tablet 11    allopurinoL (ZYLOPRIM) 100 MG tablet Take 100 mg by mouth once daily. for 90 days      aspirin (ECOTRIN) 81 MG EC tablet Take 81 mg by mouth once daily.      atorvastatin (LIPITOR) 40 MG tablet Take 1 tablet (40 mg total) by mouth once daily. 90 tablet 3    blood sugar diagnostic Strp To check BG 3 times daily, to use with insurance preferred meter 100 each 11    blood-glucose meter kit To check BG 3 times daily, to use with insurance preferred meter 1 each 0    blood-glucose meter,continuous (DEXCOM G7 ) Misc Use as directed , e 11.65 1 each 1    blood-glucose sensor (DEXCOM G7 SENSOR) Nicole Change every 10 days , e 11.65 3 each 11    carvediloL (COREG) 25 MG tablet Take 1 tablet (25 mg total) by mouth 2 (two) times daily. 180 tablet 1    cyanocobalamin (VITAMIN B-12) 250 MCG tablet Take 250 mcg by mouth once daily.      diclofenac sodium (VOLTAREN) 1 % Gel Apply 4 g topically 4 (four) times daily. Apply to knee 350 g 2    entecavir (BARACLUDE) 0.5 MG Tab Take 1 tablet (0.5 mg total) by mouth once daily. 30 tablet 6    EScitalopram oxalate (LEXAPRO) 10 MG tablet Take 1 tablet (10 mg total) by mouth once daily. 90 tablet 3    ferrous sulfate 325 (65 FE) MG EC tablet Take 1 tablet (325 mg total) by mouth once daily.  0    folic acid (FOLVITE) 1 MG tablet Take 1 tablet (1 mg total) by mouth once daily. 90 tablet 2    furosemide (LASIX) 40 MG tablet Take 40 mg lasix once daily 90 tablet 3    gabapentin (NEURONTIN) 300 MG capsule Take 2 capsules (600 mg total) by mouth 2 (two) times daily. 120 capsule 11    hydrOXYchloroQUINE (PLAQUENIL) 200 mg tablet Take 1 tablet (200 mg total) by mouth 2 (two) times daily. 180 tablet 3    insulin glargine U-100, Lantus, (LANTUS SOLOSTAR U-100 INSULIN) 100 unit/mL (3 mL) InPn pen Inject 60 Units into the skin every morning.      insulin lispro (HUMALOG  KWIKPEN INSULIN) 100 unit/mL pen Inject 20 units before meals plus scale 150-200+2, 201-250+4, 251-300+6, 301-350+8, >350+10 max daily 90 units 30 mL 6    insulin lispro (HUMALOG U-100 INSULIN) 100 unit/mL injection Use in insulin pump, max daily 100 units. 30 mL 8    insulin pump cart,cont BT-cntr (OMNIPOD DASH INTRO KIT, GEN 4,) Crtg 1 Device by subcutaneous (via wearable injector) route continuous. 1 each 1    insulin pump cart,cont inf,BT (OMNIPOD DASH PODS, GEN 4,) Crtg 1 Device by subcutaneous (via wearable injector) route every 48 hours. 45 each 3    lancets Misc To check BG 3 times daily, to use with insurance preferred meter 100 each 11    lancing device Misc 1 Device by Misc.(Non-Drug; Combo Route) route 2 (two) times daily with meals. 1 each 0    lisinopriL (PRINIVIL,ZESTRIL) 20 MG tablet Take 1 tablet (20 mg total) by mouth once daily. 90 tablet 3    methotrexate 2.5 MG Tab Take 8 tablets (20 mg total) by mouth every 7 days. 96 tablet 0    multivitamin (THERAGRAN) per tablet Take 1 tablet by mouth once daily.      NIFEdipine (PROCARDIA-XL) 60 MG (OSM) 24 hr tablet Take 1 tablet (60 mg total) by mouth once daily. 90 tablet 3    pantoprazole (PROTONIX) 40 MG tablet Take 1 tablet (40 mg total) by mouth before breakfast. 30 tablet 3    polyethylene glycol (GLYCOLAX) 17 gram/dose powder Take 17 g by mouth daily as needed (constipation). 289 g 5    predniSONE (DELTASONE) 5 MG tablet Take 1 tablet (5 mg total) by mouth once daily. 30 tablet 11    tirzepatide (MOUNJARO) 2.5 mg/0.5 mL PnIj Inject 2.5 mg into the skin every 7 days. 4 Pen 6    traZODone (DESYREL) 100 MG tablet Take 1 tablet (100 mg total) by mouth nightly as needed for Insomnia. 30 tablet 11    valACYclovir (VALTREX) 1000 MG tablet Take 1,000 mg by mouth 3 (three) times daily.       No current facility-administered medications on file prior to visit.       ALLERGIES: Review of patient's allergies indicates:  No Known Allergies     ROS:       Review  "of Systems   Constitutional:  Negative for diaphoresis, fatigue, fever and unexpected weight change.   HENT:   Negative for lump/mass and sore throat.    Eyes:  Negative for icterus.   Respiratory:  Negative for cough and shortness of breath.    Cardiovascular:  Negative for chest pain and palpitations.   Gastrointestinal:  Negative for abdominal distention, constipation, diarrhea, nausea and vomiting.   Genitourinary:  Negative for dysuria and frequency.    Musculoskeletal:  Negative for arthralgias, gait problem and myalgias.   Skin:  Negative for rash.   Neurological:  Negative for dizziness, gait problem and headaches.   Hematological:  Negative for adenopathy. Does not bruise/bleed easily.   Psychiatric/Behavioral:  The patient is not nervous/anxious.        PHYSICAL EXAM:  Vitals:    10/03/24 1605 10/03/24 1646   BP: (!) 230/102 (!) 220/110   Pulse: 64    Resp: 18    SpO2: 99%    Weight: 94.4 kg (208 lb 3.6 oz)    Height: 5' 6" (1.676 m)    PainSc: 0-No pain        KARNOFSKY PERFORMANCE STATUS 80%  ECOG 1    Physical Exam  Constitutional:       General: She is not in acute distress.     Appearance: She is well-developed.   HENT:      Head: Normocephalic and atraumatic.      Mouth/Throat:      Mouth: No oral lesions.   Eyes:      Conjunctiva/sclera: Conjunctivae normal.   Neck:      Thyroid: No thyromegaly.   Cardiovascular:      Rate and Rhythm: Normal rate and regular rhythm.      Heart sounds: Normal heart sounds. No murmur heard.  Pulmonary:      Breath sounds: Normal breath sounds. No wheezing or rales.   Abdominal:      General: There is no distension.      Palpations: Abdomen is soft. There is no hepatomegaly, splenomegaly or mass.      Tenderness: There is no abdominal tenderness.   Lymphadenopathy:      Cervical: No cervical adenopathy.      Right cervical: No deep cervical adenopathy.     Left cervical: No deep cervical adenopathy.   Skin:     Findings: No rash.   Neurological:      Mental Status: She " is alert and oriented to person, place, and time.      Cranial Nerves: No cranial nerve deficit.      Coordination: Coordination normal.      Deep Tendon Reflexes: Reflexes are normal and symmetric.         LAB:   Results for orders placed or performed in visit on 09/27/24   Basic Metabolic Panel    Collection Time: 09/27/24  1:20 PM   Result Value Ref Range    Sodium 143 136 - 145 mmol/L    Potassium 4.0 3.5 - 5.1 mmol/L    Chloride 109 95 - 110 mmol/L    CO2 23 23 - 29 mmol/L    Glucose 93 70 - 110 mg/dL    BUN 23 (H) 6 - 20 mg/dL    Creatinine 1.0 0.5 - 1.4 mg/dL    Calcium 10.1 8.7 - 10.5 mg/dL    Anion Gap 11 8 - 16 mmol/L    eGFR >60.0 >60 mL/min/1.73 m^2   CBC Auto Differential    Collection Time: 09/27/24  1:20 PM   Result Value Ref Range    WBC 4.54 3.90 - 12.70 K/uL    RBC 5.63 (H) 4.00 - 5.40 M/uL    Hemoglobin 17.2 (H) 12.0 - 16.0 g/dL    Hematocrit 50.9 (H) 37.0 - 48.5 %    MCV 90 82 - 98 fL    MCH 30.6 27.0 - 31.0 pg    MCHC 33.8 32.0 - 36.0 g/dL    RDW 13.8 11.5 - 14.5 %    Platelets 177 150 - 450 K/uL    MPV 10.2 9.2 - 12.9 fL    Immature Granulocytes 0.0 0.0 - 0.5 %    Gran # (ANC) 1.8 1.8 - 7.7 K/uL    Immature Grans (Abs) 0.00 0.00 - 0.04 K/uL    Lymph # 2.4 1.0 - 4.8 K/uL    Mono # 0.4 0.3 - 1.0 K/uL    Eos # 0.0 0.0 - 0.5 K/uL    Baso # 0.03 0.00 - 0.20 K/uL    nRBC 0 0 /100 WBC    Gran % 38.5 38.0 - 73.0 %    Lymph % 52.2 (H) 18.0 - 48.0 %    Mono % 7.7 4.0 - 15.0 %    Eosinophil % 0.9 0.0 - 8.0 %    Basophil % 0.7 0.0 - 1.9 %    Differential Method Automated    LDH    Collection Time: 09/27/24  1:20 PM   Result Value Ref Range     110 - 260 U/L     *Note: Due to a large number of results and/or encounters for the requested time period, some results have not been displayed. A complete set of results can be found in Results Review.       PROBLEMS ASSESSED THIS VISIT:    1. B-cell lymphoproliferative disorder    2. Essential hypertension        PLAN:       B-cell lymphoproliferative  disorder  No clinical findings of progressive disease at this time. Therapy is not currently indicated (she notably was also treated with single agent rituximab for cryoglobulinemic vasculitis in 2019, which would have been active against her lymphoproliferative disorder).    I do not recommend the use of TNF-alpha inhibitors in this patient given their known association with B-cell lymphomas. If possible, would prefer alternative biologic or disease modifying therapy for sarcoidosis.    Hypertension  Severe and uncontrolled. Continue current meds and discuss with Dr. Kim (PCP).     Follow-up  6 months    Álvaro Overton MD  Hematology and Stem Cell Transplant

## 2024-10-03 NOTE — PROGRESS NOTES
Route Chart for Scheduling    BMT Chart Routing      Follow up with physician 6 months.   Follow up with [facility]    Provider visit type Malignant hem   Infusion scheduling note    Injection scheduling note    Labs CBC, CMP, SPEP, immunofixation, free light chains and immunoglobulins   Scheduling:  Preferred lab:  Lab interval:  labs at time of return visit   Imaging    Pharmacy appointment    Other referrals

## 2024-10-04 ENCOUNTER — TELEPHONE (OUTPATIENT)
Dept: HEMATOLOGY/ONCOLOGY | Facility: CLINIC | Age: 59
End: 2024-10-04
Payer: MEDICAID

## 2024-10-04 NOTE — TELEPHONE ENCOUNTER
Spoke w/ pt in regards to scheduling psych referral per Dr. Overton. Pt approved to schedule in-person appt with Dr. Royce Murray  for 10/22 @ 2:30PM. MA advised that clinic is located on the 2nd floor  of the [facility]. Pt approved to send out letter reminder in the mail and confirmed address.       [place], [place] ext [phone]

## 2024-10-05 ENCOUNTER — TELEPHONE (OUTPATIENT)
Dept: INTERNAL MEDICINE | Facility: CLINIC | Age: 59
End: 2024-10-05
Payer: MEDICAID

## 2024-10-05 NOTE — TELEPHONE ENCOUNTER
Called and spoke to pharmacist at [facility] in [place]. [place] medicaid [place] CGM. Did notify patient of medicaid not covering it.

## 2024-10-07 ENCOUNTER — DOCUMENTATION ONLY (OUTPATIENT)
Dept: HEMATOLOGY/ONCOLOGY | Facility: CLINIC | Age: 59
End: 2024-10-07
Payer: MEDICAID

## 2024-10-07 NOTE — PROGRESS NOTES
SW notified of distress score of 8 for anxiety. Patient has already been scheduled for onc/psych.    SW called patient at [phone] [phone]. First call did not go through, second went through but no VM set up.

## 2024-10-23 ENCOUNTER — TELEPHONE (OUTPATIENT)
Dept: HEMATOLOGY/ONCOLOGY | Facility: CLINIC | Age: 59
End: 2024-10-23
Payer: MEDICAID

## 2024-10-23 NOTE — TELEPHONE ENCOUNTER
Attempted to reach out to pt to see if pt would like to r/s no show appt with Dr. Murray from 10/22. MA unable to leave vm. No VM set up.       [place], [place] ext [phone]

## 2024-11-04 ENCOUNTER — PATIENT MESSAGE (OUTPATIENT)
Dept: GASTROENTEROLOGY | Facility: CLINIC | Age: 59
End: 2024-11-04
Payer: MEDICAID

## 2024-11-14 ENCOUNTER — LAB VISIT (OUTPATIENT)
Dept: LAB | Facility: HOSPITAL | Age: 59
End: 2024-11-14
Attending: NURSE PRACTITIONER
Payer: MEDICAID

## 2024-11-14 DIAGNOSIS — E11.22 TYPE 2 DIABETES MELLITUS WITH STAGE 2 CHRONIC KIDNEY DISEASE, WITH LONG-TERM CURRENT USE OF INSULIN: ICD-10-CM

## 2024-11-14 DIAGNOSIS — N18.2 TYPE 2 DIABETES MELLITUS WITH STAGE 2 CHRONIC KIDNEY DISEASE, WITH LONG-TERM CURRENT USE OF INSULIN: ICD-10-CM

## 2024-11-14 DIAGNOSIS — Z79.4 TYPE 2 DIABETES MELLITUS WITH STAGE 2 CHRONIC KIDNEY DISEASE, WITH LONG-TERM CURRENT USE OF INSULIN: ICD-10-CM

## 2024-11-14 PROCEDURE — 36415 COLL VENOUS BLD VENIPUNCTURE: CPT | Mod: PO | Performed by: NURSE PRACTITIONER

## 2024-11-14 PROCEDURE — 83036 HEMOGLOBIN GLYCOSYLATED A1C: CPT | Performed by: NURSE PRACTITIONER

## 2024-11-15 ENCOUNTER — TELEPHONE (OUTPATIENT)
Dept: CARDIOLOGY | Facility: CLINIC | Age: 59
End: 2024-11-15
Payer: MEDICAID

## 2024-11-15 LAB
ESTIMATED AVG GLUCOSE: 120 MG/DL (ref 68–131)
HBA1C MFR BLD: 5.8 % (ref 4–5.6)

## 2024-11-19 ENCOUNTER — PATIENT MESSAGE (OUTPATIENT)
Dept: CARDIOLOGY | Facility: HOSPITAL | Age: 59
End: 2024-11-19
Payer: MEDICAID

## 2024-11-19 ENCOUNTER — HOSPITAL ENCOUNTER (OUTPATIENT)
Dept: CARDIOLOGY | Facility: HOSPITAL | Age: 59
Discharge: HOME | End: 2024-11-19
Attending: INTERNAL MEDICINE
Payer: MEDICAID

## 2024-11-19 VITALS — WEIGHT: 208 LBS | HEART RATE: 65 BPM | BODY MASS INDEX: 33.43 KG/M2 | HEIGHT: 66 IN

## 2024-11-19 DIAGNOSIS — D86.9 SARCOIDOSIS: ICD-10-CM

## 2024-11-19 LAB
EJECTION FRACTION- HIGH: 59 %
END DIASTOLIC INDEX-HIGH: 155 ML/M2
END DIASTOLIC INDEX-LOW: 91 ML/M2
END SYSTOLIC INDEX-HIGH: 78 ML/M2
END SYSTOLIC INDEX-LOW: 40 ML/M2
NUC REST DIASTOLIC VOLUME INDEX: 85
NUC REST EJECTION FRACTION: 69
NUC REST SYSTOLIC VOLUME INDEX: 26
OHS CV INITIAL DOSE: 29 MCG/KG/MIN
OHS CV PET ID: 7650
OHS CV TOTAL EXAM DLP: 311.48 MGY-CM
RETIRED EF AND QEF - SEE NOTES: 47 %

## 2024-11-19 PROCEDURE — 78433 MYOCRD IMG PET 2RTRACER CT: CPT | Mod: 26,,, | Performed by: INTERNAL MEDICINE

## 2024-11-19 PROCEDURE — 78433 MYOCRD IMG PET 2RTRACER CT: CPT

## 2024-11-19 RX ORDER — FLUDEOXYGLUCOSE F18 500 MCI/ML
15 INJECTION INTRAVENOUS ONCE
Status: DISCONTINUED | OUTPATIENT
Start: 2024-11-19 | End: 2024-11-20 | Stop reason: HOSPADM

## 2024-11-21 ENCOUNTER — PATIENT MESSAGE (OUTPATIENT)
Dept: INTERNAL MEDICINE | Facility: CLINIC | Age: 59
End: 2024-11-21

## 2024-11-22 ENCOUNTER — PATIENT MESSAGE (OUTPATIENT)
Dept: INTERNAL MEDICINE | Facility: CLINIC | Age: 59
End: 2024-11-22
Payer: MEDICAID

## 2024-12-19 DIAGNOSIS — D86.9 SARCOIDOSIS: Primary | ICD-10-CM

## 2025-01-03 ENCOUNTER — TELEPHONE (OUTPATIENT)
Dept: NEPHROLOGY | Facility: CLINIC | Age: 60
End: 2025-01-03
Payer: MEDICAID

## 2025-01-03 DIAGNOSIS — N18.31 STAGE 3A CHRONIC KIDNEY DISEASE: Primary | ICD-10-CM

## 2025-01-17 NOTE — SUBJECTIVE & OBJECTIVE
Interval History: Ms. Vital has no subjective complaints today. She is currently waiting for her lymph node biopsy (reportedly scheduled for Monday).     Current Facility-Administered Medications   Medication Frequency    acetaminophen tablet 650 mg Q6H PRN    albuterol-ipratropium 2.5 mg-0.5 mg/3 mL nebulizer solution 3 mL Q6H PRN    allopurinol tablet 100 mg Daily    atovaquone suspension 1,500 mg Daily    bisacodyl suppository 10 mg Daily PRN    carvedilol tablet 6.25 mg BID    cloNIDine tablet 0.1 mg BID    entecavir tablet 0.5 mg Q72H    famotidine tablet 20 mg Daily    glucagon (human recombinant) injection 1 mg PRN    glucose chewable tablet 16 g PRN    glucose chewable tablet 24 g PRN    hydrALAZINE tablet 50 mg Q8H    insulin aspart U-100 pen 1-10 Units QID (AC + HS) PRN    insulin aspart U-100 pen 5 Units TIDWM    insulin detemir U-100 pen 10 Units Once    methylPREDNISolone sodium succinate (SOLU-MEDROL) 1,000 mg in dextrose 5 % 100 mL IVPB Daily    NIFEdipine 24 hr tablet 60 mg Daily    ondansetron disintegrating tablet 8 mg Q8H PRN    oxyCODONE-acetaminophen 5-325 mg per tablet 1 tablet Q8H PRN    polyethylene glycol packet 17 g Daily    promethazine (PHENERGAN) 6.25 mg in dextrose 5 % 50 mL IVPB Q6H PRN    ramelteon tablet 8 mg Nightly PRN    sodium bicarbonate tablet 1,300 mg TID    sodium chloride 0.9% flush 10 mL PRN    Tdap vaccine injection 0.5 mL vaccine x 1 dose     Objective:     Vital Signs (Most Recent):  Temp: 98 °F (36.7 °C) (07/28/19 0430)  Pulse: 76 (07/28/19 0430)  Resp: 18 (07/28/19 0430)  BP: (!) 156/72 (07/28/19 0430)  SpO2: 95 % (07/28/19 0430)  O2 Device (Oxygen Therapy): room air (07/27/19 0351) Vital Signs (24h Range):  Temp:  [96.6 °F (35.9 °C)-98 °F (36.7 °C)] 98 °F (36.7 °C)  Pulse:  [67-90] 76  Resp:  [17-18] 18  SpO2:  [95 %-99 %] 95 %  BP: (143-196)/(72-90) 156/72     Weight: 103 kg (227 lb) (07/19/19 1400)  Body mass index is 36.64 kg/m².  Body  surface area is 2.19 meters squared.      Intake/Output Summary (Last 24 hours) at 7/28/2019 0841  Last data filed at 7/28/2019 0624  Gross per 24 hour   Intake 670 ml   Output 800 ml   Net -130 ml       Physical Exam   Constitutional: She is well-developed, well-nourished, and in no distress. No distress.   Eyes: Conjunctivae are normal. Right eye exhibits no discharge. Left eye exhibits no discharge. No scleral icterus.   Neck: No tracheal deviation present.   Central line in place, right neck   Cardiovascular: Normal rate, regular rhythm and normal heart sounds.  Exam reveals no gallop and no friction rub.    No murmur heard.  Pulmonary/Chest: Effort normal and breath sounds normal. No respiratory distress. She has no wheezes. She has no rales. She exhibits no tenderness.   Abdominal: Soft. Bowel sounds are normal. She exhibits no distension and no mass. There is no tenderness. There is no rebound and no guarding.   Genitourinary: Right adnexa normal.   Lymphadenopathy:     She has no cervical adenopathy.   Skin: Skin is warm and dry. No rash noted. She is not diaphoretic.     Musculoskeletal: She exhibits no edema.        DISPLAY PLAN FREE TEXT

## 2025-03-26 ENCOUNTER — TELEPHONE (OUTPATIENT)
Dept: TRANSPLANT | Facility: CLINIC | Age: 60
End: 2025-03-26
Payer: MEDICAID

## 2025-03-28 ENCOUNTER — OFFICE VISIT (OUTPATIENT)
Dept: TRANSPLANT | Facility: CLINIC | Age: 60
End: 2025-03-28
Payer: MEDICAID

## 2025-03-28 ENCOUNTER — LAB VISIT (OUTPATIENT)
Dept: LAB | Facility: HOSPITAL | Age: 60
End: 2025-03-28
Attending: INTERNAL MEDICINE
Payer: MEDICAID

## 2025-03-28 VITALS
SYSTOLIC BLOOD PRESSURE: 160 MMHG | HEIGHT: 66 IN | WEIGHT: 214.5 LBS | DIASTOLIC BLOOD PRESSURE: 90 MMHG | HEART RATE: 67 BPM | BODY MASS INDEX: 34.47 KG/M2

## 2025-03-28 DIAGNOSIS — N18.2 TYPE 2 DIABETES MELLITUS WITH STAGE 2 CHRONIC KIDNEY DISEASE, WITH LONG-TERM CURRENT USE OF INSULIN: ICD-10-CM

## 2025-03-28 DIAGNOSIS — D86.9 SARCOIDOSIS: ICD-10-CM

## 2025-03-28 DIAGNOSIS — I50.32 CHRONIC DIASTOLIC HEART FAILURE: ICD-10-CM

## 2025-03-28 DIAGNOSIS — I10 ESSENTIAL HYPERTENSION: ICD-10-CM

## 2025-03-28 DIAGNOSIS — E11.22 TYPE 2 DIABETES MELLITUS WITH STAGE 2 CHRONIC KIDNEY DISEASE, WITH LONG-TERM CURRENT USE OF INSULIN: ICD-10-CM

## 2025-03-28 DIAGNOSIS — Z79.4 TYPE 2 DIABETES MELLITUS WITH STAGE 2 CHRONIC KIDNEY DISEASE, WITH LONG-TERM CURRENT USE OF INSULIN: ICD-10-CM

## 2025-03-28 DIAGNOSIS — D86.85 CARDIAC SARCOIDOSIS: Primary | ICD-10-CM

## 2025-03-28 LAB
ANION GAP (OHS): 10 MMOL/L (ref 8–16)
BNP SERPL-MCNC: 87 PG/ML (ref 0–99)
BUN SERPL-MCNC: 16 MG/DL (ref 6–20)
CALCIUM SERPL-MCNC: 9.3 MG/DL (ref 8.7–10.5)
CHLORIDE SERPL-SCNC: 109 MMOL/L (ref 95–110)
CO2 SERPL-SCNC: 24 MMOL/L (ref 23–29)
CREAT SERPL-MCNC: 0.8 MG/DL (ref 0.5–1.4)
GFR SERPLBLD CREATININE-BSD FMLA CKD-EPI: >60 ML/MIN/1.73/M2
GLUCOSE SERPL-MCNC: 157 MG/DL (ref 70–110)
POTASSIUM SERPL-SCNC: 3.8 MMOL/L (ref 3.5–5.1)
SODIUM SERPL-SCNC: 143 MMOL/L (ref 136–145)

## 2025-03-28 PROCEDURE — 99215 OFFICE O/P EST HI 40 MIN: CPT | Mod: PBBFAC | Performed by: INTERNAL MEDICINE

## 2025-03-28 PROCEDURE — 82310 ASSAY OF CALCIUM: CPT

## 2025-03-28 PROCEDURE — 36415 COLL VENOUS BLD VENIPUNCTURE: CPT

## 2025-03-28 PROCEDURE — 83880 ASSAY OF NATRIURETIC PEPTIDE: CPT

## 2025-03-28 PROCEDURE — 99999 PR PBB SHADOW E&M-EST. PATIENT-LVL V: CPT | Mod: PBBFAC,,, | Performed by: INTERNAL MEDICINE

## 2025-03-28 RX ORDER — QUETIAPINE FUMARATE 100 MG/1
100 TABLET, FILM COATED ORAL NIGHTLY
COMMUNITY
Start: 2025-02-13

## 2025-03-28 RX ORDER — LISINOPRIL 20 MG/1
20 TABLET ORAL 2 TIMES DAILY
Qty: 180 TABLET | Refills: 3 | Status: SHIPPED | OUTPATIENT
Start: 2025-03-28

## 2025-03-28 NOTE — PROGRESS NOTES
Subjective:       HPI:  Ms. Vital is a very pleasant 59 year old black female with hypertension, anemia, h/o renal vein and left upper extremity thrombosis (negative AP labs), hepatitis B on entecavir, hypogammaglobulinemia s/p IVIG (7/2019 prior to rituximab infusion), B cell lymphoproliferative disorder, IgM kappa MGUS, diffuse proliferative glomerulonephritis due to cryoglobulinemic vasculitis s/p pulse steroids x 3, plasmapheresis and rituximab 1g x 2 (7-8/2019), biopsy-proven (12/1/20) pulmonary sarcoidosis with +calcitriol and lysozyme. She was referred for evaluation for possible cardiac sarcoid. This is her 4th visit with me. I had ordered a cardiac PET FDG that confirmed cardiac sarcoidosis (she also had a previous cardiac MRI that was also suggestive of sarcoid) and had started her on prednisone following our protocol. her follow-up cardiac PET FDG was negative for cardiac sarcoid. From a cardiac standpoint, she continues to do really well. Reports NYHA class II symptoms. Occasional PND and orthopnea. Denies any palpitations, chest pain or  syncopal episodes. Current cardiac regimen includes; carvedilol 25 mg twice daily, lisinopril 20 mg daily, nifedipine 60 mg daily. Current regimen for sarcoidosis includes; methotrexate 20mg weekly plus folic acid and hydroxycholoroquine 200 mg twice daily and prednisone 10 mg daily (though her med lists states 5 mg daily). Main issue lately ha sbeen her uncontrolled type 2 DM. BP remains elevated.       Cardiac PET FDG done on 11/19/2024    This study is negative for active cardiac sarcoidosis.    On rubidium-82 imaging there are no resting perfusion abnormalities. On FDG metabolic imaging there is no uptake in the LV.    There is normal wall motion at rest.    The gated perfusion images showed an ejection fraction of 69% at rest. A normal ejection fraction is greater than 47%.    When compared to a prior study from 6/6/2023, there are significant changes. The prior  resting perfusion abnormality is no longer present.      Past Medical History:   Diagnosis Date    Acute (diffuse) proliferative glomerulonephritis     Acute renal failure 2019    B-cell lymphoproliferative disorder 2019    CHF (congestive heart failure)     Chronic obstructive lung disease 2019    Chronic viral hepatitis B without delta agent and without coma 2019    Cryoglobulinemic vasculitis     Diabetes mellitus     Hypogammaglobulinemia 2019    Iron deficiency anemia 2019    Renal vein thrombosis 2015    s/p embolectomy and lovenox for 9 mos    Steroid-induced hyperglycemia 2019    Uterine leiomyoma     s/p resection     Past Surgical History:   Procedure Laterality Date    AV FISTULA PLACEMENT      CATARACT EXTRACTION W/  INTRAOCULAR LENS IMPLANT Left 2021    Procedure: EXTRACTION, CATARACT, WITH IOL INSERTION;  Surgeon: Allen Alejandro MD;  Location: [facility] OR;  Service: Ophthalmology;  Laterality: Left;    CATARACT EXTRACTION W/  INTRAOCULAR LENS IMPLANT Right 2021    Procedure: EXTRACTION, CATARACT, WITH IOL INSERTION;  Surgeon: Allen Alejandro MD;  Location: [facility] OR;  Service: Ophthalmology;  Laterality: Right;    LUNG BIOPSY N/A 2020    Procedure: BIOPSY, LUNG;  Surgeon: [facility] Diagnostic Provider;  Location: [facility] OR;  Service: Radiology;  Laterality: N/A;  8 A.M. START  PHONE PREOP DONE 2020  PT/INR, CBC, CMP AND COVID ON AM OF PROCEDURE   ARRIVAL AT 7:00 FOR 8:30 APPT     OB History          1    Para   1    Term   1            AB        Living             SAB        IAB        Ectopic        Multiple        Live Births                   Review of Systems   Constitutional: Negative. Negative for chills, decreased appetite, diaphoresis, fever, malaise/fatigue, night sweats, weight gain and weight loss.   Eyes: Negative.    Cardiovascular:  Positive for dyspnea on exertion. Negative for chest pain, claudication, cyanosis, irregular heartbeat,  "leg swelling, near-syncope, orthopnea, palpitations, paroxysmal nocturnal dyspnea and syncope.   Respiratory:  Negative for cough, hemoptysis and shortness of breath.    Endocrine: Negative.    Hematologic/Lymphatic: Negative.    Skin:  Negative for color change, dry skin and nail changes.   Musculoskeletal: Negative.    Gastrointestinal: Negative.    Genitourinary: Negative.    Neurological:  Negative for weakness.       Objective:   Blood pressure (!) 160/90, pulse 67, height 5' 6" (1.676 m), weight 97.3 kg (214 lb 8.1 oz).body mass index is 34.62 kg/m².  Physical Exam  Vitals and nursing note reviewed.   Constitutional:       Appearance: She is well-developed.   HENT:      Head: Normocephalic.   Eyes:      Pupils: Pupils are equal, round, and reactive to light.   Neck:      Vascular: No JVD.   Cardiovascular:      Rate and Rhythm: Normal rate and regular rhythm.      Chest Wall: PMI is not displaced.      Pulses: Intact distal pulses.      Heart sounds: Normal heart sounds. No murmur heard.     No friction rub. No gallop.   Pulmonary:      Effort: Pulmonary effort is normal.      Breath sounds: Normal breath sounds. No wheezing or rales.   Abdominal:      General: Bowel sounds are normal.      Palpations: Abdomen is soft.   Musculoskeletal:      Cervical back: Neck supple.   Neurological:      Mental Status: She is alert and oriented to person, place, and time.         Labs:    Chemistry        Component Value Date/Time     09/27/2024 1320    K 4.0 09/27/2024 1320     09/27/2024 1320    CO2 23 09/27/2024 1320    BUN 23 (H) 09/27/2024 1320    CREATININE 1.0 09/27/2024 1320    GLU 93 09/27/2024 1320        Component Value Date/Time    CALCIUM 10.1 09/27/2024 1320    ALKPHOS 157 (H) 07/09/2024 1132    AST 20 07/09/2024 1132    ALT 16 07/09/2024 1132    BILITOT 0.7 07/09/2024 1132    ESTGFRAFRICA >60.0 07/25/2022 1022    EGFRNONAA >60.0 07/25/2022 1022          Magnesium   Date Value Ref Range Status "   11/12/2020 1.7 1.6 - 2.6 mg/dL Final     Lab Results   Component Value Date    WBC 4.54 09/27/2024    HGB 17.2 (H) 09/27/2024    HCT 50.9 (H) 09/27/2024     09/27/2024     Lab Results   Component Value Date    INR 1.0 07/11/2023    INR 1.0 01/11/2023    INR 1.0 07/20/2022     BNP   Date Value Ref Range Status   11/07/2020 28 0 - 99 pg/mL Final     Comment:     Values of less than 100 pg/ml are consistent with non-CHF populations.   08/03/2019 282 (H) 0 - 99 pg/mL Final     Comment:     Values of less than 100 pg/ml are consistent with non-CHF populations.   07/19/2019 999 (H) 0 - 99 pg/mL Final     Comment:     Values of less than 100 pg/ml are consistent with non-CHF populations.     LD   Date Value Ref Range Status   09/27/2024 225 110 - 260 U/L Final     Comment:     Results are increased in hemolyzed samples.   10/31/2022 188 110 - 260 U/L Final     Comment:     Results are increased in hemolyzed samples.   05/27/2021 230 110 - 260 U/L Final     Comment:     Results are increased in hemolyzed samples.         Assessment:      1. Cardiac sarcoidosis    2. Chronic diastolic heart failure    3. Type 2 diabetes mellitus with stage 2 chronic kidney disease, with long-term current use of insulin    4. Essential hypertension        Plan:   Mrs. Vital is a very pleasant 59 black female with known sarcoidosis on MTX and plaquenil with multiple comorbidities including type DM, Chronic Hep B and cryoglobulinemia  with confirmed cardiac sarcoidosis (by cardiac PET) but now with evidence of active disease on the last 2 cardiac PETs FDG.  Continue current cardiac regimen   Continue MTX and plaquenil  Recommend 2 gram sodium restriction and 1500cc fluid restriction.  Encourage physical activity with graded exercise program.  Requested patient to weigh themselves daily, and to notify us if their weight increases by more than 3 lbs in 1 day or 5 lbs in 1 week.   RTC in 6 months with labs   ACP docs completed and in  file      Brina Carranza MD

## 2025-03-28 NOTE — PATIENT INSTRUCTIONS
Get  your new prescription for lisinopril and take 1 Tab twice daily.   Talk to your PCP about a sleep study to rule out sleep apnea  Echo in 6 months

## 2025-07-16 DIAGNOSIS — E11.9 TYPE 2 DIABETES MELLITUS WITHOUT COMPLICATION: ICD-10-CM

## 2025-07-31 DIAGNOSIS — F32.2 CURRENT SEVERE EPISODE OF MAJOR DEPRESSIVE DISORDER WITHOUT PSYCHOTIC FEATURES WITHOUT PRIOR EPISODE: ICD-10-CM

## 2025-07-31 RX ORDER — TRAZODONE HYDROCHLORIDE 100 MG/1
100 TABLET ORAL NIGHTLY
Qty: 30 TABLET | Refills: 2 | Status: SHIPPED | OUTPATIENT
Start: 2025-07-31

## 2025-07-31 NOTE — TELEPHONE ENCOUNTER
Care Due:                  Date            Visit Type   Department     Provider  --------------------------------------------------------------------------------                                EP -                              PRIMARY      [facility]  Last Visit: 07-      CARE ([facility])   MEDICINE       Yuki Kim  Next Visit: None Scheduled  None         None Found                                                            Last  Test          Frequency    Reason                     Performed    Due Date  --------------------------------------------------------------------------------    Office Visit  15 months..  atorvastatin, carvediloL,   07-   10-                             insulin, pantoprazole,                             traZODone................    CMP.........  12 months..  atorvastatin.............  07- 07-    HBA1C.......  6 months...  insulin..................  11-   05-    Lipid Panel.  12 months..  atorvastatin.............  07- 07-    [facility] Embedded Care Due Messages. Reference number: [id number].   7/31/2025 6:36:09 PM CDT

## 2025-08-01 NOTE — TELEPHONE ENCOUNTER
Provider Staff:  Action required for this patient    Requires appointment   Requires labs      Please see care gap opportunities below in Care Due Message.    Thanks!  [facility]     Appointments      Date Provider   Last Visit   7/9/2024 Yuki Kim MD   Next Visit   Visit date not found Yuki Kim MD     Refill Decision Note   Kendy Vital  is requesting a refill authorization.  Brief Assessment and Rationale for Refill:  Approve     Medication Therapy Plan:         Comments:     Note composed:10:43 PM 07/31/2025

## 2025-08-14 ENCOUNTER — TELEPHONE (OUTPATIENT)
Dept: TRANSPLANT | Facility: CLINIC | Age: 60
End: 2025-08-14
Payer: MEDICAID

## 2025-08-14 DIAGNOSIS — I50.32 CHRONIC DIASTOLIC HEART FAILURE: Primary | ICD-10-CM

## 2025-08-14 DIAGNOSIS — B18.1 CHRONIC VIRAL HEPATITIS B WITHOUT DELTA AGENT AND WITHOUT COMA: Primary | ICD-10-CM

## 2025-08-20 DIAGNOSIS — E11.9 TYPE 2 DIABETES MELLITUS WITHOUT COMPLICATION: ICD-10-CM

## 2025-08-25 ENCOUNTER — TELEPHONE (OUTPATIENT)
Dept: HEPATOLOGY | Facility: CLINIC | Age: 60
End: 2025-08-25
Payer: MEDICAID

## 2025-08-26 ENCOUNTER — HOSPITAL ENCOUNTER (OUTPATIENT)
Dept: RADIOLOGY | Facility: HOSPITAL | Age: 60
Discharge: HOME | End: 2025-08-26
Attending: NURSE PRACTITIONER
Payer: MEDICAID

## 2025-08-26 DIAGNOSIS — B18.1 CHRONIC VIRAL HEPATITIS B WITHOUT DELTA AGENT AND WITHOUT COMA: ICD-10-CM

## 2025-08-26 PROCEDURE — 76705 ECHO EXAM OF ABDOMEN: CPT | Mod: TC,PO

## 2025-08-26 PROCEDURE — 76705 ECHO EXAM OF ABDOMEN: CPT | Mod: 26,,, | Performed by: RADIOLOGY

## 2025-08-27 ENCOUNTER — OFFICE VISIT (OUTPATIENT)
Dept: HEPATOLOGY | Facility: CLINIC | Age: 60
End: 2025-08-27
Payer: MEDICAID

## 2025-08-27 VITALS — WEIGHT: 217.38 LBS | HEIGHT: 66 IN | BODY MASS INDEX: 34.93 KG/M2

## 2025-08-27 DIAGNOSIS — Z12.31 OTHER SCREENING MAMMOGRAM: ICD-10-CM

## 2025-08-27 DIAGNOSIS — B18.1 CHRONIC VIRAL HEPATITIS B WITHOUT DELTA AGENT AND WITHOUT COMA: Primary | ICD-10-CM

## 2025-08-27 PROCEDURE — 4010F ACE/ARB THERAPY RXD/TAKEN: CPT | Mod: CPTII,,, | Performed by: NURSE PRACTITIONER

## 2025-08-27 PROCEDURE — 1160F RVW MEDS BY RX/DR IN RCRD: CPT | Mod: CPTII,,, | Performed by: NURSE PRACTITIONER

## 2025-08-27 PROCEDURE — 99214 OFFICE O/P EST MOD 30 MIN: CPT | Mod: S$PBB,,, | Performed by: NURSE PRACTITIONER

## 2025-08-27 PROCEDURE — 3046F HEMOGLOBIN A1C LEVEL >9.0%: CPT | Mod: CPTII,,, | Performed by: NURSE PRACTITIONER

## 2025-08-27 PROCEDURE — 99999 PR PBB SHADOW E&M-EST. PATIENT-LVL V: CPT | Mod: PBBFAC,,, | Performed by: NURSE PRACTITIONER

## 2025-08-27 PROCEDURE — 3008F BODY MASS INDEX DOCD: CPT | Mod: CPTII,,, | Performed by: NURSE PRACTITIONER

## 2025-08-27 PROCEDURE — 1159F MED LIST DOCD IN RCRD: CPT | Mod: CPTII,,, | Performed by: NURSE PRACTITIONER

## 2025-08-27 PROCEDURE — 99215 OFFICE O/P EST HI 40 MIN: CPT | Mod: PBBFAC | Performed by: NURSE PRACTITIONER

## 2025-08-27 RX ORDER — ENTECAVIR 0.5 MG/1
0.5 TABLET, FILM COATED ORAL DAILY
Qty: 30 TABLET | Refills: 6 | Status: SHIPPED | OUTPATIENT
Start: 2025-08-27

## (undated) DEVICE — Device

## (undated) DEVICE — SOL BETADINE 5%

## (undated) DEVICE — SYR SLIP TIP 1CC

## (undated) DEVICE — GLOVE BIOGEL SKINSENSE PI 7.5

## (undated) DEVICE — CASSETTE INFINITI